# Patient Record
Sex: FEMALE | Race: WHITE | NOT HISPANIC OR LATINO | Employment: OTHER | URBAN - METROPOLITAN AREA
[De-identification: names, ages, dates, MRNs, and addresses within clinical notes are randomized per-mention and may not be internally consistent; named-entity substitution may affect disease eponyms.]

---

## 2017-01-23 ENCOUNTER — HOSPITAL ENCOUNTER (OUTPATIENT)
Dept: MAMMOGRAPHY | Facility: CLINIC | Age: 77
Discharge: HOME | End: 2017-01-23
Attending: OBSTETRICS & GYNECOLOGY | Admitting: OBSTETRICS & GYNECOLOGY
Payer: COMMERCIAL

## 2017-01-23 DIAGNOSIS — Z12.31 VISIT FOR SCREENING MAMMOGRAM: ICD-10-CM

## 2017-01-23 PROCEDURE — 77063 BREAST TOMOSYNTHESIS BI: CPT

## 2017-01-23 PROCEDURE — G0202 SCR MAMMO BI INCL CAD: HCPCS

## 2017-02-22 ENCOUNTER — OFFICE VISIT (OUTPATIENT)
Dept: FAMILY MEDICINE | Facility: CLINIC | Age: 77
End: 2017-02-22

## 2017-02-22 VITALS
HEART RATE: 86 BPM | SYSTOLIC BLOOD PRESSURE: 120 MMHG | DIASTOLIC BLOOD PRESSURE: 80 MMHG | WEIGHT: 141 LBS | OXYGEN SATURATION: 97 % | RESPIRATION RATE: 16 BRPM | TEMPERATURE: 99.4 F | HEIGHT: 63 IN | BODY MASS INDEX: 24.98 KG/M2

## 2017-02-22 DIAGNOSIS — E03.8 OTHER SPECIFIED HYPOTHYROIDISM: ICD-10-CM

## 2017-02-22 DIAGNOSIS — J10.1 INFLUENZA A: Primary | ICD-10-CM

## 2017-02-22 LAB
FLUAV AG UPPER RESP QL IA.RAPID: POSITIVE
FLUBV AG UPPER RESP QL IA.RAPID: NEGATIVE

## 2017-02-22 PROCEDURE — 87804 INFLUENZA ASSAY W/OPTIC: CPT | Performed by: FAMILY MEDICINE

## 2017-02-22 PROCEDURE — 99213 OFFICE O/P EST LOW 20 MIN: CPT | Performed by: FAMILY MEDICINE

## 2017-02-22 RX ORDER — OSELTAMIVIR PHOSPHATE 75 MG/1
75 CAPSULE ORAL 2 TIMES DAILY
Qty: 10 CAPSULE | Refills: 0 | Status: SHIPPED | OUTPATIENT
Start: 2017-02-22 | End: 2017-03-15

## 2017-02-22 RX ORDER — BENZONATATE 100 MG/1
100 CAPSULE ORAL 3 TIMES DAILY PRN
Qty: 21 CAPSULE | Refills: 0 | Status: SHIPPED | OUTPATIENT
Start: 2017-02-22 | End: 2017-03-15

## 2017-02-22 RX ORDER — LEVOTHYROXINE SODIUM 50 UG/1
50 TABLET ORAL DAILY
Qty: 30 TABLET | Refills: 0 | Status: SHIPPED | OUTPATIENT
Start: 2017-02-22 | End: 2017-03-15

## 2017-02-22 NOTE — TELEPHONE ENCOUNTER
LES, the patient is due for an office visit. I have changed the quantity to #30 and put in a note that the patient is due for an ov.    Pending Prescriptions:                       Disp   Refills    levothyroxine (SYNTHROID/LEVOTHROID) 50 M*30 tab*0            Sig: Take 1 tablet (50 mcg) by mouth daily      Ready to be faxed when Rx is approved.    Please forward to the  so the can call the patient and help them set up an appointment.      Telephone Information:   Mobile [phone]         Thank-You,  Rocío

## 2017-02-22 NOTE — PROGRESS NOTES
SUBJECTIVE: 76 year old female complaining of nasal congestion and cough for 2 day(s).  Fever to 101 yesterday evening  The patient describes headache with each cough.   The patient denies a history of SOB, GI symptoms or pain.   Smoking history: No.   Relevant past medical history: positive for hypertension, elevated cholesterol and hypothyroid.    Past Surgical History   Procedure Laterality Date     C appendectomy  1955     C aff forearm/wrist surgery unlisted       orif radius fx distal left     Hc knee scope, diagnostic       Arthroscopy, Knee     Hc colonoscopy w snare removal tumor/polyp/lesion  9/03 & 3/2011     polyp removal during screening colonoscopy     Hc dilation/curettage diag/ther non ob  2006     D & C     C open tacho fixatn humeral shaft fx  12/2007     Dr Massey     Arthroplasty knee  10/25/2011     Procedure:ARTHROPLASTY KNEE; Right Total Knee Arthroplasty   ; Surgeon:BRYCE ATWOOD; Location: OR     Arthroplasty knee  6/2/2014     Procedure: ARTHROPLASTY KNEE;  Surgeon: Bryce Atwood MD;  Location:  OR     Current Outpatient Prescriptions   Medication     simvastatin (ZOCOR) 20 MG tablet     amLODIPine (NORVASC) 5 MG tablet     naproxen sodium (ANAPROX) 220 MG tablet     levothyroxine (SYNTHROID, LEVOTHROID) 50 MCG tablet     No current facility-administered medications for this visit.      .    OBJECTIVE: The patient appears healthy, alert, no distress, cooperative, smiling and fatigued.   EARS: negative  NOSE/SINUS: positive findings: mucosa erythematous and swollen, clear rhinorrhea   THROAT: normal, no tonsillar hypertrophy, no exudates present and post nasal drainage   NECK:Neck supple. No adenopathy. Thyroid symmetric, normal size,, Carotids without bruits.   CHEST: Clear with dry cough  Flu: positive A    ASSESSMENT:(J10.1) Influenza A  (primary encounter diagnosis)  Comment: follow handout  Plan: Influenza A and B (BFP), oseltamivir (TAMIFLU)         75 MG capsule,  benzonatate (TESSALON) 100 MG         capsule        Symptomatic care with decongestants, fluids, tylenol/advil prn. Use GUAIFENESIN  MG OR TBCR, 1 tab po BID (Twice per day), D: 20, R: 0 for congestion and cough.    In addition, I have suggested that the patient   monitor for symptoms of bacterial infection expecting slow gradual resolution of viral URI as the natural course.  Monitor for any worrisome symptoms.

## 2017-02-22 NOTE — PATIENT INSTRUCTIONS
Read flu handout and follow    Symptomatic care with decongestants, fluids, tylenol/advil prn. Use GUAIFENESIN  MG OR TBCR, 1 tab po BID (Twice per day), D: 20, R: 0 for congestion and cough.    In addition, I have suggested that the patient   monitor for symptoms of bacterial infection expecting slow gradual resolution of viral URI as the natural course.  Potential medication side effects were discussed with the patient; let me know if any occur.

## 2017-02-22 NOTE — NURSING NOTE
Patient is here for a cough and congestion that started on Sunday - she is now having sinus pressure and headaches.  Pre-Visit Screening not done today.  Pulse - irregular  My Chart - declines    CLASSIFICATION OF OVERWEIGHT AND OBESITY BY BMI                         Obesity Class           BMI(kg/m2)  Underweight                                    < 18.5  Normal                                         18.5-24.9  Overweight                                     25.0-29.9  OBESITY                     I                  30.0-34.9                              II                 35.0-39.9  EXTREME OBESITY             III                >40                             Patient's  BMI Body mass index is 24.98 kg/(m^2).  http://hin.nhlbi.nih.gov/menuplanner/menu.cgi  Questioned patient about current smoking habits.  Pt. has never smoked.

## 2017-02-22 NOTE — TELEPHONE ENCOUNTER
Please let the patient know that a 30 day refill has been sent for their prescription.  They are due for a return fasting office visit within 30 days.  Failure to schedule an appointment may result in no further refills of his/her medication.

## 2017-02-22 NOTE — MR AVS SNAPSHOT
After Visit Summary   2/22/2017    Kimberley Mullins    MRN: [id number]           Patient Information     Date Of Birth          1940        Visit Information        Provider Department      2/22/2017 10:00 AM Sheron Armenta MD [facility]        Today's Diagnoses     Influenza A    -  1      Care Instructions    Read flu handout and follow    Symptomatic care with decongestants, fluids, tylenol/advil prn. Use GUAIFENESIN  MG OR TBCR, 1 tab po BID (Twice per day), D: 20, R: 0 for congestion and cough.    In addition, I have suggested that the patient   monitor for symptoms of bacterial infection expecting slow gradual resolution of viral URI as the natural course.  Potential medication side effects were discussed with the patient; let me know if any occur.          Follow-ups after your visit        Follow-up notes from your care team     Return if symptoms worsen or fail to improve.      Who to contact     If you have questions or need follow up information about today's clinic visit or your schedule please contact [facility] directly at [phone].  Normal or non-critical lab and imaging results will be communicated to you by MyChart, letter or phone within 4 business days after the clinic has received the results. If you do not hear from us within 7 days, please contact the clinic through MyChart or phone. If you have a critical or abnormal lab result, we will notify you by phone as soon as possible.  Submit refill requests through [employer] or call your pharmacy and they will forward the refill request to us. Please allow 3 business days for your refill to be completed.          Additional Information About Your Visit        Care EveryWhere ID     This is your Care EveryWhere ID. This could be used by other organizations to access your [facility] medical records  [id number]        Your Vitals Were     Pulse Temperature Respirations Height  "Pulse Oximetry BMI (Body Mass Index)    86 99.4  F (37.4  C) (Oral) 16 1.6 m (5' 3\") 97% 24.98 kg/m2       Blood Pressure from Last 3 Encounters:   02/22/17 120/80   10/10/16 102/66   07/20/16 110/70    Weight from Last 3 Encounters:   02/22/17 64 kg (141 lb)   10/10/16 63 kg (139 lb)   07/20/16 64.4 kg (142 lb)              We Performed the Following     Influenza A and B (BFP)          Today's Medication Changes          These changes are accurate as of: 2/22/17 11:02 AM.  If you have any questions, ask your nurse or doctor.               Start taking these medicines.        Dose/Directions    benzonatate 100 MG capsule   Commonly known as:  TESSALON   Used for:  Influenza A   Started by:  Sheron Armenta MD        Dose:  100 mg   Take 1 capsule (100 mg) by mouth 3 times daily as needed   Quantity:  21 capsule   Refills:  0       oseltamivir 75 MG capsule   Commonly known as:  TAMIFLU   Used for:  Influenza A   Started by:  Sheron Armenta MD        Dose:  75 mg   Take 1 capsule (75 mg) by mouth 2 times daily   Quantity:  10 capsule   Refills:  0            Where to get your medicines      These medications were sent to [place] [address] [facility] [address]  [address] [facility] [address]     Phone:  [phone]     benzonatate 100 MG capsule    levothyroxine 50 MCG tablet    oseltamivir 75 MG capsule                Primary Care Provider Office Phone # Fax #    Sheron Armenta [phone]       [place] [address] [facility]  [address]  [facility] [address]        Thank you!     Thank you for choosing [facility]  for your care. Our goal is always to provide you with excellent care. Hearing back from our patients is one way we can continue to improve our services. Please take a few minutes to complete the written survey that you may receive in the mail after your visit with us. Thank you!             Your Updated Medication List - " Protect others around you: Learn how to safely use, store and throw away your medicines at www.disposemymeds.org.          This list is accurate as of: 2/22/17 11:02 AM.  Always use your most recent med list.                   Brand Name Dispense Instructions for use    amLODIPine 5 MG tablet    NORVASC    90 tablet    Take 1 tablet (5 mg) by mouth daily       benzonatate 100 MG capsule    TESSALON    21 capsule    Take 1 capsule (100 mg) by mouth 3 times daily as needed       levothyroxine 50 MCG tablet    SYNTHROID/LEVOTHROID    30 tablet    Take 1 tablet (50 mcg) by mouth daily       naproxen sodium 220 MG tablet    ANAPROX     Take 1 tablet (220 mg) by mouth 2 times daily (with meals)       oseltamivir 75 MG capsule    TAMIFLU    10 capsule    Take 1 capsule (75 mg) by mouth 2 times daily       simvastatin 20 MG tablet    ZOCOR    90 tablet    Take 1 tablet (20 mg) by mouth At Bedtime

## 2017-03-15 ENCOUNTER — OFFICE VISIT (OUTPATIENT)
Dept: FAMILY MEDICINE | Facility: CLINIC | Age: 77
End: 2017-03-15

## 2017-03-15 VITALS
OXYGEN SATURATION: 97 % | BODY MASS INDEX: 24.45 KG/M2 | HEART RATE: 68 BPM | WEIGHT: 138 LBS | SYSTOLIC BLOOD PRESSURE: 122 MMHG | TEMPERATURE: 98.4 F | DIASTOLIC BLOOD PRESSURE: 82 MMHG | HEIGHT: 63 IN | RESPIRATION RATE: 12 BRPM

## 2017-03-15 DIAGNOSIS — I10 BENIGN ESSENTIAL HYPERTENSION: ICD-10-CM

## 2017-03-15 DIAGNOSIS — E03.8 OTHER SPECIFIED HYPOTHYROIDISM: ICD-10-CM

## 2017-03-15 DIAGNOSIS — Z76.0 ENCOUNTER FOR MEDICATION REFILL: ICD-10-CM

## 2017-03-15 DIAGNOSIS — E78.2 MIXED HYPERLIPIDEMIA: Primary | ICD-10-CM

## 2017-03-15 LAB — HEMOGLOBIN: 13.2 G/DL (ref 11.7–15.7)

## 2017-03-15 PROCEDURE — 85018 HEMOGLOBIN: CPT | Performed by: FAMILY MEDICINE

## 2017-03-15 PROCEDURE — 80061 LIPID PANEL: CPT | Mod: 90 | Performed by: FAMILY MEDICINE

## 2017-03-15 PROCEDURE — 84443 ASSAY THYROID STIM HORMONE: CPT | Mod: 90 | Performed by: FAMILY MEDICINE

## 2017-03-15 PROCEDURE — 36415 COLL VENOUS BLD VENIPUNCTURE: CPT | Performed by: FAMILY MEDICINE

## 2017-03-15 PROCEDURE — 99214 OFFICE O/P EST MOD 30 MIN: CPT | Performed by: FAMILY MEDICINE

## 2017-03-15 PROCEDURE — 80053 COMPREHEN METABOLIC PANEL: CPT | Mod: 90 | Performed by: FAMILY MEDICINE

## 2017-03-15 RX ORDER — LEVOTHYROXINE SODIUM 50 UG/1
50 TABLET ORAL DAILY
Qty: 90 TABLET | Refills: 3 | Status: SHIPPED | OUTPATIENT
Start: 2017-03-15 | End: 2018-03-14

## 2017-03-15 RX ORDER — SIMVASTATIN 20 MG
20 TABLET ORAL AT BEDTIME
Qty: 90 TABLET | Refills: 1 | Status: SHIPPED | OUTPATIENT
Start: 2017-03-15 | End: 2017-09-18

## 2017-03-15 RX ORDER — AMLODIPINE BESYLATE 5 MG/1
5 TABLET ORAL DAILY
Qty: 90 TABLET | Refills: 1 | Status: SHIPPED | OUTPATIENT
Start: 2017-03-15 | End: 2017-09-18

## 2017-03-15 NOTE — MR AVS SNAPSHOT
After Visit Summary   3/15/2017    Kimberley Mullins    MRN: [id number]           Patient Information     Date Of Birth          1940        Visit Information        Provider Department      3/15/2017 8:50 AM Sheron Armenta MD [facility]        Today's Diagnoses     Mixed hyperlipidemia    -  1    Benign essential hypertension        Other specified hypothyroidism        Encounter for medication refill          Care Instructions    HGB WNL    1)  Medication: continue current medication regimen unchanged  2)  Low fat, low cholesterol diet and low salt  3)  Regular aerobic exercise and weight loss  4)  Recheck in 6 months, sooner should new symptoms or   problems arise.    Patient Education: Reviewed risks of elevated lipids and principles   of treatment.            Follow-ups after your visit        Follow-up notes from your care team     Return in about 6 months (around 9/15/2017).      Who to contact     If you have questions or need follow up information about today's clinic visit or your schedule please contact [facility] directly at [phone].  Normal or non-critical lab and imaging results will be communicated to you by MyChart, letter or phone within 4 business days after the clinic has received the results. If you do not hear from us within 7 days, please contact the clinic through [employer] or phone. If you have a critical or abnormal lab result, we will notify you by phone as soon as possible.  Submit refill requests through [employer] or call your pharmacy and they will forward the refill request to us. Please allow 3 business days for your refill to be completed.          Additional Information About Your Visit        Care EveryWhere ID     This is your Care EveryWhere ID. This could be used by other organizations to access your [facility] medical records  [id number]        Your Vitals Were     Pulse Temperature Respirations Height Pulse  "Oximetry BMI (Body Mass Index)    68 98.4  F (36.9  C) (Oral) 12 1.588 m (5' 2.5\") 97% 24.84 kg/m2       Blood Pressure from Last 3 Encounters:   03/15/17 122/82   02/22/17 120/80   10/10/16 102/66    Weight from Last 3 Encounters:   03/15/17 62.6 kg (138 lb)   02/22/17 64 kg (141 lb)   10/10/16 63 kg (139 lb)              We Performed the Following     COMPREHENSIVE METABOLIC PANEL     HEMOGLOBIN [65598.000]     LIPID PANEL     TSH ([facility])     VENIPUNC FNGR,HEEL,EAR [[id number]]          Where to get your medicines      These medications were sent to [place] [address] [facility] - [place], [place] - [address]  [address] [facility] [address]     Phone:  [phone]     amLODIPine 5 MG tablet    levothyroxine 50 MCG tablet    simvastatin 20 MG tablet          Primary Care Provider Office Phone # Fax #    Sheron Armenta [phone]       [facility] [address]  [address]  [facility] [address]        Thank you!     Thank you for choosing [facility]  for your care. Our goal is always to provide you with excellent care. Hearing back from our patients is one way we can continue to improve our services. Please take a few minutes to complete the written survey that you may receive in the mail after your visit with us. Thank you!             Your Updated Medication List - Protect others around you: Learn how to safely use, store and throw away your medicines at www.disposemymeds.org.          This list is accurate as of: 3/15/17 10:27 AM.  Always use your most recent med list.                   Brand Name Dispense Instructions for use    amLODIPine 5 MG tablet    NORVASC    90 tablet    Take 1 tablet (5 mg) by mouth daily       levothyroxine 50 MCG tablet    SYNTHROID/LEVOTHROID    90 tablet    Take 1 tablet (50 mcg) by mouth daily       simvastatin 20 MG tablet    ZOCOR    90 tablet    Take 1 tablet (20 mg) by mouth At Bedtime         "

## 2017-03-15 NOTE — NURSING NOTE
Patient is here for a recheck of their medication.  Pre-Visit Screening :  Immunizations : up to date    Colonoscopy : NO LONGER NEEDS PER PT   Mammogram : THROUGH GYN  Asthma Action Test/Plan : NA  PHQ9/GAD7 :  NA  Pulse - regular    Medication Reconciliation: complete      CLASSIFICATION OF OVERWEIGHT AND OBESITY BY BMI                         Obesity Class           BMI(kg/m2)  Underweight                                    < 18.5  Normal                                         18.5-24.9  Overweight                                     25.0-29.9  OBESITY                     I                  30.0-34.9                              II                 35.0-39.9  EXTREME OBESITY             III                >40                             Patient's  BMI Body mass index is 24.84 kg/(m^2).  http://hin.nhlbi.nih.gov/menuplanner/menu.cgi  Questioned patient about current smoking habits.  Pt. has never smoked.

## 2017-03-15 NOTE — PROGRESS NOTES
"SUBJECTIVE:  Kimberley Mullins is an 76 year old female who presents for evaluation of   Hyperlipidemia, hypertension and hypothyroid. She has been diagnosed in the past as having   combined hyperlipidemia. She indicates that she is feeling well   and denies any symptoms of cardiovascular disease. Specifically   denies chest pain, palpitations, dyspnea, orthopnea, PND,   claudication or peripheral edema. Treatment modalities employed to   this point include diet, regular aerobic exercise and Zocor. Current medication   regimen is as listed below. Patient denies any side effects of   medication.    Family history: positive for hypertension, cardiovascular disease and elevated cholesterol  Age at diagnosis of hyperlipidemia: 59 and hypertension age 73  Cardiovascular risk factors: family history, lipids, hypertension, sedentary life style and stress    Current Outpatient Prescriptions   Medication     levothyroxine (SYNTHROID/LEVOTHROID) 50 MCG tablet     simvastatin (ZOCOR) 20 MG tablet     amLODIPine (NORVASC) 5 MG tablet     No current facility-administered medications for this visit.      Allergies   Allergen Reactions     No Known Allergies        Social History   Substance Use Topics     Smoking status: Never Smoker     Smokeless tobacco: Never Used     Alcohol use 3.5 oz/week     7 drink(s) per week      Comment: two to three glasses of wine q day.       OBJECTIVE:  /82 (BP Location: Right arm, Patient Position: Chair, Cuff Size: Adult Regular)  Pulse 68  Temp 98.4  F (36.9  C) (Oral)  Ht 1.588 m (5' 2.5\")  Wt 62.6 kg (138 lb)  SpO2 97%  BMI 24.84 kg/m2  Repeat BP R arm seated = 122/82  with regular size cuff.  Skin: negative  Fundi: deferred  Lungs: negative, Percussion normal. Good diaphragmatic excursion. Lungs clear  Heart: negative, PMI normal. No lifts, heaves, or thrills. RRR. No murmurs, clicks gallops or rub  Peripheral pulses: radial=4/4, femoral=4/4, popliteal=4/4, dorsalis pedis=4/4,  Abd: The " abdomen is soft without tenderness, guarding, mass or organomegaly. Bowel sounds are normal. No CVA tenderness or inguinal adenopathy noted.  BMI : Body mass index is 24.84 kg/(m^2).    ASSESSMENT:  (E78.2) Mixed hyperlipidemia  (primary encounter diagnosis)  Plan: VENIPUNC FNGR,HEEL,EAR [06277], LIPID PANEL,         simvastatin (ZOCOR) 20 MG tablet        1)  Medication: continue current medication regimen unchanged  2)  Low fat, low cholesterol diet  3)  Regular aerobic exercise  4)  Recheck in 6 months, sooner should new symptoms or   problems arise.    Patient Education: Reviewed risks of elevated lipids and principles   of treatment.        (I10) Benign essential hypertension  Plan: VENIPUNC FNGR,HEEL,EAR [06232], COMPREHENSIVE         METABOLIC PANEL, amLODIPine (NORVASC) 5 MG         tablet        1)  Medication: continue current medication regimen unchanged  2)  Dietary sodium restriction  3)  Regular aerobic exercise  4)  Recheck in 6 months, sooner should new symptoms or   problems arise.    Patient Education: Reviewed risks of hypertension and principles of   treatment.        (E03.8) Other specified hypothyroidism  Plan: VENIPUNC FNGR,HEEL,EAR [87108], HEMOGLOBIN         [96464.000], TSH ([facility]), levothyroxine         (SYNTHROID/LEVOTHROID) 50 MCG tablet        Await labs    (Z76.0) Encounter for medication refill  Plan: VENIPUNC FNGR,HEEL,EAR [06657], HEMOGLOBIN         [78807.000], LIPID PANEL, COMPREHENSIVE         METABOLIC PANEL, TSH ([facility])

## 2017-03-15 NOTE — PATIENT INSTRUCTIONS
HGB WNL    1)  Medication: continue current medication regimen unchanged  2)  Low fat, low cholesterol diet and low salt  3)  Regular aerobic exercise and weight loss  4)  Recheck in 6 months, sooner should new symptoms or   problems arise.    Patient Education: Reviewed risks of elevated lipids and principles   of treatment.

## 2017-03-15 NOTE — LETTER
[facility]  [facility] [address]  [place]  [address]    March 16, 2017        Kimberley Mullins  [address] [facility] [address]              Dear Kimberley Mullins      LAB RESULTS:     The results of your recent Comprehensive profile, Lipid profile and Thyroid Function were NORMAL.  If you have any further questions or problems, please contact our office at [phone].          Sheron Armenta MD

## 2017-03-16 LAB
ALBUMIN SERPL-MCNC: 4 G/DL (ref 3.6–5.1)
ALBUMIN/GLOB SERPL: 1.6 (CALC) (ref 1–2.5)
ALP SERPL-CCNC: 63 U/L (ref 33–130)
ALT SERPL-CCNC: 16 U/L (ref 6–29)
AST SERPL-CCNC: 20 U/L (ref 10–35)
BILIRUB SERPL-MCNC: 0.6 MG/DL (ref 0.2–1.2)
BUN SERPL-MCNC: 9 MG/DL (ref 7–25)
BUN/CREATININE RATIO: NORMAL (CALC) (ref 6–22)
CALCIUM SERPL-MCNC: 9.1 MG/DL (ref 8.6–10.4)
CHLORIDE SERPLBLD-SCNC: 104 MMOL/L (ref 98–110)
CHOLEST SERPL-MCNC: 170 MG/DL (ref 125–200)
CHOLEST/HDLC SERPL: 2.6 (CALC)
CO2 SERPL-SCNC: 26 MMOL/L (ref 20–31)
CREAT SERPL-MCNC: 0.61 MG/DL (ref 0.6–0.93)
EGFR AFRICAN AMERICAN - QUEST: 101 ML/MIN/1.73M2
GFR SERPL CREATININE-BSD FRML MDRD: 87 ML/MIN/1.73M2
GLOBULIN, CALCULATED - QUEST: 2.5 G/DL (CALC) (ref 1.9–3.7)
GLUCOSE - QUEST: 87 MG/DL (ref 65–99)
HDLC SERPL-MCNC: 65 MG/DL
LDLC SERPL CALC-MCNC: 86 MG/DL (CALC)
NONHDLC SERPL-MCNC: 105 MG/DL (CALC)
POTASSIUM SERPL-SCNC: 4.2 MMOL/L (ref 3.5–5.3)
PROT SERPL-MCNC: 6.5 G/DL (ref 6.1–8.1)
SODIUM SERPL-SCNC: 140 MMOL/L (ref 135–146)
TRIGL SERPL-MCNC: 96 MG/DL
TSH SERPL-ACNC: 1.69 MIU/L (ref 0.4–4.5)

## 2017-06-05 ENCOUNTER — OFFICE VISIT (OUTPATIENT)
Dept: FAMILY MEDICINE | Facility: CLINIC | Age: 77
End: 2017-06-05

## 2017-06-05 VITALS
DIASTOLIC BLOOD PRESSURE: 70 MMHG | HEART RATE: 76 BPM | RESPIRATION RATE: 16 BRPM | SYSTOLIC BLOOD PRESSURE: 110 MMHG | WEIGHT: 140 LBS | OXYGEN SATURATION: 97 % | HEIGHT: 63 IN | BODY MASS INDEX: 24.8 KG/M2 | TEMPERATURE: 98.2 F

## 2017-06-05 DIAGNOSIS — J34.2 DEVIATED NASAL SEPTUM: ICD-10-CM

## 2017-06-05 DIAGNOSIS — R04.0 NASAL BLEEDING: Primary | ICD-10-CM

## 2017-06-05 LAB
% GRANULOCYTES: 48.5 %
HCT VFR BLD AUTO: 42.8 % (ref 35–47)
HEMOGLOBIN: 14.2 G/DL (ref 11.7–15.7)
INR POINT OF CARE: 0.9 (ref 0.9–1.1)
LYMPHOCYTES NFR BLD AUTO: 37.9 %
MCH RBC QN AUTO: 31.9 PG (ref 26–33)
MCHC RBC AUTO-ENTMCNC: 33.2 G/DL (ref 31–36)
MCV RBC AUTO: 96.1 FL (ref 78–100)
MONOCYTES NFR BLD AUTO: 13.6 %
PLATELET COUNT - QUEST: 142 10^9/L (ref 150–375)
RBC # BLD AUTO: 4.45 10*12/L (ref 3.8–5.2)
WBC # BLD AUTO: 7.1 10*9/L (ref 4–11)

## 2017-06-05 PROCEDURE — 99213 OFFICE O/P EST LOW 20 MIN: CPT | Performed by: FAMILY MEDICINE

## 2017-06-05 PROCEDURE — 85025 COMPLETE CBC W/AUTO DIFF WBC: CPT | Performed by: FAMILY MEDICINE

## 2017-06-05 PROCEDURE — 36415 COLL VENOUS BLD VENIPUNCTURE: CPT | Performed by: FAMILY MEDICINE

## 2017-06-05 PROCEDURE — 85610 PROTHROMBIN TIME: CPT | Performed by: FAMILY MEDICINE

## 2017-06-05 NOTE — NURSING NOTE
Patient is here to talk about a couple of nose bleeds she has had recently.  Pre-Visit Screening :  Immunizations : up to date    Colonoscopy : na  Mammogram : na  Asthma Action Test/Plan : na  PHQ9/GAD7 :  Na  Pulse - regular  My Chart - declines    CLASSIFICATION OF OVERWEIGHT AND OBESITY BY BMI                         Obesity Class           BMI(kg/m2)  Underweight                                    < 18.5  Normal                                         18.5-24.9  Overweight                                     25.0-29.9  OBESITY                     I                  30.0-34.9                              II                 35.0-39.9  EXTREME OBESITY             III                >40                             Patient's  BMI Body mass index is 25.2 kg/(m^2).  http://hin.nhlbi.nih.gov/menuplanner/menu.cgi  Questioned patient about current smoking habits.  Pt. has never smoked.

## 2017-06-05 NOTE — PROGRESS NOTES
SUBJECTIVE: 76 year old female complaining of nose bleed down the back of her throat at 3 am awaking her from sleep 5 days ago. Squeezed her nose and it stopped quickly.  No issue for 2 days. 2 night s ago laye down and nose bleed down the back of throat/ gone quickly with nose squeeze for 2 minutes.   The patient describes left nostril feel raw.   The patient denies a history of pain, trauma or UR symptoms. Was using Flonase but not for a few weeks.   Smoking history: No.   Relevant past medical history: positive for left septum nasal deviation, hypertension, elevated cholesterol and hypothyroid.    OBJECTIVE: The patient appears healthy, alert, no distress, cooperative and smiling.   EARS: negative  NOSE/SINUS: positive findings: nasal septal deviation to left No mucosa changes/ no bleeding noted  THROAT: normal   NECK:Neck supple. No adenopathy. Thyroid symmetric, normal size,, Carotids without bruits.   CHEST: Clear    INR: WNL  CBC: WNL    ASSESSMENT:(R04.0) Nasal bleeding  (primary encounter diagnosis)  Comment: start daily bacitracin ointment to both nares  Plan: CL AFF HEMOGRAM/PLATE/DIFF (BFP), VENOUS         COLLECTION, CL BFP PROTHROMBIN TIME/INR (BFP),         OTOLARYNGOLOGY REFERRAL        Stop Flonase and monitor.    Appointment this week with Dr fish , ENT scheduled. Thursday 1:10 PM    (J34.2) Deviated nasal septum  Plan: OTOLARYNGOLOGY REFERRAL

## 2017-06-05 NOTE — PATIENT INSTRUCTIONS
Nasal bleeding  (primary encounter diagnosis)  Comment: start daily bacitracin ointment to both nares  Plan: CL AFF HEMOGRAM/PLATE/DIFF (BFP), VENOUS         COLLECTION, CL BFP PROTHROMBIN TIME/INR (BFP),         OTOLARYNGOLOGY REFERRAL        Stop Flonase and monitor.    Appointment this week with Dr fish , ENT scheduled. Thursday 1:10 PM    (J34.2) Deviated nasal septum  Plan: OTOLARYNGOLOGY REFERRAL

## 2017-06-05 NOTE — MR AVS SNAPSHOT
After Visit Summary   6/5/2017    Kimberley Mullins    MRN: [id number]           Patient Information     Date Of Birth          1940        Visit Information        Provider Department      6/5/2017 12:30 PM Sheron Armenta MD [facility]        Today's Diagnoses     Nasal bleeding    -  1    Deviated nasal septum          Care Instructions    Nasal bleeding  (primary encounter diagnosis)  Comment: start daily bacitracin ointment to both nares  Plan: CL AFF HEMOGRAM/PLATE/DIFF (BFP), VENOUS         COLLECTION, CL BFP PROTHROMBIN TIME/INR (BFP),         OTOLARYNGOLOGY REFERRAL        Stop Flonase and monitor.    Appointment this week with Dr fish , ENT scheduled. Thursday 1:10 PM    (J34.2) Deviated nasal septum  Plan: OTOLARYNGOLOGY REFERRAL            Follow-ups after your visit        Additional Services     OTOLARYNGOLOGY REFERRAL       Your provider has referred you to: N: [facility] [phone]   [email or web address]    Please be aware that coverage of these services is subject to the terms and limitations of your health insurance plan.  Call member services at your health plan with any benefit or coverage questions.      Please bring the following with you to your appointment:    (1) Any X-Rays, CTs or MRIs which have been performed.  Contact the facility where they were done to arrange for  prior to your scheduled appointment.   (2) List of current medications  (3) This referral request   (4) Any documents/labs given to you for this referral                  Follow-up notes from your care team     Return if symptoms worsen or fail to improve.      Who to contact     If you have questions or need follow up information about today's clinic visit or your schedule please contact [facility] directly at [phone].  Normal or non-critical lab and imaging results will be communicated to you by  "MyChart, letter or phone within 4 business days after the clinic has received the results. If you do not hear from us within 7 days, please contact the clinic through MyChart or phone. If you have a critical or abnormal lab result, we will notify you by phone as soon as possible.  Submit refill requests through [employer] or call your pharmacy and they will forward the refill request to us. Please allow 3 business days for your refill to be completed.          Additional Information About Your Visit        Care EveryWhere ID     This is your Care EveryWhere ID. This could be used by other organizations to access your [facility] medical records  [id number]        Your Vitals Were     Pulse Temperature Respirations Height Pulse Oximetry BMI (Body Mass Index)    76 98.2  F (36.8  C) (Oral) 16 1.588 m (5' 2.5\") 97% 25.2 kg/m2       Blood Pressure from Last 3 Encounters:   06/05/17 110/70   03/15/17 122/82   02/22/17 120/80    Weight from Last 3 Encounters:   06/05/17 63.5 kg (140 lb)   03/15/17 62.6 kg (138 lb)   02/22/17 64 kg (141 lb)              We Performed the Following     CL AFF HEMOGRAM/PLATE/DIFF (BFP)     CL BFP PROTHROMBIN TIME/INR (BFP)     OTOLARYNGOLOGY REFERRAL     VENOUS COLLECTION        Primary Care Provider Office Phone # Fax #    Sheron Armenta [phone]       [place] [address] [address] [facility]  [address]  [facility] [address]        Thank you!     Thank you for choosing [facility]  for your care. Our goal is always to provide you with excellent care. Hearing back from our patients is one way we can continue to improve our services. Please take a few minutes to complete the written survey that you may receive in the mail after your visit with us. Thank you!             Your Updated Medication List - Protect others around you: Learn how to safely use, store and throw away your medicines at www.disposemymeds.org.          This list is accurate as of: " 6/5/17  2:39 PM.  Always use your most recent med list.                   Brand Name Dispense Instructions for use    amLODIPine 5 MG tablet    NORVASC    90 tablet    Take 1 tablet (5 mg) by mouth daily       levothyroxine 50 MCG tablet    SYNTHROID/LEVOTHROID    90 tablet    Take 1 tablet (50 mcg) by mouth daily       simvastatin 20 MG tablet    ZOCOR    90 tablet    Take 1 tablet (20 mg) by mouth At Bedtime

## 2017-06-08 ENCOUNTER — TRANSFERRED RECORDS (OUTPATIENT)
Dept: FAMILY MEDICINE | Facility: CLINIC | Age: 77
End: 2017-06-08

## 2017-08-04 ENCOUNTER — OFFICE VISIT (OUTPATIENT)
Dept: FAMILY MEDICINE | Facility: CLINIC | Age: 77
End: 2017-08-04

## 2017-08-04 VITALS
RESPIRATION RATE: 14 BRPM | BODY MASS INDEX: 26.31 KG/M2 | WEIGHT: 143 LBS | HEART RATE: 89 BPM | TEMPERATURE: 98.1 F | OXYGEN SATURATION: 98 % | SYSTOLIC BLOOD PRESSURE: 122 MMHG | HEIGHT: 62 IN | DIASTOLIC BLOOD PRESSURE: 80 MMHG

## 2017-08-04 DIAGNOSIS — M25.512 LEFT SHOULDER PAIN, UNSPECIFIED CHRONICITY: Primary | ICD-10-CM

## 2017-08-04 LAB — ERYTHROCYTE [SEDIMENTATION RATE] IN BLOOD: 19 MM/HR (ref 0–20)

## 2017-08-04 PROCEDURE — 99213 OFFICE O/P EST LOW 20 MIN: CPT | Performed by: FAMILY MEDICINE

## 2017-08-04 PROCEDURE — 85651 RBC SED RATE NONAUTOMATED: CPT | Performed by: FAMILY MEDICINE

## 2017-08-04 PROCEDURE — 36415 COLL VENOUS BLD VENIPUNCTURE: CPT | Performed by: FAMILY MEDICINE

## 2017-08-04 NOTE — NURSING NOTE
Patient is here for a pain in her L shoulder - NKI - started a few d ago. Seems to be worse in the AM.  Pre-Visit Screening :  Immunizations : up to date    Colonoscopy : No longer needed due to age  Mammogram : No longer needed due to age  Asthma Action Test/Plan : na  PHQ9/GAD7 :  Na  Pulse - regular  My Chart - declines    CLASSIFICATION OF OVERWEIGHT AND OBESITY BY BMI                         Obesity Class           BMI(kg/m2)  Underweight                                    < 18.5  Normal                                         18.5-24.9  Overweight                                     25.0-29.9  OBESITY                     I                  30.0-34.9                              II                 35.0-39.9  EXTREME OBESITY             III                >40                             Patient's  BMI Body mass index is 26.16 kg/(m^2).  http://hin.nhlbi.nih.gov/menuplanner/menu.cgi  Questioned patient about current smoking habits.  Pt. has never smoked.

## 2017-08-04 NOTE — PROGRESS NOTES
SUBJECTIVE: 76 year old female complaining of left shoulder pain worse in the morning for 1 week(s).   The patient describes worse in the morning after sleeping on that side all night. Better a she goes through her day.   The patient denies a history of injury or neurological symptoms.   Smoking history: No.   Relevant past medical history: positive for surgery after a fracture in that humerus/ past polymyalgia rheumatic diagnosis .    OBJECTIVE: The patient appears healthy, alert, no distress, cooperative and smiling.   EARS: negative  NOSE/SINUS: Nares normal. Septum midline. Mucosa normal. No drainage or sinus tenderness.   THROAT: normal   NECK:Neck supple. No adenopathy. Thyroid symmetric, normal size,, Carotids without bruits.   CHEST: Clear  EXT: Shoulder exam shows a normal shoulder musculature with no atrophy.  Abduction is full and painless, patient can get arm over head.  Testing the rotator cuff specifically finds no special weakness of internal or external rotation.  The A-C joint is non tender and the periscapular musculature is normal. The hands reveal normal motor, sensory, vascular and tendon function.    Sed rate: 19/ called cell phone with results    ASSESSMENT: (M21.566) Left shoulder pain, unspecified chronicity  (primary encounter diagnosis)  Comment: change sleeping behavior  Plan: ESR, ALEXANDRE (BFP), VENOUS COLLECTION        Monitor.

## 2017-08-04 NOTE — MR AVS SNAPSHOT
"              After Visit Summary   8/4/2017    Kimberley Mullins    MRN: [id number]           Patient Information     Date Of Birth          1940        Visit Information        Provider Department      8/4/2017 1:45 PM Sheron Armenta MD [facility]        Today's Diagnoses     Left shoulder pain, unspecified chronicity    -  1      Care Instructions    Await labs    Change sleeping behavior/ consider a follow up wit DR Massey, Orthopedics            Follow-ups after your visit        Who to contact     If you have questions or need follow up information about today's clinic visit or your schedule please contact [facility] directly at [phone].  Normal or non-critical lab and imaging results will be communicated to you by MyChart, letter or phone within 4 business days after the clinic has received the results. If you do not hear from us within 7 days, please contact the clinic through MyChart or phone. If you have a critical or abnormal lab result, we will notify you by phone as soon as possible.  Submit refill requests through [employer] or call your pharmacy and they will forward the refill request to us. Please allow 3 business days for your refill to be completed.          Additional Information About Your Visit        Care EveryWhere ID     This is your Care EveryWhere ID. This could be used by other organizations to access your [facility] medical records  [id number]        Your Vitals Were     Pulse Temperature Respirations Height Pulse Oximetry BMI (Body Mass Index)    89 98.1  F (36.7  C) (Oral) 14 1.575 m (5' 2\") 98% 26.16 kg/m2       Blood Pressure from Last 3 Encounters:   08/04/17 122/80   06/05/17 110/70   03/15/17 122/82    Weight from Last 3 Encounters:   08/04/17 64.9 kg (143 lb)   06/05/17 63.5 kg (140 lb)   03/15/17 62.6 kg (138 lb)              We Performed the Following     ESR, WESTERGREN (BFP)     VENOUS COLLECTION        Primary Care " Provider Office Phone # Fax #    Sheron Armenta [phone]       [facility] [address]  [address]  [facility] [address]        Equal Access to Services     MARINE SCHULTZ : Paula saunders suzio Soalyali, waaxda luqadaha, qaybta kaalmada adeegyada, marianne carrolln denis skinner rashad palma. So [facility] [phone].    ATENCIÓN: Si habla español, tiene a neville disposición servicios gratuitos de asistencia lingüística. Llame al [phone].    We comply with applicable federal civil rights laws and [place] laws. We do not discriminate on the basis of race, color, national origin, age, disability sex, sexual orientation or gender identity.            Thank you!     Thank you for choosing [facility]  for your care. Our goal is always to provide you with excellent care. Hearing back from our patients is one way we can continue to improve our services. Please take a few minutes to complete the written survey that you may receive in the mail after your visit with us. Thank you!             Your Updated Medication List - Protect others around you: Learn how to safely use, store and throw away your medicines at www.disposemymeds.org.          This list is accurate as of: 8/4/17  3:46 PM.  Always use your most recent med list.                   Brand Name Dispense Instructions for use Diagnosis    amLODIPine 5 MG tablet    NORVASC    90 tablet    Take 1 tablet (5 mg) by mouth daily    Benign essential hypertension       levothyroxine 50 MCG tablet    SYNTHROID/LEVOTHROID    90 tablet    Take 1 tablet (50 mcg) by mouth daily    Other specified hypothyroidism       simvastatin 20 MG tablet    ZOCOR    90 tablet    Take 1 tablet (20 mg) by mouth At Bedtime    Mixed hyperlipidemia

## 2017-09-18 DIAGNOSIS — E78.2 MIXED HYPERLIPIDEMIA: ICD-10-CM

## 2017-09-18 DIAGNOSIS — I10 BENIGN ESSENTIAL HYPERTENSION: ICD-10-CM

## 2017-09-18 RX ORDER — SIMVASTATIN 20 MG
20 TABLET ORAL AT BEDTIME
Qty: 30 TABLET | Refills: 0 | Status: SHIPPED | OUTPATIENT
Start: 2017-09-18 | End: 2017-11-01

## 2017-09-18 RX ORDER — AMLODIPINE BESYLATE 5 MG/1
5 TABLET ORAL DAILY
Qty: 30 TABLET | Refills: 0 | Status: SHIPPED | OUTPATIENT
Start: 2017-09-18 | End: 2017-11-01

## 2017-09-18 NOTE — TELEPHONE ENCOUNTER
LES, the patient is due for an office visit. I have changed the quantity to #30 and put in a note that the patient is due for an ov.    Pending Prescriptions:                       Disp   Refills    amLODIPine (NORVASC) 5 MG tablet          30 tab*0            Sig: Take 1 tablet (5 mg) by mouth daily    simvastatin (ZOCOR) 20 MG tablet          30 tab*0            Sig: Take 1 tablet (20 mg) by mouth At Bedtime      Ready to be faxed when Rx is approved.    Please forward to the  so the can call the patient and help them set up an appointment.      Telephone Information:   Mobile [phone]         Thank-You,  Rocío

## 2017-11-01 ENCOUNTER — OFFICE VISIT (OUTPATIENT)
Dept: FAMILY MEDICINE | Facility: CLINIC | Age: 77
End: 2017-11-01

## 2017-11-01 VITALS
SYSTOLIC BLOOD PRESSURE: 120 MMHG | WEIGHT: 146 LBS | OXYGEN SATURATION: 98 % | DIASTOLIC BLOOD PRESSURE: 80 MMHG | TEMPERATURE: 98.5 F | HEART RATE: 72 BPM | HEIGHT: 63 IN | BODY MASS INDEX: 25.87 KG/M2 | RESPIRATION RATE: 16 BRPM

## 2017-11-01 DIAGNOSIS — Z76.0 ENCOUNTER FOR MEDICATION REFILL: ICD-10-CM

## 2017-11-01 DIAGNOSIS — I10 ESSENTIAL HYPERTENSION, BENIGN: ICD-10-CM

## 2017-11-01 DIAGNOSIS — Z23 NEED FOR VACCINATION: ICD-10-CM

## 2017-11-01 DIAGNOSIS — E78.2 MIXED HYPERLIPIDEMIA: Primary | ICD-10-CM

## 2017-11-01 PROCEDURE — 99213 OFFICE O/P EST LOW 20 MIN: CPT | Mod: 25 | Performed by: FAMILY MEDICINE

## 2017-11-01 PROCEDURE — G0008 ADMIN INFLUENZA VIRUS VAC: HCPCS | Performed by: FAMILY MEDICINE

## 2017-11-01 PROCEDURE — 80053 COMPREHEN METABOLIC PANEL: CPT | Mod: 90 | Performed by: FAMILY MEDICINE

## 2017-11-01 PROCEDURE — 36415 COLL VENOUS BLD VENIPUNCTURE: CPT | Performed by: FAMILY MEDICINE

## 2017-11-01 PROCEDURE — 80061 LIPID PANEL: CPT | Mod: 90 | Performed by: FAMILY MEDICINE

## 2017-11-01 PROCEDURE — 90686 IIV4 VACC NO PRSV 0.5 ML IM: CPT | Performed by: FAMILY MEDICINE

## 2017-11-01 RX ORDER — SIMVASTATIN 20 MG
20 TABLET ORAL AT BEDTIME
Qty: 90 TABLET | Refills: 1 | Status: SHIPPED | OUTPATIENT
Start: 2017-11-01 | End: 2018-04-05

## 2017-11-01 RX ORDER — AMLODIPINE BESYLATE 5 MG/1
5 TABLET ORAL DAILY
Qty: 90 TABLET | Refills: 1 | Status: SHIPPED | OUTPATIENT
Start: 2017-11-01 | End: 2018-04-05

## 2017-11-01 NOTE — PATIENT INSTRUCTIONS
1)  Medication: continue current medication regimen unchanged  2)  Low fat, low cholesterol diet and low salt  3)  Regular aerobic exercise and weight loss  4)  Recheck in 6 months, sooner should new symptoms or   problems arise.    Patient Education: Reviewed risks of elevated lipids and principles   of treatment.

## 2017-11-01 NOTE — NURSING NOTE
Patient is here for a recheck of their medication.  Pre-Visit Screening :  Immunizations : up to date    Colonoscopy : no longer needed due to age  Mammogram : no longer needed due to age  Asthma Action Test/Plan : kaity  PHQ9/GAD7 :  kaity    Medication Reconciliation: complete    My Chart - declines    CLASSIFICATION OF OVERWEIGHT AND OBESITY BY BMI                         Obesity Class           BMI(kg/m2)  Underweight                                    < 18.5  Normal                                         18.5-24.9  Overweight                                     25.0-29.9  OBESITY                     I                  30.0-34.9                              II                 35.0-39.9  EXTREME OBESITY             III                >40                             Patient's  BMI Body mass index is 26.28 kg/(m^2).  http://hin.nhlbi.nih.gov/menuplanner/menu.cgi  Questioned patient about current smoking habits.  Pt. has never smoked.

## 2017-11-01 NOTE — LETTER
November 3, 2017      Kimberley Mullins  [address] [facility] [address]        Dear ,    We are writing to inform you of your test results.    Your test results fall within the expected range(s) or remain unchanged from previous results.  Please continue with current treatment plan.    Resulted Orders   LIPID PANEL   Result Value Ref Range    Cholesterol 198 <200 mg/dL    HDL Cholesterol 73 >50 mg/dL    Triglycerides 103 <150 mg/dL    LDL Cholesterol Calculated 105 (H) mg/dL (calc)      Comment:      Reference range: <100     Desirable range <100 mg/dL for patients with CHD or  diabetes and <70 mg/dL for diabetic patients with  known heart disease.     LDL-C is now calculated using the Mala   calculation, which is a validated novel method providing   better accuracy than the Friedewald equation in the   estimation of LDL-C.   Butch SS et al. DEYANIRA. 2013;310(19): 6028-9040   (http://education.[employer].Kinsights/faq/JKX432)      Cholesterol/HDL Ratio 2.7 <5.0 (calc)    Non HDL Cholesterol 125 <130 mg/dL (calc)      Comment:      For patients with diabetes plus 1 major ASCVD risk   factor, treating to a non-HDL-C goal of <100 mg/dL   (LDL-C of <70 mg/dL) is considered a therapeutic   option.     COMPREHENSIVE METABOLIC PANEL   Result Value Ref Range    Glucose 87 65 - 99 mg/dL      Comment:                    Fasting reference interval         Urea Nitrogen 10 7 - 25 mg/dL    Creatinine 0.65 0.60 - 0.93 mg/dL      Comment:      For patients >49 years of age, the reference limit  for Creatinine is approximately 13% higher for people  identified as -American.         GFR Estimate 85 > OR = 60 mL/min/1.73m2    EGFR African American 99 > OR = 60 mL/min/1.73m2    BUN/Creatinine Ratio NOT APPLICABLE 6 - 22 (calc)    Sodium 139 135 - 146 mmol/L    Potassium 4.6 3.5 - 5.3 mmol/L    Chloride 101 98 - 110 mmol/L    Carbon Dioxide 27 20 - 31 mmol/L    Calcium 9.7 8.6 - 10.4 mg/dL    Protein  Total 7.2 6.1 - 8.1 g/dL    Albumin 4.4 3.6 - 5.1 g/dL    Globulin Calculated 2.8 1.9 - 3.7 g/dL (calc)    A/G Ratio 1.6 1.0 - 2.5 (calc)    Bilirubin Total 0.7 0.2 - 1.2 mg/dL    Alkaline Phosphatase 73 33 - 130 U/L    AST 25 10 - 35 U/L    ALT 20 6 - 29 U/L       If you have any questions or concerns, please call the clinic at the number listed above.       Sincerely,        Sheron Armenta MD

## 2017-11-01 NOTE — MR AVS SNAPSHOT
"              After Visit Summary   11/1/2017    Kimberley Mullins    MRN: [id number]           Patient Information     Date Of Birth          1940        Visit Information        Provider Department      11/1/2017 10:00 AM Sheron Armenta MD [facility]        Today's Diagnoses     Mixed hyperlipidemia    -  1    Essential hypertension, benign        Encounter for medication refill        Need for vaccination          Care Instructions    1)  Medication: continue current medication regimen unchanged  2)  Low fat, low cholesterol diet and low salt  3)  Regular aerobic exercise and weight loss  4)  Recheck in 6 months, sooner should new symptoms or   problems arise.    Patient Education: Reviewed risks of elevated lipids and principles   of treatment.              Follow-ups after your visit        Who to contact     If you have questions or need follow up information about today's clinic visit or your schedule please contact [facility] directly at [phone].  Normal or non-critical lab and imaging results will be communicated to you by MyChart, letter or phone within 4 business days after the clinic has received the results. If you do not hear from us within 7 days, please contact the clinic through [employer] or phone. If you have a critical or abnormal lab result, we will notify you by phone as soon as possible.  Submit refill requests through [employer] or call your pharmacy and they will forward the refill request to us. Please allow 3 business days for your refill to be completed.          Additional Information About Your Visit        MyChart Information     [employer] lets you send messages to your doctor, view your test results, renew your prescriptions, schedule appointments and more. To sign up, go to [email or web address] . Click on \"Log in\" on the left side of the screen, which will take you to the Welcome page. Then click on \"Sign up Now\" on the right side " "of the page.     You will be asked to enter the access code listed below, as well as some personal information. Please follow the directions to create your username and password.     Your access code is: [email or web address]  Expires: 2018 10:55 AM     Your access code will  in 90 days. If you need help or a new code, please call your [facility] clinic or [phone].        Care EveryWhere ID     This is your Care EveryWhere ID. This could be used by other organizations to access your [facility] medical records  [id number]        Your Vitals Were     Pulse Temperature Height Pulse Oximetry BMI (Body Mass Index)       72 98.5  F (36.9  C) (Oral) 1.588 m (5' 2.5\") 98% 26.28 kg/m2        Blood Pressure from Last 3 Encounters:   17 120/80   17 122/80   17 110/70    Weight from Last 3 Encounters:   17 66.2 kg (146 lb)   17 64.9 kg (143 lb)   17 63.5 kg (140 lb)              We Performed the Following     COMPREHENSIVE METABOLIC PANEL     HC FLU VAC PRESRV FREE QUAD SPLIT VIR 3+YRS IM     LIPID PANEL     VACCINE ADMINISTRATION, INITIAL     VENIPUNC FNGR,HEEL,EAR [[id number]]          Where to get your medicines      These medications were sent to [facility] [address] [place] [facility] [place] - [address]  [address], [facility] [address]     Phone:  [phone]     amLODIPine 5 MG tablet    simvastatin 20 MG tablet          Primary Care Provider Office Phone # Fax #    Sheron Armenta [phone] [address] [address]        Equal Access to Services     RAYMOND SCHULTZ : Paula Szymanski, wakendrick garber, erna kaalmada augustin, marianne palma. So [facility] [phone].    ATENCIÓN: Si habla español, tiene a neville disposición servicios gratuitos de asistencia lingüística. Llame al [phone].    We comply with applicable federal civil rights laws and [place] laws. We do not discriminate on the " basis of race, color, national origin, age, disability, sex, sexual orientation, or gender identity.            Thank you!     Thank you for choosing [facility]  for your care. Our goal is always to provide you with excellent care. Hearing back from our patients is one way we can continue to improve our services. Please take a few minutes to complete the written survey that you may receive in the mail after your visit with us. Thank you!             Your Updated Medication List - Protect others around you: Learn how to safely use, store and throw away your medicines at www.disposemymeds.org.          This list is accurate as of: 11/1/17 10:56 AM.  Always use your most recent med list.                   Brand Name Dispense Instructions for use Diagnosis    amLODIPine 5 MG tablet    NORVASC    90 tablet    Take 1 tablet (5 mg) by mouth daily    Essential hypertension, benign       levothyroxine 50 MCG tablet    SYNTHROID/LEVOTHROID    90 tablet    Take 1 tablet (50 mcg) by mouth daily    Other specified hypothyroidism       simvastatin 20 MG tablet    ZOCOR    90 tablet    Take 1 tablet (20 mg) by mouth At Bedtime    Mixed hyperlipidemia

## 2017-11-01 NOTE — PROGRESS NOTES
"SUBJECTIVE:  Kimberley Mullins is an 77 year old female who presents for evaluation of   Hyperlipidemia and hypertension. She has been diagnosed in the past as having   combined hyperlipidemia. She indicates that she is feeling well   and denies any symptoms of cardiovascular disease. Specifically   denies chest pain, palpitations, dyspnea, orthopnea, PND,   claudication or peripheral edema. Treatment modalities employed to   this point include diet, regular aerobic exercise and Zocor. Current medication   regimen is as listed below. Patient denies any side effects of   medication.    Family history: positive for hypertension, cardiovascular disease and elevated cholesterol  Age at diagnosis of hyperlipidemia: 59 and hypertension age 73  Cardiovascular risk factors: family history, lipids, hypertension, sedentary life style and stress    Current Outpatient Prescriptions   Medication     amLODIPine (NORVASC) 5 MG tablet     simvastatin (ZOCOR) 20 MG tablet     levothyroxine (SYNTHROID/LEVOTHROID) 50 MCG tablet     No current facility-administered medications for this visit.      Allergies   Allergen Reactions     No Known Allergies        Social History   Substance Use Topics     Smoking status: Never Smoker     Smokeless tobacco: Never Used     Alcohol use 3.5 oz/week     7 Standard drinks or equivalent per week      Comment: two to three glasses of wine q day.       OBJECTIVE:  /80 (BP Location: Right arm, Cuff Size: Adult Large)  Pulse 72  Temp 98.5  F (36.9  C) (Oral)  Ht 1.588 m (5' 2.5\")  Wt 66.2 kg (146 lb)  SpO2 98%  BMI 26.28 kg/m2  Repeat BP R arm seated =   120/80 with regular size cuff.  Skin: negative  Fundi: deferred  Lungs: negative, Percussion normal. Good diaphragmatic excursion. Lungs clear  Heart: negative, PMI normal. No lifts, heaves, or thrills. RRR. No murmurs, clicks gallops or rub  Peripheral pulses: radial=4/4, femoral=4/4, popliteal=4/4, dorsalis pedis=4/4,  Abd; The abdomen is " soft without tenderness, guarding, mass or organomegaly. Bowel sounds are normal. No CVA tenderness or inguinal adenopathy noted.  BMI : Body mass index is 26.28 kg/(m^2).    ASSESSMENT:  (E78.2) Mixed hyperlipidemia  (primary encounter diagnosis)  Plan: VENIPUNC FNGR,HEEL,EAR [[id number]], LIPID PANEL,         simvastatin (ZOCOR) 20 MG tablet        1)  Medication: continue current medication regimen unchanged  2)  Low fat, low cholesterol diet  3)  Regular aerobic exercise  4)  Recheck in 6 months, sooner should new symptoms or   problems arise.    Patient Education: Reviewed risks of elevated lipids and principles   of treatment.        (I10) Essential hypertension, benign  Plan: VENIPUNC FNGR,HEEL,EAR [[id number]], COMPREHENSIVE         METABOLIC PANEL, amLODIPine (NORVASC) 5 MG         tablet        1)  Medication: continue current medication regimen unchanged  2)  Dietary sodium restriction  3)  Regular aerobic exercise  4)  Recheck in 6 months, sooner should new symptoms or   problems arise.    Patient Education: Reviewed risks of hypertension and principles of   treatment.        (Z76.0) Encounter for medication refill  Plan: VENIPUNC FNGR,HEEL,EAR [[id number]], LIPID PANEL,         COMPREHENSIVE METABOLIC PANEL            (Z23) Need for vaccination  Plan: HC FLU VAC PRESRV FREE QUAD SPLIT VIR 3+YRS IM,        VACCINE ADMINISTRATION, INITIAL

## 2017-11-02 LAB
ALBUMIN SERPL-MCNC: 4.4 G/DL (ref 3.6–5.1)
ALBUMIN/GLOB SERPL: 1.6 (CALC) (ref 1–2.5)
ALP SERPL-CCNC: 73 U/L (ref 33–130)
ALT SERPL-CCNC: 20 U/L (ref 6–29)
AST SERPL-CCNC: 25 U/L (ref 10–35)
BILIRUB SERPL-MCNC: 0.7 MG/DL (ref 0.2–1.2)
BUN SERPL-MCNC: 10 MG/DL (ref 7–25)
BUN/CREATININE RATIO: NORMAL (CALC) (ref 6–22)
CALCIUM SERPL-MCNC: 9.7 MG/DL (ref 8.6–10.4)
CHLORIDE SERPLBLD-SCNC: 101 MMOL/L (ref 98–110)
CHOLEST SERPL-MCNC: 198 MG/DL
CHOLEST/HDLC SERPL: 2.7 (CALC)
CO2 SERPL-SCNC: 27 MMOL/L (ref 20–31)
CREAT SERPL-MCNC: 0.65 MG/DL (ref 0.6–0.93)
EGFR AFRICAN AMERICAN - QUEST: 99 ML/MIN/1.73M2
GFR SERPL CREATININE-BSD FRML MDRD: 85 ML/MIN/1.73M2
GLOBULIN, CALCULATED - QUEST: 2.8 G/DL (CALC) (ref 1.9–3.7)
GLUCOSE - QUEST: 87 MG/DL (ref 65–99)
HDLC SERPL-MCNC: 73 MG/DL
LDLC SERPL CALC-MCNC: 105 MG/DL (CALC)
NONHDLC SERPL-MCNC: 125 MG/DL (CALC)
POTASSIUM SERPL-SCNC: 4.6 MMOL/L (ref 3.5–5.3)
PROT SERPL-MCNC: 7.2 G/DL (ref 6.1–8.1)
SODIUM SERPL-SCNC: 139 MMOL/L (ref 135–146)
TRIGL SERPL-MCNC: 103 MG/DL

## 2018-01-10 ENCOUNTER — OFFICE VISIT (OUTPATIENT)
Dept: FAMILY MEDICINE | Facility: CLINIC | Age: 78
End: 2018-01-10

## 2018-01-10 VITALS
SYSTOLIC BLOOD PRESSURE: 122 MMHG | HEART RATE: 88 BPM | BODY MASS INDEX: 25.83 KG/M2 | TEMPERATURE: 98 F | RESPIRATION RATE: 16 BRPM | OXYGEN SATURATION: 94 % | WEIGHT: 145.8 LBS | DIASTOLIC BLOOD PRESSURE: 84 MMHG | HEIGHT: 63 IN

## 2018-01-10 DIAGNOSIS — Z87.39 H/O POLYMYALGIA RHEUMATICA: ICD-10-CM

## 2018-01-10 DIAGNOSIS — M77.11 RIGHT TENNIS ELBOW: Primary | ICD-10-CM

## 2018-01-10 DIAGNOSIS — Z98.890 H/O ELBOW SURGERY: ICD-10-CM

## 2018-01-10 LAB — ERYTHROCYTE [SEDIMENTATION RATE] IN BLOOD: 18 MM/HR (ref 0–20)

## 2018-01-10 PROCEDURE — 99213 OFFICE O/P EST LOW 20 MIN: CPT | Performed by: FAMILY MEDICINE

## 2018-01-10 PROCEDURE — 85651 RBC SED RATE NONAUTOMATED: CPT | Performed by: FAMILY MEDICINE

## 2018-01-10 PROCEDURE — 36415 COLL VENOUS BLD VENIPUNCTURE: CPT | Performed by: FAMILY MEDICINE

## 2018-01-10 RX ORDER — PREDNISONE 10 MG/1
10 TABLET ORAL DAILY
Qty: 5 TABLET | Refills: 0 | COMMUNITY
Start: 2018-01-10 | End: 2018-04-05

## 2018-01-10 NOTE — PROGRESS NOTES
SUBJECTIVE: 77 year old female complaining of left arm and shoulder pain with tingling in the morning for 10 month(s). Just feel pins and needle sensation when laying down even on her other side at night  The patient describes going to the orthopedists with Xray's negative/ cleared with ibuprofen but came back. Did make an appointment with Rheumatology in 2/2018 Dr Pelletier.  The patient denies a history of weakness or skin change.   Smoking history: No.   Relevant past medical history: positive for left humerus surgery and elbow pins from a fracture . History of polymyalgia rheumatica on prednisone which resolved 2014 by Rheumatology      OBJECTIVE: The patient appears healthy, alert, no distress, cooperative and smiling.   EXT: Shoulder exam shows a normal shoulder musculature with no atrophy.  Abduction is full, painless and patient can get arm over head.  Testing the rotator cuff specifically finds no special weakness of internal or external rotation.  The A-C joint is non tender and the periscapular musculature is normal. The hands reveal normal motor, sensory, vascular and tendon function.  Point tenderness over lateral epicondyle with positive Cozen's test. Elbow, wrist and hand otherwise normal.    Sed rate 18    ASSESSMENT: (M77.11) Right tennis elbow  (primary encounter diagnosis)  Comment: anatomy reviewed  Plan: predniSONE (DELTASONE) 10 MG tablet,         ORTHOPEDICS ADULT REFERRAL        Rehab stretches/ exercises to begin immediately and given in writing with illustrations.  Ice. Back to DR Massey for a second opinion    (Z98.890) H/O elbow surgery  Plan: ORTHOPEDICS ADULT REFERRAL        I have reviewed the patient's medical history in detail and updated the computerized patient record.      (Z87.39) H/O polymyalgia rheumatica  Plan: ESR, ALEXANDRE (BFP), VENOUS COLLECTION        Has Rheumatology appointment scheduled/ will send results

## 2018-01-10 NOTE — NURSING NOTE
Kimberley is here for a Left sore shoulder, that pt states has been going on for a while    Pre-Visit Screening :  Immunizations : up to date    Colonoscopy : is up to date  Mammogram : is up to date  Asthma Action Test/Plan : kaity  PHQ9/GAD7 :  Na    Pulse - regular  My Chart - declines    CLASSIFICATION OF OVERWEIGHT AND OBESITY BY BMI                         Obesity Class           BMI(kg/m2)  Underweight                                    < 18.5  Normal                                         18.5-24.9  Overweight                                     25.0-29.9  OBESITY                     I                  30.0-34.9                              II                 35.0-39.9  EXTREME OBESITY             III                >40                             Patient's  BMI Body mass index is 22.15 kg/(m^2).  http://hin.nhlbi.nih.gov/menuplanner/menu.cgi  Questioned patient about current smoking habits.  Pt. has never smoked.  The patient has verbalized that it is ok to leave a detailed voice message on the patient's cell phone with results/recommendations from this visit.       Verified [phone] phone number:

## 2018-01-10 NOTE — LETTER
January 10, 2018      Kimberley Mullins  [address] [facility] [address]        Dear ,    We are writing to inform you of your test results.    Your test results fall within the expected range(s) or remain unchanged from previous results.  Please continue with current treatment plan. I sent this to your Rheumatology  specialists    Resulted Orders   ESR, WESTERGREN (BFP)   Result Value Ref Range    Sed Rate 18 0 - 20 mm/hr       If you have any questions or concerns, please call the clinic at the number listed above.       Sincerely,        Sheron Armenta MD

## 2018-01-10 NOTE — PATIENT INSTRUCTIONS
Right tennis elbow  (primary encounter diagnosis)  Comment: anatomy reviewed  Plan: predniSONE (DELTASONE) 10 MG tablet,         ORTHOPEDICS ADULT REFERRAL        Rehab stretches/ exercises to begin immediately and given in writing with illustrations.  Ice. Back to DR Massey for a second opinion

## 2018-01-10 NOTE — MR AVS SNAPSHOT
After Visit Summary   1/10/2018    Kimberley Mullins    MRN: [id number]           Patient Information     Date Of Birth          1940        Visit Information        Provider Department      1/10/2018 11:15 AM Sheron Armenta MD [facility]        Today's Diagnoses     Right tennis elbow    -  1    H/O elbow surgery        H/O polymyalgia rheumatica          Care Instructions    Right tennis elbow  (primary encounter diagnosis)  Comment: anatomy reviewed  Plan: predniSONE (DELTASONE) 10 MG tablet,         ORTHOPEDICS ADULT REFERRAL        Rehab stretches/ exercises to begin immediately and given in writing with illustrations.  Ice. Back to DR Massey for a second opinion            Follow-ups after your visit        Additional Services     ORTHOPEDICS ADULT REFERRAL       Your provider has referred you to: [facility] [place] [phone]   [email or web address]    Please be aware that coverage of these services is subject to the terms and limitations of your health insurance plan.  Call member services at your health plan with any benefit or coverage questions.      Please bring the following to your appointment:    >>   Any x-rays, CTs or MRIs which have been performed.  Contact the facility where they were done to arrange for  prior to your scheduled appointment.    >>   List of current medications   >>   This referral request   >>   Any documents/labs given to you for this referral                  Your next 10 appointments already scheduled     Jan 25, 2018  1:30 PM CST   (Arrive by 1:15 PM)   MA SCREENING BILATERAL W/ YARELY with [id number]   [facility] ([facility] [place])    [address] [facility], [address]  [facility] [id number] [phone]           Three-dimensional (3D) mammograms are available at [facility] in [place], [place], [place], [place], [facility], [place], [place], and  "[place]. [facility] locations include [place] and [facility] in [place]. Benefits of 3D mammograms include: - Improved rate of cancer detection - Decreases your chance of having to go back for more tests, which means fewer: - \"False-positive\" results (This means that there is an abnormal area but it isn't cancer.) - Invasive testing procedures, such as a biopsy or surgery - Can provide clearer images of the breast if you have dense breast tissue. 3D mammography is an optional exam that anyone can have with a 2D mammogram. It doesn't replace or take the place of a 2D mammogram. 2D mammograms remain an effective screening test for all women.  Not all insurance companies cover the cost of a 3D mammogram. Check with your insurance.              Who to contact     If you have questions or need follow up information about today's clinic visit or your schedule please contact [facility] directly at [phone].  Normal or non-critical lab and imaging results will be communicated to you by MyChart, letter or phone within 4 business days after the clinic has received the results. If you do not hear from us within 7 days, please contact the clinic through [employer] or phone. If you have a critical or abnormal lab result, we will notify you by phone as soon as possible.  Submit refill requests through StreamLine Call or call your pharmacy and they will forward the refill request to us. Please allow 3 business days for your refill to be completed.          Additional Information About Your Visit        Care EveryWhere ID     This is your Care EveryWhere ID. This could be used by other organizations to access your [place] medical records  [id number]        Your Vitals Were     Pulse Temperature Height Pulse Oximetry Breastfeeding? BMI (Body Mass Index)    88 98  F (36.7  C) (Oral) 1.588 m (5' 2.5\") 94% No 26.24 kg/m2       Blood Pressure from Last 3 Encounters:   01/10/18 122/84   11/01/17 " 120/80   08/04/17 122/80    Weight from Last 3 Encounters:   01/10/18 66.1 kg (145 lb 12.8 oz)   11/01/17 66.2 kg (146 lb)   08/04/17 64.9 kg (143 lb)              We Performed the Following     LUIS, ALEXANDRE (VERONAP)     ORTHOPEDICS ADULT REFERRAL     VENOUS COLLECTION        Primary Care Provider Office Phone # Fax #    Sheron Armenta [phone] [address]  [address] [place] [address]        Equal Access to Services     [facility]: Hadii aad ku hadasho Soomaali, waaxda luqadaha, qaybta kaalmada adeegyada, waxjulia hernandez hayadeel solorzano . So [facility] [phone].    ATENCIÓN: Si habla español, tiene a neville disposición servicios gratuitos de asistencia lingüística. [facility] [phone].    We comply with applicable federal civil rights laws and [place] laws. We do not discriminate on the basis of race, color, national origin, age, disability, sex, sexual orientation, or gender identity.            Thank you!     Thank you for choosing [facility]  for your care. Our goal is always to provide you with excellent care. Hearing back from our patients is one way we can continue to improve our services. Please take a few minutes to complete the written survey that you may receive in the mail after your visit with us. Thank you!             Your Updated Medication List - Protect others around you: Learn how to safely use, store and throw away your medicines at www.disposemymeds.org.          This list is accurate as of: 1/10/18  2:34 PM.  Always use your most recent med list.                   Brand Name Dispense Instructions for use Diagnosis    amLODIPine 5 MG tablet    NORVASC    90 tablet    Take 1 tablet (5 mg) by mouth daily    Essential hypertension, benign       levothyroxine 50 MCG tablet    SYNTHROID/LEVOTHROID    90 tablet    Take 1 tablet (50 mcg) by mouth daily    Other specified hypothyroidism       predniSONE 10 MG tablet    DELTASONE    5  tablet    Take 1 tablet (10 mg) by mouth daily    Right tennis elbow       simvastatin 20 MG tablet    ZOCOR    90 tablet    Take 1 tablet (20 mg) by mouth At Bedtime    Mixed hyperlipidemia

## 2018-01-25 ENCOUNTER — HOSPITAL ENCOUNTER (OUTPATIENT)
Dept: MAMMOGRAPHY | Facility: CLINIC | Age: 78
Discharge: HOME | End: 2018-01-25
Attending: OBSTETRICS & GYNECOLOGY | Admitting: OBSTETRICS & GYNECOLOGY
Payer: COMMERCIAL

## 2018-01-25 DIAGNOSIS — Z12.31 VISIT FOR SCREENING MAMMOGRAM: ICD-10-CM

## 2018-01-25 PROCEDURE — 77063 BREAST TOMOSYNTHESIS BI: CPT

## 2018-02-24 ENCOUNTER — TRANSFERRED RECORDS (OUTPATIENT)
Dept: FAMILY MEDICINE | Facility: CLINIC | Age: 78
End: 2018-02-24

## 2018-03-14 DIAGNOSIS — E03.8 OTHER SPECIFIED HYPOTHYROIDISM: ICD-10-CM

## 2018-03-14 RX ORDER — LEVOTHYROXINE SODIUM 50 UG/1
50 TABLET ORAL DAILY
Qty: 30 TABLET | Refills: 0 | Status: SHIPPED | OUTPATIENT
Start: 2018-03-14 | End: 2018-04-05

## 2018-03-14 NOTE — TELEPHONE ENCOUNTER
Kimberley is requesting a refill of the folllowing,     Pending Prescriptions:                       Disp   Refills    levothyroxine (SYNTHROID/LEVOTHROID) 50 M*90 tab*0            Sig: Take 1 tablet (50 mcg) by mouth daily    Pt was last seen for a medication check 11-1-17 and her last TSH labs were 3-15-17 with a value of 1.69 (ref range 0.40-4.50)    Please advise

## 2018-04-05 ENCOUNTER — OFFICE VISIT (OUTPATIENT)
Dept: FAMILY MEDICINE | Facility: CLINIC | Age: 78
End: 2018-04-05

## 2018-04-05 VITALS
HEART RATE: 90 BPM | RESPIRATION RATE: 12 BRPM | HEIGHT: 63 IN | DIASTOLIC BLOOD PRESSURE: 78 MMHG | WEIGHT: 146 LBS | OXYGEN SATURATION: 96 % | SYSTOLIC BLOOD PRESSURE: 126 MMHG | TEMPERATURE: 98 F | BODY MASS INDEX: 25.87 KG/M2

## 2018-04-05 DIAGNOSIS — Z76.0 ENCOUNTER FOR MEDICATION REFILL: ICD-10-CM

## 2018-04-05 DIAGNOSIS — I10 BENIGN ESSENTIAL HYPERTENSION: ICD-10-CM

## 2018-04-05 DIAGNOSIS — Z13.21 ENCOUNTER FOR VITAMIN DEFICIENCY SCREENING: ICD-10-CM

## 2018-04-05 DIAGNOSIS — M85.80 OSTEOPENIA, UNSPECIFIED LOCATION: ICD-10-CM

## 2018-04-05 DIAGNOSIS — E78.00 PURE HYPERCHOLESTEROLEMIA: Primary | ICD-10-CM

## 2018-04-05 DIAGNOSIS — E03.8 OTHER SPECIFIED HYPOTHYROIDISM: ICD-10-CM

## 2018-04-05 DIAGNOSIS — E66.3 OVERWEIGHT (BMI 25.0-29.9): ICD-10-CM

## 2018-04-05 LAB — HEMOGLOBIN: 13.7 G/DL (ref 11.7–15.7)

## 2018-04-05 PROCEDURE — 99214 OFFICE O/P EST MOD 30 MIN: CPT | Performed by: FAMILY MEDICINE

## 2018-04-05 PROCEDURE — 85018 HEMOGLOBIN: CPT | Performed by: FAMILY MEDICINE

## 2018-04-05 PROCEDURE — 80061 LIPID PANEL: CPT | Mod: 90 | Performed by: FAMILY MEDICINE

## 2018-04-05 PROCEDURE — 82306 VITAMIN D 25 HYDROXY: CPT | Mod: 90 | Performed by: FAMILY MEDICINE

## 2018-04-05 PROCEDURE — 84443 ASSAY THYROID STIM HORMONE: CPT | Mod: 90 | Performed by: FAMILY MEDICINE

## 2018-04-05 PROCEDURE — 36415 COLL VENOUS BLD VENIPUNCTURE: CPT | Performed by: FAMILY MEDICINE

## 2018-04-05 PROCEDURE — 80053 COMPREHEN METABOLIC PANEL: CPT | Mod: 90 | Performed by: FAMILY MEDICINE

## 2018-04-05 RX ORDER — LEVOTHYROXINE SODIUM 50 UG/1
50 TABLET ORAL DAILY
Qty: 90 TABLET | Refills: 3 | Status: SHIPPED | OUTPATIENT
Start: 2018-04-05 | End: 2019-04-12

## 2018-04-05 RX ORDER — AMLODIPINE BESYLATE 5 MG/1
5 TABLET ORAL DAILY
Qty: 90 TABLET | Refills: 1 | Status: SHIPPED | OUTPATIENT
Start: 2018-04-05 | End: 2018-10-28

## 2018-04-05 RX ORDER — SIMVASTATIN 20 MG
20 TABLET ORAL AT BEDTIME
Qty: 90 TABLET | Refills: 1 | Status: SHIPPED | OUTPATIENT
Start: 2018-04-05 | End: 2018-10-28

## 2018-04-05 NOTE — MR AVS SNAPSHOT
After Visit Summary   4/5/2018    Kimberley Mullins    MRN: [id number]           Patient Information     Date Of Birth          1940        Visit Information        Provider Department      4/5/2018 8:50 AM Sheron Armenta MD [facility]        Today's Diagnoses     Pure hypercholesterolemia    -  1    Benign essential hypertension        Other specified hypothyroidism        Osteopenia, unspecified location        Overweight (BMI 25.0-29.9)        Encounter for vitamin deficiency screening        Encounter for medication refill          Care Instructions     Pure hypercholesterolemia  (primary encounter diagnosis)  Plan: VENIPUNC FNGR,HEEL,EAR [07995], LIPID PANEL,         simvastatin (ZOCOR) 20 MG tablet        1)  Medication: continue current medication regimen unchanged  2)  Low fat, low cholesterol diet  3)  Regular aerobic exercise  4)  Recheck in 6 months, sooner should new symptoms or   problems arise.    Patient Education: Reviewed risks of elevated lipids and principles   of treatment.        (I10) Benign essential hypertension  Plan: VENIPUNC FNGR,HEEL,EAR [[id number]], COMPREHENSIVE         METABOLIC PANEL, amLODIPine (NORVASC) 5 MG         tablet        1)  Medication: continue current medication regimen unchanged  2)  Dietary sodium restriction  3)  Regular aerobic exercise  4)  Recheck in 6 months, sooner should new symptoms or   problems arise.    Patient Education: Reviewed risks of hypertension and principles of   treatment.        (E03.8) Other specified hypothyroidism  Plan: VENIPUNC FNGR,HEEL,EAR [73608], TSH,         levothyroxine (SYNTHROID/LEVOTHROID) 50 MCG         tablet        Await results before filling    (M85.80) Osteopenia, unspecified location  Plan: VENIPUNC FNGR,HEEL,EAR [59973], Vitamin D         deficiency screening (QUEST)        Total calcium intake of 1500 mgm/day, vitamin D 400-800IU/day and a regular weight bearing exercise program for  "prevention of osteoporosis is recommended treatment at this time.      (E66.3) Overweight (BMI 25.0-29.9)  Plan: A low fat diet, regular aerobic exercise like walking 30 minutes daily and weight control is the treatment recommendations at this time                          Follow-ups after your visit        Who to contact     If you have questions or need follow up information about today's clinic visit or your schedule please contact [facility] directly at [phone].  Normal or non-critical lab and imaging results will be communicated to you by MyChart, letter or phone within 4 business days after the clinic has received the results. If you do not hear from us within 7 days, please contact the clinic through MyChart or phone. If you have a critical or abnormal lab result, we will notify you by phone as soon as possible.  Submit refill requests through [employer] or call your pharmacy and they will forward the refill request to us. Please allow 3 business days for your refill to be completed.          Additional Information About Your Visit        Care EveryWhere ID     This is your Care EveryWhere ID. This could be used by other organizations to access your [place] medical records  [id number]        Your Vitals Were     Pulse Temperature Respirations Height Last Period Pulse Oximetry    90 98  F (36.7  C) (Oral) 12 1.588 m (5' 2.5\") (LMP Unknown) 96%    Breastfeeding? BMI (Body Mass Index)                No 26.28 kg/m2           Blood Pressure from Last 3 Encounters:   04/05/18 126/78   01/10/18 122/84   11/01/17 120/80    Weight from Last 3 Encounters:   04/05/18 66.2 kg (146 lb)   01/10/18 66.1 kg (145 lb 12.8 oz)   11/01/17 66.2 kg (146 lb)              We Performed the Following     COMPREHENSIVE METABOLIC PANEL     HEMOGLOBIN [36021.000]     LIPID PANEL     TSH     VENIPUNC FNGR,HEEL,EAR [[id number]]     Vitamin D deficiency screening (QUEST)          Where to get your medicines    "   These medications were sent to [id number] - [place], [place] - [address]  [address], [facility] [address]     Phone:  [phone]     amLODIPine 5 MG tablet    levothyroxine 50 MCG tablet    simvastatin 20 MG tablet          Primary Care Provider Office Phone # Fax #    Sheron Armenta [phone] [address] [facility]  [address]  [facility] [address]        Equal Access to Services     [facility] : Hadii aad ku hadasho Soomaali, waaxda luqadaha, qaybta kaalmada adeegyada, waxay idiin hayaan adeeg kharash larenea . So [facility] [phone].    ATENCIÓN: Si habla español, tiene a neville disposición servicios gratuitos de asistencia lingüística. [facility] [phone].    We comply with applicable federal civil rights laws and [place] laws. We do not discriminate on the basis of race, color, national origin, age, disability, sex, sexual orientation, or gender identity.            Thank you!     Thank you for choosing [facility]  for your care. Our goal is always to provide you with excellent care. Hearing back from our patients is one way we can continue to improve our services. Please take a few minutes to complete the written survey that you may receive in the mail after your visit with us. Thank you!             Your Updated Medication List - Protect others around you: Learn how to safely use, store and throw away your medicines at www.disposemymeds.org.          This list is accurate as of 4/5/18 10:00 AM.  Always use your most recent med list.                   Brand Name Dispense Instructions for use Diagnosis    amLODIPine 5 MG tablet    NORVASC    90 tablet    Take 1 tablet (5 mg) by mouth daily    Benign essential hypertension       levothyroxine 50 MCG tablet    SYNTHROID/LEVOTHROID    90 tablet    Take 1 tablet (50 mcg) by mouth daily    Other specified hypothyroidism       simvastatin 20 MG tablet    ZOCOR    90 tablet    Take 1 tablet (20  mg) by mouth At Bedtime    Pure hypercholesterolemia

## 2018-04-05 NOTE — NURSING NOTE
Kimberley Mullins is here today for a fasting medication recheck.    Pre-visit Screening:    Immunizations:  up to date  Colonoscopy:  No Longer Needs  Mammogram: is up to date (last mammo was at the end of Jan., pt reported)  Asthma Action Test/Plan:  NA  PHQ9:  NA  GAD7:  NA  Fall Risk Assessment: is completed today (No Falls)    Questioned patient about current smoking habits Pt. has never smoked.    Is it ok to leave a detailed message on home or cell phone's voice mail for today's visit only? Yes   Phone # [phone] (home) or [phone] (mobile)      Adela Dumont, [facility]

## 2018-04-05 NOTE — LETTER
April 9, 2018      Kimberley Mulilns  [address] [address] [facility] [address]        Dear ,    We are writing to inform you of your test results.    Normal cholesterol and metabolic panels.  Low vitamin D level.    Total vitamin D 400-1000IU/day and a regular weight bearing exercise program for prevention of osteoporosis is recommended treatment at this time.    See next page          If you have any questions or concerns, please call the clinic at the number listed above.       Sincerely,        Sheron Armenta MD

## 2018-04-05 NOTE — PROGRESS NOTES
"SUBJECTIVE:  Kimberley Mullins is an 77 year old female who presents for evaluation of   hyperlipidemia, hypertension and thyroid replacement medications.   She has been diagnosed in the past as having   elevated cholesterol only. She indicates that she is feeling well   and denies any symptoms of cardiovascular disease. Specifically   denies chest pain, palpitations, dyspnea, orthopnea, PND,   claudication or peripheral edema. Treatment modalities employed to   this point include diet, regular aerobic exercise and Zocor. Current medication   regimen is as listed below. Patient denies any side effects of   medication.    Family history: positive for hypertension, cardiovascular disease and elevated cholesterol  Age at diagnosis of hyperlipidemia: 59 and hypertension age 73  Cardiovascular risk factors: family history, lipids, hypertension, sedentary life style and stress    Osteopenia diagnosis from Dr Lolita Ochoa GYN/ not on vitamin D    Current Outpatient Prescriptions   Medication     levothyroxine (SYNTHROID/LEVOTHROID) 50 MCG tablet     amLODIPine (NORVASC) 5 MG tablet     simvastatin (ZOCOR) 20 MG tablet     No current facility-administered medications for this visit.      Allergies   Allergen Reactions     No Known Allergies        Social History   Substance Use Topics     Smoking status: Never Smoker     Smokeless tobacco: Never Used     Alcohol use 3.5 oz/week     7 Standard drinks or equivalent per week      Comment: two to three glasses of wine q day.       OBJECTIVE:  /78 (BP Location: Right arm, Patient Position: Chair, Cuff Size: Adult Large)  Pulse 90  Temp 98  F (36.7  C) (Oral)  Ht 1.588 m (5' 2.5\")  Wt 66.2 kg (146 lb)  LMP  (LMP Unknown)  SpO2 96%  Breastfeeding? No  BMI 26.28 kg/m2  Repeat BP R arm seated = 126/78 with regular size cuff.  Skin: negative  Fundi: deferred  Lungs: negative, Percussion normal. Good diaphragmatic excursion. Lungs clear  Heart: negative, PMI normal. " No lifts, heaves, or thrills. RRR. No murmurs, clicks gallops or rub  Peripheral pulses: radial=4/4, femoral=4/4, popliteal=4/4, dorsalis pedis=4/4,  Abd; The abdomen is soft without tenderness, guarding, mass or organomegaly. Bowel sounds are normal. No CVA tenderness or inguinal adenopathy noted.  BMI : Body mass index is 26.28 kg/(m^2).    ASSESSMENT:(E78.00) Pure hypercholesterolemia  (primary encounter diagnosis)  Plan: VENIPUNC FNGR,HEEL,EAR [30978], LIPID PANEL,         simvastatin (ZOCOR) 20 MG tablet        1)  Medication: continue current medication regimen unchanged  2)  Low fat, low cholesterol diet  3)  Regular aerobic exercise  4)  Recheck in 6 months, sooner should new symptoms or   problems arise.    Patient Education: Reviewed risks of elevated lipids and principles   of treatment.        (I10) Benign essential hypertension  Plan: VENIPUNC FNGR,HEEL,EAR [26922], COMPREHENSIVE         METABOLIC PANEL, amLODIPine (NORVASC) 5 MG         tablet        1)  Medication: continue current medication regimen unchanged  2)  Dietary sodium restriction  3)  Regular aerobic exercise  4)  Recheck in 6 months, sooner should new symptoms or   problems arise.    Patient Education: Reviewed risks of hypertension and principles of   treatment.        (E03.8) Other specified hypothyroidism  Plan: VENIPUNC FNGR,HEEL,EAR [22268], TSH,         levothyroxine (SYNTHROID/LEVOTHROID) 50 MCG         tablet        Await results before filling    (M85.80) Osteopenia, unspecified location  Plan: VENIPUNC FNGR,HEEL,EAR [17454], Vitamin D         deficiency screening (QUEST)        Total calcium intake of 1500 mgm/day, vitamin D 400-800IU/day and a regular weight bearing exercise program for prevention of osteoporosis is recommended treatment at this time.      (E66.3) Overweight (BMI 25.0-29.9)  Plan: A low fat diet, regular aerobic exercise like walking 30 minutes daily and weight control is the treatment recommendations at this  time      (Z13.21) Encounter for vitamin deficiency screening  Plan: VENIPUNC FNGR,HEEL,EAR [68146], Vitamin D         deficiency screening (QUEST)            (Z76.0) Encounter for medication refill  Plan: VENIPUNC FNGR,HEEL,EAR [16383], HEMOGLOBIN         [71102.000], LIPID PANEL, COMPREHENSIVE         METABOLIC PANEL, TSH

## 2018-04-05 NOTE — PATIENT INSTRUCTIONS
Pure hypercholesterolemia  (primary encounter diagnosis)  Plan: VENIPUNC FNGR,HEEL,EAR [44630], LIPID PANEL,         simvastatin (ZOCOR) 20 MG tablet        1)  Medication: continue current medication regimen unchanged  2)  Low fat, low cholesterol diet  3)  Regular aerobic exercise  4)  Recheck in 6 months, sooner should new symptoms or   problems arise.    Patient Education: Reviewed risks of elevated lipids and principles   of treatment.        (I10) Benign essential hypertension  Plan: VENIPUNC FNGR,HEEL,EAR [01156], COMPREHENSIVE         METABOLIC PANEL, amLODIPine (NORVASC) 5 MG         tablet        1)  Medication: continue current medication regimen unchanged  2)  Dietary sodium restriction  3)  Regular aerobic exercise  4)  Recheck in 6 months, sooner should new symptoms or   problems arise.    Patient Education: Reviewed risks of hypertension and principles of   treatment.        (E03.8) Other specified hypothyroidism  Plan: VENIPUNC FNGR,HEEL,EAR [75094], TSH,         levothyroxine (SYNTHROID/LEVOTHROID) 50 MCG         tablet        Await results before filling    (M85.80) Osteopenia, unspecified location  Plan: VENIPUNC FNGR,HEEL,EAR [65112], Vitamin D         deficiency screening (QUEST)        Total calcium intake of 1500 mgm/day, vitamin D 400-800IU/day and a regular weight bearing exercise program for prevention of osteoporosis is recommended treatment at this time.      (E66.3) Overweight (BMI 25.0-29.9)  Plan: A low fat diet, regular aerobic exercise like walking 30 minutes daily and weight control is the treatment recommendations at this time

## 2018-04-06 LAB
ALBUMIN SERPL-MCNC: 4.4 G/DL (ref 3.6–5.1)
ALBUMIN/GLOB SERPL: 1.8 (CALC) (ref 1–2.5)
ALP SERPL-CCNC: 63 U/L (ref 33–130)
ALT SERPL-CCNC: 15 U/L (ref 6–29)
AST SERPL-CCNC: 22 U/L (ref 10–35)
BILIRUB SERPL-MCNC: 0.6 MG/DL (ref 0.2–1.2)
BUN SERPL-MCNC: 12 MG/DL (ref 7–25)
BUN/CREATININE RATIO: NORMAL (CALC) (ref 6–22)
CALCIUM SERPL-MCNC: 9.5 MG/DL (ref 8.6–10.4)
CHLORIDE SERPLBLD-SCNC: 102 MMOL/L (ref 98–110)
CHOLEST SERPL-MCNC: 192 MG/DL
CHOLEST/HDLC SERPL: 2.2 (CALC)
CO2 SERPL-SCNC: 25 MMOL/L (ref 20–31)
CREAT SERPL-MCNC: 0.71 MG/DL (ref 0.6–0.93)
EGFR AFRICAN AMERICAN - QUEST: 95 ML/MIN/1.73M2
GFR SERPL CREATININE-BSD FRML MDRD: 82 ML/MIN/1.73M2
GLOBULIN, CALCULATED - QUEST: 2.4 G/DL (CALC) (ref 1.9–3.7)
GLUCOSE - QUEST: 86 MG/DL (ref 65–99)
HDLC SERPL-MCNC: 89 MG/DL
LDLC SERPL CALC-MCNC: 86 MG/DL (CALC)
NONHDLC SERPL-MCNC: 103 MG/DL (CALC)
POTASSIUM SERPL-SCNC: 4.7 MMOL/L (ref 3.5–5.3)
PROT SERPL-MCNC: 6.8 G/DL (ref 6.1–8.1)
SODIUM SERPL-SCNC: 138 MMOL/L (ref 135–146)
TRIGL SERPL-MCNC: 81 MG/DL
TSH SERPL-ACNC: 2.81 MIU/L (ref 0.4–4.5)
VITAMIN D, 25-OH, TOTAL - QUEST: 15 NG/ML (ref 30–100)

## 2018-11-07 ENCOUNTER — OFFICE VISIT (OUTPATIENT)
Dept: FAMILY MEDICINE | Facility: CLINIC | Age: 78
End: 2018-11-07

## 2018-11-07 VITALS
SYSTOLIC BLOOD PRESSURE: 128 MMHG | BODY MASS INDEX: 26.65 KG/M2 | HEART RATE: 86 BPM | HEIGHT: 62 IN | TEMPERATURE: 98.2 F | WEIGHT: 144.8 LBS | DIASTOLIC BLOOD PRESSURE: 80 MMHG | RESPIRATION RATE: 18 BRPM | OXYGEN SATURATION: 95 %

## 2018-11-07 DIAGNOSIS — E78.00 PURE HYPERCHOLESTEROLEMIA: Primary | ICD-10-CM

## 2018-11-07 DIAGNOSIS — Z76.0 ENCOUNTER FOR MEDICATION REFILL: ICD-10-CM

## 2018-11-07 DIAGNOSIS — I10 BENIGN ESSENTIAL HYPERTENSION: ICD-10-CM

## 2018-11-07 PROCEDURE — 80061 LIPID PANEL: CPT | Mod: 90 | Performed by: FAMILY MEDICINE

## 2018-11-07 PROCEDURE — 99213 OFFICE O/P EST LOW 20 MIN: CPT | Performed by: FAMILY MEDICINE

## 2018-11-07 PROCEDURE — 36415 COLL VENOUS BLD VENIPUNCTURE: CPT | Performed by: FAMILY MEDICINE

## 2018-11-07 PROCEDURE — 80053 COMPREHEN METABOLIC PANEL: CPT | Mod: 90 | Performed by: FAMILY MEDICINE

## 2018-11-07 RX ORDER — AMLODIPINE BESYLATE 5 MG/1
TABLET ORAL
Qty: 90 TABLET | Refills: 1 | Status: SHIPPED | OUTPATIENT
Start: 2018-11-07 | End: 2019-05-29

## 2018-11-07 RX ORDER — SIMVASTATIN 20 MG
TABLET ORAL
Qty: 90 TABLET | Refills: 1 | Status: SHIPPED | OUTPATIENT
Start: 2018-11-07 | End: 2019-05-29

## 2018-11-07 NOTE — LETTER
November 8, 2018      Kimberley Mullisn  [address] [facility] [address]        Dear ,    We are writing to inform you of your test results.    Your test results fall within the expected range(s) or remain unchanged from previous results.  Please continue with current treatment plan.    Resulted Orders   COMPREHENSIVE METABOLIC PANEL   Result Value Ref Range    Glucose 83 65 - 99 mg/dL      Comment:                    Fasting reference interval         Urea Nitrogen 10 7 - 25 mg/dL    Creatinine 0.65 0.60 - 0.93 mg/dL      Comment:      For patients >49 years of age, the reference limit  for Creatinine is approximately 13% higher for people  identified as -American.         GFR Estimate 84 > OR = 60 mL/min/1.73m2    EGFR African American 98 > OR = 60 mL/min/1.73m2    BUN/Creatinine Ratio NOT APPLICABLE 6 - 22 (calc)    Sodium 138 135 - 146 mmol/L    Potassium 4.5 3.5 - 5.3 mmol/L    Chloride 101 98 - 110 mmol/L    Carbon Dioxide 27 20 - 32 mmol/L    Calcium 9.3 8.6 - 10.4 mg/dL    Protein Total 6.7 6.1 - 8.1 g/dL    Albumin 4.3 3.6 - 5.1 g/dL    Globulin Calculated 2.4 1.9 - 3.7 g/dL (calc)    A/G Ratio 1.8 1.0 - 2.5 (calc)    Bilirubin Total 0.5 0.2 - 1.2 mg/dL    Alkaline Phosphatase 74 33 - 130 U/L    AST 25 10 - 35 U/L    ALT 19 6 - 29 U/L   LIPID PANEL   Result Value Ref Range    Cholesterol 182 <200 mg/dL    HDL Cholesterol 72 >50 mg/dL    Triglycerides 116 <150 mg/dL    LDL Cholesterol Calculated 89 mg/dL (calc)      Comment:      Reference range: <100     Desirable range <100 mg/dL for primary prevention;    <70 mg/dL for patients with CHD or diabetic patients   with > or = 2 CHD risk factors.     LDL-C is now calculated using the Mala   calculation, which is a validated novel method providing   better accuracy than the Friedewald equation in the   estimation of LDL-C.   Butch CAMARGO et al. DEYANIRA. 2013;310(19): 6316-4453   (http://education.[employer].ERUCES/faq/YKI763)       Cholesterol/HDL Ratio 2.5 <5.0 (calc)    Non HDL Cholesterol 110 <130 mg/dL (calc)      Comment:      For patients with diabetes plus 1 major ASCVD risk   factor, treating to a non-HDL-C goal of <100 mg/dL   (LDL-C of <70 mg/dL) is considered a therapeutic   option.         If you have any questions or concerns, please call the clinic at the number listed above.       Sincerely,        Sheron Armenta MD

## 2018-11-07 NOTE — NURSING NOTE
Kimberley is here today for a med recheck.    Pre-visit Screening:  Immunizations:  up to date  Colonoscopy:  is up to date  Mammogram: is up to date  Asthma Action Test/Plan:  MARIYA  PHQ9:  PHQ 2 done today  GAD7:  NA  Questioned patient about current smoking habits Pt. has never smoked.  Ok to leave detailed message on voice mail for today's visit only Yes, phone # [phone]

## 2018-11-07 NOTE — MR AVS SNAPSHOT
"              After Visit Summary   11/7/2018    Kimberley Mullins    MRN: [id number]           Patient Information     Date Of Birth          1940        Visit Information        Provider Department      11/7/2018 8:50 AM Sheron Armenta MD [facility]        Today's Diagnoses     Pure hypercholesterolemia    -  1    Benign essential hypertension        Encounter for medication refill          Care Instructions    1)  Medication: continue current medication regimen unchanged  2)  Low fat, low cholesterol diet and low salt  3)  Regular aerobic exercise and weight loss  4)  Recheck in 6 months, sooner should new symptoms or   problems arise.    Patient Education: Reviewed risks of elevated lipids and principles   of treatment.              Follow-ups after your visit        Follow-up notes from your care team     Return in about 6 months (around 5/7/2019), or if symptoms worsen or fail to improve.      Who to contact     If you have questions or need follow up information about today's clinic visit or your schedule please contact [facility] directly at [phone].  Normal or non-critical lab and imaging results will be communicated to you by Momo Networkshart, letter or phone within 4 business days after the clinic has received the results. If you do not hear from us within 7 days, please contact the clinic through Bleacher[employer] or phone. If you have a critical or abnormal lab result, we will notify you by phone as soon as possible.  Submit refill requests through [employer] or call your pharmacy and they will forward the refill request to us. Please allow 3 business days for your refill to be completed.          Additional Information About Your Visit        Momo NetworksharVollee Information     [employer] lets you send messages to your doctor, view your test results, renew your prescriptions, schedule appointments and more. To sign up, go to [email or web address] . Click on \"Log in\" on the left " "side of the screen, which will take you to the Welcome page. Then click on \"Sign up Now\" on the right side of the page.     You will be asked to enter the access code listed below, as well as some personal information. Please follow the directions to create your username and password.     Your access code is: [email or web address]  Expires: 2019  9:36 AM     Your access code will  in 90 days. If you need help or a new code, please call your [facility] clinic or [phone].        Care EveryWhere ID     This is your Care EveryWhere ID. This could be used by other organizations to access your [facility] medical records  [id number]        Your Vitals Were     Pulse Temperature Respirations Height Last Period Pulse Oximetry    86 98.2  F (36.8  C) (Oral) 18 1.575 m (5' 2\") (LMP Unknown) 95%    BMI (Body Mass Index)                   26.48 kg/m2            Blood Pressure from Last 3 Encounters:   18 128/80   18 126/78   01/10/18 122/84    Weight from Last 3 Encounters:   18 65.7 kg (144 lb 12.8 oz)   18 66.2 kg (146 lb)   01/10/18 66.1 kg (145 lb 12.8 oz)              We Performed the Following     COMPREHENSIVE METABOLIC PANEL     LIPID PANEL     VENIPUNC FNGR,HEEL,EAR [[id number]]          Where to get your medicines      These medications were sent to [facility] - [place], [place] - [address]  [address] [facility] [address]     Phone:  [phone]     amLODIPine 5 MG tablet    simvastatin 20 MG tablet          Primary Care Provider Office Phone # Fax #    Sheron Armenta [phone]       [address] [address]        Equal Access to Services     MARINE SCHULTZ : Paula Szymanski, wakendrick luqadaha, qaybta kaalmamarianne harmon. [facility] [phone].    ATENCIÓN: Si habla español, tiene a neville disposición servicios gratuitos de asistencia lingüística. Llame al [phone].    We " comply with applicable federal civil rights laws and [place] laws. We do not discriminate on the basis of race, color, national origin, age, disability, sex, sexual orientation, or gender identity.            Thank you!     Thank you for choosing [facility]  for your care. Our goal is always to provide you with excellent care. Hearing back from our patients is one way we can continue to improve our services. Please take a few minutes to complete the written survey that you may receive in the mail after your visit with us. Thank you!             Your Updated Medication List - Protect others around you: Learn how to safely use, store and throw away your medicines at www.disposemymeds.org.          This list is accurate as of 11/7/18  9:36 AM.  Always use your most recent med list.                   Brand Name Dispense Instructions for use Diagnosis    amLODIPine 5 MG tablet    NORVASC    90 tablet    TAKE 1 TABLET (5 MG) BY MOUTH DAILY    Benign essential hypertension       levothyroxine 50 MCG tablet    SYNTHROID/LEVOTHROID    90 tablet    Take 1 tablet (50 mcg) by mouth daily    Other specified hypothyroidism       simvastatin 20 MG tablet    ZOCOR    90 tablet    TAKE 1 TABLET (20 MG) BY MOUTH AT BEDTIME    Pure hypercholesterolemia

## 2018-11-08 LAB
ALBUMIN SERPL-MCNC: 4.3 G/DL (ref 3.6–5.1)
ALBUMIN/GLOB SERPL: 1.8 (CALC) (ref 1–2.5)
ALP SERPL-CCNC: 74 U/L (ref 33–130)
ALT SERPL-CCNC: 19 U/L (ref 6–29)
AST SERPL-CCNC: 25 U/L (ref 10–35)
BILIRUB SERPL-MCNC: 0.5 MG/DL (ref 0.2–1.2)
BUN SERPL-MCNC: 10 MG/DL (ref 7–25)
BUN/CREATININE RATIO: NORMAL (CALC) (ref 6–22)
CALCIUM SERPL-MCNC: 9.3 MG/DL (ref 8.6–10.4)
CHLORIDE SERPLBLD-SCNC: 101 MMOL/L (ref 98–110)
CHOLEST SERPL-MCNC: 182 MG/DL
CHOLEST/HDLC SERPL: 2.5 (CALC)
CO2 SERPL-SCNC: 27 MMOL/L (ref 20–32)
CREAT SERPL-MCNC: 0.65 MG/DL (ref 0.6–0.93)
EGFR AFRICAN AMERICAN - QUEST: 98 ML/MIN/1.73M2
GFR SERPL CREATININE-BSD FRML MDRD: 84 ML/MIN/1.73M2
GLOBULIN, CALCULATED - QUEST: 2.4 G/DL (CALC) (ref 1.9–3.7)
GLUCOSE - QUEST: 83 MG/DL (ref 65–99)
HDLC SERPL-MCNC: 72 MG/DL
LDLC SERPL CALC-MCNC: 89 MG/DL (CALC)
NONHDLC SERPL-MCNC: 110 MG/DL (CALC)
POTASSIUM SERPL-SCNC: 4.5 MMOL/L (ref 3.5–5.3)
PROT SERPL-MCNC: 6.7 G/DL (ref 6.1–8.1)
SODIUM SERPL-SCNC: 138 MMOL/L (ref 135–146)
TRIGL SERPL-MCNC: 116 MG/DL

## 2018-12-17 ENCOUNTER — TRANSFERRED RECORDS (OUTPATIENT)
Dept: FAMILY MEDICINE | Facility: CLINIC | Age: 78
End: 2018-12-17

## 2019-01-29 ENCOUNTER — HOSPITAL ENCOUNTER (OUTPATIENT)
Dept: MAMMOGRAPHY | Facility: CLINIC | Age: 79
Discharge: HOME | End: 2019-01-29
Attending: FAMILY MEDICINE | Admitting: FAMILY MEDICINE
Payer: COMMERCIAL

## 2019-01-29 DIAGNOSIS — Z12.31 VISIT FOR SCREENING MAMMOGRAM: ICD-10-CM

## 2019-01-29 PROCEDURE — 77063 BREAST TOMOSYNTHESIS BI: CPT

## 2019-01-30 ENCOUNTER — HOSPITAL ENCOUNTER (OUTPATIENT)
Dept: ULTRASOUND IMAGING | Facility: CLINIC | Age: 79
Discharge: HOME | End: 2019-01-30
Attending: FAMILY MEDICINE | Admitting: FAMILY MEDICINE
Payer: COMMERCIAL

## 2019-01-30 DIAGNOSIS — R92.8 ABNORMAL MAMMOGRAM: ICD-10-CM

## 2019-01-30 PROCEDURE — 76642 ULTRASOUND BREAST LIMITED: CPT | Mod: 50

## 2019-04-12 ENCOUNTER — TELEPHONE (OUTPATIENT)
Dept: FAMILY MEDICINE | Facility: CLINIC | Age: 79
End: 2019-04-12

## 2019-04-12 DIAGNOSIS — E03.8 OTHER SPECIFIED HYPOTHYROIDISM: ICD-10-CM

## 2019-04-12 RX ORDER — LEVOTHYROXINE SODIUM 50 UG/1
50 TABLET ORAL DAILY
Qty: 30 TABLET | Refills: 0 | COMMUNITY
Start: 2019-04-12 | End: 2019-05-29

## 2019-04-12 NOTE — TELEPHONE ENCOUNTER
Called pt in 30 day of levothyroxine 50MCG to [facility] in [place]. Pt due for fasting med check and thyroid recheck. Other meds are due around 05/07/19. Please call to schedule around that time.    Thanks, Dilma

## 2019-05-03 ENCOUNTER — TELEPHONE (OUTPATIENT)
Dept: FAMILY MEDICINE | Facility: CLINIC | Age: 79
End: 2019-05-03

## 2019-05-03 NOTE — TELEPHONE ENCOUNTER
Kimberley called today stating she has concerns she would like to discuss with you.  I spoke to her  and she said she went to her gyn and had to have an US.  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  I told her you were out of the office until Monday and that if it was urgent the gyn would've let her know so she should be ok to speak with you on Monday.  She verbalized understanding    Patient's phone #[phone]

## 2019-05-06 NOTE — TELEPHONE ENCOUNTER
LM on  for Kimberley to obtain the report that said she had a dilated aorta.  Asked them to fax it us and to call with any questions.

## 2019-05-10 ENCOUNTER — TRANSFERRED RECORDS (OUTPATIENT)
Dept: FAMILY MEDICINE | Facility: CLINIC | Age: 79
End: 2019-05-10

## 2019-05-10 NOTE — TELEPHONE ENCOUNTER
Patient called in and left a message stating that she had some records faxed to our office and still has not heard back from Dr. Armenta about this yet. She requested a call back to further review. I called her back and informed her that we still have not received any records from her OBGYN and to have her call them again and try and send them to us again. I gave her the fax number again just to verify she had the correct one and she did. She will call them and let us know how it goes.

## 2019-05-14 ENCOUNTER — TELEPHONE (OUTPATIENT)
Dept: FAMILY MEDICINE | Facility: CLINIC | Age: 79
End: 2019-05-14

## 2019-05-14 DIAGNOSIS — I71.40 ABDOMINAL AORTIC ANEURYSM (AAA) WITHOUT RUPTURE (H): Primary | ICD-10-CM

## 2019-05-14 NOTE — TELEPHONE ENCOUNTER
I just reviewed her ultrasound today. The aorta has an area of dilation 1.5 cm by 1.9 cm. It is asymptomatic.    Only aneurysm over 4.0 cm are a concern that we monitor every 6-12 months. When hey are larger than 5.5 cm, surgery is considered.  Let's consider a recheck in 1-2 years. I do not think this will change or be a concern over your future life time.  Checking it again will show any change.

## 2019-05-14 NOTE — TELEPHONE ENCOUNTER
I spoke to Kimberley to let her know of LES's message below and she was so thankful to hear as she has been worrying.

## 2019-05-14 NOTE — TELEPHONE ENCOUNTER
Kimberley called to ask if you received the report she sent to you last Friday as she has not heard anything?    Patient's phone #[phone]

## 2019-05-29 ENCOUNTER — OFFICE VISIT (OUTPATIENT)
Dept: FAMILY MEDICINE | Facility: CLINIC | Age: 79
End: 2019-05-29

## 2019-05-29 VITALS
HEART RATE: 75 BPM | BODY MASS INDEX: 25.83 KG/M2 | WEIGHT: 145.8 LBS | DIASTOLIC BLOOD PRESSURE: 82 MMHG | RESPIRATION RATE: 18 BRPM | OXYGEN SATURATION: 97 % | HEIGHT: 63 IN | TEMPERATURE: 98.3 F | SYSTOLIC BLOOD PRESSURE: 130 MMHG

## 2019-05-29 DIAGNOSIS — E78.00 PURE HYPERCHOLESTEROLEMIA: Primary | ICD-10-CM

## 2019-05-29 DIAGNOSIS — E03.9 ACQUIRED HYPOTHYROIDISM: ICD-10-CM

## 2019-05-29 DIAGNOSIS — E66.3 OVERWEIGHT (BMI 25.0-29.9): ICD-10-CM

## 2019-05-29 DIAGNOSIS — Z79.899 ENCOUNTER FOR LONG-TERM (CURRENT) USE OF MEDICATIONS: ICD-10-CM

## 2019-05-29 DIAGNOSIS — I10 BENIGN ESSENTIAL HYPERTENSION: ICD-10-CM

## 2019-05-29 LAB
ALBUMIN SERPL-MCNC: 4.3 G/DL (ref 3.6–5.1)
ALP SERPL-CCNC: 74 U/L (ref 40–115)
ALT 1742-6: 7 U/L (ref 9–46)
AST 1920-8: 18 U/L (ref 10–35)
BILIRUB SERPL-MCNC: 0.7 MG/DL (ref 0.2–1.2)
BILIRUBIN DIRECT: 0.3 MG/DL (ref 0.1–0.4)
BUN SERPL-MCNC: 9 MG/DL (ref 7–25)
CALCIUM SERPL-MCNC: 9.5 MG/DL (ref 8.6–10.3)
CHLORIDE SERPLBLD-SCNC: 104 MMOL/L (ref 98–110)
CHOLEST SERPL-MCNC: 199 MG/DL (ref 0–199)
CHOLEST/HDLC SERPL: 3 {RATIO} (ref 0–5)
CO2 SERPL-SCNC: 31.5 MMOL/L (ref 20–32)
CREAT SERPL-MCNC: 0.64 MG/DL (ref 0.7–1.18)
GLUCOSE SERPL-MCNC: 93 MG/DL (ref 60–99)
HDLC SERPL-MCNC: 79 MG/DL (ref 40–150)
LDLC SERPL CALC-MCNC: 101 MG/DL (ref 0–99)
POTASSIUM SERPL-SCNC: 4.57 MMOL/L (ref 3.5–5.3)
PROT SERPL-MCNC: 6.9 G/DL (ref 6.1–8.1)
SODIUM SERPL-SCNC: 142.3 MMOL/L (ref 135–146)
TRIGL SERPL-MCNC: 97 MG/DL (ref 0–149)

## 2019-05-29 PROCEDURE — 80053 COMPREHEN METABOLIC PANEL: CPT | Performed by: FAMILY MEDICINE

## 2019-05-29 PROCEDURE — 99214 OFFICE O/P EST MOD 30 MIN: CPT | Performed by: FAMILY MEDICINE

## 2019-05-29 PROCEDURE — 84443 ASSAY THYROID STIM HORMONE: CPT | Mod: 90 | Performed by: FAMILY MEDICINE

## 2019-05-29 PROCEDURE — 36415 COLL VENOUS BLD VENIPUNCTURE: CPT | Performed by: FAMILY MEDICINE

## 2019-05-29 PROCEDURE — 80061 LIPID PANEL: CPT | Performed by: FAMILY MEDICINE

## 2019-05-29 RX ORDER — AMLODIPINE BESYLATE 5 MG/1
TABLET ORAL
Qty: 90 TABLET | Refills: 1 | Status: SHIPPED | OUTPATIENT
Start: 2019-05-29 | End: 2019-11-30

## 2019-05-29 RX ORDER — SIMVASTATIN 20 MG
TABLET ORAL
Qty: 90 TABLET | Refills: 1 | Status: SHIPPED | OUTPATIENT
Start: 2019-05-29 | End: 2019-11-30

## 2019-05-29 RX ORDER — LEVOTHYROXINE SODIUM 50 UG/1
50 TABLET ORAL DAILY
Qty: 90 TABLET | Refills: 3 | Status: SHIPPED | OUTPATIENT
Start: 2019-05-29 | End: 2020-05-06

## 2019-05-29 SDOH — ECONOMIC STABILITY: FOOD INSECURITY: WITHIN THE PAST 12 MONTHS, THE FOOD YOU BOUGHT JUST DIDN'T LAST AND YOU DIDN'T HAVE MONEY TO GET MORE.: NEVER TRUE

## 2019-05-29 SDOH — ECONOMIC STABILITY: TRANSPORTATION INSECURITY
IN THE PAST 12 MONTHS, HAS THE LACK OF TRANSPORTATION KEPT YOU FROM MEDICAL APPOINTMENTS OR FROM GETTING MEDICATIONS?: NO

## 2019-05-29 SDOH — ECONOMIC STABILITY: FOOD INSECURITY: WITHIN THE PAST 12 MONTHS, YOU WORRIED THAT YOUR FOOD WOULD RUN OUT BEFORE YOU GOT MONEY TO BUY MORE.: NEVER TRUE

## 2019-05-29 SDOH — ECONOMIC STABILITY: TRANSPORTATION INSECURITY
IN THE PAST 12 MONTHS, HAS LACK OF TRANSPORTATION KEPT YOU FROM MEETINGS, WORK, OR FROM GETTING THINGS NEEDED FOR DAILY LIVING?: NO

## 2019-05-29 SDOH — ECONOMIC STABILITY: INCOME INSECURITY: HOW HARD IS IT FOR YOU TO PAY FOR THE VERY BASICS LIKE FOOD, HOUSING, MEDICAL CARE, AND HEATING?: NOT HARD AT ALL

## 2019-05-29 ASSESSMENT — MIFFLIN-ST. JEOR: SCORE: 1097.53

## 2019-05-29 NOTE — PROGRESS NOTES
"SUBJECTIVE:  Kimberley Mullins is an 78 year old female who presents for evaluation of   Hyperlipidemia, hypothyroid medication and hypertension. She has been diagnosed in the past as having   elevated cholesterol only. She indicates that she is feeling well   and denies any symptoms of cardiovascular disease. Specifically   denies chest pain, palpitations, dyspnea, orthopnea, PND,   claudication or peripheral edema. Treatment modalities employed to   this point include diet, regular aerobic exercise and Zocor. Current medication   regimen is as listed below. Patient denies any side effects of   medication.    Family history: positive for hypertension, cardiovascular disease and elevated cholesterol  Age at diagnosis of hyperlipidemia: 59 and hypertension age 73  Cardiovascular risk factors: family history, lipids, hypertension and sedentary life style    Current Outpatient Medications   Medication     amLODIPine (NORVASC) 5 MG tablet     levothyroxine (SYNTHROID/LEVOTHROID) 50 MCG tablet     simvastatin (ZOCOR) 20 MG tablet     No current facility-administered medications for this visit.      Allergies   Allergen Reactions     No Known Allergies        Social History     Tobacco Use     Smoking status: Never Smoker     Smokeless tobacco: Never Used   Substance Use Topics     Alcohol use: Yes     Alcohol/week: 3.5 oz     Types: 7 Standard drinks or equivalent per week     Comment: two to three glasses of wine q day.       OBJECTIVE:  /82 (BP Location: Right arm, Patient Position: Sitting, Cuff Size: Adult Large)   Pulse 75   Temp 98.3  F (36.8  C) (Oral)   Ht 1.588 m (5' 2.5\")   Wt 66.1 kg (145 lb 12.8 oz)   LMP  (LMP Unknown)   SpO2 97%   BMI 26.24 kg/m    Repeat BP R arm seated = 130/82 with regular size cuff.  Skin: negative  Fundi: deferred  Lungs: negative, Percussion normal. Good diaphragmatic excursion. Lungs clear  Heart: negative, PMI normal. No lifts, heaves, or thrills. RRR. No murmurs, clicks " gallops or rub  Peripheral pulses: radial=4/4, femoral=4/4, popliteal=4/4, dorsalis pedis=4/4,  Abd: The abdomen is soft without tenderness, guarding, mass or organomegaly. Bowel sounds are normal. No CVA tenderness or inguinal adenopathy noted.  BMI : Body mass index is 26.24 kg/m .    ASSESSMENT:(E78.00) Pure hypercholesterolemia  (primary encounter diagnosis)  Plan: Lipid Panel (BFP), VENOUS COLLECTION,         simvastatin (ZOCOR) 20 MG tablet        1)  Medication: continue current medication regimen unchanged  2)  Low fat, low cholesterol diet  3)  Regular aerobic exercise  4)  Recheck in 6 months, sooner should new symptoms or   problems arise.    Patient Education: Reviewed risks of elevated lipids and principles   of treatment.        (I10) Benign essential hypertension  Plan: Comprehensive Metobolic Panel (BFP), VENOUS         COLLECTION, amLODIPine (NORVASC) 5 MG tablet        1)  Medication: continue current medication regimen unchanged  2)  Dietary sodium restriction  3)  Regular aerobic exercise  4)  Recheck in 6 months, sooner should new symptoms or   problems arise.    Patient Education: Reviewed risks of hypertension and principles of   treatment.        (E03.9) Acquired hypothyroidism  Plan: TSH with free T4 reflex ([facility]), VENOUS         COLLECTION, levothyroxine         (SYNTHROID/LEVOTHROID) 50 MCG tablet        await labs    (E66.3) Overweight (BMI 25.0-29.9)  Plan: A low fat diet, regular aerobic exercise like walking 30 minutes daily and weight control is the treatment recommendations at this time      (Z79.899) Encounter for long-term (current) use of medications  Plan: TSH with free T4 reflex ([facility]), Lipid Panel         (BFP), Comprehensive Metobolic Panel (BFP),         VENOUS COLLECTION

## 2019-05-29 NOTE — LETTER
May 30, 2019      Kimberley Mullins  [address] [facility] [address]        Dear ,    We are writing to inform you of your test results.    Your test results fall within the expected range(s) or remain unchanged from previous results. A low creatinine or ALT is not significant. See values below.     Please continue with current treatment plan.    Resulted Orders   TSH with free T4 reflex ([facility])   Result Value Ref Range    TSH 4.18 0.40 - 4.50 mIU/L   Lipid Panel (BFP)   Result Value Ref Range    Cholesterol 199 0 - 199 mg/dL    Triglycerides 97 0 - 149 mg/dL    HDL Cholesterol 79 40 - 150 mg/dL    LDL Cholesterol Direct 101 (A) 0 - 99 mg/dL    Cholesterol/HDL Ratio 3 0 - 5   Comprehensive Metobolic Panel (BFP)   Result Value Ref Range    Carbon Dioxide 31.5 20 - 32 mmol/L    Creatinine 0.64 (A) 0.70 - 1.18 mg/dL    Glucose 93 60 - 99 mg/dL    Sodium 142.3 135 - 146 mmol/L    Potassium 4.57 3.5 - 5.3 mmol/L    Chloride 104.0 98 - 110 mmol/L    Protein Total 6.9 6.1 - 8.1 g/dL    Albumin 4.3 3.6 - 5.1 g/dL    Alkaline Phosphatase 74 40 - 115 U/L    ALT 7 (A) 9 - 46 U/L    AST 18 10 - 35 U/L    Bilirubin Direct 0.3 0.1 - 0.4 mg/dL    Bilirubin Total 0.7 0.2 - 1.2 mg/dL    Urea Nitrogen 9 7 - 25 mg/dL    Calcium 9.5 8.6 - 10.3 mg/dL       If you have any questions or concerns, please call the clinic at the number listed above.       Sincerely,        Sheron Armenta MD

## 2019-05-29 NOTE — NURSING NOTE
Kimberley is here today for a fasting med recheck.    Pre-visit Screening:  Immunizations:  up to date  Colonoscopy:  is up to date  Mammogram: is up to date  Asthma Action Test/Plan:  MARIYA  PHQ9:  PHQ 2 done today  GAD7:  NA  Questioned patient about current smoking habits Pt. has never smoked.  Ok to leave detailed message on voice mail for today's visit only Yes, phone # [phone]

## 2019-05-30 LAB — TSH SERPL-ACNC: 4.18 MIU/L (ref 0.4–4.5)

## 2019-11-23 DIAGNOSIS — E78.00 PURE HYPERCHOLESTEROLEMIA: ICD-10-CM

## 2019-11-25 RX ORDER — SIMVASTATIN 20 MG
TABLET ORAL
Qty: 90 TABLET | Refills: 1 | OUTPATIENT
Start: 2019-11-25

## 2019-11-25 NOTE — TELEPHONE ENCOUNTER
Refused Prescriptions:                       Disp   Refills    simvastatin (ZOCOR) 20 MG tablet [Pharmacy*90 tab*1        Sig: TAKE 1 TABLET (20 MG) BY MOUTH AT BEDTIME  Refused By: YAQUELIN KELLEY  Reason for Refusal: Patient needs appointment    Per notes on 5-2019 rtc fasting in six months  No future appts  Yaquelin  [phone] (home)     
No

## 2019-11-26 DIAGNOSIS — E78.00 PURE HYPERCHOLESTEROLEMIA: ICD-10-CM

## 2019-11-26 RX ORDER — SIMVASTATIN 20 MG
TABLET ORAL
Qty: 30 TABLET | OUTPATIENT
Start: 2019-11-26

## 2019-11-26 NOTE — TELEPHONE ENCOUNTER
No 30 day refill/ call patient and schedule an appointment. We can give enough tablets to make it to the date.

## 2019-11-26 NOTE — TELEPHONE ENCOUNTER
Pending Prescriptions:                       Disp   Refills    simvastatin (ZOCOR) 20 MG tablet [Pharmac*30 tab*             Sig: TAKE 1 TABLET (20 MG) BY MOUTH AT BEDTIME    LES I denied this 11-23  Per notes on 5- rtc in six months  No future appt  Do you want to give 30?   Fax or deny   Yaquelin  [phone] (home)

## 2019-11-30 ENCOUNTER — OFFICE VISIT (OUTPATIENT)
Dept: FAMILY MEDICINE | Facility: CLINIC | Age: 79
End: 2019-11-30

## 2019-11-30 VITALS
SYSTOLIC BLOOD PRESSURE: 128 MMHG | BODY MASS INDEX: 26.82 KG/M2 | TEMPERATURE: 97.5 F | OXYGEN SATURATION: 98 % | HEART RATE: 82 BPM | DIASTOLIC BLOOD PRESSURE: 80 MMHG | WEIGHT: 149 LBS

## 2019-11-30 DIAGNOSIS — I10 BENIGN ESSENTIAL HYPERTENSION: Primary | ICD-10-CM

## 2019-11-30 DIAGNOSIS — H35.30 MACULAR DEGENERATION (SENILE) OF RETINA: ICD-10-CM

## 2019-11-30 DIAGNOSIS — H26.9 CATARACT OF BOTH EYES, UNSPECIFIED CATARACT TYPE: ICD-10-CM

## 2019-11-30 DIAGNOSIS — E78.00 PURE HYPERCHOLESTEROLEMIA: ICD-10-CM

## 2019-11-30 LAB
ALBUMIN SERPL-MCNC: 4.2 G/DL (ref 3.6–5.1)
ALBUMIN/GLOB SERPL: 1.7 {RATIO} (ref 1–2.5)
ALP SERPL-CCNC: 80 U/L (ref 33–130)
ALT 1742-6: 8 U/L (ref 0–32)
AST 1920-8: 14 U/L (ref 0–35)
BILIRUB SERPL-MCNC: 0.8 MG/DL (ref 0.2–1.2)
BUN SERPL-MCNC: 9 MG/DL (ref 7–25)
BUN/CREATININE RATIO: 10.2 (ref 6–22)
CALCIUM SERPL-MCNC: 9.2 MG/DL (ref 8.6–10.3)
CHLORIDE SERPLBLD-SCNC: 101.4 MMOL/L (ref 98–110)
CHOLEST SERPL-MCNC: 182 MG/DL (ref 0–199)
CHOLEST/HDLC SERPL: 2 {RATIO} (ref 0–5)
CO2 SERPL-SCNC: 28.1 MMOL/L (ref 20–32)
CREAT SERPL-MCNC: 0.88 MG/DL (ref 0.7–1.18)
GLOBULIN, CALCULATED - QUEST: 2.4 (ref 1.9–3.7)
GLUCOSE SERPL-MCNC: 93 MG/DL (ref 60–99)
HDLC SERPL-MCNC: 83 MG/DL (ref 40–150)
LDLC SERPL CALC-MCNC: 85 MG/DL (ref 0–130)
POTASSIUM SERPL-SCNC: 4.5 MMOL/L (ref 3.5–5.3)
PROT SERPL-MCNC: 6.6 G/DL (ref 6.1–8.1)
SODIUM SERPL-SCNC: 137.8 MMOL/L (ref 135–146)
TRIGL SERPL-MCNC: 72 MG/DL (ref 0–149)

## 2019-11-30 PROCEDURE — 80061 LIPID PANEL: CPT | Performed by: PHYSICIAN ASSISTANT

## 2019-11-30 PROCEDURE — 99213 OFFICE O/P EST LOW 20 MIN: CPT | Performed by: PHYSICIAN ASSISTANT

## 2019-11-30 PROCEDURE — 80053 COMPREHEN METABOLIC PANEL: CPT | Performed by: PHYSICIAN ASSISTANT

## 2019-11-30 PROCEDURE — 36415 COLL VENOUS BLD VENIPUNCTURE: CPT | Performed by: PHYSICIAN ASSISTANT

## 2019-11-30 RX ORDER — AMLODIPINE BESYLATE 5 MG/1
TABLET ORAL
Qty: 90 TABLET | Refills: 1 | Status: SHIPPED | OUTPATIENT
Start: 2019-11-30 | End: 2020-05-06

## 2019-11-30 RX ORDER — SIMVASTATIN 20 MG
TABLET ORAL
Qty: 90 TABLET | Refills: 1 | Status: SHIPPED | OUTPATIENT
Start: 2019-11-30 | End: 2020-05-06

## 2019-11-30 NOTE — PROGRESS NOTES
CC: Medication Check    History:  HTN: Takes amlodipine daily. No side effects. No chest pain, palpitations, SOB, frequent headaches. Does not check BP at home. Last eye exam was this past spring. Per pt, she has cataract and macular degenerative that are being monitored.     Hyperlipidemia: Takes simvastatin daily. Has been on this for years now. No side effects.     Chronic sinus drainage:  Ongoing issue, but tolerable with Guaifenesin. Has not seen ENT, or done Singulair trial. Not interested in either of these today.     PMH, MEDICATIONS, ALLERGIES, SOCIAL AND FAMILY HISTORY in [facility] and reviewed by me personally.    ROS negative other than the symptoms noted above in the HPI.      Examination   /80 (BP Location: Left arm, Patient Position: Sitting, Cuff Size: Adult Regular)   Pulse 82   Temp 97.5  F (36.4  C) (Oral)   Wt 67.6 kg (149 lb)   LMP  (LMP Unknown)   SpO2 98%   BMI 26.82 kg/m         Constitutional: Sitting comfortably, in no acute distress. Vital signs noted  Eyes: pupils equal round reactive to light and accomodation, extra ocular movements intact  Neck:  no adenopathy, trachea midline and normal to palpation  Cardiovascular:  regular rate and rhythm, no murmurs, clicks, or gallops  Respiratory:  normal respiratory rate and rhythm, lungs clear to auscultation  SKIN: No jaundice/pallor/rash.   Psychiatric: mentation appears normal and affect normal/bright    A/P    ICD-10-CM    1. Benign essential hypertension I10 VENOUS COLLECTION     Comprehensive Metobolic Panel (BFP)   2. Pure hypercholesterolemia E78.00 VENOUS COLLECTION     Comprehensive Metobolic Panel (BFP)     Lipid Panel (BFP)       DISCUSSION:  HTN, hyperlipidemia: Kimberley is doing well today. Bp is controlled. Will refill amlodipine and simvastatin for 6 months without change. Will recheck BMP and lipid profile today and send letter with results when available.     follow up visit: 6 months    MARY JANE Giles  [facility]

## 2019-11-30 NOTE — NURSING NOTE
Chief Complaint   Patient presents with     Recheck Medication     fasting     Pre-visit Screening:  Immunizations:  up to date  Colonoscopy:  is up to date  Mammogram: is up to date  Asthma Action Test/Plan:  NA  PHQ9:  NA  GAD7:  NA  Questioned patient about current smoking habits Pt. has never smoked.  Ok to leave detailed message on voice mail for today's visit only yes, phone # [phone]

## 2020-01-30 ENCOUNTER — HOSPITAL ENCOUNTER (OUTPATIENT)
Dept: MAMMOGRAPHY | Facility: CLINIC | Age: 80
Discharge: HOME | End: 2020-01-30
Attending: OBSTETRICS & GYNECOLOGY | Admitting: OBSTETRICS & GYNECOLOGY
Payer: COMMERCIAL

## 2020-01-30 DIAGNOSIS — Z12.31 VISIT FOR SCREENING MAMMOGRAM: ICD-10-CM

## 2020-01-30 PROCEDURE — 77063 BREAST TOMOSYNTHESIS BI: CPT

## 2020-05-06 ENCOUNTER — OFFICE VISIT (OUTPATIENT)
Dept: FAMILY MEDICINE | Facility: CLINIC | Age: 80
End: 2020-05-06

## 2020-05-06 VITALS
DIASTOLIC BLOOD PRESSURE: 82 MMHG | HEART RATE: 87 BPM | TEMPERATURE: 97.9 F | HEIGHT: 63 IN | BODY MASS INDEX: 26.29 KG/M2 | WEIGHT: 148.4 LBS | SYSTOLIC BLOOD PRESSURE: 130 MMHG | OXYGEN SATURATION: 97 %

## 2020-05-06 DIAGNOSIS — I10 BENIGN ESSENTIAL HYPERTENSION: Primary | ICD-10-CM

## 2020-05-06 DIAGNOSIS — H81.10 BENIGN PAROXYSMAL POSITIONAL VERTIGO, UNSPECIFIED LATERALITY: ICD-10-CM

## 2020-05-06 DIAGNOSIS — E03.9 ACQUIRED HYPOTHYROIDISM: ICD-10-CM

## 2020-05-06 DIAGNOSIS — R09.82 POST-NASAL DRIP: ICD-10-CM

## 2020-05-06 DIAGNOSIS — E78.00 PURE HYPERCHOLESTEROLEMIA: ICD-10-CM

## 2020-05-06 LAB
ALBUMIN SERPL-MCNC: 4.4 G/DL (ref 3.6–5.1)
ALBUMIN/GLOB SERPL: 1.8 {RATIO} (ref 1–2.5)
ALP SERPL-CCNC: 77 U/L (ref 33–130)
ALT 1742-6: 10 U/L (ref 0–32)
AST 1920-8: 15 U/L (ref 0–35)
BILIRUB SERPL-MCNC: 0.7 MG/DL (ref 0.2–1.2)
BUN SERPL-MCNC: 9 MG/DL (ref 7–25)
BUN/CREATININE RATIO: 13.4 (ref 6–22)
CALCIUM SERPL-MCNC: 9.4 MG/DL (ref 8.6–10.3)
CHLORIDE SERPLBLD-SCNC: 101.7 MMOL/L (ref 98–110)
CO2 SERPL-SCNC: 28.3 MMOL/L (ref 20–32)
CREAT SERPL-MCNC: 0.67 MG/DL (ref 0.7–1.18)
GLOBULIN, CALCULATED - QUEST: 2.4 (ref 1.9–3.7)
GLUCOSE SERPL-MCNC: 118 MG/DL (ref 60–99)
POTASSIUM SERPL-SCNC: 4.22 MMOL/L (ref 3.5–5.3)
PROT SERPL-MCNC: 6.8 G/DL (ref 6.1–8.1)
SODIUM SERPL-SCNC: 139.5 MMOL/L (ref 135–146)

## 2020-05-06 PROCEDURE — 84443 ASSAY THYROID STIM HORMONE: CPT | Mod: 90 | Performed by: FAMILY MEDICINE

## 2020-05-06 PROCEDURE — 99214 OFFICE O/P EST MOD 30 MIN: CPT | Performed by: FAMILY MEDICINE

## 2020-05-06 PROCEDURE — 36415 COLL VENOUS BLD VENIPUNCTURE: CPT | Performed by: FAMILY MEDICINE

## 2020-05-06 PROCEDURE — 80053 COMPREHEN METABOLIC PANEL: CPT | Performed by: FAMILY MEDICINE

## 2020-05-06 RX ORDER — LEVOTHYROXINE SODIUM 50 UG/1
50 TABLET ORAL DAILY
Qty: 90 TABLET | Refills: 3 | Status: SHIPPED | OUTPATIENT
Start: 2020-05-06 | End: 2021-05-03

## 2020-05-06 RX ORDER — FLUTICASONE PROPIONATE 50 MCG
1 SPRAY, SUSPENSION (ML) NASAL DAILY
Qty: 16 G | Refills: 0 | Status: SHIPPED | OUTPATIENT
Start: 2020-05-06 | End: 2020-06-30

## 2020-05-06 RX ORDER — SIMVASTATIN 20 MG
TABLET ORAL
Qty: 90 TABLET | Refills: 1 | Status: SHIPPED | OUTPATIENT
Start: 2020-05-06 | End: 2020-11-17

## 2020-05-06 RX ORDER — MECLIZINE HYDROCHLORIDE 25 MG/1
25 TABLET ORAL 3 TIMES DAILY PRN
Qty: 20 TABLET | Refills: 0 | Status: SHIPPED | OUTPATIENT
Start: 2020-05-06 | End: 2021-05-26

## 2020-05-06 RX ORDER — AMLODIPINE BESYLATE 5 MG/1
TABLET ORAL
Qty: 90 TABLET | Refills: 1 | Status: SHIPPED | OUTPATIENT
Start: 2020-05-06 | End: 2020-11-17

## 2020-05-06 ASSESSMENT — MIFFLIN-ST. JEOR: SCORE: 1104.33

## 2020-05-06 NOTE — PROGRESS NOTES
"Subjective     Kimberley Mullins is a 79 year old female who presents to clinic today for the following health issues:    HPI   Hyperlipidemia Follow-Up      Are you regularly taking any medication or supplement to lower your cholesterol?   Yes- simvastatin    Are you having muscle aches or other side effects that you think could be caused by your cholesterol lowering medication?  No    Hypertension Follow-up      Do you check your blood pressure regularly outside of the clinic? No     Are you following a low salt diet? No    Are your blood pressures ever more than 140 on the top number (systolic) OR more   than 90 on the bottom number (diastolic), for example 140/90? No      How many servings of fruits and vegetables do you eat daily?  2-3    On average, how many sweetened beverages do you drink each day (Examples: soda, juice, sweet tea, etc.  Do NOT count diet or artificially sweetened beverages)?   1    How many days per week do you exercise enough to make your heart beat faster? 5    How many minutes a day do you exercise enough to make your heart beat faster? 20 - 29    How many days per week do you miss taking your medication? 0    Dizziness  Onset: yesterday am when got out of bed    Description:   Do you feel faint:  no   Does it feel like the surroundings (bed, room) are moving: no   Unsteady/off balance: YES  Have you passed out or fallen: no     Intensity: mild    Progression of Symptoms:  Intermittent episodes when moving head lasting seconds    Accompanying Signs & Symptoms:  Heart palpitations: no   Nausea, vomiting: no   Weakness in arms or legs: no but fatigues in legs  Fatigue: YES  Vision or speech changes: no   Ringing in ears (Tinnitus): YES- chronic issue not new  Hearing Loss: no     Pt feels ears full or need to \"pop\"    History:   Head trauma/concussion hx: no   Previous similar symptoms: no   Recent bleeding history: no     Precipitating factors:   Worse with activity or head movement: " YES  Any new medications (BP?): no   Alcohol/drug abuse/withdrawal: no , drinks 2 glasses wine daily    Alleviating factors:   Does staying in a fixed position give relief:  YES    Therapies Tried and outcome: none  Pt does not ongoing nasal congestion and post nasal drip, no allergy medications tried at present-tried flonase once in past and got nosebleed    Patient Active Problem List   Diagnosis     Tinnitus     Disorder of bone and cartilage     Transient global amnesia     Pure hypercholesterolemia     Osteoarthritis     Status post total knee replacement     Health Care Home     ACP (advance care planning)     Benign essential hypertension     Hypothyroidism     Irritable bowel syndrome with diarrhea     Osteopenia, unspecified location     Overweight (BMI 25.0-29.9)     Macular degeneration (senile) of retina     Cataract of both eyes, unspecified cataract type     Past Surgical History:   Procedure Laterality Date     ARTHROPLASTY KNEE  10/25/2011    Procedure:ARTHROPLASTY KNEE; Right Total Knee Arthroplasty   ; Surgeon:BRYCE ATWOOD; Location:[facility] OR     ARTHROPLASTY KNEE  6/2/2014    Procedure: ARTHROPLASTY KNEE;  Surgeon: Bryce Atwood MD;  Location: [facility] OR     C AFF FOREARM/WRIST SURGERY UNLISTED      orif radius fx distal left     C APPENDECTOMY  1955     C OPEN JESSI FIXATN HUMERAL SHAFT FX  12/2007    Dr Massey     HC COLONOSCOPY W SNARE REMOVAL TUMOR/POLYP/LESION  9/03 & 3/2011    polyp removal during screening colonoscopy     HC DILATION/CURETTAGE DIAG/THER NON OB  2006    D & C     HC KNEE SCOPE, DIAGNOSTIC      Arthroscopy, Knee       Social History     Tobacco Use     Smoking status: Never Smoker     Smokeless tobacco: Never Used   Substance Use Topics     Alcohol use: Yes     Alcohol/week: 5.8 standard drinks     Types: 7 Standard drinks or equivalent per week     Comment: two to three glasses of wine q day.     Family History   Problem Relation Age of Onset     Heart Disease Father   "       MI age 70         Current Outpatient Medications   Medication Sig Dispense Refill     amLODIPine (NORVASC) 5 MG tablet TAKE 1 TABLET (5 MG) BY MOUTH DAILY 90 tablet 1     levothyroxine (SYNTHROID/LEVOTHROID) 50 MCG tablet Take 1 tablet (50 mcg) by mouth daily 90 tablet 3     simvastatin (ZOCOR) 20 MG tablet TAKE 1 TABLET (20 MG) BY MOUTH AT BEDTIME 90 tablet 1     Allergies   Allergen Reactions     No Known Allergies      Recent Labs   Lab Test 11/30/19 05/29/19  0935 05/29/19 11/07/18  0939 04/05/18  0941 11/01/17  1051   LDL 85  --  101* 89 86 105*   HDL 83  --  79 72 89 73   TRIG 72  --  97 116 81 103   ALT  --   --   --  19 15 20   CR 0.88  --  0.64* 0.65 0.71 0.65   GFRESTIMATED  --   --   --  84 82 85   POTASSIUM 4.50  --  4.57 4.5 4.7 4.6   TSH  --  4.18  --   --  2.81  --       BP Readings from Last 3 Encounters:   05/06/20 130/82   11/30/19 128/80   05/29/19 130/82    Wt Readings from Last 3 Encounters:   05/06/20 67.3 kg (148 lb 6.4 oz)   11/30/19 67.6 kg (149 lb)   05/29/19 66.1 kg (145 lb 12.8 oz)                    Hypothyroidism Follow-up      Since last visit, patient describes the following symptoms: Weight stable, no hair loss, no skin changes, no constipation, no loose stools    Reviewed and updated as needed this visit by Provider         Review of Systems   ROS COMP: Constitutional, HEENT, cardiovascular, pulmonary, gi and gu systems are negative, except as otherwise noted.      Objective    /82 (BP Location: Right arm, Patient Position: Sitting, Cuff Size: Adult Large)   Pulse 87   Temp 97.9  F (36.6  C) (Oral)   Ht 1.588 m (5' 2.5\")   Wt 67.3 kg (148 lb 6.4 oz)   LMP  (LMP Unknown)   SpO2 97%   BMI 26.71 kg/m    Body mass index is 26.71 kg/m .  Physical Exam   GENERAL: healthy, alert and no distress  EYES: Eyes grossly normal to inspection, PERRL and conjunctivae and sclerae normal  HENT: ear canals and TM's normal, nose and mouth without ulcers or lesions  NECK: no " adenopathy, no asymmetry, masses, or scars and thyroid normal to palpation  RESP: lungs clear to auscultation - no rales, rhonchi or wheezes  CV: regular rate and rhythm, normal S1 S2, no S3 or S4, no murmur, click or rub, no peripheral edema and peripheral pulses strong  ABDOMEN: soft, nontender, no hepatosplenomegaly, no masses and bowel sounds normal  MS: no gross musculoskeletal defects noted, no edema  SKIN: no suspicious lesions or rashes  NEURO: Normal strength and tone, mentation intact and speech normal  NEURO:   PSYCH: mentation appears normal, affect normal/bright    Diagnostic Test Results:  Labs reviewed in Epic  No results found for this or any previous visit (from the past 24 hour(s)).        Assessment & Plan   Assessment      Plan  (I10) Benign essential hypertension  (primary encounter diagnosis)  Comment: well controlled  Plan: Comprehensive Metobolic Panel (BFP), VENOUS         COLLECTION, amLODIPine (NORVASC) 5 MG tablet        continue current medications at current doses     (E78.00) Pure hypercholesterolemia  Comment: control uncertain  Plan: Comprehensive Metobolic Panel (BFP), VENOUS         COLLECTION, simvastatin (ZOCOR) 20 MG tablet        continue current medications at current doses     (E03.9) Acquired hypothyroidism  Comment: stable symptomatically   Plan: TSH with free T4 reflex ([facility]), VENOUS         COLLECTION, levothyroxine         (SYNTHROID/LEVOTHROID) 50 MCG tablet        continue current medications at current doses pendinglabs    (H81.10) Benign paroxysmal positional vertigo, unspecified laterality  Comment: discussed classic findings of BPV- episodes short, brought on by head movement-no signs central cause, no other symptoms to sugeest other peripheral cause-discussed normal 2-7 day time course, option for meclizine  Plan: meclizine (ANTIVERT) 25 MG tablet        I reviewed the risks, benefits, and possible side effects of the medication.  The patient had an opportunity  "to ask any questions regarding the treatment plan. The patient was encouraged to call my office if any problems.     If not better can consider ENT eval or dizzy clinic    patient given instructions to go to emergency department immediately if worsening of symptoms and verbalizes this understanding     (R09.82) Post-nasal drip  Comment: suspect her sinus issues may be contributing to altered inner ear function  Plan: fluticasone (FLONASE) 50 MCG/ACT nasal spray        Recommend retry flonase, Recommend vaseline petroleum jelly to nares 1-2 times daily or alternatively nasal saline, OK to restartnasal steroid if no bleeds for 2 weeks-continue moisturizing nares after restart. f/u if not improving or worsening       BMI:   Estimated body mass index is 26.71 kg/m  as calculated from the following:    Height as of this encounter: 1.588 m (5' 2.5\").    Weight as of this encounter: 67.3 kg (148 lb 6.4 oz).           FUTURE APPOINTMENTS:       - Follow-up visit in 6 mo    No follow-ups on file.    Krunal Guerra MD  [facility]            "

## 2020-05-06 NOTE — NURSING NOTE
Kimberley is here today for dizziness.    Pre-visit Screening:  Immunizations:  up to date  Colonoscopy:  is up to date  Mammogram: is up to date  Asthma Action Test/Plan:  MARIYA  PHQ9:  NA  GAD7:  NA  Questioned patient about current smoking habits Pt. has never smoked.  Ok to leave detailed message on voice mail for today's visit only Yes, phone # [phone]

## 2020-05-06 NOTE — LETTER
May 7, 2020      Kimberley Mullins  [address] [facility] [address]        Dear ,    We are writing to inform you of your test results.    Your test results fall within the expected range(s) or remain unchanged from previous results.  Please continue with current treatment plan. Thyroid looks fine.     Resulted Orders   Comprehensive Metobolic Panel (BFP)   Result Value Ref Range    Carbon Dioxide 28.3 20 - 32 mmol/L    Creatinine 0.67 (A) 0.70 - 1.18 mg/dL    Glucose 118 (A) 60 - 99 mg/dL    Sodium 139.5 135 - 146 mmol/L    Potassium 4.22 3.5 - 5.3 mmol/L    Chloride 101.7 98 - 110 mmol/L    Protein Total 6.8 6.1 - 8.1 g/dL    Albumin 4.4 3.6 - 5.1 g/dL    Alkaline Phosphatase 77 33 - 130 U/L    ALT 10 0 - 32 U/L    AST 15 0 - 35 U/L    Bilirubin Total 0.7 0.2 - 1.2 mg/dL    Urea Nitrogen 9 7 - 25 mg/dL    Calcium 9.4 8.6 - 10.3 mg/dL    BUN/Creatinine Ratio 13.4 6 - 22    Globulin Calculated 2.4 1.9 - 3.7    A/G Ratio 1.8 1 - 2.5   TSH with free T4 reflex ([facility])   Result Value Ref Range    TSH 3.19 0.40 - 4.50 mIU/L       If you have any questions or concerns, please call the clinic at the number listed above.       Sincerely,        Krunal Guerra MD

## 2020-05-07 LAB — TSH SERPL-ACNC: 3.19 MIU/L (ref 0.4–4.5)

## 2020-05-27 ENCOUNTER — TRANSFERRED RECORDS (OUTPATIENT)
Dept: FAMILY MEDICINE | Facility: CLINIC | Age: 80
End: 2020-05-27

## 2020-06-25 ENCOUNTER — TRANSFERRED RECORDS (OUTPATIENT)
Dept: FAMILY MEDICINE | Facility: CLINIC | Age: 80
End: 2020-06-25

## 2020-06-28 DIAGNOSIS — R09.82 POST-NASAL DRIP: ICD-10-CM

## 2020-06-30 RX ORDER — FLUTICASONE PROPIONATE 50 MCG
SPRAY, SUSPENSION (ML) NASAL
Qty: 16 ML | Refills: 0 | Status: SHIPPED | OUTPATIENT
Start: 2020-06-30 | End: 2020-08-26

## 2020-06-30 NOTE — TELEPHONE ENCOUNTER
Received incoming refill request for  Pending Prescriptions:                       Disp   Refills    fluticasone (FLONASE) 50 MCG/ACT nasal sp*16 mL  0            Sig: INSTILL 1 SPRAY INTO BOTH NOSTRILS DAILY    Patient last had a refill of this medication on 5/6/2020 for one. Patient was seen in clinic on 5/6/2020 and was told to return in 6 months. Routing to Dr. Armenta for review, are you willing to send in a refill of this due to Dr. Guerra being out of office this week?

## 2020-07-09 ENCOUNTER — OFFICE VISIT (OUTPATIENT)
Dept: FAMILY MEDICINE | Facility: CLINIC | Age: 80
End: 2020-07-09

## 2020-07-09 VITALS
HEART RATE: 82 BPM | DIASTOLIC BLOOD PRESSURE: 82 MMHG | WEIGHT: 146 LBS | RESPIRATION RATE: 18 BRPM | TEMPERATURE: 98.5 F | BODY MASS INDEX: 25.87 KG/M2 | OXYGEN SATURATION: 98 % | HEIGHT: 63 IN | SYSTOLIC BLOOD PRESSURE: 134 MMHG

## 2020-07-09 DIAGNOSIS — H26.9 CATARACT OF BOTH EYES, UNSPECIFIED CATARACT TYPE: Primary | ICD-10-CM

## 2020-07-09 DIAGNOSIS — Z01.818 PRE-OP EXAM: ICD-10-CM

## 2020-07-09 LAB — HEMOGLOBIN: 13.9 G/DL (ref 11.7–15.7)

## 2020-07-09 PROCEDURE — 99214 OFFICE O/P EST MOD 30 MIN: CPT | Performed by: FAMILY MEDICINE

## 2020-07-09 PROCEDURE — 85018 HEMOGLOBIN: CPT | Performed by: FAMILY MEDICINE

## 2020-07-09 PROCEDURE — 36415 COLL VENOUS BLD VENIPUNCTURE: CPT | Performed by: FAMILY MEDICINE

## 2020-07-09 ASSESSMENT — MIFFLIN-ST. JEOR: SCORE: 1093.44

## 2020-07-09 NOTE — LETTER
[facility]                 [facility]  [address]  [place]  [address]        For Emergencies:  Call 911      For Clinic Appointments:   [phone]                     [facility]  [address]  [address]  [facility] [address] [phone]  Dept: [phone]    PRE-OP EVALUATION:  Today's date: 2020    Kimberley Mullins (: 1940) presents for pre-operative evaluation assessment as requested by Dr. Lewis.  She requires evaluation and anesthesia risk assessment prior to undergoing surgery/procedure for treatment of cataracts, left eye first then right eye to follow .    Proposed Surgery/ Procedure: cataracts  Date of Surgery/ Procedure: 20 left eye, 20 right eye  Time of Surgery/ Procedure: [facility]  Hospital/Surgical Facility: [facility]   Fax number for surgical facility: [phone]  Primary Physician: Sheron Armenta  Type of Anesthesia Anticipated: to be determined    Patient has a Health Care Directive or Living Will:  YES     1. NO - Do you have a history of heart attack, stroke, stent, bypass or surgery on an artery in the head, neck, heart or legs?  2. NO - Do you ever have any pain or discomfort in your chest?  3. NO - Do you have a history of  Heart Failure?  4. NO - Are you troubled by shortness of breath when: walking on the level, up a slight hill or at night?  5. NO - Do you currently have a cold, bronchitis or other respiratory infection?  6. NO - Do you have a cough, shortness of breath or wheezing?  7. NO - Do you sometimes get pains in the calves of your legs when you walk?  8. NO - Do you or anyone in your family have previous history of blood clots?  9. NO - Do you or does anyone in your family have a serious bleeding problem such as prolonged bleeding following surgeries or cuts?  10. NO - Have you ever had problems with anemia or  been told to take iron pills?  11. NO - Have you had any abnormal blood loss such as black, tarry or bloody stools, or abnormal vaginal bleeding?  12. NO - Have you ever had a blood transfusion?  13. NO - Have you or any of your relatives ever had problems with anesthesia?  14. NO - Do you have sleep apnea, excessive snoring or daytime drowsiness?  15. NO - Do you have any prosthetic heart valves?  16. YES - DO YOU HAVE PROSTHETIC JOINTS? Bilateral knee replacement  17. NO - Is there any chance that you may be pregnant?      HPI:     HPI related to upcoming procedure: Bilateral cataract surgery      See problem list for active medical problems.  Problems all longstanding and stable, except as noted/documented.  See ROS for pertinent symptoms related to these conditions.      MEDICAL HISTORY:     Patient Active Problem List    Diagnosis Date Noted     Macular degeneration (senile) of retina 11/30/2019     Priority: Medium     Cataract of both eyes, unspecified cataract type 11/30/2019     Priority: Medium     Osteopenia, unspecified location 04/05/2018     Priority: Medium     Overweight (BMI 25.0-29.9) 04/05/2018     Priority: Medium     Irritable bowel syndrome with diarrhea 02/08/2016     Priority: Medium     Hypothyroidism 01/07/2016     Priority: Medium     Benign essential hypertension 10/08/2015     Priority: Medium     ACP (advance care planning) 08/27/2015     Priority: Medium     Advance Care Planning 12/1/2015: ACP Review and Resources Provided:  Reviewed chart for advance care plan.  Kimberley Mullins has no plan and FULL code status on file, however states presence of ACP document. Copy requested. Discussed available resources and provided with information. Confirmed code status reflects current choices pending further ACP discussions. Recommend goals of care discussion with patient/decision makers and updated POLST/Code status order to reflect patient choices. Confirmed/documented legally designated  decision maker(s). Added by CARLO Stout  Advance Care Planning 8/27/2015: ACP Review and Resources Provided:  Reviewed chart for advance care plan.  Kimberley Mullins has no plan or code status on file however states presence of ACP document. Copy requested. Confirmed code status reflects current choices pending receipt of document/advance care plan review. Confirmed/documented legally designated decision maker(s). Added by [facility] 10/03/2012     Priority: Medium     State Tier Level:  Tier 1  Status:  n/a  Care Coordinator:      See Letters for [facility] Care Plan           Status post total knee replacement 11/09/2011     Priority: Medium     Osteoarthritis 10/31/2011     Priority: Medium     Pure hypercholesterolemia 11/20/2009     Priority: Medium     Disorder of bone and cartilage 12/20/2004     Priority: Medium     Problem list name updated by automated process. Provider to review       Transient global amnesia 12/20/2004     Priority: Medium     Tinnitus 08/17/2003     Priority: Medium     Problem list name updated by automated process. Provider to review        Past Medical History:   Diagnosis Date     Arthritis      Diverticulitis of colon (without mention of hemorrhage)(562.11) 1997    diverticulosis noted on Flex Sig     Irritable bowel syndrome 1959     Osteopenia, unspecified location 4/5/2018     Other ovarian failure 1986     Overweight (BMI 25.0-29.9) 4/5/2018     Personal history of colonic polyps 2004     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     Thyroid disease      Transient global amnesia      Unspecified tinnitus     Tinnitus     Past Surgical History:   Procedure Laterality Date     ARTHROPLASTY KNEE  10/25/2011    Procedure:ARTHROPLASTY KNEE; Right Total Knee Arthroplasty   ; Surgeon:BRYCE ATWOOD; Location: OR     ARTHROPLASTY KNEE  6/2/2014    Procedure: ARTHROPLASTY KNEE;  Surgeon: Bryce Atwood MD;  Location:  OR       "AFF FOREARM/WRIST SURGERY UNLISTED      orif radius fx distal left     C APPENDECTOMY  1955     C OPEN JESSI FIXATN HUMERAL SHAFT FX  12/2007    Dr Massey     HC COLONOSCOPY W SNARE REMOVAL TUMOR/POLYP/LESION  9/03 & 3/2011    polyp removal during screening colonoscopy     HC DILATION/CURETTAGE DIAG/THER NON OB  2006    D & C     HC KNEE SCOPE, DIAGNOSTIC      Arthroscopy, Knee     Current Outpatient Medications   Medication Sig Dispense Refill     amLODIPine (NORVASC) 5 MG tablet TAKE 1 TABLET (5 MG) BY MOUTH DAILY 90 tablet 1     fluticasone (FLONASE) 50 MCG/ACT nasal spray INSTILL 1 SPRAY INTO BOTH NOSTRILS DAILY 16 mL 0     levothyroxine (SYNTHROID/LEVOTHROID) 50 MCG tablet Take 1 tablet (50 mcg) by mouth daily 90 tablet 3     meclizine (ANTIVERT) 25 MG tablet Take 1 tablet (25 mg) by mouth 3 times daily as needed for dizziness 20 tablet 0     simvastatin (ZOCOR) 20 MG tablet TAKE 1 TABLET (20 MG) BY MOUTH AT BEDTIME 90 tablet 1     OTC products: None, except as noted above    Allergies   Allergen Reactions     No Known Allergies       Latex Allergy: NO    Social History     Tobacco Use     Smoking status: Never Smoker     Smokeless tobacco: Never Used   Substance Use Topics     Alcohol use: Yes     Alcohol/week: 5.8 standard drinks     Types: 7 Standard drinks or equivalent per week     Comment: two to three glasses of wine q day.     History   Drug Use No       REVIEW OF SYSTEMS:   CONSTITUTIONAL: NEGATIVE for fever, chills, change in weight  ENT/MOUTH: NEGATIVE for ear, mouth and throat problems  RESP: NEGATIVE for significant cough or SOB  CV: NEGATIVE for chest pain, palpitations or peripheral edema    EXAM:   /82 (BP Location: Right arm, Patient Position: Sitting, Cuff Size: Adult Large)   Pulse 82   Temp 98.5  F (36.9  C) (Oral)   Resp 18   Ht 1.588 m (5' 2.5\")   Wt 66.2 kg (146 lb)   LMP  (LMP Unknown)   SpO2 98%   BMI 26.28 kg/m    GENERAL APPEARANCE: healthy, alert and no distress  HENT: " ear canals and TM's normal and nose and mouth without ulcers or lesions  RESP: lungs clear to auscultation - no rales, rhonchi or wheezes  CV: regular rate and rhythm, normal S1 S2, no S3 or S4 and no murmur, click or rub   ABDOMEN: soft, nontender, no HSM or masses and bowel sounds normal  NEURO: Normal strength and tone, sensory exam grossly normal, mentation intact and speech normal    DIAGNOSTICS:   HGB:13.9 gm/dl    Recent Labs   Lab Test 05/06/20 11/30/19 04/05/18  0942  06/05/17  1301 06/05/17  1300  02/08/16  1025  06/05/14  0624   HGB  --   --   --  13.7  --  14.2  --    < > 12.7  --   --    PLT  --   --   --   --   --  142*  --   --  253  --   --    INR  --   --   --   --   --   --  0.9  --   --   --  2.21*   .5 137.8   < >  --    < >  --   --    < >  --    < >  --    POTASSIUM 4.22 4.50   < >  --    < >  --   --    < >  --    < >  --    CR 0.67* 0.88   < >  --    < >  --   --    < >  --    < >  --     < > = values in this interval not displayed.        IMPRESSION:   Diagnosis/reason for consult: (H26.9) Cataract of both eyes, unspecified cataract type  (primary encounter diagnosis)    (Z01.818) Pre-op exam        The proposed surgical procedure is considered LOW risk.    REVISED CARDIAC RISK INDEX  The patient has the following serious cardiovascular risks for perioperative complications such as (MI, PE, VFib and 3  AV Block):  No serious cardiac risks  INTERPRETATION: 0 risks: Class I (very low risk - 0.4% complication rate)    The patient has the following additional risks for perioperative complications:  No identified additional risks        RECOMMENDATIONS:     --Consult hospital rounder / IM to assist post-op medical management    --Patient is to take all scheduled medications on the day of surgery EXCEPT for modifications listed below.    APPROVAL GIVEN to proceed with proposed procedure, without further diagnostic evaluation       Signed Electronically by: Sheron Armenta MD    Copy of  this evaluation report is provided to requesting physician.

## 2020-07-09 NOTE — PROGRESS NOTES
[facility] PHYSICIANS  [address]  [address]  [facility] [address]  [phone]  Dept: [phone]    PRE-OP EVALUATION:  Today's date: 2020    Kimberley Mullins (: 1940) presents for pre-operative evaluation assessment as requested by Dr. Lewis.  She requires evaluation and anesthesia risk assessment prior to undergoing surgery/procedure for treatment of cataracts, left eye first then right eye to follow .    Proposed Surgery/ Procedure: cataracts  Date of Surgery/ Procedure: 20 left eye, 20 right eye  Time of Surgery/ Procedure: [facility]  Hospital/Surgical Facility: [facility]   Fax number for surgical facility: [phone]  Primary Physician: Sheron Armenta  Type of Anesthesia Anticipated: to be determined    Patient has a Health Care Directive or Living Will:  YES     1. NO - Do you have a history of heart attack, stroke, stent, bypass or surgery on an artery in the head, neck, heart or legs?  2. NO - Do you ever have any pain or discomfort in your chest?  3. NO - Do you have a history of  Heart Failure?  4. NO - Are you troubled by shortness of breath when: walking on the level, up a slight hill or at night?  5. NO - Do you currently have a cold, bronchitis or other respiratory infection?  6. NO - Do you have a cough, shortness of breath or wheezing?  7. NO - Do you sometimes get pains in the calves of your legs when you walk?  8. NO - Do you or anyone in your family have previous history of blood clots?  9. NO - Do you or does anyone in your family have a serious bleeding problem such as prolonged bleeding following surgeries or cuts?  10. NO - Have you ever had problems with anemia or been told to take iron pills?  11. NO - Have you had any abnormal blood loss such as black, tarry or bloody stools, or abnormal vaginal bleeding?  12. NO - Have you ever had a blood transfusion?  13. NO - Have you or any of your relatives ever had problems with  anesthesia?  14. NO - Do you have sleep apnea, excessive snoring or daytime drowsiness?  15. NO - Do you have any prosthetic heart valves?  16. YES - DO YOU HAVE PROSTHETIC JOINTS? Bilateral knee replacement  17. NO - Is there any chance that you may be pregnant?      HPI:     HPI related to upcoming procedure: Bilateral cataract surgery      See problem list for active medical problems.  Problems all longstanding and stable, except as noted/documented.  See ROS for pertinent symptoms related to these conditions.      MEDICAL HISTORY:     Patient Active Problem List    Diagnosis Date Noted     Macular degeneration (senile) of retina 11/30/2019     Priority: Medium     Cataract of both eyes, unspecified cataract type 11/30/2019     Priority: Medium     Osteopenia, unspecified location 04/05/2018     Priority: Medium     Overweight (BMI 25.0-29.9) 04/05/2018     Priority: Medium     Irritable bowel syndrome with diarrhea 02/08/2016     Priority: Medium     Hypothyroidism 01/07/2016     Priority: Medium     Benign essential hypertension 10/08/2015     Priority: Medium     ACP (advance care planning) 08/27/2015     Priority: Medium     Advance Care Planning 12/1/2015: ACP Review and Resources Provided:  Reviewed chart for advance care plan.  Kimberley ARDON Hipolitoyaniv has no plan and FULL code status on file, however states presence of ACP document. Copy requested. Discussed available resources and provided with information. Confirmed code status reflects current choices pending further ACP discussions. Recommend goals of care discussion with patient/decision makers and updated POLST/Code status order to reflect patient choices. Confirmed/documented legally designated decision maker(s). Added by CARLO Stout  Advance Care Planning 8/27/2015: ACP Review and Resources Provided:  Reviewed chart for advance care plan.  Kimberley Mullins has no plan or code status on file however states presence of ACP document. Copy requested.  Confirmed code status reflects current choices pending receipt of document/advance care plan review. Confirmed/documented legally designated decision maker(s). [facility]2012     Priority: Medium     State Tier Level:  Tier 1  Status:  n/a  Care Coordinator:      See Letters for [facility] Care Plan           Status post total knee replacement 11/09/2011     Priority: Medium     Osteoarthritis 10/31/2011     Priority: Medium     Pure hypercholesterolemia 11/20/2009     Priority: Medium     Disorder of bone and cartilage 12/20/2004     Priority: Medium     Problem list name updated by automated process. Provider to review       Transient global amnesia 12/20/2004     Priority: Medium     Tinnitus 08/17/2003     Priority: Medium     Problem list name updated by automated process. Provider to review        Past Medical History:   Diagnosis Date     Arthritis      Diverticulitis of colon (without mention of hemorrhage)(562.11) 1997    diverticulosis noted on Flex Sig     Irritable bowel syndrome 1959     Osteopenia, unspecified location 4/5/2018     Other ovarian failure 1986     Overweight (BMI 25.0-29.9) 4/5/2018     Personal history of colonic polyps 2004     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     Thyroid disease      Transient global amnesia      Unspecified tinnitus     Tinnitus     Past Surgical History:   Procedure Laterality Date     ARTHROPLASTY KNEE  10/25/2011    Procedure:ARTHROPLASTY KNEE; Right Total Knee Arthroplasty   ; Surgeon:BRYCE ATWOOD; Location:[facility] OR     ARTHROPLASTY KNEE  6/2/2014    Procedure: ARTHROPLASTY KNEE;  Surgeon: Bryce Atwood MD;  Location: [facility] OR     C AFF FOREARM/WRIST SURGERY UNLISTED      orif radius fx distal left     C APPENDECTOMY  1955     C OPEN JESSI FIXATN HUMERAL SHAFT FX  12/2007    Dr Massey     HC COLONOSCOPY W SNARE REMOVAL TUMOR/POLYP/LESION  9/03 & 3/2011    polyp removal during screening colonoscopy  "    HC DILATION/CURETTAGE DIAG/THER NON OB  2006    D & C     HC KNEE SCOPE, DIAGNOSTIC      Arthroscopy, Knee     Current Outpatient Medications   Medication Sig Dispense Refill     amLODIPine (NORVASC) 5 MG tablet TAKE 1 TABLET (5 MG) BY MOUTH DAILY 90 tablet 1     fluticasone (FLONASE) 50 MCG/ACT nasal spray INSTILL 1 SPRAY INTO BOTH NOSTRILS DAILY 16 mL 0     levothyroxine (SYNTHROID/LEVOTHROID) 50 MCG tablet Take 1 tablet (50 mcg) by mouth daily 90 tablet 3     meclizine (ANTIVERT) 25 MG tablet Take 1 tablet (25 mg) by mouth 3 times daily as needed for dizziness 20 tablet 0     simvastatin (ZOCOR) 20 MG tablet TAKE 1 TABLET (20 MG) BY MOUTH AT BEDTIME 90 tablet 1     OTC products: None, except as noted above    Allergies   Allergen Reactions     No Known Allergies       Latex Allergy: NO    Social History     Tobacco Use     Smoking status: Never Smoker     Smokeless tobacco: Never Used   Substance Use Topics     Alcohol use: Yes     Alcohol/week: 5.8 standard drinks     Types: 7 Standard drinks or equivalent per week     Comment: two to three glasses of wine q day.     History   Drug Use No       REVIEW OF SYSTEMS:   CONSTITUTIONAL: NEGATIVE for fever, chills, change in weight  ENT/MOUTH: NEGATIVE for ear, mouth and throat problems  RESP: NEGATIVE for significant cough or SOB  CV: NEGATIVE for chest pain, palpitations or peripheral edema    EXAM:   /82 (BP Location: Right arm, Patient Position: Sitting, Cuff Size: Adult Large)   Pulse 82   Temp 98.5  F (36.9  C) (Oral)   Resp 18   Ht 1.588 m (5' 2.5\")   Wt 66.2 kg (146 lb)   LMP  (LMP Unknown)   SpO2 98%   BMI 26.28 kg/m    GENERAL APPEARANCE: healthy, alert and no distress  HENT: ear canals and TM's normal and nose and mouth without ulcers or lesions  RESP: lungs clear to auscultation - no rales, rhonchi or wheezes  CV: regular rate and rhythm, normal S1 S2, no S3 or S4 and no murmur, click or rub   ABDOMEN: soft, nontender, no HSM or masses and " bowel sounds normal  NEURO: Normal strength and tone, sensory exam grossly normal, mentation intact and speech normal    DIAGNOSTICS:   HGB:13.9 gm/dl    Recent Labs   Lab Test 05/06/20 11/30/19 04/05/18  0942  06/05/17  1301 06/05/17  1300  02/08/16  1025  06/05/14  0624   HGB  --   --   --  13.7  --  14.2  --    < > 12.7  --   --    PLT  --   --   --   --   --  142*  --   --  253  --   --    INR  --   --   --   --   --   --  0.9  --   --   --  2.21*   .5 137.8   < >  --    < >  --   --    < >  --    < >  --    POTASSIUM 4.22 4.50   < >  --    < >  --   --    < >  --    < >  --    CR 0.67* 0.88   < >  --    < >  --   --    < >  --    < >  --     < > = values in this interval not displayed.        IMPRESSION:   Diagnosis/reason for consult: (H26.9) Cataract of both eyes, unspecified cataract type  (primary encounter diagnosis)    (Z01.818) Pre-op exam        The proposed surgical procedure is considered LOW risk.    REVISED CARDIAC RISK INDEX  The patient has the following serious cardiovascular risks for perioperative complications such as (MI, PE, VFib and 3  AV Block):  No serious cardiac risks  INTERPRETATION: 0 risks: Class I (very low risk - 0.4% complication rate)    The patient has the following additional risks for perioperative complications:  No identified additional risks        RECOMMENDATIONS:     --Consult hospital rounder / IM to assist post-op medical management    --Patient is to take all scheduled medications on the day of surgery EXCEPT for modifications listed below.    APPROVAL GIVEN to proceed with proposed procedure, without further diagnostic evaluation       Signed Electronically by: Sheron Armenta MD    Copy of this evaluation report is provided to requesting physician.

## 2020-08-26 DIAGNOSIS — R09.82 POST-NASAL DRIP: ICD-10-CM

## 2020-08-26 RX ORDER — FLUTICASONE PROPIONATE 50 MCG
SPRAY, SUSPENSION (ML) NASAL
Qty: 16 ML | Refills: 1 | Status: SHIPPED | OUTPATIENT
Start: 2020-08-26 | End: 2021-05-26

## 2020-08-26 NOTE — TELEPHONE ENCOUNTER
Kimberley is requesting a refill of the fluticasone. Last seen 5/6/20 for this medication.  Routing to PCP.      Pending Prescriptions:                       Disp   Refills    fluticasone (FLONASE) 50 MCG/ACT nasal sp*16 mL  0            Sig: INSTILL 1 SPRAY INTO BOTH NOSTRILS DAILY

## 2020-10-22 ENCOUNTER — TELEPHONE (OUTPATIENT)
Dept: FAMILY MEDICINE | Facility: CLINIC | Age: 80
End: 2020-10-22

## 2020-10-22 DIAGNOSIS — R03.0 ELEVATED BLOOD PRESSURE READING: Primary | ICD-10-CM

## 2020-10-22 NOTE — TELEPHONE ENCOUNTER
This can be a side effect of prednisone. If she experiences any cardiac symptoms ( exertional chest pain, dyspnea, palpitations, syncope, orthopnea, edema or paroxysmal nocturnal dyspnea) back to Urgent care for a recheck. Otherwise monitor BP, if above 200 systolic or 120 diastolic encourage a recheck visit.

## 2020-10-22 NOTE — TELEPHONE ENCOUNTER
Spoke to Kimberley to let her know LES's note below.  Patient verbalized understanding and will be seen if she has any of the below symtoms

## 2020-10-22 NOTE — TELEPHONE ENCOUNTER
Kimberley went to  yesterday and was diagnosed with [place] Palsy.  They put her on 60 mg of Prednisone.  This morning her BP is 195/112 and she is wondering what she should do.  Please advise    Patient's phone # [phone]

## 2020-10-26 ENCOUNTER — OFFICE VISIT (OUTPATIENT)
Dept: FAMILY MEDICINE | Facility: CLINIC | Age: 80
End: 2020-10-26

## 2020-10-26 VITALS
RESPIRATION RATE: 20 BRPM | HEART RATE: 91 BPM | SYSTOLIC BLOOD PRESSURE: 140 MMHG | DIASTOLIC BLOOD PRESSURE: 84 MMHG | BODY MASS INDEX: 25.87 KG/M2 | HEIGHT: 63 IN | WEIGHT: 146 LBS | OXYGEN SATURATION: 99 % | TEMPERATURE: 98.3 F

## 2020-10-26 DIAGNOSIS — T50.905A ADVERSE EFFECT OF DRUG, INITIAL ENCOUNTER: ICD-10-CM

## 2020-10-26 DIAGNOSIS — G51.0 FACIAL PARALYSIS/BELLS PALSY: Primary | ICD-10-CM

## 2020-10-26 DIAGNOSIS — R03.0 ELEVATED BLOOD PRESSURE READING: ICD-10-CM

## 2020-10-26 PROCEDURE — 99213 OFFICE O/P EST LOW 20 MIN: CPT | Performed by: FAMILY MEDICINE

## 2020-10-26 RX ORDER — PREDNISONE 20 MG/1
TABLET ORAL
COMMUNITY
Start: 2020-10-20 | End: 2020-11-02

## 2020-10-26 RX ORDER — VALACYCLOVIR HYDROCHLORIDE 1 G/1
TABLET, FILM COATED ORAL
COMMUNITY
Start: 2020-10-20 | End: 2021-01-06

## 2020-10-26 ASSESSMENT — MIFFLIN-ST. JEOR: SCORE: 1088.44

## 2020-10-26 NOTE — PROGRESS NOTES
SUBJECTIVE: 80 year old female complaining of diagnosis of Bell's palsy left side at ER in [place] on prednisone with anxiety and elevated BP. Now tapering to 50 mgm  for 1 day(s) and tapering off in 10 days .   The patient describes feeling very anxious and noting elevated BP. Valtrex started  The patient denies a history of eye irritation or trouble closing, eating issues or facial pain.   Smoking history: No.   Relevant past medical history: positive for hypertension.    OBJECTIVE: The patient appears healthy, alert, no distress, cooperative, smiling and Mild left sided facial droop with loss of nasolabial fold.   EARS: negative  NOSE/SINUS: Nares normal. Septum midline. Mucosa normal. No drainage or sinus tenderness.   THROAT: normal   NECK:Neck supple. No adenopathy. Thyroid symmetric, normal size,, Carotids without bruits.   CHEST: Clear  CNS: Cranial nerves are normal. Fundi are normal with sharp disc margins, no papilledema, hemorrhages or exudates noted. PARKER. EOM's intact. DTR's, motor power and sensation normal and symmetric. Babinski sign absent.  Mental status normal.  Gait and station normal.  Cerebellar function is normal.      ASSESSMENT: (G51.0) Facial paralysis/[place] palsy  (primary encounter diagnosis)  Comment: improving  Plan: Potential medication side effects were discussed with the patient; let me know if any occur.      (R03.0) Elevated blood pressure reading  Plan: improving

## 2020-10-26 NOTE — NURSING NOTE
Kimberley is here today for high BP.    Pre-visit Screening:  Immunizations:  up to date  Colonoscopy:  is up to date  Mammogram: is up to date  Asthma Action Test/Plan:  MARIYA  PHQ9:  NA  GAD7:  NA  Questioned patient about current smoking habits Pt. has never smoked.  Ok to leave detailed message on voice mail for today's visit only Yes, phone # [phone]

## 2020-10-26 NOTE — PATIENT INSTRUCTIONS
Facial paralysis/[place] palsy  (primary encounter diagnosis)  Comment: improving  Plan: Potential medication side effects were discussed with the patient; let me know if any occur.    Monitor for resolution/ recheck after all medications complete

## 2020-10-31 DIAGNOSIS — I10 BENIGN ESSENTIAL HYPERTENSION: ICD-10-CM

## 2020-10-31 DIAGNOSIS — E78.00 PURE HYPERCHOLESTEROLEMIA: ICD-10-CM

## 2020-11-02 ENCOUNTER — OFFICE VISIT (OUTPATIENT)
Dept: FAMILY MEDICINE | Facility: CLINIC | Age: 80
End: 2020-11-02

## 2020-11-02 VITALS
SYSTOLIC BLOOD PRESSURE: 138 MMHG | OXYGEN SATURATION: 98 % | BODY MASS INDEX: 25.69 KG/M2 | RESPIRATION RATE: 20 BRPM | HEIGHT: 63 IN | DIASTOLIC BLOOD PRESSURE: 82 MMHG | WEIGHT: 145 LBS | HEART RATE: 100 BPM | TEMPERATURE: 98.5 F

## 2020-11-02 DIAGNOSIS — Z76.0 ENCOUNTER FOR MEDICATION REFILL: ICD-10-CM

## 2020-11-02 DIAGNOSIS — E78.00 PURE HYPERCHOLESTEROLEMIA: ICD-10-CM

## 2020-11-02 DIAGNOSIS — G51.0 FACIAL PARALYSIS/BELLS PALSY: Primary | ICD-10-CM

## 2020-11-02 DIAGNOSIS — I10 BENIGN ESSENTIAL HYPERTENSION: ICD-10-CM

## 2020-11-02 PROCEDURE — 99213 OFFICE O/P EST LOW 20 MIN: CPT | Performed by: FAMILY MEDICINE

## 2020-11-02 RX ORDER — AMLODIPINE BESYLATE 5 MG/1
TABLET ORAL
Qty: 90 TABLET | Refills: 1 | COMMUNITY
Start: 2020-11-02

## 2020-11-02 RX ORDER — SIMVASTATIN 20 MG
TABLET ORAL
Qty: 90 TABLET | Refills: 1 | COMMUNITY
Start: 2020-11-02

## 2020-11-02 ASSESSMENT — MIFFLIN-ST. JEOR: SCORE: 1083.91

## 2020-11-02 NOTE — PATIENT INSTRUCTIONS
Facial paralysis/[place] palsy  (primary encounter diagnosis)  Comment: resolved  Plan: Monitor for expected healing    (E78.00) Pure hypercholesterolemia  Comment: return for fasting labs  Plan: Lipid Panel ([facility])

## 2020-11-02 NOTE — PROGRESS NOTES
"SUBJECTIVE:  Kimberley Mullins is an 80 year old female who presents for evaluation of her bell's palsy off all medication,  Hyperlipidemia and hypertension. She has been diagnosed in the past as having   elevated cholesterol only. She indicates that she is feeling well   and denies any symptoms of cardiovascular disease. Specifically   denies chest pain, palpitations, dyspnea, orthopnea, PND,   claudication or peripheral edema. Treatment modalities employed to   this point include diet, regular aerobic exercise and Zocor. Current medication   regimen is as listed below. Patient denies any side effects of   medication.    Family history: positive for hypertension, cardiovascular disease and elevated cholesterol  Age at diagnosis of hyperlipidemia: 59 and hypertension age 73  Cardiovascular risk factors: family history, lipids, hypertension and sedentary life style    Current Outpatient Medications   Medication     amLODIPine (NORVASC) 5 MG tablet     fluticasone (FLONASE) 50 MCG/ACT nasal spray     levothyroxine (SYNTHROID/LEVOTHROID) 50 MCG tablet     meclizine (ANTIVERT) 25 MG tablet     predniSONE (DELTASONE) 20 MG tablet     simvastatin (ZOCOR) 20 MG tablet     valACYclovir (VALTREX) 1000 mg tablet     No current facility-administered medications for this visit.      Allergies   Allergen Reactions     No Known Allergies        Social History     Tobacco Use     Smoking status: Never Smoker     Smokeless tobacco: Never Used   Substance Use Topics     Alcohol use: Yes     Alcohol/week: 5.8 standard drinks     Types: 7 Standard drinks or equivalent per week     Comment: two to three glasses of wine q day.       OBJECTIVE:  /82 (BP Location: Left arm, Patient Position: Sitting, Cuff Size: Adult Regular)   Pulse 100   Temp 98.5  F (36.9  C) (Oral)   Ht 1.588 m (5' 2.5\")   Wt 65.8 kg (145 lb)   LMP  (LMP Unknown)   SpO2 98%   BMI 26.10 kg/m    Repeat BP R arm seated = 138/82   with regular size cuff.  Skin: " negative  Fundi: deferred  CNS: Cranial nerves are normal. Fundi are normal with sharp disc margins, no papilledema, hemorrhages or exudates noted. PARKER. EOM's intact. DTR's, motor power and sensation normal and symmetric. Babinski sign absent.  Mental status normal.  Gait and station normal.  Cerebellar function is normal.    Lungs: negative, Percussion normal. Good diaphragmatic excursion. Lungs clear  Heart: negative, PMI normal. No lifts, heaves, or thrills. RRR. No murmurs, clicks gallops or rub  Peripheral pulses: radial=4/4, femoral=4/4, popliteal=4/4, dorsalis pedis=4/4,  Abd; The abdomen is soft without tenderness, guarding, mass or organomegaly. Bowel sounds are normal. No CVA tenderness or inguinal adenopathy noted.  Ext: The lower extremities are normal and reveal no sign of DVT. Calves and thighs are soft and non tender, color is normal, no swelling or redness. Leoncio's sign is negative.  Pedal pulses are normal.  BMI : Body mass index is 26.1 kg/m .    ASSESSMENT:(G51.0) Facial paralysis/[place] palsy  (primary encounter diagnosis)  Comment: resolved  Plan: Monitor for expected healing    (E78.00) Pure hypercholesterolemia  Comment: return for fasting labs  Plan: Lipid Panel ([facility])            (I10) Benign essential hypertension  Comment: as above  Plan: Comprehensive Metobolic Panel ([facility])            (Z76.0) Encounter for medication refill  Plan: Lipid Panel ([facility]), Comprehensive Metobolic         Panel ([facility])

## 2020-11-02 NOTE — TELEPHONE ENCOUNTER
Kimberley Mullins is requesting a refill of:    Refused Prescriptions:                       Disp   Refills    simvastatin (ZOCOR) 20 MG tablet [Pharmacy*90 tab*1        Sig: TAKE 1 TABLET BY MOUTH AT BEDTIME  Refused By: CLEO CABEZAS  Reason for Refusal: Patient needs appointment    amLODIPine (NORVASC) 5 MG tablet [Pharmacy*90 tab*1        Sig: TAKE 1 TABLET BY MOUTH EVERY DAY  Refused By: CLEO CABEZAS  Reason for Refusal: Patient needs appointment

## 2020-11-02 NOTE — NURSING NOTE
Kimberley is here today for a med recheck.    Pre-visit Screening:  Immunizations:  up to date  Colonoscopy:  is up to date  Mammogram: is up to date  Asthma Action Test/Plan:  MARIYA  PHQ9:  NA  GAD7:  NA  Questioned patient about current smoking habits Pt. has never smoked.  Ok to leave detailed message on voice mail for today's visit only Yes, phone # [phone]

## 2020-11-17 DIAGNOSIS — I10 BENIGN ESSENTIAL HYPERTENSION: ICD-10-CM

## 2020-11-17 DIAGNOSIS — Z76.0 ENCOUNTER FOR MEDICATION REFILL: ICD-10-CM

## 2020-11-17 DIAGNOSIS — E78.00 PURE HYPERCHOLESTEROLEMIA: ICD-10-CM

## 2020-11-17 LAB
ALBUMIN SERPL-MCNC: 4.5 G/DL (ref 3.6–5.1)
ALBUMIN/GLOB SERPL: 1.8 {RATIO} (ref 1–2.5)
ALP SERPL-CCNC: 71 U/L (ref 33–130)
ALT 1742-6: 14 U/L (ref 0–32)
AST 1920-8: 17 U/L (ref 0–35)
BILIRUB SERPL-MCNC: 0.8 MG/DL (ref 0.2–1.2)
BUN SERPL-MCNC: 7 MG/DL (ref 7–25)
BUN/CREATININE RATIO: 9.3 (ref 6–22)
CALCIUM SERPL-MCNC: 9.5 MG/DL (ref 8.6–10.3)
CHLORIDE SERPLBLD-SCNC: 101.2 MMOL/L (ref 98–110)
CHOLEST SERPL-MCNC: 181 MG/DL (ref 0–199)
CHOLEST/HDLC SERPL: 2 {RATIO} (ref 0–5)
CO2 SERPL-SCNC: 27.9 MMOL/L (ref 20–32)
CREAT SERPL-MCNC: 0.85 MG/DL (ref 0.7–1.18)
GLOBULIN, CALCULATED - QUEST: 2.5 (ref 1.9–3.7)
GLUCOSE SERPL-MCNC: 98 MG/DL (ref 60–99)
HDLC SERPL-MCNC: 89 MG/DL (ref 40–150)
LDLC SERPL CALC-MCNC: 75 MG/DL (ref 0–130)
POTASSIUM SERPL-SCNC: 4.2 MMOL/L (ref 3.5–5.3)
PROT SERPL-MCNC: 7 G/DL (ref 6.1–8.1)
SODIUM SERPL-SCNC: 138.3 MMOL/L (ref 135–146)
TRIGL SERPL-MCNC: 85 MG/DL (ref 0–149)

## 2020-11-17 PROCEDURE — 36415 COLL VENOUS BLD VENIPUNCTURE: CPT | Performed by: FAMILY MEDICINE

## 2020-11-17 PROCEDURE — 80061 LIPID PANEL: CPT | Performed by: FAMILY MEDICINE

## 2020-11-17 PROCEDURE — 80053 COMPREHEN METABOLIC PANEL: CPT | Performed by: FAMILY MEDICINE

## 2020-11-17 RX ORDER — SIMVASTATIN 20 MG
TABLET ORAL
Qty: 90 TABLET | Refills: 1 | Status: SHIPPED | OUTPATIENT
Start: 2020-11-17 | End: 2021-05-26

## 2020-11-17 RX ORDER — AMLODIPINE BESYLATE 5 MG/1
TABLET ORAL
Qty: 90 TABLET | Refills: 1 | Status: SHIPPED | OUTPATIENT
Start: 2020-11-17 | End: 2021-05-26

## 2020-11-17 NOTE — LETTER
November 17, 2020      Kimberley Mullins  [address] [facility] [address]        Dear ,    We are writing to inform you of your test results.    Your test results fall within the expected range(s) or remain unchanged from previous results.  Please continue with current treatment plan.    Resulted Orders   Comprehensive Metobolic Panel ([facility])   Result Value Ref Range    Carbon Dioxide 27.9 20 - 32 mmol/L    Creatinine 0.85 0.70 - 1.18 mg/dL    Glucose 98 60 - 99 mg/dL    Sodium 138.3 135 - 146 mmol/L    Potassium 4.20 3.5 - 5.3 mmol/L    Chloride 101.2 98 - 110 mmol/L    Protein Total 7.0 6.1 - 8.1 g/dL    Albumin 4.5 3.6 - 5.1 g/dL    Alkaline Phosphatase 71 33 - 130 U/L    ALT 14 0 - 32 U/L    AST 17 0 - 35 U/L    Bilirubin Total 0.8 0.2 - 1.2 mg/dL    Urea Nitrogen 7 7 - 25 mg/dL    Calcium 9.5 8.6 - 10.3 mg/dL    BUN/Creatinine Ratio 9.3 6 - 22    Globulin Calculated 2.5 1.9 - 3.7    A/G Ratio 1.8 1 - 2.5   Lipid Panel ([facility])   Result Value Ref Range    Cholesterol 181 0 - 199 mg/dL    Triglycerides 85 0 - 149 mg/dL    HDL Cholesterol 89 40 - 150 mg/dL    LDL Cholesterol Direct 75 0 - 130 mg/dL    Cholesterol/HDL Ratio 2 0 - 5       If you have any questions or concerns, please call the clinic at the number listed above.       Sincerely,        Sheron Armenta MD

## 2021-01-06 ENCOUNTER — OFFICE VISIT (OUTPATIENT)
Dept: FAMILY MEDICINE | Facility: CLINIC | Age: 81
End: 2021-01-06

## 2021-01-06 VITALS
TEMPERATURE: 97.6 F | HEIGHT: 63 IN | RESPIRATION RATE: 20 BRPM | OXYGEN SATURATION: 97 % | BODY MASS INDEX: 25.59 KG/M2 | SYSTOLIC BLOOD PRESSURE: 138 MMHG | HEART RATE: 84 BPM | WEIGHT: 144.4 LBS | DIASTOLIC BLOOD PRESSURE: 84 MMHG

## 2021-01-06 DIAGNOSIS — E03.9 ACQUIRED HYPOTHYROIDISM: ICD-10-CM

## 2021-01-06 DIAGNOSIS — G51.0 FACIAL PARALYSIS/BELLS PALSY: Primary | ICD-10-CM

## 2021-01-06 DIAGNOSIS — F41.1 GAD (GENERALIZED ANXIETY DISORDER): ICD-10-CM

## 2021-01-06 PROCEDURE — 36415 COLL VENOUS BLD VENIPUNCTURE: CPT | Performed by: FAMILY MEDICINE

## 2021-01-06 PROCEDURE — 99214 OFFICE O/P EST MOD 30 MIN: CPT | Performed by: FAMILY MEDICINE

## 2021-01-06 RX ORDER — PREDNISONE 20 MG/1
TABLET ORAL
COMMUNITY
Start: 2020-12-26 | End: 2021-01-06

## 2021-01-06 ASSESSMENT — ANXIETY QUESTIONNAIRES
3. WORRYING TOO MUCH ABOUT DIFFERENT THINGS: MORE THAN HALF THE DAYS
GAD7 TOTAL SCORE: 12
6. BECOMING EASILY ANNOYED OR IRRITABLE: SEVERAL DAYS
1. FEELING NERVOUS, ANXIOUS, OR ON EDGE: NEARLY EVERY DAY
7. FEELING AFRAID AS IF SOMETHING AWFUL MIGHT HAPPEN: MORE THAN HALF THE DAYS
5. BEING SO RESTLESS THAT IT IS HARD TO SIT STILL: SEVERAL DAYS
2. NOT BEING ABLE TO STOP OR CONTROL WORRYING: MORE THAN HALF THE DAYS
IF YOU CHECKED OFF ANY PROBLEMS ON THIS QUESTIONNAIRE, HOW DIFFICULT HAVE THESE PROBLEMS MADE IT FOR YOU TO DO YOUR WORK, TAKE CARE OF THINGS AT HOME, OR GET ALONG WITH OTHER PEOPLE: SOMEWHAT DIFFICULT

## 2021-01-06 ASSESSMENT — MIFFLIN-ST. JEOR: SCORE: 1081.18

## 2021-01-06 ASSESSMENT — PATIENT HEALTH QUESTIONNAIRE - PHQ9: 5. POOR APPETITE OR OVEREATING: SEVERAL DAYS

## 2021-01-06 NOTE — LETTER
January 7, 2021      Kimberley Mullins  [address] [facility] [address]        Dear ,    We are writing to inform you of your test results.    Small elevation in TSH indicates a minimal change in thyroid level that is low. If you missed a dose that would explain this small change.    Resulted Orders   TSH with free T4 reflex ([facility])   Result Value Ref Range    TSH 4.71 (H) 0.40 - 4.50 mIU/L    T4 Free, Non-Dialysis 1.7 0.8 - 1.8 ng/dL       If you have any questions or concerns, please call the clinic at the number listed above.       Sincerely,      Sheron Armenta MD

## 2021-01-06 NOTE — PROGRESS NOTES
SUBJECTIVE: 80 year old female complaining of a second round of Bell's palsy this time on the right started 12/26/2020. Went to urgent care for prednisone and Valtrex with good resolution/ finished medication this week for 10 day(s).   The patient describes left sided Bell's palsy on 10/26/2020. Continued tingling at times on the left face.    The patient denies a history of trauma, other neurological symptoms but does report marked anxiety usually brings on her symptoms.   Smoking history: No.   Relevant past medical history: positive for hypothyroid, tinnitus. Has had ear issues with vertigo and elevated cholesterol.    See recent ENT visit with evaluation. Hearing loss being monitored. No vertigo now. Using FLONASE when needed.   We reviewed that visit and results.      OBJECTIVE: The patient appears healthy, alert and no distress.   EARS: negative  NOSE/SINUS: Nares normal. Septum midline. Mucosa normal. No drainage or sinus tenderness.   THROAT: normal   NECK:Neck supple. No adenopathy. Thyroid symmetric, normal size,, Carotids without bruits.   CHEST: Regular rate and  rhythm. S1 and S2 normal, no murmurs, clicks, gallops or rubs. No edema or JVD. Chest is clear; no wheezes or rales.  CNS: Cranial nerves are normal. Fundi are normal with sharp disc margins, no papilledema, hemorrhages or exudates noted. PARKER. EOM's intact. DTR's, motor power and sensation normal and symmetric. Babinski sign absent.  Mental status normal.  Gait and station normal.  Cerebellar function is normal.      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 Recheck thyroid level.    (E03.9) Acquired hypothyroidism  Plan: TSH with free T4 reflex ([facility]), VENOUS         COLLECTION            (F41.1) CAROL (generalized anxiety  disorder)  Comment: discussion of diagnosis  Plan: read handout. Consider cognitive therapy, medications or both.

## 2021-01-06 NOTE — PATIENT INSTRUCTIONS
0) Facial paralysis/[place] palsy  (primary encounter diagnosis)  Comment: reviewed resolution/ tingling expected  Plan: NEUROLOGY ADULT REFERRAL, TSH with free T4         reflex ([facility]), VENOUS COLLECTION        Consult with neurology for guidance. Recheck thyroid level.    (E03.9) Acquired hypothyroidism  Plan: TSH with free T4 reflex ([facility]), VENOUS         COLLECTION            (F41.1) CAROL (generalized anxiety disorder)  Comment: discussion of diagnosis  Plan: read handout. Consider cognitive therapy, medications or both.

## 2021-01-07 LAB
T4, FREE, NON-DIALYSIS - QUEST: 1.7 NG/DL (ref 0.8–1.8)
TSH SERPL-ACNC: 4.71 MIU/L (ref 0.4–4.5)

## 2021-01-07 ASSESSMENT — ANXIETY QUESTIONNAIRES: GAD7 TOTAL SCORE: 12

## 2021-01-11 ENCOUNTER — TELEPHONE (OUTPATIENT)
Dept: FAMILY MEDICINE | Facility: CLINIC | Age: 81
End: 2021-01-11

## 2021-01-11 NOTE — TELEPHONE ENCOUNTER
Pt called stating the first appt she could get into  is 02/25. She is concerned that is too far out and is wondering if there is another option.    Please advise # [phone]    Thanks, Dilma

## 2021-01-11 NOTE — TELEPHONE ENCOUNTER
I called [facility]. Patient is scheduled for     1/20/2021 @ 2:00pm with a 1:45pm check in with     Dr. Atkins  [facility]  [address] [place] [address]  [phone] -- appt line  [phone] -- fax    Patient was given new appt info. Patient will call [facility] to cancel her 2/25/2021 appt with Dr. Morales.     ABELARDO

## 2021-01-28 ENCOUNTER — IMMUNIZATION (OUTPATIENT)
Dept: NURSING | Facility: CLINIC | Age: 81
End: 2021-01-28
Payer: COMMERCIAL

## 2021-01-28 PROCEDURE — 91300 PR COVID VAC PFIZER DIL RECON 30 MCG/0.3 ML IM: CPT

## 2021-01-28 PROCEDURE — 0001A PR COVID VAC PFIZER DIL RECON 30 MCG/0.3 ML IM: CPT

## 2021-02-01 ENCOUNTER — HOSPITAL ENCOUNTER (OUTPATIENT)
Dept: MAMMOGRAPHY | Facility: CLINIC | Age: 81
Discharge: HOME | End: 2021-02-01
Attending: OBSTETRICS & GYNECOLOGY | Admitting: OBSTETRICS & GYNECOLOGY
Payer: COMMERCIAL

## 2021-02-01 DIAGNOSIS — Z12.31 VISIT FOR SCREENING MAMMOGRAM: ICD-10-CM

## 2021-02-01 PROCEDURE — 77063 BREAST TOMOSYNTHESIS BI: CPT

## 2021-02-05 ENCOUNTER — TRANSFERRED RECORDS (OUTPATIENT)
Dept: FAMILY MEDICINE | Facility: CLINIC | Age: 81
End: 2021-02-05

## 2021-02-17 ENCOUNTER — TELEPHONE (OUTPATIENT)
Dept: FAMILY MEDICINE | Facility: CLINIC | Age: 81
End: 2021-02-17

## 2021-02-17 NOTE — TELEPHONE ENCOUNTER
She was referred to neurology. She should check with them for their advice.it might also depend on her symptoms.

## 2021-02-17 NOTE — TELEPHONE ENCOUNTER
Pt called stating she has her 2nd COVID vaccine scheduled for tomorrow. She had another flare-up of her [place] Palsy yesterday and is concerned that it will be safe to get her vaccine.     Please advise for LES #[phone]    Thanks, Dilma

## 2021-02-18 ENCOUNTER — IMMUNIZATION (OUTPATIENT)
Dept: NURSING | Facility: CLINIC | Age: 81
End: 2021-02-18
Attending: PHYSICIAN ASSISTANT
Payer: COMMERCIAL

## 2021-02-18 ENCOUNTER — OFFICE VISIT (OUTPATIENT)
Dept: FAMILY MEDICINE | Facility: CLINIC | Age: 81
End: 2021-02-18

## 2021-02-18 VITALS
HEIGHT: 63 IN | DIASTOLIC BLOOD PRESSURE: 82 MMHG | RESPIRATION RATE: 18 BRPM | SYSTOLIC BLOOD PRESSURE: 132 MMHG | TEMPERATURE: 97.7 F | WEIGHT: 144.6 LBS | OXYGEN SATURATION: 98 % | HEART RATE: 82 BPM | BODY MASS INDEX: 25.62 KG/M2

## 2021-02-18 DIAGNOSIS — Z86.0100 HISTORY OF COLONIC POLYPS: ICD-10-CM

## 2021-02-18 DIAGNOSIS — K13.79 MUCOCELE OF MOUTH: Primary | ICD-10-CM

## 2021-02-18 PROCEDURE — 91300 PR COVID VAC PFIZER DIL RECON 30 MCG/0.3 ML IM: CPT

## 2021-02-18 PROCEDURE — 0002A PR COVID VAC PFIZER DIL RECON 30 MCG/0.3 ML IM: CPT

## 2021-02-18 PROCEDURE — 99213 OFFICE O/P EST LOW 20 MIN: CPT | Performed by: FAMILY MEDICINE

## 2021-02-18 ASSESSMENT — MIFFLIN-ST. JEOR: SCORE: 1082.09

## 2021-02-18 NOTE — PROGRESS NOTES
SUBJECTIVE: 80 year old female complaining of sinus congestion daily and this unusual left sided facial swelling and tingling without facial drooping multiple times since Er visit. See neurology evaluation and negative MRI this year. Her episodes are not bell's palsy by their opinion.    She is worried about a swollen lower lip after her dental cleaning(diagnosis of  for 1 week(s).   The patient describes going to urgent care and being told this was not an issue/ having her second coronavirus vaccine today.   The patient denies a history of any neurological symptoms and admits to anxiety about her health is always a worry.   Smoking history: No.   Relevant past medical history: positive for hypothyroid, hypertension and elevated cholesterol well controlled.    OBJECTIVE: The patient appears healthy, alert, no distress, cooperative and smiling.   EARS: negative  NOSE/SINUS: Nares normal. Septum midline. Mucosa normal. No drainage or sinus tenderness., positive findings: nasal septal deviation to left   THROAT: Right buccal mucosal mucocele resolving   NECK:Neck supple. No adenopathy. Thyroid symmetric, normal size,, Carotids without bruits.   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

## 2021-02-18 NOTE — PATIENT INSTRUCTIONS
Mucocele of mouth  (primary encounter diagnosis)  Comment: gentle mouth rinses/ trauma likely the etiology and expected resolution  Plan: monitor    (Z86.010) History of colonic polyps  Comment: considering not going for last colonoscopy  Plan: encouraged final check due to polyp history.

## 2021-02-18 NOTE — NURSING NOTE
Kimberley is here today to discuss a recent episode of lip swelling.    Pre-visit Screening:  Immunizations:  up to date  Colonoscopy:  is up to date  Mammogram: is up to date  Asthma Action Test/Plan:  NA  PHQ9:  NA  GAD7:  NA  Questioned patient about current smoking habits Pt. has never smoked.  Ok to leave detailed message on voice mail for today's visit only Yes, phone # [phone]

## 2021-03-13 ENCOUNTER — HEALTH MAINTENANCE LETTER (OUTPATIENT)
Age: 81
End: 2021-03-13

## 2021-05-02 DIAGNOSIS — E03.9 ACQUIRED HYPOTHYROIDISM: ICD-10-CM

## 2021-05-03 RX ORDER — LEVOTHYROXINE SODIUM 50 UG/1
TABLET ORAL
Qty: 90 TABLET | Refills: 0 | Status: SHIPPED | OUTPATIENT
Start: 2021-05-03 | End: 2021-05-26

## 2021-05-03 NOTE — TELEPHONE ENCOUNTER
Kimberley Mullins is requesting a refill of:    Pending Prescriptions:                       Disp   Refills    levothyroxine (SYNTHROID/LEVOTHROID) 50 M*90 tab*1            Sig: TAKE 1 TABLET BY MOUTH EVERY DAY    TSH   Date Value Ref Range Status   01/06/2021 4.71 (H) 0.40 - 4.50 mIU/L Final     T4 Free, Non-Dialysis   Date Value Ref Range Status   01/06/2021 1.7 0.8 - 1.8 ng/dL Final

## 2021-05-19 ENCOUNTER — MYC MEDICAL ADVICE (OUTPATIENT)
Dept: FAMILY MEDICINE | Facility: CLINIC | Age: 81
End: 2021-05-19

## 2021-05-19 DIAGNOSIS — I10 BENIGN ESSENTIAL HYPERTENSION: ICD-10-CM

## 2021-05-19 DIAGNOSIS — E78.00 PURE HYPERCHOLESTEROLEMIA: ICD-10-CM

## 2021-05-19 RX ORDER — SIMVASTATIN 20 MG
TABLET ORAL
Qty: 90 TABLET | Refills: 1 | COMMUNITY
Start: 2021-05-19

## 2021-05-19 RX ORDER — AMLODIPINE BESYLATE 5 MG/1
TABLET ORAL
Qty: 90 TABLET | Refills: 1 | COMMUNITY
Start: 2021-05-19

## 2021-05-19 NOTE — TELEPHONE ENCOUNTER
Received incoming refill request for  Pending Prescriptions:                       Disp   Refills    amLODIPine (NORVASC) 5 MG tablet [Pharmac*90 tab*1            Sig: TAKE 1 TABLET BY MOUTH EVERY DAY    simvastatin (ZOCOR) 20 MG tablet [Pharmac*90 tab*1            Sig: TAKE 1 TABLET BY MOUTH AT BEDTIME    Patient last had a refill of these medications on 11/17/2020 and the patient is now due to come in for labs and an OV with Dr. Armenta.

## 2021-05-26 ENCOUNTER — OFFICE VISIT (OUTPATIENT)
Dept: FAMILY MEDICINE | Facility: CLINIC | Age: 81
End: 2021-05-26

## 2021-05-26 VITALS
RESPIRATION RATE: 20 BRPM | TEMPERATURE: 98.3 F | BODY MASS INDEX: 25.69 KG/M2 | HEIGHT: 63 IN | OXYGEN SATURATION: 97 % | DIASTOLIC BLOOD PRESSURE: 84 MMHG | HEART RATE: 83 BPM | SYSTOLIC BLOOD PRESSURE: 128 MMHG | WEIGHT: 145 LBS

## 2021-05-26 DIAGNOSIS — Z82.49 FAMILY HISTORY OF CARDIOVASCULAR DISEASE: ICD-10-CM

## 2021-05-26 DIAGNOSIS — E03.9 ACQUIRED HYPOTHYROIDISM: ICD-10-CM

## 2021-05-26 DIAGNOSIS — I10 BENIGN ESSENTIAL HYPERTENSION: ICD-10-CM

## 2021-05-26 DIAGNOSIS — Z76.0 ENCOUNTER FOR MEDICATION REFILL: ICD-10-CM

## 2021-05-26 DIAGNOSIS — E78.00 PURE HYPERCHOLESTEROLEMIA: ICD-10-CM

## 2021-05-26 DIAGNOSIS — R00.2 PALPITATIONS: Primary | ICD-10-CM

## 2021-05-26 LAB
ALBUMIN SERPL-MCNC: 4.3 G/DL (ref 3.6–5.1)
ALBUMIN/GLOB SERPL: 2.2 {RATIO} (ref 1–2.5)
ALP SERPL-CCNC: 68 U/L (ref 33–130)
ALT 1742-6: 12 U/L (ref 0–32)
AST 1920-8: 20 U/L (ref 0–35)
BILIRUB SERPL-MCNC: 0.9 MG/DL (ref 0.2–1.2)
BUN SERPL-MCNC: 12 MG/DL (ref 7–25)
BUN/CREATININE RATIO: 15.4 (ref 6–22)
CALCIUM SERPL-MCNC: 9.7 MG/DL (ref 8.6–10.3)
CHLORIDE SERPLBLD-SCNC: 102 MMOL/L (ref 98–110)
CHOLEST SERPL-MCNC: 191 MG/DL (ref 0–199)
CHOLEST/HDLC SERPL: 2 {RATIO} (ref 0–5)
CO2 SERPL-SCNC: 27.1 MMOL/L (ref 20–32)
CREAT SERPL-MCNC: 0.78 MG/DL (ref 0.6–1.3)
GLOBULIN, CALCULATED - QUEST: 2 (ref 1.9–3.7)
GLUCOSE SERPL-MCNC: 99 MG/DL (ref 60–99)
HDLC SERPL-MCNC: 82 MG/DL (ref 40–150)
HEMOGLOBIN: 13.7 G/DL (ref 11.7–15.7)
LDLC SERPL CALC-MCNC: 93 MG/DL (ref 0–130)
POTASSIUM SERPL-SCNC: 4.26 MMOL/L (ref 3.5–5.3)
PROT SERPL-MCNC: 6.3 G/DL (ref 6.1–8.1)
SODIUM SERPL-SCNC: 138.5 MMOL/L (ref 135–146)
TRIGL SERPL-MCNC: 82 MG/DL (ref 0–149)

## 2021-05-26 PROCEDURE — 80053 COMPREHEN METABOLIC PANEL: CPT | Performed by: FAMILY MEDICINE

## 2021-05-26 PROCEDURE — 85018 HEMOGLOBIN: CPT | Performed by: FAMILY MEDICINE

## 2021-05-26 PROCEDURE — 99214 OFFICE O/P EST MOD 30 MIN: CPT | Performed by: FAMILY MEDICINE

## 2021-05-26 PROCEDURE — 36415 COLL VENOUS BLD VENIPUNCTURE: CPT | Performed by: FAMILY MEDICINE

## 2021-05-26 PROCEDURE — 80061 LIPID PANEL: CPT | Performed by: FAMILY MEDICINE

## 2021-05-26 RX ORDER — AMLODIPINE BESYLATE 5 MG/1
TABLET ORAL
Qty: 90 TABLET | Refills: 1 | Status: SHIPPED | OUTPATIENT
Start: 2021-05-26 | End: 2022-01-11

## 2021-05-26 RX ORDER — LEVOTHYROXINE SODIUM 50 UG/1
50 TABLET ORAL DAILY
Qty: 90 TABLET | Refills: 3 | Status: SHIPPED | OUTPATIENT
Start: 2021-05-26 | End: 2022-04-22

## 2021-05-26 RX ORDER — SIMVASTATIN 20 MG
TABLET ORAL
Qty: 90 TABLET | Refills: 3 | Status: SHIPPED | OUTPATIENT
Start: 2021-05-26 | End: 2022-04-22

## 2021-05-26 ASSESSMENT — MIFFLIN-ST. JEOR: SCORE: 1083.91

## 2021-05-26 NOTE — PROGRESS NOTES
"SUBJECTIVE:  Kimberley Mullins is an 80 year old female who presents for evaluation of hyperlipidemia,hypertension and hypothyroid medications. She is concerned about palpations she gets and a heart rate to 120 with exertion.    Patient denies exertional chest pain, dyspnea,  syncope, orthopnea, edema or paroxysmal nocturnal dyspnea.      She has been diagnosed in the past as having   elevated cholesterol only. She indicates that she is feeling well   and denies any symptoms of cardiovascular disease. Specifically   denies chest pain, palpitations, dyspnea, orthopnea, PND,   claudication or peripheral edema. Treatment modalities employed to   this point include diet, regular aerobic exercise and Zocor. Current medication   regimen is as listed below. Patient denies any side effects of   medication.    Family history: positive for hypertension, cardiovascular disease and elevated cholesterol  Age at diagnosis of hyperlipidemia: 59, hypertension age 73,hypothyroid DX by GI at 70  Cardiovascular risk factors: family history, lipids, hypertension, sedentary life style and stress    Current Outpatient Medications   Medication     amLODIPine (NORVASC) 5 MG tablet     levothyroxine (SYNTHROID/LEVOTHROID) 50 MCG tablet     simvastatin (ZOCOR) 20 MG tablet     No current facility-administered medications for this visit.      Allergies   Allergen Reactions     No Known Allergies        Social History     Tobacco Use     Smoking status: Never Smoker     Smokeless tobacco: Never Used   Substance Use Topics     Alcohol use: Yes     Alcohol/week: 5.8 standard drinks     Types: 7 Standard drinks or equivalent per week     Comment: two to three glasses of wine q day.       OBJECTIVE:  /84 (BP Location: Right arm, Patient Position: Sitting, Cuff Size: Adult Large)   Pulse 83   Temp 98.3  F (36.8  C) (Oral)   Resp 20   Ht 1.588 m (5' 2.5\")   Wt 65.8 kg (145 lb)   LMP  (LMP Unknown)   SpO2 97%   BMI 26.10 kg/m    Repeat BP " R arm seated = 128/84  with large size cuff.  Skin: negative  Fundi: deferred  Lungs: negative, Percussion normal. Good diaphragmatic excursion. Lungs clear  Heart: negative, PMI normal. No lifts, heaves, or thrills. RRR. No murmurs, clicks gallops or rub  Peripheral pulses: radial=4/4, femoral=4/4, popliteal=4/4, dorsalis pedis=4/4,  Abd; The abdomen is soft without tenderness, guarding, mass or organomegaly. Bowel sounds are normal. No CVA tenderness or inguinal adenopathy noted.  Ext: The lower extremities are normal and reveal no sign of DVT. Calves and thighs are soft and non tender, color is normal, no swelling or redness. Leoncio's sign is negative.  Pedal pulses are normal.  BMI : Body mass index is 26.1 kg/m .    ASSESSMENT:(R00.2) Palpitations  (primary encounter diagnosis)  Plan: VENOUS COLLECTION, TSH with free T4 reflex         ([facility]), HEMOGLOBIN ([facility]), Echocardiogram         Exercise Stress        Read handout/ schedule ECHO stress testing    (E78.00) Pure hypercholesterolemia  Plan: Lipid Panel ([facility]), VENOUS COLLECTION,         simvastatin (ZOCOR) 20 MG tablet,         Echocardiogram Exercise Stress        1)  Medication: continue current medication regimen unchanged  2)  Low fat, low cholesterol diet  3)  Regular aerobic exercise  4)  Recheck in 6 months, sooner should new symptoms or   problems arise.    Patient Education: Reviewed risks of elevated lipids and principles   of treatment.        (I10) Benign essential hypertension  Plan: Comprehensive Metobolic Panel ([facility]), VENOUS         COLLECTION, amLODIPine (NORVASC) 5 MG tablet,         Echocardiogram Exercise Stress        1)  Medication: continue current medication regimen unchanged  2)  Dietary sodium restriction  3)  Regular aerobic exercise  4)  Recheck in 6 months, sooner should new symptoms or   problems arise.    Patient Education: Reviewed risks of hypertension and principles of   treatment.        (E03.9) Acquired hypothyroidism  Plan:  VENOUS COLLECTION, TSH with free T4 reflex         ([facility]), levothyroxine (SYNTHROID/LEVOTHROID)         50 MCG tablet        Await labs/ recheck 1 year    (Z82.49) Family history of cardiovascular disease  Plan: Echocardiogram Exercise Stress        I have reviewed the patient's medical history in detail and updated the computerized patient record.      (Z76.0) Encounter for medication refill  Plan: Lipid Panel ([facility]), Comprehensive Metobolic         Panel ([facility]), VENOUS COLLECTION, TSH with free         T4 reflex ([facility])            Total time spent with patient today including visit and non face to face time 30 minutes.

## 2021-05-26 NOTE — PATIENT INSTRUCTIONS
Await labs    Schedule stress testing    1)  Medication: continue current medication regimen unchanged  2)  Low fat, low cholesterol diet  3)  Regular aerobic exercise  4)  Recheck in 6 months, sooner should new symptoms or   problems arise.    Patient Education: Reviewed risks of elevated lipids and principles   of treatment.

## 2021-05-26 NOTE — NURSING NOTE
Chief Complaint   Patient presents with     Recheck Medication     fasting medication check and review       Pre-visit Screening:  Immunizations:  up to date  Colonoscopy:  is up to date  Mammogram: is up to date  Asthma Action Test/Plan:  NA  PHQ9:  NA  GAD7:  NA  Questioned patient about current smoking habits Pt. has never smoked.  Ok to leave detailed message on voice mail for today's visit only Yes, phone # [phone]

## 2021-05-27 LAB — TSH SERPL-ACNC: 3.7 MIU/L (ref 0.4–4.5)

## 2021-06-02 ENCOUNTER — HOSPITAL ENCOUNTER (OUTPATIENT)
Dept: CARDIOLOGY | Facility: CLINIC | Age: 81
Discharge: HOME | End: 2021-06-02
Attending: FAMILY MEDICINE | Admitting: FAMILY MEDICINE
Payer: COMMERCIAL

## 2021-06-02 DIAGNOSIS — E78.00 PURE HYPERCHOLESTEROLEMIA: ICD-10-CM

## 2021-06-02 DIAGNOSIS — Z82.49 FAMILY HISTORY OF CARDIOVASCULAR DISEASE: ICD-10-CM

## 2021-06-02 DIAGNOSIS — I10 BENIGN ESSENTIAL HYPERTENSION: ICD-10-CM

## 2021-06-02 DIAGNOSIS — R00.2 PALPITATIONS: ICD-10-CM

## 2021-06-02 PROCEDURE — 93350 STRESS TTE ONLY: CPT | Mod: 26 | Performed by: INTERNAL MEDICINE

## 2021-06-02 PROCEDURE — 93325 DOPPLER ECHO COLOR FLOW MAPG: CPT | Mod: 26 | Performed by: INTERNAL MEDICINE

## 2021-06-02 PROCEDURE — 999N000208 ECHO STRESS ECHOCARDIOGRAM

## 2021-06-02 PROCEDURE — 93018 CV STRESS TEST I&R ONLY: CPT | Performed by: INTERNAL MEDICINE

## 2021-06-02 PROCEDURE — 255N000002 HC RX 255 OP 636: Performed by: FAMILY MEDICINE

## 2021-06-02 PROCEDURE — 93016 CV STRESS TEST SUPVJ ONLY: CPT | Performed by: INTERNAL MEDICINE

## 2021-06-02 PROCEDURE — 93321 DOPPLER ECHO F-UP/LMTD STD: CPT | Mod: 26 | Performed by: INTERNAL MEDICINE

## 2021-06-02 RX ADMIN — HUMAN ALBUMIN MICROSPHERES AND PERFLUTREN 4 ML: 10; .22 INJECTION, SOLUTION INTRAVENOUS at 11:22

## 2021-08-04 ENCOUNTER — OFFICE VISIT (OUTPATIENT)
Dept: FAMILY MEDICINE | Facility: CLINIC | Age: 81
End: 2021-08-04

## 2021-08-04 VITALS
TEMPERATURE: 97.9 F | HEIGHT: 63 IN | OXYGEN SATURATION: 94 % | WEIGHT: 145 LBS | SYSTOLIC BLOOD PRESSURE: 120 MMHG | HEART RATE: 90 BPM | RESPIRATION RATE: 20 BRPM | DIASTOLIC BLOOD PRESSURE: 80 MMHG | BODY MASS INDEX: 25.69 KG/M2

## 2021-08-04 DIAGNOSIS — R09.82 POST-NASAL DRAINAGE: Primary | ICD-10-CM

## 2021-08-04 DIAGNOSIS — R05.9 COUGH: ICD-10-CM

## 2021-08-04 PROCEDURE — G2023 SPECIMEN COLLECT COVID-19: HCPCS | Performed by: FAMILY MEDICINE

## 2021-08-04 PROCEDURE — 99214 OFFICE O/P EST MOD 30 MIN: CPT | Performed by: FAMILY MEDICINE

## 2021-08-04 RX ORDER — MONTELUKAST SODIUM 10 MG/1
10 TABLET ORAL AT BEDTIME
Qty: 30 TABLET | Refills: 0 | Status: SHIPPED | OUTPATIENT
Start: 2021-08-04 | End: 2022-01-14

## 2021-08-04 RX ORDER — GUAIFENESIN 600 MG/1
1200 TABLET, EXTENDED RELEASE ORAL 2 TIMES DAILY
Qty: 120 TABLET | Refills: 0 | Status: SHIPPED | OUTPATIENT
Start: 2021-08-04 | End: 2021-09-03

## 2021-08-04 ASSESSMENT — MIFFLIN-ST. JEOR: SCORE: 1083.91

## 2021-08-04 NOTE — PROGRESS NOTES
"SUBJECTIVE: 80 year old female complaining of sinus post nasal drainage for years. Seen for vertigo and bell's palsy by ENT.  Today a coughing spell lasting 2-3 minutes, productive of thick clear sticky mucous and then felt fine. Coughs every day several times daily causing sore throat comes and goes. Taking  MUCINEX daily at night for 2 month(s). Vaccinization for Covid. Exposed to family members with URI symptoms and not vaccinated. \" I want a Covid test!\"    Did have a grandson staying with her with a child. They are getting immunized.  The patient describes some relief with Flonase but got a nose bleed.   The patient denies a history of fever, loss of taste or smell, no SOB.   Smoking history: No.   Relevant past medical history: positive for hypertension and hypothyroidism.    OBJECTIVE: The patient appears healthy, alert, no distress, cooperative and smiling.   EARS: negative  NOSE/SINUS: Nares normal. Septum midline. Mucosa normal. No drainage or sinus tenderness.   THROAT: normal, post nasal drainage and thick and clear   NECK:Neck supple. No adenopathy. Thyroid symmetric, normal size,, Carotids without bruits.   CHEST: Regular rate and  rhythm. S1 and S2 normal, no murmurs, clicks, gallops or rubs. No edema or JVD. Chest is clear; no wheezes or rales.  Abd; The abdomen is soft without tenderness, guarding, mass or organomegaly. Bowel sounds are normal. No CVA tenderness or inguinal adenopathy noted.  Skin: Skin color, temperature, and turgor are normal. No rashes or suspicious skin lesions noted.    Reviewed ENT notes and recommendations  ASSESSMENT: (R09.82) Post-nasal drainage  (primary encounter diagnosis)  Comment: await labs/ quarantine/ mask  Plan: montelukast (SINGULAIR) 10 MG tablet,         guaiFENesin (MUCINEX) 600 MG 12 hr tablet,         CANCELED: Coronavirus CoVid19 ([facility])        Add Singulair to night time routine/ consider Nasacort AQ    (R05) Cough  Plan: montelukast (SINGULAIR) 10 MG " tablet,         guaiFENesin (MUCINEX) 600 MG 12 hr tablet, SARS        CoV2 CoVid 19 Qual  ([facility]), CANCELED:         Coronavirus CoVid19 ([facility])            Total time spent with patient today including visit and non face to face time 30 minutes.

## 2021-08-04 NOTE — NURSING NOTE
Chief Complaint   Patient presents with     Cough     states been going on for a long time, has congestion, weakness, lack of energy      ACP dicussed

## 2021-08-04 NOTE — PATIENT INSTRUCTIONS
At bedtime use your saline nasal rinse  Take the MUCINEX and the new SINGULAIR tablets before bed.    Monitor your post nasal drainage  Await labs  Quarantine and mask

## 2021-08-05 LAB — SARS-COV-2 RNA SPEC QL NAA+PROBE: NOT DETECTED

## 2021-08-27 DIAGNOSIS — R09.82 POST-NASAL DRAINAGE: ICD-10-CM

## 2021-08-27 DIAGNOSIS — R05.9 COUGH: ICD-10-CM

## 2021-08-30 RX ORDER — MONTELUKAST SODIUM 10 MG/1
TABLET ORAL
Qty: 30 TABLET | Refills: 0 | COMMUNITY
Start: 2021-08-30

## 2021-08-30 NOTE — TELEPHONE ENCOUNTER
Received refill request for  Pending Prescriptions:                       Disp   Refills    montelukast (SINGULAIR) 10 MG tablet [Pha*30 tab*0            Sig: TAKE 1 TABLET BY MOUTH EVERYDAY AT BEDTIME    Last seen  8/4/2021 advised follow up in 4 weeks if ssx fail to improve. Called patient to see if singular has helped. She notes it has helped but is still noting drainage. I advised an appointment with Dr. Armenta and she was agreeable to this.

## 2021-09-01 ENCOUNTER — OFFICE VISIT (OUTPATIENT)
Dept: FAMILY MEDICINE | Facility: CLINIC | Age: 81
End: 2021-09-01

## 2021-09-01 VITALS
SYSTOLIC BLOOD PRESSURE: 122 MMHG | WEIGHT: 146 LBS | OXYGEN SATURATION: 97 % | HEIGHT: 63 IN | TEMPERATURE: 97.6 F | BODY MASS INDEX: 25.87 KG/M2 | DIASTOLIC BLOOD PRESSURE: 80 MMHG | RESPIRATION RATE: 20 BRPM | HEART RATE: 96 BPM

## 2021-09-01 DIAGNOSIS — K21.9 GASTROESOPHAGEAL REFLUX DISEASE WITHOUT ESOPHAGITIS: ICD-10-CM

## 2021-09-01 DIAGNOSIS — R05.9 COUGH: Primary | ICD-10-CM

## 2021-09-01 DIAGNOSIS — R09.82 POST-NASAL DRAINAGE: ICD-10-CM

## 2021-09-01 PROCEDURE — 99213 OFFICE O/P EST LOW 20 MIN: CPT | Performed by: FAMILY MEDICINE

## 2021-09-01 RX ORDER — PANTOPRAZOLE SODIUM 20 MG/1
20 TABLET, DELAYED RELEASE ORAL DAILY
Qty: 30 TABLET | Refills: 0 | Status: ON HOLD | OUTPATIENT
Start: 2021-09-01 | End: 2022-01-14

## 2021-09-01 RX ORDER — TRIAMCINOLONE ACETONIDE 55 UG/1
2 SPRAY, METERED NASAL DAILY
Qty: 16.9 G | Refills: 0 | Status: SHIPPED | OUTPATIENT
Start: 2021-09-01 | End: 2022-01-14

## 2021-09-01 ASSESSMENT — MIFFLIN-ST. JEOR: SCORE: 1088.44

## 2021-09-01 NOTE — PROGRESS NOTES
SUBJECTIVE: 80 year old female complaining of sinus congestion and post nasal drainage for many year(s). Last visit we tested for CoVId after exposure to a grandson/ reviewed ENT notes and tried Singulair for relief. No real change  The patient describes since our visit she is noticing heart burn every other day using Tums/ cough might be helped?. Clear thick discharge sometimes with cough or blowing her nose/ Did try Flonase per ENT and got nose bleeds  The patient denies a history of Abdominal symptoms, SOB or weight changes.   Smoking history: No.   Relevant past medical history: positive for hypertension and hypothyroidism. Not on an ACE    OBJECTIVE: The patient appears healthy, alert, no distress, cooperative, smiling and over weight.   EARS: negative  NOSE/SINUS: positive findings: mucosa swollen, pale, and boggy, clear rhinorrhea   THROAT: normal   NECK:Neck supple. No adenopathy. Thyroid symmetric, normal size,, Carotids without bruits.   CHEST: Regular rate and  rhythm. S1 and S2 normal, no murmurs, clicks, gallops or rubs. No edema or JVD. Chest is clear; no wheezes or rales.      ASSESSMENT: (R05) Cough  (primary encounter diagnosis)  Comment: reviewed ENT together  Plan: pantoprazole (PROTONIX) 20 MG EC tablet,         triamcinolone (NASACORT) 55 MCG/ACT nasal         aerosol        Start a steroid nasal spray with normal saline and monitor.    (K21.9) Gastroesophageal reflux disease without esophagitis  Comment: read GERD handout and cough association  Plan: pantoprazole (PROTONIX) 20 MG EC tablet        Potential medication side effects were discussed with the patient; let me know if any occur.    Total time spent with patient today including visit and non face to face time 20 minutes.    (R09.82) Post-nasal drainage  Plan: triamcinolone (NASACORT) 55 MCG/ACT nasal         aerosol        As above    Recheck 3-4 weeks.

## 2021-09-01 NOTE — PATIENT INSTRUCTIONS
Start a steroid nasal spray with normal saline and monitor.    (K21.9) Gastroesophageal reflux disease without esophagitis  Comment: read GERD handout and cough association  Plan: pantoprazole (PROTONIX) 20 MG EC tablet        Potential medication side effects were discussed with the patient; let me know if any occur.

## 2021-09-01 NOTE — NURSING NOTE
Chief Complaint   Patient presents with     URI     having ongoing cough and congestion, post-nasal drip, has been usin mucinex and singulair     Pre-visit Screening:  Immunizations:  up to date  Colonoscopy:  is up to date  Mammogram: is up to date  Asthma Action Test/Plan:  NA  PHQ9:  NA  GAD7:  NA  Questioned patient about current smoking habits Pt. has never smoked.  Ok to leave detailed message on voice mail for today's visit only Yes, phone # [phone]

## 2021-09-24 DIAGNOSIS — K21.9 GASTROESOPHAGEAL REFLUX DISEASE WITHOUT ESOPHAGITIS: ICD-10-CM

## 2021-09-24 DIAGNOSIS — R05.9 COUGH: ICD-10-CM

## 2021-09-24 RX ORDER — PANTOPRAZOLE SODIUM 20 MG/1
TABLET, DELAYED RELEASE ORAL
Qty: 30 TABLET | Refills: 0 | COMMUNITY
Start: 2021-09-24

## 2021-09-24 NOTE — TELEPHONE ENCOUNTER
Received incoming refill request for  Pending Prescriptions:                       Disp   Refills    pantoprazole (PROTONIX) 20 MG EC tablet [*30 tab*0            Sig: TAKE 1 TABLET BY MOUTH EVERY DAY    Patient last had a refill of this medication on 9/1/2021 for 30 tablets. The patient is due back for a recheck on this.  [employer] message was sent to the patient informing her to call and schedule.

## 2021-10-12 ENCOUNTER — TRANSFERRED RECORDS (OUTPATIENT)
Dept: FAMILY MEDICINE | Facility: CLINIC | Age: 81
End: 2021-10-12

## 2021-10-23 ENCOUNTER — HEALTH MAINTENANCE LETTER (OUTPATIENT)
Age: 81
End: 2021-10-23

## 2021-11-20 DIAGNOSIS — I10 BENIGN ESSENTIAL HYPERTENSION: ICD-10-CM

## 2021-11-22 RX ORDER — AMLODIPINE BESYLATE 5 MG/1
TABLET ORAL
Qty: 90 TABLET | Refills: 1 | COMMUNITY
Start: 2021-11-22

## 2021-11-22 NOTE — TELEPHONE ENCOUNTER
Kimberley Mullins is requesting a refill of:    Refused Prescriptions:                       Disp   Refills    amLODIPine (NORVASC) 5 MG tablet [Pharmacy*90 tab*1        Sig: TAKE 1 TABLET BY MOUTH EVERY DAY  Refused By: CLEO CABEZAS  Reason for Refusal: Patient needs appointment    Last med recheck was 05/26/21 advised to return in 6 months. Pt due for OV. Sent Pay[employer] message

## 2022-01-07 DIAGNOSIS — I10 BENIGN ESSENTIAL HYPERTENSION: ICD-10-CM

## 2022-01-07 RX ORDER — AMLODIPINE BESYLATE 5 MG/1
TABLET ORAL
Qty: 90 TABLET | Refills: 1 | COMMUNITY
Start: 2022-01-07

## 2022-01-07 NOTE — TELEPHONE ENCOUNTER
Kimberley Mullins is requesting a refill of:    Refused Prescriptions:                       Disp   Refills    amLODIPine (NORVASC) 5 MG tablet [Pharmacy*90 tab*1        Sig: TAKE 1 TABLET BY MOUTH EVERY DAY  Refused By: CLEO CABEZAS  Reason for Refusal: Patient needs appointment    Pt was notified months ago she is due for OV

## 2022-01-08 DIAGNOSIS — I10 BENIGN ESSENTIAL HYPERTENSION: ICD-10-CM

## 2022-01-10 RX ORDER — AMLODIPINE BESYLATE 5 MG/1
TABLET ORAL
Qty: 90 TABLET | Refills: 1 | COMMUNITY
Start: 2022-01-10

## 2022-01-10 NOTE — TELEPHONE ENCOUNTER
5/26/2021, return in 6 months per note    Refused Prescriptions:                       Disp   Refills    amLODIPine (NORVASC) 5 MG tablet [Pharmacy*90 tab*1        Sig: TAKE 1 TABLET BY MOUTH EVERY DAY  Refused By: SEBLE SANTOS  Reason for Refusal: Patient needs appointment

## 2022-01-11 ENCOUNTER — TELEPHONE (OUTPATIENT)
Dept: FAMILY MEDICINE | Facility: CLINIC | Age: 82
End: 2022-01-11

## 2022-01-11 DIAGNOSIS — I10 BENIGN ESSENTIAL HYPERTENSION: ICD-10-CM

## 2022-01-11 RX ORDER — AMLODIPINE BESYLATE 5 MG/1
TABLET ORAL
Qty: 14 TABLET | Refills: 0 | Status: SHIPPED | OUTPATIENT
Start: 2022-01-11 | End: 2022-01-19

## 2022-01-11 NOTE — TELEPHONE ENCOUNTER
Pt called stating she is not feeling well: congestion, weakness, vomitting, fatigue starting last night. She did an at home test this morning it was NEG. Her brother tested POS and was with her a few days ago. We discussed she may have tested too soon. We discussed OTC medications, treating symptomatically, rest and fluids. Advised her she probably does have covid. She understood. She asked for a short supply of her Amlodipine be sent in until she can start feeling better to get in. Can you send in a 2 week supply. She will call back to schedule.    Kimberley Mullins is requesting a refill of:    Pending Prescriptions:                       Disp   Refills    amLODIPine (NORVASC) 5 MG tablet          14 tab*0            Sig: TAKE 1 TABLET (5 MG) BY MOUTH DAILY

## 2022-01-14 ENCOUNTER — HOSPITAL ENCOUNTER (INPATIENT)
Facility: CLINIC | Age: 82
LOS: 3 days | Discharge: HOME | DRG: 641 | End: 2022-01-17
Attending: EMERGENCY MEDICINE | Admitting: INTERNAL MEDICINE
Payer: COMMERCIAL

## 2022-01-14 ENCOUNTER — APPOINTMENT (OUTPATIENT)
Dept: GENERAL RADIOLOGY | Facility: CLINIC | Age: 82
DRG: 641 | End: 2022-01-14
Attending: EMERGENCY MEDICINE
Payer: COMMERCIAL

## 2022-01-14 ENCOUNTER — OFFICE VISIT (OUTPATIENT)
Dept: FAMILY MEDICINE | Facility: CLINIC | Age: 82
End: 2022-01-14

## 2022-01-14 VITALS
DIASTOLIC BLOOD PRESSURE: 68 MMHG | OXYGEN SATURATION: 96 % | WEIGHT: 139 LBS | SYSTOLIC BLOOD PRESSURE: 108 MMHG | RESPIRATION RATE: 22 BRPM | BODY MASS INDEX: 25.02 KG/M2 | HEART RATE: 98 BPM | TEMPERATURE: 97.9 F

## 2022-01-14 DIAGNOSIS — J01.00 ACUTE NON-RECURRENT MAXILLARY SINUSITIS: Primary | ICD-10-CM

## 2022-01-14 DIAGNOSIS — R50.9 FEVER, UNSPECIFIED FEVER CAUSE: ICD-10-CM

## 2022-01-14 DIAGNOSIS — R05.9 COUGH: ICD-10-CM

## 2022-01-14 DIAGNOSIS — E87.1 HYPONATREMIA: ICD-10-CM

## 2022-01-14 DIAGNOSIS — M62.81 GENERALIZED MUSCLE WEAKNESS: ICD-10-CM

## 2022-01-14 DIAGNOSIS — R11.0 NAUSEA: ICD-10-CM

## 2022-01-14 LAB
ALBUMIN SERPL-MCNC: 3.5 G/DL (ref 3.4–5)
ALBUMIN UR-MCNC: 70 MG/DL
ALP SERPL-CCNC: 68 U/L (ref 40–150)
ALT SERPL W P-5'-P-CCNC: 46 U/L (ref 0–50)
ANION GAP SERPL CALCULATED.3IONS-SCNC: 10 MMOL/L (ref 3–14)
ANION GAP SERPL CALCULATED.3IONS-SCNC: 12 MMOL/L (ref 3–14)
APPEARANCE UR: CLEAR
AST SERPL W P-5'-P-CCNC: 41 U/L (ref 0–45)
BACTERIA #/AREA URNS HPF: ABNORMAL /HPF
BASOPHILS # BLD AUTO: 0 10E3/UL (ref 0–0.2)
BASOPHILS NFR BLD AUTO: 0 %
BILIRUB DIRECT SERPL-MCNC: 0.3 MG/DL (ref 0–0.2)
BILIRUB SERPL-MCNC: 0.7 MG/DL (ref 0.2–1.3)
BILIRUB UR QL STRIP: NEGATIVE
BUN SERPL-MCNC: 28 MG/DL (ref 7–25)
BUN SERPL-MCNC: 30 MG/DL (ref 7–30)
BUN SERPL-MCNC: 33 MG/DL (ref 7–30)
BUN/CREATININE RATIO: 32.9 (ref 6–22)
CALCIUM SERPL-MCNC: 8 MG/DL (ref 8.5–10.1)
CALCIUM SERPL-MCNC: 8.8 MG/DL (ref 8.6–10.3)
CALCIUM SERPL-MCNC: 9 MG/DL (ref 8.5–10.1)
CHLORIDE BLD-SCNC: 80 MMOL/L (ref 94–109)
CHLORIDE BLD-SCNC: 85 MMOL/L (ref 94–109)
CHLORIDE SERPLBLD-SCNC: 80.8 MMOL/L (ref 98–110)
CO2 SERPL-SCNC: 22 MMOL/L (ref 20–32)
CO2 SERPL-SCNC: 23 MMOL/L (ref 20–32)
CO2 SERPL-SCNC: 24.6 MMOL/L (ref 20–32)
COLOR UR AUTO: YELLOW
COVID-19: NEGATIVE
CREAT SERPL-MCNC: 0.66 MG/DL (ref 0.52–1.04)
CREAT SERPL-MCNC: 0.76 MG/DL (ref 0.52–1.04)
CREAT SERPL-MCNC: 0.85 MG/DL (ref 0.6–1.3)
EOSINOPHIL # BLD AUTO: 0 10E3/UL (ref 0–0.7)
EOSINOPHIL NFR BLD AUTO: 0 %
ERYTHROCYTE [DISTWIDTH] IN BLOOD BY AUTOMATED COUNT: 11.9 %
ERYTHROCYTE [DISTWIDTH] IN BLOOD BY AUTOMATED COUNT: 13 % (ref 10–15)
FLUAV AG UPPER RESP QL IA.RAPID: NORMAL
FLUAV RNA SPEC QL NAA+PROBE: NEGATIVE
FLUBV AG UPPER RESP QL IA.RAPID: NORMAL
FLUBV RNA RESP QL NAA+PROBE: NEGATIVE
GFR SERPL CREATININE-BSD FRML MDRD: 78 ML/MIN/1.73M2
GFR SERPL CREATININE-BSD FRML MDRD: 87 ML/MIN/1.73M2
GLUCOSE BLD-MCNC: 106 MG/DL (ref 70–99)
GLUCOSE BLD-MCNC: 125 MG/DL (ref 70–99)
GLUCOSE SERPL-MCNC: 109 MG/DL (ref 60–99)
GLUCOSE UR STRIP-MCNC: NEGATIVE MG/DL
HCT VFR BLD AUTO: 33.9 % (ref 35–47)
HCT VFR BLD AUTO: 36.4 % (ref 35–47)
HEMOGLOBIN: 12.1 G/DL (ref 11.7–15.7)
HGB BLD-MCNC: 11.8 G/DL (ref 11.7–15.7)
HGB UR QL STRIP: NEGATIVE
HOLD SPECIMEN: NORMAL
HYALINE CASTS: 1 /LPF
IMM GRANULOCYTES # BLD: 0.1 10E3/UL
IMM GRANULOCYTES NFR BLD: 1 %
KETONES UR STRIP-MCNC: 20 MG/DL
LEUKOCYTE ESTERASE UR QL STRIP: ABNORMAL
LYMPHOCYTES # BLD AUTO: 1.3 10E3/UL (ref 0.8–5.3)
LYMPHOCYTES NFR BLD AUTO: 12 %
MAGNESIUM SERPL-MCNC: 2 MG/DL (ref 1.6–2.3)
MCH RBC QN AUTO: 32.6 PG (ref 26.5–33)
MCH RBC QN AUTO: 33.8 PG (ref 26–33)
MCHC RBC AUTO-ENTMCNC: 33.2 G/DL (ref 31–36)
MCHC RBC AUTO-ENTMCNC: 34.8 G/DL (ref 31.5–36.5)
MCV RBC AUTO: 101.7 FL (ref 78–100)
MCV RBC AUTO: 94 FL (ref 78–100)
MONOCYTES # BLD AUTO: 1.2 10E3/UL (ref 0–1.3)
MONOCYTES NFR BLD AUTO: 10 %
MUCOUS THREADS #/AREA URNS LPF: PRESENT /LPF
NEUTROPHILS # BLD AUTO: 8.7 10E3/UL (ref 1.6–8.3)
NEUTROPHILS NFR BLD AUTO: 77 %
NITRATE UR QL: NEGATIVE
NRBC # BLD AUTO: 0 10E3/UL
NRBC BLD AUTO-RTO: 0 /100
OSMOLALITY SERPL: 253 MMOL/KG (ref 280–301)
OSMOLALITY SERPL: 253 MMOL/KG (ref 280–301)
OSMOLALITY UR: 1013 MMOL/KG (ref 100–1200)
PH UR STRIP: 5.5 [PH] (ref 5–7)
PLATELET # BLD AUTO: 235 10E3/UL (ref 150–450)
PLATELET COUNT - QUEST: 242 10^9/L (ref 150–375)
POTASSIUM BLD-SCNC: 4 MMOL/L (ref 3.4–5.3)
POTASSIUM BLD-SCNC: 4.2 MMOL/L (ref 3.4–5.3)
POTASSIUM BLD-SCNC: 4.2 MMOL/L (ref 3.4–5.3)
POTASSIUM SERPL-SCNC: 4.71 MMOL/L (ref 3.5–5.3)
PROT SERPL-MCNC: 7 G/DL (ref 6.8–8.8)
RBC # BLD AUTO: 3.58 10*12/L (ref 3.8–5.2)
RBC # BLD AUTO: 3.62 10E6/UL (ref 3.8–5.2)
RBC URINE: 3 /HPF
SARS-COV-2 RNA RESP QL NAA+PROBE: NEGATIVE
SODIUM SERPL-SCNC: 112.5 MMOL/L (ref 135–146)
SODIUM SERPL-SCNC: 115 MMOL/L (ref 133–144)
SODIUM SERPL-SCNC: 117 MMOL/L (ref 133–144)
SODIUM SERPL-SCNC: 117 MMOL/L (ref 133–144)
SODIUM UR-SCNC: <5 MMOL/L
SP GR UR STRIP: 1.03 (ref 1–1.03)
SQUAMOUS EPITHELIAL: 2 /HPF
T4 FREE SERPL-MCNC: 1.51 NG/DL (ref 0.76–1.46)
TSH SERPL DL<=0.005 MIU/L-ACNC: 5.84 MU/L (ref 0.4–4)
UROBILINOGEN UR STRIP-MCNC: NORMAL MG/DL
WBC # BLD AUTO: 11.2 10E3/UL (ref 4–11)
WBC # BLD AUTO: 11.5 10*9/L (ref 4–11)
WBC URINE: 57 /HPF

## 2022-01-14 PROCEDURE — 36415 COLL VENOUS BLD VENIPUNCTURE: CPT | Performed by: INTERNAL MEDICINE

## 2022-01-14 PROCEDURE — 36415 COLL VENOUS BLD VENIPUNCTURE: CPT | Performed by: EMERGENCY MEDICINE

## 2022-01-14 PROCEDURE — 258N000003 HC RX IP 258 OP 636: Performed by: INTERNAL MEDICINE

## 2022-01-14 PROCEDURE — 83735 ASSAY OF MAGNESIUM: CPT | Performed by: INTERNAL MEDICINE

## 2022-01-14 PROCEDURE — 80048 BASIC METABOLIC PNL TOTAL CA: CPT | Performed by: PHYSICIAN ASSISTANT

## 2022-01-14 PROCEDURE — 84443 ASSAY THYROID STIM HORMONE: CPT | Performed by: EMERGENCY MEDICINE

## 2022-01-14 PROCEDURE — 85027 COMPLETE CBC AUTOMATED: CPT | Performed by: PHYSICIAN ASSISTANT

## 2022-01-14 PROCEDURE — 87804 INFLUENZA ASSAY W/OPTIC: CPT | Performed by: PHYSICIAN ASSISTANT

## 2022-01-14 PROCEDURE — 36415 COLL VENOUS BLD VENIPUNCTURE: CPT | Performed by: PHYSICIAN ASSISTANT

## 2022-01-14 PROCEDURE — 87086 URINE CULTURE/COLONY COUNT: CPT | Performed by: EMERGENCY MEDICINE

## 2022-01-14 PROCEDURE — 71045 X-RAY EXAM CHEST 1 VIEW: CPT

## 2022-01-14 PROCEDURE — 258N000003 HC RX IP 258 OP 636: Performed by: EMERGENCY MEDICINE

## 2022-01-14 PROCEDURE — 85025 COMPLETE CBC W/AUTO DIFF WBC: CPT | Performed by: EMERGENCY MEDICINE

## 2022-01-14 PROCEDURE — 84300 ASSAY OF URINE SODIUM: CPT | Performed by: EMERGENCY MEDICINE

## 2022-01-14 PROCEDURE — 84295 ASSAY OF SERUM SODIUM: CPT | Performed by: INTERNAL MEDICINE

## 2022-01-14 PROCEDURE — C9803 HOPD COVID-19 SPEC COLLECT: HCPCS

## 2022-01-14 PROCEDURE — 96360 HYDRATION IV INFUSION INIT: CPT

## 2022-01-14 PROCEDURE — 80053 COMPREHEN METABOLIC PANEL: CPT | Performed by: EMERGENCY MEDICINE

## 2022-01-14 PROCEDURE — 83930 ASSAY OF BLOOD OSMOLALITY: CPT | Performed by: INTERNAL MEDICINE

## 2022-01-14 PROCEDURE — 84439 ASSAY OF FREE THYROXINE: CPT | Performed by: EMERGENCY MEDICINE

## 2022-01-14 PROCEDURE — 87636 SARSCOV2 & INF A&B AMP PRB: CPT | Performed by: EMERGENCY MEDICINE

## 2022-01-14 PROCEDURE — 250N000011 HC RX IP 250 OP 636: Performed by: EMERGENCY MEDICINE

## 2022-01-14 PROCEDURE — 83935 ASSAY OF URINE OSMOLALITY: CPT | Performed by: EMERGENCY MEDICINE

## 2022-01-14 PROCEDURE — 99223 1ST HOSP IP/OBS HIGH 75: CPT | Mod: AI | Performed by: INTERNAL MEDICINE

## 2022-01-14 PROCEDURE — 120N000001 HC R&B MED SURG/OB

## 2022-01-14 PROCEDURE — 83930 ASSAY OF BLOOD OSMOLALITY: CPT | Performed by: EMERGENCY MEDICINE

## 2022-01-14 PROCEDURE — 99213 OFFICE O/P EST LOW 20 MIN: CPT | Performed by: PHYSICIAN ASSISTANT

## 2022-01-14 PROCEDURE — 81001 URINALYSIS AUTO W/SCOPE: CPT | Performed by: EMERGENCY MEDICINE

## 2022-01-14 PROCEDURE — 87635 SARS-COV-2 COVID-19 AMP PRB: CPT | Performed by: PHYSICIAN ASSISTANT

## 2022-01-14 PROCEDURE — 99285 EMERGENCY DEPT VISIT HI MDM: CPT | Mod: 25

## 2022-01-14 PROCEDURE — 84132 ASSAY OF SERUM POTASSIUM: CPT | Performed by: INTERNAL MEDICINE

## 2022-01-14 PROCEDURE — 82248 BILIRUBIN DIRECT: CPT | Performed by: EMERGENCY MEDICINE

## 2022-01-14 RX ORDER — AMLODIPINE BESYLATE 5 MG/1
5 TABLET ORAL DAILY
Status: DISCONTINUED | OUTPATIENT
Start: 2022-01-15 | End: 2022-01-17 | Stop reason: HOSPADM

## 2022-01-14 RX ORDER — ONDANSETRON 4 MG/1
4 TABLET, ORALLY DISINTEGRATING ORAL EVERY 8 HOURS PRN
Qty: 15 TABLET | Refills: 0 | Status: ON HOLD | OUTPATIENT
Start: 2022-01-14 | End: 2022-02-01

## 2022-01-14 RX ORDER — LEVOTHYROXINE SODIUM 50 UG/1
50 TABLET ORAL DAILY
Status: DISCONTINUED | OUTPATIENT
Start: 2022-01-15 | End: 2022-01-17 | Stop reason: HOSPADM

## 2022-01-14 RX ORDER — ONDANSETRON 4 MG/1
4 TABLET, ORALLY DISINTEGRATING ORAL EVERY 6 HOURS PRN
Status: DISCONTINUED | OUTPATIENT
Start: 2022-01-14 | End: 2022-01-17 | Stop reason: HOSPADM

## 2022-01-14 RX ORDER — PROCHLORPERAZINE 25 MG
12.5 SUPPOSITORY, RECTAL RECTAL EVERY 12 HOURS PRN
Status: DISCONTINUED | OUTPATIENT
Start: 2022-01-14 | End: 2022-01-17 | Stop reason: HOSPADM

## 2022-01-14 RX ORDER — SODIUM CHLORIDE 9 MG/ML
1000 INJECTION, SOLUTION INTRAVENOUS CONTINUOUS
Status: DISCONTINUED | OUTPATIENT
Start: 2022-01-14 | End: 2022-01-15

## 2022-01-14 RX ORDER — AZITHROMYCIN 250 MG/1
TABLET, FILM COATED ORAL
Qty: 6 TABLET | Refills: 0 | Status: ON HOLD | OUTPATIENT
Start: 2022-01-14 | End: 2022-01-17

## 2022-01-14 RX ORDER — PROCHLORPERAZINE MALEATE 5 MG
5 TABLET ORAL EVERY 6 HOURS PRN
Status: DISCONTINUED | OUTPATIENT
Start: 2022-01-14 | End: 2022-01-17 | Stop reason: HOSPADM

## 2022-01-14 RX ORDER — CEFTRIAXONE 2 G/1
2 INJECTION, POWDER, FOR SOLUTION INTRAMUSCULAR; INTRAVENOUS ONCE
Status: COMPLETED | OUTPATIENT
Start: 2022-01-14 | End: 2022-01-14

## 2022-01-14 RX ORDER — ONDANSETRON 2 MG/ML
4 INJECTION INTRAMUSCULAR; INTRAVENOUS EVERY 6 HOURS PRN
Status: DISCONTINUED | OUTPATIENT
Start: 2022-01-14 | End: 2022-01-17 | Stop reason: HOSPADM

## 2022-01-14 RX ORDER — SODIUM CHLORIDE 9 MG/ML
1000 INJECTION, SOLUTION INTRAVENOUS CONTINUOUS
Status: DISCONTINUED | OUTPATIENT
Start: 2022-01-14 | End: 2022-01-14

## 2022-01-14 RX ORDER — LIDOCAINE 40 MG/G
CREAM TOPICAL
Status: DISCONTINUED | OUTPATIENT
Start: 2022-01-14 | End: 2022-01-17 | Stop reason: HOSPADM

## 2022-01-14 RX ORDER — PANTOPRAZOLE SODIUM 20 MG/1
20 TABLET, DELAYED RELEASE ORAL DAILY
Status: DISCONTINUED | OUTPATIENT
Start: 2022-01-15 | End: 2022-01-17 | Stop reason: HOSPADM

## 2022-01-14 RX ADMIN — SODIUM CHLORIDE 1000 ML: 9 INJECTION, SOLUTION INTRAVENOUS at 18:40

## 2022-01-14 RX ADMIN — SODIUM CHLORIDE 1000 ML: 9 INJECTION, SOLUTION INTRAVENOUS at 20:54

## 2022-01-14 RX ADMIN — CEFTRIAXONE 2 G: 2 INJECTION, POWDER, FOR SOLUTION INTRAMUSCULAR; INTRAVENOUS at 23:04

## 2022-01-14 RX ADMIN — SODIUM CHLORIDE 500 ML: 9 INJECTION, SOLUTION INTRAVENOUS at 21:02

## 2022-01-14 ASSESSMENT — ENCOUNTER SYMPTOMS
WEAKNESS: 1
SHORTNESS OF BREATH: 0
FATIGUE: 1
HEMATURIA: 0
COUGH: 1
VOMITING: 1
FEVER: 0
DYSURIA: 0
APPETITE CHANGE: 1
DIARRHEA: 1
BACK PAIN: 1

## 2022-01-14 ASSESSMENT — ACTIVITIES OF DAILY LIVING (ADL)
ADLS_ACUITY_SCORE: 5
ADLS_ACUITY_SCORE: 12
ADLS_ACUITY_SCORE: 5
ADLS_ACUITY_SCORE: 12
ADLS_ACUITY_SCORE: 5

## 2022-01-14 ASSESSMENT — MIFFLIN-ST. JEOR: SCORE: 1112.7

## 2022-01-14 NOTE — ED PROVIDER NOTES
"    History   Chief Complaint:  Abnormal Labs       HPI   Kimberley Mullins is a 81 year old female with history of  who presents with fatigue, cough, and some vomiting and diarrhea since 4 days ago. The patient also endorses a cough, some back pain, a decreased appetite, and generalized weakness. No fever, dysuria, or hematuria. No alcohol use. No chest pain, shortness of breath, or leg swelling.    Influenza and Covid tests from 1/14/22:  Influenza A: Negative  Influenza B: Negative  Covid-19: Negative    Review of Systems   Constitutional: Positive for appetite change (decreased) and fatigue. Negative for fever.   Respiratory: Positive for cough. Negative for shortness of breath.    Cardiovascular: Negative for chest pain and leg swelling.   Gastrointestinal: Positive for diarrhea and vomiting.   Genitourinary: Negative for dysuria and hematuria.   Musculoskeletal: Positive for back pain.   Neurological: Positive for weakness.   All other systems reviewed and are negative.    Allergies:  The patient has no known allergies.    Medications:  Synthroid/Levothyroid  Norvasc  Zocor  Zithromax    Past Medical History:     Arthritis  Diverticulitis of colon  IBS  Osteopenia  Thyroid disease  Unspecified tinnitus  Colonic polyps  Hypothyroidism  Macular degeneration  Cataracts of both eyes      Past Surgical History:    Appendectomy  Humeral open tacho fixation  Dilation and curettage  Right total knee arthroplasty  Polyp removal during colonoscopy screening     Family History:    Father: MI age 70    Social History:  The patient presents alone. She lives alone. Her daughter brought her here.    Physical Exam     Patient Vitals for the past 24 hrs:   BP Temp Temp src Pulse Resp SpO2 Height Weight   01/14/22 2010 118/72 97.5  F (36.4  C) Oral 94 20 98 % 1.6 m (5' 3\") 68.4 kg (150 lb 11.2 oz)   01/14/22 1900 120/80 -- -- 90 -- 96 % -- --   01/14/22 1519 121/83 98.1  F (36.7  C) Oral 95 18 100 % -- --     Physical Exam  VS: " Reviewed per above  HENT: Mucous membranes moist, no nuchal rigidity  EYES: sclera anicteric  CV: Rate as noted, regular rhythm.   RESP: Effort normal. Breath sounds are normal bilaterally.  GI: no tenderness/rebound/guarding, not distended.  NEURO: GCS 15, cranial nerves II through XII are intact, 5 out of 5 strength in all 4 extremities, sensation is intact light touch in all 4 extremities  MSK: No deformity of the extremities  SKIN: Warm and dry    Emergency Department Course   Imaging:  XR Chest Port 1 View:  Small pleural effusions. Mild bibasilar opacities may be from atelectasis and adjacent pleural fluid. Difficult to completely exclude an infectious or inflammatory process.   Upper normal heart size. Mild pulmonary vascular redistribution and congestion. Aortic atherosclerosis.   Bony demineralization. Surgical change proximal left humerus.   Report per radiology    Laboratory:  Symptomatic Influenza A/B & SARS-CoV2 (COVID19) Virus PCR Multiplex: Negative    CBC: WBC 11.2 (H), HGB 11.8,       BMP: Sodium 115 (LL), Chloride 80 (L), Urea Nitrogen 33 (H), Glucose 125 (H), o/w WNL (Creatinine 0.76)    Osmolality: In process    Hepatic panel: Bilirubin Direct 0.3 (H), o/w WNl    TSH with free T4 reflex: 5.84 (H)    T4 free: 1.51 (H)    UA with microscopic: Ketones 20 (A), Protein Albumin 70 (A), Large Leukocyte Esterase (A), Few Bacteria (A), Mucus Present (A), RBC 3 (H), WBC 57 (H), Squamous Epithelials 2 (H), o/w WNL    Sodium random urine: <5    Procedures  None    Emergency Department Course:  Reviewed:  I reviewed nursing notes, vitals, past medical history and Care Everywhere    Assessments:  1750 I obtained history and examined the patient as noted above.   1827 I rechecked the patient and performed an ultrasound.    Interventions:  1840 NS 1L IV Bolus    Disposition:  The patient was admitted to the hospital under the care of Dr. Goodwin.     Impression & Plan   Medical Decision Making:  Patient  presents to the ER from clinic for evaluation of hyponatremia.  Patient reports 4-day history of fatigue, cough, mild vomiting and diarrhea as well as poor p.o. intake.  On arrival vital signs are reassuring.  On exam she is neurologically intact.  Clinically she does not appear markedly hypovolemic.  IVC on bedside ultrasound does not appear to be collapsed, she has moist mucous membranes.  I also do not see signs of significant peripheral edema to suggest volume overload.  Labs today do confirm hyponatremia with potassium level of 115 as well as hypochloremia of 80.  She also has ketones in her urine.  Chest x-ray shows mild bibasilar opacities and mild pleural effusions.  No obvious signs of CHF.  With reports of poor p.o. intake and GI losses and ketones in the urine, IV fluid bolus was trialed with concern for hypotonic hypovolemic hyponatremia.  Further sodium studies pending at time of admission.      Diagnosis:    ICD-10-CM    1. Hyponatremia  E87.1      Scribe Disclosure:  Laura WALKER, am serving as a scribe at 5:46 PM on 1/14/2022 to document services ersonally performed by Karlo Muñiz MD based on my observations and the provider's statements to me.      Karlo Muñiz MD  01/14/22 1381

## 2022-01-14 NOTE — PROGRESS NOTES
CC: Cough, fatigue    History:  Here with daughter Hilary.    Developed nausea 4-5 days ago on Monday, but was more noticeable Tues. Also developed fatigue, which is getting worse. Has been feeling cough, and congestion, which is worse than her usual chronic congestion. Cough is dry and productive, intermittently, getting drainage down throat. Was vomiting at first, now just dry heaving. Some body aches. No fever, sweats, chills, facial pain/pressure, sore throat, headaches, shortness of breath, mild diarrhea. Denies any urinary symptoms. Had done negative covid-19 test at home.     Has been taking Mucinex. Never ended up taking pantoprazole. Has been using cough drops.     PMH, MEDICATIONS, ALLERGIES, SOCIAL AND FAMILY HISTORY in [facility] and reviewed by me personally.    ROS negative other than the symptoms noted above in the HPI.    Examination   /68 (BP Location: Right arm, Patient Position: Sitting, Cuff Size: Adult Large)   Pulse 98   Temp 97.9  F (36.6  C) (Temporal)   Resp 22   Wt 63 kg (139 lb)   LMP  (LMP Unknown)   SpO2 96%   BMI 25.02 kg/m       Constitutional: Sitting comfortably, in no acute distress. Vital signs noted  Neck:  no adenopathy, trachea midline and normal to palpation, thyroid normal to palpation  Cardiovascular:  regular rate and rhythm, no murmurs, clicks, or gallops  Respiratory:  normal respiratory rate and rhythm, lungs clear to auscultation  Abdomen: Abdomen soft, non-tender. BS normal. No masses, organomegaly  SKIN: No jaundice/pallor/rash.   Psychiatric: mentation appears normal and affect normal/bright        A/P    ICD-10-CM    1. Acute non-recurrent maxillary sinusitis  J01.00 VENOUS COLLECTION     Hemogram Platelet (BFP)     Basic Metabolic Panel (BFP)     azithromycin (ZITHROMAX) 250 MG tablet   2. Fever, unspecified fever cause  R50.9 COVID-19 (BFP)     Influenza A and B (BFP)   3. Nausea  R11.0 ondansetron (ZOFRAN-ODT) 4 MG ODT tab   4. Generalized muscle weakness   M62.81    5. Hyponatremia  E87.1    6. Cough  R05.9        DISCUSSION: Covid-19 and influenza swabs today negative. Suspect sinusitis causing drainage, nausea, cough. Could still be viral but with severity of symptoms, recommended z-pack. Take with food. Warned of side effects. Also can use ondansetron as needed to help with nausea, as I am very concerned with her low fluid/food intake. Will check CBC and BMP and send MyChart when available.     *Updated- Unfortunately, BMP revealed critically low sodium value of 112, after being run twice to confirm. Chloride is low as well. Also has very mild leukocytosis. Based on this, informed pt and daughter that Emergency room evaluation is necessary at this time. They agree to proceed to ER. I had planned to contact ER with report, but pt is already checked in.      follow up visit: As needed    Renetta Rizvi PA-C  [facility]

## 2022-01-14 NOTE — ED TRIAGE NOTES
"Arrives with family. Reports that patient has been \"Not well since Monday night\" to ED this AM. Vomiting, coughing and congested, weakness with confusion. States that ruled out flu and COVID at clinic.     Left appointment and was called back with test results for low sodium   "

## 2022-01-14 NOTE — NURSING NOTE
Chief Complaint   Patient presents with     URI     cough, fatigue and nausea since Monday night        Pre-visit Screening:  Immunizations:  up to date  Colonoscopy:  is up to date  Mammogram: is up to date  Asthma Action Test/Plan:  NA  PHQ9:  Na  GAD7:  NA  Questioned patient about current smoking habits Pt. has never smoked.  Ok to leave detailed message on voice mail for today's visit only YEs, phone # [phone]

## 2022-01-15 LAB
ALBUMIN SERPL-MCNC: 2.8 G/DL (ref 3.4–5)
ALP SERPL-CCNC: 64 U/L (ref 40–150)
ALT SERPL W P-5'-P-CCNC: 46 U/L (ref 0–50)
ANION GAP SERPL CALCULATED.3IONS-SCNC: 10 MMOL/L (ref 3–14)
AST SERPL W P-5'-P-CCNC: 37 U/L (ref 0–45)
BILIRUB SERPL-MCNC: 0.5 MG/DL (ref 0.2–1.3)
BUN SERPL-MCNC: 23 MG/DL (ref 7–30)
CALCIUM SERPL-MCNC: 7.8 MG/DL (ref 8.5–10.1)
CHLORIDE BLD-SCNC: 87 MMOL/L (ref 94–109)
CO2 SERPL-SCNC: 23 MMOL/L (ref 20–32)
CREAT SERPL-MCNC: 0.7 MG/DL (ref 0.52–1.04)
ERYTHROCYTE [DISTWIDTH] IN BLOOD BY AUTOMATED COUNT: 13.2 % (ref 10–15)
GFR SERPL CREATININE-BSD FRML MDRD: 86 ML/MIN/1.73M2
GLUCOSE BLD-MCNC: 87 MG/DL (ref 70–99)
HCT VFR BLD AUTO: 30.8 % (ref 35–47)
HGB BLD-MCNC: 10.6 G/DL (ref 11.7–15.7)
MAGNESIUM SERPL-MCNC: 2.2 MG/DL (ref 1.6–2.3)
MCH RBC QN AUTO: 32.9 PG (ref 26.5–33)
MCHC RBC AUTO-ENTMCNC: 34.4 G/DL (ref 31.5–36.5)
MCV RBC AUTO: 96 FL (ref 78–100)
PLATELET # BLD AUTO: 194 10E3/UL (ref 150–450)
POTASSIUM BLD-SCNC: 4.3 MMOL/L (ref 3.4–5.3)
PROT SERPL-MCNC: 5.8 G/DL (ref 6.8–8.8)
RBC # BLD AUTO: 3.22 10E6/UL (ref 3.8–5.2)
SODIUM SERPL-SCNC: 119 MMOL/L (ref 133–144)
SODIUM SERPL-SCNC: 119 MMOL/L (ref 133–144)
SODIUM SERPL-SCNC: 120 MMOL/L (ref 133–144)
SODIUM SERPL-SCNC: 123 MMOL/L (ref 133–144)
WBC # BLD AUTO: 7.8 10E3/UL (ref 4–11)

## 2022-01-15 PROCEDURE — 258N000003 HC RX IP 258 OP 636: Performed by: INTERNAL MEDICINE

## 2022-01-15 PROCEDURE — 120N000001 HC R&B MED SURG/OB

## 2022-01-15 PROCEDURE — 84295 ASSAY OF SERUM SODIUM: CPT | Performed by: INTERNAL MEDICINE

## 2022-01-15 PROCEDURE — 83735 ASSAY OF MAGNESIUM: CPT | Performed by: INTERNAL MEDICINE

## 2022-01-15 PROCEDURE — 36415 COLL VENOUS BLD VENIPUNCTURE: CPT | Performed by: INTERNAL MEDICINE

## 2022-01-15 PROCEDURE — 85014 HEMATOCRIT: CPT | Performed by: INTERNAL MEDICINE

## 2022-01-15 PROCEDURE — 99233 SBSQ HOSP IP/OBS HIGH 50: CPT | Performed by: INTERNAL MEDICINE

## 2022-01-15 PROCEDURE — 80053 COMPREHEN METABOLIC PANEL: CPT | Performed by: INTERNAL MEDICINE

## 2022-01-15 PROCEDURE — 250N000011 HC RX IP 250 OP 636: Performed by: INTERNAL MEDICINE

## 2022-01-15 PROCEDURE — 250N000013 HC RX MED GY IP 250 OP 250 PS 637: Performed by: INTERNAL MEDICINE

## 2022-01-15 RX ORDER — CEFTRIAXONE 1 G/1
1 INJECTION, POWDER, FOR SOLUTION INTRAMUSCULAR; INTRAVENOUS EVERY 24 HOURS
Status: DISCONTINUED | OUTPATIENT
Start: 2022-01-15 | End: 2022-01-16

## 2022-01-15 RX ORDER — GUAIFENESIN 600 MG/1
600 TABLET, EXTENDED RELEASE ORAL DAILY
Status: DISCONTINUED | OUTPATIENT
Start: 2022-01-15 | End: 2022-01-17 | Stop reason: HOSPADM

## 2022-01-15 RX ORDER — SODIUM CHLORIDE 9 MG/ML
1000 INJECTION, SOLUTION INTRAVENOUS CONTINUOUS
Status: DISCONTINUED | OUTPATIENT
Start: 2022-01-15 | End: 2022-01-16

## 2022-01-15 RX ORDER — SODIUM CHLORIDE 9 MG/ML
1000 INJECTION, SOLUTION INTRAVENOUS CONTINUOUS
Status: DISCONTINUED | OUTPATIENT
Start: 2022-01-15 | End: 2022-01-15

## 2022-01-15 RX ADMIN — SODIUM CHLORIDE 1000 ML: 9 INJECTION, SOLUTION INTRAVENOUS at 20:09

## 2022-01-15 RX ADMIN — GUAIFENESIN 600 MG: 600 TABLET, EXTENDED RELEASE ORAL at 10:22

## 2022-01-15 RX ADMIN — AMLODIPINE BESYLATE 5 MG: 5 TABLET ORAL at 08:12

## 2022-01-15 RX ADMIN — CEFTRIAXONE 1 G: 1 INJECTION, POWDER, FOR SOLUTION INTRAMUSCULAR; INTRAVENOUS at 20:09

## 2022-01-15 RX ADMIN — PANTOPRAZOLE SODIUM 20 MG: 20 TABLET, DELAYED RELEASE ORAL at 08:12

## 2022-01-15 RX ADMIN — LEVOTHYROXINE SODIUM 50 MCG: 50 TABLET ORAL at 08:12

## 2022-01-15 RX ADMIN — SODIUM CHLORIDE 1000 ML: 9 INJECTION, SOLUTION INTRAVENOUS at 08:17

## 2022-01-15 ASSESSMENT — ACTIVITIES OF DAILY LIVING (ADL)
ADLS_ACUITY_SCORE: 5

## 2022-01-15 ASSESSMENT — MIFFLIN-ST. JEOR: SCORE: 1108.62

## 2022-01-15 NOTE — H&P
[facility]    Hospitalist Admission Note    Name: Kimberley Mullins    MRN: [id number]  YOB: 1940    Age: 81 year old  Date of admission: 1/14/2022  Primary care provider: Sheron Armenta  Admitting physician : Andrade Goodwin M.D. ,M.B.A.            Assessment:       Brief summary of admission assessment:    Kimberley Mullins is a 81 year old  female with a significant past medical history of hypothyroidism,  diverticular disease and hypertension who presents with 4 days of nausea vomiting diarrhea, cough and generalized weakness.    She was was seen at the [facility] clinic for cough and fatigue morning of presentation.  She was diagnosed with acute nonrecurrent maxillary sinusitis and labs were drawn.  Influenza swab was negative.  Basic metabolic panel revealed critically low sodium of 112 and patient was sent to emergency department for further evaluation.    Here in the emergency department her initial vital signs were stable.  Basic metabolic panel revealed sodium of 115, potassium of 4.2, chloride of 80, bicarb of 23, BUN of 33 and creatinine of 0.76.  Complete blood count was evident for WBC count of 11.5, normal hemoglobin and platelet count.    Hospitalist admission was requested and patient was admitted to our service.        Admission diagnoses:      #1.Acute respiratory symptoms -Cough, Fatigue, congestion associated with GI symptoms of nausea vomiting and diarrhea concerning for viral illness.  -- Influenza and COVID-19 negative    #2.Hyponatremia -suspect hypovolemic with GI symptoms and volume loss.  -- Serum sodium was 112.5 earlier and 115 on arrival.  Patient has no altered mental status or neurologic symptoms.      #3.  Small pleural effusions: No hypoxia or shortness of breath.  Likely chronic.  Normal EF stress test back in June 2021.        Active comorbid medical conditions:    Hypertension on amlodipine    Hypothyroidism  on levothyroxine    Hyperlipidemia on statin        COVID-19 Testing : Negative       Plan/MDM:       # Admission Status: Will admit patient to hospitalist service as inpatient as patient likely need over two mid night stay  in the hospital.     # Care plan(MDM):      Admit to medical bed, continue IV fluid hydration, continue treatment with 0.9% normal saline rather than 3% due to absence of neurologic symptoms.  Monitor serum sodium every 4 hours, monitor replace potassium as needed.      Check serum osmolality, urine sodium, intake and output.      # Supportive care:IV fluids continued  Nausea and vomiting control measures    # Diet:Diet advanced    # Activity:Advance activity as tolerated    # Education/Counseling :Discussed treatment plan with the patient    # Consults:none     # VTE prophylactic measures:prophylaxis against venous thromboembolism with PCD     # Therapies:none       # Additional orders:    --Care plan discussed with the patient/family and agreed to care plan   --Patient will be transferred to care of hospitalist attending for further evaluation and management as appropriate   --Old medical orders reviewed   --imaging result independently reviewed by me     (See orders placed for this visit by me )     - Home medication reviewed and will be continued as appropriate once pharmacy reconciliation is completed         Code Status/Disposition:     # Code Status:Full Code      # Disposition:anticipate discharge to home and Anticipate discharge in 2-3 days        Disclaimer: This note consists of symbols derived from keyboarding, dictation and/or voice recognition software. As a result, there may be errors in the script that have gone undetected. Please consider this when interpreting information found in this chart.             Chief Complaint:     Abnormal lab  History is obtained from the patient          History of Present Illness:      This patient is a 81 year old  female with a significant past  medical history of hypertension, hyperlipidemia, hypothyroidism who presents with the following condition requiring a hospital admission:    Severe acute hyponatremia    Patient has been having generalized weakness fatigue, cough nausea and few episodes of vomiting associated with diarrhea since 4 days ago.  She had decreased appetite and was not able to take enough fluid.  She has been having dry cough but denies any shortness of breath or fever.  No complaint of chest pain.  She was tested negative for influenza as well as COVID-19.    She was was seen at the [facility] physicians clinic for cough and fatigue morning of presentation.  She was diagnosed with acute nonrecurrent maxillary sinusitis and labs were drawn.  Influenza swab was negative.  Basic metabolic panel revealed critically low sodium of 112 and patient was sent to emergency department for further evaluation.           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     Thyroid disease      Transient global amnesia      Unspecified tinnitus     Tinnitus            Past Surgical History:     Past Surgical History:   Procedure Laterality Date     ARTHROPLASTY KNEE  10/25/2011    Procedure:ARTHROPLASTY KNEE; Right Total Knee Arthroplasty   ; Surgeon:BRYCE ATWOOD; Location:[facility] OR     ARTHROPLASTY KNEE  6/2/2014    Procedure: ARTHROPLASTY KNEE;  Surgeon: Bryce Atwood MD;  Location:  OR      DILATION/CURETTAGE DIAG/THER NON OB  2006    D & C     HC KNEE SCOPE, DIAGNOSTIC      Arthroscopy, Knee     ZZC AFF FOREARM/WRIST SURGERY UNLISTED      orif radius  fx distal left     [facility] APP[facility]  1955     [facility] OPEN JESSI FIXATN HUMERAL SHAFT FX  12/2007    Dr Massey     [facility] COLONOSCOPY W SNARE REMOVAL TUMOR/POLYP/LESION  9/03 & 3/2011    polyp removal during screening colonoscopy             Social History:     Social History     Tobacco Use     Smoking status: Never Smoker     Smokeless tobacco: Never Used   Substance Use Topics     Alcohol use: Yes     Alcohol/week: 5.8 standard drinks     Types: 7 Standard drinks or equivalent per week     Comment: two to three glasses of wine q day.             Family History:     Family History   Problem Relation Age of Onset     Heart Disease Father         MI age 70            Allergies:     Allergies   Allergen Reactions     No Known Allergies              Medications:        Prior to Admission medications    Medication Sig Last Dose Taking? Auth Provider   amLODIPine (NORVASC) 5 MG tablet TAKE 1 TABLET (5 MG) BY MOUTH DAILY   Katlyn Daniels MD   azithromycin (ZITHROMAX) 250 MG tablet Two tablets first day, then one tablet daily for four days.   Renetta Rizvi PA-C   levothyroxine (SYNTHROID/LEVOTHROID) 50 MCG tablet Take 1 tablet (50 mcg) by mouth daily   Sheron Armenta MD   ondansetron (ZOFRAN-ODT) 4 MG ODT tab Take 1 tablet (4 mg) by mouth every 8 hours as needed for nausea   Renetta Rizvi PA-C   pantoprazole (PROTONIX) 20 MG EC tablet Take 1 tablet (20 mg) by mouth daily   Sheron Armenta MD   simvastatin (ZOCOR) 20 MG tablet TAKE 1 TABLET (20 MG) BY MOUTH AT BEDTIME   Sheron Armenta MD          Review of Systems:     A Comprehensive greater than 10 system review of systems was carried out.  Pertinent positives and negatives are noted above in HPI.  Otherwise negative for contributory information.           Physical Exam:     Vital signs were reviewed    Temp:  [97.9  F (36.6  C)-98.1  F (36.7  C)] 98.1  F (36.7  C)  Pulse:  [95-98] 95  Resp:  [18-22] 18  BP: (108-121)/(68-83) 121/83  SpO2:  [96  %-100 %] 100 %        GEN: awake, alert, cooperative, no apparent distress, oriented x 3    NECK:Supple ,no mass or thyromegaly     HEENT:  Normocephalic/atraumatic, no scleral icterus, no nasal discharge, mouth moist.    CV:  Regular rate and rhythm, no murmur or JVD.  S1 + S2 noted, no S3 or S4.    LUNGS:  Clear to auscultation bilaterally without rales/rhonchi/wheezing/retractions.  Symmetric chest rise on inhalation noted.    ABD:  Active bowel sounds, soft, non-tender/non-distended.  No rebound/guarding/rigidity.    EXT:  No edema.  No cyanosis.  No joint synovitis noted.Lower extremity pulses are normal bilaterally and     LGS: No cervical or axillary lymphadenopathy     SKIN:  Dry to touch, warm ,no exanthems noted in the visualized areas.    Neurologic:Grossly intact,non focal . No acute focal neurologic deficit     Psychaitric exam: Mood and affect normal                Data:       All laboratory and imaging data in the past 24 hours reviewed     Results for orders placed or performed during the hospital encounter of 01/14/22   XR Chest Port 1 View     Status: None    Narrative    EXAM: XR CHEST PORT 1 VIEW  LOCATION: [facility]  DATE/TIME: 1/14/2022 6:18 PM    INDICATION: Cough.  COMPARISON: None.      Impression    IMPRESSION:     Small pleural effusions. Mild bibasilar opacities may be from atelectasis and adjacent pleural fluid. Difficult to completely exclude an infectious or inflammatory process.     Upper normal heart size. Mild pulmonary vascular redistribution and congestion. Aortic atherosclerosis.     Bony demineralization. Surgical change proximal left humerus.       UA with Microscopic reflex to Culture     Status: Abnormal    Specimen: Urine, Midstream   Result Value Ref Range    Color Urine Yellow Colorless, Straw, Light Yellow, Yellow    Appearance Urine Clear Clear    Glucose Urine Negative Negative mg/dL    Bilirubin Urine Negative Negative    Ketones Urine 20  (A)  Negative mg/dL    Specific Gravity Urine 1.035 1.003 - 1.035    Blood Urine Negative Negative    pH Urine 5.5 5.0 - 7.0    Protein Albumin Urine 70  (A) Negative mg/dL    Urobilinogen Urine Normal Normal, 2.0 mg/dL    Nitrite Urine Negative Negative    Leukocyte Esterase Urine Large (A) Negative    Bacteria Urine Few (A) None Seen /HPF    Mucus Urine Present (A) None Seen /LPF    RBC Urine 3 (H) <=2 /HPF    WBC Urine 57 (H) <=5 /HPF    Squamous Epithelials Urine 2 (H) <=1 /HPF    Hyaline Casts Urine 1 <=2 /LPF    Narrative    Urine Culture ordered based on laboratory criteria   Extra Red Top Tube     Status: None   Result Value Ref Range    Hold Specimen JIC    Extra Green Top (Lithium Heparin) Tube     Status: None   Result Value Ref Range    Hold Specimen JIC    Extra Purple Top Tube     Status: None   Result Value Ref Range    Hold Specimen JIC    Basic metabolic panel     Status: Abnormal   Result Value Ref Range    Sodium 115 (LL) 133 - 144 mmol/L    Potassium 4.2 3.4 - 5.3 mmol/L    Chloride 80 (L) 94 - 109 mmol/L    Carbon Dioxide (CO2) 23 20 - 32 mmol/L    Anion Gap 12 3 - 14 mmol/L    Urea Nitrogen 33 (H) 7 - 30 mg/dL    Creatinine 0.76 0.52 - 1.04 mg/dL    Calcium 9.0 8.5 - 10.1 mg/dL    Glucose 125 (H) 70 - 99 mg/dL    GFR Estimate 78 >60 mL/min/1.73m2   CBC with platelets and differential     Status: Abnormal   Result Value Ref Range    WBC Count 11.2 (H) 4.0 - 11.0 10e3/uL    RBC Count 3.62 (L) 3.80 - 5.20 10e6/uL    Hemoglobin 11.8 11.7 - 15.7 g/dL    Hematocrit 33.9 (L) 35.0 - 47.0 %    MCV 94 78 - 100 fL    MCH 32.6 26.5 - 33.0 pg    MCHC 34.8 31.5 - 36.5 g/dL    RDW 13.0 10.0 - 15.0 %    Platelet Count 235 150 - 450 10e3/uL    % Neutrophils 77 %    % Lymphocytes 12 %    % Monocytes 10 %    % Eosinophils 0 %    % Basophils 0 %    % Immature Granulocytes 1 %    NRBCs per 100 WBC 0 <1 /100    Absolute Neutrophils 8.7 (H) 1.6 - 8.3 10e3/uL    Absolute Lymphocytes 1.3 0.8 - 5.3 10e3/uL    Absolute  Monocytes 1.2 0.0 - 1.3 10e3/uL    Absolute Eosinophils 0.0 0.0 - 0.7 10e3/uL    Absolute Basophils 0.0 0.0 - 0.2 10e3/uL    Absolute Immature Granulocytes 0.1 <=0.4 10e3/uL    Absolute NRBCs 0.0 10e3/uL   Sodium random urine     Status: None   Result Value Ref Range    Sodium Urine mmol/L <5 mmol/L   Osmolality urine     Status: Normal   Result Value Ref Range    Osmolality Urine 1,013 100-1,200 mmol/kg    Narrative    Reference Ranges depend on patient's hydration status and renal function.   Neonates:  mmol/kg   2 years and older, random specimens: 100-1200 mmol/kg; Greater than 850 mmol/kg after 12 hour fluid restriction  Urine/serum osmolality ratio: 2 years and older: 1.0-3.0; 3.0-4.7 after 12 hour fluid restriction   Osmolality     Status: Abnormal   Result Value Ref Range    Osmolality Blood 253 (L) 280 - 301 mmol/kg    Narrative    Greater than 385 mmol/kg relates to stupor in hyperglycemia   Greater than 400 mmol/kg can relate to seizures   Greater than 420 mmol/kg can be lethal    Serum Osmalar Gap:   Normal <10   Larger suggest unmeasured substances present in serum (ethanol, methanol, isopropanol, mannitol, ethylene glycol).   Hepatic panel     Status: Abnormal   Result Value Ref Range    Bilirubin Total 0.7 0.2 - 1.3 mg/dL    Bilirubin Direct 0.3 (H) 0.0 - 0.2 mg/dL    Protein Total 7.0 6.8 - 8.8 g/dL    Albumin 3.5 3.4 - 5.0 g/dL    Alkaline Phosphatase 68 40 - 150 U/L    AST 41 0 - 45 U/L    ALT 46 0 - 50 U/L   TSH with free T4 reflex     Status: Abnormal   Result Value Ref Range    TSH 5.84 (H) 0.40 - 4.00 mU/L   Symptomatic; Unknown Influenza A/B & SARS-CoV2 (COVID-19) Virus PCR Multiplex Nasopharyngeal     Status: Normal    Specimen: Nasopharyngeal; Swab   Result Value Ref Range    Influenza A PCR Negative Negative    Influenza B PCR Negative Negative    SARS CoV2 PCR Negative Negative    Narrative    Testing was performed using the froilan SARS-CoV-2 & Influenza A/B Assay on the froilan [employer]  System. This test should be ordered for the detection of SARS-CoV-2 and influenza viruses in individuals who meet clinical and/or epidemiological criteria. Test performance is unknown in asymptomatic patients. This test is for in vitro diagnostic use under the FDA EUA for laboratories certified under CLIA to perform moderate and/or high complexity testing. This test has not been FDA cleared or approved. A negative result does not rule out the presence of PCR inhibitors in the specimen or target RNA in concentration below the limit of detection for the assay. If only one viral target is positive but coinfection with multiple targets is suspected, the sample should be re-tested with another FDA cleared, approved or authorized test, if coinfection would change clinical management. [facility] are certified under the Clinical Laboratory Improvement Amendments of 1988 (CLIA-88) as  qualified to perform moderate and/or high complexity laboratory testing.   Basic metabolic panel (BMP)     Status: Abnormal   Result Value Ref Range    Sodium 117 (LL) 133 - 144 mmol/L    Potassium 4.2 3.4 - 5.3 mmol/L    Chloride 85 (L) 94 - 109 mmol/L    Carbon Dioxide (CO2) 22 20 - 32 mmol/L    Anion Gap 10 3 - 14 mmol/L    Urea Nitrogen 30 7 - 30 mg/dL    Creatinine 0.66 0.52 - 1.04 mg/dL    Calcium 8.0 (L) 8.5 - 10.1 mg/dL    Glucose 106 (H) 70 - 99 mg/dL    GFR Estimate 87 >60 mL/min/1.73m2   T4 free     Status: Abnormal   Result Value Ref Range    Free T4 1.51 (H) 0.76 - 1.46 ng/dL   Potassium     Status: Normal   Result Value Ref Range    Potassium 4.0 3.4 - 5.3 mmol/L   Magnesium     Status: Normal   Result Value Ref Range    Magnesium 2.0 1.6 - 2.3 mg/dL   Osmolality     Status: Abnormal   Result Value Ref Range    Osmolality Blood 253 (L) 280 - 301 mmol/kg    Narrative    Greater than 385 mmol/kg relates to stupor in hyperglycemia   Greater than 400 mmol/kg can relate to seizures   Greater than 420 mmol/kg can be  lethal    Serum Osmalar Gap:   Normal <10   Larger suggest unmeasured substances present in serum (ethanol, methanol, isopropanol, mannitol, ethylene glycol).   Sodium     Status: Abnormal   Result Value Ref Range    Sodium 117 (LL) 133 - 144 mmol/L   Extra Purple Top Tube     Status: None   Result Value Ref Range    Hold Specimen [facility]    [place] Draw     Status: None    Narrative    The following orders were created for panel order [place] Draw.  Procedure                               Abnormality         Status                     ---------                               -----------         ------                     Extra Red Top Tube[[id number]]                               Final result               Extra Green Top (Lithium...[[id number]]                      Final result               Extra Purple Top Tube[[id number]]                            Final result                 Please view results for these tests on the individual orders.   CBC with platelets differential     Status: Abnormal    Narrative    The following orders were created for panel order CBC with platelets differential.  Procedure                               Abnormality         Status                     ---------                               -----------         ------                     CBC with platelets and d...[[id number]]  Abnormal            Final result                 Please view results for these tests on the individual orders.   Extra Tube     Status: None    Narrative    The following orders were created for panel order Extra Tube.  Procedure                               Abnormality         Status                     ---------                               -----------         ------                     Extra Purple Top Tube[[id number]]                            Final result                 Please view results for these tests on the individual orders.   Results for orders placed or performed in visit on 01/14/22   COVID-19 ([facility])      Status: None   Result Value Ref Range    COVID-19 Negative    Influenza A and B (BFP)     Status: None   Result Value Ref Range    Influenza A neg neg    Influenza B neg neg   Hemogram Platelet (BFP)     Status: Abnormal   Result Value Ref Range    WBC 11.5 (A) 4.0 - 11 10*9/L    RBC Count 3.58 (A) 3.8 - 5.2 10*12/L    Hemoglobin 12.1 11.7 - 15.7 g/dL    Hematocrit 36.4 35.0 - 47.0 %    .7 (A) 78 - 100 fL    MCH 33.8 (A) 26 - 33 pg    MCHC 33.2 31 - 36 g/dL    RDW 11.9 %    Platelet Count 242 150 - 375 10^9/L   Basic Metabolic Panel (BFP)     Status: Abnormal   Result Value Ref Range    Carbon Dioxide 24.6 20 - 32 mmol/L    Creatinine 0.85 0.60 - 1.30 mg/dL    Glucose 109 (A) 60 - 99 mg/dL    Sodium 112.5 (A) 135 - 146 mmol/L    Potassium 4.71 3.5 - 5.3 mmol/L    Chloride 80.8 (A) 98 - 110 mmol/L    Urea Nitrogen 28 (A) 7 - 25 mg/dL    Calcium 8.8 8.6 - 10.3 mg/dL    BUN/Creatinine Ratio 32.9 (A) 6 - 22             All imaging studies reviewed by me.         Patient`s old medical records reviewed and case discussed with the ED physician.    ED course-Reviewed  and care plan discussed with Karlo Muñiz MD

## 2022-01-15 NOTE — PHARMACY-ADMISSION MEDICATION HISTORY
Admission medication history interview status for this patient is complete. See [facility] admission navigator for allergy information, prior to admission medications and immunization status.     Medication history interview done, indicate source(s): Patient  Medication history resources (including written lists, pill bottles, clinic record):None      Changes made to PTA medication list:  Added: none  Changed: none  Reported as Not Taking: none  Removed: protonix    Actions taken by pharmacist (provider contacted, etc):None     Additional medication history information:None    Medication reconciliation/reorder completed by provider prior to medication history?  y   (Y/N)     For patients on insulin therapy:   Do you use sliding scale insulin based on blood sugars?   What is your pre-meal insulin coverage?    Do you typically eat three meals a day?   How many times do you check your blood glucose per day?   How many episodes of hypoglycemia do you typically have per month?   Do you have a Continuous Glucose Monitor (CGM)?      Prior to Admission medications    Medication Sig Last Dose Taking? Auth Provider   amLODIPine (NORVASC) 5 MG tablet TAKE 1 TABLET (5 MG) BY MOUTH DAILY 1/14/2022 at Unknown time Yes Katlyn Daniels MD   azithromycin (ZITHROMAX) 250 MG tablet Two tablets first day, then one tablet daily for four days. new RX Yes Renetta Rizvi PA-C   levothyroxine (SYNTHROID/LEVOTHROID) 50 MCG tablet Take 1 tablet (50 mcg) by mouth daily 1/14/2022 at am Yes Sheron Armenta MD   ondansetron (ZOFRAN-ODT) 4 MG ODT tab Take 1 tablet (4 mg) by mouth every 8 hours as needed for nausea  Yes Renetta Rizvi PA-C   simvastatin (ZOCOR) 20 MG tablet TAKE 1 TABLET (20 MG) BY MOUTH AT BEDTIME 1/13/2022 at Unknown time Yes Sheron Armenta MD

## 2022-01-15 NOTE — PLAN OF CARE
Pertinent assessments: A&Ox4. Infrequent cough, LS dim. Denies pain. Passing flatus. Poor appetite. Up SBA.     Major Shift Events: admitted to unit    Treatment Plan: IVF, Q4 Na checks    Bedside Nurse: Ellen Canada RN

## 2022-01-15 NOTE — PROGRESS NOTES
VSS. Denies pain. Only symptom reported is weakness. Sodium checks q6h, last 120. NS infusing. 1500 mL fluid restriction in place. Poor appetite, but patient is trying to eat meals. Voiding. Rocephin for uti. Will continue to monitor.

## 2022-01-15 NOTE — PROGRESS NOTES
[facility]  Hospitalist Progress Note  Admit 1/14/2022  5:35 PM    Name: Kimberley Mullins    MRN: [id number]  Provider:  Job Elena MD, [facility]    Date of Service: 01/15/2022     Reason for Stay (Diagnosis): Severe hyponatremia         Summary of hospital stay & Assessment/Plan:   Summary of Stay: Kimberley Mullins is a 81 year old female who was admitted on 1/14/2022  81 year old  female with a significant past medical history of hypothyroidism,  diverticular disease and hypertension who presents with 4 days of nausea vomiting diarrhea, cough and generalized weakness. She was diagnosed with acute nonrecurrent maxillary sinusitis and labs were drawn.  Influenza swab was negative.  Basic metabolic panel revealed critically low sodium of 112 and patient was sent to emergency department for further evaluation.       Problem List:   1. Acute respiratory viral illness negative COVID and influenza  2. Nausea vomiting and diarrhea concerning for viral illness  3. Severe hyponatremia with sodium of 112.5, new to the patient, patient urine osmolarity is significantly elevated and serum osmolality is low picture consistent with SIADH  4. Concern for possible urinary tract infection culture pending    Clinically improving continue IV fluid 100 cc/h  I am not concerned about a rapid increase in sodium likely her hyponatremia is acute, it looks IV fluids were decreased earlier this morning, sodium was a little lower  We will continue following sodium every 6 hours and repeat BMP in the morning  Fluid restrict to 1.5 L/day   Follow-up urine culture and continue Rocephin for now    Active comorbid medical conditions:    Hypertension on amlodipine    Hypothyroidism on levothyroxine    Hyperlipidemia on statin         DVT Prophylaxis: Pneumatic Compression Devices  Code Status:  Full Code    Disposition Plan     Expected discharge in 2  days to prior living arrangement once sodium above 125 for more than 12  hours.     Entered: Job Elena 01/15/2022, 3:46 PM                 Interval History:       Denies new symptoms denies nausea or vomiting  Today's plan detailed above discussed with nursing               Physical Exam:   Physical Exam   Temp: 97.8  F (36.6  C) Temp src: Oral BP: 105/67 Pulse: 79   Resp: 18 SpO2: 96 % O2 Device: None (Room air)    Vitals:    01/14/22 2010 01/15/22 0541   Weight: 68.4 kg (150 lb 11.2 oz) 67.9 kg (149 lb 12.8 oz)     I/O last 3 completed shifts:  In: 440 [P.O.:440]  Out: -           GENERAL:  Comfortable.   PSYCH: pleasant, oriented, No acute distress.  EYES: PERRLA, Normal conjunctiva.  HEART:  Normal S1, S2 with no edema.  LUNGS:  Clear to auscultation, normal Respiratory effort.  ABDOMEN:  Soft, no hepatosplenomegaly, normal bowel sounds.  SKIN:  Dry to touch, No rash.    Medications     sodium chloride 100 mL/hr at 01/15/22 1317       amLODIPine  5 mg Oral Daily     guaiFENesin  600 mg Oral Daily     levothyroxine  50 mcg Oral Daily     pantoprazole  20 mg Oral Daily     sodium chloride (PF)  3 mL Intracatheter Q8H     Data     -Data reviewed today:  I personally reviewed  all new labs and imaging results over the last 24 hours.    Recent Labs   Lab 01/15/22  0531 01/14/22  1521 01/14/22  1242   WBC 7.8 11.2* 11.5*   HGB 10.6* 11.8 12.1   HCT 30.8* 33.9* 36.4   MCV 96 94 101.7*    235 242     Recent Labs   Lab 01/15/22  1003 01/15/22  0531 01/15/22  0056 01/14/22  2043 01/14/22  1930 01/14/22  1521   * 120* 119* 117* 117* 115*   POTASSIUM  --  4.3  --  4.0 4.2 4.2   CHLORIDE  --  87*  --   --  85* 80*   CO2  --  23  --   --  22 23   ANIONGAP  --  10  --   --  10 12   GLC  --  87  --   --  106* 125*   BUN  --  23  --   --  30 33*   CR  --  0.70  --   --  0.66 0.76   GFRESTIMATED  --  86  --   --  87 78   ANANDA  --  7.8*  --   --  8.0* 9.0       Recent Results (from the past 24 hour(s))   XR Chest Port 1 View    Narrative    EXAM: XR CHEST PORT 1 VIEW  LOCATION: [facility]  DATE/TIME: 1/14/2022 6:18 PM    INDICATION: Cough.  COMPARISON: None.      Impression    IMPRESSION:     Small pleural effusions. Mild bibasilar opacities may be from atelectasis and adjacent pleural fluid. Difficult to completely exclude an infectious or inflammatory process.     Upper normal heart size. Mild pulmonary vascular redistribution and congestion. Aortic atherosclerosis.     Bony demineralization. Surgical change proximal left humerus.           This document was produced using voice recognition software

## 2022-01-15 NOTE — PROVIDER NOTIFICATION
DATE:  1/15/2022   TIME OF RECEIPT FROM LAB:  0124  LAB TEST:  Sodium   LAB VALUE:  119  RESULTS GIVEN WITH READ-BACK TO (PROVIDER): Hospitalist   TIME LAB VALUE REPORTED TO PROVIDER:   0127

## 2022-01-15 NOTE — PLAN OF CARE
Pertinent assessments: A&Ox4. Able to communicate needs. Critical sodium of 119. Paged MD. IVF held per order. Denies chest pain, SOB, N/V or diarrhea. Lung sounds diminished, intermittent cough. Bowel sounds active. No BM during shift. Second sodium level 120. Paged MD. Resume IVF at 50ml/hr    Major Shift Events: critical Na 120    Treatment Plan: monitor sodium level.     Bedside Nurse: Shavonne Teixeira RN

## 2022-01-15 NOTE — PROGRESS NOTES
Cross cover:    Paged regarding sodium of 119.  Approximately 26 hours ago her sodium was 112.5.  She is right at goal correction for severe hyponatremia.  -- Currently on normal saline at 100 cc/h, will hold this for the time being pending recheck  -- Recheck sodium in 4 hours with a.m. labs.  -- Amanda BRINK to revisit restarting fluids pending trend/recheck.    Ede Wiley MD     Update: Recheck sodium this morning 120.  We will restart normal saline at a reduced rate of 50 cc/h.  Amanda BRINK to follow-up later this morning.  Continue serial sodium checks.    Ede Wiley MD

## 2022-01-15 NOTE — PROVIDER NOTIFICATION
DATE:  1/14/2022   TIME OF RECEIPT FROM LAB:  2045  LAB TEST:  Sodium  LAB VALUE:  117  RESULTS GIVEN WITH READ-BACK TO (PROVIDER):  Admitting hospitalist  TIME LAB VALUE REPORTED TO PROVIDER:   2045

## 2022-01-15 NOTE — ED NOTES
[facility]  ED Nurse Handoff Report    Kimberley Mullins is a 81 year old female   ED Chief complaint: Abnormal Labs  . ED Diagnosis:   Final diagnoses:   Hyponatremia     Allergies:   Allergies   Allergen Reactions     No Known Allergies        Code Status: Full Code  Activity level - Baseline/Home:  Independent. Activity Level - Current:   Independent. Lift room needed: No. Bariatric: No   Needed: No   Isolation: Yes. Infection: Not Applicable  COVID r/o and special precautions.     Vital Signs:   Vitals:    01/14/22 1519 01/14/22 1900   BP: 121/83 120/80   Pulse: 95 90   Resp: 18    Temp: 98.1  F (36.7  C)    TempSrc: Oral    SpO2: 100% 96%       Cardiac Rhythm:  ,      Pain level:    Patient confused: No. Patient Falls Risk: Yes.   Elimination Status: Has voided   Patient Report - Initial Complaint: Abnormal Labs. Focused Assessment:  Pt c/o fatigue, cough, and some vomiting and diarrhea since 4 days ago. The patient also endorses a cough, some back pain, a decreased appetite, and generalized weakness. No fever, dysuria, or hematuria.  ABCs intact, A&OX4.  Tests Performed:   Labs Ordered and Resulted from Time of ED Arrival to Time of ED Departure   BASIC METABOLIC PANEL - Abnormal       Result Value    Sodium 115 (*)     Potassium 4.2      Chloride 80 (*)     Carbon Dioxide (CO2) 23      Anion Gap 12      Urea Nitrogen 33 (*)     Creatinine 0.76      Calcium 9.0      Glucose 125 (*)     GFR Estimate 78     CBC WITH PLATELETS AND DIFFERENTIAL - Abnormal    WBC Count 11.2 (*)     RBC Count 3.62 (*)     Hemoglobin 11.8      Hematocrit 33.9 (*)     MCV 94      MCH 32.6      MCHC 34.8      RDW 13.0      Platelet Count 235      % Neutrophils 77      % Lymphocytes 12      % Monocytes 10      % Eosinophils 0      % Basophils 0      % Immature Granulocytes 1      NRBCs per 100 WBC 0      Absolute Neutrophils 8.7 (*)     Absolute Lymphocytes 1.3      Absolute Monocytes 1.2      Absolute Eosinophils  0.0      Absolute Basophils 0.0      Absolute Immature Granulocytes 0.1      Absolute NRBCs 0.0     HEPATIC FUNCTION PANEL - Abnormal    Bilirubin Total 0.7      Bilirubin Direct 0.3 (*)     Protein Total 7.0      Albumin 3.5      Alkaline Phosphatase 68      AST 41      ALT 46     TSH WITH FREE T4 REFLEX - Abnormal    TSH 5.84 (*)    T4 FREE - Abnormal    Free T4 1.51 (*)    ROUTINE UA WITH MICROSCOPIC REFLEX TO CULTURE   SODIUM RANDOM URINE   OSMOLALITY, RANDOM URINE   OSMOLALITY   INFLUENZA A/B & SARS-COV2 PCR MULTIPLEX   BASIC METABOLIC PANEL     XR Chest Port 1 View   Final Result   IMPRESSION:       Small pleural effusions. Mild bibasilar opacities may be from atelectasis and adjacent pleural fluid. Difficult to completely exclude an infectious or inflammatory process.       Upper normal heart size. Mild pulmonary vascular redistribution and congestion. Aortic atherosclerosis.       Bony demineralization. Surgical change proximal left humerus.              Treatments provided: see MAR  Family Comments: Daughter would like to be kept updated  OBS brochure/video discussed/provided to patient:  Yes  ED Medications:   Medications   0.9% sodium chloride BOLUS (1,000 mLs Intravenous New Bag 1/14/22 1840)     Drips infusing:  No  For the majority of the shift, the patient's behavior Green. Interventions performed were none.    Sepsis treatment initiated: No     Patient tested for COVID 19 prior to admission: YES    ED Nurse Name/Phone Number: Reshma Pugh RN,   7:13 PM    RECEIVING UNIT ED HANDOFF REVIEW    Above ED Nurse Handoff Report was reviewed: Yes  Reviewed by: Ellen Canada RN on January 14, 2022 at 7:41 PM

## 2022-01-16 LAB
ANION GAP SERPL CALCULATED.3IONS-SCNC: 6 MMOL/L (ref 3–14)
BACTERIA UR CULT: NORMAL
BUN SERPL-MCNC: 15 MG/DL (ref 7–30)
CALCIUM SERPL-MCNC: 7.8 MG/DL (ref 8.5–10.1)
CHLORIDE BLD-SCNC: 95 MMOL/L (ref 94–109)
CO2 SERPL-SCNC: 24 MMOL/L (ref 20–32)
CREAT SERPL-MCNC: 0.68 MG/DL (ref 0.52–1.04)
GFR SERPL CREATININE-BSD FRML MDRD: 86 ML/MIN/1.73M2
GLUCOSE BLD-MCNC: 108 MG/DL (ref 70–99)
MAGNESIUM SERPL-MCNC: 2.4 MG/DL (ref 1.6–2.3)
POTASSIUM BLD-SCNC: 4 MMOL/L (ref 3.4–5.3)
SODIUM SERPL-SCNC: 125 MMOL/L (ref 133–144)
SODIUM SERPL-SCNC: 125 MMOL/L (ref 133–144)
SODIUM SERPL-SCNC: 128 MMOL/L (ref 133–144)
SODIUM SERPL-SCNC: 132 MMOL/L (ref 133–144)

## 2022-01-16 PROCEDURE — 99232 SBSQ HOSP IP/OBS MODERATE 35: CPT | Performed by: INTERNAL MEDICINE

## 2022-01-16 PROCEDURE — 83735 ASSAY OF MAGNESIUM: CPT | Performed by: INTERNAL MEDICINE

## 2022-01-16 PROCEDURE — 84295 ASSAY OF SERUM SODIUM: CPT | Performed by: INTERNAL MEDICINE

## 2022-01-16 PROCEDURE — 250N000013 HC RX MED GY IP 250 OP 250 PS 637: Performed by: INTERNAL MEDICINE

## 2022-01-16 PROCEDURE — 120N000001 HC R&B MED SURG/OB

## 2022-01-16 PROCEDURE — 258N000003 HC RX IP 258 OP 636: Performed by: INTERNAL MEDICINE

## 2022-01-16 PROCEDURE — 36415 COLL VENOUS BLD VENIPUNCTURE: CPT | Performed by: INTERNAL MEDICINE

## 2022-01-16 RX ADMIN — GUAIFENESIN 600 MG: 600 TABLET, EXTENDED RELEASE ORAL at 05:48

## 2022-01-16 RX ADMIN — SODIUM CHLORIDE 1000 ML: 9 INJECTION, SOLUTION INTRAVENOUS at 05:49

## 2022-01-16 RX ADMIN — LEVOTHYROXINE SODIUM 50 MCG: 50 TABLET ORAL at 08:21

## 2022-01-16 RX ADMIN — AMLODIPINE BESYLATE 5 MG: 5 TABLET ORAL at 08:20

## 2022-01-16 RX ADMIN — PANTOPRAZOLE SODIUM 20 MG: 20 TABLET, DELAYED RELEASE ORAL at 08:20

## 2022-01-16 ASSESSMENT — ACTIVITIES OF DAILY LIVING (ADL)
ADLS_ACUITY_SCORE: 5
ADLS_ACUITY_SCORE: 7
ADLS_ACUITY_SCORE: 7
ADLS_ACUITY_SCORE: 5
ADLS_ACUITY_SCORE: 7
ADLS_ACUITY_SCORE: 5
ADLS_ACUITY_SCORE: 7
ADLS_ACUITY_SCORE: 5
ADLS_ACUITY_SCORE: 7
ADLS_ACUITY_SCORE: 5

## 2022-01-16 ASSESSMENT — MIFFLIN-ST. JEOR: SCORE: 1127.22

## 2022-01-16 NOTE — PLAN OF CARE
Pertinent assessments:   A/O x4, forgetful. Denies pain. Heart and lung sounds WDl x murmur noted. Pt is aware and plans to follow up with PCP. IVF infusing. Up with SBA to void. 1500mL fluid restriction in effect. Rocephin for suspected UTI.     Major Shift Events: None, slept well.     Treatment Plan: monitor sodium level, Anbx UTI

## 2022-01-16 NOTE — PROGRESS NOTES
[facility]  Hospitalist Progress Note  Admit 1/14/2022  5:35 PM    Name: Kimberley Mullins    MRN: [id number]  Provider:  Job Elena MD, [facility]    Date of Service: 01/16/2022     Reason for Stay (Diagnosis): Severe hyponatremia         Summary of hospital stay & Assessment/Plan:   Summary of Stay: Kimberley Mullins is a 81 year old female who was admitted on 1/14/2022  81 year old  female with a significant past medical history of hypothyroidism,  diverticular disease and hypertension who presents with 4 days of nausea vomiting diarrhea, cough and generalized weakness. She was diagnosed with acute nonrecurrent maxillary sinusitis and labs were drawn.  Influenza swab was negative.  Basic metabolic panel revealed critically low sodium of 112 and patient was sent to emergency department for further evaluation.       Problem List:   1. Acute respiratory viral illness negative COVID and influenza  2. Nausea vomiting and diarrhea concerning for viral illness  3. Severe hyponatremia with sodium of 112.5, new to the patient, patient urine osmolarity is significantly elevated and serum osmolality is low picture consistent with SIADH  4. Concern for possible urinary tract infection culture culture showing urogenital christopher Rocephin discontinued    I would like to stop her IV fluid and continue fluid restriction at 1.2 L/day and see how she will autoregulate her sodium prior to discharge  With significant weakness physical therapy evaluation was requested  I am not concerned about a rapid increase in sodium likely her hyponatremia is acute, it looks IV fluids were decreased earlier this morning, sodium was a little lower  We will continue following sodium every 6 hours and repeat BMP in the morning      Active comorbid medical conditions:    Hypertension on amlodipine    Hypothyroidism on levothyroxine, free T4 is 1.51 should have follow-up with her physician    Hyperlipidemia on statin         DVT  Prophylaxis: Pneumatic Compression Devices  Code Status:  Full Code    Disposition Plan     Expected discharge in 1-2  days to prior living arrangement if sodium continues to improve and cleared by therapy     Entered: Job Elena 01/16/2022, 11:13 AM                 Interval History:       Although improving however feels still weak  Today's plan detailed above discussed with nursing               Physical Exam:   Physical Exam   Temp: 97.6  F (36.4  C) Temp src: Oral BP: 126/81 Pulse: 81   Resp: 18 SpO2: 98 % O2 Device: None (Room air)    Vitals:    01/14/22 2010 01/15/22 0541 01/16/22 0526   Weight: 68.4 kg (150 lb 11.2 oz) 67.9 kg (149 lb 12.8 oz) 69.8 kg (153 lb 14.4 oz)     I/O last 3 completed shifts:  In: 1058 [P.O.:1058]  Out: -       GENERAL:  Comfortable.   PSYCH: pleasant, oriented, No acute distress.  EYES: PERRLA, Normal conjunctiva.  HEART:  Normal S1, S2 with no edema.  LUNGS:  Clear to auscultation, normal Respiratory effort.  ABDOMEN:  Soft, no hepatosplenomegaly, normal bowel sounds.  SKIN:  Dry to touch, No rash.    Medications       amLODIPine  5 mg Oral Daily     guaiFENesin  600 mg Oral Daily     levothyroxine  50 mcg Oral Daily     pantoprazole  20 mg Oral Daily     sodium chloride (PF)  3 mL Intracatheter Q8H     Data     -Data reviewed today:  I personally reviewed  all new labs and imaging results over the last 24 hours.    Recent Labs   Lab 01/15/22  0531 01/14/22  1521 01/14/22  1242   WBC 7.8 11.2* 11.5*   HGB 10.6* 11.8 12.1   HCT 30.8* 33.9* 36.4   MCV 96 94 101.7*    235 242     Recent Labs   Lab 01/16/22  0636 01/15/22  2327 01/15/22  1748 01/15/22  1003 01/15/22  0531 01/15/22  0056 01/14/22  2043 01/14/22  1930   *  125* 123* 120*   < > 120*   < > 117* 117*   POTASSIUM 4.0  --   --   --  4.3  --  4.0 4.2   CHLORIDE 95  --   --   --  87*  --   --  85*   CO2 24  --   --   --  23  --   --  22   ANIONGAP 6  --   --   --  10  --   --  10   *  --   --   --   87  --   --  106*   BUN 15  --   --   --  23  --   --  30   CR 0.68  --   --   --  0.70  --   --  0.66   GFRESTIMATED 86  --   --   --  86  --   --  87   ANANDA 7.8*  --   --   --  7.8*  --   --  8.0*    < > = values in this interval not displayed.       No results found for this or any previous visit (from the past 24 hour(s)).    This document was produced using voice recognition software

## 2022-01-17 VITALS
DIASTOLIC BLOOD PRESSURE: 77 MMHG | RESPIRATION RATE: 18 BRPM | WEIGHT: 153.4 LBS | HEIGHT: 63 IN | HEART RATE: 85 BPM | BODY MASS INDEX: 27.18 KG/M2 | SYSTOLIC BLOOD PRESSURE: 122 MMHG | TEMPERATURE: 97.8 F | OXYGEN SATURATION: 96 %

## 2022-01-17 LAB
ANION GAP SERPL CALCULATED.3IONS-SCNC: 7 MMOL/L (ref 3–14)
BUN SERPL-MCNC: 14 MG/DL (ref 7–30)
CALCIUM SERPL-MCNC: 8.2 MG/DL (ref 8.5–10.1)
CHLORIDE BLD-SCNC: 102 MMOL/L (ref 94–109)
CO2 SERPL-SCNC: 23 MMOL/L (ref 20–32)
CREAT SERPL-MCNC: 0.72 MG/DL (ref 0.52–1.04)
GFR SERPL CREATININE-BSD FRML MDRD: 83 ML/MIN/1.73M2
GLUCOSE BLD-MCNC: 97 MG/DL (ref 70–99)
MAGNESIUM SERPL-MCNC: 2.5 MG/DL (ref 1.6–2.3)
POTASSIUM BLD-SCNC: 3.4 MMOL/L (ref 3.4–5.3)
SODIUM SERPL-SCNC: 132 MMOL/L (ref 133–144)
SODIUM SERPL-SCNC: 132 MMOL/L (ref 133–144)

## 2022-01-17 PROCEDURE — 83735 ASSAY OF MAGNESIUM: CPT | Performed by: INTERNAL MEDICINE

## 2022-01-17 PROCEDURE — 250N000013 HC RX MED GY IP 250 OP 250 PS 637: Performed by: INTERNAL MEDICINE

## 2022-01-17 PROCEDURE — 84295 ASSAY OF SERUM SODIUM: CPT | Performed by: INTERNAL MEDICINE

## 2022-01-17 PROCEDURE — 36415 COLL VENOUS BLD VENIPUNCTURE: CPT | Performed by: INTERNAL MEDICINE

## 2022-01-17 PROCEDURE — 999N000147 HC STATISTIC PT IP EVAL DEFER: Performed by: PHYSICAL THERAPIST

## 2022-01-17 PROCEDURE — 99239 HOSP IP/OBS DSCHRG MGMT >30: CPT | Performed by: INTERNAL MEDICINE

## 2022-01-17 RX ORDER — POTASSIUM CHLORIDE 1500 MG/1
40 TABLET, EXTENDED RELEASE ORAL ONCE
Status: COMPLETED | OUTPATIENT
Start: 2022-01-17 | End: 2022-01-17

## 2022-01-17 RX ADMIN — AMLODIPINE BESYLATE 5 MG: 5 TABLET ORAL at 08:24

## 2022-01-17 RX ADMIN — POTASSIUM CHLORIDE 40 MEQ: 1500 TABLET, EXTENDED RELEASE ORAL at 10:14

## 2022-01-17 RX ADMIN — PANTOPRAZOLE SODIUM 20 MG: 20 TABLET, DELAYED RELEASE ORAL at 08:24

## 2022-01-17 RX ADMIN — GUAIFENESIN 600 MG: 600 TABLET, EXTENDED RELEASE ORAL at 08:24

## 2022-01-17 RX ADMIN — LEVOTHYROXINE SODIUM 50 MCG: 50 TABLET ORAL at 07:30

## 2022-01-17 ASSESSMENT — ACTIVITIES OF DAILY LIVING (ADL)
ADLS_ACUITY_SCORE: 7

## 2022-01-17 ASSESSMENT — MIFFLIN-ST. JEOR: SCORE: 1124.95

## 2022-01-17 NOTE — PLAN OF CARE
Reviewed discharge instructions and medications with patient and son. Questions answered. Patient discharged to home with son, discharge instructions, and belongings at this time.    VSS AM of discharge.     Per discharging provider, NO fluid restriction needed at discharge.

## 2022-01-17 NOTE — PLAN OF CARE
Physical Therapy: Orders received. Chart reviewed and discussed with patient and family.? Physical Therapy not indicated due to patient mobilizing adequately for discharge this date, and has support of family PRN upon discharge.? Defer discharge recommendations to medical team.? Will complete orders.

## 2022-01-17 NOTE — PROGRESS NOTES
End of Shift Summary  For vital signs and complete assessments, please see documentation flowsheets.     Pertinent assessments: A&O x4. VSS. SBA w/ transfers. LS fine crackles noted. BS active, BM today. IV fluid discontinued to see if sodium labs continue to improve. Q8 hour sodium labs. Poor appetite. No pain noted. 1200 fluid restriction. Sodium trending up.     Major Shift Events: none     Treatment Plan: continue w/ monitoring sodium labs, encouraging ambulation, and encouraging eating food.     Bedside Nurse: Barbara Colunga RN

## 2022-01-17 NOTE — PLAN OF CARE
Pertinent assessments:   A&O x4. VSS. SBA w/ transfers. LLL fine crackles noted. Q8 Na- most recent 132. Poor appetite. No pain noted. 1200 fluid restriction. Sodium trending up.     Major Shift Events: none     Treatment Plan: Monitor Na, encourage ambulation & PO intake. Anticipate home today if Na maintains WDL.

## 2022-01-19 ENCOUNTER — CARE COORDINATION (OUTPATIENT)
Dept: FAMILY MEDICINE | Facility: CLINIC | Age: 82
End: 2022-01-19

## 2022-01-19 ENCOUNTER — OFFICE VISIT (OUTPATIENT)
Dept: FAMILY MEDICINE | Facility: CLINIC | Age: 82
End: 2022-01-19

## 2022-01-19 VITALS
TEMPERATURE: 97.7 F | DIASTOLIC BLOOD PRESSURE: 78 MMHG | WEIGHT: 157 LBS | HEART RATE: 102 BPM | HEIGHT: 63 IN | OXYGEN SATURATION: 99 % | SYSTOLIC BLOOD PRESSURE: 120 MMHG | BODY MASS INDEX: 27.82 KG/M2

## 2022-01-19 DIAGNOSIS — Z09 HOSPITAL DISCHARGE FOLLOW-UP: Primary | ICD-10-CM

## 2022-01-19 DIAGNOSIS — E87.1 HYPONATREMIA: ICD-10-CM

## 2022-01-19 DIAGNOSIS — I10 BENIGN ESSENTIAL HYPERTENSION: ICD-10-CM

## 2022-01-19 LAB
BUN SERPL-MCNC: 13 MG/DL (ref 7–25)
BUN/CREATININE RATIO: 17.8 (ref 6–22)
CALCIUM SERPL-MCNC: 8.7 MG/DL (ref 8.6–10.3)
CHLORIDE SERPLBLD-SCNC: 100.4 MMOL/L (ref 98–110)
CO2 SERPL-SCNC: 25.6 MMOL/L (ref 20–32)
CREAT SERPL-MCNC: 0.73 MG/DL (ref 0.6–1.3)
GLUCOSE SERPL-MCNC: 112 MG/DL (ref 60–99)
POTASSIUM SERPL-SCNC: 4.75 MMOL/L (ref 3.5–5.3)
SODIUM SERPL-SCNC: 130.9 MMOL/L (ref 135–146)

## 2022-01-19 PROCEDURE — 99496 TRANSJ CARE MGMT HIGH F2F 7D: CPT | Performed by: PHYSICIAN ASSISTANT

## 2022-01-19 PROCEDURE — 80048 BASIC METABOLIC PNL TOTAL CA: CPT | Performed by: PHYSICIAN ASSISTANT

## 2022-01-19 PROCEDURE — 36415 COLL VENOUS BLD VENIPUNCTURE: CPT | Performed by: PHYSICIAN ASSISTANT

## 2022-01-19 RX ORDER — AMLODIPINE BESYLATE 5 MG/1
TABLET ORAL
Qty: 90 TABLET | Refills: 0 | Status: ON HOLD | OUTPATIENT
Start: 2022-01-19 | End: 2022-02-01

## 2022-01-19 ASSESSMENT — MIFFLIN-ST. JEOR: SCORE: 1141.28

## 2022-01-19 NOTE — PROGRESS NOTES
"  Assessment & Plan     Hospital discharge follow-up  Hyponatremia  Kimberley's condition seems to be stable. Ankle edema is minimal, but did recommend elevate, could consider compression stockings. Will check BMP to ensure sodium level I maintaining- was 132 at discharge. Will likely recheck again in 1 week, and if seeing any downward trend will want to refer to nephrology.   - VENOUS COLLECTION  - Basic Metabolic Panel (BFP)    Benign essential hypertension  Refilled as she is running low.   - amLODIPine (NORVASC) 5 MG tablet; TAKE 1 TABLET (5 MG) BY MOUTH DAILY    30 minutes spent on the date of the encounter doing chart review, review of outside records, review of test results, interpretation of tests, patient visit and documentation        BMI:   Estimated body mass index is 27.81 kg/m  as calculated from the following:    Height as of this encounter: 1.6 m (5' 3\").    Weight as of this encounter: 71.2 kg (157 lb).   Weight management plan: Discussed healthy diet and exercise guidelines    CONSULTATION/REFERRAL to nephrology if sodium decreasing  FUTURE APPOINTMENTS:       - Follow-up visit in 1 week    No follow-ups on file.    Renetta Rizvi PA-C  [facility]    Subjective   Kimberley is a 81 year old who presents for the following health issues  accompanied by her son.    HPI   Discharged from hospital yesterday. Was told to follow-up with PCP within 1 week. Since discharge, has been stable, nausea improved, but still having some fatigue, and some more noticeable fatigue especially with exertion. 2 hours ago did notice some swelling developing in ankles.     Hospital Follow-up Visit:    Hospital/Nursing Home/IP Rehab Facility: [facility]  Date of Admission: 1/14/2022  Date of Discharge: 1/18/2022  Reason(s) for Admission: Hyponatremia, slight ankle swelling today      Was your hospitalization related to COVID-19? No   Problems taking medications regularly:  " "None  Medication changes since discharge: None  Problems adhering to non-medication therapy:  None    Summary of hospitalization:  Discharge summary unavailable  Diagnostic Tests/Treatments reviewed.  Follow up needed: Based on labs  Other Healthcare Providers Involved in Patient s Care:         None- Discharge summary not yet available, but no evidence of nephrology referral. Thought to be acute from viral URI.   Update since discharge: improved    Post Discharge Medication Reconciliation: discharge medications reconciled, continue medications without change.  Plan of care communicated with patient and family          Review of Systems   Constitutional, HEENT, cardiovascular, pulmonary, GI, , musculoskeletal, neuro, skin, endocrine and psych systems are negative, except as otherwise noted.      Objective    /78 (BP Location: Right arm, Patient Position: Sitting, Cuff Size: Adult Large)   Pulse 102   Temp 97.7  F (36.5  C) (Temporal)   Ht 1.6 m (5' 3\")   Wt 71.2 kg (157 lb)   LMP  (LMP Unknown)   SpO2 99%   BMI 27.81 kg/m    Body mass index is 27.81 kg/m .  Physical Exam   GENERAL: healthy, alert and no distress  NECK: no adenopathy, no asymmetry, masses, or scars and thyroid normal to palpation  RESP: lungs clear to auscultation - no rales, rhonchi or wheezes  CV: regular rate and rhythm, normal S1 S2, no S3 or S4, no murmur, click or rub, no peripheral edema and peripheral pulses strong  MS: no gross musculoskeletal defects noted, mild ankle edema bilaterally, pitted 1+ in left ankle.     Results for orders placed or performed in visit on 01/19/22   Basic Metabolic Panel (BFP)     Status: Abnormal   Result Value Ref Range    Carbon Dioxide 25.6 20 - 32 mmol/L    Creatinine 0.73 0.60 - 1.30 mg/dL    Glucose 112 (A) 60 - 99 mg/dL    Sodium 130.9 (A) 135 - 146 mmol/L    Potassium 4.75 3.5 - 5.3 mmol/L    Chloride 100.4 98 - 110 mmol/L    Urea Nitrogen 13 7 - 25 mg/dL    Calcium 8.7 8.6 - 10.3 mg/dL    " BUN/Creatinine Ratio 17.8 6 - 22

## 2022-01-19 NOTE — PROGRESS NOTES
Care Coordination Initial Assessment    The patient was admitted into [facility] on 1/14/22 due to hyponatremia. She was discharged on 1/17/22 with instructions to follow up with her PCP within 5 days.    PCP: Renetta Rizvi    Referral Source:  ED/IP List    Utilization:   Hospital Admits in last year: 1  Last PCP Appt.: 1/14/22  Specialist: Seeing dermatology and radiology   Upcoming Appt.: Has appt with Renetta for follow up on 1/21/22    Health Maintenance Reviewed: Yes    Current Medical Health Concerns:   Please review patients current medical problem list    Patient/Caregiver Understanding: Yes    Medication Management:   Patient has understanding of regimen and is adherent:  Yes    Functional Status:   Independent with all ADL/IADL's-patient doing very well fro her age and is independent with help from her daughter most of the time or friends.    Current Behavioral Health Concerns:   No current behavioral concerns at this time.    Patient/Caregiver Understanding:  yes    Psychosocial:  Support System Patient/Caregivers: has a good support system with her daughter and her friends that are able to help her when needed  Living Situation:  Living at home and doing well with this-she is   Safety: no safety concerns-patient states that she is still able to move around just fine by herself and does not have any concerns for risk of falls  Decision Making/Healthcare Directives  yes    Gaps:    Health Maintenance: Due for medicare wellness visit-I did inform the patient of this and she is not interested in this at this time   Medication: Doing well with understanding her medications and how to take them-no concerns  Functional: Doing well-no concerns  Behavioral Health: Doing well-no concerns with this     Resources Given:    No resources were needed at this time by the patient.    Plan:   The patient is doing well overall but is slightly anxious about making sure her sodium stays at a  good level. She was scheduled for a hospital follow up on 1/21/22 with Renetta where labs will be repeated. The patient had no further questions or concerns at this time.

## 2022-01-20 ENCOUNTER — TELEPHONE (OUTPATIENT)
Dept: FAMILY MEDICINE | Facility: CLINIC | Age: 82
End: 2022-01-20

## 2022-01-20 NOTE — TELEPHONE ENCOUNTER
Patient called in and is requesting a call back from Renetta today if possible to review her blood work results that were done yesterday. She is still worried about her sodium and wants to review this more with Renetta over the phone.     Patient can be reached at [phone]

## 2022-01-20 NOTE — TELEPHONE ENCOUNTER
Called and spoke to pt and called daughter Hilary as well. Ankle swelling improved overnight. Informed of stable sodium since discharge- only 1 point down. Continue rest, getting adequate fluids, but not much >64 oz daily. Okay to do Protein drinks/electrolyte water. Contact me if worsening, otherwise continue to monitor. Does have mildly sore back- can try heat pack, gentle stretching.    Asked them to return next week for repeat labs, review. Contact me sooner if concerns.

## 2022-01-23 NOTE — DISCHARGE SUMMARY
Physician Discharge Summary           [facility]    Name: Kimberley Mullins    MRN: [id number]     YOB: 1940    Age: 81 year old                                                     Primary care provider: Renetta Rizvi    Admit date:  1/14/2022    Discharge date and time: 1/17/2022 11:31 AM    Discharge Physician: Andrade Goodwin M.D., M.B.A.       Primary Discharge Diagnosis      Severe hyponatremia  Acute respiratory viral illness negative COVID and influenza  Nausea vomiting and diarrhea concerning for viral illness  Concern for possible urinary tract infection culture culture showing urogenital christopher Rocephin discontinued       Secondary Diagnosis /chronic medical conditions     Past Medical History:   Diagnosis Date     Arthritis      Diverticulitis of colon (without mention of hemorrhage)(562.11) 1997    diverticulosis noted on Flex Sig     Irritable bowel syndrome 1959     Osteopenia, unspecified location 4/5/2018     Other ovarian failure 1986     Overweight (BMI 25.0-29.9) 4/5/2018     Personal history of colonic polyps 2004     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     Thyroid disease      Transient global amnesia      Unspecified tinnitus     Tinnitus     Past Surgical History:  Past Surgical History:   Procedure Laterality Date     ARTHROPLASTY KNEE  10/25/2011    Procedure:ARTHROPLASTY KNEE; Right Total Knee Arthroplasty   ; Surgeon:BRYCE ATWOOD; Location:[facility] OR     ARTHROPLASTY KNEE  6/2/2014    Procedure: ARTHROPLASTY KNEE;  Surgeon: Bryce Atwood MD;  Location: [facility] OR     HC DILATION/CURETTAGE DIAG/THER NON OB  2006    D & C     HC KNEE SCOPE, DIAGNOSTIC      Arthroscopy, Knee     ZZC AFF FOREARM/WRIST SURGERY UNLISTED      orif radius fx distal left     ZZC APPENDECTOMY  1955     ZZ OPEN JESSI FIXATN HUMERAL SHAFT FX  12/2007    Dr Massey     ZZ COLONOSCOPY W SNARE REMOVAL TUMOR/POLYP/LESION  9/03 &  3/2011    polyp removal during screening colonoscopy           Brief Summary of Hospital stay :       Please refer to  Admission H&P note for full details of patient presentation.    Reason for Hospitalization(C/C,HPI and brief patient summary):nausea vomiting diarrhea, cough and generalized weakness.      Significant findings(Primary diagnosis )Procedures and treatments provided(Hospital course ,consults, procedures):Please see below for details    Kimberley Mullins is a 81 year old  female with a significant past medical history of hypothyroidism,  diverticular disease and hypertension who presents with 4 days of nausea vomiting diarrhea, cough and generalized weakness.     She was was seen at the [facility] physicians clinic for cough and fatigue morning of presentation.  She was diagnosed with acute nonrecurrent maxillary sinusitis and labs were drawn.  Influenza swab was negative.  Basic metabolic panel revealed critically low sodium of 112 and patient was sent to emergency department for further evaluation.     Here in the emergency department her initial vital signs were stable.  Basic metabolic panel revealed sodium of 115, potassium of 4.2, chloride of 80, bicarb of 23, BUN of 33 and creatinine of 0.76.  Complete blood count was evident for WBC count of 11.5, normal hemoglobin and platelet count.     Hospitalist admission was requested and patient was admitted to our service.        Problem list (medical problems addressed during hospital stay):    1. Acute respiratory viral illness negative COVID and influenza-improved  2. Nausea vomiting and diarrhea concerning for viral illness-improved with supportive care  3. Severe hyponatremia -likely hypovolemic hyponatremia due to dehydration from #2.  Patient's sodium improved to 132 with saline hydration     4. Concern for possible urinary tract infection culture culture showing urogenital christopher Rocephin discontinued           Consultations during  "hospital stay:       PHYSICAL THERAPY ADULT IP CONSULT      Patient discharge Condition:     stable    /77 (BP Location: Left arm)   Pulse 85   Temp 97.8  F (36.6  C) (Oral)   Resp 18   Ht 1.6 m (5' 3\")   Wt 69.6 kg (153 lb 6.4 oz)   LMP  (LMP Unknown)   SpO2 96%   BMI 27.17 kg/m         Discharge Instructions:       Patient/family instructions: Written discharge instruction given to patient/family    Discharge Medications:       Review of your medicines      CONTINUE these medicines which have NOT CHANGED      Dose / Directions   levothyroxine 50 MCG tablet  Commonly known as: SYNTHROID/LEVOTHROID  Used for: Acquired hypothyroidism      Dose: 50 mcg  Take 1 tablet (50 mcg) by mouth daily  Quantity: 90 tablet  Refills: 3     ondansetron 4 MG ODT tab  Commonly known as: ZOFRAN-ODT  Used for: Nausea      Dose: 4 mg  Take 1 tablet (4 mg) by mouth every 8 hours as needed for nausea  Quantity: 15 tablet  Refills: 0     simvastatin 20 MG tablet  Commonly known as: ZOCOR  Used for: Pure hypercholesterolemia      TAKE 1 TABLET (20 MG) BY MOUTH AT BEDTIME  Quantity: 90 tablet  Refills: 3        STOP taking    azithromycin 250 MG tablet  Commonly known as: ZITHROMAX                Discharge diet:Orders Placed This Encounter      Diet    regular diet      Discharge activity:Activity as tolerated      Discharge follow-up:    Follow up with primary care provider in 7 days or earlier if symptoms return or gets worse.          Other instructions:    We discussed with patient/family about detail discharge instructions as well as discharge medications above including potential risks,side effects and benefits.Patient/family understood benefits and potential serious side effects of taking these medications and need to follow up with PCP if the patient develops complications.  Patient is also advised to see a doctor immediately for severe symptoms.        Major procedure performed/  Significant Diagnostic Studies:        "     Results for orders placed or performed during the hospital encounter of 01/14/22   XR Chest Port 1 View    Narrative    EXAM: XR CHEST PORT 1 VIEW  LOCATION: [facility]  DATE/TIME: 1/14/2022 6:18 PM    INDICATION: Cough.  COMPARISON: None.      Impression    IMPRESSION:     Small pleural effusions. Mild bibasilar opacities may be from atelectasis and adjacent pleural fluid. Difficult to completely exclude an infectious or inflammatory process.     Upper normal heart size. Mild pulmonary vascular redistribution and congestion. Aortic atherosclerosis.     Bony demineralization. Surgical change proximal left humerus.           No results for input(s): WBC, HGB, HCT, MCV, PLT in the last 168 hours.  No results for input(s): CULT in the last 168 hours.  Recent Labs   Lab 01/19/22  1613 01/17/22  0634 01/16/22 2129   .9* 132*  132* 132*   POTASSIUM 4.75 3.4  --    CHLORIDE 100.4 102  --    CO2 25.6 23  --    ANIONGAP  --  7  --    * 97  --    BUN 13  17.8 14  --    CR 0.73 0.72  --    GFRESTIMATED  --  83  --    ANANDA 8.7 8.2*  --    MAG  --  2.5*  --        Recent Labs   Lab 01/19/22  1613 01/17/22  0634   * 97       Pending Results:       Unresulted Labs Ordered in the Past 30 Days of this Admission     No orders found from 12/15/2021 to 1/15/2022.             Patient Allergies:       Allergies   Allergen Reactions     No Known Allergies          Disposition:     Disposition: home      I saw and evaluated the patient on day of discharge and  discharge instructions reviewed  and  all the patient's questions and concerns addressed. Over 30 minutes spent on discharge and coordination of discharge process for this patient.      Disclaimer: This note consists of symbols derived from keyboarding, dictation and/or voice recognition software. As a result, there may be errors in the script that have gone undetected. Please consider this when interpreting information found in this  chart

## 2022-01-24 ENCOUNTER — HOSPITAL ENCOUNTER (EMERGENCY)
Facility: CLINIC | Age: 82
Discharge: ANOTHER HOSPITAL | End: 2022-01-24
Attending: EMERGENCY MEDICINE | Admitting: EMERGENCY MEDICINE
Payer: COMMERCIAL

## 2022-01-24 ENCOUNTER — OFFICE VISIT (OUTPATIENT)
Dept: FAMILY MEDICINE | Facility: CLINIC | Age: 82
End: 2022-01-24

## 2022-01-24 ENCOUNTER — APPOINTMENT (OUTPATIENT)
Dept: GENERAL RADIOLOGY | Facility: CLINIC | Age: 82
End: 2022-01-24
Attending: EMERGENCY MEDICINE
Payer: COMMERCIAL

## 2022-01-24 ENCOUNTER — APPOINTMENT (OUTPATIENT)
Dept: CARDIOLOGY | Facility: CLINIC | Age: 82
End: 2022-01-24
Attending: EMERGENCY MEDICINE
Payer: COMMERCIAL

## 2022-01-24 ENCOUNTER — HOSPITAL ENCOUNTER (INPATIENT)
Facility: CLINIC | Age: 82
LOS: 17 days | Discharge: HOME HEALTH | DRG: 228 | End: 2022-02-10
Attending: INTERNAL MEDICINE | Admitting: INTERNAL MEDICINE
Payer: COMMERCIAL

## 2022-01-24 ENCOUNTER — APPOINTMENT (OUTPATIENT)
Dept: CT IMAGING | Facility: CLINIC | Age: 82
End: 2022-01-24
Attending: EMERGENCY MEDICINE
Payer: COMMERCIAL

## 2022-01-24 VITALS
HEIGHT: 63 IN | OXYGEN SATURATION: 97 % | DIASTOLIC BLOOD PRESSURE: 72 MMHG | BODY MASS INDEX: 28.21 KG/M2 | HEART RATE: 105 BPM | SYSTOLIC BLOOD PRESSURE: 114 MMHG | WEIGHT: 159.2 LBS | TEMPERATURE: 97.6 F

## 2022-01-24 VITALS
HEART RATE: 95 BPM | OXYGEN SATURATION: 92 % | SYSTOLIC BLOOD PRESSURE: 123 MMHG | RESPIRATION RATE: 27 BRPM | DIASTOLIC BLOOD PRESSURE: 87 MMHG | TEMPERATURE: 97.9 F

## 2022-01-24 DIAGNOSIS — I50.21 ACUTE SYSTOLIC HEART FAILURE (H): ICD-10-CM

## 2022-01-24 DIAGNOSIS — Z87.74 S/P VENTRICULAR SEPTAL DEFECT REPAIR: Primary | ICD-10-CM

## 2022-01-24 DIAGNOSIS — R01.1 HEART MURMUR: ICD-10-CM

## 2022-01-24 DIAGNOSIS — Q21.0 VENTRICULAR SEPTAL DEFECT: ICD-10-CM

## 2022-01-24 DIAGNOSIS — Q21.0 VSD (VENTRICULAR SEPTAL DEFECT): ICD-10-CM

## 2022-01-24 DIAGNOSIS — F41.1 GAD (GENERALIZED ANXIETY DISORDER): ICD-10-CM

## 2022-01-24 DIAGNOSIS — I21.4 NSTEMI (NON-ST ELEVATED MYOCARDIAL INFARCTION) (H): ICD-10-CM

## 2022-01-24 DIAGNOSIS — E87.1 HYPONATREMIA: Primary | ICD-10-CM

## 2022-01-24 DIAGNOSIS — R60.9 FLUID RETENTION: ICD-10-CM

## 2022-01-24 DIAGNOSIS — R06.02 SHORTNESS OF BREATH: ICD-10-CM

## 2022-01-24 DIAGNOSIS — I26.93 SINGLE SUBSEGMENTAL PULMONARY EMBOLISM WITHOUT ACUTE COR PULMONALE (H): ICD-10-CM

## 2022-01-24 DIAGNOSIS — R53.83 OTHER FATIGUE: ICD-10-CM

## 2022-01-24 LAB
% GRANULOCYTES: 64.8 %
ABO/RH(D): NORMAL
ALBUMIN SERPL-MCNC: 3.3 G/DL (ref 3.4–5)
ALP SERPL-CCNC: 60 U/L (ref 40–150)
ALT SERPL W P-5'-P-CCNC: 49 U/L (ref 0–50)
ANION GAP SERPL CALCULATED.3IONS-SCNC: 8 MMOL/L (ref 3–14)
ANTIBODY SCREEN: NEGATIVE
APPEARANCE UR: ABNORMAL
APTT PPP: 34 SECONDS (ref 22–38)
AST SERPL W P-5'-P-CCNC: 25 U/L (ref 0–45)
BACTERIA, UR: ABNORMAL
BASOPHILS # BLD AUTO: 0 10E3/UL (ref 0–0.2)
BASOPHILS NFR BLD AUTO: 0 %
BILIRUB SERPL-MCNC: 0.6 MG/DL (ref 0.2–1.3)
BILIRUB UR QL: ABNORMAL
BUN SERPL-MCNC: 11 MG/DL (ref 7–30)
CALCIUM SERPL-MCNC: 8.6 MG/DL (ref 8.5–10.1)
CASTS/LPF: ABNORMAL
CHLORIDE BLD-SCNC: 97 MMOL/L (ref 94–109)
CO2 SERPL-SCNC: 26 MMOL/L (ref 20–32)
COLOR UR: YELLOW
CREAT SERPL-MCNC: 0.71 MG/DL (ref 0.52–1.04)
D DIMER PPP FEU-MCNC: 2.13 UG/ML FEU (ref 0–0.5)
EOSINOPHIL # BLD AUTO: 0 10E3/UL (ref 0–0.7)
EOSINOPHIL NFR BLD AUTO: 0 %
EP/HPF: ABNORMAL
ERYTHROCYTE [DISTWIDTH] IN BLOOD BY AUTOMATED COUNT: 15.6 % (ref 10–15)
ERYTHROCYTE [DISTWIDTH] IN BLOOD BY AUTOMATED COUNT: 15.6 % (ref 10–15)
FLUAV RNA SPEC QL NAA+PROBE: NEGATIVE
FLUBV RNA RESP QL NAA+PROBE: NEGATIVE
GFR SERPL CREATININE-BSD FRML MDRD: 84 ML/MIN/1.73M2
GLUCOSE BLD-MCNC: 109 MG/DL (ref 70–99)
GLUCOSE URINE: ABNORMAL MG/DL
HCT VFR BLD AUTO: 35.1 % (ref 35–47)
HCT VFR BLD AUTO: 35.2 % (ref 35–47)
HCT VFR BLD AUTO: 35.3 % (ref 35–47)
HEMOGLOBIN: 11.7 G/DL (ref 11.7–15.7)
HGB BLD-MCNC: 11.6 G/DL (ref 11.7–15.7)
HGB BLD-MCNC: 11.7 G/DL (ref 11.7–15.7)
HGB UR QL: ABNORMAL
IMM GRANULOCYTES # BLD: 0 10E3/UL
IMM GRANULOCYTES NFR BLD: 1 %
INR PPP: 1.08 (ref 0.85–1.15)
INR PPP: 1.09 (ref 0.85–1.15)
KETONES UR QL: ABNORMAL MG/DL
LVEF ECHO: NORMAL
LYMPHOCYTES # BLD AUTO: 0.9 10E3/UL (ref 0.8–5.3)
LYMPHOCYTES NFR BLD AUTO: 10 %
LYMPHOCYTES NFR BLD AUTO: 23 %
MCH RBC QN AUTO: 33 PG (ref 26.5–33)
MCH RBC QN AUTO: 33.2 PG (ref 26.5–33)
MCH RBC QN AUTO: 33.6 PG (ref 26–33)
MCHC RBC AUTO-ENTMCNC: 33 G/DL (ref 31.5–36.5)
MCHC RBC AUTO-ENTMCNC: 33.1 G/DL (ref 31–36)
MCHC RBC AUTO-ENTMCNC: 33.2 G/DL (ref 31.5–36.5)
MCV RBC AUTO: 101 FL (ref 78–100)
MCV RBC AUTO: 101.5 FL (ref 78–100)
MCV RBC AUTO: 99 FL (ref 78–100)
MISC.: ABNORMAL
MONOCYTES # BLD AUTO: 0.9 10E3/UL (ref 0–1.3)
MONOCYTES NFR BLD AUTO: 10 %
MONOCYTES NFR BLD AUTO: 11.2 %
NEUTROPHILS # BLD AUTO: 6.6 10E3/UL (ref 1.6–8.3)
NEUTROPHILS NFR BLD AUTO: 79 %
NITRITE UR QL STRIP: ABNORMAL
NRBC # BLD AUTO: 0 10E3/UL
NRBC BLD AUTO-RTO: 0 /100
NT-PROBNP SERPL-MCNC: ABNORMAL PG/ML (ref 0–1800)
PH UR STRIP: 6.5 PH (ref 5–7)
PLATELET # BLD AUTO: 214 10E3/UL (ref 150–450)
PLATELET # BLD AUTO: 220 10E3/UL (ref 150–450)
PLATELET COUNT - QUEST: 265 10^9/L (ref 150–375)
POTASSIUM BLD-SCNC: 4.5 MMOL/L (ref 3.4–5.3)
PROT SERPL-MCNC: 6.5 G/DL (ref 6.8–8.8)
PROT UR QL: 100 MG/DL
RBC # BLD AUTO: 3.48 10*12/L (ref 3.8–5.2)
RBC # BLD AUTO: 3.49 10E6/UL (ref 3.8–5.2)
RBC # BLD AUTO: 3.55 10E6/UL (ref 3.8–5.2)
RBC, UR MICRO: ABNORMAL (ref ?–2)
SARS-COV-2 RNA RESP QL NAA+PROBE: NEGATIVE
SODIUM SERPL-SCNC: 131 MMOL/L (ref 133–144)
SP GR UR STRIP: ABNORMAL (ref 1–1.03)
SPECIMEN EXPIRATION DATE: NORMAL
TROPONIN I SERPL HS-MCNC: 73 NG/L
TROPONIN I SERPL HS-MCNC: 77 NG/L
UFH PPP CHRO-ACNC: 0.78 IU/ML
UROBILINOGEN UR QL STRIP: 0.2 EU/DL (ref 0.2–1)
WBC # BLD AUTO: 8.4 10E3/UL (ref 4–11)
WBC # BLD AUTO: 9.3 10E3/UL (ref 4–11)
WBC # BLD AUTO: 9.6 10*9/L (ref 4–11)
WBC #/AREA URNS HPF: ABNORMAL /[HPF]
WBC, UR MICRO: ABNORMAL (ref ?–2)

## 2022-01-24 PROCEDURE — C9803 HOPD COVID-19 SPEC COLLECT: HCPCS

## 2022-01-24 PROCEDURE — 84484 ASSAY OF TROPONIN QUANT: CPT | Performed by: HOSPITALIST

## 2022-01-24 PROCEDURE — 93325 DOPPLER ECHO COLOR FLOW MAPG: CPT

## 2022-01-24 PROCEDURE — 93308 TTE F-UP OR LMTD: CPT

## 2022-01-24 PROCEDURE — 250N000011 HC RX IP 250 OP 636: Performed by: EMERGENCY MEDICINE

## 2022-01-24 PROCEDURE — 85610 PROTHROMBIN TIME: CPT | Performed by: EMERGENCY MEDICINE

## 2022-01-24 PROCEDURE — 250N000009 HC RX 250: Performed by: EMERGENCY MEDICINE

## 2022-01-24 PROCEDURE — 258N000003 HC RX IP 258 OP 636: Performed by: EMERGENCY MEDICINE

## 2022-01-24 PROCEDURE — 93308 TTE F-UP OR LMTD: CPT | Mod: 26 | Performed by: INTERNAL MEDICINE

## 2022-01-24 PROCEDURE — 96374 THER/PROPH/DIAG INJ IV PUSH: CPT | Mod: 59

## 2022-01-24 PROCEDURE — 96376 TX/PRO/DX INJ SAME DRUG ADON: CPT

## 2022-01-24 PROCEDURE — 85520 HEPARIN ASSAY: CPT | Performed by: STUDENT IN AN ORGANIZED HEALTH CARE EDUCATION/TRAINING PROGRAM

## 2022-01-24 PROCEDURE — 86850 RBC ANTIBODY SCREEN: CPT | Performed by: EMERGENCY MEDICINE

## 2022-01-24 PROCEDURE — 93321 DOPPLER ECHO F-UP/LMTD STD: CPT | Mod: 26 | Performed by: INTERNAL MEDICINE

## 2022-01-24 PROCEDURE — 36415 COLL VENOUS BLD VENIPUNCTURE: CPT | Performed by: HOSPITALIST

## 2022-01-24 PROCEDURE — 36415 COLL VENOUS BLD VENIPUNCTURE: CPT | Performed by: STUDENT IN AN ORGANIZED HEALTH CARE EDUCATION/TRAINING PROGRAM

## 2022-01-24 PROCEDURE — 80053 COMPREHEN METABOLIC PANEL: CPT | Performed by: EMERGENCY MEDICINE

## 2022-01-24 PROCEDURE — 96375 TX/PRO/DX INJ NEW DRUG ADDON: CPT | Mod: 59

## 2022-01-24 PROCEDURE — 250N000011 HC RX IP 250 OP 636: Performed by: STUDENT IN AN ORGANIZED HEALTH CARE EDUCATION/TRAINING PROGRAM

## 2022-01-24 PROCEDURE — 93325 DOPPLER ECHO COLOR FLOW MAPG: CPT | Mod: 26 | Performed by: INTERNAL MEDICINE

## 2022-01-24 PROCEDURE — 85025 COMPLETE CBC W/AUTO DIFF WBC: CPT | Performed by: EMERGENCY MEDICINE

## 2022-01-24 PROCEDURE — 85025 COMPLETE CBC W/AUTO DIFF WBC: CPT | Performed by: FAMILY MEDICINE

## 2022-01-24 PROCEDURE — 36415 COLL VENOUS BLD VENIPUNCTURE: CPT | Performed by: EMERGENCY MEDICINE

## 2022-01-24 PROCEDURE — 258N000003 HC RX IP 258 OP 636: Performed by: STUDENT IN AN ORGANIZED HEALTH CARE EDUCATION/TRAINING PROGRAM

## 2022-01-24 PROCEDURE — 71275 CT ANGIOGRAPHY CHEST: CPT

## 2022-01-24 PROCEDURE — 83880 ASSAY OF NATRIURETIC PEPTIDE: CPT | Performed by: EMERGENCY MEDICINE

## 2022-01-24 PROCEDURE — 96365 THER/PROPH/DIAG IV INF INIT: CPT

## 2022-01-24 PROCEDURE — 85379 FIBRIN DEGRADATION QUANT: CPT | Performed by: EMERGENCY MEDICINE

## 2022-01-24 PROCEDURE — 200N000002 HC R&B ICU UMMC

## 2022-01-24 PROCEDURE — 86901 BLOOD TYPING SEROLOGIC RH(D): CPT | Performed by: EMERGENCY MEDICINE

## 2022-01-24 PROCEDURE — 84484 ASSAY OF TROPONIN QUANT: CPT | Mod: 91 | Performed by: EMERGENCY MEDICINE

## 2022-01-24 PROCEDURE — 36415 COLL VENOUS BLD VENIPUNCTURE: CPT | Performed by: FAMILY MEDICINE

## 2022-01-24 PROCEDURE — 96366 THER/PROPH/DIAG IV INF ADDON: CPT

## 2022-01-24 PROCEDURE — 82040 ASSAY OF SERUM ALBUMIN: CPT | Performed by: EMERGENCY MEDICINE

## 2022-01-24 PROCEDURE — 85027 COMPLETE CBC AUTOMATED: CPT | Performed by: STUDENT IN AN ORGANIZED HEALTH CARE EDUCATION/TRAINING PROGRAM

## 2022-01-24 PROCEDURE — 71045 X-RAY EXAM CHEST 1 VIEW: CPT

## 2022-01-24 PROCEDURE — 87636 SARSCOV2 & INF A&B AMP PRB: CPT | Performed by: EMERGENCY MEDICINE

## 2022-01-24 PROCEDURE — 99285 EMERGENCY DEPT VISIT HI MDM: CPT | Mod: 25

## 2022-01-24 PROCEDURE — 81001 URINALYSIS AUTO W/SCOPE: CPT | Performed by: FAMILY MEDICINE

## 2022-01-24 PROCEDURE — 85730 THROMBOPLASTIN TIME PARTIAL: CPT | Performed by: EMERGENCY MEDICINE

## 2022-01-24 PROCEDURE — 99215 OFFICE O/P EST HI 40 MIN: CPT | Performed by: FAMILY MEDICINE

## 2022-01-24 RX ORDER — AMLODIPINE BESYLATE 5 MG/1
5 TABLET ORAL DAILY
Status: DISCONTINUED | OUTPATIENT
Start: 2022-01-25 | End: 2022-02-02

## 2022-01-24 RX ORDER — FUROSEMIDE 10 MG/ML
40 INJECTION INTRAMUSCULAR; INTRAVENOUS EVERY 12 HOURS
Status: DISCONTINUED | OUTPATIENT
Start: 2022-01-24 | End: 2022-01-24

## 2022-01-24 RX ORDER — IOPAMIDOL 755 MG/ML
500 INJECTION, SOLUTION INTRAVASCULAR ONCE
Status: COMPLETED | OUTPATIENT
Start: 2022-01-24 | End: 2022-01-24

## 2022-01-24 RX ORDER — FUROSEMIDE 10 MG/ML
20 INJECTION INTRAMUSCULAR; INTRAVENOUS EVERY 12 HOURS
Status: DISCONTINUED | OUTPATIENT
Start: 2022-01-25 | End: 2022-01-24 | Stop reason: HOSPADM

## 2022-01-24 RX ORDER — MULTIVITAMIN,THERAPEUTIC
1 TABLET ORAL DAILY
Status: DISCONTINUED | OUTPATIENT
Start: 2022-01-25 | End: 2022-02-10 | Stop reason: HOSPADM

## 2022-01-24 RX ORDER — FUROSEMIDE 10 MG/ML
40 INJECTION INTRAMUSCULAR; INTRAVENOUS
Status: DISCONTINUED | OUTPATIENT
Start: 2022-01-24 | End: 2022-01-25

## 2022-01-24 RX ORDER — LEVOTHYROXINE SODIUM 50 UG/1
50 TABLET ORAL DAILY
Status: DISCONTINUED | OUTPATIENT
Start: 2022-01-25 | End: 2022-01-24 | Stop reason: HOSPADM

## 2022-01-24 RX ORDER — SIMVASTATIN 20 MG
20 TABLET ORAL EVERY EVENING
Status: DISCONTINUED | OUTPATIENT
Start: 2022-01-24 | End: 2022-01-24 | Stop reason: HOSPADM

## 2022-01-24 RX ORDER — HEPARIN SODIUM 10000 [USP'U]/100ML
0-5000 INJECTION, SOLUTION INTRAVENOUS CONTINUOUS
Status: DISCONTINUED | OUTPATIENT
Start: 2022-01-24 | End: 2022-01-24 | Stop reason: HOSPADM

## 2022-01-24 RX ORDER — HEPARIN SODIUM 10000 [USP'U]/100ML
0-5000 INJECTION, SOLUTION INTRAVENOUS CONTINUOUS
Status: DISCONTINUED | OUTPATIENT
Start: 2022-01-24 | End: 2022-01-31

## 2022-01-24 RX ORDER — ONDANSETRON 4 MG/1
4 TABLET, ORALLY DISINTEGRATING ORAL EVERY 6 HOURS PRN
Status: DISCONTINUED | OUTPATIENT
Start: 2022-01-24 | End: 2022-01-24 | Stop reason: HOSPADM

## 2022-01-24 RX ORDER — LEVOTHYROXINE SODIUM 50 UG/1
50 TABLET ORAL DAILY
Status: DISCONTINUED | OUTPATIENT
Start: 2022-01-25 | End: 2022-02-10 | Stop reason: HOSPADM

## 2022-01-24 RX ORDER — ONDANSETRON 2 MG/ML
4 INJECTION INTRAMUSCULAR; INTRAVENOUS EVERY 6 HOURS PRN
Status: DISCONTINUED | OUTPATIENT
Start: 2022-01-24 | End: 2022-01-24 | Stop reason: HOSPADM

## 2022-01-24 RX ORDER — LIDOCAINE 40 MG/G
CREAM TOPICAL
Status: DISCONTINUED | OUTPATIENT
Start: 2022-01-24 | End: 2022-01-24 | Stop reason: HOSPADM

## 2022-01-24 RX ORDER — FUROSEMIDE 10 MG/ML
20 INJECTION INTRAMUSCULAR; INTRAVENOUS ONCE
Status: COMPLETED | OUTPATIENT
Start: 2022-01-24 | End: 2022-01-24

## 2022-01-24 RX ORDER — VITS A,C,E/LUTEIN/MINERALS 300MCG-200
1 TABLET ORAL 2 TIMES DAILY
Status: DISCONTINUED | OUTPATIENT
Start: 2022-01-24 | End: 2022-02-10 | Stop reason: HOSPADM

## 2022-01-24 RX ORDER — ACETAMINOPHEN 325 MG/1
650 TABLET ORAL EVERY 6 HOURS PRN
Status: DISCONTINUED | OUTPATIENT
Start: 2022-01-24 | End: 2022-01-24 | Stop reason: HOSPADM

## 2022-01-24 RX ORDER — ACETAMINOPHEN 650 MG/1
650 SUPPOSITORY RECTAL EVERY 6 HOURS PRN
Status: DISCONTINUED | OUTPATIENT
Start: 2022-01-24 | End: 2022-01-24 | Stop reason: HOSPADM

## 2022-01-24 RX ORDER — SIMVASTATIN 20 MG
20 TABLET ORAL AT BEDTIME
Status: DISCONTINUED | OUTPATIENT
Start: 2022-01-24 | End: 2022-02-10 | Stop reason: HOSPADM

## 2022-01-24 RX ORDER — MULTIVITAMIN,THERAPEUTIC
1 TABLET ORAL DAILY
COMMUNITY
End: 2023-04-17

## 2022-01-24 RX ADMIN — HEPARIN SODIUM 1300 UNITS/HR: 1000 INJECTION INTRAVENOUS; SUBCUTANEOUS at 17:10

## 2022-01-24 RX ADMIN — HEPARIN SODIUM 1300 UNITS/HR: 1000 INJECTION INTRAVENOUS; SUBCUTANEOUS at 22:48

## 2022-01-24 RX ADMIN — SODIUM CHLORIDE 78 ML: 9 INJECTION, SOLUTION INTRAVENOUS at 15:46

## 2022-01-24 RX ADMIN — FUROSEMIDE 20 MG: 10 INJECTION, SOLUTION INTRAMUSCULAR; INTRAVENOUS at 12:33

## 2022-01-24 RX ADMIN — IOPAMIDOL 57 ML: 755 INJECTION, SOLUTION INTRAVENOUS at 15:46

## 2022-01-24 RX ADMIN — FUROSEMIDE 40 MG: 10 INJECTION, SOLUTION INTRAVENOUS at 22:47

## 2022-01-24 ASSESSMENT — MIFFLIN-ST. JEOR: SCORE: 1151.26

## 2022-01-24 ASSESSMENT — ACTIVITIES OF DAILY LIVING (ADL)
ADLS_ACUITY_SCORE: 12
ADLS_ACUITY_SCORE: 3

## 2022-01-24 NOTE — Clinical Note
Called to Una MAYES on 4A. All questions answered. All belongings sent with patient. Patient transported to 4A via patient bed with MIGUEL RN and on monitor.

## 2022-01-24 NOTE — PHARMACY-ADMISSION MEDICATION HISTORY
Admission medication history interview status for this patient is complete. See [facility] admission navigator for allergy information, prior to admission medications and immunization status.     Medication history interview done, indicate source(s): Patient  Medication history resources (including written lists, pill bottles, clinic record): [employer] dispense records  Pharmacy: [facility]    Changes made to PTA medication list:  Added: multivitamin, preservision areds  Changed: none  Reported as Not Taking: ondansetron  Removed: none    Actions taken by pharmacist (provider contacted, etc):None     Additional medication history information:None    Medication reconciliation/reorder completed by provider prior to medication history?  Y   (Y/N)         Prior to Admission medications    Medication Sig Last Dose Taking? Auth Provider   amLODIPine (NORVASC) 5 MG tablet TAKE 1 TABLET (5 MG) BY MOUTH DAILY 1/23/2022 at am Yes Renetta Rizvi PA-C   levothyroxine (SYNTHROID/LEVOTHROID) 50 MCG tablet Take 1 tablet (50 mcg) by mouth daily 1/24/2022 at am Yes Sheron Armenta MD   Multiple Vitamins-Minerals (PRESERVISION AREDS PO) Take 1 tablet by mouth 2 times daily 1/23/2022 at pm Yes Unknown, Entered By History   multivitamin, therapeutic (THERA-VIT) TABS tablet Take 1 tablet by mouth daily 1/23/2022 at am Yes Unknown, Entered By History   simvastatin (ZOCOR) 20 MG tablet TAKE 1 TABLET (20 MG) BY MOUTH AT BEDTIME 1/23/2022 at pm Yes Sheron Armenta MD   ondansetron (ZOFRAN-ODT) 4 MG ODT tab Take 1 tablet (4 mg) by mouth every 8 hours as needed for nausea  Patient not taking: Reported on 1/24/2022 Not Taking at Unknown time  Renetta Rizvi PA-C

## 2022-01-24 NOTE — Clinical Note
Prepped: groin, right radial and right neck. Prepped with: ChloraPrep. The patient was draped. .Pre-procedure site marking:N/A

## 2022-01-24 NOTE — Clinical Note
Potential access sites were evaluated for patency using ultrasound.   The right femoral vein was selected. Access was obtained under with Sonosite guidance using a micropuncture 21 gauge needle with direct visualization of needle entry.

## 2022-01-24 NOTE — PATIENT INSTRUCTIONS
Assessment & Plan   Problem List Items Addressed This Visit        Endocrine    Hyponatremia - Primary       Circulatory    Heart murmur      Other Visit Diagnoses     Other fatigue        Relevant Orders    VENOUS COLLECTION (Completed)    Basic Metabolic Panel ([facility])    HEMOGRAM PLATELET DIFF ([facility]) (Completed)    URINALYSIS, ROUTINE ([facility]) (Completed)    URINE CULTURE AEROBIC BACTERIAL ([facility])    Fluid retention        Shortness of breath             Results for orders placed or performed in visit on 01/24/22   HEMOGRAM PLATELET DIFF ([facility])     Status: Abnormal   Result Value Ref Range    WBC 9.6 4.0 - 11 10*9/L    RBC Count 3.48 (A) 3.8 - 5.2 10*12/L    Hemoglobin 11.7 11.7 - 15.7 g/dL    Hematocrit 35.3 35.0 - 47.0 %    .5 (A) 78 - 100 fL    MCH 33.6 (A) 26 - 33 pg    MCHC 33.1 31 - 36 g/dL    Platelet Count 265 150 - 375 10^9/L    % Granulocytes 64.8 %    % Lymphocytes 23.0 %    % Monocytes 11.2 %   URINALYSIS, ROUTINE ([facility])     Status: Abnormal   Result Value Ref Range    Color Urine Yellow     Appearance Urine Slightly Cloudy (A)     Glucose Urine Neg neg mg/dL    Bilirubin Urine Neg neg    Ketones Urine Neg neg mg/dL    Specific Gravity Urine Other (A) 1.003 - 1.035    Blood Urine Trace (A) neg    pH Urine 6.5 5.0 - 7.0 pH    Protein Urine 100 (A) neg - neg mg/dL    Urobilinogen Urine 0.2 0.2 - 1.0 EU/dL    Nitrite Urine Neg NEG    Leukocytes trace (A)     Wbc, Urine Micro 2-3 (A) neg - 2    RBC Micro Urine 0-1 neg - 2    EP/HPF small     Bacteria Urine small (A) neg - neg    Casts/LPF neg     Miscellaneous neg      1. Hyponatremia  Labs checked today.    2. Other fatigue  Her symptoms are significant. She will need additional workup, including for shortness of breath, increased fluid retention, fatigue, significant heart murmur.  - VENOUS COLLECTION  - Basic Metabolic Panel ([facility])  - HEMOGRAM PLATELET DIFF ([facility])  - URINALYSIS, ROUTINE ([facility])  - URINE CULTURE AEROBIC BACTERIAL ([facility])       3. Heart  murmur      4. Fluid retention      5. Shortness of breath      FUTURE APPOINTMENTS:       - Follow-up visit in ER now.    No follow-ups on file.    Katlyn Daniels MD  [facility]

## 2022-01-24 NOTE — CONSULTS
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 She has a large VSD left to right shunt  with moderate RV enlargement and mildly to moderately reduced RV systolic performance. She clearly needs an ICU bed and urgent surgical consultation/diagnostic cardiac testing.  I have called , cardiovascular surgery service at [facility] and they are performing emergency surgery at this time and although they are very willing to take her, do not have a bed available. Unfortunately no ICU bed is available in [facility] at this time. The CT scan showed a small subsegmental PE as well.     Plan  1)  Continue attempts to find ICU bed with CV surgery available  2)  Will administer IV heparin and furosemide to temporize

## 2022-01-24 NOTE — Clinical Note
Procedure done under supervision of Anesthesia.  Vital signs, charting, monitoring, Ins/Outs, medications, and sedation all per Anesthesia.

## 2022-01-24 NOTE — Clinical Note
Potential access sites were evaluated for patency using ultrasound.   The right internal jugular vein was selected. Access was obtained under with Sonosite guidance using a micropuncture 21 gauge needle with direct visualization of needle entry.

## 2022-01-24 NOTE — Clinical Note
dry, intact, no bleeding and no hematoma. 8fr RIJ and 6fr RFV removed, manual pressure applied hemostasis achieved bandage placed  7fr RFA sheath removed, angio-seal closure device used.

## 2022-01-24 NOTE — PROGRESS NOTES
Assessment & Plan   Problem List Items Addressed This Visit        Endocrine    Hyponatremia - Primary       Circulatory    Heart murmur      Other Visit Diagnoses     Other fatigue        Relevant Orders    VENOUS COLLECTION (Completed)    Basic Metabolic Panel (BFP)    HEMOGRAM PLATELET DIFF (BFP) (Completed)    URINALYSIS, ROUTINE (BFP) (Completed)    URINE CULTURE AEROBIC BACTERIAL ([facility])    Fluid retention        Shortness of breath             Results for orders placed or performed in visit on 01/24/22   HEMOGRAM PLATELET DIFF (BFP)     Status: Abnormal   Result Value Ref Range    WBC 9.6 4.0 - 11 10*9/L    RBC Count 3.48 (A) 3.8 - 5.2 10*12/L    Hemoglobin 11.7 11.7 - 15.7 g/dL    Hematocrit 35.3 35.0 - 47.0 %    .5 (A) 78 - 100 fL    MCH 33.6 (A) 26 - 33 pg    MCHC 33.1 31 - 36 g/dL    Platelet Count 265 150 - 375 10^9/L    % Granulocytes 64.8 %    % Lymphocytes 23.0 %    % Monocytes 11.2 %   URINALYSIS, ROUTINE (BFP)     Status: Abnormal   Result Value Ref Range    Color Urine Yellow     Appearance Urine Slightly Cloudy (A)     Glucose Urine Neg neg mg/dL    Bilirubin Urine Neg neg    Ketones Urine Neg neg mg/dL    Specific Gravity Urine Other (A) 1.003 - 1.035    Blood Urine Trace (A) neg    pH Urine 6.5 5.0 - 7.0 pH    Protein Urine 100 (A) neg - neg mg/dL    Urobilinogen Urine 0.2 0.2 - 1.0 EU/dL    Nitrite Urine Neg NEG    Leukocytes trace (A)     Wbc, Urine Micro 2-3 (A) neg - 2    RBC Micro Urine 0-1 neg - 2    EP/HPF small     Bacteria Urine small (A) neg - neg    Casts/LPF neg     Miscellaneous neg      1. Hyponatremia  Labs checked today.    2. Other fatigue  Her symptoms are significant. She will need additional workup, including for shortness of breath, increased fluid retention, fatigue, significant heart murmur.  - VENOUS COLLECTION  - Basic Metabolic Panel (BFP)  - HEMOGRAM PLATELET DIFF (BFP)  - URINALYSIS, ROUTINE (BFP)  - URINE CULTURE AEROBIC BACTERIAL ([facility])       3. Heart  murmur      4. Fluid retention      5. Shortness of breath      FUTURE APPOINTMENTS:       - Follow-up visit in ER now.    No follow-ups on file.    Katlyn Daniels MD  [facility]    Subjective     Nursing Notes:   Dilma Malin CMA  1/24/2022 10:02 AM  Signed  Chief Complaint   Patient presents with     Leg Swelling     calf swelling down through ankles into feet     Fatigue     energy level isvery low,  fatigue, no appetite     Pre-visit Screening:  Immunizations:  up to date  Colonoscopy:  is up to date  Mammogram: is up to date  Asthma Action Test/Plan:  NA  PHQ9:  NA  GAD7:  NA  Questioned patient about current smoking habits Pt. has never smoked.  Ok to leave detailed message on voice mail for today's visit only Yes, phone # [phone]           Kimberley Mullins is a 81 year old female who presents to clinic today for the following health issues   HPI     Here with son and daughter, not feeling well.  History chronic cough.  Started getting sick jan 10th, neg for covid. Came in about 10 days ago on Friday. Neg again for covid and influenza. Was feeling very weak.   The sx initially started with vomiting at night. Lives alone. Weak and nervous about being alone. Worsening cough, weak and achy and no appetite. Low sodium, sent to hospital, was there until Monday 17th.  Now since Thursday has been staying with daughter. Saw Renetta last week, did bloodwork again, sodium was down a little, had some swelling in her feet and told her to come back this week.    Now is back earlier because she is doing worse. Swelling is worse. Is trying to keep her legs elevated. Maybe retaining in her abd also. Her cough exhausts her. Not sure what is bringing this on. The coughing has caused a hemorrhoid, pee. Is a little gaggy. The phlegm is not colored.  Is very winded when walking anywhere.  Was almost wheezing. Is very out of breath, this has been the whole time. This hasn't gotten any better.  Can  "hardly even get into the car. Very short of breath. Normally works out at the [employer].  Now the family is questioning this all. This summer had noted some leg weakness and couldn't even walk around the block anymore.   Has a new heart murmur. Did have a stress test in the spring of this year. Was no mention of this. It was very notable in the hospital. They also noted some fluid in her lungs.   Yesterday wasn't a terrible day but today is worse. Thinks that anxiety is worsening. This doesn't help anything. Even its elevated with walking to her chair.  No fevers. Doesn't feel congested until she starts coughing. Wondering about an inhaler.    Has strong \"stinky\" urine. Not dark. Had abx in the hospital.  Eating and drinking: had a little yogurt this morning. And an electrolyte drink. This morning wasn't good. Yesterday had a little for breakfast. Lunch: 1/2 can soup with crackers. Is eating but not a lot. This isn't normal for her but she isn't a big eater.     2 weeks ago she was 145 pounds at home, weighs herself regularly.    Denies calf pain.           Review of Systems   Constitutional, HEENT, cardiovascular, pulmonary, gi and gu systems are negative, except as otherwise noted.      Objective    /72 (BP Location: Left arm, Patient Position: Sitting, Cuff Size: Adult Regular)   Pulse 105   Temp 97.6  F (36.4  C) (Temporal)   Ht 1.6 m (5' 3\")   Wt 72.2 kg (159 lb 3.2 oz)   LMP  (LMP Unknown)   SpO2 97%   BMI 28.20 kg/m    Body mass index is 28.2 kg/m .  Physical Exam   GENERAL: healthy, alert and no distress  NECK: no adenopathy, no asymmetry, masses, or scars and thyroid normal to palpation  RESP: lungs clear to auscultation - no rales, rhonchi or wheezes  CV: regular rate and rhythm, normal S1 S2, no S3 or S4, + significant holosystolic murmur  MS: no gross musculoskeletal defects noted, no edema  NEURO: Normal strength and tone, mentation intact and speech normal  PSYCH: mentation appears normal, affect " normal/bright  Needs assistance to walk, walked several steps, oxygen level dropped to 93%, couldn't walk further, had to stop and get into her wheelchair.  Bilateral LE edema, mild/moderate    Results for orders placed or performed in visit on 01/24/22   HEMOGRAM PLATELET DIFF (BFP)     Status: Abnormal   Result Value Ref Range    WBC 9.6 4.0 - 11 10*9/L    RBC Count 3.48 (A) 3.8 - 5.2 10*12/L    Hemoglobin 11.7 11.7 - 15.7 g/dL    Hematocrit 35.3 35.0 - 47.0 %    .5 (A) 78 - 100 fL    MCH 33.6 (A) 26 - 33 pg    MCHC 33.1 31 - 36 g/dL    Platelet Count 265 150 - 375 10^9/L    % Granulocytes 64.8 %    % Lymphocytes 23.0 %    % Monocytes 11.2 %   URINALYSIS, ROUTINE (BFP)     Status: Abnormal   Result Value Ref Range    Color Urine Yellow     Appearance Urine Slightly Cloudy (A)     Glucose Urine Neg neg mg/dL    Bilirubin Urine Neg neg    Ketones Urine Neg neg mg/dL    Specific Gravity Urine Other (A) 1.003 - 1.035    Blood Urine Trace (A) neg    pH Urine 6.5 5.0 - 7.0 pH    Protein Urine 100 (A) neg - neg mg/dL    Urobilinogen Urine 0.2 0.2 - 1.0 EU/dL    Nitrite Urine Neg NEG    Leukocytes trace (A)     Wbc, Urine Micro 2-3 (A) neg - 2    RBC Micro Urine 0-1 neg - 2    EP/HPF small     Bacteria Urine small (A) neg - neg    Casts/LPF neg     Miscellaneous neg

## 2022-01-24 NOTE — Clinical Note
Potential access sites were evaluated for patency using ultrasound.   The left femoral vein was selected. Access was obtained under with Sonosite guidance using a micropuncture 21 gauge needle with direct visualization of needle entry.

## 2022-01-24 NOTE — NURSING NOTE
Chief Complaint   Patient presents with     Leg Swelling     calf swelling down through ankles into feet     Fatigue     energy level isvery low,  fatigue, no appetite     Pre-visit Screening:  Immunizations:  up to date  Colonoscopy:  is up to date  Mammogram: is up to date  Asthma Action Test/Plan:  NA  PHQ9:  NA  GAD7:  NA  Questioned patient about current smoking habits Pt. has never smoked.  Ok to leave detailed message on voice mail for today's visit only Yes, phone # [phone]

## 2022-01-24 NOTE — ED PROVIDER NOTES
History     Chief Complaint:    Shortness of Breath      HPI   Kimberley Mullins is a 81 year old female who presents with shortness of breath and shortness of breath with exertion.    Patient is an 81-year-old female recently hospitalized for diarrhea and low sodium.  Patient states she has been weak since then.  She has been short of breath since the 10th of this month she feels like she cannot go 5 or 10 feet and is significantly short of breath.  This is new as if I were to meet her a month ago she admits her exertional capacity was normal.  She is had no clear chest pain her legs have been more swollen.  She has had no vomiting or diarrhea since discharge.    Review of Systems  Negative for chest pain positive for dyspnea positive or dyspnea on exertion.  Positive for leg swelling.      Allergiesa;    No Known Allergies      Medications:      amLODIPine (NORVASC) 5 MG tablet  levothyroxine (SYNTHROID/LEVOTHROID) 50 MCG tablet  ondansetron (ZOFRAN-ODT) 4 MG ODT tab  simvastatin (ZOCOR) 20 MG tablet        Past Medical History:    Past Medical History:   Diagnosis Date     Arthritis      Diverticulitis of colon (without mention of hemorrhage)(562.11) 1997     Irritable bowel syndrome 1959     Osteopenia, unspecified location 4/5/2018     Other ovarian failure 1986     Overweight (BMI 25.0-29.9) 4/5/2018     Personal history of colonic polyps 2004     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     Thyroid disease      Transient global amnesia      Unspecified tinnitus      Patient Active Problem List    Diagnosis Date Noted     Heart murmur 01/24/2022     Priority: Medium     Hyponatremia 01/14/2022     Priority: Medium     Macular degeneration (senile) of retina 11/30/2019     Priority: Medium     Cataract of both eyes, unspecified cataract type 11/30/2019     Priority: Medium     Osteopenia, unspecified location 04/05/2018     Priority: Medium     Overweight (BMI 25.0-29.9) 04/05/2018     Priority: Medium      Irritable bowel syndrome with diarrhea 02/08/2016     Priority: Medium     Hypothyroidism 01/07/2016     Priority: Medium     Benign essential hypertension 10/08/2015     Priority: Medium     ACP (advance care planning) 08/27/2015     Priority: Medium     Advance Care Planning 12/1/2015: ACP Review and Resources Provided:  Reviewed chart for advance care plan.  Kimberley Mullins has no plan and FULL code status on file, however states presence of ACP document. Copy requested. Discussed available resources and provided with information. Confirmed code status reflects current choices pending further ACP discussions. Recommend goals of care discussion with patient/decision makers and updated POLST/Code status order to reflect patient choices. Confirmed/documented legally designated decision maker(s). Added by CARLO Stout  Advance Care Planning 8/27/2015: ACP Review and Resources Provided:  Reviewed chart for advance care plan.  Kimberley Mullins has no plan or code status on file however states presence of ACP document. Copy requested. Confirmed code status reflects current choices pending receipt of document/advance care plan review. Confirmed/documented legally designated decision maker(s). [facility]     Priority: Medium     State Tier Level:  Tier 1  Status:  n/a  Care Coordinator:      See Letters for [facility] Care Plan           Status post total knee replacement 11/09/2011     Priority: Medium     Osteoarthritis 10/31/2011     Priority: Medium     Pure hypercholesterolemia 11/20/2009     Priority: Medium     Transient global amnesia 12/20/2004     Priority: Medium     Tinnitus 08/17/2003     Priority: Medium     Problem list name updated by automated process. Provider to review          Past Surgical History:      Past Surgical History:   Procedure Laterality Date     ARTHROPLASTY KNEE  10/25/2011    Procedure:ARTHROPLASTY KNEE; Right Total Knee  Arthroplasty   ; Surgeon:BRYCE ATWOOD; Location:[facility] OR     ARTHROPLASTY KNEE  6/2/2014    Procedure: ARTHROPLASTY KNEE;  Surgeon: Bryce Atwood MD;  Location: [facility] OR     HC DILATION/CURETTAGE DIAG/THER NON OB  2006    D & C     HC KNEE SCOPE, DIAGNOSTIC      Arthroscopy, Knee     ZZC AFF FOREARM/WRIST SURGERY UNLISTED      orif radius fx distal left     ZZC APPENDECTOMY  1955     ZZ OPEN JESSI FIXATN HUMERAL SHAFT FX  12/2007    Dr Mas[facility]POLYP[facility]LESION  9/03 & 3/2011    polyp removal during screening colonoscopy       Family History:    Family History   Problem Relation Age of Onset     Heart Disease Father         MI age 70       Social History:  Patient here alone denies smoking or alcohol abuse.    Physical Exam     Patient Vitals for the past 24 hrs:   BP Temp Temp src Pulse Resp SpO2   01/24/22 1301 -- -- -- 105 20 95 %   01/24/22 1300 (!) 112/93 -- -- 109 22 95 %   01/24/22 1200 126/89 -- -- 101 16 96 %   01/24/22 1122 (!) 131/98 97.9  F (36.6  C) Temporal 105 18 96 %       Physical Exam  Vitals and nursing note reviewed.   HENT:      Head: Normocephalic and atraumatic.   Eyes:      Pupils: Pupils are equal, round, and reactive to light.   Cardiovascular:      Rate and Rhythm: Normal rate.      Heart sounds: Murmur heard.       Pulmonary:      Effort: Tachypnea present.      Breath sounds: Examination of the right-lower field reveals decreased breath sounds. Examination of the left-lower field reveals decreased breath sounds. Decreased breath sounds present.   Musculoskeletal:      Cervical back: Normal range of motion.      Comments: +2 bilateral lower extremity edema.   Skin:     General: Skin is warm.      Capillary Refill: Capillary refill takes less than 2 seconds.   Neurological:      General: No focal deficit present.      Mental Status: She is alert.   Psychiatric:         Mood and Affect: Mood normal.           Emergency Department Course    ECG:  ECG taken at 11:37AM, ECG read at SINUS TACHYCARDIA     Rate 104 bpm. DE interval 152 ms. QRS duration 84 ms. QT/QTc 380/499 ms. t wave inversion V1 and V2, flattened t II inverted t wave III avf, no prior to compare      Imaging:  XR Chest Port 1 View   Final Result   IMPRESSION: Stable small bilateral pleural effusions, bibasilar   atelectasis and mild pulmonary edema. Mild cardiomegaly. No   pneumothorax. Left humerus plate and screw fixation.      IVAN ANGELES MD            SYSTEM ID:  [id number]      Echocardiogram Limited    (Results Pending)   CT Chest Pulmonary Embolism w Contrast    (Results Pending)       Laboratory:  Labs Ordered and Resulted from Time of ED Arrival to Time of ED Departure   D DIMER QUANTITATIVE - Abnormal       Result Value    D-Dimer Quantitative 2.13 (*)    COMPREHENSIVE METABOLIC PANEL - Abnormal    Sodium 131 (*)     Potassium 4.5      Chloride 97      Carbon Dioxide (CO2) 26      Anion Gap 8      Urea Nitrogen 11      Creatinine 0.71      Calcium 8.6      Glucose 109 (*)     Alkaline Phosphatase 60      AST 25      ALT 49      Protein Total 6.5 (*)     Albumin 3.3 (*)     Bilirubin Total 0.6      GFR Estimate 84     TROPONIN I - Abnormal    Troponin I High Sensitivity 73 (*)    NT PROBNP INPATIENT - Abnormal    N terminal Pro BNP Inpatient 14,899 (*)    CBC WITH PLATELETS AND DIFFERENTIAL - Abnormal    WBC Count 8.4      RBC Count 3.49 (*)     Hemoglobin 11.6 (*)     Hematocrit 35.1       (*)     MCH 33.2 (*)     MCHC 33.0      RDW 15.6 (*)     Platelet Count 214      % Neutrophils 79      % Lymphocytes 10      % Monocytes 10      % Eosinophils 0      % Basophils 0      % Immature Granulocytes 1      NRBCs per 100 WBC 0      Absolute Neutrophils 6.6      Absolute Lymphocytes 0.9      Absolute Monocytes 0.9      Absolute Eosinophils 0.0      Absolute Basophils 0.0      Absolute Immature Granulocytes 0.0      Absolute NRBCs 0.0     TROPONIN I - Abnormal    Troponin I  High Sensitivity 77 (*)    INR - Normal    INR 1.09     PARTIAL THROMBOPLASTIN TIME - Normal    aPTT 34     INFLUENZA A/B & SARS-COV2 PCR MULTIPLEX - Normal    Influenza A PCR Negative      Influenza B PCR Negative      SARS CoV2 PCR Negative     INR   TYPE AND SCREEN, ADULT    ABO/RH(D) O POS      Antibody Screen Negative      SPECIMEN EXPIRATION DATE [id number]     ABO/RH TYPE AND SCREEN           Emergency Department Course:           Reviewed:    I reviewed nursing notes, vitals and care everywhere    Assessments:  1130 I obtained history and examined the patient as noted above.   1230 I rechecked the patient and explained findings.       Consults:            Interventions:    Medications   furosemide (LASIX) injection 20 mg (20 mg Intravenous Given 1/24/22 1233)       Disposition:  Care of the patient was transferred to my colleague, Dr. sibley, pending transfer bed availability.    Impression & Plan        Medical Decision Making:  Patient presents with 2 weeks of shortness of breath and shortness of breath with exertion.  Patient was admitted for low sodium few weeks ago but this is since been corrected thought likely related to SIADH.  On arrival patient is mildly tachypneic with conversation room air sats are normal blood pressure is normal chest x-ray shows concerns for congestive heart failure.  Lab work shows severely elevated BNP.  Due to what sounds to be a new murmur as this was not documented on prior exams echo was recommended for further assessment it does identify a new VSD at the apex as well as concerns for congestive heart failure.  I was called by Dr. Gillespie who recommended transfer to a hospital with cardiac MRI.  Ultimately this was not available other than [facility] which was far for the patient due to the need for transfer I did recommend a cardiology consultation in the ER.  Patient was seen in the emergency room by Dr. Wade Felton.  He does recommend admission and feels  urgent to get the patient in the care of a cardiothoracic surgeon.  Patient CT of the chest was entertained due to elevated D-dimer and is positive for small subsegmental PE as well as clear right to left shunting of her IV contrast.  Patient was initiated on heparin.  Unfortunately during the current Covid crisis there are no beds to transfer the patient to.  There is no beds in the [address] [place] [address] area to admit for cardiovascular  surgical consult.  Patient will require hospitalization here until definitive transfer can be obtained.  Patient will be made aware of this finding and will be signed out to the oncoming physician pending bed availability.    Initially care was discussed with Dr. Stanley at [facility] in [place].  I was told her care connect that this hospital could accept this patient however after consulting with Dr. Terrazas, it was decided that the patient could not go to [facility] at the only potential hospital transfer is could be the [facility] or [facility].  Patient is signed out to Dr. Nevarez pending bed availability.  Patient and family were updated on these concerns.    Covid-19  Kimberley Mullins was evaluated during a global COVID-19 pandemic, which necessitated consideration that the patient might be at risk for infection with the SARS-CoV-2 virus that causes COVID-19.   Applicable protocols for evaluation were followed during the patient's care.   COVID-19 was considered as part of the patient's evaluation. The plan for testing is:  a test was obtained during this visit.      Diagnosis:    ICD-10-CM    1. Acute systolic heart failure (H)  I50.21    2. Ventricular septal defect  Q21.0    3. NSTEMI (non-ST elevated myocardial infarction) (H)  I21.4        Discharge Medications:  New Prescriptions    No medications on file         Scribe Disclosure:  Gui WALKER MD, am serving as a scribe at 1:59 PM on 1/24/2022 to document services personally performed by Goodman  Gui Hines MD  based on my observations and the provider's statements to me.      Gui Braden MD  01/24/22 1822

## 2022-01-24 NOTE — Clinical Note
dry, intact, no bleeding and no hematoma. 6 fr sheath removed from right radial artery. TR band applied - 14 ml air in balloon. Hemostasis achieved. 4 fr sheath removed from right femoral artery. Manual pressure held. Hemostasis achieved. 7 fr sheath removed from right internal jugular. Manual pressure held. Hemostasis achieved. Primapore dressings applied.

## 2022-01-24 NOTE — PROGRESS NOTES
Discussed with Dr. Braden and reviewed Dr. Crum's note from cardiology service.      Patient presents with shortness of breath with acute heart failure and found to have significant VSD on TTE.  VSD noted to have left to right shunt with moderate RV enlargement and mildly to moderately reduced RV systolic performance.  Patient requires urgent cardiothoracic surgery eval. We do not have cardiothoracic surgery available at [facility] and given severity of her VSD and cardiology's recommendations, she requires transfer to another hospital even if outside of [facility].     Andrew Ware MD

## 2022-01-24 NOTE — ED NOTES
[facility]  ED Nurse Handoff Report    Kimberley Mullins is a 81 year old female   ED Chief complaint: Shortness of Breath  . ED Diagnosis:   Final diagnoses:   None     Allergies:   Allergies   Allergen Reactions     No Known Allergies        Code Status: Full Code  Activity level - Baseline/Home:  Independent. Activity Level - Current:   Stand by Assist. Lift room needed: No. Bariatric: No   Needed: No   Isolation: No. Infection: Covid-19 pending    Vital Signs:   Vitals:    01/24/22 1122 01/24/22 1200 01/24/22 1300 01/24/22 1301   BP: (!) 131/98 126/89 (!) 112/93    Pulse: 105 101 109 105   Resp: 18 16 22 20   Temp: 97.9  F (36.6  C)      TempSrc: Temporal      SpO2: 96% 96% 95% 95%       Cardiac Rhythm:  ,      Pain level:    Patient confused: No. Patient Falls Risk: Yes. Weak  Elimination Status: Has voided   Patient Report - Initial Complaint: SOB,BLE swelling. Focused Assessment:  Patient presents with complaints of SOB which has been ongoing for the past few weeks. Patient also has cough and leg swelling as well. Denies any chest pain.  ABC intact without need for intervention at this time  Tests Performed:   Labs Ordered and Resulted from Time of ED Arrival to Time of ED Departure   D DIMER QUANTITATIVE - Abnormal       Result Value    D-Dimer Quantitative 2.13 (*)    COMPREHENSIVE METABOLIC PANEL - Abnormal    Sodium 131 (*)     Potassium 4.5      Chloride 97      Carbon Dioxide (CO2) 26      Anion Gap 8      Urea Nitrogen 11      Creatinine 0.71      Calcium 8.6      Glucose 109 (*)     Alkaline Phosphatase 60      AST 25      ALT 49      Protein Total 6.5 (*)     Albumin 3.3 (*)     Bilirubin Total 0.6      GFR Estimate 84     TROPONIN I - Abnormal    Troponin I High Sensitivity 73 (*)    NT PROBNP INPATIENT - Abnormal    N terminal Pro BNP Inpatient 14,899 (*)    CBC WITH PLATELETS AND DIFFERENTIAL - Abnormal    WBC Count 8.4      RBC Count 3.49 (*)     Hemoglobin 11.6 (*)      Hematocrit 35.1       (*)     MCH 33.2 (*)     MCHC 33.0      RDW 15.6 (*)     Platelet Count 214      % Neutrophils 79      % Lymphocytes 10      % Monocytes 10      % Eosinophils 0      % Basophils 0      % Immature Granulocytes 1      NRBCs per 100 WBC 0      Absolute Neutrophils 6.6      Absolute Lymphocytes 0.9      Absolute Monocytes 0.9      Absolute Eosinophils 0.0      Absolute Basophils 0.0      Absolute Immature Granulocytes 0.0      Absolute NRBCs 0.0     INR - Normal    INR 1.09     PARTIAL THROMBOPLASTIN TIME - Normal    aPTT 34     INFLUENZA A/B & SARS-COV2 PCR MULTIPLEX   TYPE AND SCREEN, ADULT   ABO/RH TYPE AND SCREEN     XR Chest Port 1 View   Final Result   IMPRESSION: Stable small bilateral pleural effusions, bibasilar   atelectasis and mild pulmonary edema. Mild cardiomegaly. No   pneumothorax. Left humerus plate and screw fixation.      IVAN ANGELES MD            SYSTEM ID:  [id number]      Echocardiogram Limited    (Results Pending)     Treatments provided: Lasix 20mg  Family Comments: patient able to text family  OBS brochure/video discussed/provided to patient:  Yes  ED Medications:   Medications   furosemide (LASIX) injection 20 mg (20 mg Intravenous Given 1/24/22 1233)     Drips infusing:  No  For the majority of the shift, the patient's behavior Green. Interventions performed were questions answered, assisted to bedside commode.    Sepsis treatment initiated: No     Patient tested for COVID 19 prior to admission: YES-pending    ED Nurse Name/Phone Number: Eloise Moreau RN,   1:57 PM

## 2022-01-24 NOTE — CONSULTS
Consult Date: 01/24/2022    HISTORY OF PRESENT ILLNESS:  Kimberley Mullins, an 81-year-old woman with hypertension and dyslipidemia ,was evaluated in consultation at the request of Dr. Ware and Dr. Braden ( ED)  for heart failure and echocardiographic evidence of ventricular septal defect.    The patient was admitted to [facility] on 01/14/2022 with a 4-day history of fatigue, dyspnea, cough and nausea.  At no point did the patient complain of chest, arm, neck, jaw or back discomfort.  Her admission laboratories at that time  were remarkable for profound hyponatremia (a sodium of 112), small pleural effusions, and pulmonary congestion and negative COVID/Influenza screening tests. Her physicians suspected acute viral upper respiratory illness complicated by SIADH and she was treated with IV normal saline, fluid restriction and empiric antibiotics. At no point did any of her care providers report a cardiac murmur and an ECG was not performed. The patient was discharged with plans to follow up with her primary care providers. She  was then seen in followup at [facility], again no murmur was noted and plans were made to consult with nephrology for her hyponatremia. The patients complaints of cough,weakness and dyspnea persisted after this visit, she presented to her primary care providers who then sent her to the [facility] ED. Our ED doctors performed an ECG demonstrating a QS pattern in her ECG consistent with age indetermiant anterior wall MI and a CT chest demonstrating a small subsegmental PE. A TTE demonstrated new apical wall motion abnormality with a VSD, left to right shunt and moderate RV enlargement Cardiology consultation was requested.  Presently the patient is seated upright in an ED bed with mild resting dyspnea, but no chest pain and states she has been unchanged for several days.      and the ED staff have worked diligently to find an ICU bed in the [facility] [place] system, [facility] and the [facility] without success.   I called Dr. Ede Terrazas CV surgery [facility] who confirmed there are no ICU beds currently available and they would be very willing to accept the patient in transfer at anytime a bed becomes available.     PAST MEDICAL HISTORY:    1.  Osteoarthritis.  a. Status post bilateral knee replacement.  2.  Diverticulitis.  3.  Hypothyroidism.  4.  Hypertension.  5.  Dyslipidemia.    ALLERGIES:  NONE KNOWN.    FAMILY HISTORY:  There is a family history of her father having an MI at age 70.    REVIEW OF SYSTEMS:  A 12-point review of systems was performed.  Outside the issues mentioned in the HPI, there are no other complaints.    HABITS:  The patient does not smoke.  She has perhaps 1 drink every night.    SOCIAL HISTORY:  The patient is a .  She lives on her own.  She has 3 children, 10 grandchildren and 6 great grandchildren.    MEDICATIONS:    1.  Amlodipine 5 daily.  2.  Levothyroxine 50 mcg daily.  3.  Simvastatin 20 daily.    PHYSICAL EXAMINATION:    GENERAL:  Demonstrates a very pleasant, cooperative 81-year-old woman who is alert and answers questions appropriately.  VITAL SIGNS:  Her blood pressure is 112/93.  Her heart rate is 105 and regular.  LUNGS:  Demonstrate basilar dullness with occasional rhonchi.  CARDIOVASCULAR:  Shows a normal S1 with a very loud 3-4/6 systolic ejection murmur and a palpable thrill compatible with ventricular septal defect.  Her pulses are symmetrical in the carotid, radial, brachial femoral, popliteal, dorsalis pedis and posterior tibials.  ABDOMEN:  Bowel sounds are present but diminished.  Liver percusses to about 7 cm.  Spleen is not palpable.  Aorta is not tender.  LOWER EXTREMITIES:  There is no swelling, cyanosis or clubbing.  NEUROLOGIC:  Cranial nerves II-XII are intact.  Strength equal and symmetrical.  She displays normal insight and judgment.    LABORATORY DATA:  Her ECG shows a sinus rhythm with an  age-indeterminate anterior wall infarction characterized by Q-waves in V1, V2, V3.  There are nonspecific ST wave changes.  Heart rate is 104.  As compared to last ECG in our system, which was performed on 2014, there have been new changes consistent with age-indeterminate anterior wall infarction.  Echocardiogram:  I have personally reviewed the echocardiogram.  There is hyperdynamic left ventricular systolic performance with normal left ventricular chamber size.  There is moderate dilation of the ascending aorta that measures 4.2 cm.  There is moderate-to-severe tricuspid insufficiency with a triangular dense pattern consistent with elevated right-sided pressures.  There is an apical ventricular septal defect with a large left-to-right shunt and a small pericardial effusion.  The right side is enlarged, and it is difficult to assess right ventricular systolic performance, but it appears to be mildly to moderately reduced.    ASSESSMENT:  This 81-year-old woman has sustained a recent anterior apical infarction complicated by ventricular septal defect and acute systolic heart failure. Remarkably she has remained free of chest pain and her blood pressure and renal function are stable. Ideally we would like to transfer her to a facility where urgent advanced cardiac surgical services are available as this support is not available at [facility]. We will continue diligent efforts to find a bed for her in any available system. . In the meantime, I have explained the situation with the patient and her daughter. In the meantime, we will treat with IV heparin ( PE) and furosemide to palliate symptoms of dyspnea.         Wade Crum MD        D: 2022   T: 2022   MT: [facility]    Name:     ALEXA EDGE  MRN:      [id number]        Account:      [id number]   :      1940           Consult Date: 2022     Document: [id number]

## 2022-01-24 NOTE — Clinical Note
Potential access sites were evaluated for patency using ultrasound.   The right femoral artery was selected. Access was obtained under with Sonosite guidance using a micropuncture 21 gauge needle with direct visualization of needle entry.

## 2022-01-25 LAB
ALBUMIN SERPL-MCNC: 3 G/DL (ref 3.4–5)
ALP SERPL-CCNC: 50 U/L (ref 40–150)
ALT SERPL W P-5'-P-CCNC: 41 U/L (ref 0–50)
ANION GAP SERPL CALCULATED.3IONS-SCNC: 15 MMOL/L (ref 3–14)
AST SERPL W P-5'-P-CCNC: 28 U/L (ref 0–45)
ATRIAL RATE - MUSE: 104 BPM
BILIRUB SERPL-MCNC: 0.6 MG/DL (ref 0.2–1.3)
BUN SERPL-MCNC: 10 MG/DL (ref 7–30)
CALCIUM SERPL-MCNC: 8.3 MG/DL (ref 8.5–10.1)
CHLORIDE BLD-SCNC: 97 MMOL/L (ref 94–109)
CO2 SERPL-SCNC: 20 MMOL/L (ref 20–32)
CREAT SERPL-MCNC: 0.59 MG/DL (ref 0.52–1.04)
DIASTOLIC BLOOD PRESSURE - MUSE: NORMAL MMHG
GFR SERPL CREATININE-BSD FRML MDRD: 89 ML/MIN/1.73M2
GLUCOSE BLD-MCNC: 84 MG/DL (ref 70–99)
HGB BLD-MCNC: 10.5 G/DL (ref 11.7–15.7)
HGB BLD-MCNC: 10.7 G/DL (ref 11.7–15.7)
HGB BLD-MCNC: 10.8 G/DL (ref 11.7–15.7)
HGB BLD-MCNC: 10.9 G/DL (ref 11.7–15.7)
HGB BLD-MCNC: 11 G/DL (ref 11.7–15.7)
HGB BLD-MCNC: 11.7 G/DL (ref 11.7–15.7)
INTERPRETATION ECG - MUSE: NORMAL
MAGNESIUM SERPL-MCNC: 2.2 MG/DL (ref 1.6–2.3)
OXYHGB MFR BLDV: 63 % (ref 92–100)
OXYHGB MFR BLDV: 64 % (ref 92–100)
OXYHGB MFR BLDV: 64 % (ref 92–100)
OXYHGB MFR BLDV: 65 % (ref 92–100)
OXYHGB MFR BLDV: 80 % (ref 92–100)
OXYHGB MFR BLDV: 81 % (ref 92–100)
OXYHGB MFR BLDV: 83 % (ref 92–100)
OXYHGB MFR BLDV: 96 % (ref 92–100)
P AXIS - MUSE: 55 DEGREES
POTASSIUM BLD-SCNC: 3.7 MMOL/L (ref 3.4–5.3)
PR INTERVAL - MUSE: 152 MS
PROT SERPL-MCNC: 5.9 G/DL (ref 6.8–8.8)
QRS DURATION - MUSE: 84 MS
QT - MUSE: 380 MS
QTC - MUSE: 499 MS
R AXIS - MUSE: 43 DEGREES
SODIUM SERPL-SCNC: 132 MMOL/L (ref 133–144)
SYSTOLIC BLOOD PRESSURE - MUSE: NORMAL MMHG
T AXIS - MUSE: 21 DEGREES
UFH PPP CHRO-ACNC: 0.32 IU/ML
UFH PPP CHRO-ACNC: 0.45 IU/ML
UFH PPP CHRO-ACNC: <0.1 IU/ML
VENTRICULAR RATE- MUSE: 104 BPM

## 2022-01-25 PROCEDURE — 250N000013 HC RX MED GY IP 250 OP 250 PS 637: Performed by: NURSE PRACTITIONER

## 2022-01-25 PROCEDURE — 250N000013 HC RX MED GY IP 250 OP 250 PS 637: Performed by: STUDENT IN AN ORGANIZED HEALTH CARE EDUCATION/TRAINING PROGRAM

## 2022-01-25 PROCEDURE — 85018 HEMOGLOBIN: CPT

## 2022-01-25 PROCEDURE — 99152 MOD SED SAME PHYS/QHP 5/>YRS: CPT | Performed by: INTERNAL MEDICINE

## 2022-01-25 PROCEDURE — 99153 MOD SED SAME PHYS/QHP EA: CPT | Performed by: INTERNAL MEDICINE

## 2022-01-25 PROCEDURE — 250N000011 HC RX IP 250 OP 636: Performed by: INTERNAL MEDICINE

## 2022-01-25 PROCEDURE — 99223 1ST HOSP IP/OBS HIGH 75: CPT | Mod: 25 | Performed by: INTERNAL MEDICINE

## 2022-01-25 PROCEDURE — C1769 GUIDE WIRE: HCPCS | Performed by: INTERNAL MEDICINE

## 2022-01-25 PROCEDURE — 93456 R HRT CORONARY ARTERY ANGIO: CPT | Performed by: INTERNAL MEDICINE

## 2022-01-25 PROCEDURE — 258N000003 HC RX IP 258 OP 636: Performed by: STUDENT IN AN ORGANIZED HEALTH CARE EDUCATION/TRAINING PROGRAM

## 2022-01-25 PROCEDURE — 83735 ASSAY OF MAGNESIUM: CPT | Performed by: STUDENT IN AN ORGANIZED HEALTH CARE EDUCATION/TRAINING PROGRAM

## 2022-01-25 PROCEDURE — 272N000001 HC OR GENERAL SUPPLY STERILE: Performed by: INTERNAL MEDICINE

## 2022-01-25 PROCEDURE — 250N000011 HC RX IP 250 OP 636: Performed by: STUDENT IN AN ORGANIZED HEALTH CARE EDUCATION/TRAINING PROGRAM

## 2022-01-25 PROCEDURE — 82810 BLOOD GASES O2 SAT ONLY: CPT

## 2022-01-25 PROCEDURE — C1894 INTRO/SHEATH, NON-LASER: HCPCS | Performed by: INTERNAL MEDICINE

## 2022-01-25 PROCEDURE — C1887 CATHETER, GUIDING: HCPCS | Performed by: INTERNAL MEDICINE

## 2022-01-25 PROCEDURE — 214N000001 HC R&B CCU UMMC

## 2022-01-25 PROCEDURE — 999N000248 HC STATISTIC IV INSERT WITH US BY RN

## 2022-01-25 PROCEDURE — 36415 COLL VENOUS BLD VENIPUNCTURE: CPT | Performed by: INTERNAL MEDICINE

## 2022-01-25 PROCEDURE — 80053 COMPREHEN METABOLIC PANEL: CPT | Performed by: STUDENT IN AN ORGANIZED HEALTH CARE EDUCATION/TRAINING PROGRAM

## 2022-01-25 PROCEDURE — 36415 COLL VENOUS BLD VENIPUNCTURE: CPT | Performed by: STUDENT IN AN ORGANIZED HEALTH CARE EDUCATION/TRAINING PROGRAM

## 2022-01-25 PROCEDURE — 250N000009 HC RX 250: Performed by: INTERNAL MEDICINE

## 2022-01-25 PROCEDURE — 99222 1ST HOSP IP/OBS MODERATE 55: CPT | Performed by: THORACIC SURGERY (CARDIOTHORACIC VASCULAR SURGERY)

## 2022-01-25 PROCEDURE — 85520 HEPARIN ASSAY: CPT | Performed by: INTERNAL MEDICINE

## 2022-01-25 RX ORDER — OXYCODONE HYDROCHLORIDE 10 MG/1
10 TABLET ORAL EVERY 4 HOURS PRN
Status: DISCONTINUED | OUTPATIENT
Start: 2022-01-25 | End: 2022-02-05

## 2022-01-25 RX ORDER — SODIUM CHLORIDE 9 MG/ML
75 INJECTION, SOLUTION INTRAVENOUS CONTINUOUS
Status: ACTIVE | OUTPATIENT
Start: 2022-01-25 | End: 2022-01-25

## 2022-01-25 RX ORDER — NITROGLYCERIN 5 MG/ML
VIAL (ML) INTRAVENOUS
Status: DISCONTINUED | OUTPATIENT
Start: 2022-01-25 | End: 2022-01-25 | Stop reason: HOSPADM

## 2022-01-25 RX ORDER — FENTANYL CITRATE 50 UG/ML
25 INJECTION, SOLUTION INTRAMUSCULAR; INTRAVENOUS
Status: DISCONTINUED | OUTPATIENT
Start: 2022-01-25 | End: 2022-01-26 | Stop reason: CLARIF

## 2022-01-25 RX ORDER — ONDANSETRON 2 MG/ML
4 INJECTION INTRAMUSCULAR; INTRAVENOUS EVERY 6 HOURS PRN
Status: DISCONTINUED | OUTPATIENT
Start: 2022-01-25 | End: 2022-01-31

## 2022-01-25 RX ORDER — FUROSEMIDE 10 MG/ML
40 INJECTION INTRAMUSCULAR; INTRAVENOUS DAILY
Status: DISCONTINUED | OUTPATIENT
Start: 2022-01-26 | End: 2022-01-28

## 2022-01-25 RX ORDER — ACETAMINOPHEN 325 MG/1
325 TABLET ORAL EVERY 4 HOURS PRN
Status: DISCONTINUED | OUTPATIENT
Start: 2022-01-25 | End: 2022-01-26

## 2022-01-25 RX ORDER — HYDRALAZINE HYDROCHLORIDE 10 MG/1
10 TABLET, FILM COATED ORAL 4 TIMES DAILY
Status: DISCONTINUED | OUTPATIENT
Start: 2022-01-25 | End: 2022-01-27

## 2022-01-25 RX ORDER — NICARDIPINE HYDROCHLORIDE 2.5 MG/ML
INJECTION INTRAVENOUS
Status: DISCONTINUED | OUTPATIENT
Start: 2022-01-25 | End: 2022-01-25 | Stop reason: HOSPADM

## 2022-01-25 RX ORDER — IOPAMIDOL 755 MG/ML
INJECTION, SOLUTION INTRAVASCULAR
Status: DISCONTINUED | OUTPATIENT
Start: 2022-01-25 | End: 2022-01-25 | Stop reason: HOSPADM

## 2022-01-25 RX ORDER — NALOXONE HYDROCHLORIDE 0.4 MG/ML
0.2 INJECTION, SOLUTION INTRAMUSCULAR; INTRAVENOUS; SUBCUTANEOUS
Status: ACTIVE | OUTPATIENT
Start: 2022-01-25 | End: 2022-01-25

## 2022-01-25 RX ORDER — NALOXONE HYDROCHLORIDE 0.4 MG/ML
0.4 INJECTION, SOLUTION INTRAMUSCULAR; INTRAVENOUS; SUBCUTANEOUS
Status: ACTIVE | OUTPATIENT
Start: 2022-01-25 | End: 2022-01-25

## 2022-01-25 RX ORDER — OXYCODONE HYDROCHLORIDE 5 MG/1
5 TABLET ORAL EVERY 4 HOURS PRN
Status: DISCONTINUED | OUTPATIENT
Start: 2022-01-25 | End: 2022-02-05

## 2022-01-25 RX ORDER — ISOSORBIDE DINITRATE 20 MG/1
20 TABLET ORAL
Status: DISCONTINUED | OUTPATIENT
Start: 2022-01-25 | End: 2022-01-31

## 2022-01-25 RX ORDER — ATROPINE SULFATE 0.1 MG/ML
0.5 INJECTION INTRAVENOUS
Status: ACTIVE | OUTPATIENT
Start: 2022-01-25 | End: 2022-01-25

## 2022-01-25 RX ORDER — FLUMAZENIL 0.1 MG/ML
0.2 INJECTION, SOLUTION INTRAVENOUS
Status: ACTIVE | OUTPATIENT
Start: 2022-01-25 | End: 2022-01-25

## 2022-01-25 RX ORDER — FENTANYL CITRATE 50 UG/ML
INJECTION, SOLUTION INTRAMUSCULAR; INTRAVENOUS
Status: DISCONTINUED | OUTPATIENT
Start: 2022-01-25 | End: 2022-01-25 | Stop reason: HOSPADM

## 2022-01-25 RX ORDER — ACETAMINOPHEN 325 MG/1
650 TABLET ORAL EVERY 4 HOURS PRN
Status: DISCONTINUED | OUTPATIENT
Start: 2022-01-25 | End: 2022-02-04

## 2022-01-25 RX ADMIN — SIMVASTATIN 20 MG: 20 TABLET, FILM COATED ORAL at 22:09

## 2022-01-25 RX ADMIN — HYDRALAZINE HYDROCHLORIDE 10 MG: 10 TABLET, FILM COATED ORAL at 22:08

## 2022-01-25 RX ADMIN — Medication 1 TABLET: at 00:12

## 2022-01-25 RX ADMIN — HYDRALAZINE HYDROCHLORIDE 10 MG: 10 TABLET, FILM COATED ORAL at 15:49

## 2022-01-25 RX ADMIN — LEVOTHYROXINE SODIUM 50 MCG: 0.05 TABLET ORAL at 07:43

## 2022-01-25 RX ADMIN — FUROSEMIDE 40 MG: 10 INJECTION, SOLUTION INTRAVENOUS at 08:55

## 2022-01-25 RX ADMIN — HEPARIN SODIUM 1250 UNITS/HR: 1000 INJECTION INTRAVENOUS; SUBCUTANEOUS at 15:47

## 2022-01-25 RX ADMIN — SIMVASTATIN 20 MG: 20 TABLET, FILM COATED ORAL at 00:12

## 2022-01-25 RX ADMIN — AMLODIPINE BESYLATE 5 MG: 5 TABLET ORAL at 07:43

## 2022-01-25 RX ADMIN — THERA TABS 1 TABLET: TAB at 07:43

## 2022-01-25 RX ADMIN — Medication 1 TABLET: at 22:09

## 2022-01-25 RX ADMIN — Medication 1 TABLET: at 07:47

## 2022-01-25 ASSESSMENT — ACTIVITIES OF DAILY LIVING (ADL)
ADLS_ACUITY_SCORE: 5
ADLS_ACUITY_SCORE: 7
ADLS_ACUITY_SCORE: 5
ADLS_ACUITY_SCORE: 16
ADLS_ACUITY_SCORE: 5
ADLS_ACUITY_SCORE: 7

## 2022-01-25 NOTE — PLAN OF CARE
[id number]:  Neuro: A&O x4. Call light appropriate. Up to commode with assist x1.   Card: SR with Twave inversion. Murmur present on auscultation. BPs stable. Afebrile.   Pulm: RA. Lungs clear, dim in bases. Pt reports SOB and KUMAR but does feel it is improving over course of night. Frequent dry cough.   Heme: Heparin gtt@1200units/hr. Next Xa due at 0630.    GI: NPO since midnight for possible tests/procedure today. Active BS, no BM overnight.   : Voiding frequently with good response to Lasix.   Skin: Intact. Preventive mepilex on sacrum.     Will continue to monitor and notify team with updates.

## 2022-01-25 NOTE — PLAN OF CARE
Admitted/transferred from: [facility]   Reason for admission/transfer: Further mgmt of newly diagnosed VSD   2 RN skin assessment: completed by Elisa RN and Erica RN  Result of skin assessment and interventions/actions: Skin intact, protective mepilex applied to sacrum  Height, weight, drug calc weight: Done  Patient belongings (see Flowsheet) - purse, phone, apple watch at bedside  MDRO education added to care plan N/A  ?  Pt seen by Enrique BRINK, plan to make NPO at midnight for possible tests/procedures tomorrow and continue Heparin gtt from OSH. Will check Xa at 2300. Order also obtained for Lasix. Order to transfer pt from ICU.

## 2022-01-25 NOTE — PROGRESS NOTES
[facility]   Cardiology   Progress Note     ASSESSMENT/PLAN:  Kimberley Mullins is a 81 year old female admitted on (Not on file). She has PMH only notable for HTN, hypothyroidism, and recent [facility] admission (1/14-1/17) for N/V and hyponatremia, and is being transferred to [facility] for shortness of breath 2/2 new heart failure and VSD.      # Acute diastolic heart failure   # Ventral septal defect, new  Patient admitted to [facility] with 2w worsening SOB and BETINA, weight gain. Echo shows new VSD with L>R shunt, EF 60-65%, likely WMA RCA territory. Patient transferred for possible surgical intervention of VSD. No previous hx of CAD, recent MI, heart failure.     - Coronary angiogram (diagnostic), RHC today  - NPO for procedure  - CVTS consulted  - Volume Status: hypervolemic, decrease IV Lasix 40 mg daily (Lasix naive), will adjust based on RHC  - Start low dose hydral/isordil for afterload reduction, to minimize shunting  - Can consider cMRI to further assess VSD, but won't change plan of needing surgical intervention  - Strict I/O  - Daily BMP  - K>4, Mg>2  - Transfer to cardiac telemetry     # Small segmental PE  D-dimer positive at OSH 2.13, CT PE positive for acute vs chronic small subsegmental PE. Hemodynamically stable.   - Heparin gtt high intensity     # Hypertension   BP stable 110-120/70-80's  - Start low dose hydral/isordil for afterload reduction; this can be stopped after surgery  - Hold PTA Norvasc currently     # Hyponatremia, hypervolemic  # IBS with diarrhea  Recent hospitalization for diarrhea, HypoNa, no recent diarrhea/vomiting since discharge. Na on admit 131.   - Diuresis per above    - BMP, Mg monitoring while diuresing     # Weakness   Pt reports worsening over last several weeks  - PT/OT consult (can be prior to surgery)     Hyperlipidemia- PTA Simvastatin 20  Hypothyroidism - PTA Levothyroxine 50mcg    FEN: NPO for procedure, ADAT Cardiac diet post  procedure  Code status: Full  Prophylaxis:  hep gtt  Isolation: None  Disposition: transfer to floor, remain hospitalized prior to surgery    Patient seen and discussed with Dr. Pickering, who agrees with above plan.    Marlys SOLORZANO, CNP  [facility] Cardiology Team    Interval History:  - Patient given 2 doses IV Lasix overnight (given late transfer), was net negative 3.5L, weight down 8 lbs, Cr stable  - Pt denies any SOB at rest, reports has not ambulated much since time of transfer  - She continues to report cough, pt states this is chronic, has gone on for years, pt daughter reports worse over last couple weeks  - Pt denies any chest pain, SOB, lightheadedness, dizziness    Physical Exam:  Temp:  [97.6  F (36.4  C)-98.5  F (36.9  C)] 97.6  F (36.4  C)  Pulse:  [] 92  Resp:  [14-27] 20  BP: (105-134)/(70-93) 116/79  SpO2:  [84 %-97 %] 95 %    I/O:  Intake/Output Summary (Last 24 hours) at 1/25/2022 1146  Last data filed at 1/25/2022 1030  Gross per 24 hour   Intake 342.87 ml   Output 4375 ml   Net -4032.13 ml         Wt:   Wt Readings from Last 5 Encounters:   01/24/22 68.6 kg (151 lb 3.8 oz)   01/24/22 72.2 kg (159 lb 3.2 oz)   01/19/22 71.2 kg (157 lb)   01/17/22 69.6 kg (153 lb 6.4 oz)   01/14/22 63 kg (139 lb)       General: NAD  HEENT:  PERRLA, EOMI.   Neck: no JVD  CV: RRR. Loud murmur, heard t/o. No rubs or gallops. Peripheral radial pulse intact.  Resp: No increased work of breathing or use of accessory muscles, breathing comfortably on room air.  Lung sounds clear throughout/bilaterally  Abdomen:  Normal active bowel sounds.  Abdomen is soft. No distension, non-tender to palpation.    Extremities: Warm. Capillary refill less than 3 sec. 2/4 radial pulses bilaterally. 2/4 pedal pulses bilaterally. Trace BLE edema. No cyanosis or clubbing.  Skin:  Warm and dry. No erythema, rashes, ulceration or diaphoresis.  Neuro: Alert and oriented x3.      Medications:    [Held by provider] amLODIPine  5 mg Oral  Daily     furosemide  40 mg Intravenous BID     hydrALAZINE  10 mg Oral or Feeding Tube 4x Daily     isosorbide dinitrate  20 mg Oral TID     levothyroxine  50 mcg Oral Daily     multivitamin  1 tablet Oral BID     multivitamin, therapeutic  1 tablet Oral Daily     simvastatin  20 mg Oral At Bedtime       heparin Stopped (01/25/22 1045)     ACE/ARB/ARNI NOT PRESCRIBED       BETA BLOCKER NOT PRESCRIBED         Labs:   CMP  Recent Labs   Lab 01/25/22  0359 01/24/22  1151 01/19/22  1613   * 131* 130.9*   POTASSIUM 3.7 4.5 4.75   CHLORIDE 97 97 100.4   CO2 20 26 25.6   ANIONGAP 15* 8  --    GLC 84 109* 112*   BUN 10 11 13  17.8   CR 0.59 0.71 0.73   GFRESTIMATED 89 84  --    ANANDA 8.3* 8.6 8.7   MAG 2.2  --   --    PROTTOTAL 5.9* 6.5*  --    ALBUMIN 3.0* 3.3*  --    BILITOTAL 0.6 0.6  --    ALKPHOS 50 60  --    AST 28 25  --    ALT 41 49  --      CBC  Recent Labs   Lab 01/24/22  2235 01/24/22  1151 01/24/22  0000   WBC 9.3 8.4 9.6   RBC 3.55* 3.49* 3.48*   HGB 11.7 11.6* 11.7   HCT 35.2 35.1 35.3   MCV 99 101* 101.5*   MCH 33.0 33.2* 33.6*   MCHC 33.2 33.0 33.1   RDW 15.6* 15.6*  --     214 265     INR  Recent Labs   Lab 01/24/22  1701 01/24/22  1151   INR 1.08 1.09     Arterial Blood GasNo lab results found in last 7 days.    Diagnostics:  ECG 1/24/2022: ST, , TWI V2-V4      Recent Results (from the past 24 hour(s))   XR Chest Port 1 View    Narrative    XR CHEST PORT 1 VIEW 1/24/2022 12:38 PM    HISTORY: SOB    COMPARISON: 1/14/2022      Impression    IMPRESSION: Stable small bilateral pleural effusions, bibasilar  atelectasis and mild pulmonary edema. Mild cardiomegaly. No  pneumothorax. Left humerus plate and screw fixation.    IVAN ANGELES MD         SYSTEM ID:  [id number]   Echocardiogram Limited   Result Value    LVEF  60-65%    Narrative    [id number]  [id number]  [id number]  [id number]^^IVAN^[facility]  Echocardiography Laboratory  [address] [place]  [address]     Name: ALEXA EDGE  MRN: [id number]  : 1940  Study Date: 2022 12:01 PM  Age: 81 yrs  Gender: Female  Patient Location: [facility]  Reason For Study: SOB  Ordering Physician: IVAN BIGGS  Referring Physician: Renetta Rizvi  Performed By: Ita Browning RDCS     BSA: 1.7 m2  Height: 63 in  Weight: 157 lb  HR: 104  BP: 145/97 mmHg  ______________________________________________________________________________  Procedure  Limited Portable Echo Adult.  ______________________________________________________________________________  Interpretation Summary     Left ventricular systolic function is normal.  The visual ejection fraction is 60-65%.  No regional wall motion abnormalities noted.  A muscular ventricular septal defect is present.  This appears to be in mid to distal interventricular septum with left to right  flow, measuring approximately 9-10mm. Qp/Qs calculated at 2 but accuracy  limited. Consider Cardiac MRI with flow if clinically appropriate.  Right ventricular systolic pressure is elevated, consistent with mild to  moderate pulmonary hypertension.  Compared to stress echo from , this VSD is new finding but the  Interventricular septum was not optimally imaged on prior study.  Findings discussed with Dr Biggs today at 12.50pm.  The study was technically adequate.  ______________________________________________________________________________  Left Ventricle  The left ventricle is normal in size. There is normal left ventricular wall  thickness. Left ventricular systolic function is normal. The visual ejection  fraction is 60-65%. Left ventricular diastolic function is indeterminate. No  regional wall motion abnormalities noted. A muscular ventricular septal defect  is present. This appears to be in mid to distal interventricular septum with  left to right flow, measuring approximately 9-10mm. Qp/Qs calculated at 2 but  accuracy limited. Consider Cardiac MRI with flow  if clinically appropriate.     Right Ventricle  The right ventricle is mildly dilated. Mildly decreased right ventricular  systolic function.     Atria  The left atrium is mildly dilated. The right atrium is mildly dilated.     Mitral Valve  There is trace mitral regurgitation.     Tricuspid Valve  There is mild to moderate (1-2+) tricuspid regurgitation. The right  ventricular systolic pressure is approximated at 40.5 mmHg plus the right  atrial pressure. Right ventricular systolic pressure is elevated, consistent  with mild to moderate pulmonary hypertension.     Aortic Valve  There is mild trileaflet aortic sclerosis. There is trace aortic  regurgitation. No aortic stenosis is present.     Pulmonic Valve  The pulmonic valve is not well visualized.     Vessels  The ascending aorta is Mildly dilated.     Pericardium  Trivial pericardial effusion. Small left pleural effusion.     Rhythm  Sinus rhythm was noted.  ______________________________________________________________________________  MMode/2D Measurements & Calculations  asc Aorta Diam: 4.2 cm     LVOT diam: 2.3 cm  LVOT area: 4.2 cm2  RVOT diam: 2.7 cm     Doppler Measurements & Calculations  LV V1 max PG: 3.0 mmHg  LV V1 max: 86.7 cm/sec  LV V1 VTI: 12.1 cm  SV(LVOT): 50.3 ml  SI(LVOT): 28.8 ml/m2  TR max lalita: 318.2 cm/sec  TR max P.5 mmHg  Qp/Qs: 2.0/1.0  PVR: 182.0     ______________________________________________________________________________                     VSD     Report approved by: Liz Quarles 2022 12:50 PM         CT Chest Pulmonary Embolism w Contrast    Narrative    CT CHEST PULMONARY EMBOLISM W CONTRAST 2022 3:55 PM    CLINICAL HISTORY: PE suspected, low/intermediate prob, positive  D-dimer  TECHNIQUE: CT angiogram chest during arterial phase injection IV  contrast. 2D and 3D MIP reconstructions were performed by the CT  technologist. Dose reduction techniques were used.     CONTRAST: 57mL [id number]    COMPARISON:  Chest x-ray earlier today    FINDINGS:  ANGIOGRAM CHEST: Small nonocclusive filling defect in right lower lobe  segmental pulmonary artery (series 6, image 151, series 9, image 44  and series 10, image 40) is suspicious for an age indeterminate,  possibly chronic lung embolus. No other pulmonary emboli. Thoracic  aorta is negative for dissection. No CT evidence of right heart  strain.    LUNGS AND PLEURA: Small-to-moderate bilateral pleural effusions, right  greater than left. Mild intralobular septal thickening suggestive of  pulmonary edema. Mild bibasilar atelectasis. No focal infiltrate or  pneumothorax.    MEDIASTINUM/AXILLAE: Cardiomegaly. Defect in the anterior ventricular  septum (series 6, image 212) could represent the known VSD although  this evaluation is slightly limited by motion. No thoracic aortic  aneurysm. Mild coronary artery calcifications. No pericardial  effusion.    UPPER ABDOMEN: Normal.    MUSCULOSKELETAL: Mild degenerative changes in the spine. No suspicious  lesions in the bones.      Impression    IMPRESSION:  1.  Small nonocclusive filling defect in a right lower lobe segmental  pulmonary artery could represent an age-indeterminate small pulmonary  embolus, acute or chronic.  2.  Mild pulmonary edema and small-to-moderate bilateral pleural  effusions.   3.  Cardiomegaly. Ventricular septal defect, evaluation of which is  slightly limited by motion.    IVAN ANGELES MD         SYSTEM ID:  [id number]       Time Spent on this Encounter   I spent 25 minutes on the unit/floor managing the care of Kimberley Mullins. Over 50% of my time was spent on the following:   - Counseling the patient and/or family regarding: diagnostic results, prognosis and risks and benefits of treatment options  - Coordination of care with the: consultant(s), nurse and family (daughter)      Saba Betancourt, CORRY

## 2022-01-25 NOTE — H&P
[facility]    Cardiology History and Physical - Cardiology         Date of Admission:  (Not on file)    Assessment & Plan: [facility]    Kimberley Mullins is a 81 year old female admitted on (Not on file). She has PMH only notable for HTN, hypothyroidism, and recent [facility] admission (1/14-1/17) for N/V and hyponatremia, and is being transferred to [facility] for shortness of breath 2/2 new heart failure and VSD.       Acute heart failure exacerbation  Ventral septal defect, new  Today, pt has been evaluated by PCP and ED with approx 2w worsening SOB and BETINA, weight gain. Further evaluation found elevated BNP, CXR with signs of congestive heart failure, reportedly new murmur, TTE shows new VSD and e/o heart failure, new since 6/2021 echo exercise stress test. Recommended transfer to cardiothoracic surgery-equipped facility for possible surgical intervention. No previous hx of heart failure, not on any PTA GDMTs.   - CVTS consult placed   - NPO at midnight   - Diuresis plan: s/p 20mg IV lasix at OSH   - 40mg IV lasix BID   - Strict I/O    - Daily weights   - K >4 Mg >2     Small segmental PE  D-dimer positive at OSH 2.13, CT PE positive for small subsegmental PE. Hemodynamically stable.   - Heparin gtt high intensity    Hypertension   - PTA Amlodipine 5    Hyponatremia, hypervolemic  IBS with diarrhea  Recent hospitalization for diarrhea, HypoNa, no recent diarrhea/vomiting since discharge. Na on admit 131.   - Diuresis per above    - BMP, Mg monitoring while diuresing    Weakness    - would benefit from PT/OT after surgical evaluation    Hyperlipidemia- PTA Simvastatin 20  Hypothyroidism - PTA Levothyroxine 50mcg       Diet: 2g Na, 2L fluid restrict, NPO at midnight  DVT Prophylaxis: Heparin gtt  Plascencia Catheter: Not present  Code Status: Full  Fluids: Diuresis  Lines: PIV     Disposition Plan   Expected discharge: 4 - 7 days, recommended to prior living arrangement once  "fluid volume status optimized on oral medication and plans for possible surgical intervention have been made. Pt arrived to room 4214, however is floor appropriate on arrival. Transfer orders to cardiac tele bed placed.     Entered: Bernadette Castillo MD 01/24/2022, 11:02 PM     The patient's care was discussed with the Patient and overnight cardiology fellow.    Bernadette Castillo MD  [facility]    ______________________________________________________________________    Chief Complaint   SOB    History is obtained from the patient and chart review    History of Present Illness   Kimberley Mullins is a 81 year old female who presents to [facility] with 2w worsening SOB, BETINA, and weight gain.     Today, patient was seen (along with son and daughter) for a follow-up visit by Family Medicine Dr. Daniels for leg swelling and significant fatigue, with murmur. Advised to go to the ED.     Pt reports illness symptoms since 1/10, COVID negative, negative again for COVID and flu 1/14. Felt quite weak, several episodes of emesis with nausea, poor PO intake. Pt was admitted to the hospital 1/14-1/17 for what was thought to be viral illness, c/b hyponatremia. Has since been staying with daughter. Repeat OP labs showed ongoing HypoNa, with new/worsening BETINA. Re-presented to PCP due to worsening illness and swelling despite elevating legs as much as possible. Exam notable for new murmur not previously documented.  Cough is ongoing and significantly bothersome with clear phlegm. Some SOB at rest with profound KUMAR, some wheezing. Has had intermittent back pain over past couple of months, onset if she's working hard in her kitchen, resolves with a few minutes of rest, between her shoulder blades, and severity described as \"quite noticeable I don't stop what I'm doing.\"     Baseline physical activity incl working out at the [facility], though had leg weakness over the summer and inability to walk around " the block, since resolved. Currently cannot walk more than a few feet without significant SOB. Worsening anxiety associated with illness in recent days. Otherwise, no fevers, no chest pain, calf pain, no sinus congestion. Reports foul smelling urine. Recent Abx at previous hospital. Decreased PO intake. Reports her dry weight would be 143lb at home, quite surprised to hear it was 155 on her recent hospital admit, and BETINA has only developed in interim after she was discharged home. Uses 2 pillows to sleep chronically, reports she wouldn't be able to lay flat if she tried right now.     Pt went to ED, noted to be mildly tachypneic but on RA with conversation, CXR, BNP, TTE all c/w CHF and new VSD. Elevated D-dimer prompted CTA chest, which found small subsegmental PE. Was evaluated by cardiology, recommended initiation of Heparin, recommended transfer to hospital with cardiothoracic surgeons available. Pt arrived to [facility] approx 22:00, hemodynamically stable breathing comfortably on RA in NAD.     Review of Systems    The 10 point Review of Systems is negative other than noted in the HPI or here.     Past Medical History    I have reviewed this patient's medical history and updated it with pertinent information if needed.   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  Past Surgical History:   Procedure Laterality Date     ARTHROPLASTY KNEE   10/25/2011    Procedure:ARTHROPLASTY KNEE; Right Total Knee Arthroplasty   ; Surgeon:BRYCE ATWOOD; Location:[facility] OR     ARTHROPLASTY KNEE  6/2/2014    Procedure: ARTHROPLASTY KNEE;  Surgeon: Bryce Atwood MD;  Location: [facility] OR     HC DILATION/CURETTAGE DIAG/THER NON OB  2006    D & C     HC KNEE SCOPE, DIAGNOSTIC      Arthroscopy, Knee     ZZC AFF FOREARM/WRIST SURGERY UNLISTED      orif radius fx distal left     ZZC APPENDECTOMY  1955     ZZ OPEN JESSI FIXATN HUMERAL SHAFT FX  12/2007    Dr Massey     [facility] COLONOSCOPY W SNARE REMOVAL TUMOR/POLYP/LESION  9/03 & 3/2011    polyp removal during screening colonoscopy       Social History   I have reviewed this patient's social history and updated it with pertinent information if needed.  Social History     Tobacco Use     Smoking status: Never Smoker     Smokeless tobacco: Never Used   Substance Use Topics     Alcohol use: Yes     Alcohol/week: 5.8 standard drinks     Types: 7 Standard drinks or equivalent per week     Comment: two to three glasses of wine q day.     Drug use: No     Family History   I have reviewed this patient's family history and updated it with pertinent information if needed.   I have reviewed this patient's family history and updated it with pertinent information if needed.  Family History   Problem Relation Age of Onset     Heart Disease Father         MI age 70       Prior to Admission Medications   Prior to Admission Medications   Prescriptions Last Dose Informant Patient Reported? Taking?   Multiple Vitamins-Minerals (PRESERVISION AREDS PO)   Yes No   Sig: Take 1 tablet by mouth 2 times daily   amLODIPine (NORVASC) 5 MG tablet   No No   Sig: TAKE 1 TABLET (5 MG) BY MOUTH DAILY   levothyroxine (SYNTHROID/LEVOTHROID) 50 MCG tablet   No No   Sig: Take 1 tablet (50 mcg) by mouth daily   multivitamin, therapeutic (THERA-VIT) TABS tablet   Yes No   Sig: Take 1 tablet by mouth daily   ondansetron (ZOFRAN-ODT) 4 MG ODT tab   No No   Sig:  Take 1 tablet (4 mg) by mouth every 8 hours as needed for nausea   Patient not taking: Reported on 1/24/2022   simvastatin (ZOCOR) 20 MG tablet   No No   Sig: TAKE 1 TABLET (20 MG) BY MOUTH AT BEDTIME      Facility-Administered Medications: None     Allergies   Allergies   Allergen Reactions     No Known Allergies        Physical Exam   Vital Signs: Temp: 97.8  F (36.6  C) Temp src: Oral BP: 133/86 Pulse: 101   Resp: 22 SpO2: 93 % O2 Device: None (Room air)    Weight: 151 lbs 3.77 oz    GEN: Mildly anxious though comfortable appearing, alert, NAD  HEENT: NCAT, EOMI, MMM  CVS: tachycardic, regular rhythm, Normal S1/S2. Grade 3/6 systolic murmur heard best LLSB, no R/G. JVP approx 12cm  Pulm: decreased breath sounds bl, with mild end expiratory wheezing in RLL. On RA  Abd: soft, NTND  Ext: WWP, 1+ pitting BETINA around ankles, and non-pitting edema to mid shin bl.  Skin: No lesions/bruises on exposed skin surfaces  Neuro: Answering questions appropriately, moving all extremities spontaneously in bed, no facial asymmetry noted      Data   Data reviewed today: I reviewed all medications, new labs and imaging results over the last 24 hours.    1/24 CXR at [facility]:  IMPRESSION: Stable small bilateral pleural effusions, bibasilar  atelectasis and mild pulmonary edema. Mild cardiomegaly. No  pneumothorax. Left humerus plate and screw fixation.    1/24 CT chest  IMPRESSION:  1.  Small nonocclusive filling defect in a right lower lobe segmental  pulmonary artery could represent an age-indeterminate small pulmonary  embolus, acute or chronic.  2.  Mild pulmonary edema and small-to-moderate bilateral pleural  effusions.   3.  Cardiomegaly. Ventricular septal defect, evaluation of which is  slightly limited by motion.    1/24 limited echo: reviewed and significant VSD noted. Formal read still pending.     1/24 EKG: Reviewed, no significant change from 2014      Recent Labs   Lab 01/24/22  2235 01/24/22  1701 01/24/22  1151  01/24/22  0000 01/19/22  1613   WBC 9.3  --  8.4 9.6  --    HGB 11.7  --  11.6* 11.7  --    MCV 99  --  101* 101.5*  --      --  214 265  --    INR  --  1.08 1.09  --   --    NA  --   --  131*  --  130.9*   POTASSIUM  --   --  4.5  --  4.75   CHLORIDE  --   --  97  --  100.4   CO2  --   --  26  --  25.6   BUN  --   --  11  --  13  17.8   CR  --   --  0.71  --  0.73   ANIONGAP  --   --  8  --   --    ANANDA  --   --  8.6  --  8.7   GLC  --   --  109*  --  112*   ALBUMIN  --   --  3.3*  --   --    PROTTOTAL  --   --  6.5*  --   --    BILITOTAL  --   --  0.6  --   --    ALKPHOS  --   --  60  --   --    ALT  --   --  49  --   --    AST  --   --  25  --   --

## 2022-01-25 NOTE — PHARMACY-ADMISSION MEDICATION HISTORY
Admission medication history completed at [facility]. Please see Pharmacy - Admission Medication History note from 1/24/2022 (Honey Dykes).    Reshma Arteaga, PharmD, BCCP, BCPS

## 2022-01-25 NOTE — CONSULTS
Cardiothoracic Surgery   History and Physical     Kimberley Mullins   1940   MRN: [id number]           Cardiothoracic Consult Note   Reason for Consult: VSD secondary to ischemic event          Assessment and Plan:     Kimberley Mullins is a 81 year old female with a PMHx of HTN and hypothyroidism, who presented to [facility] with complaints of SOB, extremity edema, and N/V. 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     - Recommend Left heart cath, coronary angiogram and Right heart cath today, will allow for calculation of shunt fraction and determination of culprit coronary lesion as well as any concomitant coronary pathology.   - Consider transesophageal echocardiogram to better delineate location of VSD  - Continue medical optimization with diuresis and afterload reduction  - Continue to delay intervention as tolerated from HF standpoint to increase success of intervention, timing of surgery may depend on need for intravenous medical management of heart failure which would push timing of intervention up vs if heart failure can ultimately be managed with oral therapy with possible discharge and follow up for more elective type repair  - Would discuss with interventional cardiology anatomical consideration for percutaneous closure, this route may be preferable due to her age.  - Small segmental PE (acute vs chronic), currently on heparin drip, would require long term anticoagulation once stable and no further interventions planned.  - Will continue to follow during work up.       Aldair Whitney MD  Cardiothoracic Surgery  [phone]            History of Present Illness:     Kimberley Mullins is a 81 year old female with a PMHx of HTN and hypothyroidism, who presented to [facility] with complaints of SOB, extremity edema, and N/V. She was found to have a new apical septal ischemic VSD (with L to R shunt) likely secondary to recent  anteroapical MI. She presented on 1/10 to her GP for worsening shortness of breath and severe nausea. Her symptoms and low sodium were attributed to a viral illness. She was sent home. She returned when her symptoms progressed to the point she was very short of breath, fatigued, and had significant lower extremity swelling. She was referred to [facility] ED where a limited ECHO identified a apical VSD. She was transferred to [facility] for escalation of care. She is currently without chest pain or shortness of breath at rest. Her nausea has resolved. She denies orthopnea, palpitations, syncope, or pre-syncope.     Home Meds:  Medications Prior to Admission   Medication Sig Dispense Refill Last Dose     amLODIPine (NORVASC) 5 MG tablet TAKE 1 TABLET (5 MG) BY MOUTH DAILY 90 tablet 0      levothyroxine (SYNTHROID/LEVOTHROID) 50 MCG tablet Take 1 tablet (50 mcg) by mouth daily 90 tablet 3      Multiple Vitamins-Minerals (PRESERVISION AREDS PO) Take 1 tablet by mouth 2 times daily        multivitamin, therapeutic (THERA-VIT) TABS tablet Take 1 tablet by mouth daily        ondansetron (ZOFRAN-ODT) 4 MG ODT tab Take 1 tablet (4 mg) by mouth every 8 hours as needed for nausea (Patient not taking: Reported on 1/24/2022) 15 tablet 0      simvastatin (ZOCOR) 20 MG tablet TAKE 1 TABLET (20 MG) BY MOUTH AT BEDTIME 90 tablet 3        Allergies:  Allergies   Allergen Reactions     No Known Allergies        PMH:  Past Medical History:   Diagnosis Date     Arthritis      Diverticulitis of colon (without mention of hemorrhage)(562.11) 1997    diverticulosis noted on Flex Sig     Irritable bowel syndrome 1959     Osteopenia, unspecified location 4/5/2018     Other ovarian failure 1986     Overweight (BMI 25.0-29.9) 4/5/2018     Personal history of colonic polyps 2004     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     Thyroid disease      Transient global amnesia      Unspecified tinnitus     Tinnitus       PSH:  Past Surgical History:    Procedure Laterality Date     ARTHROPLASTY KNEE  10/25/2011    Procedure:ARTHROPLASTY KNEE; Right Total Knee Arthroplasty   ; Surgeon:BRYCE ATWOOD; Location:[facility] OR     ARTHROPLASTY KNEE  6/2/2014    Procedure: ARTHROPLASTY KNEE;  Surgeon: Bryce Atwood MD;  Location: [facility] OR     HC DILATION/CURETTAGE DIAG/THER NON OB  2006    D & C     HC KNEE SCOPE, DIAGNOSTIC      Arthroscopy, Knee     ZZC AFF FOREARM/WRIST SURGERY UNLISTED      orif radius fx distal left     ZZC APPENDECTOMY  1955     ZZC OPEN JESSI FIXATN HUMERAL SHAFT FX  12/2007    Dr Massey     ZZ COLONOSCOPY W SNARE REMOVAL TUMOR/POLYP/LESION  9/03 & 3/2011    polyp removal during screening colonoscopy       FH:  Family History   Problem Relation Age of Onset     Heart Disease Father         MI age 70       SH:  Social History     Socioeconomic History     Marital status:      Spouse name: Not on file     Number of children: 3     Years of education: Not on file     Highest education level: Not on file   Occupational History     Employer: RETIRED   Tobacco Use     Smoking status: Never Smoker     Smokeless tobacco: Never Used   Substance and Sexual Activity     Alcohol use: Yes     Alcohol/week: 5.8 standard drinks     Types: 7 Standard drinks or equivalent per week     Comment: two to three glasses of wine q day.     Drug use: No     Sexual activity: Yes     Partners: Male     Birth control/protection: Post-menopausal   Other Topics Concern      Service Not Asked     Blood Transfusions Not Asked     Caffeine Concern No     Comment: none     Occupational Exposure Not Asked     Hobby Hazards Not Asked     Sleep Concern Not Asked     Stress Concern Not Asked     Weight Concern Not Asked     Special Diet No     Comment: calcium daily     Back Care Not Asked     Exercise Yes     Comment: regular walks     Bike Helmet Not Asked     Seat Belt Not Asked     Self-Exams Not Asked   Social History Narrative     Not on file     Social  Determinants of Health     Financial Resource Strain: Not on file   Food Insecurity: Not on file   Transportation Needs: Not on file   Physical Activity: Not on file   Stress: Not on file   Social Connections: Not on file   Intimate Partner Violence: Not on file   Housing Stability: Not on file            Review of Systems:   See HPI for ROS. No cough, fevers, chills, sweats, diarrhea, abdominal pain.            Physical Exam:   Temp:  [97.6  F (36.4  C)-98.5  F (36.9  C)] 97.6  F (36.4  C)  Pulse:  [] 89  Resp:  [16-27] 18  BP: (105-134)/(70-98) 111/76  SpO2:  [84 %-97 %] 95 %     Gen: NAD, resting comfortably in bed, conversational  Skin: no rashes or bruises, warm, dry  HEENT: mild JVD, trachea midline, EOMI, anicteric  Lungs: CTA in all fields, no wheezing or rhonchi  CV: RRR, S1S2 normal, harsh holosystolic murmur heard best at 5th intercostal space anterior axillary line.  Abd: positive normal pitched bowel sounds, overall soft and non distended, nontender, no hepatosplenomegaly, guarding/rebound tenderness.   Musculoskeletal: grossly intact, strength 5/5 upper and lower extremities, bilateral pitting edema to knees  Neuro: AOx3, CN II-VII grossly intact, sensation/motor intact in upper and lower extremities  Mental: normal mood and affect, regular rate of speech           LABS:     BMP  Recent Labs   Lab 01/25/22  0359 01/24/22  1151 01/19/22  1613   * 131* 130.9*   POTASSIUM 3.7 4.5 4.75   CHLORIDE 97 97 100.4   ANANDA 8.3* 8.6 8.7   CO2 20 26 25.6   BUN 10 11 13  17.8   CR 0.59 0.71 0.73   GLC 84 109* 112*     CBC  Recent Labs   Lab 01/24/22  2235 01/24/22  1151 01/24/22  0000   WBC 9.3 8.4 9.6   RBC 3.55* 3.49* 3.48*   HGB 11.7 11.6* 11.7   HCT 35.2 35.1 35.3   MCV 99 101* 101.5*   MCH 33.0 33.2* 33.6*   MCHC 33.2 33.0 33.1   RDW 15.6* 15.6*  --     214 265     INR  Recent Labs   Lab 01/24/22  1701 01/24/22  1151   INR 1.08 1.09      Hepatic Panel  Recent Labs   Lab 01/25/22  0359  01/24/22  1151   AST 28 25   ALT 41 49   ALKPHOS 50 60   BILITOTAL 0.6 0.6   ALBUMIN 3.0* 3.3*            Imaging:     TTE 1/24/22-  Left ventricular systolic function is normal.  The visual ejection fraction is 60-65%.  No regional wall motion abnormalities noted.  A muscular ventricular septal defect is present.  This appears to be in mid to distal interventricular septum with left to right  flow, measuring approximately 9-10mm. Qp/Qs calculated at 2 but accuracy  limited. Consider Cardiac MRI with flow if clinically appropriate.  Right ventricular systolic pressure is elevated consistent with mild to moderate pulmonary hypertension.  Compared to stress echo from 6/21, this VSD is new finding but the Interventricular septum was not  optimally imaged on prior study.  Findings discussed with Dr Braden today at 12.50pm.  The study was technically adequate.         Problem List:     Patient Active Problem List   Diagnosis     Tinnitus     Transient global amnesia     Pure hypercholesterolemia     Osteoarthritis     Status post total knee replacement     Health Care Home     ACP (advance care planning)     Benign essential hypertension     Hypothyroidism     Irritable bowel syndrome with diarrhea     Osteopenia, unspecified location     Overweight (BMI 25.0-29.9)     Macular degeneration (senile) of retina     Cataract of both eyes, unspecified cataract type     Hyponatremia     Heart murmur     Shortness of breath     VSD (ventricular septal defect)

## 2022-01-26 ENCOUNTER — APPOINTMENT (OUTPATIENT)
Dept: CT IMAGING | Facility: CLINIC | Age: 82
DRG: 228 | End: 2022-01-26
Attending: NURSE PRACTITIONER
Payer: COMMERCIAL

## 2022-01-26 ENCOUNTER — APPOINTMENT (OUTPATIENT)
Dept: MRI IMAGING | Facility: CLINIC | Age: 82
DRG: 228 | End: 2022-01-26
Attending: NURSE PRACTITIONER
Payer: COMMERCIAL

## 2022-01-26 LAB
ANION GAP SERPL CALCULATED.3IONS-SCNC: 10 MMOL/L (ref 3–14)
BUN SERPL-MCNC: 9 MG/DL (ref 7–30)
CALCIUM SERPL-MCNC: 8.3 MG/DL (ref 8.5–10.1)
CHLORIDE BLD-SCNC: 97 MMOL/L (ref 94–109)
CO2 SERPL-SCNC: 29 MMOL/L (ref 20–32)
CREAT SERPL-MCNC: 0.68 MG/DL (ref 0.52–1.04)
ERYTHROCYTE [DISTWIDTH] IN BLOOD BY AUTOMATED COUNT: 15.6 % (ref 10–15)
GFR SERPL CREATININE-BSD FRML MDRD: 86 ML/MIN/1.73M2
GLUCOSE BLD-MCNC: 87 MG/DL (ref 70–99)
HCT VFR BLD AUTO: 30.9 % (ref 35–47)
HGB BLD-MCNC: 10.5 G/DL (ref 11.7–15.7)
MAGNESIUM SERPL-MCNC: 2.2 MG/DL (ref 1.6–2.3)
MCH RBC QN AUTO: 33.2 PG (ref 26.5–33)
MCHC RBC AUTO-ENTMCNC: 34 G/DL (ref 31.5–36.5)
MCV RBC AUTO: 98 FL (ref 78–100)
PLATELET # BLD AUTO: 202 10E3/UL (ref 150–450)
POTASSIUM BLD-SCNC: 3.1 MMOL/L (ref 3.4–5.3)
POTASSIUM BLD-SCNC: 3.5 MMOL/L (ref 3.4–5.3)
RBC # BLD AUTO: 3.16 10E6/UL (ref 3.8–5.2)
SODIUM SERPL-SCNC: 136 MMOL/L (ref 133–144)
UFH PPP CHRO-ACNC: 0.43 IU/ML
WBC # BLD AUTO: 6 10E3/UL (ref 4–11)

## 2022-01-26 PROCEDURE — 75572 CT HRT W/3D IMAGE: CPT | Mod: 26 | Performed by: STUDENT IN AN ORGANIZED HEALTH CARE EDUCATION/TRAINING PROGRAM

## 2022-01-26 PROCEDURE — 83735 ASSAY OF MAGNESIUM: CPT | Performed by: STUDENT IN AN ORGANIZED HEALTH CARE EDUCATION/TRAINING PROGRAM

## 2022-01-26 PROCEDURE — 82310 ASSAY OF CALCIUM: CPT | Performed by: NURSE PRACTITIONER

## 2022-01-26 PROCEDURE — 85520 HEPARIN ASSAY: CPT | Performed by: INTERNAL MEDICINE

## 2022-01-26 PROCEDURE — 36415 COLL VENOUS BLD VENIPUNCTURE: CPT | Performed by: INTERNAL MEDICINE

## 2022-01-26 PROCEDURE — 255N000002 HC RX 255 OP 636: Performed by: INTERNAL MEDICINE

## 2022-01-26 PROCEDURE — A9585 GADOBUTROL INJECTION: HCPCS | Performed by: INTERNAL MEDICINE

## 2022-01-26 PROCEDURE — 250N000013 HC RX MED GY IP 250 OP 250 PS 637: Performed by: NURSE PRACTITIONER

## 2022-01-26 PROCEDURE — 250N000011 HC RX IP 250 OP 636: Performed by: STUDENT IN AN ORGANIZED HEALTH CARE EDUCATION/TRAINING PROGRAM

## 2022-01-26 PROCEDURE — 99233 SBSQ HOSP IP/OBS HIGH 50: CPT | Performed by: INTERNAL MEDICINE

## 2022-01-26 PROCEDURE — 250N000013 HC RX MED GY IP 250 OP 250 PS 637: Performed by: STUDENT IN AN ORGANIZED HEALTH CARE EDUCATION/TRAINING PROGRAM

## 2022-01-26 PROCEDURE — 214N000001 HC R&B CCU UMMC

## 2022-01-26 PROCEDURE — 75561 CARDIAC MRI FOR MORPH W/DYE: CPT

## 2022-01-26 PROCEDURE — 250N000013 HC RX MED GY IP 250 OP 250 PS 637: Performed by: INTERNAL MEDICINE

## 2022-01-26 PROCEDURE — 99232 SBSQ HOSP IP/OBS MODERATE 35: CPT | Mod: GC | Performed by: INTERNAL MEDICINE

## 2022-01-26 PROCEDURE — 84132 ASSAY OF SERUM POTASSIUM: CPT | Performed by: INTERNAL MEDICINE

## 2022-01-26 PROCEDURE — 75572 CT HRT W/3D IMAGE: CPT

## 2022-01-26 PROCEDURE — 36415 COLL VENOUS BLD VENIPUNCTURE: CPT | Performed by: NURSE PRACTITIONER

## 2022-01-26 PROCEDURE — 258N000003 HC RX IP 258 OP 636: Performed by: STUDENT IN AN ORGANIZED HEALTH CARE EDUCATION/TRAINING PROGRAM

## 2022-01-26 PROCEDURE — 250N000011 HC RX IP 250 OP 636: Performed by: NURSE PRACTITIONER

## 2022-01-26 PROCEDURE — 85014 HEMATOCRIT: CPT | Performed by: NURSE PRACTITIONER

## 2022-01-26 PROCEDURE — 75561 CARDIAC MRI FOR MORPH W/DYE: CPT | Mod: 26 | Performed by: INTERNAL MEDICINE

## 2022-01-26 RX ORDER — GADOBUTROL 604.72 MG/ML
7.5 INJECTION INTRAVENOUS ONCE
Status: COMPLETED | OUTPATIENT
Start: 2022-01-26 | End: 2022-01-26

## 2022-01-26 RX ORDER — POTASSIUM CHLORIDE 750 MG/1
20 TABLET, EXTENDED RELEASE ORAL ONCE
Status: COMPLETED | OUTPATIENT
Start: 2022-01-26 | End: 2022-01-26

## 2022-01-26 RX ORDER — IOPAMIDOL 755 MG/ML
120 INJECTION, SOLUTION INTRAVASCULAR ONCE
Status: COMPLETED | OUTPATIENT
Start: 2022-01-26 | End: 2022-01-26

## 2022-01-26 RX ORDER — METOPROLOL TARTRATE 50 MG
50-100 TABLET ORAL
Status: DISCONTINUED | OUTPATIENT
Start: 2022-01-26 | End: 2022-01-26

## 2022-01-26 RX ORDER — LORAZEPAM 0.5 MG/1
0.5 TABLET ORAL ONCE
Status: COMPLETED | OUTPATIENT
Start: 2022-01-26 | End: 2022-01-26

## 2022-01-26 RX ORDER — FUROSEMIDE 10 MG/ML
40 INJECTION INTRAMUSCULAR; INTRAVENOUS ONCE
Status: COMPLETED | OUTPATIENT
Start: 2022-01-26 | End: 2022-01-26

## 2022-01-26 RX ORDER — POTASSIUM CHLORIDE 750 MG/1
40 TABLET, EXTENDED RELEASE ORAL ONCE
Status: DISCONTINUED | OUTPATIENT
Start: 2022-01-26 | End: 2022-01-26

## 2022-01-26 RX ORDER — POTASSIUM CHLORIDE 750 MG/1
40 TABLET, EXTENDED RELEASE ORAL ONCE
Status: COMPLETED | OUTPATIENT
Start: 2022-01-26 | End: 2022-01-26

## 2022-01-26 RX ADMIN — HYDRALAZINE HYDROCHLORIDE 10 MG: 10 TABLET, FILM COATED ORAL at 06:53

## 2022-01-26 RX ADMIN — ISOSORBIDE DINITRATE 20 MG: 20 TABLET ORAL at 17:33

## 2022-01-26 RX ADMIN — IOPAMIDOL 120 ML: 755 INJECTION, SOLUTION INTRAVENOUS at 11:06

## 2022-01-26 RX ADMIN — HYDRALAZINE HYDROCHLORIDE 10 MG: 10 TABLET, FILM COATED ORAL at 17:33

## 2022-01-26 RX ADMIN — FUROSEMIDE 40 MG: 10 INJECTION, SOLUTION INTRAVENOUS at 17:33

## 2022-01-26 RX ADMIN — POTASSIUM CHLORIDE 20 MEQ: 750 TABLET, EXTENDED RELEASE ORAL at 14:21

## 2022-01-26 RX ADMIN — Medication 1 TABLET: at 08:28

## 2022-01-26 RX ADMIN — Medication 1 TABLET: at 20:25

## 2022-01-26 RX ADMIN — HEPARIN SODIUM 1250 UNITS/HR: 1000 INJECTION INTRAVENOUS; SUBCUTANEOUS at 13:04

## 2022-01-26 RX ADMIN — HYDRALAZINE HYDROCHLORIDE 10 MG: 10 TABLET, FILM COATED ORAL at 11:39

## 2022-01-26 RX ADMIN — ISOSORBIDE DINITRATE 20 MG: 20 TABLET ORAL at 08:28

## 2022-01-26 RX ADMIN — ISOSORBIDE DINITRATE 20 MG: 20 TABLET ORAL at 11:39

## 2022-01-26 RX ADMIN — SIMVASTATIN 20 MG: 20 TABLET, FILM COATED ORAL at 21:06

## 2022-01-26 RX ADMIN — LEVOTHYROXINE SODIUM 50 MCG: 0.05 TABLET ORAL at 06:53

## 2022-01-26 RX ADMIN — POTASSIUM CHLORIDE 40 MEQ: 750 TABLET, EXTENDED RELEASE ORAL at 08:29

## 2022-01-26 RX ADMIN — GADOBUTROL 7.5 ML: 604.72 INJECTION INTRAVENOUS at 10:10

## 2022-01-26 RX ADMIN — LORAZEPAM 0.5 MG: 0.5 TABLET ORAL at 08:29

## 2022-01-26 RX ADMIN — FUROSEMIDE 40 MG: 10 INJECTION, SOLUTION INTRAVENOUS at 11:39

## 2022-01-26 RX ADMIN — HYDRALAZINE HYDROCHLORIDE 10 MG: 10 TABLET, FILM COATED ORAL at 21:10

## 2022-01-26 RX ADMIN — THERA TABS 1 TABLET: TAB at 08:29

## 2022-01-26 ASSESSMENT — ACTIVITIES OF DAILY LIVING (ADL)
ADLS_ACUITY_SCORE: 5

## 2022-01-26 NOTE — PROGRESS NOTES
[facility]   Cardiology   Progress Note     ASSESSMENT/PLAN:  Kimberley Mullins is a 81 year old female admitted on (Not on file). She has PMH only notable for HTN, hypothyroidism, and recent [facility] admission (1/14-1/17) for N/V and hyponatremia, and is being transferred to [facility] for shortness of breath 2/2 new heart failure and VSD.      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 with 2w worsening SOB and BETINA, weight gain. Echo shows new VSD with L>R shunt, EF 60-65%, likely WMA RCA territory. Patient transferred for possible surgical intervention of VSD. No previous hx of CAD, recent MI, heart failure. Cors/RHC 1/25 with normal coronary arteries, RHC PCPW 16, PA(m) 26, CO 3.9/CI 2.3. Shunt run completed, 2.      - CVTS consulted, looking into possible percutaneous repair vs open, timing of surgery TBD  - Volume Status: hypervolemic, continue Lasix 40 mg IV daily  - Continue low dose hydral/isordil for afterload reduction, to minimize shunting  - cMRI, CTA today for further characterization of VSD/anatomy  - no need for KATIE   - Strict I/O  - Daily BMP  - K>4, Mg>2     # Small segmental PE  D-dimer positive at OSH 2.13, CT PE positive for acute vs chronic small subsegmental PE. Hemodynamically stable.   - Heparin gtt high intensity     # Hx Hypertension   BP stable   - Continue low dose hydral/isordil for afterload reduction; this can be stopped after surgery  - Hold PTA Norvasc currently     # Hyponatremia, hypervolemic  # IBS with diarrhea  Recent hospitalization for diarrhea, HypoNa, no recent diarrhea/vomiting since discharge. Na on admit 131. Today 136   - Diuresis per above    - BMP, Mg monitoring while diuresing     # Weakness   Pt reports worsening over last several weeks  - PT/OT consult      # Hyperlipidemia- PTA Simvastatin 20  # Hypothyroidism - PTA Levothyroxine  50mcg    FEN: 2 gm Sodium  Code status: Full  Prophylaxis:  hep gtt  Isolation: None, COVID negative 1/24  Disposition: 5-7 days pending timing VSD repair, volume status    Patient seen and discussed with Dr. Pickering, who agrees with above plan.    Marlys SOLORZANO, CNP  [facility] Cardiology Team    Interval History:  - No acute events overnight  - Pt reports feeling better this am, no SOB at rest, no KUMAR (has been up to bathroom)  - patient s/p 40 mg IV Lasix yesterday, net negative 4.4L, weight down 8 lbs, Cr stable  - K low 3.1, 2/2 diuresis    Physical Exam:  Temp:  [97.5  F (36.4  C)-98.6  F (37  C)] 98.6  F (37  C)  Pulse:  [80-99] 90  Resp:  [12-20] 16  BP: ()/(56-83) 103/69  SpO2:  [90 %-96 %] 92 %    I/O:  Intake/Output Summary (Last 24 hours) at 1/26/2022 0834  Last data filed at 1/26/2022 0832  Gross per 24 hour   Intake 1093.21 ml   Output 2700 ml   Net -1606.79 ml         Wt:   Wt Readings from Last 5 Encounters:   01/26/22 65.1 kg (143 lb 8 oz)   01/24/22 72.2 kg (159 lb 3.2 oz)   01/19/22 71.2 kg (157 lb)   01/17/22 69.6 kg (153 lb 6.4 oz)   01/14/22 63 kg (139 lb)       General: NAD  HEENT:  PERRLA, EOMI.   Neck: no JVD  CV: RRR. Loud murmur heard t/o. No rubs or gallops. Peripheral radial pulse intact.  Resp: No increased work of breathing or use of accessory muscles, breathing comfortably on room air.  Lung sounds clear throughout/bilaterally  Abdomen:  Normal active bowel sounds.  Abdomen is soft. No distension, non-tender to palpation.    Extremities: Warm. Capillary refill less than 3 sec. 2/4 radial pulses bilaterally.  2/4 pedal pulses bilaterally. Trace BLE edema. No cyanosis or clubbing. RIJ, Right radial, Right femoral access site CDI, soft, non-tender to touch, minimal bruising around radial site, no signs of hematoma.   Skin:  Warm and dry. No erythema, rashes, ulceration or diaphoresis.  Neuro: Alert and oriented x3.      Medications:    [Held by provider] amLODIPine  5 mg Oral Daily      furosemide  40 mg Intravenous Daily     hydrALAZINE  10 mg Oral or Feeding Tube 4x Daily     isosorbide dinitrate  20 mg Oral TID     levothyroxine  50 mcg Oral Daily     LORazepam  0.5 mg Oral Once     multivitamin  1 tablet Oral BID     multivitamin, therapeutic  1 tablet Oral Daily     potassium chloride  40 mEq Oral Once     simvastatin  20 mg Oral At Bedtime       heparin 1,250 Units/hr (01/26/22 0406)     ACE/ARB/ARNI NOT PRESCRIBED       BETA BLOCKER NOT PRESCRIBED         Labs:   CMP  Recent Labs   Lab 01/26/22  0549 01/25/22  0359 01/24/22  1151 01/19/22  1613    132* 131* 130.9*   POTASSIUM 3.1* 3.7 4.5 4.75   CHLORIDE 97 97 97 100.4   CO2 29 20 26 25.6   ANIONGAP 10 15* 8  --    GLC 87 84 109* 112*   BUN 9 10 11 13  17.8   CR 0.68 0.59 0.71 0.73   GFRESTIMATED 86 89 84  --    ANANDA 8.3* 8.3* 8.6 8.7   MAG 2.2 2.2  --   --    PROTTOTAL  --  5.9* 6.5*  --    ALBUMIN  --  3.0* 3.3*  --    BILITOTAL  --  0.6 0.6  --    ALKPHOS  --  50 60  --    AST  --  28 25  --    ALT  --  41 49  --      CBC  Recent Labs   Lab 01/26/22  0549 01/25/22  1242 01/25/22  1237 01/25/22  1235 01/25/22  1148 01/24/22  2235 01/24/22  1151 01/24/22  1151 01/24/22  0000   WBC 6.0  --   --   --   --  9.3  --  8.4 9.6   RBC 3.16*  --   --   --   --  3.55*  --  3.49* 3.48*   HGB 10.5* 11.0* 10.9* 10.8*   < > 11.7   < > 11.6* 11.7   HCT 30.9*  --   --   --   --  35.2  --  35.1 35.3   MCV 98  --   --   --   --  99  --  101* 101.5*   MCH 33.2*  --   --   --   --  33.0  --  33.2* 33.6*   MCHC 34.0  --   --   --   --  33.2  --  33.0 33.1   RDW 15.6*  --   --   --   --  15.6*  --  15.6*  --      --   --   --   --  220  --  214 265    < > = values in this interval not displayed.     INR  Recent Labs   Lab 01/24/22  1701 01/24/22  1151   INR 1.08 1.09     Arterial Blood GasNo lab results found in last 7 days.    Diagnostics:  ECG 1/24/2022: ST , TWI V2-4      Echo 1/24/2022:  Interpretation Summary     Left ventricular  systolic function is normal.  The visual ejection fraction is 60-65%.  No regional wall motion abnormalities noted.  A muscular ventricular septal defect is present.  This appears to be in mid to distal interventricular septum with left to right  flow, measuring approximately 9-10mm. Qp/Qs calculated at 2 but accuracy  limited. Consider Cardiac MRI with flow if clinically appropriate.  Right ventricular systolic pressure is elevated, consistent with mild to  moderate pulmonary hypertension.  Compared to stress echo from 6/21, this VSD is new finding but the  Interventricular septum was not optimally imaged on prior study.  Findings discussed with Dr Braden today at 12.50pm.  The study was technically adequate.    Coronary angiogram/RHC:  Comments/Patient Narrative  81 year old woman who presents to catheterization laboratory for shunt run for VSD closure as well as coronary angiogram for definition of coronary anatomy and consideration for CABG prior to potential cardiac surgery.       Pre Procedure Diagnosis  ACS > 24 hoursother         Conclusion    Left sided filling pressures are mildly elevated.    Right sided filling pressures are normal.    Mild elevated pulmonary hypertension.    No significant coronary artery disease identified angiographically.           Plan    Follow bedrest per protocol    Continued medical management and lifestyle modifications for cardiovascular risk factor optimizations.    Arterial sheath removed from radial artery with TR band placement.    Return to the primary inpatient team for further evaluation and managmenet     Recommendations  General Recommendations:  - Patient given specific instructions regarding care of arteriotomy site, activity restrictions, signs and symptoms of cardiac or vascular complications and to seek immediate medical evaluation should they occur.   - Arterial sheath removed from radial artery with TR Band placement.     Coronary  Findings  DiagnosticDominance: Right  Left Main   The vessel is large and is angiographically normal.   Left Circumflex   The vessel is large.   First Obtuse Marginal Branch   The vessel is large and is angiographically normal.   Second Obtuse Marginal Branch   The vessel is large and is angiographically normal.   Third Obtuse Marginal Branch   The vessel is large and is angiographically normal.   Right Coronary Artery   The vessel is large and is angiographically normal.   Right Posterior Descending Artery   The vessel is small and is angiographically normal.   Right Posterior Atrioventricular Artery   The vessel is large and is angiographically normal.   First Right Posterolateral Branch   The vessel is angiographically normal.   Second Right Posterolateral Branch   The vessel is angiographically normal.   Third Right Posterolateral Branch   The vessel is small and is angiographically normal.       Intervention  No interventions have been documented.    Hemodynamics  RA 9/13/8  RV 50/7  PA 44/18 (27)  PCWP 16  TDCO 3.93  TDCI 2.29  CI 2.32  CO 3.99  MVO2 PA 80.15  MVO2 RIJ 63.5  MVO2 SVC 65.3   MVO2 RA 63.6  MVO2 RV 82.5  PVR 2.79 KIRKPATRICK  SVR 1343 dynes   Right sided filling pressures are normal. Left sided filling pressures are mildly elevated. Mild elevated pulmonary hypertension.       No results found for this or any previous visit (from the past 24 hour(s)).    Time Spent on this Encounter   I spent 30 minutes on the unit/floor managing the care of Kimberley Mullins. Over 50% of my time was spent on the following:   - Counseling the patient and/or family regarding: diagnostic results, prognosis and risks and benefits of treatment options  - Coordination of care with the: consultant(s), care coordinator/, nurse and family    Saba Betancourt, CNP

## 2022-01-26 NOTE — PLAN OF CARE
Admission Diagnosis:  Acute diastolic heart failure, VSD, Edema, SOB    Admitted from: 4A  Via: Bed  Accompanied: Daughter Hilary, and RN  Belongings:1 duffle bag 1 purse, phone    Access: R & L PIV  Telemetry: yes  Ht/Wt: 143.5 # (standing scale)  2 RN skin check: Done with Claudia GOODEN RN. Skin in tact, see flow sheets for assesment of surgical sites.

## 2022-01-26 NOTE — PLAN OF CARE
D:   Acute diastolic heart failure, VSD, Edema, SOB. Coronary angio done 1/25, small segmenta PE (heparin drip)    PMH: HTN, Hypothyroid, arthritis    I: Monitored vitals and assessed pt status.    Running: Heparin 1250 u/hr (R PIV)     A: A&Ox4. VSS on 1L NC. Tele shows SR with inverted t wave with murmer present on auscultation.. Afebrile. Urinating adequately. Has right groin site, right radial sight, and right IJ site. IJ and groin Covered with primapore and CDI. Right radial site with small hematoma on arrival to . 2g NA diet, 2  Fluid restriction. Denies pain     P: Continue to monitor pt status and report changes to treatment team.     [id number]

## 2022-01-26 NOTE — CONSULTS
STRUCTURAL HEART CARE  CARDIOVASCULAR DIVISION    STRUCTURAL HEART INITIAL CONSULTATION    PRIMARY CARDIOLOGIST: Dr. Pickering (inpatient)      PERTINENT CLINICAL HISTORY:     Kimberley Mullins is a very pleasant 81 year old female with history of hypertension, hyperlipidemia, hypothyroidism, ascending aortic aneurysm who is admitted for heart failure and found to have ventricular septal defect. Our service is consulted for consideration of percutaneous closure of her VSD.    Ms. Mullins was at her baseline level of health/activity until Freeman 10 when she was admitted with sudden onset fatigue, KUMAR, nausea, emesis. She was admitted to a local hospital with these symptoms and new findings of hyponatremia and heart murmur. She received IV fluids and was discharged a few days later without any change in longstanding medications. She continued to experience significant fatigue, KUMAR, and developed progressive lower extremity edema, prompting emergency room visit. She was noted to have harsh murmur and had TTE performed, revealing a VSD with large left to right shunt. She was admitted to cardiology service for further management. She was incidentally found to have small PE on CT and is currently receiving heparin gtt. Her symptoms have improved with afterload reduction and IV diuresis. Coronary angiogram was performed that did not reveal any significant CAD. RHC showed elevated right sided pressures with oxygen saturation step up in RV and Qp/Qs of 2.2. She had CT heart and MRI performed today. CT surgery evaluated the patient for open closure. Our service was also consulted for consideration of percutaneous closure.     She denies recent travel, infection, exposures, medication changes or major changes in her health prior to Freeman 10. She admits her symptoms came on suddenly and were not slowly progressive over a time course. She had a stress echocardiogram last summer that was unrevealing and showed normal RV and LV  function with appropriate response to stress.    Denies chest pain, palpitations, syncope. She continues to have some lower extremity swelling that is improving with diuretics.    Mother passed away of heart failure in her 90s. Father had heart attack in his 70s. No family history of congenital heart defects.       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     Thyroid disease      Transient global amnesia      Unspecified tinnitus     Tinnitus        PAST SURGICAL HISTORY:     Past Surgical History:   Procedure Laterality Date     ARTHROPLASTY KNEE  10/25/2011    Procedure:ARTHROPLASTY KNEE; Right Total Knee Arthroplasty   ; Surgeon:BRYCE ATWOOD; Location:[facility] OR     ARTHROPLASTY KNEE  6/2/2014    Procedure: ARTHROPLASTY KNEE;  Surgeon: Bryce Atwood MD;  Location: [facility] OR     HC DILATION/CURETTAGE DIAG/THER NON OB  2006    D & C     HC KNEE SCOPE, DIAGNOSTIC      Arthroscopy, Knee     ZZC AFF FOREARM/WRIST SURGERY UNLISTED      orif radius fx distal left     ZZC APPENDECTOMY  1955     ZZ OPEN JESSI FIXATN HUMERAL SHAFT FX  12/2007    [facility] REMOVAL TUMOR/POLYP/LESION  9/03 & 3/2011    polyp removal during screening colonoscopy        CURRENT MEDICATIONS:     Current Facility-Administered Medications   Medication     acetaminophen (TYLENOL) tablet 650 mg     [Held by provider] amLODIPine (NORVASC) tablet 5 mg     furosemide (LASIX) injection 40 mg     heparin infusion 25,000 units in D5W 250 mL ANTICOAGULANT     HOLD nitroGLYcerin IF     hydrALAZINE (APRESOLINE) tablet 10 mg     IF patient  diabetic - HOLD: ALL ORAL HYPOGLYCEMICS: glipizide, glyburide, glimepiride, gliclazide, metformin (Glucophage), any metformin (Glucophage) containing medication, rosiglitazone (Avandia), pioglitazone (Actos), or sitagliptin (Januvia) on day of the procedure     isosorbide dinitrate (ISORDIL) tablet 20 mg     levothyroxine (SYNTHROID/LEVOTHROID) tablet 50 mcg     multivitamin (OCUVITE) tablet 1 tablet     multivitamin, therapeutic (THERA-VIT) tablet 1 tablet     ondansetron (ZOFRAN) injection 4 mg     oxyCODONE (ROXICODONE) tablet 5 mg    Or     oxyCODONE IR (ROXICODONE) tablet 10 mg     Reason ACE/ARB/ARNI order not selected     Reason beta blocker not prescribed     simvastatin (ZOCOR) tablet 20 mg     sodium chloride (PF) 0.9% PF flush 10 mL     sodium chloride (PF) 0.9% PF flush 10 mL     sodium chloride (PF) 0.9% PF flush 10 mL        ALLERGIES:     Allergies   Allergen Reactions     No Known Allergies         FAMILY HISTORY:     Family History   Problem Relation Age of Onset     Heart Disease Father         MI age 70        SOCIAL HISTORY:     Social History     Socioeconomic History     Marital status:      Spouse name: Not on file     Number of children: 3     Years of education: Not on file     Highest education level: Not on file   Occupational History     Employer: RETIRED   Tobacco Use     Smoking status: Never Smoker     Smokeless tobacco: Never Used   Substance and Sexual Activity     Alcohol use: Yes     Alcohol/week: 5.8 standard drinks     Types: 7 Standard drinks or equivalent per week     Comment: two to three glasses of wine q day.     Drug use: No     Sexual activity: Yes     Partners: Male     Birth control/protection: Post-menopausal   Other Topics Concern      Service Not Asked     Blood Transfusions Not Asked     Caffeine Concern No     Comment: none     Occupational Exposure Not Asked     Hobby Hazards Not Asked     Sleep Concern Not Asked     Stress Concern Not Asked      Weight Concern Not Asked     Special Diet No     Comment: calcium daily     Back Care Not Asked     Exercise Yes     Comment: regular walks     Bike Helmet Not Asked     Seat Belt Not Asked     Self-Exams Not Asked   Social History Narrative     Not on file     Social Determinants of Health     Financial Resource Strain: Not on file   Food Insecurity: Not on file   Transportation Needs: Not on file   Physical Activity: Not on file   Stress: Not on file   Social Connections: Not on file   Intimate Partner Violence: Not on file   Housing Stability: Not on file        REVIEW OF SYSTEMS:     Constitutional: No fevers or chills  Skin: No new rash or itching  Eyes: No acute change in vision  Ears/Nose/Throat: No purulent rhinorrhea, new hearing loss, or new vertigo  Respiratory: No cough or hemoptysis  Cardiovascular: See HPI  Gastrointestinal: No change in appetite, vomiting, hematemesis or diarrhea  Genitourinary: No dysuria or hematuria  Musculoskeletal: No new back pain, neck pain or muscle pain  Neurologic: No new headaches, focal weakness or behavior changes  Psychiatric: No hallucinations, excessive alcohol consumption or illegal drug usage  Hematologic/Lymphatic/Immunologic: No bleeding, chills, fever, night sweats or weight loss  Endocrine: No new cold intolerance, heat intolerance, polyphagia, polydipsia or polyuria      PHYSICAL EXAMINATION:     BP 96/58 (BP Location: Right arm, Cuff Size: Adult Regular)   Pulse 100   Temp 98.5  F (36.9  C) (Axillary)   Resp 16   Wt 65.1 kg (143 lb 8 oz)   LMP  (LMP Unknown)   SpO2 93%   BMI 25.42 kg/m      GENERAL: No acute distress.  HEENT: EOMI. Sclerae white, not injected. Pharynx without erythema or exudate.   Neck: No adenopathy. No thyromegaly. Symmetrical. JVP approx 7cm  Heart: Regular rate and rhythm. Harsh systolic murmur throughout precordium.   Lungs: Diminished at bases.  Abdomen: Soft, nontender, nondistended. Bowel sounds present.  Extremities: No  clubbing, cyanosis. 1+ pretibial edema.  Neurologic: Alert and oriented to person/place/time, normal speech and affect. No focal deficits.  Skin: No petechiae, purpura or rash.     LABORATORY DATA:     LIPID RESULTS:  Lab Results   Component Value Date    CHOL 191 05/26/2021    HDL 82 05/26/2021    LDL 93 05/26/2021    LDL 89 11/07/2018    TRIG 82 05/26/2021    CHOLHDLRATIO 2 05/26/2021       LIVER ENZYME RESULTS:  Lab Results   Component Value Date    AST 28 01/25/2022    AST 25 11/07/2018    ALT 41 01/25/2022    ALT 19 11/07/2018       CBC RESULTS:  Lab Results   Component Value Date    WBC 6.0 01/26/2022    WBC 9.6 01/24/2022    RBC 3.16 (L) 01/26/2022    RBC 3.48 (A) 01/24/2022    HGB 10.5 (L) 01/26/2022    HGB 11.7 01/24/2022    HCT 30.9 (L) 01/26/2022    HCT 35.3 01/24/2022    MCV 98 01/26/2022    .5 (A) 01/24/2022    MCH 33.2 (H) 01/26/2022    MCH 33.6 (A) 01/24/2022    MCHC 34.0 01/26/2022    MCHC 33.1 01/24/2022    RDW 15.6 (H) 01/26/2022    RDW 11.9 01/14/2022     01/26/2022     01/24/2022     02/21/2012       BMP RESULTS:  Lab Results   Component Value Date     01/26/2022    .9 (A) 01/19/2022    POTASSIUM 3.5 01/26/2022    POTASSIUM 4.75 01/19/2022    CHLORIDE 97 01/26/2022    CHLORIDE 100.4 01/19/2022    CO2 29 01/26/2022    CO2 25.6 01/19/2022    ANIONGAP 10 01/26/2022    ANIONGAP 13 05/05/2008    GLC 87 01/26/2022     (A) 01/19/2022    BUN 9 01/26/2022    BUN 13 01/19/2022    BUN 17.8 01/19/2022    CR 0.68 01/26/2022    CR 0.73 01/19/2022    GFRESTIMATED 86 01/26/2022    GFRESTIMATED 84 11/07/2018    GFRESTBLACK >90 05/05/2008    ANANDA 8.3 (L) 01/26/2022    ANANDA 8.7 01/19/2022        A1C RESULTS:  Lab Results   Component Value Date    A1C 5.6 08/14/2003       INR RESULTS:  Lab Results   Component Value Date    INR 1.08 01/24/2022    INR 1.09 01/24/2022    INR 0.9 06/05/2017    INR 2.21 (H) 06/05/2014    INR 2.01 (H) 06/04/2014          PROCEDURES & FURTHER  ASSESSMENTS:     EKG 1/24/22      Stress echo 6/2021  1. Average exercise capacity, 97% max HR achieved.  2. The patient exhibited no chest pain during exercise.  3. This was a normal stress EKG with no evidence of stress-induced ischemia.  4. Rest echo: Normal left ventricular function and wall motion at rest. The  visual ejection fraction is estimated at 55-60%.  5. Stress echo: This was a normal stress echocardiogram with no evidence of  stress-induced ischemia. The visual ejection fraction is estimated at 65-70%.     The ascending aorta is Moderately dilated. 4.4cm.    Limited TTE 1/24/22  Left ventricular systolic function is normal.  The visual ejection fraction is 60-65%.  No regional wall motion abnormalities noted.  A muscular ventricular septal defect is present.  This appears to be in mid to distal interventricular septum with left to right  flow, measuring approximately 9-10mm. Qp/Qs calculated at 2 but accuracy  limited. Consider Cardiac MRI with flow if clinically appropriate.  Right ventricular systolic pressure is elevated, consistent with mild to  moderate pulmonary hypertension.  Compared to stress echo from 6/21, this VSD is new finding but the  Interventricular septum was not optimally imaged on prior study.  Findings discussed with Dr Braden today at 12.50pm.  The study was technically adequate.    Coronary angiogram, RHC, shunt run 1/25/22  Reviewed, final report pending  RA 9/13/8  RV 50/7  PA 44/18 (27)  PCWP 16  TDCO 3.93  TDCI 2.29  CI 2.32  CO 3.99  MVO2 PA 80.15  MVO2 RIJ 63.5  MVO2 SVC 65.3   MVO2 RA 63.6  MVO2 RV 82.5  PVR 2.79 KIRKPATRICK  SVR 1343 dynes    MR heart 1/26/22  1. The left ventricle is normal in cavity size and wall thickness.  There is a muscular ventricular septal  defect with a left to right shunt in the distal septum. The defect is oblique measuring 7 mm on the left  ventricular side and much wider at 18 mm on the right ventricular side.  There are no regional wall  motion  abnormalities. The global systolic function is normal. The LVEF is 67%.   2. The right ventricle is moderately dilated. The global systolic function is mildly reduced. The RVEF is  47%.   3. The right atrium is mildly enlarged. The left atrium is normal in size   4. There is tricuspid regurgitation.   5. There is no late gadolinium enhancement to indicate myocardial fibrosis.    6. There is no pericardial effusion. There are small bilateral pleural effusion.   7. There is no intracardiac thrombus.  8. There is evidence of shunting with Qp/Qs of 2.34.   CONCLUSIONS:   1. Normal left ventricular size and function, LVEF 67%  2. Moderately dilated and mildly reduced right ventricular function, RVEF 47%.   3. Isolated muscular ventricular septal defect in the distal septum measuring (7 mm on the LV side and 18mm on the RV side) with Qp/Qs of 2.34.   4. No evidence of myocardial fibrosis or infarction.      CT heart 1/26/22  Images reviewed. Report pending     CLINICAL IMPRESSION:     Kimberley Mullins is a very pleasant 81 year old female with history of hypertension, hyperlipidemia, hypothyroidism, ascending aortic aneurysm who is admitted for heart failure and found to have ventricular septal defect. Our service is consulted for consideration of percutaneous closure of her VSD.    Ms. Mullins presented with acute heart failure likely as a result of new onset ventricular septal defect. Etiology of her VSD remains unclear. Echo images from 6/2021 do not suggest baseline defect based on ventricular size/function or by color doppler of the septum on limited views. There is no evidence of post-infarct based on absence of scar on MRI and minimal CAD on angiogram. No evidence of infiltrative disease on MRI.     Multimodal imaging, invasive hemodynamics, and shunt run show significant left to right shunt with Qp:Qs >1.5. Along with her symptoms, this favors elective closure of VSD. The distal location of the VSD along the  septum may pose a challenge for septal occluder device, especially if there is inadequate rim tissue for device grasp. We will need to review her studies further with our imaging cardiologists to determine feasibility of percutaneous device closure.     Fortunately, she is improving with afterload reduction and diuresis and favor continued medical support while a treatment plan is made for her.    Plan Summary:  1) Ventricular septal defect:  - Continue medical treatment with afterload reduction and diuresis  - Will review studies further with imaging cardiologists to review anatomical consideration for percutaneous closure  - Continue heparin gtt for small segmental PE until timing of procedure determined    Will follow along in hospital.    Discussed with Dr. Cerda.    Please call if any questions or concerns.    Yusuf Ortiz MD  Structural Heart Fellow  Pager [phone]    CC  Patient Care Team:  Renetta Rizvi PA-C as PCP - General (Family Medicine)  Sheron Armenta MD as Assigned PCP  IVAN BIGGS      I have seen and examined the patient and agree with the finding and plan.       Daniel Cerda MD  Cardiology-[facility]  [phone]

## 2022-01-27 ENCOUNTER — APPOINTMENT (OUTPATIENT)
Dept: PHYSICAL THERAPY | Facility: CLINIC | Age: 82
DRG: 228 | End: 2022-01-27
Attending: NURSE PRACTITIONER
Payer: COMMERCIAL

## 2022-01-27 LAB
ANION GAP SERPL CALCULATED.3IONS-SCNC: 7 MMOL/L (ref 3–14)
BUN SERPL-MCNC: 8 MG/DL (ref 7–30)
CALCIUM SERPL-MCNC: 8.6 MG/DL (ref 8.5–10.1)
CHLORIDE BLD-SCNC: 99 MMOL/L (ref 94–109)
CO2 SERPL-SCNC: 30 MMOL/L (ref 20–32)
CREAT SERPL-MCNC: 0.68 MG/DL (ref 0.52–1.04)
ERYTHROCYTE [DISTWIDTH] IN BLOOD BY AUTOMATED COUNT: 15.7 % (ref 10–15)
GFR SERPL CREATININE-BSD FRML MDRD: 86 ML/MIN/1.73M2
GLUCOSE BLD-MCNC: 98 MG/DL (ref 70–99)
HCT VFR BLD AUTO: 30.1 % (ref 35–47)
HGB BLD-MCNC: 10.2 G/DL (ref 11.7–15.7)
MAGNESIUM SERPL-MCNC: 2.2 MG/DL (ref 1.6–2.3)
MCH RBC QN AUTO: 33.2 PG (ref 26.5–33)
MCHC RBC AUTO-ENTMCNC: 33.9 G/DL (ref 31.5–36.5)
MCV RBC AUTO: 98 FL (ref 78–100)
PLATELET # BLD AUTO: 201 10E3/UL (ref 150–450)
POTASSIUM BLD-SCNC: 3.4 MMOL/L (ref 3.4–5.3)
RBC # BLD AUTO: 3.07 10E6/UL (ref 3.8–5.2)
SODIUM SERPL-SCNC: 136 MMOL/L (ref 133–144)
UFH PPP CHRO-ACNC: 0.56 IU/ML
WBC # BLD AUTO: 5.7 10E3/UL (ref 4–11)

## 2022-01-27 PROCEDURE — 214N000001 HC R&B CCU UMMC

## 2022-01-27 PROCEDURE — 4A023N6 MEASUREMENT OF CARDIAC SAMPLING AND PRESSURE, RIGHT HEART, PERCUTANEOUS APPROACH: ICD-10-PCS | Performed by: INTERNAL MEDICINE

## 2022-01-27 PROCEDURE — 250N000011 HC RX IP 250 OP 636: Performed by: STUDENT IN AN ORGANIZED HEALTH CARE EDUCATION/TRAINING PROGRAM

## 2022-01-27 PROCEDURE — 83735 ASSAY OF MAGNESIUM: CPT | Performed by: STUDENT IN AN ORGANIZED HEALTH CARE EDUCATION/TRAINING PROGRAM

## 2022-01-27 PROCEDURE — 36415 COLL VENOUS BLD VENIPUNCTURE: CPT | Performed by: NURSE PRACTITIONER

## 2022-01-27 PROCEDURE — 258N000003 HC RX IP 258 OP 636: Performed by: STUDENT IN AN ORGANIZED HEALTH CARE EDUCATION/TRAINING PROGRAM

## 2022-01-27 PROCEDURE — 250N000013 HC RX MED GY IP 250 OP 250 PS 637: Performed by: STUDENT IN AN ORGANIZED HEALTH CARE EDUCATION/TRAINING PROGRAM

## 2022-01-27 PROCEDURE — 99233 SBSQ HOSP IP/OBS HIGH 50: CPT | Performed by: INTERNAL MEDICINE

## 2022-01-27 PROCEDURE — 85027 COMPLETE CBC AUTOMATED: CPT | Performed by: NURSE PRACTITIONER

## 2022-01-27 PROCEDURE — 250N000013 HC RX MED GY IP 250 OP 250 PS 637: Performed by: INTERNAL MEDICINE

## 2022-01-27 PROCEDURE — 97110 THERAPEUTIC EXERCISES: CPT | Mod: GP

## 2022-01-27 PROCEDURE — B2111ZZ FLUOROSCOPY OF MULTIPLE CORONARY ARTERIES USING LOW OSMOLAR CONTRAST: ICD-10-PCS | Performed by: INTERNAL MEDICINE

## 2022-01-27 PROCEDURE — 97530 THERAPEUTIC ACTIVITIES: CPT | Mod: GP

## 2022-01-27 PROCEDURE — 97161 PT EVAL LOW COMPLEX 20 MIN: CPT | Mod: GP

## 2022-01-27 PROCEDURE — 250N000013 HC RX MED GY IP 250 OP 250 PS 637: Performed by: NURSE PRACTITIONER

## 2022-01-27 PROCEDURE — 85520 HEPARIN ASSAY: CPT | Performed by: INTERNAL MEDICINE

## 2022-01-27 PROCEDURE — 80048 BASIC METABOLIC PNL TOTAL CA: CPT | Performed by: NURSE PRACTITIONER

## 2022-01-27 PROCEDURE — 250N000011 HC RX IP 250 OP 636: Performed by: NURSE PRACTITIONER

## 2022-01-27 RX ORDER — POTASSIUM CHLORIDE 750 MG/1
20 TABLET, EXTENDED RELEASE ORAL ONCE
Status: COMPLETED | OUTPATIENT
Start: 2022-01-27 | End: 2022-01-27

## 2022-01-27 RX ADMIN — ACETAMINOPHEN 650 MG: 325 TABLET, FILM COATED ORAL at 21:13

## 2022-01-27 RX ADMIN — POTASSIUM CHLORIDE 20 MEQ: 750 TABLET, EXTENDED RELEASE ORAL at 12:33

## 2022-01-27 RX ADMIN — HEPARIN SODIUM 1250 UNITS/HR: 1000 INJECTION INTRAVENOUS; SUBCUTANEOUS at 06:54

## 2022-01-27 RX ADMIN — LEVOTHYROXINE SODIUM 50 MCG: 0.05 TABLET ORAL at 06:52

## 2022-01-27 RX ADMIN — HYDRALAZINE HYDROCHLORIDE 10 MG: 10 TABLET, FILM COATED ORAL at 05:03

## 2022-01-27 RX ADMIN — Medication 1 TABLET: at 09:01

## 2022-01-27 RX ADMIN — SIMVASTATIN 20 MG: 20 TABLET, FILM COATED ORAL at 21:05

## 2022-01-27 RX ADMIN — THERA TABS 1 TABLET: TAB at 09:03

## 2022-01-27 RX ADMIN — Medication 1 TABLET: at 21:05

## 2022-01-27 RX ADMIN — FUROSEMIDE 40 MG: 10 INJECTION, SOLUTION INTRAVENOUS at 09:01

## 2022-01-27 RX ADMIN — Medication 12.5 MG: at 21:06

## 2022-01-27 RX ADMIN — HEPARIN SODIUM 1250 UNITS/HR: 1000 INJECTION INTRAVENOUS; SUBCUTANEOUS at 09:05

## 2022-01-27 RX ADMIN — ISOSORBIDE DINITRATE 20 MG: 20 TABLET ORAL at 13:23

## 2022-01-27 RX ADMIN — ISOSORBIDE DINITRATE 20 MG: 20 TABLET ORAL at 18:22

## 2022-01-27 RX ADMIN — ISOSORBIDE DINITRATE 20 MG: 20 TABLET ORAL at 09:02

## 2022-01-27 RX ADMIN — Medication 12.5 MG: at 13:23

## 2022-01-27 ASSESSMENT — ACTIVITIES OF DAILY LIVING (ADL)
ADLS_ACUITY_SCORE: 5
ADLS_ACUITY_SCORE: 6
ADLS_ACUITY_SCORE: 6
ADLS_ACUITY_SCORE: 5
ADLS_ACUITY_SCORE: 6
ADLS_ACUITY_SCORE: 6
ADLS_ACUITY_SCORE: 5
ADLS_ACUITY_SCORE: 5
ADLS_ACUITY_SCORE: 6
ADLS_ACUITY_SCORE: 5
ADLS_ACUITY_SCORE: 5
ADLS_ACUITY_SCORE: 6
ADLS_ACUITY_SCORE: 5
ADLS_ACUITY_SCORE: 6
ADLS_ACUITY_SCORE: 6
ADLS_ACUITY_SCORE: 5
ADLS_ACUITY_SCORE: 5
ADLS_ACUITY_SCORE: 6
ADLS_ACUITY_SCORE: 6
ADLS_ACUITY_SCORE: 5

## 2022-01-27 NOTE — PLAN OF CARE
D: Pt is a 81 year old with a history of HTN, diastolic heart failure, pHTN, hypokalemia, hyponatremia, and IBS, . Pt presented on 1/24/2022 with SOB, lower limb edema, and weight gain and was found to have a new VSD. Pt is scheduled for repair of this Monday 1/31/2022.    I: Monitored vitals and assessed pt status. Pt has heparin drip running at 1250units/hr for small PE. Pt ambulated in the halls this shift. Pt visited by daughter this shift.    A: Alert and oriented x4. Pt in sinus rhythm with intermittent tachycardia. Pt has audible heart murmur. Pt afebrile and denies pain.     Temp range: 97.8 to 98.3F  Pulse: 87 to 109 beats per minute  BP: systolic 95 to 117mmHg, diastolic 58 to 66mmHg  Respiratory: rate 16 to 18 breaths per minute, oxygen saturations 93 to 96% on room air    Diet: 2g sodium, 2L fluid restriction  Mobility: stand-by assist  I/O: intake ~1.4L, output ~1.6L, voiding adequately    P: Continue to monitor vitals and ensure pt comfort. Pt scheduled for VSD closure Monday 1/31/2022. Continue with plan of care. Pt verbalizes acceptance with plan.     Lian Reyes RN

## 2022-01-27 NOTE — ED PROVIDER NOTES
Patient was changed over to my care from Dr. Braden pending bed placement.  During change over, patient received a bed at the [facility] and was transferred without event.     Ramin Nevarez MD  01/27/22 1057

## 2022-01-27 NOTE — PLAN OF CARE
Patient admitted for shortness of breath secondary to new heart failure and ventricular septal defect.  PMH of hypertension, hypothyroidism, and arthritis.     Neuro: A&O x4, denied pain and dizziness.  Had right groin, wrist, and neck puncture sites from angiogram yesterday, CMS intact and equal in bilateral arms and legs.  Respiratory:  Had dyspnea on exertion, lung sounds diminished in bases.  Cardiac: Heart murmur noted.  Had 1-2+ edema in legs and feet, was given IV furosemide.     GI: Denied nausea, bowel sounds hyperactive.  Had bowel movement today.   : Had good urine output, see I&O flowsheet.  Skin: Right groin puncture site clean and dry, no hematoma noted, no femoral bruit noted.  Right wrist puncture site clean and dry, small hematoma noted.  Right neck puncture site clean and dry, no hematoma noted.   VS: In sinus rhythm with intermittent tachycardia and occasional PACs, HR 90s-100s, SBP 90s-100s, SaO2 92-94% on room air.    Drips:  Heparin gtt continued at 1250 units/hr, 10a level therapeutic.    Electrolytes: Potassium replaced per protocol.  Pain: Denied.  Tests/procedures: Cardiac MRI and CT angiogram performed during shift, see Chart Review for results.    Mobility: Ambulated in hallway with standby assist, was steady on her feet.  Social: Daughter visited during shift.    Plan:  Continue to monitor pain, VS, heart rhythm, skin and surgical site integrity, fluid status, bowel status, cardiac and respiratory status.  Notify care team of changes in patient condition or other concerns.  Encourage activity to maintain strength.  Encourage IS use to promote lung function.  Potential ventricular septal defect repair prior to discharge.

## 2022-01-27 NOTE — PROGRESS NOTES
01/27/22 1300   Quick Adds   Type of Visit Initial PT Evaluation   Living Environment   People in home alone   Current Living Arrangements house   Home Accessibility stairs to enter home;stairs within home  (all needs met on main level)   Number of Stairs, Main Entrance 4   Stair Railings, Main Entrance railings on both sides of stairs   Number of Stairs, Within Home, Primary greater than 10 stairs  (all needs met on main level)   Stair Railings, Within Home, Primary railing on right side (ascending)   Transportation Anticipated car, drives self   Living Environment Comments Pt is very active in the community. She has adult kids but the closest nearby is 15 min away.   Self-Care   Usual Activity Tolerance good   Current Activity Tolerance good   Regular Exercise Yes   Activity/Exercise Type other (see comments)  (Functional fitness classes at )   Exercise Amount/Frequency 2 times/wk   Equipment Currently Used at Home tub bench   Activity/Exercise/Self-Care Comment  Pt enjoys golfing & attending [employer]   Disability/Function   Hearing Difficulty or Deaf no   Wear Glasses or Blind yes   Vision Management implants   Concentrating, Remembering or Making Decisions Difficulty no   Difficulty Communicating no   Difficulty Eating/Swallowing no   Walking or Climbing Stairs Difficulty no   Dressing/Bathing Difficulty no   Toileting issues no   Doing Errands Independently Difficulty (such as shopping) no   Fall history within last six months yes   Number of times patient has fallen within last six months 1   Change in Functional Status Since Onset of Current Illness/Injury no   General Information   Onset of Illness/Injury or Date of Surgery 01/24/22   Referring Physician Saba Betancourt, CNP   Patient/Family Therapy Goals Statement (PT) Get strong to go home.   Pertinent History of Current Problem (include personal factors and/or comorbidities that impact the POC) 81 year old female with a PMHx of HTN and  hypothyroidism, who presented to [facility] with complaints of SOB, extremity edema, and N/V. 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  Patient transferred to [facility] for possible surgical intervention of VSD.   Existing Precautions/Restrictions no known precautions/restrictions   Heart Disease Risk Factors High blood pressure;Family history;Medical history   Cognition   Orientation Status (Cognition) oriented x 3   Affect/Mental Status (Cognition) WFL   Follows Commands (Cognition) over 90% accuracy;follows multi-step commands   Pain Assessment   Patient Currently in Pain No   Integumentary/Edema   Integumentary/Edema Comments No observable BLE swelling upon eval   Posture    Posture Protracted shoulders   Range of Motion (ROM)   ROM Comment BUE & BLE grossly WFL   Strength   Strength Comments BUE & BLE grossly WFL   Bed Mobility   Comment (Bed Mobility) Supine<>sit IND   Transfers   Transfer Safety Comments Sit<>stand SBA   Gait/Stairs (Locomotion)   Comment (Gait/Stairs) amb w/ no AD SBA   Balance   Balance Comments Seated & standing balance WNL   Clinical Impression   Criteria for Skilled Therapeutic Intervention yes, treatment indicated   PT Diagnosis (PT) Impaired strength & activity tolerance d/t anticipated prolonged hospital stay   Influenced by the following impairments Activity tolerance, strength, dyspnea   Functional limitations due to impairments Gait, stairs, endurance   Clinical Presentation Stable/Uncomplicated   Clinical Presentation Rationale Clinical reasoning   Clinical Decision Making (Complexity) low complexity   Therapy Frequency (PT) 2x/week   Predicted Duration of Therapy Intervention (days/wks) 7 days   Planned Therapy Interventions (PT) gait training;home exercise program;patient/family education;postural re-education;stair training;strengthening;transfer training;progressive activity/exercise;risk factor  education;home program guidelines   Risk & Benefits of therapy have been explained patient;daughter;evaluation/treatment results reviewed   PT Discharge Planning    PT Discharge Recommendation (DC Rec) home with assist;home with home care physical therapy;home with outpatient physical therapy   PT Rationale for DC Rec Currently recommend discharge to home w/ home health vs. OP PT - will reassess discharge plans pending surgical treatment. Pt is near baseline for functional mobility but shows minor deficits in endurance & strength. Pt lives alone w/o nearby assist, was IND in all ADLs previously.    PT Brief overview of current status  SBA w/o AD   Total Evaluation Time   Total Evaluation Time (Minutes) 10

## 2022-01-27 NOTE — PROGRESS NOTES
[facility]   Cardiology   Progress Note     ASSESSMENT/PLAN:  Kimberley Mullins is a 81 year old female admitted on (Not on file). She has PMH only notable for HTN, hypothyroidism, and recent [facility] admission (1/14-1/17) for N/V and hyponatremia, and is being transferred to [facility] for shortness of breath 2/2 new heart failure and VSD.      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 Echo shows new VSD with L>R shunt, EF 60-65%, likely WMA RCA territory. Patient transferred for possible surgical intervention of VSD. No previous hx of CAD, recent MI, heart failure. Cors/RHC 1/25 with normal coronary arteries, RHC PCPW 16, PA(m) 26, CO 3.9/CI 2.3. QP/QS 2.2. cMRI with no evidence of ischemia, no scarring. CT heart with 1.9 x 1.2 x 0.7 cm muscular ventricular septal defect.     - CVTS and Structural Cardiology consulted  - Tentative plan for percutaneous VSD closure (and intra op KATIE) with Dr. Cerda Monday, 1/31  - Volume Status: mildly hypervolemic, continue Lasix 40 mg IV daily, likely transition to oral in next 1-2 days  - Continue low dose hydral/isordil for afterload reduction, to minimize shunting  - Strict I/O  - Daily BMP  - K>4, Mg>2, replacement per protocol     # Small segmental PE  D-dimer positive at OSH 2.13, CT PE positive for acute vs chronic small subsegmental PE. Hemodynamically stable.   - Heparin gtt high intensity, will leave on this gtt prior to procedure  - After VSD closure, prior to discharge, will transition to DOAC     # Hx Hypertension   BP stable   - Continue low dose hydral/isordil for afterload reduction  - Hold PTA Norvasc currently     # Hyponatremia, hypervolemic  # IBS with diarrhea  Recent hospitalization for diarrhea, HypoNa, no recent diarrhea/vomiting since discharge. Na on  admit 131. Na stable, 136   - Diuresis per above    - BMP, Mg monitoring while diuresing     # Weakness, resolved  Pt reports worsening over last several weeks, with aggressive diuresis, patient now stating weakness is much better, she feels almost back to baseline  - PT/OT consulted      # Hyperlipidemia- PTA Simvastatin 20  # Hypothyroidism - PTA Levothyroxine 50mcg     FEN: 2 gm Sodium  Code status: Full  Prophylaxis:  hep gtt  Isolation: None, COVID negative 1/24  Disposition: 5-7 days pending timing VSD repair/recovery    Patient seen and discussed with Dr. Pickering, who agrees with above plan.    Marlys SOLORZANO, CNP  [facility] Cardiology Team    Interval History:  - No acute events overnight  - Patient given 2 doses 40 mg IV Lasix  - Net negative 380mL, did have several unmeasured while gone for tests  - Weight down 2 lbs  - patient denies SOB at rest, she also denies KUMAR, but does report when ambulating in hallways yesterday, had to take a break sooner than she expected  - BP remains stable with diuresis/afterload    Physical Exam:  Temp:  [97.5  F (36.4  C)-98.5  F (36.9  C)] 98.2  F (36.8  C)  Pulse:  [] 99  Resp:  [16-18] 18  BP: ()/(58-82) 98/67  SpO2:  [92 %-96 %] 96 %    I/O:  Intake/Output Summary (Last 24 hours) at 1/27/2022 1348  Last data filed at 1/27/2022 1231  Gross per 24 hour   Intake 2078.29 ml   Output 3675 ml   Net -1596.71 ml       Wt:   Wt Readings from Last 5 Encounters:   01/27/22 64.3 kg (141 lb 11.2 oz)   01/24/22 72.2 kg (159 lb 3.2 oz)   01/19/22 71.2 kg (157 lb)   01/17/22 69.6 kg (153 lb 6.4 oz)   01/14/22 63 kg (139 lb)       General: NAD  HEENT:  PERRLA, EOMI.   Neck: no JVD  CV: RRR. Loud murmur heard t/o. No rubs or gallops. Peripheral radial pulse intact.  Resp: No increased work of breathing or use of accessory muscles, breathing comfortably on room air.  Lung sounds clear throughout/bilaterally  Abdomen:  Normal active bowel sounds.  Abdomen is soft. No distension,  non-tender to palpation.    Extremities: Warm. Capillary refill less than 3 sec. 2/4 radial pulses bilaterally.  2/4 pedal pulses bilaterally. No pre-tibial edema. No cyanosis or clubbing.  Skin:  Warm and dry. No erythema, rashes, ulceration or diaphoresis.  Neuro: Alert and oriented x3.      Medications:    [Held by provider] amLODIPine  5 mg Oral Daily     furosemide  40 mg Intravenous Daily     hydrALAZINE  12.5 mg Oral or Feeding Tube TID     isosorbide dinitrate  20 mg Oral TID     levothyroxine  50 mcg Oral Daily     multivitamin  1 tablet Oral BID     multivitamin, therapeutic  1 tablet Oral Daily     simvastatin  20 mg Oral At Bedtime     sodium chloride (PF)  10 mL Intravenous Once       heparin 1,250 Units/hr (01/27/22 0905)     ACE/ARB/ARNI NOT PRESCRIBED       BETA BLOCKER NOT PRESCRIBED         Labs:   CMP  Recent Labs   Lab 01/27/22  0602 01/26/22  1256 01/26/22  0549 01/25/22  0359 01/24/22  1151     --  136 132* 131*   POTASSIUM 3.4 3.5 3.1* 3.7 4.5   CHLORIDE 99  --  97 97 97   CO2 30  --  29 20 26   ANIONGAP 7  --  10 15* 8   GLC 98  --  87 84 109*   BUN 8  --  9 10 11   CR 0.68  --  0.68 0.59 0.71   GFRESTIMATED 86  --  86 89 84   ANANDA 8.6  --  8.3* 8.3* 8.6   MAG 2.2  --  2.2 2.2  --    PROTTOTAL  --   --   --  5.9* 6.5*   ALBUMIN  --   --   --  3.0* 3.3*   BILITOTAL  --   --   --  0.6 0.6   ALKPHOS  --   --   --  50 60   AST  --   --   --  28 25   ALT  --   --   --  41 49     CBC  Recent Labs   Lab 01/27/22  0602 01/26/22  0549 01/25/22  1242 01/25/22  1237 01/25/22  1148 01/24/22  2235 01/24/22  1151   WBC 5.7 6.0  --   --   --  9.3 8.4   RBC 3.07* 3.16*  --   --   --  3.55* 3.49*   HGB 10.2* 10.5* 11.0* 10.9*   < > 11.7 11.6*   HCT 30.1* 30.9*  --   --   --  35.2 35.1   MCV 98 98  --   --   --  99 101*   MCH 33.2* 33.2*  --   --   --  33.0 33.2*   MCHC 33.9 34.0  --   --   --  33.2 33.0   RDW 15.7* 15.6*  --   --   --  15.6* 15.6*    202  --   --   --  220 214    < > = values in  this interval not displayed.     INR  Recent Labs   Lab 01/24/22  1701 01/24/22  1151   INR 1.08 1.09     Arterial Blood GasNo lab results found in last 7 days.    Diagnostics:  cMRI 1/26/2022:  Clinical history: 81 year old woman with history of hypertension, hypothyroidism with a newly diagnosed  muscular VSD on echocardiogram. CMRI to evaluate VSD     Comparison CMR: None      1. The left ventricle is normal in cavity size and wall thickness.  There is a muscular ventricular septal defect with a left to right shunt in the distal septum. The defect is oblique measuring 7 mm on the left  ventricular side and much wider at 18 mm on the right ventricular side.  There are no regional wall motion abnormalities. The global systolic function is normal. The LVEF is 67%.   2. The right ventricle is moderately dilated. The global systolic function is mildly reduced. The RVEF is 47%.   3. The right atrium is mildly enlarged. The left atrium is normal in size   4. There is tricuspid regurgitation.   5. There is no late gadolinium enhancement to indicate myocardial fibrosis.    6. There is no pericardial effusion. There are small bilateral pleural effusion.   7. There is no intracardiac thrombus.  8. There is evidence of shunting with Qp/Qs of 2.34.      CONCLUSIONS:   1. Normal left ventricular size and function, LVEF 67%  2. Moderately dilated and mildly reduced right ventricular function, RVEF 47%.   3. Isolated muscular ventricular septal defect in the distal septum measuring (7 mm on the LV side and 18  mm on the RV side) with Qp/Qs of 2.34.   4. No evidence of myocardial fibrosis or infarction.      Recent Results (from the past 24 hour(s))   CTA  Angiogram Heart    Narrative    Procedure: CTA ANGIOGRAM HEART  Indication: Evaluate ventricular septal defect morphology  Examination Date: 1/26/2022 11:37 AM  Ordering Physician: IVAN BIGGS     TECHNIQUE: The patient was positioned in the scanner gantry and an  IV  was started using an 18 gauge IV in the right antecubital fossa.  Utilizing 120 mL [id number]. Multi-slice computed tomography was  performed with a Siemens Dual Source Flash scanner without incident.  The total radiation exposure was calculated to be 867 DLP and 12.1  mSv. The angiogram was performed in the Flash mode with care dose at  100 kVp. Images were reconstructed and analyzed on a [employer]  Workstation. The scan protocol was optimized to minimize radiation  exposure.     Height: 63 in  Weight: 143 lbs  BSA: 1.68 cm/m2    IMPRESSIONS:    1.  There is a 1.9 x 1.2 x 0.7 cm muscular ventricular septal defect.  It is seen in the mid anteroseptum and then extends inferiorly into  the mid inferoseptum and obliquely into the apical septum.   2.  Please review the separate Radiology report for incidental  noncardiac findings.    FINDINGS:     1.  The cardiac chambers demonstrate normal atrioventricular and  ventriculoarterial concordance.  2.  A muscular ventricular septal defect is visualized. The VSD is  seen in the mid anteroseptal segment of the LV and then extends  inferiorly into the mid inferoseptal segment and obliquely into the  apical septal segment. The VSD measures 1.9 x 1.2 x 0.7 cm. There is  moderate-severe degree of left-to-right shunting in view of the fact  that the right ventricle is clearly opacified. There is robust  contrast uptake in the surrounding myocardium. This argues against the  presence of a recent myocardial infarction.  3.  Another area of contrast opacification is seen in the RV apex but  a clear communication to the LV cannot be identified due to  significant misregistration artifact that persisted after multiple  reconstructions. This could be another very small VSD or a heavily  trabeculated area in the RV apex.   4.  The aortic arch is left-sided. There is normal branching of the  arch vessels. There is no coarctation seen.  5.  There is mild dilation of the ascending aorta  (4.3 x 4.1 cm,  indexed value 2.56 cm/m2).   6.  The main pulmonary artery is mildly enlarged (3.7 x 3.2 cm). The  proximal branch pulmonary arteries are normal in size.   7.  The systemic venous connections are normal.   8.  The coronary arteries originate from their respective cusps.   9.  There are mild coronary artery calcifications. They are noted in  the LAD territory.  10.  The pericardium is unremarkable.  There is no pericardial  effusion.   11.  There is a moderate-sized right pleural effusion.  12.  There is no intracardiac thrombus.    Results discussed with Cards 1 team at 1100 hrs on 01/26/2022.    BG AARON MD         SYSTEM ID:  [id number]   Radiologist Consult For Cardiology    Narrative    Radiologist consult for cardiology    INDICATION: Preoperative planning    COMPARISON: CT chest pulmonary angiogram 1/24/2022    FINDINGS: Contrast mixing between the right and left cardiac ventricle  suggestive of VSD. Bilateral pleural effusions. Associated bibasilar  atelectasis. Degenerative disc disease in the thoracic spine.  Calcification of bronchial airway walls. Coronary artery calcium. Mild  abdominal aortic atherosclerosis. Left upper lung scarring.      Impression    IMPRESSION: Ventricular septal defect. Coronary artery calcium. Please  refer to cardiologist separate report for further information. The  mild left upper lung scarring.    SIMONE NATARAJAN MD         SYSTEM ID:  [id number]       Time Spent on this Encounter   I spent >35 minutes on the unit/floor managing the care of Kimberley Mullins. Over 50% of my time was spent on the following:   - Counseling the patient and/or family regarding: diagnostic results, prognosis, risks and benefits of treatment options and recommended follow-up  - Coordination of care with the: consultant(s), care coordinator/ and family      Saba Betancourt, CNP

## 2022-01-27 NOTE — PLAN OF CARE
D: Pt admit 1/24/22 for SOB 2/2  New HF and VSD. PMH HTN, hypothyroidism, recent [facility] admission 1/14-1/17 for n/v and hyponatremia    I/A:   Neuro: A&Ox4  VS: VSS. RA  Tele: SR  Pain: denies  GI/: Urinating adequately. No BM this shift.  Diet: 2g Na w/2L FR  IV/Drips: L PIV SL. R PIV infusing heparin gtt 1250 units/hr. hep10a therapeutic 0.56. next check tomorrow morning  Activity: SBA  Skin/drains: R neck, groin, and wrist site unchanged.     P: Plan to Continue to monitor pt status and report changes to Cards 1.     Archana Quintana RN

## 2022-01-28 ENCOUNTER — ANESTHESIA EVENT (OUTPATIENT)
Dept: CARDIOLOGY | Facility: CLINIC | Age: 82
DRG: 228 | End: 2022-01-28
Payer: COMMERCIAL

## 2022-01-28 LAB
ANION GAP SERPL CALCULATED.3IONS-SCNC: 6 MMOL/L (ref 3–14)
BUN SERPL-MCNC: 8 MG/DL (ref 7–30)
CALCIUM SERPL-MCNC: 8.9 MG/DL (ref 8.5–10.1)
CHLORIDE BLD-SCNC: 102 MMOL/L (ref 94–109)
CO2 SERPL-SCNC: 29 MMOL/L (ref 20–32)
CREAT SERPL-MCNC: 0.69 MG/DL (ref 0.52–1.04)
ERYTHROCYTE [DISTWIDTH] IN BLOOD BY AUTOMATED COUNT: 15.8 % (ref 10–15)
GFR SERPL CREATININE-BSD FRML MDRD: 86 ML/MIN/1.73M2
GLUCOSE BLD-MCNC: 94 MG/DL (ref 70–99)
HCT VFR BLD AUTO: 30.8 % (ref 35–47)
HGB BLD-MCNC: 10.3 G/DL (ref 11.7–15.7)
MAGNESIUM SERPL-MCNC: 2.2 MG/DL (ref 1.6–2.3)
MCH RBC QN AUTO: 32.5 PG (ref 26.5–33)
MCHC RBC AUTO-ENTMCNC: 33.4 G/DL (ref 31.5–36.5)
MCV RBC AUTO: 97 FL (ref 78–100)
PLATELET # BLD AUTO: 207 10E3/UL (ref 150–450)
POTASSIUM BLD-SCNC: 3.6 MMOL/L (ref 3.4–5.3)
RBC # BLD AUTO: 3.17 10E6/UL (ref 3.8–5.2)
SODIUM SERPL-SCNC: 137 MMOL/L (ref 133–144)
T4 FREE SERPL-MCNC: 1.45 NG/DL (ref 0.76–1.46)
TSH SERPL DL<=0.005 MIU/L-ACNC: 6.02 MU/L (ref 0.4–4)
UFH PPP CHRO-ACNC: 0.66 IU/ML
WBC # BLD AUTO: 4.8 10E3/UL (ref 4–11)

## 2022-01-28 PROCEDURE — 258N000003 HC RX IP 258 OP 636: Performed by: STUDENT IN AN ORGANIZED HEALTH CARE EDUCATION/TRAINING PROGRAM

## 2022-01-28 PROCEDURE — 82310 ASSAY OF CALCIUM: CPT | Performed by: NURSE PRACTITIONER

## 2022-01-28 PROCEDURE — 250N000013 HC RX MED GY IP 250 OP 250 PS 637: Performed by: NURSE PRACTITIONER

## 2022-01-28 PROCEDURE — 84439 ASSAY OF FREE THYROXINE: CPT | Performed by: NURSE PRACTITIONER

## 2022-01-28 PROCEDURE — 250N000011 HC RX IP 250 OP 636: Performed by: STUDENT IN AN ORGANIZED HEALTH CARE EDUCATION/TRAINING PROGRAM

## 2022-01-28 PROCEDURE — 99233 SBSQ HOSP IP/OBS HIGH 50: CPT | Performed by: INTERNAL MEDICINE

## 2022-01-28 PROCEDURE — 250N000013 HC RX MED GY IP 250 OP 250 PS 637: Performed by: INTERNAL MEDICINE

## 2022-01-28 PROCEDURE — 85027 COMPLETE CBC AUTOMATED: CPT | Performed by: NURSE PRACTITIONER

## 2022-01-28 PROCEDURE — 84443 ASSAY THYROID STIM HORMONE: CPT | Performed by: NURSE PRACTITIONER

## 2022-01-28 PROCEDURE — 250N000013 HC RX MED GY IP 250 OP 250 PS 637: Performed by: STUDENT IN AN ORGANIZED HEALTH CARE EDUCATION/TRAINING PROGRAM

## 2022-01-28 PROCEDURE — 83735 ASSAY OF MAGNESIUM: CPT | Performed by: STUDENT IN AN ORGANIZED HEALTH CARE EDUCATION/TRAINING PROGRAM

## 2022-01-28 PROCEDURE — 36415 COLL VENOUS BLD VENIPUNCTURE: CPT | Performed by: NURSE PRACTITIONER

## 2022-01-28 PROCEDURE — 85520 HEPARIN ASSAY: CPT | Performed by: STUDENT IN AN ORGANIZED HEALTH CARE EDUCATION/TRAINING PROGRAM

## 2022-01-28 PROCEDURE — 214N000001 HC R&B CCU UMMC

## 2022-01-28 RX ORDER — FUROSEMIDE 40 MG
40 TABLET ORAL DAILY
Status: DISCONTINUED | OUTPATIENT
Start: 2022-01-28 | End: 2022-01-31

## 2022-01-28 RX ORDER — POTASSIUM CHLORIDE 750 MG/1
10 TABLET, EXTENDED RELEASE ORAL ONCE
Status: COMPLETED | OUTPATIENT
Start: 2022-01-28 | End: 2022-01-28

## 2022-01-28 RX ADMIN — POTASSIUM CHLORIDE 10 MEQ: 750 TABLET, EXTENDED RELEASE ORAL at 09:45

## 2022-01-28 RX ADMIN — HEPARIN SODIUM 1250 UNITS/HR: 1000 INJECTION INTRAVENOUS; SUBCUTANEOUS at 03:13

## 2022-01-28 RX ADMIN — Medication 12.5 MG: at 14:32

## 2022-01-28 RX ADMIN — ISOSORBIDE DINITRATE 20 MG: 20 TABLET ORAL at 17:38

## 2022-01-28 RX ADMIN — THERA TABS 1 TABLET: TAB at 08:23

## 2022-01-28 RX ADMIN — SIMVASTATIN 20 MG: 20 TABLET, FILM COATED ORAL at 20:01

## 2022-01-28 RX ADMIN — Medication 1 TABLET: at 09:45

## 2022-01-28 RX ADMIN — LEVOTHYROXINE SODIUM 50 MCG: 0.05 TABLET ORAL at 09:45

## 2022-01-28 RX ADMIN — FUROSEMIDE 40 MG: 40 TABLET ORAL at 09:45

## 2022-01-28 RX ADMIN — HEPARIN SODIUM 1250 UNITS/HR: 1000 INJECTION INTRAVENOUS; SUBCUTANEOUS at 23:51

## 2022-01-28 RX ADMIN — Medication 12.5 MG: at 08:23

## 2022-01-28 RX ADMIN — Medication 1 TABLET: at 20:01

## 2022-01-28 RX ADMIN — ISOSORBIDE DINITRATE 20 MG: 20 TABLET ORAL at 08:23

## 2022-01-28 RX ADMIN — Medication 12.5 MG: at 20:01

## 2022-01-28 ASSESSMENT — ACTIVITIES OF DAILY LIVING (ADL)
ADLS_ACUITY_SCORE: 6

## 2022-01-28 NOTE — PLAN OF CARE
No significant change in status today. Continues on heparin gtt @ 1250u/hr with next 10a recheck in a.m.   Ambulating with family around unit. Denies pain. Reports a mild rash starting on her upper back-some relief with lotion applied. Continue to monitor.   Lasix changed to oral dosing this morning w/good urine output.   Plan for VSD closure procedure Monday 1/31.

## 2022-01-28 NOTE — PROGRESS NOTES
[facility]   Cardiology   Progress Note     ASSESSMENT/PLAN:  Kimberley Mullins is a 81 year old female admitted on (Not on file). She has PMH only notable for HTN, hypothyroidism, and recent [facility] admission (1/14-1/17) for N/V and hyponatremia, and is being transferred to [facility] for shortness of breath 2/2 new heart failure and VSD.      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 Echo shows new VSD with L>R shunt, EF 60-65%, likely WMA RCA territory. Patient transferred for possible surgical intervention of VSD. No previous hx of CAD, recent MI, heart failure. Cors/RHC 1/25 with normal coronary arteries, RHC PCPW 16, PA(m) 26, CO 3.9/CI 2.3. QP/QS 2.2. cMRI with no evidence of ischemia, no scarring. CT heart with 1.9 x 1.2 x 0.7 cm muscular ventricular septal defect, moderate right pleural effusion.      - CVTS and Structural Cardiology consulted  - Current plan for percutaneous VSD closure (and intra op KATIE) with Dr. Cerda Monday, 1/31  - Will need repeat COVID prior to procedure (ordered)  - Volume Status: appears euvolemic, transition to oral Lasix, 40 mg daily  - Goal keep net even  - Continue low dose hydral/isordil for afterload reduction, to minimize shunting  - Strict I/O  - Daily BMP  - K>4, Mg>2, replacement per protocol  - IV filter on peripheral IV lines    # Small segmental PE  D-dimer positive at OSH 2.13, CT PE positive for acute vs chronic small subsegmental PE. Hemodynamically stable.  - Heparin gtt high intensity, will leave on this gtt prior to procedure  - After VSD closure, prior to discharge, will transition to DOAC     # Hx Hypertension   BP stable   - Continue low dose hydral/isordil for afterload reduction  - Hold PTA Norvasc  "currently     # Hyponatremia, hypervolemic  # IBS with diarrhea  Recent hospitalization for diarrhea, HypoNa, no recent diarrhea/vomiting since discharge. Na on admit 131. Na stable, 136   - Diuresis per above    - BMP, Mg monitoring while diuresing     # Weakness, resolved  Pt reports worsening over last several weeks, with aggressive diuresis, patient now stating weakness is much better, she feels almost back to baseline  - PT/OT consulted     # Hypothyroidism  Patient reports feeling \"shaky\" today. Likely 2/2 no sleep last night, however, 1/14 when hospitalized with hyponatremia, TSH/T4 elevated.   - Will repeat TSH/T4 today to assess for dose adjustment  - PTA Levothyroxine 50mcg for now     # Hyperlipidemia- PTA Simvastatin 20      FEN: 2gm Sodium  Code status: Full  Prophylaxis:  heparin gtt  Isolation: None  Disposition: possible discharge home early-mid next week pending VSD closure/recovery    Patient seen and discussed with Dr. Pickering, who agrees with above plan.    Marlys SOLORZANO, CNP  [facility] Cardiology Team    Interval History:  - No acute events overnight  - Pt reports did not sleep last night (due to noise from neighbor)  - She also feels \"more shaky\" this am  - She denies any SOB at rest, did ambulate in hallways yesterday and denied any KUMAR while walking around  - Patient s/p 40 mg IV lasix, net positive 300 mL, did have 2 unmeasured UOP.  - Weight down almost 1 pound, Cr stable    Physical Exam:  Temp:  [96.7  F (35.9  C)-98.5  F (36.9  C)] 96.7  F (35.9  C)  Pulse:  [] 102  Resp:  [16-18] 16  BP: ()/(57-77) 107/63  SpO2:  [93 %-96 %] 96 %    I/O:  Intake/Output Summary (Last 24 hours) at 1/28/2022 0912  Last data filed at 1/28/2022 0756  Gross per 24 hour   Intake 1516 ml   Output 1975 ml   Net -459 ml       Wt:   Wt Readings from Last 5 Encounters:   01/28/22 64 kg (141 lb 3.2 oz)   01/24/22 72.2 kg (159 lb 3.2 oz)   01/19/22 71.2 kg (157 lb)   01/17/22 69.6 kg (153 lb 6.4 oz) "   01/14/22 63 kg (139 lb)     General: NAD  HEENT:  PERRLA, EOMI.   Neck: no JVD  CV: RRR. Loud murmur heard t/o. No rubs or gallops. Peripheral radial pulse intact.  Resp: No increased work of breathing or use of accessory muscles, breathing comfortably on room air.  Lung sounds clear throughout/bilaterally  Abdomen:  Normal active bowel sounds.  Abdomen is soft. No distension, non-tender to palpation.    Extremities: Warm. Capillary refill less than 3 sec. 2/4 radial pulses bilaterally.  2/4 pedal pulses bilaterally. No pre-tibial edema. No cyanosis or clubbing.  Skin:  Warm and dry. No erythema, rashes, ulceration or diaphoresis.  Neuro: Alert and oriented x3.      Medications:    [Held by provider] amLODIPine  5 mg Oral Daily     furosemide  40 mg Oral Daily     hydrALAZINE  12.5 mg Oral or Feeding Tube TID     isosorbide dinitrate  20 mg Oral TID     levothyroxine  50 mcg Oral Daily     multivitamin  1 tablet Oral BID     multivitamin, therapeutic  1 tablet Oral Daily     potassium chloride  10 mEq Oral Once     simvastatin  20 mg Oral At Bedtime     sodium chloride (PF)  10 mL Intravenous Once       heparin 1,250 Units/hr (01/28/22 0313)     ACE/ARB/ARNI NOT PRESCRIBED       BETA BLOCKER NOT PRESCRIBED         Labs:   CMP  Recent Labs   Lab 01/28/22  0610 01/27/22  0602 01/26/22  1256 01/26/22  0549 01/25/22  0359 01/24/22  1151    136  --  136 132* 131*   POTASSIUM 3.6 3.4 3.5 3.1* 3.7 4.5   CHLORIDE 102 99  --  97 97 97   CO2 29 30  --  29 20 26   ANIONGAP 6 7  --  10 15* 8   GLC 94 98  --  87 84 109*   BUN 8 8  --  9 10 11   CR 0.69 0.68  --  0.68 0.59 0.71   GFRESTIMATED 86 86  --  86 89 84   ANANDA 8.9 8.6  --  8.3* 8.3* 8.6   MAG 2.2 2.2  --  2.2 2.2  --    PROTTOTAL  --   --   --   --  5.9* 6.5*   ALBUMIN  --   --   --   --  3.0* 3.3*   BILITOTAL  --   --   --   --  0.6 0.6   ALKPHOS  --   --   --   --  50 60   AST  --   --   --   --  28 25   ALT  --   --   --   --  41 49     CBC  Recent Labs   Lab  01/28/22  0610 01/27/22  0602 01/26/22  0549 01/25/22  1242 01/25/22  1148 01/24/22  2235   WBC 4.8 5.7 6.0  --   --  9.3   RBC 3.17* 3.07* 3.16*  --   --  3.55*   HGB 10.3* 10.2* 10.5* 11.0*   < > 11.7   HCT 30.8* 30.1* 30.9*  --   --  35.2   MCV 97 98 98  --   --  99   MCH 32.5 33.2* 33.2*  --   --  33.0   MCHC 33.4 33.9 34.0  --   --  33.2   RDW 15.8* 15.7* 15.6*  --   --  15.6*    201 202  --   --  220    < > = values in this interval not displayed.     INR  Recent Labs   Lab 01/24/22  1701 01/24/22  1151   INR 1.08 1.09     Arterial Blood GasNo lab results found in last 7 days.    Diagnostics:  Echo 1/24/2022:  Interpretation Summary     Left ventricular systolic function is normal.  The visual ejection fraction is 60-65%.  No regional wall motion abnormalities noted.  A muscular ventricular septal defect is present.  This appears to be in mid to distal interventricular septum with left to right  flow, measuring approximately 9-10mm. Qp/Qs calculated at 2 but accuracy  limited. Consider Cardiac MRI with flow if clinically appropriate.  Right ventricular systolic pressure is elevated, consistent with mild to  moderate pulmonary hypertension.  Compared to stress echo from 6/21, this VSD is new finding but the  Interventricular septum was not optimally imaged on prior study.  Findings discussed with Dr Braden today at 12.50pm.  The study was technically adequate.    Coronary angiogram/RHC 1/25/2022:  Conclusion       Left sided filling pressures are mildly elevated.    Right sided filling pressures are mildly elevated.    Mild elevated pulmonary hypertension.    Normal cardiac output level.    Hemodynamic data has been modified in Epic per physician review.     Minimal non obstructive CAD.  Significant left to right shunt at the level of the right ventricle with a Qp/Qs of 2.0.             Coronary Findings  DiagnosticDominance: Right  Left Main   The vessel is large and is angiographically normal.   Left  Anterior Descending   The vessel is large.   First Diagonal Branch   The vessel is large and is angiographically normal.   Left Circumflex   The vessel is large and is angiographically normal.   First Obtuse Marginal Branch   The vessel is large and is angiographically normal.   Second Obtuse Marginal Branch   The vessel is large and is angiographically normal.   Right Coronary Artery   The vessel is large and is angiographically normal.   Right Posterior Descending Artery   The vessel is moderate in size and is angiographically normal.   Right Posterior Atrioventricular Artery   The vessel is moderate in size and is angiographically normal.   First Right Posterolateral Branch   The vessel is small and is angiographically normal.   Second Right Posterolateral Branch   The vessel is moderate in size and is angiographically normal.   Third Right Posterolateral Branch   The vessel is small and is angiographically normal.       Intervention  No interventions have been documented.    Hemodynamics  RA 9/13/8  RV 50/7  PA 44/18 (27)  PCWP 16  TDCO 3.93  TDCI 2.29  CI 2.32  CO 3.99  MVO2 PA 80.15  MVO2 RIJ 63.5  MVO2 SVC 65.3   MVO2 RA 63.6  MVO2 RV 82.5  PVR 2.79 KIRKPATRICK  SVR 1343 dynes   Right sided filling pressures are mildly elevated. Left sided filling pressures are mildly elevated. Mild elevated pulmonary hypertension. Normal cardiac output level. Hemodynamic data has been modified in Epic per physician review.     cMRI 1/26/2022:  Clinical history: 81 year old woman with history of hypertension, hypothyroidism with a newly diagnosed  muscular VSD on echocardiogram. CMRI to evaluate VSD     Comparison CMR: None      1. The left ventricle is normal in cavity size and wall thickness.  There is a muscular ventricular septal defect with a left to right shunt in the distal septum. The defect is oblique measuring 7 mm on the left  ventricular side and much wider at 18 mm on the right ventricular side.  There are no regional  wall motion abnormalities. The global systolic function is normal. The LVEF is 67%.   2. The right ventricle is moderately dilated. The global systolic function is mildly reduced. The RVEF is 47%.   3. The right atrium is mildly enlarged. The left atrium is normal in size   4. There is tricuspid regurgitation.   5. There is no late gadolinium enhancement to indicate myocardial fibrosis.    6. There is no pericardial effusion. There are small bilateral pleural effusion.   7. There is no intracardiac thrombus.  8. There is evidence of shunting with Qp/Qs of 2.34.      CONCLUSIONS:   1. Normal left ventricular size and function, LVEF 67%  2. Moderately dilated and mildly reduced right ventricular function, RVEF 47%.   3. Isolated muscular ventricular septal defect in the distal septum measuring (7 mm on the LV side and 18  mm on the RV side) with Qp/Qs of 2.34.   4. No evidence of myocardial fibrosis or infarction.      CT Heart 1/26/2022:  IMPRESSIONS:     1.  There is a 1.9 x 1.2 x 0.7 cm muscular ventricular septal defect.  It is seen in the mid anteroseptum and then extends inferiorly into  the mid inferoseptum and obliquely into the apical septum.   2.  Please review the separate Radiology report for incidental  noncardiac findings.     FINDINGS:      1.  The cardiac chambers demonstrate normal atrioventricular and  ventriculoarterial concordance.  2.  A muscular ventricular septal defect is visualized. The VSD is  seen in the mid anteroseptal segment of the LV and then extends  inferiorly into the mid inferoseptal segment and obliquely into the  apical septal segment. The VSD measures 1.9 x 1.2 x 0.7 cm. There is  moderate-severe degree of left-to-right shunting in view of the fact  that the right ventricle is clearly opacified. There is robust  contrast uptake in the surrounding myocardium. This argues against the  presence of a recent myocardial infarction.  3.  Another area of contrast opacification is seen in  the RV apex but  a clear communication to the LV cannot be identified due to  significant misregistration artifact that persisted after multiple  reconstructions. This could be another very small VSD or a heavily  trabeculated area in the RV apex.   4.  The aortic arch is left-sided. There is normal branching of the  arch vessels. There is no coarctation seen.  5.  There is mild dilation of the ascending aorta (4.3 x 4.1 cm,  indexed value 2.56 cm/m2).   6.  The main pulmonary artery is mildly enlarged (3.7 x 3.2 cm). The  proximal branch pulmonary arteries are normal in size.   7.  The systemic venous connections are normal.   8.  The coronary arteries originate from their respective cusps.   9.  There are mild coronary artery calcifications. They are noted in  the LAD territory.  10.  The pericardium is unremarkable.  There is no pericardial  effusion.   11.  There is a moderate-sized right pleural effusion.  12.  There is no intracardiac thrombus.      Time Spent on this Encounter   I spent 30 minutes on the unit/floor managing the care of Kimberley Mullins. Over 50% of my time was spent on the following:   - Counseling the patient and/or family regarding: diagnostic results, risks and benefits of treatment options and recommended follow-up  - Coordination of care with the: consultant(s), care coordinator/, nurse and family    Saba Betancourt, CORRY

## 2022-01-28 NOTE — PLAN OF CARE
D: Pt admit 1/24/22 for SOB 2/2  New HF and VSD. PMH HTN, hypothyroidism, recent [facility] admission 1/14-1/17 for n/v and hyponatremia     I/A:   Neuro: A&Ox4  VS: VSS. RA  Tele: SR/ST  Pain: denies  GI/: Urinating adequately. No BM this shift.  Diet: 2g Na w/2L FR  IV/Drips: L PIV SL. R PIV infusing heparin gtt 1250 units/hr. hep10a not yet resulted this AM  Activity: SBA  Skin/drains: R neck, groin, and wrist site unchanged.      P: Plan for VSD closure Monday 1/31/22.  Continue to monitor pt status and report changes to Cards 1.      Archana Quintana RN

## 2022-01-29 ENCOUNTER — APPOINTMENT (OUTPATIENT)
Dept: GENERAL RADIOLOGY | Facility: CLINIC | Age: 82
DRG: 228 | End: 2022-01-29
Attending: NURSE PRACTITIONER
Payer: COMMERCIAL

## 2022-01-29 LAB
ANION GAP SERPL CALCULATED.3IONS-SCNC: 8 MMOL/L (ref 3–14)
BUN SERPL-MCNC: 9 MG/DL (ref 7–30)
CALCIUM SERPL-MCNC: 9 MG/DL (ref 8.5–10.1)
CHLORIDE BLD-SCNC: 102 MMOL/L (ref 94–109)
CO2 SERPL-SCNC: 28 MMOL/L (ref 20–32)
CREAT SERPL-MCNC: 0.68 MG/DL (ref 0.52–1.04)
ERYTHROCYTE [DISTWIDTH] IN BLOOD BY AUTOMATED COUNT: 16.1 % (ref 10–15)
GFR SERPL CREATININE-BSD FRML MDRD: 86 ML/MIN/1.73M2
GLUCOSE BLD-MCNC: 90 MG/DL (ref 70–99)
HCT VFR BLD AUTO: 33.1 % (ref 35–47)
HGB BLD-MCNC: 10.8 G/DL (ref 11.7–15.7)
MAGNESIUM SERPL-MCNC: 2.1 MG/DL (ref 1.6–2.3)
MCH RBC QN AUTO: 32.5 PG (ref 26.5–33)
MCHC RBC AUTO-ENTMCNC: 32.6 G/DL (ref 31.5–36.5)
MCV RBC AUTO: 100 FL (ref 78–100)
PLATELET # BLD AUTO: 206 10E3/UL (ref 150–450)
POTASSIUM BLD-SCNC: 3.6 MMOL/L (ref 3.4–5.3)
RBC # BLD AUTO: 3.32 10E6/UL (ref 3.8–5.2)
SODIUM SERPL-SCNC: 138 MMOL/L (ref 133–144)
UFH PPP CHRO-ACNC: 0.4 IU/ML
UFH PPP CHRO-ACNC: 0.44 IU/ML
UFH PPP CHRO-ACNC: 0.73 IU/ML
WBC # BLD AUTO: 5.2 10E3/UL (ref 4–11)

## 2022-01-29 PROCEDURE — 250N000013 HC RX MED GY IP 250 OP 250 PS 637: Performed by: INTERNAL MEDICINE

## 2022-01-29 PROCEDURE — 82310 ASSAY OF CALCIUM: CPT | Performed by: NURSE PRACTITIONER

## 2022-01-29 PROCEDURE — 214N000001 HC R&B CCU UMMC

## 2022-01-29 PROCEDURE — U0005 INFEC AGEN DETEC AMPLI PROBE: HCPCS | Performed by: NURSE PRACTITIONER

## 2022-01-29 PROCEDURE — 85520 HEPARIN ASSAY: CPT | Performed by: STUDENT IN AN ORGANIZED HEALTH CARE EDUCATION/TRAINING PROGRAM

## 2022-01-29 PROCEDURE — 85027 COMPLETE CBC AUTOMATED: CPT | Performed by: NURSE PRACTITIONER

## 2022-01-29 PROCEDURE — 36415 COLL VENOUS BLD VENIPUNCTURE: CPT | Performed by: STUDENT IN AN ORGANIZED HEALTH CARE EDUCATION/TRAINING PROGRAM

## 2022-01-29 PROCEDURE — 99233 SBSQ HOSP IP/OBS HIGH 50: CPT | Performed by: INTERNAL MEDICINE

## 2022-01-29 PROCEDURE — 83735 ASSAY OF MAGNESIUM: CPT | Performed by: STUDENT IN AN ORGANIZED HEALTH CARE EDUCATION/TRAINING PROGRAM

## 2022-01-29 PROCEDURE — 258N000003 HC RX IP 258 OP 636: Performed by: STUDENT IN AN ORGANIZED HEALTH CARE EDUCATION/TRAINING PROGRAM

## 2022-01-29 PROCEDURE — 250N000011 HC RX IP 250 OP 636: Performed by: STUDENT IN AN ORGANIZED HEALTH CARE EDUCATION/TRAINING PROGRAM

## 2022-01-29 PROCEDURE — 85520 HEPARIN ASSAY: CPT | Performed by: INTERNAL MEDICINE

## 2022-01-29 PROCEDURE — 71045 X-RAY EXAM CHEST 1 VIEW: CPT | Mod: 26 | Performed by: RADIOLOGY

## 2022-01-29 PROCEDURE — 71045 X-RAY EXAM CHEST 1 VIEW: CPT

## 2022-01-29 PROCEDURE — 250N000013 HC RX MED GY IP 250 OP 250 PS 637: Performed by: STUDENT IN AN ORGANIZED HEALTH CARE EDUCATION/TRAINING PROGRAM

## 2022-01-29 PROCEDURE — 36415 COLL VENOUS BLD VENIPUNCTURE: CPT | Performed by: INTERNAL MEDICINE

## 2022-01-29 PROCEDURE — 250N000013 HC RX MED GY IP 250 OP 250 PS 637: Performed by: NURSE PRACTITIONER

## 2022-01-29 RX ORDER — CLONAZEPAM 0.5 MG/1
0.5 TABLET ORAL 2 TIMES DAILY PRN
Status: DISCONTINUED | OUTPATIENT
Start: 2022-01-29 | End: 2022-02-10 | Stop reason: HOSPADM

## 2022-01-29 RX ORDER — POTASSIUM CHLORIDE 750 MG/1
10 TABLET, EXTENDED RELEASE ORAL ONCE
Status: COMPLETED | OUTPATIENT
Start: 2022-01-29 | End: 2022-01-29

## 2022-01-29 RX ADMIN — Medication 12.5 MG: at 14:28

## 2022-01-29 RX ADMIN — Medication 1 TABLET: at 07:58

## 2022-01-29 RX ADMIN — HEPARIN SODIUM 1150 UNITS/HR: 1000 INJECTION INTRAVENOUS; SUBCUTANEOUS at 21:35

## 2022-01-29 RX ADMIN — ISOSORBIDE DINITRATE 20 MG: 20 TABLET ORAL at 07:58

## 2022-01-29 RX ADMIN — Medication 12.5 MG: at 19:45

## 2022-01-29 RX ADMIN — Medication 1 TABLET: at 19:45

## 2022-01-29 RX ADMIN — Medication 12.5 MG: at 07:58

## 2022-01-29 RX ADMIN — THERA TABS 1 TABLET: TAB at 07:58

## 2022-01-29 RX ADMIN — FUROSEMIDE 40 MG: 40 TABLET ORAL at 07:58

## 2022-01-29 RX ADMIN — ISOSORBIDE DINITRATE 20 MG: 20 TABLET ORAL at 12:02

## 2022-01-29 RX ADMIN — LEVOTHYROXINE SODIUM 50 MCG: 0.05 TABLET ORAL at 07:28

## 2022-01-29 RX ADMIN — ISOSORBIDE DINITRATE 20 MG: 20 TABLET ORAL at 18:17

## 2022-01-29 RX ADMIN — POTASSIUM CHLORIDE 10 MEQ: 750 TABLET, EXTENDED RELEASE ORAL at 07:58

## 2022-01-29 RX ADMIN — SIMVASTATIN 20 MG: 20 TABLET, FILM COATED ORAL at 21:35

## 2022-01-29 ASSESSMENT — ACTIVITIES OF DAILY LIVING (ADL)
ADLS_ACUITY_SCORE: 8
ADLS_ACUITY_SCORE: 6
ADLS_ACUITY_SCORE: 6
ADLS_ACUITY_SCORE: 8
ADLS_ACUITY_SCORE: 6
ADLS_ACUITY_SCORE: 6
ADLS_ACUITY_SCORE: 8
ADLS_ACUITY_SCORE: 8
ADLS_ACUITY_SCORE: 6
ADLS_ACUITY_SCORE: 6
ADLS_ACUITY_SCORE: 8
ADLS_ACUITY_SCORE: 6
ADLS_ACUITY_SCORE: 8
ADLS_ACUITY_SCORE: 6
ADLS_ACUITY_SCORE: 8

## 2022-01-29 NOTE — PLAN OF CARE
No major shift events    Neuro: A&Ox4.     Cardiac: SR in 90s. VSS           Respiratory: RA. LS with faint crackles in bases.     GI/: Voiding spontaneously. No BM this shift.     Diet/appetite: 2g Na, 2L FR.     Activity: Up indep, ambulated [place] several times with daughter today.

## 2022-01-29 NOTE — PROGRESS NOTES
Cardiology Progress Note      Assessment & Plan:  Kimberley Mullins is a 81 year old female admitted on (Not on file). She has PMH only notable for HTN, hypothyroidism, and recent [facility] admission (1/14-1/17) for N/V and hyponatremia, and is being transferred to [facility] for shortness of breath 2/2 new heart failure and VSD.     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 Echo shows new VSD with L>R shunt, EF 60-65%, likely WMA RCA territory. Patient transferred for possible surgical intervention of VSD. No previous hx of CAD, recent MI, heart failure. Cors/RHC 1/25 with normal coronary arteries, RHC PCPW 16, PA(m) 26, CO 3.9/CI 2.3. QP/QS 2.2. cMRI with no evidence of ischemia, no scarring. CT heart with 1.9 x 1.2 x 0.7 cm muscular ventricular septal defect, moderate right pleural effusion. Reports worsening SOB with exertion today, otherwise appears well-compensated on exam.       - CVTS and Structural Cardiology following, appreciate assistance   - Volume Status: appears euvolemic, transition to oral Lasix, 40 mg daily  - Strict I&O's, goal to keep net even  - Continue low dose hydral/isordil for afterload reduction, to minimize shunting  - Daily BMP  - K>4, Mg>2, replacement per protocol  - IV filter on peripheral IV lines  - Obtain repeat covid swab for upcoming procedure   - Repeat CXR for SOB   - Plan for percutaneous VSD closure (and intra op KATIE) on Monday, 1/31    # Small segmental PE  D-dimer positive at OSH 2.13, CT PE positive for acute vs chronic small subsegmental PE. Hemodynamically stable.  - Heparin gtt high  "intensity, will leave on this gtt prior to procedure  - After VSD closure, prior to discharge, will transition to DOAC     # Hypertension   BP stable   - Continue low dose hydral/isordil for afterload reduction  - Hold PTA Norvasc for now      # Hyponatremia, hypervolemic  # IBS with diarrhea  Recent hospitalization for diarrhea, HypoNa, no recent diarrhea/vomiting since discharge. Na on admit 131. Na stable, 136   - Diuresis per above    - BMP, Mg monitoring while diuresing     # Weakness, resolved  Pt reports worsening over last several weeks, with aggressive diuresis, patient now stating weakness is much better, she feels almost back to baseline  - PT/OT consulted      # Hypothyroidism  Patient reports feeling \"shaky\" today. Likely 2/2 no sleep last night, however, 1/14 when hospitalized with hyponatremia, TSH/T4 elevated. Repeat TSH 6.02, T4 nml.   - Continue PTA Levothyroxine 50mcg      # Hyperlipidemia  - Continue PTA Simvastatin 20     # Anxiety  Reports feeling anxious about new diagnosis and upcoming procedure.   - Clonazepam 0.5 mg BID prn     Diet: 2G Na/2L fluid restriction   Activity: as tolerated   Code Status: Full   Disposition: pending VSD closure     Patient seen and discussed w/ Dr. Pickering.      Genoveva Olson DNP, APRN, CNP  [facility] Cardiology Team  [phone] 7a-5p       Interval History: No acute events overnight. Denies CP, dizziness, or palpitations. Reports worsening SOB with activity while walking hallway this morning along with persistent cough. Afebrile. Hemodynamically on stable on RA. Appears compensated on exam. Down 3 lbs today. Net negative 1L yesterday.     Most recent vital signs:  /73 (BP Location: Right arm)   Pulse 94   Temp 97.8  F (36.6  C) (Oral)   Resp 16   Wt 62.8 kg (138 lb 7.2 oz)   LMP  (LMP Unknown)   SpO2 96%   BMI 24.53 kg/m    Temp:  [97.6  F (36.4  C)-98.5  F (36.9  C)] 97.8  F (36.6  C)  Pulse:  [] 94  Resp:  [14-16] 16  BP: (101-118)/(71-87) " 108/73  SpO2:  [92 %-96 %] 96 %  Wt Readings from Last 2 Encounters:   01/29/22 62.8 kg (138 lb 7.2 oz)   01/24/22 72.2 kg (159 lb 3.2 oz)       Intake/Output Summary (Last 24 hours) at 1/29/2022 0935  Last data filed at 1/29/2022 0904  Gross per 24 hour   Intake 1660 ml   Output 2100 ml   Net -440 ml       Physical exam:  General: In bed, in NAD  HEENT: EOMI, PERRLA, no scleral icterus or injection  CARDIAC: RRR, no m/r/g appreciated. Peripheral pulses 2+  RESP: CTAB, no wheezes, rhonchi or crackles appreciated.  GI: NABS, NT/ND, no guarding or rebound  EXTREMITIES: NO LE edema, pulses 2+.   SKIN: No acute lesions appreciated  NEURO: Alert and oriented X3, CN II-XII grossly intact, no focal neurological deficits noted    Drains and Tubes: All drain and tube sites are all benign, no signs of infection  Access: All vascular access sites appear benign    Labs (Past three days):  CBC  Recent Labs   Lab 01/29/22  0619 01/28/22  0610 01/27/22  0602 01/26/22  0549   WBC 5.2 4.8 5.7 6.0   RBC 3.32* 3.17* 3.07* 3.16*   HGB 10.8* 10.3* 10.2* 10.5*   HCT 33.1* 30.8* 30.1* 30.9*    97 98 98   MCH 32.5 32.5 33.2* 33.2*   MCHC 32.6 33.4 33.9 34.0   RDW 16.1* 15.8* 15.7* 15.6*    207 201 202     BMP  Recent Labs   Lab 01/29/22  0619 01/28/22  0610 01/27/22  0602 01/26/22  1256 01/26/22  0549    137 136  --  136   POTASSIUM 3.6 3.6 3.4 3.5 3.1*   CHLORIDE 102 102 99  --  97   CO2 28 29 30  --  29   ANIONGAP 8 6 7  --  10   GLC 90 94 98  --  87   BUN 9 8 8  --  9   CR 0.68 0.69 0.68  --  0.68   GFRESTIMATED 86 86 86  --  86   ANANDA 9.0 8.9 8.6  --  8.3*   MAG 2.1 2.2 2.2  --  2.2     Troponins: No results found for: TROPI, TROPONIN, TROPR, TROPN     INR  Recent Labs   Lab 01/24/22  1701 01/24/22  1151   INR 1.08 1.09     Liver panel  Recent Labs   Lab 01/25/22  0359 01/24/22  1151   PROTTOTAL 5.9* 6.5*   ALBUMIN 3.0* 3.3*   BILITOTAL 0.6 0.6   ALKPHOS 50 60   AST 28 25   ALT 41 49       Imaging/procedure  results:  ECHO: 1/24/22  Interpretation Summary     Left ventricular systolic function is normal.  The visual ejection fraction is 60-65%.  No regional wall motion abnormalities noted.  A muscular ventricular septal defect is present.  This appears to be in mid to distal interventricular septum with left to right  flow, measuring approximately 9-10mm. Qp/Qs calculated at 2 but accuracy  limited. Consider Cardiac MRI with flow if clinically appropriate.  Right ventricular systolic pressure is elevated, consistent with mild to  moderate pulmonary hypertension.  Compared to stress echo from 6/21, this VSD is new finding but the  Interventricular septum was not optimally imaged on prior study.      CMR: 1/26/22  Comparison CMR: None      1. The left ventricle is normal in cavity size and wall thickness.  There is a muscular ventricular septal  defect with a left to right shunt in the distal septum. The defect is oblique measuring 7 mm on the left  ventricular side and much wider at 18 mm on the right ventricular side.  There are no regional wall motion  abnormalities. The global systolic function is normal. The LVEF is 67%.      2. The right ventricle is moderately dilated. The global systolic function is mildly reduced. The RVEF is  47%.      3. The right atrium is mildly enlarged. The left atrium is normal in size      4. There is tricuspid regurgitation.      5. There is no late gadolinium enhancement to indicate myocardial fibrosis.       6. There is no pericardial effusion. There are small bilateral pleural effusion.      7. There is no intracardiac thrombus.     8. There is evidence of shunting with Qp/Qs of 2.34.      CONCLUSIONS:   1. Normal left ventricular size and function, LVEF 67%  2. Moderately dilated and mildly reduced right ventricular function, RVEF 47%.   3. Isolated muscular ventricular septal defect in the distal septum measuring (7 mm on the LV side and 18  mm on the RV side) with Qp/Qs of 2.34.    4. No evidence of myocardial fibrosis or infarction.      Coronary Angiogram/RHC: 1/25/22  Pre Procedure Diagnosis    ACS > 24 hoursother         Conclusion         Left sided filling pressures are mildly elevated.    Right sided filling pressures are mildly elevated.    Mild elevated pulmonary hypertension.    Normal cardiac output level.    Hemodynamic data has been modified in Epic per physician review.     Minimal non obstructive CAD.  Significant left to right shunt at the level of the right ventricle with a Qp/Qs of 2.0.     Hemodynamics    RA 9/13/8  RV 50/7  PA 44/18 (27)  PCWP 16  TDCO 3.93  TDCI 2.29  CI 2.32  CO 3.99  MVO2 PA 80.15  MVO2 RIJ 63.5  MVO2 SVC 65.3   MVO2 RA 63.6  MVO2 RV 82.5  PVR 2.79 KIRKPATRICK  SVR 1343 dynes         Time Spent on this Encounter   I spent >35 minutes on the unit/floor managing the care of Kimberley Mullins. Over 50% of my time was spent on the following:   - Counseling the patient and/or family regarding: diagnostic results, prognosis, risks and benefits of treatment options and medical compliance  - Coordination of care with the: consultant(s) and nurse      Genoveva Olson, CNP

## 2022-01-29 NOTE — PLAN OF CARE
Neuro: A&Ox4.   Cardiac: SR in 90s. VSS   Respiratory: RA. LS with faint crackles in bases.   GI/: Voiding spontaneously. No BM this shift.   Diet/appetite: 2g Na, 2L FR.   Activity: Up indep, ambulated [place] several times with daughter today.   Pain: Denies   Skin: No new deficits noted.  Lines: PIV x 1-heparin infusing  Replacements: K recheck in am.   Plan: continue current cares, surgery on Monday

## 2022-01-30 LAB
ABO/RH(D): NORMAL
ANION GAP SERPL CALCULATED.3IONS-SCNC: 6 MMOL/L (ref 3–14)
ANTIBODY SCREEN: NEGATIVE
BUN SERPL-MCNC: 12 MG/DL (ref 7–30)
CALCIUM SERPL-MCNC: 9.2 MG/DL (ref 8.5–10.1)
CHLORIDE BLD-SCNC: 102 MMOL/L (ref 94–109)
CO2 SERPL-SCNC: 27 MMOL/L (ref 20–32)
CREAT SERPL-MCNC: 0.67 MG/DL (ref 0.52–1.04)
ERYTHROCYTE [DISTWIDTH] IN BLOOD BY AUTOMATED COUNT: 16 % (ref 10–15)
FERRITIN SERPL-MCNC: 61 NG/ML (ref 8–252)
GFR SERPL CREATININE-BSD FRML MDRD: 87 ML/MIN/1.73M2
GLUCOSE BLD-MCNC: 96 MG/DL (ref 70–99)
HCT VFR BLD AUTO: 33.6 % (ref 35–47)
HGB BLD-MCNC: 11 G/DL (ref 11.7–15.7)
IRON SATN MFR SERPL: 17 % (ref 15–46)
IRON SERPL-MCNC: 58 UG/DL (ref 35–180)
MAGNESIUM SERPL-MCNC: 2.3 MG/DL (ref 1.6–2.3)
MCH RBC QN AUTO: 32.4 PG (ref 26.5–33)
MCHC RBC AUTO-ENTMCNC: 32.7 G/DL (ref 31.5–36.5)
MCV RBC AUTO: 99 FL (ref 78–100)
PLATELET # BLD AUTO: 220 10E3/UL (ref 150–450)
POTASSIUM BLD-SCNC: 3.9 MMOL/L (ref 3.4–5.3)
RBC # BLD AUTO: 3.39 10E6/UL (ref 3.8–5.2)
SARS-COV-2 RNA RESP QL NAA+PROBE: NEGATIVE
SODIUM SERPL-SCNC: 135 MMOL/L (ref 133–144)
SPECIMEN EXPIRATION DATE: NORMAL
TIBC SERPL-MCNC: 337 UG/DL (ref 240–430)
UFH PPP CHRO-ACNC: 0.25 IU/ML
UFH PPP CHRO-ACNC: 0.51 IU/ML
VIT B12 SERPL-MCNC: 957 PG/ML (ref 193–986)
WBC # BLD AUTO: 4.5 10E3/UL (ref 4–11)

## 2022-01-30 PROCEDURE — 36415 COLL VENOUS BLD VENIPUNCTURE: CPT | Performed by: INTERNAL MEDICINE

## 2022-01-30 PROCEDURE — 83550 IRON BINDING TEST: CPT | Performed by: STUDENT IN AN ORGANIZED HEALTH CARE EDUCATION/TRAINING PROGRAM

## 2022-01-30 PROCEDURE — 85027 COMPLETE CBC AUTOMATED: CPT | Performed by: NURSE PRACTITIONER

## 2022-01-30 PROCEDURE — 250N000013 HC RX MED GY IP 250 OP 250 PS 637: Performed by: NURSE PRACTITIONER

## 2022-01-30 PROCEDURE — 250N000013 HC RX MED GY IP 250 OP 250 PS 637: Performed by: STUDENT IN AN ORGANIZED HEALTH CARE EDUCATION/TRAINING PROGRAM

## 2022-01-30 PROCEDURE — 80048 BASIC METABOLIC PNL TOTAL CA: CPT | Performed by: NURSE PRACTITIONER

## 2022-01-30 PROCEDURE — 86923 COMPATIBILITY TEST ELECTRIC: CPT

## 2022-01-30 PROCEDURE — 36415 COLL VENOUS BLD VENIPUNCTURE: CPT | Performed by: NURSE PRACTITIONER

## 2022-01-30 PROCEDURE — 82607 VITAMIN B-12: CPT | Performed by: STUDENT IN AN ORGANIZED HEALTH CARE EDUCATION/TRAINING PROGRAM

## 2022-01-30 PROCEDURE — 85520 HEPARIN ASSAY: CPT | Performed by: INTERNAL MEDICINE

## 2022-01-30 PROCEDURE — 83735 ASSAY OF MAGNESIUM: CPT | Performed by: STUDENT IN AN ORGANIZED HEALTH CARE EDUCATION/TRAINING PROGRAM

## 2022-01-30 PROCEDURE — 82728 ASSAY OF FERRITIN: CPT | Performed by: STUDENT IN AN ORGANIZED HEALTH CARE EDUCATION/TRAINING PROGRAM

## 2022-01-30 PROCEDURE — 258N000003 HC RX IP 258 OP 636: Performed by: STUDENT IN AN ORGANIZED HEALTH CARE EDUCATION/TRAINING PROGRAM

## 2022-01-30 PROCEDURE — 214N000001 HC R&B CCU UMMC

## 2022-01-30 PROCEDURE — 86901 BLOOD TYPING SEROLOGIC RH(D): CPT | Performed by: NURSE PRACTITIONER

## 2022-01-30 PROCEDURE — 250N000011 HC RX IP 250 OP 636: Performed by: STUDENT IN AN ORGANIZED HEALTH CARE EDUCATION/TRAINING PROGRAM

## 2022-01-30 PROCEDURE — 99233 SBSQ HOSP IP/OBS HIGH 50: CPT | Performed by: NURSE PRACTITIONER

## 2022-01-30 RX ADMIN — HEPARIN SODIUM 1300 UNITS/HR: 1000 INJECTION INTRAVENOUS; SUBCUTANEOUS at 22:06

## 2022-01-30 RX ADMIN — Medication 12.5 MG: at 07:55

## 2022-01-30 RX ADMIN — FUROSEMIDE 40 MG: 40 TABLET ORAL at 07:55

## 2022-01-30 RX ADMIN — LEVOTHYROXINE SODIUM 50 MCG: 0.05 TABLET ORAL at 07:21

## 2022-01-30 RX ADMIN — Medication 12.5 MG: at 19:48

## 2022-01-30 RX ADMIN — ISOSORBIDE DINITRATE 20 MG: 20 TABLET ORAL at 11:48

## 2022-01-30 RX ADMIN — THERA TABS 1 TABLET: TAB at 07:55

## 2022-01-30 RX ADMIN — SIMVASTATIN 20 MG: 20 TABLET, FILM COATED ORAL at 21:27

## 2022-01-30 RX ADMIN — Medication 12.5 MG: at 14:39

## 2022-01-30 RX ADMIN — Medication 1 TABLET: at 19:48

## 2022-01-30 RX ADMIN — Medication 1 TABLET: at 07:55

## 2022-01-30 RX ADMIN — ISOSORBIDE DINITRATE 20 MG: 20 TABLET ORAL at 17:51

## 2022-01-30 RX ADMIN — ISOSORBIDE DINITRATE 20 MG: 20 TABLET ORAL at 07:55

## 2022-01-30 RX ADMIN — CLONAZEPAM 0.5 MG: 0.5 TABLET ORAL at 21:27

## 2022-01-30 ASSESSMENT — ACTIVITIES OF DAILY LIVING (ADL)
ADLS_ACUITY_SCORE: 6
ADLS_ACUITY_SCORE: 8
ADLS_ACUITY_SCORE: 6
ADLS_ACUITY_SCORE: 8
ADLS_ACUITY_SCORE: 6
ADLS_ACUITY_SCORE: 8
ADLS_ACUITY_SCORE: 6
ADLS_ACUITY_SCORE: 8
ADLS_ACUITY_SCORE: 6
ADLS_ACUITY_SCORE: 6
ADLS_ACUITY_SCORE: 8
ADLS_ACUITY_SCORE: 6
ADLS_ACUITY_SCORE: 6
ADLS_ACUITY_SCORE: 8
ADLS_ACUITY_SCORE: 6
ADLS_ACUITY_SCORE: 8

## 2022-01-30 NOTE — PLAN OF CARE
Pt reported feeling somewhat more SOB with activity today; was noticing she had productive cough as well. Admitted feeling anxious regarding upcoming VSD repair.  CXR done-wnl. Weight trending down; continues on oral lasix with good urine output. Pt given IS and encouraged to spend more time sitting upright in chair; felt better after doing so as day went on. Independent with cares-walking halls with family & RN. Klonopin prn now available to pt for anxiety but thus far she has declined wanting to take med.  Replaced K 3.6 per protocol.  Continues on heparin gtt @ 1150u/hr for small PE. Next 10a recheck due at 21:30.  Pre-procedure covid swab sent: results pending.

## 2022-01-30 NOTE — PROGRESS NOTES
Cardiology Progress Note      Assessment & Plan:  Kimberley Mullins is a 81 year old female admitted on (Not on file). She has PMH only notable for HTN, hypothyroidism, and recent [facility] admission (1/14-1/17) for N/V and hyponatremia, and is being transferred to [facility] for shortness of breath 2/2 new heart failure and VSD.     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 Echo shows new VSD with L>R shunt, EF 60-65%, likely WMA RCA territory. Patient transferred for possible surgical intervention of VSD. No previous hx of CAD, recent MI, heart failure. Cors/RHC 1/25 with normal coronary arteries, RHC PCPW 16, PA(m) 26, CO 3.9/CI 2.3. QP/QS 2.2. cMRI with no evidence of ischemia, no scarring. CT heart with 1.9 x 1.2 x 0.7 cm muscular ventricular septal defect, moderate right pleural effusion. Worsening SOB on 1/29, no acute findings on CXR. Appears well-compensated on exam.      - Volume Status: appears euvolemic, continue oral lasix 40 mg daily  - Continue low dose hydral/isordil for afterload reduction, to minimize shunting  - Daily BMP  - K>4, Mg>2, replacement per protocol  - Strict I&O's, goal net even  - IV filter on peripheral IV lines  - Plan for percutaneous VSD closure (and intra op KATIE) on Monday, 1/31    # Small segmental PE  D-dimer positive at OSH 2.13, CT PE positive for acute vs chronic small subsegmental PE. Hemodynamically stable.  - Continue heparin gtt prior to procedure, transition to DOAC at discharge      # Hypertension   BP stable   - Continue low dose hydral/isordil for afterload reduction  - Hold PTA Norvasc for now      # Hyponatremia, hypervolemic  # IBS with diarrhea  Recent hospitalization for diarrhea,  "HypoNa, no recent diarrhea/vomiting since discharge. Na on admit 131. Na stable, 136   - Diuresis per above    - BMP, Mg monitoring while diuresing     # Weakness, resolved  Pt reports worsening over last several weeks, with aggressive diuresis, patient now stating weakness is much better, she feels almost back to baseline  - PT/OT consulted      # Hypothyroidism  Patient reports feeling \"shaky\" today. Likely 2/2 no sleep last night, however, 1/14 when hospitalized with hyponatremia, TSH/T4 elevated. Repeat TSH 6.02, T4 nml.   - Continue PTA Levothyroxine 50mcg      # Hyperlipidemia  - Continue PTA Simvastatin 20     # Anxiety  Reports feeling anxious about new diagnosis and upcoming procedure.   - Clonazepam 0.5 mg BID prn     Diet: 2G Na/2L fluid restriction   Activity: as tolerated   Code Status: Full   Disposition: home 2-3 days pending VSD closure     Patient seen and discussed w/ Dr. Pickering.      Genoveva Olson DNP, APRN, CNP  [facility] Cardiology Team  [phone] 7a-5p       Interval History: No acute events overnight. Denies CP, dizziness, or palpitations. Hemodynamically on stable on RA. Appears compensated on exam. Weight stable. NPO at midnight for VSD closure tomorrow.     Most recent vital signs:  /74 (BP Location: Right arm)   Pulse 98   Temp 98.2  F (36.8  C) (Oral)   Resp 18   Wt 62.6 kg (137 lb 14.4 oz)   LMP  (LMP Unknown)   SpO2 94%   BMI 24.43 kg/m    Temp:  [97.3  F (36.3  C)-98.5  F (36.9  C)] 98.2  F (36.8  C)  Pulse:  [87-98] 98  Resp:  [18] 18  BP: ()/(63-83) 111/74  SpO2:  [94 %-99 %] 94 %  Wt Readings from Last 2 Encounters:   01/30/22 62.6 kg (137 lb 14.4 oz)   01/24/22 72.2 kg (159 lb 3.2 oz)       Intake/Output Summary (Last 24 hours) at 1/29/2022 0935  Last data filed at 1/29/2022 0904  Gross per 24 hour   Intake 1660 ml   Output 2100 ml   Net -440 ml       Physical exam:  General: In bed, in NAD  HEENT: EOMI, PERRLA, no scleral icterus or injection  CARDIAC: RRR, no " m/r/g appreciated. Peripheral pulses 2+  RESP: CTAB, no wheezes, rhonchi or crackles appreciated.  GI: NABS, NT/ND, no guarding or rebound  EXTREMITIES: NO LE edema, pulses 2+.   SKIN: No acute lesions appreciated  NEURO: Alert and oriented X3, CN II-XII grossly intact, no focal neurological deficits noted    Drains and Tubes: All drain and tube sites are all benign, no signs of infection  Access: All vascular access sites appear benign    Labs (Past three days):  CBC  Recent Labs   Lab 01/30/22  0531 01/29/22  0619 01/28/22  0610 01/27/22  0602   WBC 4.5 5.2 4.8 5.7   RBC 3.39* 3.32* 3.17* 3.07*   HGB 11.0* 10.8* 10.3* 10.2*   HCT 33.6* 33.1* 30.8* 30.1*   MCV 99 100 97 98   MCH 32.4 32.5 32.5 33.2*   MCHC 32.7 32.6 33.4 33.9   RDW 16.0* 16.1* 15.8* 15.7*    206 207 201     BMP  Recent Labs   Lab 01/30/22  0531 01/29/22  0619 01/28/22  0610 01/27/22  0602    138 137 136   POTASSIUM 3.9 3.6 3.6 3.4   CHLORIDE 102 102 102 99   CO2 27 28 29 30   ANIONGAP 6 8 6 7   GLC 96 90 94 98   BUN 12 9 8 8   CR 0.67 0.68 0.69 0.68   GFRESTIMATED 87 86 86 86   ANANDA 9.2 9.0 8.9 8.6   MAG 2.3 2.1 2.2 2.2     Troponins: No results found for: TROPI, TROPONIN, TROPR, TROPN     INR  Recent Labs   Lab 01/24/22  1701 01/24/22  1151   INR 1.08 1.09     Liver panel  Recent Labs   Lab 01/25/22  0359 01/24/22  1151   PROTTOTAL 5.9* 6.5*   ALBUMIN 3.0* 3.3*   BILITOTAL 0.6 0.6   ALKPHOS 50 60   AST 28 25   ALT 41 49       Imaging/procedure results:  ECHO: 1/24/22  Interpretation Summary     Left ventricular systolic function is normal.  The visual ejection fraction is 60-65%.  No regional wall motion abnormalities noted.  A muscular ventricular septal defect is present.  This appears to be in mid to distal interventricular septum with left to right  flow, measuring approximately 9-10mm. Qp/Qs calculated at 2 but accuracy  limited. Consider Cardiac MRI with flow if clinically appropriate.  Right ventricular systolic pressure is  elevated, consistent with mild to  moderate pulmonary hypertension.  Compared to stress echo from 6/21, this VSD is new finding but the  Interventricular septum was not optimally imaged on prior study.      CMR: 1/26/22  Comparison CMR: None      1. The left ventricle is normal in cavity size and wall thickness.  There is a muscular ventricular septal  defect with a left to right shunt in the distal septum. The defect is oblique measuring 7 mm on the left  ventricular side and much wider at 18 mm on the right ventricular side.  There are no regional wall motion  abnormalities. The global systolic function is normal. The LVEF is 67%.      2. The right ventricle is moderately dilated. The global systolic function is mildly reduced. The RVEF is  47%.      3. The right atrium is mildly enlarged. The left atrium is normal in size      4. There is tricuspid regurgitation.      5. There is no late gadolinium enhancement to indicate myocardial fibrosis.       6. There is no pericardial effusion. There are small bilateral pleural effusion.      7. There is no intracardiac thrombus.     8. There is evidence of shunting with Qp/Qs of 2.34.      CONCLUSIONS:   1. Normal left ventricular size and function, LVEF 67%  2. Moderately dilated and mildly reduced right ventricular function, RVEF 47%.   3. Isolated muscular ventricular septal defect in the distal septum measuring (7 mm on the LV side and 18  mm on the RV side) with Qp/Qs of 2.34.   4. No evidence of myocardial fibrosis or infarction.      Coronary Angiogram/RHC: 1/25/22  Pre Procedure Diagnosis    ACS > 24 hoursother         Conclusion         Left sided filling pressures are mildly elevated.    Right sided filling pressures are mildly elevated.    Mild elevated pulmonary hypertension.    Normal cardiac output level.    Hemodynamic data has been modified in Epic per physician review.     Minimal non obstructive CAD.  Significant left to right shunt at the level of the  right ventricle with a Qp/Qs of 2.0.     Hemodynamics    RA 9/13/8  RV 50/7  PA 44/18 (27)  PCWP 16  TDCO 3.93  TDCI 2.29  CI 2.32  CO 3.99  MVO2 PA 80.15  MVO2 RIJ 63.5  MVO2 SVC 65.3   MVO2 RA 63.6  MVO2 RV 82.5  PVR 2.79 KIRKPATRICK  SVR 1343 dynes       Time Spent on this Encounter   I spent 34 minutes on the unit/floor managing the care of Kimberleydion Mullins. Over 50% of my time was spent on the following:   - Counseling the patient and/or family regarding: diagnostic results, recommended follow-up and medical compliance  - Coordination of care with the: consultant(s) and nurse      Genoveva Olson, CNP

## 2022-01-30 NOTE — PLAN OF CARE
D: New HF and VSD   PMH of HTN, hypothyroidism    I/A: A0x4. VSS. Afebrile. SR/ST, rates of 80s-100s. On RA. BP stable and WDL.   Denies pain/palpitations/dyspnea.   Heparin gtt increased to 1300 units/hr with bolus. Urinating adequately. Weight trending down. Standby assist, moves well. Reports anxiety is manageable overnight.     P: VSD closure on Monday. continue to monitor Pt status and report changes to treatment team.

## 2022-01-31 ENCOUNTER — APPOINTMENT (OUTPATIENT)
Dept: GENERAL RADIOLOGY | Facility: CLINIC | Age: 82
DRG: 228 | End: 2022-01-31
Attending: STUDENT IN AN ORGANIZED HEALTH CARE EDUCATION/TRAINING PROGRAM
Payer: COMMERCIAL

## 2022-01-31 ENCOUNTER — ANESTHESIA (OUTPATIENT)
Dept: CARDIOLOGY | Facility: CLINIC | Age: 82
DRG: 228 | End: 2022-01-31
Payer: COMMERCIAL

## 2022-01-31 ENCOUNTER — APPOINTMENT (OUTPATIENT)
Dept: CARDIOLOGY | Facility: CLINIC | Age: 82
DRG: 228 | End: 2022-01-31
Attending: NURSE PRACTITIONER
Payer: COMMERCIAL

## 2022-01-31 LAB
ACT BLD: 126 SECONDS (ref 74–150)
ACT BLD: 220 SECONDS (ref 74–150)
ACT BLD: 245 SECONDS (ref 74–150)
ACT BLD: 279 SECONDS (ref 74–150)
ANION GAP SERPL CALCULATED.3IONS-SCNC: 6 MMOL/L (ref 3–14)
BASE EXCESS BLDA CALC-SCNC: 2.3 MMOL/L (ref -9.6–2)
BASE EXCESS BLDA CALC-SCNC: 4.1 MMOL/L (ref -9.6–2)
BUN SERPL-MCNC: 12 MG/DL (ref 7–30)
CA-I BLD-MCNC: 4.4 MG/DL (ref 4.4–5.2)
CA-I BLD-MCNC: 4.6 MG/DL (ref 4.4–5.2)
CALCIUM SERPL-MCNC: 9.1 MG/DL (ref 8.5–10.1)
CHLORIDE BLD-SCNC: 102 MMOL/L (ref 94–109)
CO2 SERPL-SCNC: 28 MMOL/L (ref 20–32)
CREAT SERPL-MCNC: 0.62 MG/DL (ref 0.52–1.04)
ERYTHROCYTE [DISTWIDTH] IN BLOOD BY AUTOMATED COUNT: 15.8 % (ref 10–15)
FOLATE SERPL-MCNC: 35.2 NG/ML
GFR SERPL CREATININE-BSD FRML MDRD: 88 ML/MIN/1.73M2
GLUCOSE BLD-MCNC: 120 MG/DL (ref 70–99)
GLUCOSE BLD-MCNC: 89 MG/DL (ref 70–99)
GLUCOSE BLD-MCNC: 94 MG/DL (ref 70–99)
GLUCOSE BLDC GLUCOMTR-MCNC: 84 MG/DL (ref 70–99)
HCO3 BLDA-SCNC: 26 MMOL/L (ref 21–28)
HCO3 BLDA-SCNC: 27 MMOL/L (ref 21–28)
HCT VFR BLD AUTO: 33.7 % (ref 35–47)
HGB BLD-MCNC: 10.5 G/DL (ref 11.7–15.7)
HGB BLD-MCNC: 10.9 G/DL (ref 11.7–15.7)
HGB BLD-MCNC: 11 G/DL (ref 11.7–15.7)
HGB BLD-MCNC: 11.1 G/DL (ref 11.7–15.7)
HGB BLD-MCNC: 11.2 G/DL (ref 11.7–15.7)
HGB BLD-MCNC: 11.3 G/DL (ref 11.7–15.7)
LACTATE BLD-SCNC: 0.9 MMOL/L
LACTATE BLD-SCNC: 1.2 MMOL/L
MAGNESIUM SERPL-MCNC: 2.3 MG/DL (ref 1.6–2.3)
MCH RBC QN AUTO: 32.6 PG (ref 26.5–33)
MCHC RBC AUTO-ENTMCNC: 32.6 G/DL (ref 31.5–36.5)
MCV RBC AUTO: 100 FL (ref 78–100)
O2/TOTAL GAS SETTING VFR VENT: 100 %
O2/TOTAL GAS SETTING VFR VENT: 63 %
OXYHGB MFR BLDA: 87 % (ref 92–100)
OXYHGB MFR BLDA: 96 % (ref 92–100)
OXYHGB MFR BLDA: 99 % (ref 92–100)
OXYHGB MFR BLDV: 63 % (ref 92–100)
OXYHGB MFR BLDV: 72 % (ref 92–100)
OXYHGB MFR BLDV: 74 % (ref 92–100)
OXYHGB MFR BLDV: 86 % (ref 92–100)
PCO2 BLDA: 33 MM HG (ref 35–45)
PCO2 BLDA: 36 MM HG (ref 35–45)
PH BLDA: 7.47 [PH] (ref 7.35–7.45)
PH BLDA: 7.52 [PH] (ref 7.35–7.45)
PLATELET # BLD AUTO: 218 10E3/UL (ref 150–450)
PO2 BLDA: 206 MM HG (ref 80–105)
PO2 BLDA: 83 MM HG (ref 80–105)
POTASSIUM BLD-SCNC: 3.7 MMOL/L (ref 3.5–5)
POTASSIUM BLD-SCNC: 3.7 MMOL/L (ref 3.5–5)
POTASSIUM BLD-SCNC: 3.8 MMOL/L (ref 3.4–5.3)
RBC # BLD AUTO: 3.37 10E6/UL (ref 3.8–5.2)
SODIUM BLD-SCNC: 133 MMOL/L (ref 133–144)
SODIUM BLD-SCNC: 139 MMOL/L (ref 133–144)
SODIUM SERPL-SCNC: 136 MMOL/L (ref 133–144)
UFH PPP CHRO-ACNC: 0.63 IU/ML
WBC # BLD AUTO: 4.8 10E3/UL (ref 4–11)

## 2022-01-31 PROCEDURE — 02UM3JZ SUPPLEMENT VENTRICULAR SEPTUM WITH SYNTHETIC SUBSTITUTE, PERCUTANEOUS APPROACH: ICD-10-PCS | Performed by: INTERNAL MEDICINE

## 2022-01-31 PROCEDURE — 258N000003 HC RX IP 258 OP 636: Performed by: NURSE ANESTHETIST, CERTIFIED REGISTERED

## 2022-01-31 PROCEDURE — 999N000054 HC STATISTIC EKG NON-CHARGEABLE

## 2022-01-31 PROCEDURE — 82810 BLOOD GASES O2 SAT ONLY: CPT

## 2022-01-31 PROCEDURE — 93355 ECHO TRANSESOPHAGEAL (TEE): CPT

## 2022-01-31 PROCEDURE — 250N000009 HC RX 250: Performed by: INTERNAL MEDICINE

## 2022-01-31 PROCEDURE — 272N000001 HC OR GENERAL SUPPLY STERILE: Performed by: INTERNAL MEDICINE

## 2022-01-31 PROCEDURE — 85018 HEMOGLOBIN: CPT

## 2022-01-31 PROCEDURE — 250N000009 HC RX 250: Performed by: NURSE ANESTHETIST, CERTIFIED REGISTERED

## 2022-01-31 PROCEDURE — 82803 BLOOD GASES ANY COMBINATION: CPT

## 2022-01-31 PROCEDURE — 80048 BASIC METABOLIC PNL TOTAL CA: CPT | Performed by: NURSE PRACTITIONER

## 2022-01-31 PROCEDURE — 85027 COMPLETE CBC AUTOMATED: CPT | Performed by: NURSE PRACTITIONER

## 2022-01-31 PROCEDURE — 214N000001 HC R&B CCU UMMC

## 2022-01-31 PROCEDURE — 4A023N8 MEASUREMENT OF CARDIAC SAMPLING AND PRESSURE, BILATERAL, PERCUTANEOUS APPROACH: ICD-10-PCS | Performed by: INTERNAL MEDICINE

## 2022-01-31 PROCEDURE — 93355 ECHO TRANSESOPHAGEAL (TEE): CPT | Performed by: STUDENT IN AN ORGANIZED HEALTH CARE EDUCATION/TRAINING PROGRAM

## 2022-01-31 PROCEDURE — 85520 HEPARIN ASSAY: CPT | Performed by: INTERNAL MEDICINE

## 2022-01-31 PROCEDURE — 250N000011 HC RX IP 250 OP 636: Performed by: INTERNAL MEDICINE

## 2022-01-31 PROCEDURE — 250N000013 HC RX MED GY IP 250 OP 250 PS 637: Performed by: STUDENT IN AN ORGANIZED HEALTH CARE EDUCATION/TRAINING PROGRAM

## 2022-01-31 PROCEDURE — 250N000011 HC RX IP 250 OP 636: Performed by: ANESTHESIOLOGY

## 2022-01-31 PROCEDURE — 250N000011 HC RX IP 250 OP 636: Performed by: NURSE ANESTHETIST, CERTIFIED REGISTERED

## 2022-01-31 PROCEDURE — 258N000003 HC RX IP 258 OP 636: Performed by: STUDENT IN AN ORGANIZED HEALTH CARE EDUCATION/TRAINING PROGRAM

## 2022-01-31 PROCEDURE — 93005 ELECTROCARDIOGRAM TRACING: CPT

## 2022-01-31 PROCEDURE — C1894 INTRO/SHEATH, NON-LASER: HCPCS | Performed by: INTERNAL MEDICINE

## 2022-01-31 PROCEDURE — 85347 COAGULATION TIME ACTIVATED: CPT

## 2022-01-31 PROCEDURE — 250N000013 HC RX MED GY IP 250 OP 250 PS 637: Performed by: INTERNAL MEDICINE

## 2022-01-31 PROCEDURE — 250N000011 HC RX IP 250 OP 636: Performed by: STUDENT IN AN ORGANIZED HEALTH CARE EDUCATION/TRAINING PROGRAM

## 2022-01-31 PROCEDURE — 93010 ELECTROCARDIOGRAM REPORT: CPT | Mod: 76 | Performed by: INTERNAL MEDICINE

## 2022-01-31 PROCEDURE — 93581 TRANSCATH CLOSURE OF VSD: CPT | Performed by: INTERNAL MEDICINE

## 2022-01-31 PROCEDURE — 250N000013 HC RX MED GY IP 250 OP 250 PS 637: Performed by: NURSE PRACTITIONER

## 2022-01-31 PROCEDURE — 278N000051 HC OR IMPLANT GENERAL: Performed by: INTERNAL MEDICINE

## 2022-01-31 PROCEDURE — B2151ZZ FLUOROSCOPY OF LEFT HEART USING LOW OSMOLAR CONTRAST: ICD-10-PCS | Performed by: INTERNAL MEDICINE

## 2022-01-31 PROCEDURE — 82746 ASSAY OF FOLIC ACID SERUM: CPT | Performed by: STUDENT IN AN ORGANIZED HEALTH CARE EDUCATION/TRAINING PROGRAM

## 2022-01-31 PROCEDURE — 258N000003 HC RX IP 258 OP 636: Performed by: ANESTHESIOLOGY

## 2022-01-31 PROCEDURE — 99233 SBSQ HOSP IP/OBS HIGH 50: CPT | Mod: 25 | Performed by: INTERNAL MEDICINE

## 2022-01-31 PROCEDURE — 36415 COLL VENOUS BLD VENIPUNCTURE: CPT | Performed by: NURSE PRACTITIONER

## 2022-01-31 PROCEDURE — 370N000017 HC ANESTHESIA TECHNICAL FEE, PER MIN: Performed by: INTERNAL MEDICINE

## 2022-01-31 PROCEDURE — 93453 R&L HRT CATH W/VENTRICLGRPHY: CPT | Performed by: INTERNAL MEDICINE

## 2022-01-31 PROCEDURE — C1887 CATHETER, GUIDING: HCPCS | Performed by: INTERNAL MEDICINE

## 2022-01-31 PROCEDURE — C1760 CLOSURE DEV, VASC: HCPCS | Performed by: INTERNAL MEDICINE

## 2022-01-31 PROCEDURE — 71045 X-RAY EXAM CHEST 1 VIEW: CPT | Mod: 26

## 2022-01-31 PROCEDURE — 999N000065 XR CHEST PORT 1 VIEW

## 2022-01-31 PROCEDURE — 83735 ASSAY OF MAGNESIUM: CPT | Performed by: STUDENT IN AN ORGANIZED HEALTH CARE EDUCATION/TRAINING PROGRAM

## 2022-01-31 PROCEDURE — C1769 GUIDE WIRE: HCPCS | Performed by: INTERNAL MEDICINE

## 2022-01-31 PROCEDURE — 82330 ASSAY OF CALCIUM: CPT

## 2022-01-31 DEVICE — IMPLANTABLE DEVICE
Type: IMPLANTABLE DEVICE | Status: NON-FUNCTIONAL
Removed: 2022-02-04

## 2022-01-31 RX ORDER — LIDOCAINE 40 MG/G
CREAM TOPICAL
Status: CANCELLED | OUTPATIENT
Start: 2022-01-31

## 2022-01-31 RX ORDER — IOPAMIDOL 755 MG/ML
INJECTION, SOLUTION INTRAVASCULAR
Status: DISCONTINUED | OUTPATIENT
Start: 2022-01-31 | End: 2022-01-31 | Stop reason: HOSPADM

## 2022-01-31 RX ORDER — NALOXONE HYDROCHLORIDE 0.4 MG/ML
0.4 INJECTION, SOLUTION INTRAMUSCULAR; INTRAVENOUS; SUBCUTANEOUS
Status: DISCONTINUED | OUTPATIENT
Start: 2022-01-31 | End: 2022-02-01

## 2022-01-31 RX ORDER — SODIUM CHLORIDE 9 MG/ML
INJECTION, SOLUTION INTRAVENOUS CONTINUOUS
Status: DISCONTINUED | OUTPATIENT
Start: 2022-01-31 | End: 2022-01-31 | Stop reason: CLARIF

## 2022-01-31 RX ORDER — PROPOFOL 10 MG/ML
INJECTION, EMULSION INTRAVENOUS PRN
Status: DISCONTINUED | OUTPATIENT
Start: 2022-01-31 | End: 2022-01-31

## 2022-01-31 RX ORDER — FENTANYL CITRATE 50 UG/ML
25 INJECTION, SOLUTION INTRAMUSCULAR; INTRAVENOUS
Status: DISCONTINUED | OUTPATIENT
Start: 2022-01-31 | End: 2022-02-01

## 2022-01-31 RX ORDER — PROTAMINE SULFATE 10 MG/ML
INJECTION, SOLUTION INTRAVENOUS PRN
Status: DISCONTINUED | OUTPATIENT
Start: 2022-01-31 | End: 2022-01-31

## 2022-01-31 RX ORDER — FLUMAZENIL 0.1 MG/ML
0.2 INJECTION, SOLUTION INTRAVENOUS
Status: DISCONTINUED | OUTPATIENT
Start: 2022-01-31 | End: 2022-02-01

## 2022-01-31 RX ORDER — CEFAZOLIN SODIUM 1 G/50ML
2 SOLUTION INTRAVENOUS ONCE
Status: COMPLETED | OUTPATIENT
Start: 2022-01-31 | End: 2022-01-31

## 2022-01-31 RX ORDER — LIDOCAINE HYDROCHLORIDE 20 MG/ML
INJECTION, SOLUTION INFILTRATION; PERINEURAL PRN
Status: DISCONTINUED | OUTPATIENT
Start: 2022-01-31 | End: 2022-01-31

## 2022-01-31 RX ORDER — OXYCODONE HYDROCHLORIDE 5 MG/1
5 TABLET ORAL EVERY 4 HOURS PRN
Status: DISCONTINUED | OUTPATIENT
Start: 2022-01-31 | End: 2022-01-31

## 2022-01-31 RX ORDER — HEPARIN SODIUM 1000 [USP'U]/ML
INJECTION, SOLUTION INTRAVENOUS; SUBCUTANEOUS PRN
Status: DISCONTINUED | OUTPATIENT
Start: 2022-01-31 | End: 2022-01-31

## 2022-01-31 RX ORDER — DEXAMETHASONE SODIUM PHOSPHATE 4 MG/ML
INJECTION, SOLUTION INTRA-ARTICULAR; INTRALESIONAL; INTRAMUSCULAR; INTRAVENOUS; SOFT TISSUE PRN
Status: DISCONTINUED | OUTPATIENT
Start: 2022-01-31 | End: 2022-01-31

## 2022-01-31 RX ORDER — ETOMIDATE 2 MG/ML
INJECTION INTRAVENOUS PRN
Status: DISCONTINUED | OUTPATIENT
Start: 2022-01-31 | End: 2022-01-31

## 2022-01-31 RX ORDER — ASPIRIN 81 MG/1
81 TABLET ORAL DAILY
Status: DISCONTINUED | OUTPATIENT
Start: 2022-02-01 | End: 2022-01-31

## 2022-01-31 RX ORDER — CEFAZOLIN SODIUM 2 G/100ML
INJECTION, SOLUTION INTRAVENOUS PRN
Status: DISCONTINUED | OUTPATIENT
Start: 2022-01-31 | End: 2022-01-31

## 2022-01-31 RX ORDER — CLOPIDOGREL BISULFATE 75 MG/1
75 TABLET ORAL DAILY
Qty: 90 TABLET | Refills: 3 | Status: SHIPPED | OUTPATIENT
Start: 2022-02-01 | End: 2022-02-15

## 2022-01-31 RX ORDER — DOBUTAMINE HYDROCHLORIDE 200 MG/100ML
2.5 INJECTION INTRAVENOUS CONTINUOUS
Status: DISCONTINUED | OUTPATIENT
Start: 2022-01-31 | End: 2022-01-31 | Stop reason: HOSPADM

## 2022-01-31 RX ORDER — ONDANSETRON 4 MG/1
4 TABLET, ORALLY DISINTEGRATING ORAL EVERY 6 HOURS PRN
Status: DISCONTINUED | OUTPATIENT
Start: 2022-01-31 | End: 2022-02-04

## 2022-01-31 RX ORDER — SODIUM CHLORIDE, SODIUM LACTATE, POTASSIUM CHLORIDE, CALCIUM CHLORIDE 600; 310; 30; 20 MG/100ML; MG/100ML; MG/100ML; MG/100ML
INJECTION, SOLUTION INTRAVENOUS CONTINUOUS PRN
Status: DISCONTINUED | OUTPATIENT
Start: 2022-01-31 | End: 2022-01-31

## 2022-01-31 RX ORDER — HYDRALAZINE HYDROCHLORIDE 20 MG/ML
10 INJECTION INTRAMUSCULAR; INTRAVENOUS EVERY 4 HOURS PRN
Status: DISCONTINUED | OUTPATIENT
Start: 2022-01-31 | End: 2022-02-01

## 2022-01-31 RX ORDER — NALOXONE HYDROCHLORIDE 0.4 MG/ML
0.2 INJECTION, SOLUTION INTRAMUSCULAR; INTRAVENOUS; SUBCUTANEOUS
Status: DISCONTINUED | OUTPATIENT
Start: 2022-01-31 | End: 2022-02-01

## 2022-01-31 RX ORDER — CLOPIDOGREL BISULFATE 75 MG/1
300 TABLET ORAL ONCE
Status: COMPLETED | OUTPATIENT
Start: 2022-01-31 | End: 2022-01-31

## 2022-01-31 RX ORDER — POTASSIUM CHLORIDE 750 MG/1
10 TABLET, EXTENDED RELEASE ORAL ONCE
Status: COMPLETED | OUTPATIENT
Start: 2022-01-31 | End: 2022-01-31

## 2022-01-31 RX ORDER — ONDANSETRON 2 MG/ML
INJECTION INTRAMUSCULAR; INTRAVENOUS PRN
Status: DISCONTINUED | OUTPATIENT
Start: 2022-01-31 | End: 2022-01-31

## 2022-01-31 RX ORDER — FENTANYL CITRATE 50 UG/ML
INJECTION, SOLUTION INTRAMUSCULAR; INTRAVENOUS PRN
Status: DISCONTINUED | OUTPATIENT
Start: 2022-01-31 | End: 2022-01-31

## 2022-01-31 RX ORDER — SODIUM CHLORIDE, SODIUM LACTATE, POTASSIUM CHLORIDE, CALCIUM CHLORIDE 600; 310; 30; 20 MG/100ML; MG/100ML; MG/100ML; MG/100ML
INJECTION, SOLUTION INTRAVENOUS CONTINUOUS
Status: CANCELLED | OUTPATIENT
Start: 2022-01-31

## 2022-01-31 RX ORDER — OXYCODONE HYDROCHLORIDE 10 MG/1
10 TABLET ORAL EVERY 4 HOURS PRN
Status: DISCONTINUED | OUTPATIENT
Start: 2022-01-31 | End: 2022-01-31

## 2022-01-31 RX ORDER — CLOPIDOGREL BISULFATE 75 MG/1
75 TABLET ORAL DAILY
Status: DISCONTINUED | OUTPATIENT
Start: 2022-02-01 | End: 2022-02-03

## 2022-01-31 RX ORDER — ASPIRIN 81 MG/1
81 TABLET, CHEWABLE ORAL ONCE
Status: DISCONTINUED | OUTPATIENT
Start: 2022-01-31 | End: 2022-01-31

## 2022-01-31 RX ORDER — ATROPINE SULFATE 0.1 MG/ML
0.5 INJECTION INTRAVENOUS
Status: DISCONTINUED | OUTPATIENT
Start: 2022-01-31 | End: 2022-02-01

## 2022-01-31 RX ORDER — METOPROLOL TARTRATE 1 MG/ML
5 INJECTION, SOLUTION INTRAVENOUS
Status: DISCONTINUED | OUTPATIENT
Start: 2022-01-31 | End: 2022-02-01

## 2022-01-31 RX ORDER — NOREPINEPHRINE BITARTRATE 0.06 MG/ML
.01-.6 INJECTION, SOLUTION INTRAVENOUS CONTINUOUS
Status: DISCONTINUED | OUTPATIENT
Start: 2022-01-31 | End: 2022-01-31 | Stop reason: HOSPADM

## 2022-01-31 RX ORDER — NITROGLYCERIN 0.4 MG/1
0.4 TABLET SUBLINGUAL EVERY 5 MIN PRN
Status: DISCONTINUED | OUTPATIENT
Start: 2022-01-31 | End: 2022-02-10 | Stop reason: HOSPADM

## 2022-01-31 RX ORDER — ONDANSETRON 2 MG/ML
4 INJECTION INTRAMUSCULAR; INTRAVENOUS EVERY 6 HOURS PRN
Status: DISCONTINUED | OUTPATIENT
Start: 2022-01-31 | End: 2022-02-04

## 2022-01-31 RX ADMIN — THERA TABS 1 TABLET: TAB at 08:32

## 2022-01-31 RX ADMIN — NOREPINEPHRINE BITARTRATE 0.2 MCG/KG/MIN: 1 INJECTION, SOLUTION, CONCENTRATE INTRAVENOUS at 14:15

## 2022-01-31 RX ADMIN — ASPIRIN 325 MG: 325 TABLET, COATED ORAL at 18:46

## 2022-01-31 RX ADMIN — NOREPINEPHRINE BITARTRATE 12.8 MCG: 1 INJECTION, SOLUTION, CONCENTRATE INTRAVENOUS at 15:10

## 2022-01-31 RX ADMIN — NOREPINEPHRINE BITARTRATE 6.4 MCG: 1 INJECTION, SOLUTION, CONCENTRATE INTRAVENOUS at 12:52

## 2022-01-31 RX ADMIN — LEVOTHYROXINE SODIUM 50 MCG: 0.05 TABLET ORAL at 08:32

## 2022-01-31 RX ADMIN — PHENYLEPHRINE HYDROCHLORIDE 50 MCG: 10 INJECTION INTRAVENOUS at 16:10

## 2022-01-31 RX ADMIN — FUROSEMIDE 40 MG: 40 TABLET ORAL at 08:31

## 2022-01-31 RX ADMIN — NOREPINEPHRINE BITARTRATE 6.4 MCG: 1 INJECTION, SOLUTION, CONCENTRATE INTRAVENOUS at 13:13

## 2022-01-31 RX ADMIN — POTASSIUM CHLORIDE 10 MEQ: 750 TABLET, EXTENDED RELEASE ORAL at 08:32

## 2022-01-31 RX ADMIN — NOREPINEPHRINE BITARTRATE 12.8 MCG: 1 INJECTION, SOLUTION, CONCENTRATE INTRAVENOUS at 14:59

## 2022-01-31 RX ADMIN — NOREPINEPHRINE BITARTRATE 6.4 MCG: 1 INJECTION, SOLUTION, CONCENTRATE INTRAVENOUS at 13:11

## 2022-01-31 RX ADMIN — Medication 12.5 MG: at 08:32

## 2022-01-31 RX ADMIN — Medication 2 G: at 12:52

## 2022-01-31 RX ADMIN — FENTANYL CITRATE 50 MCG: 50 INJECTION, SOLUTION INTRAMUSCULAR; INTRAVENOUS at 14:40

## 2022-01-31 RX ADMIN — NOREPINEPHRINE BITARTRATE 12.8 MCG: 1 INJECTION, SOLUTION, CONCENTRATE INTRAVENOUS at 14:16

## 2022-01-31 RX ADMIN — SIMVASTATIN 20 MG: 20 TABLET, FILM COATED ORAL at 22:56

## 2022-01-31 RX ADMIN — NOREPINEPHRINE BITARTRATE 6.4 MCG: 1 INJECTION, SOLUTION, CONCENTRATE INTRAVENOUS at 13:18

## 2022-01-31 RX ADMIN — ROCURONIUM BROMIDE 50 MG: 50 INJECTION, SOLUTION INTRAVENOUS at 13:10

## 2022-01-31 RX ADMIN — ROCURONIUM BROMIDE 20 MG: 50 INJECTION, SOLUTION INTRAVENOUS at 15:04

## 2022-01-31 RX ADMIN — FENTANYL CITRATE 50 MCG: 50 INJECTION, SOLUTION INTRAMUSCULAR; INTRAVENOUS at 13:10

## 2022-01-31 RX ADMIN — Medication 1 UNITS/HR: at 14:23

## 2022-01-31 RX ADMIN — Medication 1 UNITS: at 14:26

## 2022-01-31 RX ADMIN — Medication 1 UNITS: at 13:39

## 2022-01-31 RX ADMIN — CEFAZOLIN SODIUM 2 G: 10 INJECTION, POWDER, FOR SOLUTION INTRAVENOUS at 21:51

## 2022-01-31 RX ADMIN — DEXAMETHASONE SODIUM PHOSPHATE 4 MG: 4 INJECTION, SOLUTION INTRA-ARTICULAR; INTRALESIONAL; INTRAMUSCULAR; INTRAVENOUS; SOFT TISSUE at 12:52

## 2022-01-31 RX ADMIN — ISOSORBIDE DINITRATE 20 MG: 20 TABLET ORAL at 08:31

## 2022-01-31 RX ADMIN — SODIUM CHLORIDE, POTASSIUM CHLORIDE, SODIUM LACTATE AND CALCIUM CHLORIDE: 600; 310; 30; 20 INJECTION, SOLUTION INTRAVENOUS at 12:37

## 2022-01-31 RX ADMIN — Medication 1 UNITS: at 14:16

## 2022-01-31 RX ADMIN — ROCURONIUM BROMIDE 10 MG: 50 INJECTION, SOLUTION INTRAVENOUS at 14:10

## 2022-01-31 RX ADMIN — ONDANSETRON 4 MG: 2 INJECTION INTRAMUSCULAR; INTRAVENOUS at 16:13

## 2022-01-31 RX ADMIN — ETOMIDATE 10 MG: 2 INJECTION INTRAVENOUS at 13:10

## 2022-01-31 RX ADMIN — CLOPIDOGREL BISULFATE 300 MG: 75 TABLET ORAL at 18:46

## 2022-01-31 RX ADMIN — Medication 2000 UNITS: at 15:04

## 2022-01-31 RX ADMIN — PROTAMINE SULFATE 10 MG: 10 INJECTION, SOLUTION INTRAVENOUS at 16:01

## 2022-01-31 RX ADMIN — NOREPINEPHRINE BITARTRATE 12.8 MCG: 1 INJECTION, SOLUTION, CONCENTRATE INTRAVENOUS at 14:09

## 2022-01-31 RX ADMIN — Medication 6000 UNITS: at 14:08

## 2022-01-31 RX ADMIN — NOREPINEPHRINE BITARTRATE 6.4 MCG: 1 INJECTION, SOLUTION, CONCENTRATE INTRAVENOUS at 13:28

## 2022-01-31 RX ADMIN — LIDOCAINE HYDROCHLORIDE 60 MG: 20 INJECTION, SOLUTION INFILTRATION; PERINEURAL at 13:10

## 2022-01-31 RX ADMIN — NOREPINEPHRINE BITARTRATE 6.4 MCG: 1 INJECTION, SOLUTION, CONCENTRATE INTRAVENOUS at 13:22

## 2022-01-31 RX ADMIN — Medication 1 UNITS: at 14:09

## 2022-01-31 RX ADMIN — NOREPINEPHRINE BITARTRATE 6.4 MCG: 1 INJECTION, SOLUTION, CONCENTRATE INTRAVENOUS at 13:36

## 2022-01-31 RX ADMIN — SODIUM CHLORIDE, SODIUM LACTATE, POTASSIUM CHLORIDE, CALCIUM CHLORIDE: 600; 310; 30; 20 INJECTION, SOLUTION INTRAVENOUS at 14:15

## 2022-01-31 RX ADMIN — PROTAMINE SULFATE 50 MG: 10 INJECTION, SOLUTION INTRAVENOUS at 16:04

## 2022-01-31 RX ADMIN — Medication 5 MG: at 23:30

## 2022-01-31 RX ADMIN — PROPOFOL 40 MG: 10 INJECTION, EMULSION INTRAVENOUS at 13:10

## 2022-01-31 RX ADMIN — Medication 1 TABLET: at 08:31

## 2022-01-31 RX ADMIN — SUGAMMADEX 200 MG: 100 INJECTION, SOLUTION INTRAVENOUS at 16:16

## 2022-01-31 RX ADMIN — Medication 3000 UNITS: at 14:49

## 2022-01-31 ASSESSMENT — ACTIVITIES OF DAILY LIVING (ADL)
ADLS_ACUITY_SCORE: 6

## 2022-01-31 NOTE — ANESTHESIA CARE TRANSFER NOTE
Patient: Kimberley Mullins    Procedure: Procedure(s):  CV VSD CLOSURE W/KATIE       Diagnosis: other  Diagnosis Additional Information: No value filed.    Anesthesia Type:   General     Note:    Oropharynx: oropharynx clear of all foreign objects and spontaneously breathing  Level of Consciousness: drowsy  Oxygen Supplementation: face mask  Level of Supplemental Oxygen (L/min / FiO2): 6  Independent Airway: airway patency satisfactory and stable  Dentition: dentition unchanged  Vital Signs Stable: post-procedure vital signs reviewed and stable  Report to RN Given: handoff report given  Patient transferred to: PACU    Handoff Report: Identifed the Patient, Identified the Reponsible Provider, Reviewed the pertinent medical history, Discussed the surgical course, Reviewed Intra-OP anesthesia mangement and issues during anesthesia, Set expectations for post-procedure period and Allowed opportunity for questions and acknowledgement of understanding      Vitals:  Vitals Value Taken Time   BP     Temp     Pulse 92 01/31/22 1642   Resp 14    SpO2 98 % 01/31/22 1642   Vitals shown include unvalidated device data.    Electronically Signed By: JEANIEN Prince CRNA  January 31, 2022  4:43 PM

## 2022-01-31 NOTE — OR NURSING
Dr. Padilla stated that it is up to cardiology if they want another EKG pre-op, or a heparin level rechecked.    Pt has a flat itchy red rash on her back, and also on the back of her Right leg. Her right wrist is bruised with a hematoma noted. Radial pulse is palpable, Bilat feet pulses marked. Right groin is soft and flat. Right neck has scant bruising but is soft and flat. No tenderness to these previous sites.

## 2022-01-31 NOTE — PROGRESS NOTES
"CLINICAL NUTRITION SERVICES - ASSESSMENT NOTE     Nutrition Prescription    RECOMMENDATIONS FOR MDs/PROVIDERS TO ORDER:  None at this time    Malnutrition Status:    Moderate malnutrition in the context of acute illness    Recommendations already ordered by Registered Dietitian (RD):  Provided education on low saturated fat diet    Future/Additional Recommendations:  Monitor labs, intakes, and weight trends.     REASON FOR ASSESSMENT  Kimberley Mullins is a/an 81 year old female assessed by the dietitian for LOS    NUTRITION/MEDICAL HISTORY  History of HTN, hypothyroidism, and recent [facility] admission (1/14-1/17) for N/V and hyponatremia. Transferred to [facility] for shortness of breath 2/2 new heart failure and VSD (with L to R shunt) likely secondary to recent anteroapical MI    Percutaneous VSD closure 1/31.     Chest x-ray 1/31: Increasing small left pleural effusion with adjacent compressive  atelectasis.    FINDINGS  Patient states she believes recent weight loss is all water weight and not muscle. Did not eat much of her lunch (chinese new year menu) and was worried that she would not have options for dinner given the amount of sodium in that meal. Was curious about low saturated fat diet, explained reasoning behind that addition and better choices to make.     CURRENT NUTRITION ORDERS  Diet:Low Saturated Fat Na <2400 mg diet  Intake/Tolerance: mostly 100%, one 50% , two 75% of documented meals in last week    Pt ordering (on average) 1056kcal and 50g protein per day. This is likely meeting 68% minimum energy and 74% protein needs.    LABS  Labs reviewed: Mag 2.4 (L), rest WNL    MEDICATIONS  Medications reviewed, notable for: lasix, levothyroxine, multivitamin, potassium chloride, simvastatin    ANTHROPOMETRICS  Height: 160 cm (5' 3\")  Most Recent Weight: 62.2 kg (137 lb 1.6 oz)    IBW: 52.3 kg  BMI: Normal BMI  Weight History:  Pt with 22 lb weight loss in 1 week since admission, has been on lasix   Wt " Readings from Last Encounters:   01/31/22 62.2 kg (137 lb 1.6 oz)   01/30/22  62.6 kg (137 lb 14.4 oz)   01/29/22 62.8 kg (138 lb 7.2 oz)   01/28/22  64 kg (141 lb 3.2 oz)   01/27/22  64.3 kg (141 lb 11.2 oz)   01/26/22  65.1 kg (143 lb 8 oz)   01/24/22 72.2 kg (159 lb 3.2 oz)   01/19/22 71.2 kg (157 lb)   01/17/22 69.6 kg (153 lb 6.4 oz)   01/14/22 63 kg (139 lb)   09/01/21 66.2 kg (146 lb)   08/04/21 65.8 kg (145 lb)   05/26/21 65.8 kg (145 lb)   02/18/21 65.6 kg (144 lb 9.6 oz)   01/06/21 65.5 kg (144 lb 6.4 oz)     Dosing Weight: 62 kg    ASSESSED NUTRITION NEEDS  Estimated Energy Needs: 2685-4834 kcals/day (25 - 30 kcals/kg)  Justification: Maintenance  Estimated Protein Needs: 68-87 grams protein/day (1.1 - 1.4 grams of pro/kg)  Justification: Increased needs  Estimated Fluid Needs: 6872-8460 mL/day (25 - 30 mL/kg)   Justification: Maintenance     MALNUTRITION  % Intake: < 75% for > 7 days (moderate)  % Weight Loss: > 2% in 1 week (severe) - difficult to assess with fluid status  Subcutaneous Fat Loss: None observed  Muscle Loss: Temporal:  Mild vs age related, Upper arm (bicep, tricep):  Mild-Moderate and Dorsal hand:  Mild-moderate vs age related  Fluid Accumulation/Edema: Trace   Malnutrition Diagnosis: Moderate malnutrition in the context of acute illness      NUTRITION DIAGNOSIS  Inadequate oral intake related to poor appetite and restrictive diet order as evidenced by documented intakes and muscle wasting.       INTERVENTIONS  Implementation  Nutrition Education: Provided education on low saturated fat diet for heart health    Goals  Patient to consume % of nutritionally adequate meal trays TID, or the equivalent with supplements/snacks.     Monitoring/Evaluation  Progress toward goals will be monitored and evaluated per protocol.    Gloria Hampton MS, RDN, LDN  6C RD Pager: [phone]

## 2022-01-31 NOTE — PRE-PROCEDURE
GENERAL PRE-PROCEDURE:   Procedure:  Percutanous ventricular septal defect closure  Date/Time:  1/31/2022 8:55 AM    Written consent obtained?: Yes    Risks and benefits: Risks, benefits and alternatives were discussed    Consent given by:  Patient  Patient states understanding of procedure being performed: Yes    Patient's understanding of procedure matches consent: Yes    Procedure consent matches procedure scheduled: Yes    Expected level of sedation:  Moderate  Appropriately NPO:  Yes  ASA Class:  2  Mallampati  :  Grade 2- soft palate, base of uvula, tonsillar pillars, and portion of posterior pharyngeal wall visible  Lungs:  Lungs clear with good breath sounds bilaterally  Heart:  Normal heart sounds and rate and systolic murmur  History & Physical reviewed:  History and physical reviewed and no updates needed  Statement of review:  I have reviewed the lab findings, diagnostic data, medications, and the plan for sedation

## 2022-01-31 NOTE — PLAN OF CARE
No significant change in status today. Continues on heparin gtt @ 1300u/hr with next recheck in a.m.   Denies pain; ambulating in halls with family throughout day. Using hydrocortisone cream for mild rash on back with relief.  Had a full shower and shampoo this yeimy in prep for VSD repair procedure tomorrow around noon.   Admits to feeling more anxious the closer it gets to procedure and is willing to take klonopin this evening after her daughter leaves.  NPO after MN for VSD repair.

## 2022-01-31 NOTE — ANESTHESIA POSTPROCEDURE EVALUATION
Patient: Kimberley Mullins    Procedure: Procedure(s):  CV VSD CLOSURE W/KATIE       Diagnosis:other  Diagnosis Additional Information: No value filed.    Anesthesia Type:  General    Note:  Disposition: Inpatient   Postop Pain Control: Uneventful            Sign Out: Well controlled pain   PONV: No   Neuro/Psych: Uneventful            Sign Out: Acceptable/Baseline neuro status   Airway/Respiratory: Uneventful            Sign Out: Acceptable/Baseline resp. status   CV/Hemodynamics: Uneventful            Sign Out: Acceptable CV status; No obvious hypovolemia; No obvious fluid overload   Other NRE: NONE   DID A NON-ROUTINE EVENT OCCUR? No           Last vitals:  Vitals Value Taken Time   /82 01/31/22 1715   Temp 36.8  C (98.3  F) 01/31/22 1640   Pulse 92 01/31/22 1727   Resp 15 01/31/22 1715   SpO2 98 % 01/31/22 1727   Vitals shown include unvalidated device data.    Electronically Signed By: Wade Lopez MD  January 31, 2022  5:28 PM

## 2022-01-31 NOTE — PLAN OF CARE
D: New HF and VSD   PMH of HTN, hypothyroidism    I/A: A0x4. VSS. Afebrile. SR/ST, rates of 80s-100s. On RA. BP stable and WDL.   Denies pain/palpitations/dyspnea.   Heparin gtt running at 1300 units/hr. Urinating adequately. Standby assist, moves well. Klonapin x1 overnight for anxiety/sleep. NPO at midnight for VSD closure today.    P: VSD closure at noon. continue to monitor Pt status and report changes to Cards 1.

## 2022-01-31 NOTE — ANESTHESIA PREPROCEDURE EVALUATION
Anesthesia Pre-Procedure Evaluation    Patient: Kimberley Mullins   MRN: [id number] : 1940        Preoperative Diagnosis: other    Procedure : Procedure(s):  CV VSD CLOSURE W/KATIE          Past Medical History:   Diagnosis Date     Arthritis      Diverticulitis of colon (without mention of hemorrhage)(562.11)     diverticulosis noted on Flex Sig     Irritable bowel syndrome      Osteopenia, unspecified location 2018     Other ovarian failure      Overweight (BMI 25.0-29.9) 2018     Personal history of colonic polyps 2004     Status post total knee replacement 2011     Thyroid disease      Transient global amnesia      Unspecified tinnitus     Tinnitus      Past Surgical History:   Procedure Laterality Date     ARTHROPLASTY KNEE  10/25/2011    Procedure:ARTHROPLASTY KNEE; Right Total Knee Arthroplasty   ; Surgeon:BRYCE ATWOOD; Location:[facility] OR     ARTHROPLASTY KNEE  2014    Procedure: ARTHROPLASTY KNEE;  Surgeon: Bryce Atwood MD;  Location: [facility] OR     CV CORONARY ANGIOGRAM N/A 2022    Procedure: Coronary Angiogram; diagnostic;  Surgeon: Sreekanth Delong MD;  [facility]     CV RIGHT HEART CATH MEASUREMENTS RECORDED N/A 2022    Procedure: Right Heart Cath;  Surgeon: Sreekanth Delong MD;  [facility]     HC DILATION/CURETTAGE DIAG/THER NON OB  2006    D & C     HC KNEE SCOPE, DIAGNOSTIC      Arthroscopy, Knee     ZZC AFF FOREARM/WRIST SURGERY UNLISTED      orif radius fx distal left     ZZC APPENDECTOMY       ZZ OPEN JESSI FIXATN HUMERAL SHAFT FX  2007    Dr Massey     [facility] COLONOSCOPY W SNARE REMOVAL TUMOR/POLYP/LESION   & 3/2011    polyp removal during screening colonoscopy      Allergies   Allergen Reactions     No Known Allergies       Social History     Tobacco Use     Smoking status: Never Smoker     Smokeless tobacco: Never Used   Substance Use Topics     Alcohol use: Yes      Alcohol/week: 5.8 standard drinks     Types: 7 Standard drinks or equivalent per week     Comment: two to three glasses of wine q day.      Wt Readings from Last 1 Encounters:   01/31/22 62.2 kg (137 lb 1.6 oz)        Anesthesia Evaluation   Pt has had prior anesthetic.     No history of anesthetic complications       ROS/MED HX  ENT/Pulmonary:       Neurologic:       Cardiovascular:     (+) hypertension-----CHF valvular problems/murmurs pulmonary hypertension, Previous cardiac testing     METS/Exercise Tolerance: 3 - Able to walk 1-2 blocks without stopping    Hematologic:       Musculoskeletal:       GI/Hepatic:       Renal/Genitourinary:       Endo:     (+) thyroid problem, hypothyroidism,     Psychiatric/Substance Use:       Infectious Disease:       Malignancy:       Other:            Physical Exam    Airway        Mallampati: II   TM distance: > 3 FB   Neck ROM: limited   Mouth opening: > 3 cm    Respiratory Devices and Support         Dental  no notable dental history         Cardiovascular          Rhythm and rate: regular and tachycardia     Pulmonary           (+) decreased breath sounds           OUTSIDE LABS:  CBC:   Lab Results   Component Value Date    WBC 4.8 01/31/2022    WBC 4.5 01/30/2022    HGB 11.0 (L) 01/31/2022    HGB 11.0 (L) 01/30/2022    HCT 33.7 (L) 01/31/2022    HCT 33.6 (L) 01/30/2022     01/31/2022     01/30/2022     BMP:   Lab Results   Component Value Date     01/31/2022     01/30/2022    POTASSIUM 3.8 01/31/2022    POTASSIUM 3.9 01/30/2022    CHLORIDE 102 01/31/2022    CHLORIDE 102 01/30/2022    CO2 28 01/31/2022    CO2 27 01/30/2022    BUN 12 01/31/2022    BUN 12 01/30/2022    CR 0.62 01/31/2022    CR 0.67 01/30/2022    GLC 89 01/31/2022    GLC 96 01/30/2022     COAGS:   Lab Results   Component Value Date    PTT 34 01/24/2022    INR 1.08 01/24/2022     POC: No results found for: BGM, HCG, HCGS  HEPATIC:   Lab Results   Component Value Date    ALBUMIN 3.0  (L) 01/25/2022    PROTTOTAL 5.9 (L) 01/25/2022    ALT 41 01/25/2022    AST 28 01/25/2022    ALKPHOS 50 01/25/2022    BILITOTAL 0.6 01/25/2022    BILIDIRECT 0.1 01/08/2010     OTHER:   Lab Results   Component Value Date    PH 8.0 (A) 04/06/2006    A1C 5.6 08/14/2003    ANANDA 9.1 01/31/2022    MAG 2.3 01/31/2022    TSH 6.02 (H) 01/28/2022    T4 1.45 01/28/2022    CRP 0.02 06/10/2013    SED 18 01/10/2018       Anesthesia Plan    ASA Status:  3   NPO Status:  NPO Appropriate    Anesthesia Type: General.     - Airway: ETT         Techniques and Equipment:     - Lines/Monitors: 2nd IV, Arterial Line, Central Line, KATIE            KATIE Absolute Contra-indication: NONE            KATIE Relative Contra-indication: NONE     - Blood: T&C     - Drips/Meds: Vasopressin, Norepi     Consents    Anesthesia Plan(s) and associated risks, benefits, and realistic alternatives discussed. Questions answered and patient/representative(s) expressed understanding.    - Discussed:     - Discussed with:  Patient, Other (See Comment)      - Extended Intubation/Ventilatory Support Discussed: Yes.      - Patient is DNR/DNI Status: No    Use of blood products discussed: Yes.     - Discussed with: Patient, Other (see comment).     - Consented: consented to blood products            Reason for refusal: other.     Postoperative Care       PONV prophylaxis: Ondansetron (or other 5HT-3), Dexamethasone or Solumedrol     Comments:    Other Comments: Anesthesia plan was discussed with patient and her daughter. They voiced understanding and agreed to proceed as planned. Questions were sought and answered            Jai Padilla MD

## 2022-01-31 NOTE — PROGRESS NOTES
[facility]   Cardiology   Progress Note     ASSESSMENT/PLAN:  Kimberley Mullins is a 81 year old female admitted on (Not on file). She has PMH only notable for HTN, hypothyroidism, and recent [facility] admission (1/14-1/17) for N/V and hyponatremia, and is being transferred to [facility] for shortness of breath 2/2 new heart failure and VSD.      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 Echo shows new VSD with L>R shunt, EF 60-65%, likely WMA RCA territory. Patient transferred for possible surgical intervention of VSD. No previous hx of CAD, recent MI, heart failure. Cors/RHC 1/25 with normal coronary arteries, RHC PCPW 16, PA(m) 26, CO 3.9/CI 2.3. QP/QS 2.2. cMRI with no evidence of ischemia, no scarring. CT heart with 1.9 x 1.2 x 0.7 cm muscular ventricular septal defect, moderate right pleural effusion. Worsening SOB on 1/29, no acute findings on CXR. Appears well-compensated on exam.       - Volume Status: appears euvolemic, continue oral lasix 40 mg daily  - Continue low dose hydral/isordil for afterload reduction, to minimize shunting  - Post VSD, may be able to discontinue Lasix and hydral/isordil  - Daily BMP  - K>4, Mg>2, replacement per protocol  - Strict I&O's, goal net even  - IV filter on peripheral IV lines  - Plan for percutaneous VSD closure (and intra op KATIE) today     # Small segmental PE  D-dimer positive at OSH 2.13, CT PE positive for acute vs chronic small subsegmental PE. Hemodynamically stable.  - hep gtt on hold this am in prep for procedure  - post procedure, if no signs of bleeding will transition to DOAC     # Hypertension   BP stable   - Continue low dose hydral/isordil for afterload reduction; plan on  stopped post VSD closure  - Hold PTA Norvasc for now, may resume post procedure     # Hyponatremia, hypervolemic, resolved  # IBS with diarrhea  Recent hospitalization for diarrhea, HypoNa, no recent diarrhea/vomiting since discharge. Na on admit 131. Na stable, 136   - Diuresis per above    - BMP, Mg monitoring while diuresing     # Weakness, resolved  Pt reports worsening over last several weeks, with aggressive diuresis, patient now stating weakness is much better, she feels almost back to baseline  - PT/OT consulted; recs for home with assist at time of discharge     # Hypothyroidism  1/14 when hospitalized with hyponatremia, TSH/T4 elevated. Repeat TSH 6.02, T4 nml.   - Continue PTA Levothyroxine 50mcg      # Hyperlipidemia  - Continue PTA Simvastatin 20     # Anxiety  Reports feeling anxious about new diagnosis and upcoming procedure.   - Clonazepam 0.5 mg BID prn     FEN: NPO for procedure  Code status: Full  Prophylaxis:  hep gtt (currently on hold for procedure)  Isolation: None  Disposition: possible discharge in tomorrow pending VSD/recovery    Patient seen and discussed with Dr. Liang, who agrees with above plan.    Marlys SOLORZANO, CNP  [facility] Cardiology Team    Interval History:  - No acute events overnight  - Pt anxious for procedure today  - Hep gtt held this am  - Pt denies SOB, lightheadedness, dizziness, chest pain    Physical Exam:  Temp:  [97.2  F (36.2  C)-98  F (36.7  C)] 97.9  F (36.6  C)  Pulse:  [] 93  Resp:  [16-20] 16  BP: (101-117)/(65-81) 111/72  SpO2:  [92 %-97 %] 92 %    I/O:  Intake/Output Summary (Last 24 hours) at 1/31/2022 0839  Last data filed at 1/31/2022 0200  Gross per 24 hour   Intake 1008.98 ml   Output 1400 ml   Net -391.02 ml       Wt:   Wt Readings from Last 5 Encounters:   01/31/22 62.2 kg (137 lb 1.6 oz)   01/24/22 72.2 kg (159 lb 3.2 oz)   01/19/22 71.2 kg (157 lb)   01/17/22 69.6 kg (153 lb 6.4 oz)   01/14/22 63 kg (139 lb)       General: NAD  HEENT:  GEETHA,  EOMI.   Neck: no JVD  CV: RRR. Loud murmur heard t/o. No rubs or gallops. Peripheral radial pulse intact.  Resp: No increased work of breathing or use of accessory muscles, breathing comfortably on room air.  Lung sounds clear throughout/bilaterally  Abdomen:  Normal active bowel sounds.  Abdomen is soft. No distension, non-tender to palpation.    Extremities: Warm. Capillary refill less than 3 sec. 2/4 radial pulses bilaterally.  2/4 pedal pulses bilaterally. No pre-tibial edema. No cyanosis or clubbing.  Skin:  Warm and dry. No erythema, rashes, ulceration or diaphoresis.  Neuro: Alert and oriented x3.      Medications:    [Held by provider] amLODIPine  5 mg Oral Daily     furosemide  40 mg Oral Daily     hydrALAZINE  12.5 mg Oral or Feeding Tube TID     isosorbide dinitrate  20 mg Oral TID     levothyroxine  50 mcg Oral Daily     multivitamin  1 tablet Oral BID     multivitamin, therapeutic  1 tablet Oral Daily     simvastatin  20 mg Oral At Bedtime       [Held by provider] heparin Stopped (01/31/22 0817)     ACE/ARB/ARNI NOT PRESCRIBED       BETA BLOCKER NOT PRESCRIBED         Labs:   CMP  Recent Labs   Lab 01/31/22  0515 01/30/22  0531 01/29/22  0619 01/28/22  0610 01/26/22  0549 01/25/22  0359 01/24/22  1151    135 138 137   < > 132* 131*   POTASSIUM 3.8 3.9 3.6 3.6   < > 3.7 4.5   CHLORIDE 102 102 102 102   < > 97 97   CO2 28 27 28 29   < > 20 26   ANIONGAP 6 6 8 6   < > 15* 8   GLC 89 96 90 94   < > 84 109*   BUN 12 12 9 8   < > 10 11   CR 0.62 0.67 0.68 0.69   < > 0.59 0.71   GFRESTIMATED 88 87 86 86   < > 89 84   ANANDA 9.1 9.2 9.0 8.9   < > 8.3* 8.6   MAG 2.3 2.3 2.1 2.2   < > 2.2  --    PROTTOTAL  --   --   --   --   --  5.9* 6.5*   ALBUMIN  --   --   --   --   --  3.0* 3.3*   BILITOTAL  --   --   --   --   --  0.6 0.6   ALKPHOS  --   --   --   --   --  50 60   AST  --   --   --   --   --  28 25   ALT  --   --   --   --   --  41 49    < > = values in this interval not displayed.     CBC  Recent Labs    Lab 01/31/22  0515 01/30/22  0531 01/29/22  0619 01/28/22  0610   WBC 4.8 4.5 5.2 4.8   RBC 3.37* 3.39* 3.32* 3.17*   HGB 11.0* 11.0* 10.8* 10.3*   HCT 33.7* 33.6* 33.1* 30.8*    99 100 97   MCH 32.6 32.4 32.5 32.5   MCHC 32.6 32.7 32.6 33.4   RDW 15.8* 16.0* 16.1* 15.8*    220 206 207     INR  Recent Labs   Lab 01/24/22  1701 01/24/22  1151   INR 1.08 1.09     Arterial Blood GasNo lab results found in last 7 days.    Diagnostics:  ECHO: 1/24/22  Interpretation Summary     Left ventricular systolic function is normal.  The visual ejection fraction is 60-65%.  No regional wall motion abnormalities noted.  A muscular ventricular septal defect is present.  This appears to be in mid to distal interventricular septum with left to right  flow, measuring approximately 9-10mm. Qp/Qs calculated at 2 but accuracy  limited. Consider Cardiac MRI with flow if clinically appropriate.  Right ventricular systolic pressure is elevated, consistent with mild to  moderate pulmonary hypertension.  Compared to stress echo from 6/21, this VSD is new finding but the  Interventricular septum was not optimally imaged on prior study.        CMR: 1/26/22  Comparison CMR: None      1. The left ventricle is normal in cavity size and wall thickness.  There is a muscular ventricular septal  defect with a left to right shunt in the distal septum. The defect is oblique measuring 7 mm on the left  ventricular side and much wider at 18 mm on the right ventricular side.  There are no regional wall motion  abnormalities. The global systolic function is normal. The LVEF is 67%.      2. The right ventricle is moderately dilated. The global systolic function is mildly reduced. The RVEF is  47%.      3. The right atrium is mildly enlarged. The left atrium is normal in size      4. There is tricuspid regurgitation.      5. There is no late gadolinium enhancement to indicate myocardial fibrosis.       6. There is no pericardial effusion. There  are small bilateral pleural effusion.      7. There is no intracardiac thrombus.     8. There is evidence of shunting with Qp/Qs of 2.34.      CONCLUSIONS:   1. Normal left ventricular size and function, LVEF 67%  2. Moderately dilated and mildly reduced right ventricular function, RVEF 47%.   3. Isolated muscular ventricular septal defect in the distal septum measuring (7 mm on the LV side and 18  mm on the RV side) with Qp/Qs of 2.34.   4. No evidence of myocardial fibrosis or infarction.       Coronary Angiogram/RHC: 1/25/22  Pre Procedure Diagnosis     ACS > 24 hoursother           Conclusion          Left sided filling pressures are mildly elevated.    Right sided filling pressures are mildly elevated.    Mild elevated pulmonary hypertension.    Normal cardiac output level.    Hemodynamic data has been modified in Epic per physician review.     Minimal non obstructive CAD.  Significant left to right shunt at the level of the right ventricle with a Qp/Qs of 2.0.     Hemodynamics    RA 9/13/8  RV 50/7  PA 44/18 (27)  PCWP 16  TDCO 3.93  TDCI 2.29  CI 2.32  CO 3.99  MVO2 PA 80.15  MVO2 RIJ 63.5  MVO2 SVC 65.3   MVO2 RA 63.6  MVO2 RV 82.5  PVR 2.79 KIRKPATRICK  SVR 1343 dynes       No results found for this or any previous visit (from the past 24 hour(s)).    Time Spent on this Encounter   I spent 30 minutes on the unit/floor managing the care of Kimberley Mullins. Over 50% of my time was spent on the following:   - Counseling the patient and/or family regarding: risks and benefits of treatment options, recommended follow-up and medical compliance  - Coordination of care with the: consultant(s), nurse and family    Saba Betancourt, CNP      This is a shared progress note with Saba Betancourt, NP-C    Kimberley Mullins is a 81 year old female admitted in [facility] for shortness of breath 2/2 new heart failure and VSD.      # Acute diastolic heart failure   # Ventral septal defect, new  # Mild Pulmonary Hypertension, likely WHO  group II  # Hypokalemia, improving  # Moderate Pleural Effusion    I discussed with patient and NP-C   Interval History:  - No acute events overnight  - Pt anxious for procedure today  - Hep gtt held this am  - Pt denies SOB, lightheadedness, dizziness, chest pain     Physical Exam performed by me and NP-C and discussed as well vital signs, in and output  Temp:  [97.2  F (36.2  C)-98  F (36.7  C)] 97.9  F (36.6  C)  Pulse:  [] 93  Resp:  [16-20] 16  BP: (101-117)/(65-81) 111/72  SpO2:  [92 %-97 %] 92 %     I/O:  Intake/Output Summary (Last 24 hours) at 1/31/2022 0839  Last data filed at 1/31/2022 0200      Gross per 24 hour   Intake 1008.98 ml   Output 1400 ml   Net -391.02 ml         Wt:   Wt Readings from Last 5 Encounters:   01/31/22 62.2 kg (137 lb 1.6 oz)   01/24/22 72.2 kg (159 lb 3.2 oz)   01/19/22 71.2 kg (157 lb)   01/17/22 69.6 kg (153 lb 6.4 oz)   01/14/22 63 kg (139 lb)         General: NAD  HEENT:  PERRLA, EOMI.   Neck: no JVD  CV: RRR. Loud murmur heard t/o. No rubs or gallops. Peripheral radial pulse intact.  Resp: No increased work of breathing or use of accessory muscles, breathing comfortably on room air.  Lung sounds clear throughout/bilaterally  Extremities: Warm. Capillary refill less than 3 sec. 2/4 radial pulses bilaterally.  2/4 pedal pulses bilaterally. No pre-tibial edema. No cyanosis or clubbing.    I evaluated and examined patient with NP-C PA-C.  I reviewed discussed the tests of vital signs and monitoring, lab tests, EKG, results of imaging and other tests with patient and NP-C PA-C.  I discussed the assessment and therapeutic plan with patient and NP-C PA-C.  I agree with NP-C note and I edited the note.    Barney Liang MD, PhD  Professor of Medicine  Division of Cardiology

## 2022-01-31 NOTE — CONSULTS
Discharge Pharmacy Test Claim    Patient has prescription coverage through [employer] Part D with an unmet deductible of $200. This insurance covers eliquis or xarelto with an initial copay of $235 then $35/mo after the deductible is met.     [facility] pharmacy has one-time use free trial vouchers available for either DOAC.    Kristi Montiel  [facility] Pharmacy Liaison  Ph: [phone] Pager: [phone]

## 2022-01-31 NOTE — ANESTHESIA PROCEDURE NOTES
Airway       Patient location during procedure: OR       Procedure Start/Stop Times: 1/31/2022 1:13 PM  Staff -        Anesthesiologist:  Jai Padilla MD       CRNA: Jennifer Handy APRN CRNA       Performed By: CRNAIndications and Patient Condition       Indications for airway management: vimal-procedural       Induction type:intravenous       Mask difficulty assessment: 1 - vent by mask    Final Airway Details       Final airway type: endotracheal airway       Successful airway: ETT - single  Endotracheal Airway Details        ETT size (mm): 7.0       Cuffed: yes       Successful intubation technique: direct laryngoscopy       DL Blade Type: MAC 3       Grade View of Cords: 1       Adjucts: stylet       Position: Right       Measured from: lips       Secured at (cm): 21       Bite block used: None    Post intubation assessment        Placement verified by: capnometry, equal breath sounds and chest rise        Number of attempts at approach: 1       Number of other approaches attempted: 0       Secured with: pink tape       Ease of procedure: easy       Dentition: Intact and Unchanged

## 2022-02-01 ENCOUNTER — APPOINTMENT (OUTPATIENT)
Dept: PHYSICAL THERAPY | Facility: CLINIC | Age: 82
DRG: 228 | End: 2022-02-01
Attending: INTERNAL MEDICINE
Payer: COMMERCIAL

## 2022-02-01 ENCOUNTER — APPOINTMENT (OUTPATIENT)
Dept: CARDIOLOGY | Facility: CLINIC | Age: 82
DRG: 228 | End: 2022-02-01
Attending: STUDENT IN AN ORGANIZED HEALTH CARE EDUCATION/TRAINING PROGRAM
Payer: COMMERCIAL

## 2022-02-01 ENCOUNTER — APPOINTMENT (OUTPATIENT)
Dept: CARDIOLOGY | Facility: CLINIC | Age: 82
DRG: 228 | End: 2022-02-01
Attending: NURSE PRACTITIONER
Payer: COMMERCIAL

## 2022-02-01 LAB
ANION GAP SERPL CALCULATED.3IONS-SCNC: 6 MMOL/L (ref 3–14)
BILIRUB DIRECT SERPL-MCNC: 0.2 MG/DL (ref 0–0.2)
BILIRUB SERPL-MCNC: 1.1 MG/DL (ref 0.2–1.3)
BUN SERPL-MCNC: 18 MG/DL (ref 7–30)
CALCIUM SERPL-MCNC: 9 MG/DL (ref 8.5–10.1)
CHLORIDE BLD-SCNC: 103 MMOL/L (ref 94–109)
CO2 SERPL-SCNC: 28 MMOL/L (ref 20–32)
CREAT SERPL-MCNC: 0.89 MG/DL (ref 0.52–1.04)
ERYTHROCYTE [DISTWIDTH] IN BLOOD BY AUTOMATED COUNT: 16.2 % (ref 10–15)
GFR SERPL CREATININE-BSD FRML MDRD: 64 ML/MIN/1.73M2
GLUCOSE BLD-MCNC: 96 MG/DL (ref 70–99)
HCT VFR BLD AUTO: 32.8 % (ref 35–47)
HGB BLD-MCNC: 10.8 G/DL (ref 11.7–15.7)
LVEF ECHO: NORMAL
LVEF ECHO: NORMAL
MAGNESIUM SERPL-MCNC: 2.4 MG/DL (ref 1.6–2.3)
MCH RBC QN AUTO: 32.8 PG (ref 26.5–33)
MCHC RBC AUTO-ENTMCNC: 32.9 G/DL (ref 31.5–36.5)
MCV RBC AUTO: 100 FL (ref 78–100)
PLATELET # BLD AUTO: 197 10E3/UL (ref 150–450)
POTASSIUM BLD-SCNC: 4 MMOL/L (ref 3.4–5.3)
RBC # BLD AUTO: 3.29 10E6/UL (ref 3.8–5.2)
SODIUM SERPL-SCNC: 137 MMOL/L (ref 133–144)
UFH PPP CHRO-ACNC: <0.1 IU/ML
WBC # BLD AUTO: 7.9 10E3/UL (ref 4–11)

## 2022-02-01 PROCEDURE — 214N000001 HC R&B CCU UMMC

## 2022-02-01 PROCEDURE — 250N000013 HC RX MED GY IP 250 OP 250 PS 637: Performed by: NURSE PRACTITIONER

## 2022-02-01 PROCEDURE — 93308 TTE F-UP OR LMTD: CPT | Mod: 26 | Performed by: INTERNAL MEDICINE

## 2022-02-01 PROCEDURE — 97116 GAIT TRAINING THERAPY: CPT | Mod: GP

## 2022-02-01 PROCEDURE — 250N000013 HC RX MED GY IP 250 OP 250 PS 637: Performed by: STUDENT IN AN ORGANIZED HEALTH CARE EDUCATION/TRAINING PROGRAM

## 2022-02-01 PROCEDURE — 99232 SBSQ HOSP IP/OBS MODERATE 35: CPT | Mod: 25 | Performed by: NURSE PRACTITIONER

## 2022-02-01 PROCEDURE — 93010 ELECTROCARDIOGRAM REPORT: CPT | Performed by: INTERNAL MEDICINE

## 2022-02-01 PROCEDURE — 93321 DOPPLER ECHO F-UP/LMTD STD: CPT

## 2022-02-01 PROCEDURE — 999N000111 HC STATISTIC OT IP EVAL DEFER

## 2022-02-01 PROCEDURE — 85027 COMPLETE CBC AUTOMATED: CPT | Performed by: STUDENT IN AN ORGANIZED HEALTH CARE EDUCATION/TRAINING PROGRAM

## 2022-02-01 PROCEDURE — 80048 BASIC METABOLIC PNL TOTAL CA: CPT | Performed by: NURSE PRACTITIONER

## 2022-02-01 PROCEDURE — 85520 HEPARIN ASSAY: CPT | Performed by: STUDENT IN AN ORGANIZED HEALTH CARE EDUCATION/TRAINING PROGRAM

## 2022-02-01 PROCEDURE — 93005 ELECTROCARDIOGRAM TRACING: CPT

## 2022-02-01 PROCEDURE — 97110 THERAPEUTIC EXERCISES: CPT | Mod: GP

## 2022-02-01 PROCEDURE — 93325 DOPPLER ECHO COLOR FLOW MAPG: CPT | Mod: 26 | Performed by: INTERNAL MEDICINE

## 2022-02-01 PROCEDURE — 93321 DOPPLER ECHO F-UP/LMTD STD: CPT | Mod: 26 | Performed by: INTERNAL MEDICINE

## 2022-02-01 PROCEDURE — 82248 BILIRUBIN DIRECT: CPT | Performed by: NURSE PRACTITIONER

## 2022-02-01 PROCEDURE — 83735 ASSAY OF MAGNESIUM: CPT | Performed by: STUDENT IN AN ORGANIZED HEALTH CARE EDUCATION/TRAINING PROGRAM

## 2022-02-01 PROCEDURE — 93325 DOPPLER ECHO COLOR FLOW MAPG: CPT

## 2022-02-01 PROCEDURE — 36415 COLL VENOUS BLD VENIPUNCTURE: CPT | Performed by: STUDENT IN AN ORGANIZED HEALTH CARE EDUCATION/TRAINING PROGRAM

## 2022-02-01 RX ORDER — NALOXONE HYDROCHLORIDE 0.4 MG/ML
0.2 INJECTION, SOLUTION INTRAMUSCULAR; INTRAVENOUS; SUBCUTANEOUS
Status: DISCONTINUED | OUTPATIENT
Start: 2022-02-01 | End: 2022-02-10 | Stop reason: HOSPADM

## 2022-02-01 RX ORDER — NALOXONE HYDROCHLORIDE 0.4 MG/ML
0.4 INJECTION, SOLUTION INTRAMUSCULAR; INTRAVENOUS; SUBCUTANEOUS
Status: DISCONTINUED | OUTPATIENT
Start: 2022-02-01 | End: 2022-02-10 | Stop reason: HOSPADM

## 2022-02-01 RX ORDER — LOPERAMIDE HCL 2 MG
2 CAPSULE ORAL 4 TIMES DAILY PRN
Status: DISCONTINUED | OUTPATIENT
Start: 2022-02-01 | End: 2022-02-10 | Stop reason: HOSPADM

## 2022-02-01 RX ORDER — CLONAZEPAM 0.5 MG/1
0.5 TABLET ORAL
Qty: 30 TABLET | Refills: 0 | Status: SHIPPED | OUTPATIENT
Start: 2022-02-01 | End: 2022-02-15

## 2022-02-01 RX ADMIN — Medication 5 MG: at 22:11

## 2022-02-01 RX ADMIN — LEVOTHYROXINE SODIUM 50 MCG: 0.05 TABLET ORAL at 07:57

## 2022-02-01 RX ADMIN — CLONAZEPAM 0.5 MG: 0.5 TABLET ORAL at 14:29

## 2022-02-01 RX ADMIN — BENZOCAINE AND MENTHOL 1 LOZENGE: 15; 3.6 LOZENGE ORAL at 21:01

## 2022-02-01 RX ADMIN — THERA TABS 1 TABLET: TAB at 07:57

## 2022-02-01 RX ADMIN — LOPERAMIDE HYDROCHLORIDE 2 MG: 2 CAPSULE ORAL at 14:29

## 2022-02-01 RX ADMIN — BENZOCAINE AND MENTHOL 1 LOZENGE: 15; 3.6 LOZENGE ORAL at 22:11

## 2022-02-01 RX ADMIN — CLOPIDOGREL BISULFATE 75 MG: 75 TABLET ORAL at 07:57

## 2022-02-01 RX ADMIN — APIXABAN 5 MG: 5 TABLET, FILM COATED ORAL at 07:57

## 2022-02-01 RX ADMIN — Medication 1 TABLET: at 07:57

## 2022-02-01 RX ADMIN — SIMVASTATIN 20 MG: 20 TABLET, FILM COATED ORAL at 22:11

## 2022-02-01 RX ADMIN — Medication 1 TABLET: at 21:01

## 2022-02-01 ASSESSMENT — ACTIVITIES OF DAILY LIVING (ADL)
ADLS_ACUITY_SCORE: 6

## 2022-02-01 NOTE — PLAN OF CARE
D: New HF and VSD   PMH of HTN, hypothyroidism    I/A: A0x4. VSS. Afebrile. SR/ST, rates of 80s-100s. On RA. BP WDL.   Denies pain/palpitations/dyspnea.   Right groin site soft, nontender, and right leg CMS intact. Bedrest completed 10:30. One time dose of cefazolin given.     Pt had purewick in place while on bedrest, draining clear yellow urine. After bedrest, pt urinated 400 mL of brown/red urine into toilet. No pain or signs of vaginal bleeding or rectal bleeding were noted. Xcover notified and will obtain hgb check in AM and watch for BP changes.   Sore throat noted, but improving.     P: Send UA, possible discharge, continue to monitor Pt status and report changes to Cards 1.

## 2022-02-01 NOTE — PROGRESS NOTES
CVTS    Patient underwent successful percutaneous VSD closure yesterday. Plan for follow-up echo today per cardiology. CVTS will sign off, please do not hesitate to call with any questions or concerns.     Discussed with Dr. Whitney.    Carlos Acevedo PA-C  Cardiothoracic Surgery  p: [phone]

## 2022-02-01 NOTE — PROGRESS NOTES
[facility]   Cardiology   Progress Note     This is a shared progress note with MIRANDA Rodriguez    Interval History:  - No acute events overnight  - Pt s/p percutaneous VSD occluder yesterday  - Right groin site CDI, soft, non-tender to touch, no signs of hem- No acute events overnightatoma  - Pt feels well this am, denies SOB, lightheadedness, dizziness       I examined patient together with NP-C    Physical Exam:  Temp:  [97.5  F (36.4  C)-98.3  F (36.8  C)] 97.5  F (36.4  C)  Pulse:  [] 98  Resp:  [14-18] 16  BP: ()/(69-88) 102/71  SpO2:  [93 %-99 %] 93 %    I/O:  Intake/Output Summary (Last 24 hours) at 2/1/2022 0854  Last data filed at 2/1/2022 0724  Gross per 24 hour   Intake 700 ml   Output 2250 ml   Net -1550 ml         Wt:   Wt Readings from Last 5 Encounters:   02/01/22 61.5 kg (135 lb 9.6 oz)   01/24/22 72.2 kg (159 lb 3.2 oz)   01/19/22 71.2 kg (157 lb)   01/17/22 69.6 kg (153 lb 6.4 oz)   01/14/22 63 kg (139 lb)       General: NAD  HEENT:  PERRLA, EOMI.   Neck: no JVD  CV: RRR. 3/6 systolic murmur present heard best near left sternal border. No rubs or gallops. Peripheral radial pulse intact.  Resp: No increased work of breathing or use of accessory muscles, breathing comfortably on room air.  Lung sounds clear throughout/bilaterally   Extremities: Warm. Capillary refill less than 3 sec. 2/4 radial pulses bilaterally.  2/4 pedal pulses bilaterally. No pre-tibial edema. No cyanosis or clubbing.  Skin:  Warm and dry. No erythema, rashes, ulceration or diaphoresis. Right groin site CDI, soft, non-tender to touch, no bruising, no signs of hematoma. RIJ CDI, soft, non-tender to touch, no bruising, no signs of hematoma    Assessment and Plan    Today's plan:   - Echocardiogram  - Encourage Ambulation  - Start Eliquis    I evaluated and examined patient with NPLANDEN HINTON.  I reviewed discussed the tests of vital signs and monitoring, lab tests, EKG, results of  imaging and other tests with patient and NP-C PA-C.  I discussed the assessment and therapeutic plan with patient and NP-C MARY JANE.  I agree with NP-C note and I edited the note.     Barney Liang MD, PhD  Professor of Medicine  Division of Cardiology                   ASSESSMENT/PLAN:  Kimberley Mullins is a 81 year old female admitted on (Not on file). She has PMH only notable for HTN, hypothyroidism, and recent [facility] admission (1/14-1/17) for N/V and hyponatremia, and is being transferred to [facility] for shortness of breath 2/2 new heart failure and VSD.      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 Echo shows new VSD with L>R shunt, EF 60-65%, likely WMA RCA territory. Patient transferred for possible surgical intervention of VSD. No previous hx of CAD, recent MI, heart failure. Cors/RHC 1/25 with normal coronary arteries, RHC PCPW 16, PA(m) 26, CO 3.9/CI 2.3. QP/QS 2.2. cMRI with no evidence of ischemia, no scarring. CT heart with 1.9 x 1.2 x 0.7 cm muscular ventricular septal defect, moderate right pleural effusion. Worsening SOB on 1/29, no acute findings on CXR. Appears well-compensated on exam.  s/p percutaneous VSD occluder device 1/31.      - DAPT: Plavix/Eliquis for at least 3 months  - Volume Status: appears euvolemic, Lasix discontinued  - Stop hydral/isordil  - Daily BMP  - K>4, Mg>2, replacement per protocol  - Strict I&O's, goal net even  - IV filter on peripheral IV lines  - Echocardiogram today  - Encourage ambulation  - will need structural follow up in 2 weeks and 1 month with repeat echo prior to 1 month appointment     # Small  segmental PE  D-dimer positive at OSH 2.13, CT PE positive for acute vs chronic small subsegmental PE. Hemodynamically stable.  - Start Eliquis 5 mg BID, at least 3 months     # Hypertension   BP stable   - Hold PTA Norvasc for now, SBP 's, may not need Norvasc at time of discharge     # Hyponatremia, hypervolemic, resolved  # IBS with diarrhea  Recent hospitalization for diarrhea, HypoNa, no recent diarrhea/vomiting since discharge. Na on admit 131. Na stable, 137   - Daily BMP     # Weakness, resolved  Pt reports worsening over last several weeks, with aggressive diuresis, patient now stating weakness is much better, she feels almost back to baseline  - PT/OT consulted; recs for home with assist at time of discharge     # Hypothyroidism  1/14 when hospitalized with hyponatremia, TSH/T4 elevated. Repeat TSH 6.02, T4 nml.   - Continue PTA Levothyroxine 50mcg      # Hyperlipidemia  - Continue PTA Simvastatin 20     # Anxiety, resolved  Reported feeling anxious about new diagnosis and VSD procedure, pre-op given Clonazepam.   - Discontinue prn Clonazepam     Patient seen and discussed with Dr. Liang, who agrees with above plan.    Marlys SOLORZANO, CNP  [facility] Cardiology Team    Interval History:  - No acute events overnight  - Pt s/p percutaneous VSD occluder yesterday  - Right groin site CDI, soft, non-tender to touch, no signs of hematoma  - Pt reported dark, brown urine overnight, was clear while using Purewick, ?blood from [place], she denies any burning, frequency, flank pain, no UA needed  - Pt feels well this am, denies SOB, lightheadedness, dizziness    Physical Exam:  Temp:  [97.5  F (36.4  C)-98.3  F (36.8  C)] 97.5  F (36.4  C)  Pulse:  [] 98  Resp:  [14-18] 16  BP: ()/(69-88) 102/71  SpO2:  [93 %-99 %] 93 %    I/O:  Intake/Output Summary (Last 24 hours) at 2/1/2022 0854  Last data filed at 2/1/2022 0724  Gross per 24 hour   Intake 700 ml   Output 2250 ml   Net -1550 ml         Wt:   Wt  Readings from Last 5 Encounters:   02/01/22 61.5 kg (135 lb 9.6 oz)   01/24/22 72.2 kg (159 lb 3.2 oz)   01/19/22 71.2 kg (157 lb)   01/17/22 69.6 kg (153 lb 6.4 oz)   01/14/22 63 kg (139 lb)       General: NAD  HEENT:  PERRLA, EOMI.   Neck: no JVD  CV: RRR. 3/6 systolic murmur present heard best near left sternal border. No rubs or gallops. Peripheral radial pulse intact.  Resp: No increased work of breathing or use of accessory muscles, breathing comfortably on room air.  Lung sounds clear throughout/bilaterally  Abdomen:  Normal active bowel sounds.  Abdomen is soft. No distension, non-tender to palpation.    Extremities: Warm. Capillary refill less than 3 sec. 2/4 radial pulses bilaterally.  2/4 pedal pulses bilaterally. No pre-tibial edema. No cyanosis or clubbing.  Skin:  Warm and dry. No erythema, rashes, ulceration or diaphoresis. Right groin site CDI, soft, non-tender to touch, no bruising, no signs of hematoma. RIJ CDI, soft, non-tender to touch, no bruising, no signs of hematoma  Neuro: Alert and oriented x3.      Medications:    [Held by provider] amLODIPine  5 mg Oral Daily     apixaban ANTICOAGULANT  5 mg Oral BID     clopidogrel  75 mg Oral Daily     levothyroxine  50 mcg Oral Daily     melatonin  5 mg Oral At Bedtime     multivitamin  1 tablet Oral BID     multivitamin, therapeutic  1 tablet Oral Daily     simvastatin  20 mg Oral At Bedtime       - MEDICATION INSTRUCTIONS -       Percutaneous Coronary Intervention orders placed (this is information for BPA alerting)       ACE/ARB/ARNI NOT PRESCRIBED       BETA BLOCKER NOT PRESCRIBED         Labs:   CMP  Recent Labs   Lab 02/01/22  0527 01/31/22  1555 01/31/22  1413 01/31/22  1220 01/31/22  0515 01/30/22  0531 01/29/22  0619    133 139  --  136 135 138   POTASSIUM 4.0 3.7 3.7  --  3.8 3.9 3.6   CHLORIDE 103  --   --   --  102 102 102   CO2 28  --   --   --  28 27 28   ANIONGAP 6  --   --   --  6 6 8   GLC 96 120* 94 84 89 96 90   BUN 18  --   --    --  12 12 9   CR 0.89  --   --   --  0.62 0.67 0.68   GFRESTIMATED 64  --   --   --  88 87 86   ANANDA 9.0  --   --   --  9.1 9.2 9.0   MAG 2.4*  --   --   --  2.3 2.3 2.1     CBC  Recent Labs   Lab 22  0527 22  1555 22  1414 22  1413 22  1408 22  0515 22  0531 22  0619   WBC 7.9  --   --   --   --  4.8 4.5 5.2   RBC 3.29*  --   --   --   --  3.37* 3.39* 3.32*   HGB 10.8* 10.5* 11.1* 11.0*  10.9*   < > 11.0* 11.0* 10.8*   HCT 32.8*  --   --   --   --  33.7* 33.6* 33.1*     --   --   --   --  100 99 100   MCH 32.8  --   --   --   --  32.6 32.4 32.5   MCHC 32.9  --   --   --   --  32.6 32.7 32.6   RDW 16.2*  --   --   --   --  15.8* 16.0* 16.1*     --   --   --   --  218 220 206    < > = values in this interval not displayed.     INRNo lab results found in last 7 days.  Arterial Blood Gas  Recent Labs   Lab 22  1555 22  1413   PH 7.47* 7.52*   PCO2 36 33*   PO2 206* 83   HCO3 26 27   O2PER 63.0 100.0       Diagnostics:  Recent Results (from the past 24 hour(s))   Transesophageal Echocardiogram    Narrative    [id number]  [id number]  [id number]  [id number]^LISSETTE^MELECIO^NICOLAS     [facility]  Echocardiography Laboratory  [address] [place] [id number]     Name: ALEXA EDGE  MRN: [id number]  : 1940  Study Date: 2022 12:26 PM  Age: 81 yrs  Gender: Female  Patient Location: [facility]  Reason For Study: VSD closure  Ordering Physician: MELECIO MCLAUGHLIN  Referring Physician: IVAN BIGGS  Performed By: Adina Harrison     ______________________________________________________________________________  Interpretation Summary  Pre-Procedural  Global and regional LV function is 60-65% with no regional wall motion  abnormalities. LV size and thickness is normal . There is a VSD visualized in  the distal interventricular septum measuring approximately 9mm in size with  predominant left to right  shunting.  RV size and function is normal.  No significant valvular abnormalities seen on limited evaluation of the mitral  and aortic valves  No pericardial effusion     Intra-Procedural  Live KATIE guidance provided for wire, catheter and 12mm Amplatzer VSD closure  device crossing of VSD. Amplatzer device embolization into the RV was  witnessed with retrieval performed under KATIE guidance. Repeat VSD crossing of  catheter and 16mm Amplatzer device was then conducted with successful crossing  and seating of Amplatzer device.     Post-Procedural  Repeat survey performed and noted for well-seated 16mm Amplatzer device with  small ongoing para-device leak present. Device appears well-seated and stable  in multiple views and on 3D imaging. The mitral valve leaflets continue to  open well and show no current evidence of device tethering. There is no  pericardial effusion. On repeat valve survery, mild to moderate tricuspid  regurgitation is seen, consistent with prior documented degree of TR on prior  studies. No other valve pathology witnessed on Doppler interrogation     This study was compared to prior dated 1/24/22. Patient is now s/p VSD closure  with 16mm Amplatzer device. No additional changes noted.  ______________________________________________________________________________  Procedure  Transesophageal Echocardiogram with color and spectral Doppler performed. 3D  image acquisition, reconstruction, and real-time interpretation was performed.  Procedure location Heart Catherization Laboratory. Sedation, endotracheal  intubation, and mechanical ventilation were initiated prior to the KATIE and  were monitored by anesthesia. The Transducer was inserted without difficulty .  I determined this patient to be an appropriate candidate for the planned  sedation and procedure and have reassessed the patient immediately prior to  sedation and procedure. Total sedation time: 150 minutes of continuous bedside  1:1 monitoring.  Sedation was administered and monitored by anesthesia. The  patient tolerated the procedure well. Complications None. KATIE Probe #64 was  used during the procedure. ECG shows normal sinus rhythm. Good quality two-  dimensional was performed and interpreted. Good quality color and spectral  Doppler were performed and interpreted.     ______________________________________________________________________________  Report approved by: MD Arie Martin 01/31/2022 04:43 PM     ______________________________________________________________________________      Cardiac Catheterization    Narrative      Successful percutaneous VSD closure with a 16mm Amplatzer muscular   ventricular septal occluder device      XR Chest Port 1 View    Narrative    Exam: XR CHEST PORT 1 VIEW, 1/31/2022 5:09 PM    Indication: s/p VSD occluder device placement    Comparison: 1/29/2022    Findings:   Portable supine AP radiograph of the chest. Midline trachea. New VSD  occluder device. Stable large chronic silhouette. Indistinct pulmonary  vasculature with increased mixed interstitial and patchy airspace  opacities in the perihilar and left retrocardiac distribution.  Increasing small left pleural effusion. No definite right pleural  effusion. Streaky right basilar opacities. No appreciable  pneumothorax. Additionally, increased superhilar mixed interstitial  and patchy airspace opacities. No acute findings in the upper abdomen.  Stable osseous structures, with left humeral head hardware.      Impression    Impression:   1. Interval placement of a VSD occluder device.  2.Cardiomegaly with increased central and left retrocardiac pulmonary  edema and/or infection. Likely component of atelectasis.  3. Increasing small left pleural effusion with adjacent compressive  atelectasis.    I have personally reviewed the examination and initial interpretation  and I agree with the findings.    NERI PATEL MD         SYSTEM ID:   [id number]       Time Spent on this Encounter   I spent >35 minutes on the unit/floor managing the care of Kimberley Mullins. Over 50% of my time was spent on the following:   - Counseling the patient and/or family regarding: diagnostic results, risks and benefits of treatment options, recommended follow-up and medical compliance  - Coordination of care with the: care coordinator/, nurse and daughter      Saba VALENZUELADarwin Betancourt, CNP    This is a shared note with Saba Betancourt NP-C    Kimberley Mullins is a 81 year old female with PMH of  HTN, hypothyroidism.  [facility] admission (1/14-1/17) for N/V and hyponatremia, and is being transferred to 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  # Hypokalemia, resolved  # Moderate Pleural Effusion    I disucssed the interval Hx with patient and NP-C  Interval History:  - No acute events overnight  - Pt s/p percutaneous VSD occluder yesterday  - Right groin site CDI, soft, non-tender to touch, no signs of hematoma  - Pt reported dark, brown urine overnight, was clear while using Purewick, ?blood from [place], she denies any burning, frequency, flank pain, no UA needed  - Pt feels well this am, denies SOB, lightheadedness, dizziness     Physical Exam:  Temp:  [97.5  F (36.4  C)-98.3  F (36.8  C)] 97.5  F (36.4  C)  Pulse:  [] 98  Resp:  [14-18] 16  BP: ()/(69-88) 102/71  SpO2:  [93 %-99 %] 93 %     I/O:  Intake/Output Summary (Last 24 hours) at 2/1/2022 0854  Last data filed at 2/1/2022 0724      Gross per 24 hour   Intake 700 ml   Output 2250 ml   Net -1550 ml            Wt:       Wt Readings from Last 5 Encounters:   02/01/22 61.5 kg (135 lb 9.6 oz)   01/24/22 72.2 kg (159 lb 3.2 oz)   01/19/22 71.2 kg (157 lb)   01/17/22 69.6 kg (153 lb 6.4 oz)   01/14/22 63 kg (139 lb)          General: NAD  HEENT:  PERRLA, EOMI.   Neck: no JVD  CV: RRR. 3/6 systolic murmur present heard best near left sternal border. No rubs or gallops. Peripheral radial pulse intact.  Resp: No increased work of breathing or use of accessory muscles, breathing comfortably on room air.  Lung sounds clear throughout/bilaterally  Abdomen:  Normal active bowel sounds.  Abdomen is soft. No distension, non-tender to palpation.    Extremities: Warm. Capillary refill less than 3 sec. 2/4 radial pulses bilaterally.  2/4 pedal pulses bilaterally. No pre-tibial edema. No cyanosis or clubbing.  Skin:  Warm and dry. No erythema, rashes, ulceration or diaphoresis. Right groin site CDI, soft, non-tender to touch, no bruising, no signs of hematoma. RIJ CDI, soft, non-tender to touch, no bruising, no signs of hematoma  Neuro: Alert and oriented x3.

## 2022-02-01 NOTE — PLAN OF CARE
DX: VSD  PMH: HTN, hypothyroidism, and recent [facility] admission (1/14-1/17) for N/V and hyponatremia, and is being transferred to [facility] for shortness of breath 2/2 new heart failure and VSD.       Code Status: full   Team: CSI    Cardiac: NSR, VSS. Team found on the first echo today that the amplatzer device used to close the VSD shifted. Waiting for results from second echo today.  Respiratory: lung sounds clear in the bases. Pt complains of sore throat from intubation yesterday  Neuro: AxO x4  Pain: denies  GI/: urinating well. Pt had dark brown urine in the am. Team is aware and believes it is r/t to hernandez catheter from procedure yesterday. Pt's lab are WDL and was asked to push fluids. Pt had several loose stools today so imodium was given   Diet: 2g Na  Skin: groin site soft and warm and pt's normal skin color   Activity: independent    Gtts/fluid: n/a    Plan: continue to monitor

## 2022-02-01 NOTE — PLAN OF CARE
Occupational Therapy: Orders received. Chart reviewed and discussed with PT and patient/daughter.? Occupational Therapy not indicated due to SBA-IND performance with basic ADLs, with patient feeling near baseline. Patient/daughter deny concerns for OT at this time. PT to follow for general conditioning/progression of activity and cardiac rehab education while IP.? Defer discharge recommendations to PT (recommending home with assist and OP CR).? Will complete orders. Please re-consult if new needs arise.

## 2022-02-01 NOTE — PLAN OF CARE
DX: VSD  PMH: HTN, hypothyroidism, and recent [facility] admission (1/14-1/17) for N/V and hyponatremia, and is being transferred to [facility] for shortness of breath 2/2 new heart failure and VSD.       Code Status: full   Team: CSI    Cardiac: NSR, VSS  Respiratory: crackles in bases of lungs. Pt has a productive cough since extubation, team is aware.  Neuro: AxO x4  Pain: denies  GI/: urinating well with purwick since pt is on bedrest until 2230. Pt has not had a bm today  Diet: 2g Na, 2L fluid restriction   Skin: groin site soft and warm and pt's normal skin color   Activity: bedrest until 2230    Gtts/fluid: n/a    Plan: possible discharge home tomorrow

## 2022-02-02 ENCOUNTER — APPOINTMENT (OUTPATIENT)
Dept: CARDIOLOGY | Facility: CLINIC | Age: 82
DRG: 228 | End: 2022-02-02
Attending: NURSE PRACTITIONER
Payer: COMMERCIAL

## 2022-02-02 LAB
ANION GAP SERPL CALCULATED.3IONS-SCNC: 8 MMOL/L (ref 3–14)
APTT PPP: 61 SECONDS (ref 22–38)
ATRIAL RATE - MUSE: 94 BPM
BUN SERPL-MCNC: 20 MG/DL (ref 7–30)
CALCIUM SERPL-MCNC: 8.5 MG/DL (ref 8.5–10.1)
CHLORIDE BLD-SCNC: 100 MMOL/L (ref 94–109)
CO2 SERPL-SCNC: 25 MMOL/L (ref 20–32)
CREAT SERPL-MCNC: 0.91 MG/DL (ref 0.52–1.04)
DIASTOLIC BLOOD PRESSURE - MUSE: NORMAL MMHG
ERYTHROCYTE [DISTWIDTH] IN BLOOD BY AUTOMATED COUNT: 16.1 % (ref 10–15)
GFR SERPL CREATININE-BSD FRML MDRD: 63 ML/MIN/1.73M2
GLUCOSE BLD-MCNC: 92 MG/DL (ref 70–99)
HCT VFR BLD AUTO: 28 % (ref 35–47)
HGB BLD-MCNC: 9.2 G/DL (ref 11.7–15.7)
INTERPRETATION ECG - MUSE: NORMAL
LVEF ECHO: NORMAL
MAGNESIUM SERPL-MCNC: 2.3 MG/DL (ref 1.6–2.3)
MCH RBC QN AUTO: 32.3 PG (ref 26.5–33)
MCHC RBC AUTO-ENTMCNC: 32.9 G/DL (ref 31.5–36.5)
MCV RBC AUTO: 98 FL (ref 78–100)
P AXIS - MUSE: 36 DEGREES
PLATELET # BLD AUTO: 168 10E3/UL (ref 150–450)
POTASSIUM BLD-SCNC: 4 MMOL/L (ref 3.4–5.3)
PR INTERVAL - MUSE: 160 MS
QRS DURATION - MUSE: 80 MS
QT - MUSE: 404 MS
QTC - MUSE: 505 MS
R AXIS - MUSE: -2 DEGREES
RBC # BLD AUTO: 2.85 10E6/UL (ref 3.8–5.2)
SODIUM SERPL-SCNC: 133 MMOL/L (ref 133–144)
SYSTOLIC BLOOD PRESSURE - MUSE: NORMAL MMHG
T AXIS - MUSE: 0 DEGREES
VENTRICULAR RATE- MUSE: 94 BPM
WBC # BLD AUTO: 9 10E3/UL (ref 4–11)

## 2022-02-02 PROCEDURE — 250N000013 HC RX MED GY IP 250 OP 250 PS 637: Performed by: STUDENT IN AN ORGANIZED HEALTH CARE EDUCATION/TRAINING PROGRAM

## 2022-02-02 PROCEDURE — 93325 DOPPLER ECHO COLOR FLOW MAPG: CPT

## 2022-02-02 PROCEDURE — 83735 ASSAY OF MAGNESIUM: CPT | Performed by: STUDENT IN AN ORGANIZED HEALTH CARE EDUCATION/TRAINING PROGRAM

## 2022-02-02 PROCEDURE — 258N000003 HC RX IP 258 OP 636: Performed by: INTERNAL MEDICINE

## 2022-02-02 PROCEDURE — 36415 COLL VENOUS BLD VENIPUNCTURE: CPT | Performed by: NURSE PRACTITIONER

## 2022-02-02 PROCEDURE — 82310 ASSAY OF CALCIUM: CPT | Performed by: NURSE PRACTITIONER

## 2022-02-02 PROCEDURE — 250N000013 HC RX MED GY IP 250 OP 250 PS 637: Performed by: NURSE PRACTITIONER

## 2022-02-02 PROCEDURE — 99232 SBSQ HOSP IP/OBS MODERATE 35: CPT | Mod: 25 | Performed by: INTERNAL MEDICINE

## 2022-02-02 PROCEDURE — 85027 COMPLETE CBC AUTOMATED: CPT | Performed by: STUDENT IN AN ORGANIZED HEALTH CARE EDUCATION/TRAINING PROGRAM

## 2022-02-02 PROCEDURE — 36415 COLL VENOUS BLD VENIPUNCTURE: CPT | Performed by: INTERNAL MEDICINE

## 2022-02-02 PROCEDURE — 93325 DOPPLER ECHO COLOR FLOW MAPG: CPT | Mod: 26 | Performed by: INTERNAL MEDICINE

## 2022-02-02 PROCEDURE — 85730 THROMBOPLASTIN TIME PARTIAL: CPT | Performed by: INTERNAL MEDICINE

## 2022-02-02 PROCEDURE — 250N000011 HC RX IP 250 OP 636: Performed by: INTERNAL MEDICINE

## 2022-02-02 PROCEDURE — 93308 TTE F-UP OR LMTD: CPT | Mod: 26 | Performed by: INTERNAL MEDICINE

## 2022-02-02 PROCEDURE — 93321 DOPPLER ECHO F-UP/LMTD STD: CPT | Mod: 26 | Performed by: INTERNAL MEDICINE

## 2022-02-02 PROCEDURE — 214N000001 HC R&B CCU UMMC

## 2022-02-02 RX ORDER — HEPARIN SODIUM 10000 [USP'U]/100ML
0-5000 INJECTION, SOLUTION INTRAVENOUS CONTINUOUS
Status: DISCONTINUED | OUTPATIENT
Start: 2022-02-02 | End: 2022-02-02

## 2022-02-02 RX ORDER — HYDROXYZINE HYDROCHLORIDE 50 MG/1
50 TABLET, FILM COATED ORAL EVERY 6 HOURS PRN
Status: DISCONTINUED | OUTPATIENT
Start: 2022-02-02 | End: 2022-02-10 | Stop reason: HOSPADM

## 2022-02-02 RX ORDER — ASPIRIN 81 MG/1
81 TABLET ORAL DAILY
Status: DISCONTINUED | OUTPATIENT
Start: 2022-02-03 | End: 2022-02-04

## 2022-02-02 RX ORDER — FUROSEMIDE 20 MG
40 TABLET ORAL DAILY
Status: DISCONTINUED | OUTPATIENT
Start: 2022-02-02 | End: 2022-02-04

## 2022-02-02 RX ORDER — HEPARIN SODIUM 10000 [USP'U]/100ML
0-5000 INJECTION, SOLUTION INTRAVENOUS CONTINUOUS
Status: DISCONTINUED | OUTPATIENT
Start: 2022-02-02 | End: 2022-02-04

## 2022-02-02 RX ORDER — ISOSORBIDE DINITRATE 20 MG/1
20 TABLET ORAL
Status: DISCONTINUED | OUTPATIENT
Start: 2022-02-02 | End: 2022-02-03

## 2022-02-02 RX ORDER — HYDROXYZINE HYDROCHLORIDE 25 MG/1
25 TABLET, FILM COATED ORAL EVERY 6 HOURS PRN
Status: DISCONTINUED | OUTPATIENT
Start: 2022-02-02 | End: 2022-02-10 | Stop reason: HOSPADM

## 2022-02-02 RX ADMIN — THERA TABS 1 TABLET: TAB at 07:55

## 2022-02-02 RX ADMIN — SIMVASTATIN 20 MG: 20 TABLET, FILM COATED ORAL at 21:31

## 2022-02-02 RX ADMIN — CLOPIDOGREL BISULFATE 75 MG: 75 TABLET ORAL at 07:55

## 2022-02-02 RX ADMIN — Medication 1 TABLET: at 20:05

## 2022-02-02 RX ADMIN — LEVOTHYROXINE SODIUM 50 MCG: 0.05 TABLET ORAL at 07:55

## 2022-02-02 RX ADMIN — ISOSORBIDE DINITRATE 20 MG: 20 TABLET ORAL at 18:06

## 2022-02-02 RX ADMIN — FUROSEMIDE 40 MG: 40 TABLET ORAL at 14:23

## 2022-02-02 RX ADMIN — Medication 5 MG: at 21:31

## 2022-02-02 RX ADMIN — Medication 1 TABLET: at 07:56

## 2022-02-02 RX ADMIN — ISOSORBIDE DINITRATE 20 MG: 20 TABLET ORAL at 14:23

## 2022-02-02 RX ADMIN — HEPARIN SODIUM 1100 UNITS/HR: 1000 INJECTION INTRAVENOUS; SUBCUTANEOUS at 15:22

## 2022-02-02 RX ADMIN — Medication 12.5 MG: at 20:05

## 2022-02-02 RX ADMIN — Medication 12.5 MG: at 14:23

## 2022-02-02 ASSESSMENT — ACTIVITIES OF DAILY LIVING (ADL)
ADLS_ACUITY_SCORE: 6

## 2022-02-02 NOTE — PROGRESS NOTES
Brief Structural Note    Reviewed post-procedure images. VSD occluder device appears to be tilted towards RV with worsening left-to-right shunting. RV appears more dilated compared to intra-procedural imaging. Her fatigue has worsened since procedure.     We reviewed options for management including watchful waiting, return to cath lab for device retrieval and attempt at placing larger device, or surgery.     Given her clinical stability, we favor monitoring with serial imaging over the next few days and restarting afterload reduction and diuretics. Will consider repeat attempt at percutaneous repair vs surgery depending on how her images look.     Please call if any questions or concerns or if there is a major change in her clinical condition.    Yusuf Ortiz MD  Structural Heart Fellow  [phone]

## 2022-02-02 NOTE — PLAN OF CARE
D: Admitted 1/24 for VSD closure and new HF.    I: Monitored vitals and assessed pt status.   Changed: NPO since 7am.  PRN: lozenge x2    A: A0x4. VSS, on RA. SR, murmur present. Afebrile. Up ad eduardo, moves well. R groin site WDL.     BP 96/67 (BP Location: Right arm)   Pulse 91   Temp 98  F (36.7  C) (Oral)   Resp 18   Wt 61.5 kg (135 lb 9.6 oz)   LMP  (LMP Unknown)   SpO2 93%   BMI 24.02 kg/m        P: Continue to monitor Pt status and report changes to Cards 1.

## 2022-02-02 NOTE — PROGRESS NOTES
SPIRITUAL HEALTH SERVICES  SPIRITUAL ASSESSMENT Progress Note  [facility] 6C     REFERRAL SOURCE: Consult    Pt and daughter welcomed  visit, saying they had just received news pt's stay would be longer than anticipated in the hospital.  Pt's daughter was tearful and pt said she was disappointed and frustrated.  Pt talked about her aiyana, her lifelong connection to the [facility] and her involvement there, as well as the many people praying for her.  Pt said her  is aware of her hospitalization and her daughter has been in touch with the [employer].   provided prayer, validation and support.      PLAN: SHS will remain available and continue to visit    Kary Redin  Pager: [phone]     none

## 2022-02-02 NOTE — PROGRESS NOTES
[facility]   Cardiology   Progress Note     ASSESSMENT/PLAN:  Kimberley Mullins is a 81 year old female admitted on 1/24. She has PMH only notable for HTN, hypothyroidism, and recent [facility] admission (1/14-1/17) for N/V and hyponatremia, and was transferred to [facility] for shortness of breath 2/2 new heart failure and VSD. 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 with 2w worsening SOB and BETINA, weight gain. Echo shows new VSD with L>R shunt, EF 60-65%, likely WMA RCA territory. Patient transferred for possible surgical intervention of VSD. No previous hx of CAD, recent MI, heart failure. Cors/RHC 1/25 with normal coronary arteries, RHC PCPW 16, PA(m) 26, CO 3.9/CI 2.3. QP/QS 2.2. cMRI with no evidence of ischemia, no scarring. CT heart with 1.9 x 1.2 x 0.7 cm muscular ventricular septal defect, moderate right pleural effusion. s/p VSD with 16 mm Amplatzer device 1/31. POD 1 echo revealed device slightly tilted to the right, moderate leaking around device. A repeat echo was done yesterday with similar findings.     - Dr. Cerda aware of echo results, plan on repeat echo this am, if device continues to move, it may need to be removed  - In case of this scenario, patient is currently NPO  - We are also holding Eliquis  - Continue Plavix 75 mg daily   - Volume Status: appears euvolemic off Lasix  - Daily BMP  - K>4, Mg>2, replacement per protocol  - Strict I&O's, goal net even  - IV filter on peripheral IV lines     # Small segmental PE  D-dimer positive at OSH 2.13, CT PE positive for acute vs chronic small subsegmental PE. Hemodynamically stable.  - Patient off heparin since 1/31  - PO Eliquis on hold  for potential procedure, pending echo results (pt did receive 1 dose yesterday am)     # Hypertension   BP stable off all antihypertensives, /60-70s's  - Discontinue PTA Norvasc     # Hyponatremia, hypervolemic, resolved  # IBS with diarrhea  Recent hospitalization for diarrhea, HypoNa, no recent diarrhea/vomiting since discharge. Na on admit 131. Na stable, 136. Pt with loose stools yesterday, she states is not uncommon with higher levels of stress  - BMP, Mg monitoring while diuresing  - prn imodium     # Weakness, resolved  Pt reports worsening over last several weeks, with aggressive diuresis, patient now stating weakness is much better, she feels almost back to baseline  - PT/OT consulted; recs for home with assist at time of discharge     # Hypothyroidism  1/14 when hospitalized with hyponatremia, TSH/T4 elevated. Repeat TSH 6.02, T4 nml.   - Continue PTA Levothyroxine 50mcg      # Hyperlipidemia  - Continue PTA Simvastatin 20     # Anxiety  Reports feeling anxious about new diagnosis and upcoming procedure.   - Clonazepam 0.5 mg BID prn     # Dark Urine  Noted 1/31 post VSD procedure, dark brown color. Bilirubin checked, normal, 0.2. Cr normal 0.8. Patient denies any frequency, burning, pain. Afebrile. WBC 7.9. Possible old blood from Plascencia catheter used intra op. Per patient, starting to lighten up.   - Continue to monitor UOP, expect to clear with fluids    FEN: NPO for possible need of procedure  Code status: Full  Prophylaxis:  PO Eliquis on hold (pt received 1 dose yesterday am)  Isolation: None  Disposition: discharge home pending echocardiogram findings    Patient seen and discussed with Dr. Liang, who agrees with above plan.    Marlys SOLORZANO, CNP  [facility] Cardiology Team    Interval History:  - No acute events overnight  - Patient continues to deny SOB, chest pain, lightheadedness, dizziness  - Urine remains dark color, per patient, appears to be getting lighter  - Echocardiogram yesterday  revealed Amplatzer device has tilted to right, will get repeat echo this am  - Pt anxious about results of echo    Physical Exam:  Temp:  [96.9  F (36.1  C)-98.3  F (36.8  C)] 98.3  F (36.8  C)  Pulse:  [] 96  Resp:  [16-18] 16  BP: ()/(64-78) 97/64  SpO2:  [92 %-99 %] 94 %    I/O:  Intake/Output Summary (Last 24 hours) at 2/2/2022 0804  Last data filed at 2/2/2022 0300  Gross per 24 hour   Intake 250 ml   Output 1100 ml   Net -850 ml       Wt:   Wt Readings from Last 5 Encounters:   02/01/22 61.5 kg (135 lb 9.6 oz)   01/24/22 72.2 kg (159 lb 3.2 oz)   01/19/22 71.2 kg (157 lb)   01/17/22 69.6 kg (153 lb 6.4 oz)   01/14/22 63 kg (139 lb)       General: NAD  HEENT:  PERRLA, EOMI.   Neck: no JVD  CV: RRR. 3/6 systolic murmur heard best left sternal border. No rubs or gallops. Peripheral radial pulse intact.  Resp: No increased work of breathing or use of accessory muscles, breathing comfortably on room air.  Lung sounds clear throughout/bilaterally  Abdomen:  Normal active bowel sounds.  Abdomen is soft. No distension, non-tender to palpation.    Extremities: Warm. Capillary refill less than 3 sec. 2/4 radial pulses bilaterally. 2/4 pedal pulses bilaterally. No pre-tibial edema. No cyanosis or clubbing.  Skin:  Warm and dry. No erythema, rashes, ulceration or diaphoresis.  Neuro: Alert and oriented x3.      Medications:    [Held by provider] amLODIPine  5 mg Oral Daily     [Held by provider] apixaban ANTICOAGULANT  5 mg Oral BID     clopidogrel  75 mg Oral Daily     levothyroxine  50 mcg Oral Daily     melatonin  5 mg Oral At Bedtime     multivitamin  1 tablet Oral BID     multivitamin, therapeutic  1 tablet Oral Daily     simvastatin  20 mg Oral At Bedtime       - MEDICATION INSTRUCTIONS -       Percutaneous Coronary Intervention orders placed (this is information for BPA alerting)       ACE/ARB/ARNI NOT PRESCRIBED       BETA BLOCKER NOT PRESCRIBED         Labs:   [facility]  Recent Labs   Lab 02/02/22  9332  22  1555 22  1413 22  1220 22  0515 22  0531    137 133 139  --  136 135   POTASSIUM 4.0 4.0 3.7 3.7  --  3.8 3.9   CHLORIDE 100 103  --   --   --  102 102   CO2 25 28  --   --   --  28 27   ANIONGAP 8 6  --   --   --  6 6   GLC 92 96 120* 94   < > 89 96   BUN 20 18  --   --   --  12 12   CR 0.91 0.89  --   --   --  0.62 0.67   GFRESTIMATED 63 64  --   --   --  88 87   ANANDA 8.5 9.0  --   --   --  9.1 9.2   MAG 2.3 2.4*  --   --   --  2.3 2.3   BILITOTAL  --  1.1  --   --   --   --   --     < > = values in this interval not displayed.     CBC  Recent Labs   Lab 2245 22  1555 22  1414 22  1408 22  0515 22  0531   WBC 9.0 7.9  --   --   --  4.8 4.5   RBC 2.85* 3.29*  --   --   --  3.37* 3.39*   HGB 9.2* 10.8* 10.5* 11.1*   < > 11.0* 11.0*   HCT 28.0* 32.8*  --   --   --  33.7* 33.6*   MCV 98 100  --   --   --  100 99   MCH 32.3 32.8  --   --   --  32.6 32.4   MCHC 32.9 32.9  --   --   --  32.6 32.7   RDW 16.1* 16.2*  --   --   --  15.8* 16.0*    197  --   --   --  218 220    < > = values in this interval not displayed.     INRNo lab results found in last 7 days.  Arterial Blood Gas  Recent Labs   Lab 22  15522  1413   PH 7.47* 7.52*   PCO2 36 33*   PO2 206* 83   HCO3 26 27   O2PER 63.0 100.0       Diagnostics:  Recent Results (from the past 24 hour(s))   Echo Limited   Result Value    LVEF  60-65%    Narrative    [id number]  [id number]  [id number]  [id number]^EUGENIO^DELICIA     [facility],[place]  Echocardiography Laboratory  [address] [place], [place] [id number]     Name: ALEXA EDGE  MRN: [id number]  : 1940  Study Date: 2022 09:40 AM  Age: 81 yrs  Gender: Female  [place] [facility] [place]  Reason For Study: VSD  Ordering Physician: DELICIA BECKER  Referring Physician: IVAN BIGGS  Performed By: Pat Perea RDCS     BSA: 1.6 m2  Height: 63  in  Weight: 137 lb  HR: 100  BP: 129/78 mmHg  ______________________________________________________________________________  Procedure  Limited Portable Echo Adult.  ______________________________________________________________________________  Interpretation Summary  Post VSD closure with 16mm Amplatzer device     Global and regional left ventricular function is normal with an EF of 60-65%.     Global right ventricular function is mildly reduced.     There is an Amplatzer device at the site of the apical VSD. The device is  stationary but tilted to the right with moderate vimal device leak.     Mild to moderate tricuspid insufficiency is present.     IVC diameter and respiratory changes fall into an intermediate range  suggesting an RA pressure of 8 mmHg.     No pericardial effusion is present.  ______________________________________________________________________________  Left Ventricle  Left ventricular size is normal. Global and regional left ventricular function  is normal with an EF of 60-65%. There is an Amplatzer device at the site of  the apical VSD. The device is stationary but tilted to the right with moderate  vimal device leak.     Right Ventricle  Mild right ventricular dilation is present. Global right ventricular function  is mildly reduced.     Tricuspid Valve  Mild to moderate tricuspid insufficiency is present.     Vessels  IVC diameter and respiratory changes fall into an intermediate range  suggesting an RA pressure of 8 mmHg.     Pericardium  No pericardial effusion is present.     Compared to Previous Study  This study was compared with the study from 1/31/22 (periprocedural KATIE) . The  Amplatzer VSD closure device appears tilted to the right with more vimal-device  leak.  ______________________________________________________________________________  Report approved by: Ese Konety, MDon 02/01/2022 10:37 AM     ______________________________________________________________________________       Echocardiogram Limited   Result Value    LVEF  55-60%    [facility]    [id number]  [id number]  [id number]  [id number]^LISSETTE^MELECIO^NICOLAS     [facility],[place]  Echocardiography Laboratory  [address] [place] [id number]     Name: ALEXA EDGE  MRN: [id number]  : 1940  Study Date: 2022 03:34 PM  Age: 81 yrs  Gender: Female  [place] [facility] [place] Study: VSD  Ordering Physician: MELECIO MCLAUGHLIN  Referring Physician: IVAN BIGGS  Performed By: Pat Perea RDCS     BSA: 1.6 m2  Height: 63 in  Weight: 135 lb  HR: 97  BP: 129/78 mmHg  ______________________________________________________________________________  Procedure  Limited Portable Echo Adult.  ______________________________________________________________________________  Interpretation Summary  Left ventricular function is normal. Right ventricular function is moderately  reduced.     The Amplatzer device placed at the site of the apical VSD (1.31.22) is  stationary and remains tilted to the right with moderate vimal device leak. The  leak is predominanty apical to the device.     Pulmoanry hypertension is present. Estimated PA systolic pressure is 48 mmHg +  RA pressure.     Pericardial effusion is not present.     Compared to echo from earlier today (9.40 AM), the device position and  peridevice leak are similar. RV appears larger and the function is slightly  worse.  ______________________________________________________________________________  Left Ventricle  Left ventricular function is normal.The ejection fraction is 55-60%. The  Amplatzer device placed at the site of the apical VSD (1.31.22) is stationary  and remains tilted to the right with moderate vimal device leak. The leak is  predominanty apical to the device. The peak pressure gradient across the  residual VSD is now lower at 30 mmHg (was 67 mmHg earlier).     Right Ventricle  Mild to moderate right ventricular  dilation is present. Global right  ventricular function is moderately reduced.     Atria  Moderate right atrial enlargement is present.     Tricuspid Valve  Pulmonary hypertension is present. Estimated pulmonary artery systolic  pressure is 48 mmHg plus right atrial pressure.     Pericardium  No pericardial effusion is present.  ______________________________________________________________________________  Report approved by: Liz Rasmussen 02/01/2022 04:27 PM     ______________________________________________________________________________          Time Spent on this Encounter   I spent 25 minutes on the unit/floor managing the care of Kimberley ARDON Hipolitoyaniv. Over 50% of my time was spent on the following:   - Counseling the patient and/or family regarding: diagnostic results, prognosis and risks and benefits of treatment options  - Coordination of care with the: consultant(s), nurse and family    Saba Betancourt, CNP

## 2022-02-03 ENCOUNTER — ANESTHESIA EVENT (OUTPATIENT)
Dept: SURGERY | Facility: CLINIC | Age: 82
DRG: 228 | End: 2022-02-03
Payer: COMMERCIAL

## 2022-02-03 ENCOUNTER — APPOINTMENT (OUTPATIENT)
Dept: CARDIOLOGY | Facility: CLINIC | Age: 82
DRG: 228 | End: 2022-02-03
Attending: NURSE PRACTITIONER
Payer: COMMERCIAL

## 2022-02-03 ENCOUNTER — PREP FOR PROCEDURE (OUTPATIENT)
Dept: CARDIOLOGY | Facility: CLINIC | Age: 82
End: 2022-02-03

## 2022-02-03 DIAGNOSIS — Q21.0 VSD (VENTRICULAR SEPTAL DEFECT): Primary | ICD-10-CM

## 2022-02-03 LAB
ABO/RH(D): NORMAL
ALBUMIN UR-MCNC: 300 MG/DL
ANION GAP SERPL CALCULATED.3IONS-SCNC: 7 MMOL/L (ref 3–14)
ANION GAP SERPL CALCULATED.3IONS-SCNC: 9 MMOL/L (ref 3–14)
ANTIBODY SCREEN: NEGATIVE
APPEARANCE UR: ABNORMAL
APTT PPP: 83 SECONDS (ref 22–38)
ATRIAL RATE - MUSE: 95 BPM
ATRIAL RATE - MUSE: 96 BPM
BACTERIA #/AREA URNS HPF: ABNORMAL /HPF
BILIRUB DIRECT SERPL-MCNC: 0.3 MG/DL (ref 0–0.2)
BILIRUB SERPL-MCNC: 1.3 MG/DL (ref 0.2–1.3)
BILIRUB UR QL STRIP: NEGATIVE
BLD PROD TYP BPU: NORMAL
BLOOD COMPONENT TYPE: NORMAL
BUN SERPL-MCNC: 19 MG/DL (ref 7–30)
BUN SERPL-MCNC: 24 MG/DL (ref 7–30)
CALCIUM SERPL-MCNC: 8.6 MG/DL (ref 8.5–10.1)
CALCIUM SERPL-MCNC: 9.1 MG/DL (ref 8.5–10.1)
CHLORIDE BLD-SCNC: 100 MMOL/L (ref 94–109)
CHLORIDE BLD-SCNC: 105 MMOL/L (ref 94–109)
CO2 SERPL-SCNC: 23 MMOL/L (ref 20–32)
CO2 SERPL-SCNC: 25 MMOL/L (ref 20–32)
CODING SYSTEM: NORMAL
COLOR UR AUTO: ABNORMAL
CREAT SERPL-MCNC: 1.01 MG/DL (ref 0.52–1.04)
CREAT SERPL-MCNC: 1.31 MG/DL (ref 0.52–1.04)
CROSSMATCH: NORMAL
DIASTOLIC BLOOD PRESSURE - MUSE: NORMAL MMHG
DIASTOLIC BLOOD PRESSURE - MUSE: NORMAL MMHG
ERYTHROCYTE [DISTWIDTH] IN BLOOD BY AUTOMATED COUNT: 16 % (ref 10–15)
GFR SERPL CREATININE-BSD FRML MDRD: 40 ML/MIN/1.73M2
GFR SERPL CREATININE-BSD FRML MDRD: 55 ML/MIN/1.73M2
GLUCOSE BLD-MCNC: 100 MG/DL (ref 70–99)
GLUCOSE BLD-MCNC: 97 MG/DL (ref 70–99)
GLUCOSE UR STRIP-MCNC: NEGATIVE MG/DL
HAPTOGLOB SERPL-MCNC: <3 MG/DL (ref 32–197)
HCT VFR BLD AUTO: 28.1 % (ref 35–47)
HGB BLD-MCNC: 9.2 G/DL (ref 11.7–15.7)
HGB UR QL STRIP: ABNORMAL
HOLD SPECIMEN: NORMAL
INTERPRETATION ECG - MUSE: NORMAL
INTERPRETATION ECG - MUSE: NORMAL
KETONES UR STRIP-MCNC: 10 MG/DL
LDH SERPL L TO P-CCNC: 1364 U/L (ref 81–234)
LDH SERPL L TO P-CCNC: 1632 U/L (ref 81–234)
LEUKOCYTE ESTERASE UR QL STRIP: NEGATIVE
LVEF ECHO: NORMAL
MAGNESIUM SERPL-MCNC: 2.4 MG/DL (ref 1.6–2.3)
MCH RBC QN AUTO: 33 PG (ref 26.5–33)
MCHC RBC AUTO-ENTMCNC: 32.7 G/DL (ref 31.5–36.5)
MCV RBC AUTO: 101 FL (ref 78–100)
MUCOUS THREADS #/AREA URNS LPF: PRESENT /LPF
NITRATE UR QL: NEGATIVE
P AXIS - MUSE: 55 DEGREES
P AXIS - MUSE: 66 DEGREES
PA ADP BLD-ACNC: 110 PRU
PH UR STRIP: 7 [PH] (ref 5–7)
PLATELET # BLD AUTO: 194 10E3/UL (ref 150–450)
POTASSIUM BLD-SCNC: 3.5 MMOL/L (ref 3.4–5.3)
POTASSIUM BLD-SCNC: 4 MMOL/L (ref 3.4–5.3)
PR INTERVAL - MUSE: 162 MS
PR INTERVAL - MUSE: 170 MS
QRS DURATION - MUSE: 90 MS
QRS DURATION - MUSE: 92 MS
QT - MUSE: 368 MS
QT - MUSE: 402 MS
QTC - MUSE: 463 MS
QTC - MUSE: 507 MS
R AXIS - MUSE: 45 DEGREES
R AXIS - MUSE: 6 DEGREES
RBC # BLD AUTO: 2.79 10E6/UL (ref 3.8–5.2)
RBC URINE: 19 /HPF
SODIUM SERPL-SCNC: 132 MMOL/L (ref 133–144)
SODIUM SERPL-SCNC: 137 MMOL/L (ref 133–144)
SP GR UR STRIP: 1.01 (ref 1–1.03)
SPECIMEN EXPIRATION DATE: NORMAL
SQUAMOUS EPITHELIAL: 2 /HPF
SYSTOLIC BLOOD PRESSURE - MUSE: NORMAL MMHG
SYSTOLIC BLOOD PRESSURE - MUSE: NORMAL MMHG
T AXIS - MUSE: 16 DEGREES
T AXIS - MUSE: 61 DEGREES
TRANSITIONAL EPI: 8 /HPF
UNIT ABO/RH: NORMAL
UNIT NUMBER: NORMAL
UNIT STATUS: NORMAL
UNIT TYPE ISBT: 5100
UROBILINOGEN UR STRIP-MCNC: NORMAL MG/DL
VENTRICULAR RATE- MUSE: 95 BPM
VENTRICULAR RATE- MUSE: 96 BPM
WBC # BLD AUTO: 7.9 10E3/UL (ref 4–11)
WBC URINE: 84 /HPF

## 2022-02-03 PROCEDURE — 250N000013 HC RX MED GY IP 250 OP 250 PS 637: Performed by: STUDENT IN AN ORGANIZED HEALTH CARE EDUCATION/TRAINING PROGRAM

## 2022-02-03 PROCEDURE — 93321 DOPPLER ECHO F-UP/LMTD STD: CPT

## 2022-02-03 PROCEDURE — 36415 COLL VENOUS BLD VENIPUNCTURE: CPT | Performed by: THORACIC SURGERY (CARDIOTHORACIC VASCULAR SURGERY)

## 2022-02-03 PROCEDURE — 93325 DOPPLER ECHO COLOR FLOW MAPG: CPT | Mod: 26 | Performed by: INTERNAL MEDICINE

## 2022-02-03 PROCEDURE — 80048 BASIC METABOLIC PNL TOTAL CA: CPT | Performed by: THORACIC SURGERY (CARDIOTHORACIC VASCULAR SURGERY)

## 2022-02-03 PROCEDURE — 250N000009 HC RX 250: Performed by: PHYSICIAN ASSISTANT

## 2022-02-03 PROCEDURE — 258N000003 HC RX IP 258 OP 636: Performed by: INTERNAL MEDICINE

## 2022-02-03 PROCEDURE — 83010 ASSAY OF HAPTOGLOBIN QUANT: CPT | Performed by: NURSE PRACTITIONER

## 2022-02-03 PROCEDURE — 86901 BLOOD TYPING SEROLOGIC RH(D): CPT | Performed by: PHYSICIAN ASSISTANT

## 2022-02-03 PROCEDURE — 93010 ELECTROCARDIOGRAM REPORT: CPT | Mod: 59 | Performed by: INTERNAL MEDICINE

## 2022-02-03 PROCEDURE — 86850 RBC ANTIBODY SCREEN: CPT | Performed by: PHYSICIAN ASSISTANT

## 2022-02-03 PROCEDURE — 93321 DOPPLER ECHO F-UP/LMTD STD: CPT | Mod: 26 | Performed by: INTERNAL MEDICINE

## 2022-02-03 PROCEDURE — 999N000054 HC STATISTIC EKG NON-CHARGEABLE

## 2022-02-03 PROCEDURE — 86923 COMPATIBILITY TEST ELECTRIC: CPT | Performed by: INTERNAL MEDICINE

## 2022-02-03 PROCEDURE — 93325 DOPPLER ECHO COLOR FLOW MAPG: CPT

## 2022-02-03 PROCEDURE — 99232 SBSQ HOSP IP/OBS MODERATE 35: CPT | Mod: 25 | Performed by: INTERNAL MEDICINE

## 2022-02-03 PROCEDURE — 82248 BILIRUBIN DIRECT: CPT | Performed by: NURSE PRACTITIONER

## 2022-02-03 PROCEDURE — 83615 LACTATE (LD) (LDH) ENZYME: CPT | Performed by: NURSE PRACTITIONER

## 2022-02-03 PROCEDURE — 258N000003 HC RX IP 258 OP 636: Performed by: PHYSICIAN ASSISTANT

## 2022-02-03 PROCEDURE — 36415 COLL VENOUS BLD VENIPUNCTURE: CPT | Performed by: NURSE PRACTITIONER

## 2022-02-03 PROCEDURE — 87086 URINE CULTURE/COLONY COUNT: CPT | Performed by: NURSE PRACTITIONER

## 2022-02-03 PROCEDURE — 214N000001 HC R&B CCU UMMC

## 2022-02-03 PROCEDURE — 250N000011 HC RX IP 250 OP 636: Performed by: INTERNAL MEDICINE

## 2022-02-03 PROCEDURE — 83615 LACTATE (LD) (LDH) ENZYME: CPT | Performed by: THORACIC SURGERY (CARDIOTHORACIC VASCULAR SURGERY)

## 2022-02-03 PROCEDURE — 93308 TTE F-UP OR LMTD: CPT | Mod: 26 | Performed by: INTERNAL MEDICINE

## 2022-02-03 PROCEDURE — 85730 THROMBOPLASTIN TIME PARTIAL: CPT | Performed by: INTERNAL MEDICINE

## 2022-02-03 PROCEDURE — 250N000013 HC RX MED GY IP 250 OP 250 PS 637: Performed by: NURSE PRACTITIONER

## 2022-02-03 PROCEDURE — 36415 COLL VENOUS BLD VENIPUNCTURE: CPT | Performed by: INTERNAL MEDICINE

## 2022-02-03 PROCEDURE — 83735 ASSAY OF MAGNESIUM: CPT | Performed by: STUDENT IN AN ORGANIZED HEALTH CARE EDUCATION/TRAINING PROGRAM

## 2022-02-03 PROCEDURE — 85027 COMPLETE CBC AUTOMATED: CPT | Performed by: NURSE PRACTITIONER

## 2022-02-03 PROCEDURE — 85576 BLOOD PLATELET AGGREGATION: CPT

## 2022-02-03 PROCEDURE — 81003 URINALYSIS AUTO W/O SCOPE: CPT | Performed by: NURSE PRACTITIONER

## 2022-02-03 PROCEDURE — 250N000011 HC RX IP 250 OP 636: Performed by: PHYSICIAN ASSISTANT

## 2022-02-03 PROCEDURE — 80048 BASIC METABOLIC PNL TOTAL CA: CPT | Performed by: NURSE PRACTITIONER

## 2022-02-03 RX ORDER — PHENYLEPHRINE HCL IN 0.9% NACL 50MG/250ML
.1-6 PLASTIC BAG, INJECTION (ML) INTRAVENOUS CONTINUOUS
Status: CANCELLED | OUTPATIENT
Start: 2022-02-03

## 2022-02-03 RX ORDER — HYDRALAZINE HYDROCHLORIDE 10 MG/1
10 TABLET, FILM COATED ORAL 3 TIMES DAILY PRN
Status: DISCONTINUED | OUTPATIENT
Start: 2022-02-03 | End: 2022-02-04

## 2022-02-03 RX ORDER — ISOSORBIDE DINITRATE 30 MG/1
30 TABLET ORAL
Status: DISCONTINUED | OUTPATIENT
Start: 2022-02-03 | End: 2022-02-06

## 2022-02-03 RX ORDER — POTASSIUM CHLORIDE 750 MG/1
10 TABLET, EXTENDED RELEASE ORAL ONCE
Status: COMPLETED | OUTPATIENT
Start: 2022-02-03 | End: 2022-02-03

## 2022-02-03 RX ADMIN — Medication 5 MG: at 21:28

## 2022-02-03 RX ADMIN — ISOSORBIDE DINITRATE 30 MG: 10 TABLET ORAL at 17:59

## 2022-02-03 RX ADMIN — SIMVASTATIN 20 MG: 20 TABLET, FILM COATED ORAL at 21:28

## 2022-02-03 RX ADMIN — HEPARIN SODIUM 1100 UNITS/HR: 1000 INJECTION INTRAVENOUS; SUBCUTANEOUS at 13:07

## 2022-02-03 RX ADMIN — Medication 12.5 MG: at 09:13

## 2022-02-03 RX ADMIN — POTASSIUM CHLORIDE 10 MEQ: 750 TABLET, EXTENDED RELEASE ORAL at 09:14

## 2022-02-03 RX ADMIN — ISOSORBIDE DINITRATE 20 MG: 20 TABLET ORAL at 09:13

## 2022-02-03 RX ADMIN — HYDROXYZINE HYDROCHLORIDE 50 MG: 50 TABLET ORAL at 16:15

## 2022-02-03 RX ADMIN — Medication 1 TABLET: at 09:13

## 2022-02-03 RX ADMIN — Medication 1 TABLET: at 20:07

## 2022-02-03 RX ADMIN — ISOSORBIDE DINITRATE 30 MG: 10 TABLET ORAL at 11:18

## 2022-02-03 RX ADMIN — LEVOTHYROXINE SODIUM 50 MCG: 0.05 TABLET ORAL at 09:14

## 2022-02-03 RX ADMIN — THERA TABS 1 TABLET: TAB at 09:13

## 2022-02-03 RX ADMIN — ASPIRIN 81 MG: 81 TABLET ORAL at 09:13

## 2022-02-03 ASSESSMENT — ACTIVITIES OF DAILY LIVING (ADL)
ADLS_ACUITY_SCORE: 6
ADLS_ACUITY_SCORE: 8
ADLS_ACUITY_SCORE: 6
ADLS_ACUITY_SCORE: 6
ADLS_ACUITY_SCORE: 10
ADLS_ACUITY_SCORE: 6
ADLS_ACUITY_SCORE: 10
ADLS_ACUITY_SCORE: 6
ADLS_ACUITY_SCORE: 6

## 2022-02-03 NOTE — PLAN OF CARE
DX: VSD  PMH: HTN, hypothyroidism, and recent [facility] admission (1/14-1/17) for N/V and hyponatremia, and is being transferred to [facility] for shortness of breath 2/2 new heart failure and VSD.       Code Status: full   Team: CSI/cards 1    Cardiac: NSR, VSS. BP meds restarted. Team wants to wait and see if device moves anymore and will reassess plan on Friday.   Respiratory: lung sounds have very fine crackles in bases  Neuro: AxO x4  Pain: denies  GI/: urinating well. Pt is still having dark brown urine from hernandez cath on Monday   Diet: 2g Na  Skin: wdl  Activity: independent    Gtts/fluid: n/a    Plan: continue to monitor

## 2022-02-03 NOTE — PLAN OF CARE
D: Admitted 1/24 with SOB 2/2 new heart failure and VSD s/p Amplatzer device on 1/31. Hx of HTN, hypothyroidism, and recent hospitalization for N/V and hyponatremia.       I: Monitored vitals and assessed pt status.   Changed:  Running: Heparin 1100 units/hr    PRN:  Tele: SA/SR  O2: Room air   Mobility: stand by assist      A: A0x4. VSS. Afebrile. Denies pain. C/o intermittent chest pain (has improved), MD aware. 2 g Na diet, poor appetite. Crackles in lower lobes. Urine dark brown in color, urinating adequately. Right groin and right internal jugular site intact.       P: Continue to monitor Pt status and report changes to Cards 2.

## 2022-02-03 NOTE — PROGRESS NOTES
CVTS:     Pre-op orders placed for VSD repair in OR tomorrow am with Dr Whitney.   Type and screen, blood products.   NPO at midnight.      Migue Dolan PA-C  Cardiothoracic Surgery  Pager [phone]    4:22 PM   February 3, 2022

## 2022-02-03 NOTE — PROGRESS NOTES
[facility]  Cardiovascular and Thoracic Surgery Daily Note          Assessment and Plan:   Kimberley Mullins is a 81 year old female who presented with a new apical, septal VSD from [facility]. It was assumed to be ischemic in origin however MRI and angiogram did not reveal coronary disease or evidence of prior infarct. Etiology remains unclear. She presented in heart failure but was not in shock and improved with medical therapy. A percutaneous closure was attempted and although initially successful, has on follow up imaging been found to have a residual, high gradient shunt. Initial plan was to delay surgical repair to allow septal tissue to continue to scar and repeat LHC/RHC to determine shunt fraction in 3-4 weeks as she was minimally symptomatic. She has, however, in the interim developed significant hemolysis resulting in hematuria and an LDH of over 1000. Her renal function remains preserved currently.     Because of her hemolysis and high gradient flow across the residual shunt I do not believe we now have the opportunity to delay surgical repair. She will be a worse candidate after a delay if her renal function declines and she required hemodialysis. For this reason I recommend urgent surgical repair of her VSD tomorrow AM. This does put her at higher risk of a failed repair and mortality. She also unfortunately has received a DOAC dose of apixaban on 2/1 and was loaded and has since received 2 doses of clopidogrel making her risk of bleeding significantly higher. Additional risks explained to the patient include a risk of stroke, infection, irregular heart rhythm, heart attack, shock requiring ECMO, valvular heart damage, renal failure, lung failure requiring prolonged ventilation and possible tracheostomy, nerve damage, liver damage, intestinal damage, and heart block requiring permanent pacemaker. I have quoted her a risk of death of 10-20%. She and her  family agree to proceed with surgery.    - Preoperative orders completed  - Hold heparin at 0300 AM tomorrow morning  - Canceled held orders for apixaban and clopidogrel.     Aldair Whitney MD  Cardiothoracic Surgery  [phone]        Interval History:   Shortness of breath, severe fatigue, urine color changes present. Denies chest pain, tightness, pressure, fevers, chills, cough.         Medications:     Current Facility-Administered Medications   Medication     acetaminophen (TYLENOL) tablet 650 mg     aspirin EC tablet 81 mg     benzocaine-menthol (CEPACOL) 15-3.6 MG lozenge 1 lozenge     clonazePAM (klonoPIN) tablet 0.5 mg     Continuing statin from home medication list OR statin order already placed during this visit     [Held by provider] furosemide (LASIX) tablet 40 mg     heparin infusion 25,000 units in D5W 250 mL ANTICOAGULANT     HOLD MEDICATION     HOLD nitroGLYcerin IF     [START ON 2/4/2022] HOLD:  All AM Medications     hydrALAZINE (APRESOLINE) tablet 10 mg     hydrOXYzine (ATARAX) tablet 25 mg    Or     hydrOXYzine (ATARAX) tablet 50 mg     isosorbide dinitrate (ISORDIL) tablet 30 mg     levothyroxine (SYNTHROID/LEVOTHROID) tablet 50 mcg     loperamide (IMODIUM) capsule 2 mg     melatonin tablet 5 mg     multivitamin (OCUVITE) tablet 1 tablet     multivitamin, therapeutic (THERA-VIT) tablet 1 tablet     naloxone (NARCAN) injection 0.2 mg    Or     naloxone (NARCAN) injection 0.4 mg    Or     naloxone (NARCAN) injection 0.2 mg    Or     naloxone (NARCAN) injection 0.4 mg     nitroGLYcerin (NITROSTAT) sublingual tablet 0.4 mg     ondansetron (ZOFRAN-ODT) ODT tab 4 mg    Or     ondansetron (ZOFRAN) injection 4 mg     oxyCODONE (ROXICODONE) tablet 5 mg    Or     oxyCODONE IR (ROXICODONE) tablet 10 mg     Percutaneous Coronary Intervention orders placed (this is information for BPA alerting)     Reason ACE/ARB/ARNI order not selected     Reason beta blocker not prescribed     simvastatin (ZOCOR) tablet  no 20 mg     Objective:  /77 (BP Location: Left arm)   Pulse 100   Temp 97.5  F (36.4  C) (Oral)   Resp 16   Wt 61.2 kg (135 lb)   LMP  (LMP Unknown)   SpO2 97%   BMI 23.91 kg/m      Alert, oriented, cooperative, no distress  No JVD, trachea midline  EOMI, anicteric  Sinus tachcyardia, systolic murmur heard best at apex  Clear b/l, no increased work of breathing, on room air  Soft, nt, nd  No edema, no deformities          Data:     Lab Results   Component Value Date    WBC 7.9 02/03/2022    HGB 9.2 (L) 02/03/2022    HCT 28.1 (L) 02/03/2022     02/03/2022     02/03/2022    POTASSIUM 3.5 02/03/2022    CHLORIDE 105 02/03/2022    CO2 25 02/03/2022    BUN 19 02/03/2022    CR 1.01 02/03/2022    GLC 97 02/03/2022    SED 18 01/10/2018    DD 2.13 (H) 01/24/2022    NTBNPI 14,899 (H) 01/24/2022    AST 28 01/25/2022    ALT 41 01/25/2022    ALKPHOS 50 01/25/2022    BILITOTAL 1.3 02/03/2022    BILIDIRECT 0.1 01/08/2010    INR 1.08 01/24/2022     TTE 2/3/2022:  Global and regional left ventricular function is normal with an EF of 55-60%.  Flattened septum is consistent with right ventricular pressure overload.  The Amplatzer device placed at the site of the apical VSD is well seated  however bowing in to RV with apical vimal device leak. Peak VSD flow through  the vimal-device leak is 5 m/s.  Global right ventricular function is mildly reduced.  Pulmonary hypertension is present.  Right ventricular systolic pressure is 70mmHg above the right atrial pressure.  Trivial pericardial effusion is present.

## 2022-02-03 NOTE — PROGRESS NOTES
SPIRITUAL HEALTH SERVICES  SPIRITUAL ASSESSMENT Progress Note  [facility]C     REFERRAL SOURCE: Follow up    Pt and daughter were receptive of return  visit.  They shared that pt had received some difficult news this morning from the physicians, and she still did not know what the plan would be moving forward.  She said she is waiting to hear, and that the waiting is difficult for her.  We processed how she can manage during this time, and get support from her family.  We prayed together, and pt said she would reach out again for support if needed.    PLAN: [facility] will remain available,  will continue to follow up     Kary Toney    Pager: [phone]

## 2022-02-03 NOTE — PROGRESS NOTES
[facility]   Cardiology   Progress Note     ASSESSMENT/PLAN:  Kimberley Mullins is a 81 year old female admitted on 1/24. She has PMH only notable for HTN, hypothyroidism, and recent [facility] admission (1/14-1/17) for N/V and hyponatremia, and was transferred to [facility] for shortness of breath 2/2 new heart failure and VSD. 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 Echo shows new VSD with L>R shunt, EF 60-65%, likely WMA RCA territory. Patient transferred for possible surgical intervention of VSD. No previous hx of CAD, recent MI, heart failure. Cors/RHC 1/25 with normal coronary arteries, RHC PCPW 16, PA(m) 26, CO 3.9/CI 2.3. QP/QS 2.2. cMRI with no evidence of ischemia, no scarring. CT heart with 1.9 x 1.2 x 0.7 cm muscular ventricular septal defect, moderate right pleural effusion. s/p VSD with 16 mm Amplatzer device 1/31. POD 1 echo revealed device slightly tilted to the right, moderate leaking around device. A repeat echo was done yesterday with similar findings, worsening RV function.     - Repeat echo this am  - Structural team following  - Holding Plavix in case of need for open surgical repair, patient started on 81 mg ASA  - Volume Status: appears euvolemic , hold PO Lasix today given LINDSEY   - Continue hydral/isordil to minimize shunting  - Daily BMP  - K>4, Mg>2, replacement per protocol  - Strict I&O's, goal net even  - IV filter on peripheral IV lines    # LINDSEY  # Brown Colored Urine  GFR 55, previously in 80's. Patient restarted on PO Lasix  yesterday, was net negative 2L. Could be component of over diuresis as well as 2/2 contrast given with VSD procedure 1/31. Brownish red color urine Noted 1/31 post VSD procedure, dark brown/red color. Bilirubin checked, normal, 0.2. Cr 1.0 Patient denies any frequency, burning, pain. Afebrile. WBC 9.0. Possible old blood from Plascencia catheter used intra op. Per patient, had started to lighten up 2/2, now getting dark again 2/3, per RN more brown than red.   - Strict I/O  - Daily Weights  - Daily BMP  - Check UA    # Acute Anemia  Hgb baseline 10-11. Yesterday hgb 9.2. Pt reported blood nose. Given concern for hemolysis after VSD occluder, bilirubin checked, normal.   - Given continued drop in hgb, repeat bili, haptoglobin, LDH this am     # Small segmental PE  D-dimer positive at OSH 2.13, CT PE positive for acute vs chronic small subsegmental PE. Hemodynamically stable.  - Heparin gtt resumed yesterday given possible need for repeat procedure     # Hypertension   BP stable off all antihypertensives, /60-70s's  - Discontinue PTA Norvasc  - Continue hydral/isordil as listed above     # Hyponatremia, hypervolemic, resolved  # IBS with diarrhea  Recent hospitalization for diarrhea, HypoNa, no recent diarrhea/vomiting since discharge. Na on admit 131. Na stable, 136. Pt with loose stools yesterday, she states is not uncommon with higher levels of stress  - BMP, Mg monitoring while diuresing  - prn imodium     # Weakness, resolved  Pt reports worsening over last several weeks, with aggressive diuresis, patient now stating weakness is much better, she feels almost back to baseline  - PT/OT consulted; recs for home with assist at time of discharge     # Hypothyroidism  1/14 when hospitalized with hyponatremia, TSH/T4 elevated. Repeat TSH 6.02, T4 nml.   - Continue PTA Levothyroxine 50mcg      # Hyperlipidemia  - Continue PTA Simvastatin 20     # Anxiety  Reports feeling anxious about new diagnosis and possible upcoming  procedure.   - Clonazepam 0.5 mg BID prn   - Atarax prn    FEN: 2 gm Sodium  Code status: Full  Prophylaxis:  hep gtt  Isolation: None   Disposition: discharge plan TBD based on echocardiogram, labwork    Patient seen and discussed with Dr. Liang, who agrees with above plan.    Marlys SOLORZANO, CNP  [facility] Cardiology Team    Interval History:  - No acute events overnight  - Patient restarted on Lasix, Hydral/isordil yesterday to off load RV; net negative 2L, weight stable, Cr slightly increased (GFR 55, had been in 80's)  - Pt reports had bloody nose last night  - Urine remains brown color, pt states she thought had been lightening up, now back to brown  - Hgb down 9.2 (from 10.8)  - She denies SOB, lightheadedness, dizziness  - Pt reports feeling very anxious regarding POC    Physical Exam:  Temp:  [97.3  F (36.3  C)-98.3  F (36.8  C)] 98  F (36.7  C)  Pulse:  [88-93] 88  Resp:  [16] 16  BP: ()/(57-75) 95/57  SpO2:  [94 %-97 %] 95 %    I/O:  Intake/Output Summary (Last 24 hours) at 2/3/2022 0824  Last data filed at 2/3/2022 0600  Gross per 24 hour   Intake 260.97 ml   Output 2450 ml   Net -2189.03 ml         Wt:   Wt Readings from Last 5 Encounters:   02/03/22 61.2 kg (135 lb)   01/24/22 72.2 kg (159 lb 3.2 oz)   01/19/22 71.2 kg (157 lb)   01/17/22 69.6 kg (153 lb 6.4 oz)   01/14/22 63 kg (139 lb)         General: NAD  HEENT:  PERRLA, EOMI.   Neck: no JVD  CV: RRR. Systolic murmur present, left sternal border. No rubs or gallops. Peripheral radial pulse intact.  Resp: No increased work of breathing or use of accessory muscles, breathing comfortably on room air.  Lung sounds clear throughout/bilaterally  Abdomen:  Normal active bowel sounds.  Abdomen is soft. No distension, non-tender to palpation.    Extremities: Warm. Capillary refill less than 3 sec. 2/4 radial pulses bilaterally.  2/4 pedal pulses bilaterally. No pre-tibial edema. No cyanosis or clubbing.  Skin:  Warm and dry. No erythema, rashes,  ulceration or diaphoresis.  Neuro: Alert and oriented x3.      Medications:    [Held by provider] apixaban ANTICOAGULANT  5 mg Oral BID     aspirin  81 mg Oral Daily     [Held by provider] clopidogrel  75 mg Oral Daily     [Held by provider] furosemide  40 mg Oral Daily     hydrALAZINE  12.5 mg Oral TID     isosorbide dinitrate  20 mg Oral TID     levothyroxine  50 mcg Oral Daily     melatonin  5 mg Oral At Bedtime     multivitamin  1 tablet Oral BID     multivitamin, therapeutic  1 tablet Oral Daily     potassium chloride  10 mEq Oral Once     simvastatin  20 mg Oral At Bedtime       - MEDICATION INSTRUCTIONS -       heparin 1,100 Units/hr (02/02/22 2007)     Percutaneous Coronary Intervention orders placed (this is information for BPA alerting)       ACE/ARB/ARNI NOT PRESCRIBED       BETA BLOCKER NOT PRESCRIBED         Labs:   CMP  Recent Labs   Lab 02/03/22  0320 02/02/22  0545 02/01/22  0527 01/31/22  1555 01/31/22  1220 01/31/22  0515    133 137 133   < > 136   POTASSIUM 3.5 4.0 4.0 3.7   < > 3.8   CHLORIDE 105 100 103  --   --  102   CO2 25 25 28  --   --  28   ANIONGAP 7 8 6  --   --  6   GLC 97 92 96 120*   < > 89   BUN 19 20 18  --   --  12   CR 1.01 0.91 0.89  --   --  0.62   GFRESTIMATED 55* 63 64  --   --  88   ANANDA 8.6 8.5 9.0  --   --  9.1   MAG 2.4* 2.3 2.4*  --   --  2.3   BILITOTAL  --   --  1.1  --   --   --     < > = values in this interval not displayed.     CBC  Recent Labs   Lab 02/02/22  0545 02/01/22  0527 01/31/22  1555 01/31/22  1414 01/31/22  1408 01/31/22  0515 01/30/22  0531   WBC 9.0 7.9  --   --   --  4.8 4.5   RBC 2.85* 3.29*  --   --   --  3.37* 3.39*   HGB 9.2* 10.8* 10.5* 11.1*   < > 11.0* 11.0*   HCT 28.0* 32.8*  --   --   --  33.7* 33.6*   MCV 98 100  --   --   --  100 99   MCH 32.3 32.8  --   --   --  32.6 32.4   MCHC 32.9 32.9  --   --   --  32.6 32.7   RDW 16.1* 16.2*  --   --   --  15.8* 16.0*    197  --   --   --  218 220    < > = values in this interval not  displayed.     INRNo lab results found in last 7 days.  Arterial Blood Gas  Recent Labs   Lab 22  1555 22  1413   PH 7.47* 7.52*   PCO2 36 33*   PO2 206* 83   HCO3 26 27   O2PER 63.0 100.0       Diagnostics:  Recent Results (from the past 24 hour(s))   Echocardiogram Limited   Result Value    LVEF  55-60%    Narrative    [id number]  [id number]  [id number]  [id number]^LISSETTE^MELECIO^NICOLAS     [facility],[place]  Echocardiography Laboratory  [address] [place] [id number]     Name: ALEXA EDGE  MRN: [id number]  : 1940  Study Date: 2022 09:20 AM  Age: 81 yrs  Gender: Female  Patient Location: [facility]  Reason For Study: VSD  Ordering Physician: MELECIO MCLAUGHLIN  Referring Physician: IVAN BIGGS  Performed By: Adela Ramos RDCS     BSA: 1.6 m2  Height: 63 in  Weight: 135 lb  HR: 99  BP: 97/64 mmHg  ______________________________________________________________________________  Procedure  Limited Echocardiogram with portions of two-dimensional, color and spectral  Doppler performed.  ______________________________________________________________________________  Interpretation Summary  LV function normal. RV function continues to appear moderately reduced, stable  when compared with study 22.  The Amplatzer device placed at the site of the apical VSD (22) is  stationary and remains tilted to the right with moderate vimal device leak. The  leak is predominanty apical to the device. Peak VSD flow velocity 5.3m/sec.  Moderate pulmonary hypertension is present with RVSP pf 54.4mmHg plus the RA  pressure  IVC diameter and respiratory changes fall into an intermediate range  suggesting an RA pressure of 8 mmHg.  No pericardial effusion is present.  ______________________________________________________________________________  Left Ventricle  Global and regional left ventricular function is normal with an EF of 55-60%.  The Amplatzer device placed at  the site of the apical VSD (22) is  stationary and remains tilted to the right with moderate vimal device leak. The  leak is predominanty apical to the device. Peak VSD flow velocity 5.3m/sec.     Right Ventricle  Global right ventricular function is moderately reduced.     Mitral Valve  The mitral valve is normal.     Aortic Valve  The aortic valve cannot be assessed.     Tricuspid Valve  Mild to moderate tricuspid insufficiency is present. The right ventricular  systolic pressure is approximated at 54.4 mmHg plus the right atrial pressure.  Moderate pulmonary hypertension is present.     Vessels  IVC diameter and respiratory changes fall into an intermediate range  suggesting an RA pressure of 8 mmHg.     Pericardium  No pericardial effusion is present.     Compared to Previous Study  This study was compared with the study from 22 .     Attestation  I have personally viewed the imaging and agree with the interpretation and  report as documented by the fellow, Adina Harrison, and/or edited by me.     ______________________________________________________________________________  Doppler Measurements & Calculations  TR max lalita: 368.8 cm/sec  TR max P.4 mmHg     ______________________________________________________________________________  Report approved by: Liz Rodriguez 2022 10:31 AM             Time Spent on this Encounter   I spent >35 minutes on the unit/floor managing the care of Kimberley Mullins. Over 50% of my time was spent on the following:   - Counseling the patient and/or family regarding: diagnostic results, prognosis, risks and benefits of treatment options and medical compliance  - Coordination of care with the: consultant(s), care coordinator/, nurse and family    Saba Betancourt, CNP

## 2022-02-04 ENCOUNTER — ANESTHESIA (OUTPATIENT)
Dept: SURGERY | Facility: CLINIC | Age: 82
DRG: 228 | End: 2022-02-04
Payer: COMMERCIAL

## 2022-02-04 ENCOUNTER — APPOINTMENT (OUTPATIENT)
Dept: GENERAL RADIOLOGY | Facility: CLINIC | Age: 82
DRG: 228 | End: 2022-02-04
Attending: SURGERY
Payer: COMMERCIAL

## 2022-02-04 LAB
ALBUMIN SERPL-MCNC: 2.5 G/DL (ref 3.4–5)
ALBUMIN SERPL-MCNC: 3.4 G/DL (ref 3.4–5)
ALP SERPL-CCNC: 42 U/L (ref 40–150)
ALP SERPL-CCNC: 49 U/L (ref 40–150)
ALT SERPL W P-5'-P-CCNC: 33 U/L (ref 0–50)
ALT SERPL W P-5'-P-CCNC: 43 U/L (ref 0–50)
ANION GAP SERPL CALCULATED.3IONS-SCNC: 14 MMOL/L (ref 3–14)
ANION GAP SERPL CALCULATED.3IONS-SCNC: 7 MMOL/L (ref 3–14)
APTT PPP: 34 SECONDS (ref 22–38)
APTT PPP: 40 SECONDS (ref 22–38)
APTT PPP: 62 SECONDS (ref 22–38)
APTT PPP: 66 SECONDS (ref 22–38)
AST SERPL W P-5'-P-CCNC: 125 U/L (ref 0–45)
AST SERPL W P-5'-P-CCNC: 141 U/L (ref 0–45)
ATRIAL RATE - MUSE: 76 BPM
BASE EXCESS BLDA CALC-SCNC: -0.8 MMOL/L (ref -9.6–2)
BASE EXCESS BLDA CALC-SCNC: -0.9 MMOL/L (ref -9.6–2)
BASE EXCESS BLDA CALC-SCNC: -3.3 MMOL/L (ref -9–1.8)
BASE EXCESS BLDA CALC-SCNC: -3.4 MMOL/L (ref -9.6–2)
BASE EXCESS BLDA CALC-SCNC: -3.6 MMOL/L (ref -9.6–2)
BASE EXCESS BLDA CALC-SCNC: -3.8 MMOL/L (ref -9.6–2)
BASE EXCESS BLDA CALC-SCNC: -3.8 MMOL/L (ref -9.6–2)
BASE EXCESS BLDA CALC-SCNC: -4.1 MMOL/L (ref -9.6–2)
BASE EXCESS BLDA CALC-SCNC: -4.5 MMOL/L (ref -9.6–2)
BASE EXCESS BLDA CALC-SCNC: -4.5 MMOL/L (ref -9.6–2)
BASE EXCESS BLDA CALC-SCNC: -5.4 MMOL/L (ref -9.6–2)
BASE EXCESS BLDV CALC-SCNC: -1.4 MMOL/L (ref -8.1–1.9)
BILIRUB SERPL-MCNC: 1 MG/DL (ref 0.2–1.3)
BILIRUB SERPL-MCNC: 1.8 MG/DL (ref 0.2–1.3)
BLD PROD TYP BPU: NORMAL
BLOOD COMPONENT TYPE: NORMAL
BUN SERPL-MCNC: 18 MG/DL (ref 7–30)
BUN SERPL-MCNC: 22 MG/DL (ref 7–30)
CA-I BLD-MCNC: 3.6 MG/DL (ref 4.4–5.2)
CA-I BLD-MCNC: 3.7 MG/DL (ref 4.4–5.2)
CA-I BLD-MCNC: 3.8 MG/DL (ref 4.4–5.2)
CA-I BLD-MCNC: 4 MG/DL (ref 4.4–5.2)
CA-I BLD-MCNC: 4.4 MG/DL (ref 4.4–5.2)
CA-I BLD-MCNC: 4.5 MG/DL (ref 4.4–5.2)
CA-I BLD-MCNC: 4.6 MG/DL (ref 4.4–5.2)
CA-I BLD-MCNC: 4.6 MG/DL (ref 4.4–5.2)
CA-I BLD-MCNC: 4.7 MG/DL (ref 4.4–5.2)
CA-I BLD-MCNC: 5.1 MG/DL (ref 4.4–5.2)
CA-I BLD-MCNC: 5.6 MG/DL (ref 4.4–5.2)
CA-I BLD-MCNC: 5.8 MG/DL (ref 4.4–5.2)
CALCIUM SERPL-MCNC: 8.4 MG/DL (ref 8.5–10.1)
CALCIUM SERPL-MCNC: 9.3 MG/DL (ref 8.5–10.1)
CF REDUC 30M P MA P HEP LENFR BLD TEG: 0.5 % (ref 0–8)
CF REDUC 30M P MA P HEP LENFR BLD TEG: 3.3 % (ref 0–8)
CF REDUC 60M P MA LENFR BLD TEG: 0 % (ref 0–15)
CF REDUC 60M P MA LENFR BLD TEG: 2.9 % (ref 0–15)
CF REDUC 60M P MA LENFR BLD TEG: 4 % (ref 0–15)
CF REDUC 60M P MA P HEPASE LENFR BLD TEG: 3 % (ref 0–15)
CF REDUC 60M P MA P HEPASE LENFR BLD TEG: 6.7 % (ref 0–15)
CFT BLD TEG: 0.8 MINUTE (ref 1–3)
CFT BLD TEG: 0.8 MINUTE (ref 1–3)
CFT BLD TEG: ABNORMAL S
CFT P HPASE BLD TEG: 0.8 MINUTE (ref 1–3)
CFT P HPASE BLD TEG: 1.1 MINUTE (ref 1–3)
CHLORIDE BLD-SCNC: 104 MMOL/L (ref 94–109)
CHLORIDE BLD-SCNC: 107 MMOL/L (ref 94–109)
CI (COAGULATION INDEX)(Z) NON NATIVE: 3.9 (ref -3–3)
CI (COAGULATION INDEX)(Z) NON NATIVE: 4.3 (ref -3–3)
CI (COAGULATION INDEX)(Z) NON NATIVE: ABNORMAL
CI (COAGULATION INDEX)(Z): 2.5 (ref -3–3)
CI (COAGULATION INDEX)(Z): 4.1 (ref -3–3)
CLOT ANGLE BLD TEG: 78.5 DEGREES (ref 53–72)
CLOT ANGLE BLD TEG: 78.7 DEGREES (ref 53–72)
CLOT ANGLE BLD TEG: ABNORMAL DEG
CLOT ANGLE P HPASE BLD TEG: 74.5 DEGREES (ref 53–72)
CLOT ANGLE P HPASE BLD TEG: 78.1 DEGREES (ref 53–72)
CLOT INIT BLD TEG: 18 MINUTE (ref 5–10)
CLOT INIT BLD TEG: 4.5 MINUTE (ref 5–10)
CLOT INIT BLD TEG: 5.4 MINUTE (ref 5–10)
CLOT INIT P HPASE BLD TEG: 4.7 MINUTE (ref 5–10)
CLOT INIT P HPASE BLD TEG: 5.7 MINUTE (ref 5–10)
CLOT LYSIS 30M P MA LENFR BLD TEG: 0 % (ref 0–8)
CLOT LYSIS 30M P MA LENFR BLD TEG: 0.4 % (ref 0–8)
CLOT LYSIS 30M P MA LENFR BLD TEG: 1 % (ref 0–8)
CLOT STRENGTH BLD TEG: 16.2 KD/SC (ref 4.5–11)
CLOT STRENGTH BLD TEG: 18.4 KD/SC (ref 4.5–11)
CLOT STRENGTH BLD TEG: 6.6 KD/SC (ref 4.5–11)
CLOT STRENGTH P HPASE BLD TEG: 12.3 KD/SC (ref 4.5–11)
CLOT STRENGTH P HPASE BLD TEG: 15.9 KD/SC (ref 4.5–11)
CO2 SERPL-SCNC: 20 MMOL/L (ref 20–32)
CO2 SERPL-SCNC: 24 MMOL/L (ref 20–32)
CODING SYSTEM: NORMAL
CREAT SERPL-MCNC: 1.22 MG/DL (ref 0.52–1.04)
CREAT SERPL-MCNC: 1.44 MG/DL (ref 0.52–1.04)
CROSSMATCH: NORMAL
DIASTOLIC BLOOD PRESSURE - MUSE: NORMAL MMHG
ERYTHROCYTE [DISTWIDTH] IN BLOOD BY AUTOMATED COUNT: 16.4 % (ref 10–15)
ERYTHROCYTE [DISTWIDTH] IN BLOOD BY AUTOMATED COUNT: 16.5 % (ref 10–15)
ERYTHROCYTE [DISTWIDTH] IN BLOOD BY AUTOMATED COUNT: 17.2 % (ref 10–15)
ERYTHROCYTE [DISTWIDTH] IN BLOOD BY AUTOMATED COUNT: 17.3 % (ref 10–15)
FIBRINOGEN PPP-MCNC: 296 MG/DL (ref 170–490)
FIBRINOGEN PPP-MCNC: 297 MG/DL (ref 170–490)
FIBRINOGEN PPP-MCNC: 366 MG/DL (ref 170–490)
GFR SERPL CREATININE-BSD FRML MDRD: 36 ML/MIN/1.73M2
GFR SERPL CREATININE-BSD FRML MDRD: 44 ML/MIN/1.73M2
GLUCOSE BLD-MCNC: 104 MG/DL (ref 70–99)
GLUCOSE BLD-MCNC: 125 MG/DL (ref 70–99)
GLUCOSE BLD-MCNC: 128 MG/DL (ref 70–99)
GLUCOSE BLD-MCNC: 145 MG/DL (ref 70–99)
GLUCOSE BLD-MCNC: 159 MG/DL (ref 70–99)
GLUCOSE BLD-MCNC: 159 MG/DL (ref 70–99)
GLUCOSE BLD-MCNC: 174 MG/DL (ref 70–99)
GLUCOSE BLD-MCNC: 174 MG/DL (ref 70–99)
GLUCOSE BLD-MCNC: 176 MG/DL (ref 70–99)
GLUCOSE BLD-MCNC: 177 MG/DL (ref 70–99)
GLUCOSE BLD-MCNC: 186 MG/DL (ref 70–99)
GLUCOSE BLD-MCNC: 187 MG/DL (ref 70–99)
GLUCOSE BLD-MCNC: 188 MG/DL (ref 70–99)
GLUCOSE BLDC GLUCOMTR-MCNC: 120 MG/DL (ref 70–99)
GLUCOSE BLDC GLUCOMTR-MCNC: 130 MG/DL (ref 70–99)
GLUCOSE BLDC GLUCOMTR-MCNC: 132 MG/DL (ref 70–99)
GLUCOSE BLDC GLUCOMTR-MCNC: 143 MG/DL (ref 70–99)
GLUCOSE BLDC GLUCOMTR-MCNC: 154 MG/DL (ref 70–99)
GLUCOSE BLDC GLUCOMTR-MCNC: 163 MG/DL (ref 70–99)
GLUCOSE BLDC GLUCOMTR-MCNC: 185 MG/DL (ref 70–99)
GLUCOSE BLDC GLUCOMTR-MCNC: 197 MG/DL (ref 70–99)
GLUCOSE BLDC GLUCOMTR-MCNC: 204 MG/DL (ref 70–99)
GLUCOSE BLDC GLUCOMTR-MCNC: 216 MG/DL (ref 70–99)
GLUCOSE BLDC GLUCOMTR-MCNC: 87 MG/DL (ref 70–99)
HBV SURFACE AG SERPL QL IA: NONREACTIVE
HCO3 BLD-SCNC: 21 MMOL/L (ref 21–28)
HCO3 BLDA-SCNC: 20 MMOL/L (ref 21–28)
HCO3 BLDA-SCNC: 21 MMOL/L (ref 21–28)
HCO3 BLDA-SCNC: 22 MMOL/L (ref 21–28)
HCO3 BLDA-SCNC: 22 MMOL/L (ref 21–28)
HCO3 BLDA-SCNC: 23 MMOL/L (ref 21–28)
HCO3 BLDA-SCNC: 23 MMOL/L (ref 21–28)
HCO3 BLDA-SCNC: 25 MMOL/L (ref 21–28)
HCO3 BLDV-SCNC: 24 MMOL/L (ref 21–28)
HCT VFR BLD AUTO: 23 % (ref 35–47)
HCT VFR BLD AUTO: 24.1 % (ref 35–47)
HCT VFR BLD AUTO: 26.7 % (ref 35–47)
HCT VFR BLD AUTO: 30.7 % (ref 35–47)
HGB BLD-MCNC: 6.4 G/DL (ref 11.7–15.7)
HGB BLD-MCNC: 6.6 G/DL (ref 11.7–15.7)
HGB BLD-MCNC: 7.1 G/DL (ref 11.7–15.7)
HGB BLD-MCNC: 7.2 G/DL (ref 11.7–15.7)
HGB BLD-MCNC: 7.2 G/DL (ref 11.7–15.7)
HGB BLD-MCNC: 7.3 G/DL (ref 11.7–15.7)
HGB BLD-MCNC: 7.7 G/DL (ref 11.7–15.7)
HGB BLD-MCNC: 7.9 G/DL (ref 11.7–15.7)
HGB BLD-MCNC: 7.9 G/DL (ref 11.7–15.7)
HGB BLD-MCNC: 8.2 G/DL (ref 11.7–15.7)
HGB BLD-MCNC: 8.4 G/DL (ref 11.7–15.7)
HGB BLD-MCNC: 8.4 G/DL (ref 11.7–15.7)
HGB BLD-MCNC: 8.6 G/DL (ref 11.7–15.7)
HGB BLD-MCNC: 8.9 G/DL (ref 11.7–15.7)
HGB BLD-MCNC: 9.9 G/DL (ref 11.7–15.7)
HIV 1+2 AB+HIV1 P24 AG SERPL QL IA: NONREACTIVE
HIV 1+2 AB+HIV1P24 AG SERPLBLD IA.RAPID: NON REACTIVE
HIV 1+2 AB+HIV1P24 AG SERPLBLD IA.RAPID: NON REACTIVE
HIV INTERPRETATION: NORMAL
HOLD SPECIMEN: NORMAL
HOLD SPECIMEN: NORMAL
INR PPP: 1.21 (ref 0.85–1.15)
INR PPP: 1.34 (ref 0.85–1.15)
INR PPP: 1.54 (ref 0.85–1.15)
INTERPRETATION ECG - MUSE: NORMAL
ISSUE DATE AND TIME: NORMAL
LACTATE BLD-SCNC: 1.1 MMOL/L
LACTATE BLD-SCNC: 1.5 MMOL/L
LACTATE BLD-SCNC: 1.6 MMOL/L
LACTATE BLD-SCNC: 1.8 MMOL/L
LACTATE BLD-SCNC: 2.1 MMOL/L
LACTATE BLD-SCNC: 2.3 MMOL/L
LACTATE BLD-SCNC: 2.7 MMOL/L
LACTATE BLD-SCNC: 3.8 MMOL/L
LACTATE BLD-SCNC: 4.6 MMOL/L
LACTATE SERPL-SCNC: 5.2 MMOL/L (ref 0.7–2)
MAGNESIUM SERPL-MCNC: 2.7 MG/DL (ref 1.6–2.3)
MAGNESIUM SERPL-MCNC: 3.4 MG/DL (ref 1.8–2.6)
MCF BLD TEG: 57.1 MM (ref 50–70)
MCF BLD TEG: 76.4 MM (ref 50–70)
MCF BLD TEG: 78.6 MM (ref 50–70)
MCF P HPASE BLD TEG: 71 MM (ref 50–70)
MCF P HPASE BLD TEG: 76.1 MM (ref 50–70)
MCH RBC QN AUTO: 31.6 PG (ref 26.5–33)
MCH RBC QN AUTO: 32.2 PG (ref 26.5–33)
MCH RBC QN AUTO: 32.6 PG (ref 26.5–33)
MCH RBC QN AUTO: 32.8 PG (ref 26.5–33)
MCHC RBC AUTO-ENTMCNC: 32.2 G/DL (ref 31.5–36.5)
MCHC RBC AUTO-ENTMCNC: 32.2 G/DL (ref 31.5–36.5)
MCHC RBC AUTO-ENTMCNC: 32.8 G/DL (ref 31.5–36.5)
MCHC RBC AUTO-ENTMCNC: 33.5 G/DL (ref 31.5–36.5)
MCV RBC AUTO: 101 FL (ref 78–100)
MCV RBC AUTO: 102 FL (ref 78–100)
MCV RBC AUTO: 94 FL (ref 78–100)
MCV RBC AUTO: 98 FL (ref 78–100)
O2/TOTAL GAS SETTING VFR VENT: 100 %
O2/TOTAL GAS SETTING VFR VENT: 70 %
O2/TOTAL GAS SETTING VFR VENT: 80 %
O2/TOTAL GAS SETTING VFR VENT: 90 %
OXYHGB MFR BLD: 98 % (ref 92–100)
OXYHGB MFR BLDA: 97 % (ref 92–100)
OXYHGB MFR BLDV: 74 % (ref 92–100)
P AXIS - MUSE: 42 DEGREES
PCO2 BLD: 34 MM HG (ref 35–45)
PCO2 BLDA: 31 MM HG (ref 35–45)
PCO2 BLDA: 34 MM HG (ref 35–45)
PCO2 BLDA: 36 MM HG (ref 35–45)
PCO2 BLDA: 37 MM HG (ref 35–45)
PCO2 BLDA: 38 MM HG (ref 35–45)
PCO2 BLDA: 42 MM HG (ref 35–45)
PCO2 BLDA: 42 MM HG (ref 35–45)
PCO2 BLDA: 44 MM HG (ref 35–45)
PCO2 BLDA: 46 MM HG (ref 35–45)
PCO2 BLDA: 50 MM HG (ref 35–45)
PCO2 BLDV: 40 MM HG (ref 40–50)
PH BLD: 7.4 [PH] (ref 7.35–7.45)
PH BLDA: 7.27 [PH] (ref 7.35–7.45)
PH BLDA: 7.3 [PH] (ref 7.35–7.45)
PH BLDA: 7.32 [PH] (ref 7.35–7.45)
PH BLDA: 7.33 [PH] (ref 7.35–7.45)
PH BLDA: 7.34 [PH] (ref 7.35–7.45)
PH BLDA: 7.34 [PH] (ref 7.35–7.45)
PH BLDA: 7.37 [PH] (ref 7.35–7.45)
PH BLDA: 7.38 [PH] (ref 7.35–7.45)
PH BLDA: 7.39 [PH] (ref 7.35–7.45)
PH BLDA: 7.46 [PH] (ref 7.35–7.45)
PH BLDV: 7.38 [PH] (ref 7.32–7.43)
PHOSPHATE SERPL-MCNC: 0.8 MG/DL (ref 2.5–4.5)
PLATELET # BLD AUTO: 140 10E3/UL (ref 150–450)
PLATELET # BLD AUTO: 228 10E3/UL (ref 150–450)
PLATELET # BLD AUTO: 234 10E3/UL (ref 150–450)
PLATELET # BLD AUTO: 298 10E3/UL (ref 150–450)
PO2 BLD: 244 MM HG (ref 80–105)
PO2 BLDA: 223 MM HG (ref 80–105)
PO2 BLDA: 335 MM HG (ref 80–105)
PO2 BLDA: 352 MM HG (ref 80–105)
PO2 BLDA: 374 MM HG (ref 80–105)
PO2 BLDA: 390 MM HG (ref 80–105)
PO2 BLDA: 392 MM HG (ref 80–105)
PO2 BLDA: 395 MM HG (ref 80–105)
PO2 BLDA: 400 MM HG (ref 80–105)
PO2 BLDA: 487 MM HG (ref 80–105)
PO2 BLDA: 487 MM HG (ref 80–105)
PO2 BLDV: 43 MM HG (ref 25–47)
POTASSIUM BLD-SCNC: 3 MMOL/L (ref 3.5–5)
POTASSIUM BLD-SCNC: 3.3 MMOL/L (ref 3.4–5.3)
POTASSIUM BLD-SCNC: 3.3 MMOL/L (ref 3.5–5)
POTASSIUM BLD-SCNC: 3.3 MMOL/L (ref 3.5–5)
POTASSIUM BLD-SCNC: 3.4 MMOL/L (ref 3.5–5)
POTASSIUM BLD-SCNC: 3.6 MMOL/L (ref 3.5–5)
POTASSIUM BLD-SCNC: 3.7 MMOL/L (ref 3.4–5.3)
POTASSIUM BLD-SCNC: 3.8 MMOL/L (ref 3.5–5)
POTASSIUM BLD-SCNC: 4.6 MMOL/L (ref 3.5–5)
POTASSIUM BLD-SCNC: 4.7 MMOL/L (ref 3.5–5)
POTASSIUM BLD-SCNC: 4.9 MMOL/L (ref 3.5–5)
POTASSIUM BLD-SCNC: 5 MMOL/L (ref 3.5–5)
POTASSIUM BLD-SCNC: 5.3 MMOL/L (ref 3.5–5)
PR INTERVAL - MUSE: 160 MS
PROT SERPL-MCNC: 4.9 G/DL (ref 6.8–8.8)
PROT SERPL-MCNC: 6.9 G/DL (ref 6.8–8.8)
QRS DURATION - MUSE: 92 MS
QT - MUSE: 450 MS
QTC - MUSE: 506 MS
R AXIS - MUSE: -9 DEGREES
RBC # BLD AUTO: 2.44 10E6/UL (ref 3.8–5.2)
RBC # BLD AUTO: 2.45 10E6/UL (ref 3.8–5.2)
RBC # BLD AUTO: 2.64 10E6/UL (ref 3.8–5.2)
RBC # BLD AUTO: 3.02 10E6/UL (ref 3.8–5.2)
SODIUM BLD-SCNC: 134 MMOL/L (ref 133–144)
SODIUM BLD-SCNC: 135 MMOL/L (ref 133–144)
SODIUM BLD-SCNC: 136 MMOL/L (ref 133–144)
SODIUM BLD-SCNC: 136 MMOL/L (ref 133–144)
SODIUM BLD-SCNC: 137 MMOL/L (ref 133–144)
SODIUM BLD-SCNC: 137 MMOL/L (ref 133–144)
SODIUM BLD-SCNC: 138 MMOL/L (ref 133–144)
SODIUM BLD-SCNC: 139 MMOL/L (ref 133–144)
SODIUM BLD-SCNC: 139 MMOL/L (ref 133–144)
SODIUM SERPL-SCNC: 135 MMOL/L (ref 133–144)
SODIUM SERPL-SCNC: 141 MMOL/L (ref 133–144)
SYSTOLIC BLOOD PRESSURE - MUSE: NORMAL MMHG
T AXIS - MUSE: 37 DEGREES
UNIT ABO/RH: NORMAL
UNIT NUMBER: NORMAL
UNIT STATUS: NORMAL
UNIT TYPE ISBT: 5100
VENTRICULAR RATE- MUSE: 76 BPM
WBC # BLD AUTO: 11.7 10E3/UL (ref 4–11)
WBC # BLD AUTO: 15.1 10E3/UL (ref 4–11)
WBC # BLD AUTO: 7.9 10E3/UL (ref 4–11)
WBC # BLD AUTO: 9.5 10E3/UL (ref 4–11)

## 2022-02-04 PROCEDURE — 82810 BLOOD GASES O2 SAT ONLY: CPT

## 2022-02-04 PROCEDURE — 250N000011 HC RX IP 250 OP 636: Performed by: STUDENT IN AN ORGANIZED HEALTH CARE EDUCATION/TRAINING PROGRAM

## 2022-02-04 PROCEDURE — 250N000009 HC RX 250

## 2022-02-04 PROCEDURE — 84450 TRANSFERASE (AST) (SGOT): CPT | Performed by: SURGERY

## 2022-02-04 PROCEDURE — 99291 CRITICAL CARE FIRST HOUR: CPT | Mod: 24 | Performed by: ANESTHESIOLOGY

## 2022-02-04 PROCEDURE — 85396 CLOTTING ASSAY WHOLE BLOOD: CPT | Performed by: PHYSICIAN ASSISTANT

## 2022-02-04 PROCEDURE — P9016 RBC LEUKOCYTES REDUCED: HCPCS | Performed by: INTERNAL MEDICINE

## 2022-02-04 PROCEDURE — 84132 ASSAY OF SERUM POTASSIUM: CPT

## 2022-02-04 PROCEDURE — 36415 COLL VENOUS BLD VENIPUNCTURE: CPT | Performed by: INTERNAL MEDICINE

## 2022-02-04 PROCEDURE — 82330 ASSAY OF CALCIUM: CPT | Performed by: SURGERY

## 2022-02-04 PROCEDURE — 999N000104 HIV RAPID ANTIBODY SCREEN: Performed by: INTERNAL MEDICINE

## 2022-02-04 PROCEDURE — C1781 MESH (IMPLANTABLE): HCPCS | Performed by: THORACIC SURGERY (CARDIOTHORACIC VASCULAR SURGERY)

## 2022-02-04 PROCEDURE — 272N000002 HC OR SUPPLY OTHER OPNP: Performed by: THORACIC SURGERY (CARDIOTHORACIC VASCULAR SURGERY)

## 2022-02-04 PROCEDURE — 272N000088 HC PUMP APP ADULT PERFUSION: Performed by: THORACIC SURGERY (CARDIOTHORACIC VASCULAR SURGERY)

## 2022-02-04 PROCEDURE — 85396 CLOTTING ASSAY WHOLE BLOOD: CPT | Performed by: INTERNAL MEDICINE

## 2022-02-04 PROCEDURE — 85384 FIBRINOGEN ACTIVITY: CPT | Performed by: THORACIC SURGERY (CARDIOTHORACIC VASCULAR SURGERY)

## 2022-02-04 PROCEDURE — 85018 HEMOGLOBIN: CPT | Performed by: SURGERY

## 2022-02-04 PROCEDURE — 93010 ELECTROCARDIOGRAM REPORT: CPT | Mod: 59 | Performed by: INTERNAL MEDICINE

## 2022-02-04 PROCEDURE — 83605 ASSAY OF LACTIC ACID: CPT

## 2022-02-04 PROCEDURE — 93005 ELECTROCARDIOGRAM TRACING: CPT

## 2022-02-04 PROCEDURE — 250N000009 HC RX 250: Performed by: PHYSICIAN ASSISTANT

## 2022-02-04 PROCEDURE — 999N000157 HC STATISTIC RCP TIME EA 10 MIN

## 2022-02-04 PROCEDURE — 410N000004: Performed by: THORACIC SURGERY (CARDIOTHORACIC VASCULAR SURGERY)

## 2022-02-04 PROCEDURE — 272N000001 HC OR GENERAL SUPPLY STERILE: Performed by: THORACIC SURGERY (CARDIOTHORACIC VASCULAR SURGERY)

## 2022-02-04 PROCEDURE — 88300 SURGICAL PATH GROSS: CPT | Mod: TC | Performed by: THORACIC SURGERY (CARDIOTHORACIC VASCULAR SURGERY)

## 2022-02-04 PROCEDURE — 410N000003 HC PER-PERFUSION 1ST 30 MIN: Performed by: THORACIC SURGERY (CARDIOTHORACIC VASCULAR SURGERY)

## 2022-02-04 PROCEDURE — 82805 BLOOD GASES W/O2 SATURATION: CPT | Performed by: SURGERY

## 2022-02-04 PROCEDURE — 02UM08Z SUPPLEMENT VENTRICULAR SEPTUM WITH ZOOPLASTIC TISSUE, OPEN APPROACH: ICD-10-PCS | Performed by: THORACIC SURGERY (CARDIOTHORACIC VASCULAR SURGERY)

## 2022-02-04 PROCEDURE — 258N000003 HC RX IP 258 OP 636: Performed by: STUDENT IN AN ORGANIZED HEALTH CARE EDUCATION/TRAINING PROGRAM

## 2022-02-04 PROCEDURE — 360N000079 HC SURGERY LEVEL 6, PER MIN: Performed by: THORACIC SURGERY (CARDIOTHORACIC VASCULAR SURGERY)

## 2022-02-04 PROCEDURE — 258N000003 HC RX IP 258 OP 636: Performed by: PHYSICIAN ASSISTANT

## 2022-02-04 PROCEDURE — 85018 HEMOGLOBIN: CPT

## 2022-02-04 PROCEDURE — 85027 COMPLETE CBC AUTOMATED: CPT | Performed by: NURSE PRACTITIONER

## 2022-02-04 PROCEDURE — 84132 ASSAY OF SERUM POTASSIUM: CPT | Performed by: SURGERY

## 2022-02-04 PROCEDURE — 200N000002 HC R&B ICU UMMC

## 2022-02-04 PROCEDURE — 88300 SURGICAL PATH GROSS: CPT | Mod: 26 | Performed by: PATHOLOGY

## 2022-02-04 PROCEDURE — 94002 VENT MGMT INPAT INIT DAY: CPT

## 2022-02-04 PROCEDURE — 250N000009 HC RX 250: Performed by: STUDENT IN AN ORGANIZED HEALTH CARE EDUCATION/TRAINING PROGRAM

## 2022-02-04 PROCEDURE — 82330 ASSAY OF CALCIUM: CPT

## 2022-02-04 PROCEDURE — 250N000009 HC RX 250: Performed by: SURGERY

## 2022-02-04 PROCEDURE — 71045 X-RAY EXAM CHEST 1 VIEW: CPT | Mod: 26 | Performed by: RADIOLOGY

## 2022-02-04 PROCEDURE — 85610 PROTHROMBIN TIME: CPT | Performed by: INTERNAL MEDICINE

## 2022-02-04 PROCEDURE — 999N000141 HC STATISTIC PRE-PROCEDURE NURSING ASSESSMENT: Performed by: THORACIC SURGERY (CARDIOTHORACIC VASCULAR SURGERY)

## 2022-02-04 PROCEDURE — 84100 ASSAY OF PHOSPHORUS: CPT | Performed by: STUDENT IN AN ORGANIZED HEALTH CARE EDUCATION/TRAINING PROGRAM

## 2022-02-04 PROCEDURE — 250N000011 HC RX IP 250 OP 636

## 2022-02-04 PROCEDURE — 83605 ASSAY OF LACTIC ACID: CPT | Performed by: SURGERY

## 2022-02-04 PROCEDURE — 250N000009 HC RX 250: Performed by: THORACIC SURGERY (CARDIOTHORACIC VASCULAR SURGERY)

## 2022-02-04 PROCEDURE — 82805 BLOOD GASES W/O2 SATURATION: CPT

## 2022-02-04 PROCEDURE — 85027 COMPLETE CBC AUTOMATED: CPT | Performed by: INTERNAL MEDICINE

## 2022-02-04 PROCEDURE — 250N000011 HC RX IP 250 OP 636: Performed by: PHYSICIAN ASSISTANT

## 2022-02-04 PROCEDURE — 250N000011 HC RX IP 250 OP 636: Performed by: THORACIC SURGERY (CARDIOTHORACIC VASCULAR SURGERY)

## 2022-02-04 PROCEDURE — P9037 PLATE PHERES LEUKOREDU IRRAD: HCPCS | Performed by: INTERNAL MEDICINE

## 2022-02-04 PROCEDURE — 85730 THROMBOPLASTIN TIME PARTIAL: CPT | Performed by: SURGERY

## 2022-02-04 PROCEDURE — 83735 ASSAY OF MAGNESIUM: CPT | Performed by: SURGERY

## 2022-02-04 PROCEDURE — 250N000024 HC ISOFLURANE, PER MIN: Performed by: THORACIC SURGERY (CARDIOTHORACIC VASCULAR SURGERY)

## 2022-02-04 PROCEDURE — 84100 ASSAY OF PHOSPHORUS: CPT | Performed by: SURGERY

## 2022-02-04 PROCEDURE — 250N000013 HC RX MED GY IP 250 OP 250 PS 637: Performed by: PHYSICIAN ASSISTANT

## 2022-02-04 PROCEDURE — 80053 COMPREHEN METABOLIC PANEL: CPT | Performed by: NURSE PRACTITIONER

## 2022-02-04 PROCEDURE — C1763 CONN TISS, NON-HUMAN: HCPCS | Performed by: THORACIC SURGERY (CARDIOTHORACIC VASCULAR SURGERY)

## 2022-02-04 PROCEDURE — 5A1221Z PERFORMANCE OF CARDIAC OUTPUT, CONTINUOUS: ICD-10-PCS | Performed by: THORACIC SURGERY (CARDIOTHORACIC VASCULAR SURGERY)

## 2022-02-04 PROCEDURE — 85610 PROTHROMBIN TIME: CPT | Performed by: SURGERY

## 2022-02-04 PROCEDURE — 999N000015 HC STATISTIC ARTERIAL MONITORING DAILY

## 2022-02-04 PROCEDURE — 272N000085 HC PACK CELL SAVER CSP: Performed by: THORACIC SURGERY (CARDIOTHORACIC VASCULAR SURGERY)

## 2022-02-04 PROCEDURE — 85730 THROMBOPLASTIN TIME PARTIAL: CPT | Performed by: INTERNAL MEDICINE

## 2022-02-04 PROCEDURE — 84295 ASSAY OF SERUM SODIUM: CPT

## 2022-02-04 PROCEDURE — 250N000015 HC RX FACTOR IP MED 636 OP 636: Performed by: INTERNAL MEDICINE

## 2022-02-04 PROCEDURE — 82803 BLOOD GASES ANY COMBINATION: CPT

## 2022-02-04 PROCEDURE — 370N000017 HC ANESTHESIA TECHNICAL FEE, PER MIN: Performed by: THORACIC SURGERY (CARDIOTHORACIC VASCULAR SURGERY)

## 2022-02-04 PROCEDURE — 33681 CLOSURE 1 VSD W/WO PATCH: CPT | Performed by: THORACIC SURGERY (CARDIOTHORACIC VASCULAR SURGERY)

## 2022-02-04 PROCEDURE — 250N000011 HC RX IP 250 OP 636: Performed by: SURGERY

## 2022-02-04 PROCEDURE — 85384 FIBRINOGEN ACTIVITY: CPT | Performed by: INTERNAL MEDICINE

## 2022-02-04 PROCEDURE — P9041 ALBUMIN (HUMAN),5%, 50ML: HCPCS

## 2022-02-04 PROCEDURE — 83735 ASSAY OF MAGNESIUM: CPT | Performed by: STUDENT IN AN ORGANIZED HEALTH CARE EDUCATION/TRAINING PROGRAM

## 2022-02-04 PROCEDURE — 999N000065 XR CHEST PORT 1 VIEW

## 2022-02-04 DEVICE — GRAFT PTFE FELT 6X6" 007837: Type: IMPLANTABLE DEVICE | Site: HEART | Status: FUNCTIONAL

## 2022-02-04 DEVICE — IMP PATCH PERICARDIUM PHOTOFIX BOVINE 8X14CM PFP8X14: Type: IMPLANTABLE DEVICE | Site: HEART | Status: FUNCTIONAL

## 2022-02-04 RX ORDER — ASPIRIN 81 MG/1
162 TABLET, CHEWABLE ORAL
Status: DISCONTINUED | OUTPATIENT
Start: 2022-02-04 | End: 2022-02-04 | Stop reason: HOSPADM

## 2022-02-04 RX ORDER — DEXTROSE MONOHYDRATE 25 G/50ML
25-50 INJECTION, SOLUTION INTRAVENOUS
Status: DISCONTINUED | OUTPATIENT
Start: 2022-02-04 | End: 2022-02-06

## 2022-02-04 RX ORDER — BISACODYL 10 MG
10 SUPPOSITORY, RECTAL RECTAL DAILY PRN
Status: DISCONTINUED | OUTPATIENT
Start: 2022-02-04 | End: 2022-02-10 | Stop reason: HOSPADM

## 2022-02-04 RX ORDER — ONDANSETRON 4 MG/1
4 TABLET, ORALLY DISINTEGRATING ORAL EVERY 6 HOURS PRN
Status: DISCONTINUED | OUTPATIENT
Start: 2022-02-04 | End: 2022-02-10 | Stop reason: HOSPADM

## 2022-02-04 RX ORDER — HYDROMORPHONE HYDROCHLORIDE 2 MG/1
2 TABLET ORAL EVERY 4 HOURS PRN
Status: DISCONTINUED | OUTPATIENT
Start: 2022-02-04 | End: 2022-02-06

## 2022-02-04 RX ORDER — DEXMEDETOMIDINE HYDROCHLORIDE 4 UG/ML
.2-.7 INJECTION, SOLUTION INTRAVENOUS CONTINUOUS
Status: DISCONTINUED | OUTPATIENT
Start: 2022-02-04 | End: 2022-02-06

## 2022-02-04 RX ORDER — POLYETHYLENE GLYCOL 3350 17 G/17G
17 POWDER, FOR SOLUTION ORAL DAILY
Status: DISCONTINUED | OUTPATIENT
Start: 2022-02-05 | End: 2022-02-10 | Stop reason: HOSPADM

## 2022-02-04 RX ORDER — FIBRINOGEN (HUMAN) 700-1300MG
1100 KIT INTRAVENOUS ONCE
Status: DISCONTINUED | OUTPATIENT
Start: 2022-02-04 | End: 2022-02-04

## 2022-02-04 RX ORDER — DEXTROSE MONOHYDRATE 25 G/50ML
25-50 INJECTION, SOLUTION INTRAVENOUS
Status: DISCONTINUED | OUTPATIENT
Start: 2022-02-04 | End: 2022-02-04 | Stop reason: HOSPADM

## 2022-02-04 RX ORDER — MILRINONE LACTATE 0.2 MG/ML
0.12 INJECTION, SOLUTION INTRAVENOUS CONTINUOUS
Status: DISCONTINUED | OUTPATIENT
Start: 2022-02-04 | End: 2022-02-04 | Stop reason: HOSPADM

## 2022-02-04 RX ORDER — PANTOPRAZOLE SODIUM 40 MG/1
40 TABLET, DELAYED RELEASE ORAL DAILY
Status: DISCONTINUED | OUTPATIENT
Start: 2022-02-04 | End: 2022-02-04

## 2022-02-04 RX ORDER — ASPIRIN 81 MG/1
81 TABLET, CHEWABLE ORAL DAILY
Status: DISCONTINUED | OUTPATIENT
Start: 2022-02-05 | End: 2022-02-10 | Stop reason: HOSPADM

## 2022-02-04 RX ORDER — LIDOCAINE HYDROCHLORIDE 20 MG/ML
INJECTION, SOLUTION INFILTRATION; PERINEURAL PRN
Status: DISCONTINUED | OUTPATIENT
Start: 2022-02-04 | End: 2022-02-04

## 2022-02-04 RX ORDER — PANTOPRAZOLE SODIUM 40 MG/1
40 TABLET, DELAYED RELEASE ORAL DAILY
Status: DISCONTINUED | OUTPATIENT
Start: 2022-02-05 | End: 2022-02-10 | Stop reason: HOSPADM

## 2022-02-04 RX ORDER — ACETAMINOPHEN 325 MG/1
650 TABLET ORAL EVERY 4 HOURS PRN
Status: DISCONTINUED | OUTPATIENT
Start: 2022-02-07 | End: 2022-02-07

## 2022-02-04 RX ORDER — ACETAMINOPHEN 325 MG/1
975 TABLET ORAL ONCE
Status: COMPLETED | OUTPATIENT
Start: 2022-02-04 | End: 2022-02-04

## 2022-02-04 RX ORDER — FAMOTIDINE 20 MG/1
20 TABLET, FILM COATED ORAL
Status: COMPLETED | OUTPATIENT
Start: 2022-02-04 | End: 2022-02-04

## 2022-02-04 RX ORDER — MUPIROCIN 20 MG/G
0.5 OINTMENT TOPICAL 2 TIMES DAILY
Status: CANCELLED | OUTPATIENT
Start: 2022-02-04 | End: 2022-02-09

## 2022-02-04 RX ORDER — PROPOFOL 10 MG/ML
INJECTION, EMULSION INTRAVENOUS PRN
Status: DISCONTINUED | OUTPATIENT
Start: 2022-02-04 | End: 2022-02-04

## 2022-02-04 RX ORDER — CEFAZOLIN SODIUM 1 G/3ML
1 INJECTION, POWDER, FOR SOLUTION INTRAMUSCULAR; INTRAVENOUS EVERY 8 HOURS
Status: CANCELLED | OUTPATIENT
Start: 2022-02-04 | End: 2022-02-05

## 2022-02-04 RX ORDER — POTASSIUM CHLORIDE 7.45 MG/ML
10 INJECTION INTRAVENOUS
Status: COMPLETED | OUTPATIENT
Start: 2022-02-04 | End: 2022-02-04

## 2022-02-04 RX ORDER — ACETAMINOPHEN 325 MG/1
975 TABLET ORAL EVERY 8 HOURS
Status: DISPENSED | OUTPATIENT
Start: 2022-02-04 | End: 2022-02-07

## 2022-02-04 RX ORDER — HYDROMORPHONE HCL IN WATER/PF 6 MG/30 ML
0.2 PATIENT CONTROLLED ANALGESIA SYRINGE INTRAVENOUS
Status: DISCONTINUED | OUTPATIENT
Start: 2022-02-04 | End: 2022-02-10 | Stop reason: HOSPADM

## 2022-02-04 RX ORDER — GABAPENTIN 100 MG/1
100 CAPSULE ORAL
Status: COMPLETED | OUTPATIENT
Start: 2022-02-04 | End: 2022-02-04

## 2022-02-04 RX ORDER — NICOTINE POLACRILEX 4 MG
15-30 LOZENGE BUCCAL
Status: DISCONTINUED | OUTPATIENT
Start: 2022-02-04 | End: 2022-02-04 | Stop reason: HOSPADM

## 2022-02-04 RX ORDER — SODIUM CHLORIDE, SODIUM GLUCONATE, SODIUM ACETATE, POTASSIUM CHLORIDE AND MAGNESIUM CHLORIDE 526; 502; 368; 37; 30 MG/100ML; MG/100ML; MG/100ML; MG/100ML; MG/100ML
INJECTION, SOLUTION INTRAVENOUS CONTINUOUS PRN
Status: DISCONTINUED | OUTPATIENT
Start: 2022-02-04 | End: 2022-02-04

## 2022-02-04 RX ORDER — NICOTINE POLACRILEX 4 MG
15-30 LOZENGE BUCCAL
Status: DISCONTINUED | OUTPATIENT
Start: 2022-02-04 | End: 2022-02-06

## 2022-02-04 RX ORDER — HEPARIN SODIUM 1000 [USP'U]/ML
INJECTION, SOLUTION INTRAVENOUS; SUBCUTANEOUS PRN
Status: DISCONTINUED | OUTPATIENT
Start: 2022-02-04 | End: 2022-02-04

## 2022-02-04 RX ORDER — CALCIUM GLUCONATE 20 MG/ML
2 INJECTION, SOLUTION INTRAVENOUS
Status: ACTIVE | OUTPATIENT
Start: 2022-02-04 | End: 2022-02-07

## 2022-02-04 RX ORDER — ONDANSETRON 2 MG/ML
4 INJECTION INTRAMUSCULAR; INTRAVENOUS EVERY 6 HOURS PRN
Status: DISCONTINUED | OUTPATIENT
Start: 2022-02-04 | End: 2022-02-10 | Stop reason: HOSPADM

## 2022-02-04 RX ORDER — DEXTROSE MONOHYDRATE, SODIUM CHLORIDE, AND POTASSIUM CHLORIDE 50; 1.49; 4.5 G/1000ML; G/1000ML; G/1000ML
INJECTION, SOLUTION INTRAVENOUS CONTINUOUS
Status: CANCELLED | OUTPATIENT
Start: 2022-02-04

## 2022-02-04 RX ORDER — PROPOFOL 10 MG/ML
INJECTION, EMULSION INTRAVENOUS CONTINUOUS PRN
Status: DISCONTINUED | OUTPATIENT
Start: 2022-02-04 | End: 2022-02-04

## 2022-02-04 RX ORDER — NOREPINEPHRINE BITARTRATE 0.06 MG/ML
.01-.6 INJECTION, SOLUTION INTRAVENOUS CONTINUOUS
Status: DISCONTINUED | OUTPATIENT
Start: 2022-02-04 | End: 2022-02-05

## 2022-02-04 RX ORDER — CEFAZOLIN SODIUM 1 G/50ML
2 SOLUTION INTRAVENOUS
Status: COMPLETED | OUTPATIENT
Start: 2022-02-04 | End: 2022-02-04

## 2022-02-04 RX ORDER — CEFAZOLIN SODIUM 1 G/50ML
2 SOLUTION INTRAVENOUS SEE ADMIN INSTRUCTIONS
Status: DISCONTINUED | OUTPATIENT
Start: 2022-02-04 | End: 2022-02-04 | Stop reason: HOSPADM

## 2022-02-04 RX ORDER — VANCOMYCIN HYDROCHLORIDE 1 G/200ML
1000 INJECTION, SOLUTION INTRAVENOUS
Status: COMPLETED | OUTPATIENT
Start: 2022-02-04 | End: 2022-02-04

## 2022-02-04 RX ORDER — ASPIRIN 81 MG/1
81 TABLET, CHEWABLE ORAL DAILY
Status: DISCONTINUED | OUTPATIENT
Start: 2022-02-05 | End: 2022-02-04

## 2022-02-04 RX ORDER — CHLORHEXIDINE GLUCONATE ORAL RINSE 1.2 MG/ML
10 SOLUTION DENTAL ONCE
Status: COMPLETED | OUTPATIENT
Start: 2022-02-04 | End: 2022-02-04

## 2022-02-04 RX ORDER — HEPARIN SODIUM 5000 [USP'U]/.5ML
5000 INJECTION, SOLUTION INTRAVENOUS; SUBCUTANEOUS EVERY 8 HOURS
Status: DISCONTINUED | OUTPATIENT
Start: 2022-02-05 | End: 2022-02-06

## 2022-02-04 RX ORDER — AMOXICILLIN 250 MG
1 CAPSULE ORAL 2 TIMES DAILY
Status: DISCONTINUED | OUTPATIENT
Start: 2022-02-04 | End: 2022-02-07

## 2022-02-04 RX ORDER — HYDRALAZINE HYDROCHLORIDE 20 MG/ML
10 INJECTION INTRAMUSCULAR; INTRAVENOUS EVERY 30 MIN PRN
Status: DISCONTINUED | OUTPATIENT
Start: 2022-02-04 | End: 2022-02-10 | Stop reason: HOSPADM

## 2022-02-04 RX ORDER — CALCIUM GLUCONATE 20 MG/ML
1 INJECTION, SOLUTION INTRAVENOUS
Status: ACTIVE | OUTPATIENT
Start: 2022-02-04 | End: 2022-02-07

## 2022-02-04 RX ORDER — PROPOFOL 10 MG/ML
5-75 INJECTION, EMULSION INTRAVENOUS CONTINUOUS
Status: DISCONTINUED | OUTPATIENT
Start: 2022-02-04 | End: 2022-02-05

## 2022-02-04 RX ORDER — ASPIRIN 81 MG/1
81 TABLET, CHEWABLE ORAL
Status: DISCONTINUED | OUTPATIENT
Start: 2022-02-04 | End: 2022-02-04 | Stop reason: HOSPADM

## 2022-02-04 RX ORDER — PROTAMINE SULFATE 10 MG/ML
INJECTION, SOLUTION INTRAVENOUS PRN
Status: DISCONTINUED | OUTPATIENT
Start: 2022-02-04 | End: 2022-02-04

## 2022-02-04 RX ORDER — SODIUM CHLORIDE, SODIUM LACTATE, POTASSIUM CHLORIDE, CALCIUM CHLORIDE 600; 310; 30; 20 MG/100ML; MG/100ML; MG/100ML; MG/100ML
1-3 INJECTION, SOLUTION INTRAVENOUS CONTINUOUS
Status: DISCONTINUED | OUTPATIENT
Start: 2022-02-04 | End: 2022-02-04 | Stop reason: HOSPADM

## 2022-02-04 RX ORDER — NICARDIPINE HYDROCHLORIDE 0.2 MG/ML
.5-15 INJECTION INTRAVENOUS CONTINUOUS
Status: DISCONTINUED | OUTPATIENT
Start: 2022-02-04 | End: 2022-02-04 | Stop reason: HOSPADM

## 2022-02-04 RX ORDER — LIDOCAINE 40 MG/G
CREAM TOPICAL
Status: DISCONTINUED | OUTPATIENT
Start: 2022-02-04 | End: 2022-02-04 | Stop reason: HOSPADM

## 2022-02-04 RX ORDER — DEXMEDETOMIDINE HYDROCHLORIDE 4 UG/ML
.1-1.2 INJECTION, SOLUTION INTRAVENOUS CONTINUOUS
Status: DISCONTINUED | OUTPATIENT
Start: 2022-02-04 | End: 2022-02-04 | Stop reason: HOSPADM

## 2022-02-04 RX ORDER — ALBUMIN, HUMAN INJ 5% 5 %
SOLUTION INTRAVENOUS
Status: COMPLETED
Start: 2022-02-04 | End: 2022-02-04

## 2022-02-04 RX ORDER — NOREPINEPHRINE BITARTRATE 0.06 MG/ML
.01-.6 INJECTION, SOLUTION INTRAVENOUS CONTINUOUS
Status: DISCONTINUED | OUTPATIENT
Start: 2022-02-04 | End: 2022-02-04 | Stop reason: HOSPADM

## 2022-02-04 RX ORDER — PROCHLORPERAZINE MALEATE 5 MG
5 TABLET ORAL EVERY 6 HOURS PRN
Status: DISCONTINUED | OUTPATIENT
Start: 2022-02-04 | End: 2022-02-10 | Stop reason: HOSPADM

## 2022-02-04 RX ORDER — CEFAZOLIN SODIUM 1 G/3ML
1 INJECTION, POWDER, FOR SOLUTION INTRAMUSCULAR; INTRAVENOUS EVERY 12 HOURS
Status: COMPLETED | OUTPATIENT
Start: 2022-02-04 | End: 2022-02-05

## 2022-02-04 RX ORDER — SODIUM CHLORIDE, SODIUM GLUCONATE, SODIUM ACETATE, POTASSIUM CHLORIDE AND MAGNESIUM CHLORIDE 526; 502; 368; 37; 30 MG/100ML; MG/100ML; MG/100ML; MG/100ML; MG/100ML
1-3 INJECTION, SOLUTION INTRAVENOUS CONTINUOUS
Status: DISCONTINUED | OUTPATIENT
Start: 2022-02-04 | End: 2022-02-04 | Stop reason: HOSPADM

## 2022-02-04 RX ORDER — LIDOCAINE 40 MG/G
CREAM TOPICAL
Status: DISCONTINUED | OUTPATIENT
Start: 2022-02-04 | End: 2022-02-10 | Stop reason: HOSPADM

## 2022-02-04 RX ORDER — MUPIROCIN 20 MG/G
0.5 OINTMENT TOPICAL 2 TIMES DAILY
Status: DISPENSED | OUTPATIENT
Start: 2022-02-04 | End: 2022-02-09

## 2022-02-04 RX ORDER — SODIUM CHLORIDE, SODIUM GLUCONATE, SODIUM ACETATE, POTASSIUM CHLORIDE AND MAGNESIUM CHLORIDE 526; 502; 368; 37; 30 MG/100ML; MG/100ML; MG/100ML; MG/100ML; MG/100ML
250 INJECTION, SOLUTION INTRAVENOUS EVERY 10 MIN PRN
Status: DISCONTINUED | OUTPATIENT
Start: 2022-02-04 | End: 2022-02-07

## 2022-02-04 RX ORDER — FENTANYL CITRATE 50 UG/ML
INJECTION, SOLUTION INTRAMUSCULAR; INTRAVENOUS PRN
Status: DISCONTINUED | OUTPATIENT
Start: 2022-02-04 | End: 2022-02-04

## 2022-02-04 RX ADMIN — FAMOTIDINE 20 MG: 20 TABLET, FILM COATED ORAL at 06:43

## 2022-02-04 RX ADMIN — AMINOCAPROIC ACID 7.5 G: 250 INJECTION, SOLUTION INTRAVENOUS at 08:43

## 2022-02-04 RX ADMIN — FENTANYL CITRATE 250 MCG: 50 INJECTION, SOLUTION INTRAMUSCULAR; INTRAVENOUS at 09:06

## 2022-02-04 RX ADMIN — PROPOFOL 30 MCG/KG/MIN: 10 INJECTION, EMULSION INTRAVENOUS at 13:17

## 2022-02-04 RX ADMIN — DEXMEDETOMIDINE HYDROCHLORIDE 0.2 MCG/KG/HR: 400 INJECTION INTRAVENOUS at 17:11

## 2022-02-04 RX ADMIN — SODIUM CHLORIDE, SODIUM GLUCONATE, SODIUM ACETATE, POTASSIUM CHLORIDE AND MAGNESIUM CHLORIDE: 526; 502; 368; 37; 30 INJECTION, SOLUTION INTRAVENOUS at 07:46

## 2022-02-04 RX ADMIN — LIDOCAINE HYDROCHLORIDE 100 MG: 20 INJECTION, SOLUTION INFILTRATION; PERINEURAL at 11:25

## 2022-02-04 RX ADMIN — EPINEPHRINE 0.03 MCG/KG/MIN: 1 INJECTION PARENTERAL at 08:31

## 2022-02-04 RX ADMIN — ACETAMINOPHEN 975 MG: 325 TABLET, FILM COATED ORAL at 06:43

## 2022-02-04 RX ADMIN — PROTHROMBIN, COAGULATION FACTOR VII HUMAN, COAGULATION FACTOR IX HUMAN, COAGULATION FACTOR X HUMAN, PROTEIN C, PROTEIN S HUMAN, AND WATER 1120 UNITS: KIT at 11:54

## 2022-02-04 RX ADMIN — SODIUM CHLORIDE, SODIUM GLUCONATE, SODIUM ACETATE, POTASSIUM CHLORIDE AND MAGNESIUM CHLORIDE: 526; 502; 368; 37; 30 INJECTION, SOLUTION INTRAVENOUS at 08:12

## 2022-02-04 RX ADMIN — FENTANYL CITRATE 250 MCG: 50 INJECTION, SOLUTION INTRAMUSCULAR; INTRAVENOUS at 08:12

## 2022-02-04 RX ADMIN — PROTAMINE SULFATE 25 MG: 10 INJECTION, SOLUTION INTRAVENOUS at 11:46

## 2022-02-04 RX ADMIN — MILRINONE LACTATE IN DEXTROSE 0.28 MCG/KG/MIN: 200 INJECTION, SOLUTION INTRAVENOUS at 09:00

## 2022-02-04 RX ADMIN — GABAPENTIN 100 MG: 100 CAPSULE ORAL at 06:43

## 2022-02-04 RX ADMIN — HYDROMORPHONE HYDROCHLORIDE 0.2 MG: 0.2 INJECTION, SOLUTION INTRAMUSCULAR; INTRAVENOUS; SUBCUTANEOUS at 21:09

## 2022-02-04 RX ADMIN — MUPIROCIN 0.5 G: 20 OINTMENT TOPICAL at 20:17

## 2022-02-04 RX ADMIN — HUMAN INSULIN 2 UNITS/HR: 100 INJECTION, SOLUTION SUBCUTANEOUS at 09:27

## 2022-02-04 RX ADMIN — PROTAMINE SULFATE 50 MG: 10 INJECTION, SOLUTION INTRAVENOUS at 11:48

## 2022-02-04 RX ADMIN — HYDROMORPHONE HYDROCHLORIDE 0.5 MG: 1 INJECTION, SOLUTION INTRAMUSCULAR; INTRAVENOUS; SUBCUTANEOUS at 13:35

## 2022-02-04 RX ADMIN — SUGAMMADEX 200 MG: 100 INJECTION, SOLUTION INTRAVENOUS at 13:37

## 2022-02-04 RX ADMIN — PROPOFOL 30 MCG/KG/MIN: 10 INJECTION, EMULSION INTRAVENOUS at 21:28

## 2022-02-04 RX ADMIN — FENTANYL CITRATE 250 MCG: 50 INJECTION, SOLUTION INTRAMUSCULAR; INTRAVENOUS at 12:39

## 2022-02-04 RX ADMIN — HYDROMORPHONE HYDROCHLORIDE 0.2 MG: 0.2 INJECTION, SOLUTION INTRAMUSCULAR; INTRAVENOUS; SUBCUTANEOUS at 16:17

## 2022-02-04 RX ADMIN — NOREPINEPHRINE BITARTRATE 6.4 MCG: 1 INJECTION, SOLUTION, CONCENTRATE INTRAVENOUS at 08:12

## 2022-02-04 RX ADMIN — ALBUMIN HUMAN 12.5 G: 0.05 INJECTION, SOLUTION INTRAVENOUS at 15:15

## 2022-02-04 RX ADMIN — ROCURONIUM BROMIDE 50 MG: 50 INJECTION, SOLUTION INTRAVENOUS at 09:06

## 2022-02-04 RX ADMIN — HUMAN INSULIN 2 UNITS/HR: 100 INJECTION, SOLUTION SUBCUTANEOUS at 14:46

## 2022-02-04 RX ADMIN — Medication 2 G: at 11:45

## 2022-02-04 RX ADMIN — POTASSIUM CHLORIDE 10 MEQ: 7.46 INJECTION, SOLUTION INTRAVENOUS at 17:35

## 2022-02-04 RX ADMIN — VANCOMYCIN HYDROCHLORIDE 1000 MG: 1 INJECTION, SOLUTION INTRAVENOUS at 09:00

## 2022-02-04 RX ADMIN — POTASSIUM PHOSPHATE, MONOBASIC AND POTASSIUM PHOSPHATE, DIBASIC 15 MMOL: 224; 236 INJECTION, SOLUTION, CONCENTRATE INTRAVENOUS at 16:08

## 2022-02-04 RX ADMIN — Medication 2 G: at 08:45

## 2022-02-04 RX ADMIN — PROPOFOL 40 MG: 10 INJECTION, EMULSION INTRAVENOUS at 08:12

## 2022-02-04 RX ADMIN — FENTANYL CITRATE 250 MCG: 50 INJECTION, SOLUTION INTRAMUSCULAR; INTRAVENOUS at 11:38

## 2022-02-04 RX ADMIN — LIDOCAINE HYDROCHLORIDE 100 MG: 20 INJECTION, SOLUTION INFILTRATION; PERINEURAL at 08:12

## 2022-02-04 RX ADMIN — ROCURONIUM BROMIDE 50 MG: 50 INJECTION, SOLUTION INTRAVENOUS at 11:38

## 2022-02-04 RX ADMIN — NOREPINEPHRINE BITARTRATE 0.03 MCG/KG/MIN: 1 INJECTION, SOLUTION, CONCENTRATE INTRAVENOUS at 08:15

## 2022-02-04 RX ADMIN — PROTAMINE SULFATE 50 MG: 10 INJECTION, SOLUTION INTRAVENOUS at 11:47

## 2022-02-04 RX ADMIN — POTASSIUM CHLORIDE 10 MEQ: 7.46 INJECTION, SOLUTION INTRAVENOUS at 16:33

## 2022-02-04 RX ADMIN — CHLORHEXIDINE GLUCONATE 10 ML: 1.2 SOLUTION ORAL at 07:00

## 2022-02-04 RX ADMIN — PROPOFOL 30 MCG/KG/MIN: 10 INJECTION, EMULSION INTRAVENOUS at 15:35

## 2022-02-04 RX ADMIN — CEFAZOLIN 1 G: 1 INJECTION, POWDER, FOR SOLUTION INTRAMUSCULAR; INTRAVENOUS at 23:32

## 2022-02-04 RX ADMIN — ROCURONIUM BROMIDE 100 MG: 50 INJECTION, SOLUTION INTRAVENOUS at 08:12

## 2022-02-04 RX ADMIN — HEPARIN SODIUM 20000 UNITS: 1000 INJECTION INTRAVENOUS; SUBCUTANEOUS at 09:15

## 2022-02-04 RX ADMIN — EPINEPHRINE 0.05 MCG/KG/MIN: 1 INJECTION PARENTERAL at 15:13

## 2022-02-04 ASSESSMENT — ACTIVITIES OF DAILY LIVING (ADL)
ADLS_ACUITY_SCORE: 10
ADLS_ACUITY_SCORE: 12
ADLS_ACUITY_SCORE: 10
ADLS_ACUITY_SCORE: 12
ADLS_ACUITY_SCORE: 10
ADLS_ACUITY_SCORE: 12
ADLS_ACUITY_SCORE: 12
ADLS_ACUITY_SCORE: 10
ADLS_ACUITY_SCORE: 12
ADLS_ACUITY_SCORE: 10
ADLS_ACUITY_SCORE: 12
ADLS_ACUITY_SCORE: 10
ADLS_ACUITY_SCORE: 10

## 2022-02-04 NOTE — BRIEF OP NOTE
"[facility]    Brief Operative Note    Pre-operative diagnosis: VSD (ventricular septal defect) [Q21.0]  Post-operative diagnosis Same as pre-operative diagnosis    Procedure: Procedure(s):  Median Sternotomy, cardiopulmonary bypass, Ischemic Ventricular Septal Defect repair, tranesophageal echocardiogram performed by anesthesia  Surgeon: Surgeon(s) and Role:     * Aldair Whitney MD - Primary     * Leighton Whittington MD - Assisting     * Christiana Oquendo PA-C - Assisting      * Jacqueline Sumner CNP - Assisting  Anesthesia: General   Estimated Blood Loss: 1L    Drains:   Mediastinal chest tubes x2  Specimens:   ID Type Source Tests Collected by Time Destination   1 : VSD Plug Implant Other SURGICAL PATHOLOGY EXAM Aldair Whitney MD 2/4/2022 12:15 PM      Findings:   VSD in situ.  Complications: None.  Implants:   Implant Name Type Inv. Item Serial No.  Lot No. LRB No. Used Action   OCCLUDER DUCT AMPLATZER 16MM [id number] - [id number] Card Septal Defect Repair OCCLUDER DUCT AMPLATZER 16MM [id number]  [employer] [id number]  1 Explanted   IMP PATCH PERICARDIUM PHOTOFIX BOVINE [id number] [id number] - [id number] Bone/Tissue/Biologic IMP PATCH PERICARDIUM PHOTOFIX BOVINE 8X14CM [id number]  [facility] [id number] N/A 1 Implanted   GRAFT PTFE FELT 6X6\" [id number] - [id number] Graft GRAFT PTFE FELT 6X6\" [id number]  [employer] [id number] N/A 1 Implanted     Jacqueline Sumner CNP, [facility]  Cardiothoracic Surgery  Pager [phone]    "

## 2022-02-04 NOTE — OP NOTE
"DATE OF SERVICE: 02/04/22      PREOPERATIVE DIAGNOSES:  1. Subacute muscular ventricular septal defect, etiology unknown  2. 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 Hemolysis  4. Acute kidney injury  5. Hypothyroidism  6. Acute diastolic heart failure  7. Pulmonary hypertension  8 Acute on chronic anemia      POSTOPERATIVE DIAGNOSES:  1. Subacute muscular ventricular septal defect, etiology unknown  2. 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 Hemolysis  4. Acute kidney injury  5. Hypothyroidism  6. Acute diastolic heart failure  7. Pulmonary hypertension  8 Acute on chronic anemia    PROCEDURE PERFORMED:  1. Repair muscular ventriculoseptal defect, single patch     SURGEON: Aldair Whitney MD      CO SURGEON: Leighton Whittington    Please note, a co-surgeon was required due to the complexity of the operation.     FIRST ASSISTANT:  Christiana Oquendo PA-C and Jacqueline Sumner PA-C      ANESTHESIA:  General endotracheal anesthesia.      ANESTHESIOLOGIST:  Ibrahima Nascimento MD      ESTIMATED BLOOD LOSS:  500 mL      SPECIMEN:    1. VSD Plug device     CARDIOPULMONARY BYPASS TIME: 144\"    AORTIC CROSSCLAMP TIME: 94\"      INDICATIONS FOR PROCEDURE: Ms. Kimberley Mullins is a 81 year old year-old woman with acute heart failure and was found to have a new apical septal, muscular ventriculoseptal defect. She was worked up and etiology did not appear to be congenital or ischemic. Coronary angiography demonstrates no significant coronary artery disease. Echocardiogram showed mild to moderate mitral regurgitation and moderate tricuspid regurgitation with a large apical VSD. A percutaneous closure was attempted bur resulted in a residual shunt with hemolysis. I recommend surgical patch repair. The patient understands the risks and benefits of the procedure and wishes to undergo the operation.      OPERATIVE FINDINGS:    1. Transesophageal echocardiogram intraoperatively showed similar findings to " pre-operatively. Her left to right shunt fraction by KATIE was 2.3.  2. The VSD plug was visible through the right atriotomy but the defect was too difficult to repair via this approach.   3. The VSD was repaired with a single patch technique through a left apico-lateral ventriculotomy using bovine pericardium. The ventriculotomy was closed primarily.  4. The post bypass KATIE showed no residual shunt and no new or changed valvular disease. An intermittent left bundle branch block was present post operatively.     OPERATIVE DESCRIPTION IN DETAIL:    After obtaining informed consent, the patient was brought to the operating room and placed in the supine position on the operating room table. Appropriate lines and devices for Monitoring were placed by the anesthesia team. The patient underwent smooth induction of general anesthesia, and the trachea was intubated orally. A right internal jugular Cordis was placed. The patient was prepped and draped, and a timeout was performed to confirm the correct patient identity, as well as the procedure to be performed. A median sternotomy was performed and the pericardium was opened.      The patient was given full dose heparin with a final ACT greater than 480 seconds. Bicaval cannulation was performed and cardiopulmonary bypass was commenced. Antegrade and direct retrograde cardioplegia cannulae were placed after snaring the cavae. The heart was arrested with 1 liter of cold antegrade and 500 mL of retrograde blood cardioplegia. Subsequent maintenance doses of 300 mL of cold retrograde blood cardioplegia were given approximately every 15 minutes. Topical cold saline slush was applied for additional protection.      Through a lower transverse oblique right atriotomy the ventricular septum was explored. The plug device was identified and had partially migrated into the RV but was trapped by trabeculae. Due to the depth of the VSD from the approach repair was not possible.     There  was not evidence of infarct on the anterior or posterior wall of the heart. A vertical left ventriculotomy was performed through the apico-inferior wall 3 cm lateral to the left anterior descending coronary artery. The left ventricular cavity was entered safely and the papillary muscles identified and avoided. The ventriculotomy was expanded for a length of 3 cm. The VSD and plug was easily visualized via this incision. The VSD plug was easily removed. The VSD appeared to have quality tissue and amenable to repair. Pledgeted 2-0 Ethibond sutures were passed from the RV side of the VSD to the LV side circumferentially. A total of 14 sutures were placed. A patulous patch of photofixed bovine pericardium was cut to size and suture passed through its perimeter. This was seated and gently secured. 2 sutures along the apical aspect tore through the tissue and were replaced. We then inspected the patch for completeness and added buttressing 2-0 Ethibond pledgeted sutures in several locations for extra support.     The left ventriculotomy was then closed in 2 layers. The first layer was horizontal mattress sutures of 2-0 Ethibond with felt strips to either side of the incision. These were secured. A second layer was then run using 3-0 Prolene from felt strip to felt strip for additional hemostasis. Bioglue was applied to the incision. A hot shot dose of cardioplegia was then given.     The aortic cross clamp was released and the heart was resuscitated. The right atriotomy was closed in 2 layers with a running 4-0 prolene suture. All bleeding points were controlled. A bipolar ventricular pacing lead was placed and brought out through a separate stab incision. A bipolar right atrial pacing lead was placed and brought out through a separate stab incision. The patient weaned from cardiopulmonary bypass, was given protamine and decannulated. A 28-Maltese angled drain was placed in the right pleural cavity and delivered through a  separate stab incision. A 28-Kyrgyz angled drain was placed along the inferior margin and brought out through a separate stab incision. A 32-Kyrgyz chest tube was placed in the anterior mediastinum and brought out through a separate stab incision.    Coagulopathy was corrected by anesthesia with platelet transfusions as well as a dose of prothrombin concentrate complex.     The sternal edges were reapposed using a wire cerclage technique with #6 single stainless steel sternal wires. The muscle, fascia, and skin were closed in layers with absorbable Stratafix suture. Surgical skin glue was applied. All sponge, needle and instrument counts were correct at the end of the case.    Aldair Whitney MD  Cardiothoracic Surgery   Pager [phone]

## 2022-02-04 NOTE — ANESTHESIA PROCEDURE NOTES
Arterial Line Procedure Note    Pre-Procedure   Staff -        Anesthesiologist:  Ibrahima Nascimento MD       Resident/Fellow: Alexia Ortiz MD       Performed By: resident       Location: OR       Pre-Anesthestic Checklist: patient identified, IV checked, risks and benefits discussed, informed consent, monitors and equipment checked, pre-op evaluation and at physician/surgeon's request  Timeout:       Correct Patient: Yes        Correct Procedure: Yes        Correct Site: Yes        Correct Position: Yes   Procedure   Procedure: arterial line       Laterality: right       Insertion Site: radial.  Sterile Prep        Standard elements of sterile barrier followed       Skin prep: Chloraprep  Insertion/Injection        Technique: ultrasound guided        1. Ultrasound was used to evaluate the access site.       2. Artery evaluated via ultrasound for patency/adequacy.       3. Using real-time ultrasound the needle/catheter was observed entering the artery/vein.       Catheter Type/Size: 20 G, 12 cm  Narrative        Tegaderm and Biopatch dressing used.       Complications: Other (Comment),        Arterial waveform: Yes        IBP within 10% of NIBP: Yes   Comments:  Initial attempt on left radial unsuccessful with difficulty passing guide wire. Switched to right radial with successful placement.

## 2022-02-04 NOTE — ANESTHESIA POSTPROCEDURE EVALUATION
Patient: Kimberley Mullins    Procedure: Procedure(s):  Median Sternotomy, cardiopulmonary bypass, Ischemic Ventricular Septal Defect repair, tranesophageal echocardiogram performed by anesthesia       Diagnosis:VSD (ventricular septal defect) [Q21.0]  Diagnosis Additional Information: No value filed.    Anesthesia Type:  General    Note:  Disposition: ICU            ICU Sign Out: Anesthesiologist/ICU physician sign out WAS performed   Postop Pain Control: Uneventful            Sign Out: Well controlled pain   PONV: No   Neuro/Psych: Uneventful            Sign Out: PLANNED postop sedation   Airway/Respiratory: Uneventful            Sign Out: AIRWAY IN SITU/Resp. Support               Airway in situ/Resp. Support: ETT                 Reason: Planned Pre-op   CV/Hemodynamics: Uneventful            Sign Out: Acceptable CV status; No obvious hypovolemia; No obvious fluid overload   Other NRE: NONE   DID A NON-ROUTINE EVENT OCCUR? No    Event details/Postop Comments:  Patient transported from the OR to the ICU w/ O2, ambu, monitors, infusions, emergency medications and emergency airway equipment. VSS throughout transport and handoff.           Last vitals:  Vitals Value Taken Time   BP     Temp     Pulse 81 02/04/22 1359   Resp 18 02/04/22 1359   SpO2 99 % 02/04/22 1359   Vitals shown include unvalidated device data.    Electronically Signed By: Ibrahima Nascimento MD  February 4, 2022  2:01 PM

## 2022-02-04 NOTE — ANESTHESIA PROCEDURE NOTES
Airway       Patient location during procedure: OR       Procedure Start/Stop Times: 2/4/2022 8:15 AM  Staff -        Anesthesiologist:  Ibrahima Nascimento MD       Resident/Fellow: Alexia Ortiz MD       Performed By: resident  Consent for Airway        Urgency: elective  Indications and Patient Condition       Indications for airway management: vimal-procedural       Induction type:intravenous       Mask difficulty assessment: 2 - vent by mask + OA or adjuvant +/- NMBA    Final Airway Details       Final airway type: endotracheal airway       Successful airway: ETT - single  Endotracheal Airway Details        ETT size (mm): 8.0       Cuffed: yes       Successful intubation technique: video laryngoscopy       VL Blade Size: MAC 3       Grade View of Cords: 1       Adjucts: stylet       Position: Right       Measured from: gums/teeth       Secured at (cm): 23       Bite block used: None    Post intubation assessment        Placement verified by: capnometry, equal breath sounds and chest rise        Number of attempts at approach: 1       Secured with: pink tape       Ease of procedure: easy       Dentition: Intact

## 2022-02-04 NOTE — ANESTHESIA PROCEDURE NOTES
Arterial Line Procedure Note    Pre-Procedure   Staff -        Anesthesiologist:  Ibrahima Nascimento MD       Performed By: anesthesiologist       Location: OR       Pre-Anesthestic Checklist: patient identified, IV checked, risks and benefits discussed, informed consent, monitors and equipment checked, pre-op evaluation and at physician/surgeon's request  Timeout:       Correct Patient: Yes        Correct Procedure: Yes        Correct Site: Yes        Correct Position: Yes   Procedure   Procedure: arterial line, new line and elective       Laterality: left       Insertion Site: femoral.  Sterile Prep        Standard elements of sterile barrier followed       Skin prep: Chloraprep  Insertion/Injection        Technique: ultrasound guided        1. Ultrasound was used to evaluate the access site.       2. Artery evaluated via ultrasound for patency/adequacy.       3. Using real-time ultrasound the needle/catheter was observed entering the artery/vein.       5. The visualized structures were anatomically normal.       6. There were no apparent abnormal pathologic findings.       Catheter Type/Size: 20 G, 12 cm  Narrative         Secured by: suture       Tegaderm and Biopatch dressing used.       Complications: None apparent,        Arterial waveform: Yes        IBP within 10% of NIBP: Yes

## 2022-02-04 NOTE — ANESTHESIA PROCEDURE NOTES
Central Line/PA Catheter Placement    Pre-Procedure   Staff -        Anesthesiologist:  Ibrahima Nascimento MD       Resident/Fellow: Alexia Ortiz MD       Performed By: resident       Location: OR       Pre-Anesthestic Checklist: patient identified, IV checked, site marked, risks and benefits discussed, informed consent, monitors and equipment checked, pre-op evaluation and at physician/surgeon's request  Timeout:       Correct Patient: Yes        Correct Procedure: Yes        Correct Site: Yes        Correct Position: Yes        Correct Laterality: Yes     Procedure   Procedure: central line       Laterality: right       Insertion Site: internal jugular.       Patient Position: Trendelenburg  Sterile Prep        All elements of maximal sterile barrier technique followed       Patient Prep/Sterile Barriers: draped, hand hygiene, gloves , hat , mask , draped, gown, sterile gel and probe cover       Skin prep: Chloraprep  Insertion/Injection        Technique: ultrasound guided        1. Ultrasound was used to evaluate the access site.       2. Vein evaluated via ultrasound for patency/adequacy.       3. Using real-time ultrasound the needle/catheter was observed entering the artery/vein.       Introducer Type: 9 Fr, 2-lumen MAC        Hands-off Catheter: Hands-off 3-lumen via introducer   Narrative         Secured by: suture       Biopatch and Tegaderm dressing used.       Complications: None apparent,        blood aspirated from all lumens,        All lumens flushed: Yes       Verification method: Ultrasound, Placement to be verified post-op and KATIE

## 2022-02-04 NOTE — ANESTHESIA CARE TRANSFER NOTE
Patient: Kimberley Mullins    Procedure: Procedure(s):  Median Sternotomy, cardiopulmonary bypass, Ischemic Ventricular Septal Defect repair, tranesophageal echocardiogram performed by anesthesia       Diagnosis: VSD (ventricular septal defect) [Q21.0]  Diagnosis Additional Information: No value filed.    Anesthesia Type:   General     Note:    Oropharynx: endotracheal tube in place  Level of Consciousness: iatrogenic sedation          Vital Signs Stable: post-procedure vital signs reviewed and stable  Report to RN Given: handoff report given  Patient transferred to: ICU  Comments: Patient transferred to ICU with ETT in situ with appropriate monitors. VSS on Epi and NE gtts. No apparent complications from anesthesia. Sign out given to ICU team and all questions answered prior to leaving.   ICU Handoff: Call for PAUSE to initiate/utilize ICU HANDOFF, Identified Patient, Identified Responsible Provider, Reviewed the Pertinent Medical History, Discussed Surgical Course, Reviewed Intra-OP Anesthesia Management and Issues during Anesthesia, Set Expectations for Post Procedure Period and Allowed Opportunity for Questions and Acknowledgement of Understanding      Vitals:  Vitals Value Taken Time   BP     Temp     Pulse 81 02/04/22 1401   Resp 18 02/04/22 1401   SpO2 94 % 02/04/22 1401   Vitals shown include unvalidated device data.    Electronically Signed By: Alexia Ortiz MD  February 4, 2022  2:03 PM

## 2022-02-04 NOTE — H&P
CV ICU H&P  2/4/2022      CO-MORBIDITIES:   Patient Active Problem List   Diagnosis     Tinnitus     Transient global amnesia     Pure hypercholesterolemia     Osteoarthritis     Status post total knee replacement     Health Care Home     ACP (advance care planning)     Benign essential hypertension     Hypothyroidism     Irritable bowel syndrome with diarrhea     Osteopenia, unspecified location     Overweight (BMI 25.0-29.9)     Macular degeneration (senile) of retina     Cataract of both eyes, unspecified cataract type     Hyponatremia     Heart murmur     Shortness of breath     VSD (ventricular septal defect)       ASSESSMENT: Kimberley Mullins is a 81 year old female with a PMH of HTN, hypothyroidism, recent [facility] admission (1/14-1/17) for N/V and hyponatremia found to have a new apical, septal VSD ( underwent percutaneous closure with 16mm Amplatzer device (now has a residual, high gradient shunt) who underwent apical septal VSD repair and removal of Amplatzer device 2/4 with Dr. Whitney and Dr. Whittington.    INTRAOPERATIVE REPORT:  Easy mask and intubation  Repaired VSD and removal of Amplatzer device  Shunt was 2.3 before the case, now 1  3 units platelets, 3 units pRBC, 85ml cell savor, 100 units Kcentra, 2.5 L crystalloid  1000mg fentanyl  0.5 dilaudid  PH 7.36  Hgb 8.4  Propofol @ 30  Epi @ 0.06, NE @ 0.04  , PEEP 5, O2 80%  Has an LINDSEY- little urine output throughout case (dark in color since before case started)    PLAN:  Neuro/ pain/ sedation:  # Acute Postoperative pain  # History of tinnitus  # History of macular degeneration and bilateral cataracts  - Monitor neurological status. Notify the MD for any acute changes in exam.  - Pain: fentanyl gtt. Scheduled tylenol and gabapentin. PRN tylenol, oxycodone, robaxin  - Sedation: propofol gtt    Pulmonary care:   # Postoperative ventilation management  - Intubated, ventilated  - Titrate supplemental oxygen to maintain saturation above 92%.  -  Pulmonary hygiene: Incentive spirometer every 15- 30 minutes when awake, flutter valve, C&DB    Cardiovascular:    # VSD s/p percutaneous closure with 16mm Amplatzer device  # Residual high gradient VSD shunt s/p septal VSD repair and Amplatzer device removal 2/4 with Dr. Whitney and Dr. Whittington  # History of hypertension  # History of hypercholesterolemia  Recent echo on 2/3/22 with LVEF of 55-50%, mildly reduced RV function, moderate TR  LHC/RC 1/25 with no CAD, PCPW 15, MPAP 26  Cardiac MR without evidence of ischemia  CTA chest pulmonary angiogram 1/26/22 showing 1.9 x 1.2 x 0.7 cm muscular ventricular septal defect.  It is seen in the mid anteroseptum and then extends inferiorly into  the mid inferoseptum and obliquely into the apical septum.  - Monitor hemodynamic status.   - Goal MAP>65, SBP<140  - Statin hold  - BB hold  - ASA: start tomorrow  - Epi, norepi gtt; wean as tolerated  - PTA meds: ASA 81 daily, amlodipine 5mg daily, simvastatin 20mg daily held    GI care/ Nutrition:   # History of IBS with diarrhea  - NPO   - PPI  - Continue bowel regimen: miralax, senna  - PTA meds: pantoprazole 20mg daily    Renal/ Fluid Balance/ Electrolytes:   # Recent LINDSEY  BL creat appears to be ~ 0.9-1.0. Most recent creatinine before surgery was 1.44. She has decreased urine output and dark colored urine before her operation 2/4.  - Strict I/O, daily weights  - Avoid/limit nephrotoxins as able  - Replete lytes PRN per protocol    Endocrine:    # Stress induced hyperglycemia  # History of hypothyroidism  Preop A1c 5.6  - Insulin gtt  - Goal BG <180 for optimal healing  - PTA meds: synthroid 50 mcg daily    ID/ Antibiotics:  # Stress induced leukocytosis  - To complete perioperative regimen  - Continue to monitor fever curve, WBC and inflammatory markers as appropriate    Heme:     # Stress induced leukocytosis  # Acute blood loss anemia  # Acute blood loss thrombocytopenia  No s/sx active bleeding  - Continue to monitor  - CBC      MSK/ Skin:  # Sternotomy  # Surgical Incision  # Osteoarthritis s/p total knee replacement  # Osteopenia  - Sternal precautions  - Postoperative incision management per protocol  - PT/OT/CR  - PTA meds: Tylenol PRN    Prophylaxis:    - Mechanical prophylaxis for DVT  - Chemical DVT prophylaxis - start subcutaneous heparin tomorrow  - PPI    Lines/ tubes/ drains:  - RIJ MAC (2/4)  - RIJ access line (10 days)  - Right groin access line (9 days)  - Arterial Line x 2 (left femoral, right radial) (2/4)  - PIV R hand (2/4)  - 3 CTs (anterior and posterior mediastinal, right pleural) (2/4)  - ETT (2/4)  - Plascencia (2/4)    Disposition:  - CVICU    Patient seen, findings and plan discussed with CVICU staff Dr. Vasquez and CVTS staff Dr. Devonte Hill, MS4    I was present with the student who participated in the service and in the documentation of the note. I have verified the history and personally performed the physical exam and medical decisionmaking. I agree with the assessment and plan of care as documented in the note.    Ede Hutchinson MD, PhD, PGY-3  General Surgery  ====================================    HPI:   Kimberley Mullins is a 81 year old female with a PMH of HTN, hypothyroidism, recent [facility] admission (1/14-1/17) for N/V and hyponatremia found to have a new apical, septal VSD ( underwent percutaneous closure with 16mm Amplatzer device (now has a residual, high gradient shunt) who underwent apical, septal VSD repair and removal of Amplatzer device 2/4 with Dr. Whitney and Dr. Whittington.    Kimberley Mullins is a 81 year old female who presented with a new apical, septal VSD from [facility]. It was assumed to be ischemic in origin however MRI and angiogram did not reveal coronary disease or evidence of prior infarct. Etiology remains unclear. She presented in heart failure but was not in shock and improved with medical therapy. A percutaneous closure was attempted and although  initially successful, has on follow up imaging been found to have a residual, high gradient shunt. Initial plan was to delay surgical repair to allow septal tissue to continue to scar and repeat LHC/RHC to determine shunt fraction in 3-4 weeks as she was minimally symptomatic. She has, however, in the interim developed significant hemolysis resulting in hematuria and an LDH of over 1000. Her renal function remains preserved currently. Underwent urgent surgical repair of her VSD.      Echo 2/3/22:  Global and regional left ventricular function is normal with an EF of 55-60%.  Flattened septum is consistent with right ventricular pressure overload.  The Amplatzer device placed at the site of the apical VSD is well seated  however bowing in to RV with apical vmial device leak. Peak VSD flow through the vimal-device leak is 5 m/s.  Global right ventricular function is mildly reduced.  Pulmonary hypertension is present.  Right ventricular systolic pressure is 70mmHg above the right atrial pressure.  Trivial pericardial effusion is present.     This study was compared with the study from 2/2/2022 .  Systolic PAP estimated higher in this study otherwise no change.    Cardiac cath 1/27/22:  Successful percutaneous VSD closure with a 16mm Amplatzer muscular ventricular septal occluder device.    CT chest pulmonary angiogram 1/26/22:  1.  The cardiac chambers demonstrate normal atrioventricular and  ventriculoarterial concordance.  2.  A muscular ventricular septal defect is visualized. The VSD is  seen in the mid anteroseptal segment of the LV and then extends  inferiorly into the mid inferoseptal segment and obliquely into the  apical septal segment. The VSD measures 1.9 x 1.2 x 0.7 cm. There is  moderate-severe degree of left-to-right shunting in view of the fact  that the right ventricle is clearly opacified. There is robust  contrast uptake in the surrounding myocardium. This argues against the  presence of a recent  myocardial infarction.  3.  Another area of contrast opacification is seen in the RV apex but  a clear communication to the LV cannot be identified due to  significant misregistration artifact that persisted after multiple  reconstructions. This could be another very small VSD or a heavily  trabeculated area in the RV apex.   4.  The aortic arch is left-sided. There is normal branching of the  arch vessels. There is no coarctation seen.  5.  There is mild dilation of the ascending aorta (4.3 x 4.1 cm,  indexed value 2.56 cm/m2).   6.  The main pulmonary artery is mildly enlarged (3.7 x 3.2 cm). The  proximal branch pulmonary arteries are normal in size.   7.  The systemic venous connections are normal.   8.  The coronary arteries originate from their respective cusps.   9.  There are mild coronary artery calcifications. They are noted in  the LAD territory.  10.  The pericardium is unremarkable.  There is no pericardial  effusion.   11.  There is a moderate-sized right pleural effusion.  12.  There is no intracardiac thrombus.    MRI cardiac 1/26/22:  1. Normal left ventricular size and function, LVEF 67%  2. Moderately dilated and mildly reduced right ventricular function, RVEF 47%.   3. Isolated muscular ventricular septal defect in the distal septum measuring (7 mm on the LV side and 18  mm on the RV side) with Qp/Qs of 2.34.   4. No evidence of myocardial fibrosis or infarction.      PAST MEDICAL HISTORY:   Past Medical History:   Diagnosis Date     Arthritis      Diverticulitis of colon (without mention of hemorrhage)(562.11) 1997    diverticulosis noted on Flex Sig     Irritable bowel syndrome 1959     Osteopenia, unspecified location 4/5/2018     Other ovarian failure 1986     Overweight (BMI 25.0-29.9) 4/5/2018     Personal history of colonic polyps 2004     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     Thyroid disease      Transient global amnesia      Unspecified tinnitus     Tinnitus       PAST  "SURGICAL HISTORY:   Past Surgical History:   Procedure Laterality Date     ARTHROPLASTY KNEE  10/25/2011    Procedure:ARTHROPLASTY KNEE; Right Total Knee Arthroplasty   ; Surgeon:BRYCE ATWOOD; Location:[facility] OR     ARTHROPLASTY KNEE  6/2/2014    Procedure: ARTHROPLASTY KNEE;  Surgeon: Bryce Atwood MD;  Location: [facility] OR     CV CORONARY ANGIOGRAM N/A 1/25/2022    Procedure: Coronary Angiogram; diagnostic;  Surgeon: Sreekanth Delong MD;  Location: [facility]     CV RIGHT HEART CATH MEASUREMENTS RECORDED N/A 1/25/2022    Procedure: Right Heart Cath;  Surgeon: Sreekanth Delong MD;  Location: [facility]     HC DILATION/CURETTAGE DIAG/THER NON OB  2006    D & C     HC KNEE SCOPE, DIAGNOSTIC      Arthroscopy, Knee     ZZC AFF FOREARM/WRIST SURGERY UNLISTED      orif radius fx distal left     ZZC APPENDECTOMY  1955     ZZC OPEN JESSI FIXATN HUMERAL SHAFT FX  12/2007    Dr Massey     ZZ COLONOSCOPY W SNARE REMOVAL TUMOR/POLYP/LESION  9/03 & 3/2011    polyp removal during screening colonoscopy       FAMILY HISTORY:   Family History   Problem Relation Age of Onset     Heart Disease Father         MI age 70       SOCIAL HISTORY:   Social History     Tobacco Use     Smoking status: Never Smoker     Smokeless tobacco: Never Used   Substance Use Topics     Alcohol use: Yes     Alcohol/week: 5.8 standard drinks     Types: 7 Standard drinks or equivalent per week     Comment: two to three glasses of wine q day.         OBJECTIVE:   1. VITAL SIGNS:      Vitals:    02/04/22 0300 02/04/22 0631 02/04/22 0637 02/04/22 1400   BP: 107/70  117/74    BP Location: Right arm  Left arm    Pulse: 93  104 81   Resp: 16  16 18   Temp: 98.2  F (36.8  C)  98.4  F (36.9  C) 98.5  F (36.9  C)   TempSrc: Oral  Oral Axillary   SpO2: 95%  98% 92%   Weight:  60.2 kg (132 lb 11.2 oz)     Height:   1.6 m (5' 3\")        2. INTAKE/ OUTPUT:   I/O last 3 completed shifts:  In: 230.36 " [P.O.:120; I.V.:110.36]  Out: 1500 [Urine:1500]     3. PHYSICAL EXAMINATION:    General: sedated  Neuro: sedated  Resp: intubated, mechanically ventilated, CTAB  CV: S1, S2, RRR on telemetry, pacer on standby  Abdomen: Soft, non-distended  Incisions: c/d/i  Extremities: warm and well perfused  CT: To suction, serosang output, no airleak, crepitus    4. INVESTIGATIONS:   Arterial Blood Gases   Recent Labs   Lab 02/04/22  1234 02/04/22  1153 02/04/22  1136 02/04/22  1128   PH 7.38 7.39 7.30* 7.27*   PCO2 36 34* 44 50*   PO2 352* 223* 392* 374*   HCO3 21 21 22 23     Complete Blood Count   Recent Labs   Lab 02/04/22  1234 02/04/22  1155 02/04/22  1153 02/04/22  1136 02/04/22  0921 02/04/22  0850 02/04/22  0537 02/03/22  0324   WBC  --  15.1*  --   --   --  9.5 7.9 7.9   HGB 7.1* 7.9* 8.2* 7.9*   < > 8.6*  8.9* 9.9* 9.2*   PLT  --  140*  --   --   --  228 234 194    < > = values in this interval not displayed.     Basic Metabolic Panel  Recent Labs   Lab 02/04/22  1355 02/04/22  1234 02/04/22  1153 02/04/22  1136 02/04/22  1128 02/04/22  0850 02/04/22  0537 02/03/22  1712 02/03/22  0320 02/02/22  0545   NA  --  139 138 138 135   < > 135 132* 137 133   POTASSIUM  --  3.4* 3.8 4.6 4.9   < > 3.7 4.0 3.5 4.0   CHLORIDE  --   --   --   --   --   --  104 100 105 100   CO2  --   --   --   --   --   --  24 23 25 25   BUN  --   --   --   --   --   --  22 24 19 20   CR  --   --   --   --   --   --  1.44* 1.31* 1.01 0.91   * 159* 187* 177* 174*   < > 104* 100* 97 92    < > = values in this interval not displayed.     Liver Function Tests  Recent Labs   Lab 02/04/22  1155 02/04/22  0850 02/04/22  0537 02/03/22  0320 02/01/22  0527   AST  --   --  141*  --   --    ALT  --   --  43  --   --    ALKPHOS  --   --  49  --   --    BILITOTAL  --   --  1.8* 1.3 1.1   ALBUMIN  --   --  3.4  --   --    INR 1.54* 1.21*  --   --   --      Pancreatic Enzymes  No lab results found in last 7 days.  Coagulation Profile  Recent Labs   Lab  02/04/22  1155 02/04/22  0850 02/04/22  0537 02/03/22  0320   INR 1.54* 1.21*  --   --    PTT 66* 40* 34 83*         5. RADIOLOGY:   No results found for this or any previous visit (from the past 24 hour(s)).    =========================================

## 2022-02-04 NOTE — PLAN OF CARE
D: Admitted 1/24 with SOB 2/2 new heart failure and VSD s/p Amplatzer device on 1/31. Hx of HTN, hypothyroidism, and recent hospitalization for N/V and hyponatremia.       I: Monitored vitals and assessed pt status.   Changed: Heparin stopped 0300  Saline locked PIV- filters on IV  PRN:  Tele: SA/ST   O2: Room air   Mobility: independent      A: A0x4. Anxious for surgery, daughter/sons at bedside. VSS. Afebrile. Denies pain. NPO since midnight. Poor appetite. Completed both pre op showers. Last BM 2/2. Urine brown/red in color.       P: Continue to monitor Pt status and report changes to Cards 1. OR in am.

## 2022-02-04 NOTE — ANESTHESIA PROCEDURE NOTES
Perioperative KATIE Procedure Note    Staff -        Anesthesiologist:  Ibrahima Nascimento MD       Resident/Fellow: Lonnie Yost MD       Performed By: with fellow       Procedure performed by resident/fellow/CRNA in presence of a teaching physician.    Preanesthesia Checklist:  Patient identified, IV assessed, risks and benefits discussed, monitors and equipment assessed, procedure being performed at surgeon's request and anesthesia consent obtained.    KATIE Probe Insertion  Probe Number: 2  Probe Status PRE Insertion: NO obvious damage  Probe type:  Adult 3D  Bite block used:   Soft  Insertion Technique: Easy, no oropharyngeal manipulation  Insertion complications: None obvious  Billing Report:KATIE report by Anesthesiologist (See Separate Report note)  Probe Status POST Removal: NO obvious damage    KATIE Report  General Procedure Information  Fellow/Resident Interpretation: I personally reviewed the images and the fellow/resident interpretation.  I agree with the fellow/resident interpretation as amended by myself.   Images for this study have been archived.  Modalities: 3D, 2D, CW Doppler, PW Doppler and Color flow mapping  Diagnostic indications for KATIE:   Acquired cardiac septal defect  Echocardiographic and Doppler Measurements  Right Ventricle:  Cavity size normal.   Hypertrophy not present.   Thrombus not present.    Global function normal.     Left Ventricle:  Cavity size normal.   Hypertrophy not present.   Thrombus not present.   Global Function normal.   Ejection Fraction 60%.      Ventricular Regional Function:  1- Basal Anteroseptal:  normal  2- Basal Anterior:  normal  3- Basal Anterolateral:  normal  4- Basal Inferolateral:  normal  5- Basal Inferior:  normal  6- Basal Inferoseptal:  normal  7- Mid Anteroseptal:  normal  8- Mid Anterior:  normal  9- Mid Anterolateral:  normal  10- Mid Inferolateral:  normal  11- Mid Inferior:  normal  12- Mid Inferoseptal:  normal  13- Apical Anterior:  normal  14-  Apical Lateral:  normal  15- Apical Inferior:  normal  16- Apical Septal:  akinetic  17- [place]:  normal    Valves  Aortic Valve: Annulus normal.  Stenosis not present.  Area: 2.55 cm .  Mean Gradient: 1 mmHg.  Regurgitation +2.  Leaflets normal.  Leaflet motions normal.    Mitral Valve: Annulus normal.  Stenosis not present.  Regurgitation +2.  Leaflets normal.  Leaflet motions normal.    Tricuspid Valve: Annulus normal.  Stenosis not present.  Regurgitation +3.  Leaflets normal.  Leaflet motions normal.    Pulmonic Valve: Annulus normal.  Stenosis not present.  Regurgitation +1.      Aorta: Ascending Aorta: Size normal.  Diameter 4 cm.  Dissection not present.  Plaque thickness less than 3 mm.  Mobile plaque not present.    Aortic Arch: Size normal.   Diameter 4 cm.   Dissection not present.   Plaque thickness less than 3 mm.   Mobile plaque not present.    Descending Aorta: Size normal.   Diameter 2.6 cm.   Dissection not present.   Plaque thickness less than 3 mm.   Mobile plaque not present.        Right Atrium:  Size normal.   Spontaneous echo contrast not present.   Thrombus not present.   Tumor not present.   Device not present.     Left Atrium: Size normal.  Spontaneous echo contrast not present.  Thrombus not present.  Tumor not present.  Device not present.    Left atrial appendage normal.   Other Atria Findings:  MAGALYS velocity 80 cm/s  Atrial Septum: Intra-atrial septal morphology normal.     Ventricular Septum: Intra-ventricular septum morphology contains a muscular defect.   Ventricular septal defect shunts left to right.   Other ventricular septal defect findings:  VSD occlusion device visualized in interventricular septum near the apex with left-to-right flow by CFD. Qp:Qs 2.3  Diastolic Function Measurements:  Diastolic Dysfunction Grade= indeterminate. E= ms. A= ms. E/A Ratio= . DT=  ms.  S/D= .  IVRT= ms.  Other Findings:   Pericardium:  normal. Pleural Effusion:  left. Pulmonary Arteries:  normal.  Pulmonary Venous Flow:  normal. Cornoary sinus catheter present.   Post Intervention Findings  Procedure(s) performed:  Other (see comments) (VSD Repair). Global function:  Unchanged. Regional wall motion: Unchanged. Surgeon(s) notified of all postintervention findings: Yes.       Echocardiogram Comments  Echocardiogram comments:   PRE-CPB:  Normal biventricular systolic function with LV EF 55-60% by visual estimation. Diastology indeterminant 2/2 VSD.  There is mild to moderate MR with a centrally directed, complex MR jet.  The 3D EROA is 0.13.  There is mild centrally directed AI.  The ascending aorta is mildly dilated at 4.1cm.  There is moderate TR and trace pulmonary insufficiency.  There is no PFO by color doppler.  There is a septal occluder seen in the muscular intraventricular septum near the apex with residual left to right shunting across the superior aspect of the partially occluded VSD.  Qp:Qs is 2.3.  There is a small L pleural effusion.  There is no pericardial effusion. No aortic dissection. All findings communicated with surgical team    POST-CPB:  S/p VSD repair via left ventriculotomy. Global biventricular function unchanged. Anterolateral wall hypokinesis with enhancement at ventriculotomy site with no extravasation of fluid. No new valvulopathies. No flow visualized with CFD seen across the VSD repair site.  The Qp:Qs is now 1.03. No pericardial effusion. There is no aortic dissection.  Findings communicated to surgical team in real time.   .

## 2022-02-05 ENCOUNTER — APPOINTMENT (OUTPATIENT)
Dept: GENERAL RADIOLOGY | Facility: CLINIC | Age: 82
DRG: 228 | End: 2022-02-05
Attending: SURGERY
Payer: COMMERCIAL

## 2022-02-05 LAB
ALBUMIN SERPL-MCNC: 2.7 G/DL (ref 3.4–5)
ALBUMIN SERPL-MCNC: 2.9 G/DL (ref 3.4–5)
ALP SERPL-CCNC: 44 U/L (ref 40–150)
ALP SERPL-CCNC: 45 U/L (ref 40–150)
ALT SERPL W P-5'-P-CCNC: 27 U/L (ref 0–50)
ALT SERPL W P-5'-P-CCNC: 32 U/L (ref 0–50)
ANION GAP SERPL CALCULATED.3IONS-SCNC: 13 MMOL/L (ref 3–14)
ANION GAP SERPL CALCULATED.3IONS-SCNC: 9 MMOL/L (ref 3–14)
AST SERPL W P-5'-P-CCNC: 150 U/L (ref 0–45)
AST SERPL W P-5'-P-CCNC: 152 U/L (ref 0–45)
BACTERIA UR CULT: ABNORMAL
BASE EXCESS BLDA CALC-SCNC: -3.5 MMOL/L (ref -9–1.8)
BASE EXCESS BLDA CALC-SCNC: -4.8 MMOL/L (ref -9–1.8)
BILIRUB SERPL-MCNC: 0.6 MG/DL (ref 0.2–1.3)
BILIRUB SERPL-MCNC: 0.9 MG/DL (ref 0.2–1.3)
BUN SERPL-MCNC: 15 MG/DL (ref 7–30)
BUN SERPL-MCNC: 17 MG/DL (ref 7–30)
CA-I BLD-MCNC: 4.5 MG/DL (ref 4.4–5.2)
CALCIUM SERPL-MCNC: 8.3 MG/DL (ref 8.5–10.1)
CALCIUM SERPL-MCNC: 8.5 MG/DL (ref 8.5–10.1)
CHLORIDE BLD-SCNC: 108 MMOL/L (ref 94–109)
CHLORIDE BLD-SCNC: 111 MMOL/L (ref 94–109)
CO2 SERPL-SCNC: 20 MMOL/L (ref 20–32)
CO2 SERPL-SCNC: 21 MMOL/L (ref 20–32)
CREAT SERPL-MCNC: 1.25 MG/DL (ref 0.52–1.04)
CREAT SERPL-MCNC: 1.32 MG/DL (ref 0.52–1.04)
ERYTHROCYTE [DISTWIDTH] IN BLOOD BY AUTOMATED COUNT: 18.3 % (ref 10–15)
GFR SERPL CREATININE-BSD FRML MDRD: 40 ML/MIN/1.73M2
GFR SERPL CREATININE-BSD FRML MDRD: 43 ML/MIN/1.73M2
GLUCOSE BLD-MCNC: 117 MG/DL (ref 70–99)
GLUCOSE BLD-MCNC: 150 MG/DL (ref 70–99)
GLUCOSE BLDC GLUCOMTR-MCNC: 100 MG/DL (ref 70–99)
GLUCOSE BLDC GLUCOMTR-MCNC: 102 MG/DL (ref 70–99)
GLUCOSE BLDC GLUCOMTR-MCNC: 103 MG/DL (ref 70–99)
GLUCOSE BLDC GLUCOMTR-MCNC: 110 MG/DL (ref 70–99)
GLUCOSE BLDC GLUCOMTR-MCNC: 111 MG/DL (ref 70–99)
GLUCOSE BLDC GLUCOMTR-MCNC: 119 MG/DL (ref 70–99)
GLUCOSE BLDC GLUCOMTR-MCNC: 123 MG/DL (ref 70–99)
GLUCOSE BLDC GLUCOMTR-MCNC: 128 MG/DL (ref 70–99)
GLUCOSE BLDC GLUCOMTR-MCNC: 129 MG/DL (ref 70–99)
GLUCOSE BLDC GLUCOMTR-MCNC: 129 MG/DL (ref 70–99)
GLUCOSE BLDC GLUCOMTR-MCNC: 133 MG/DL (ref 70–99)
GLUCOSE BLDC GLUCOMTR-MCNC: 97 MG/DL (ref 70–99)
HCO3 BLD-SCNC: 19 MMOL/L (ref 21–28)
HCO3 BLD-SCNC: 20 MMOL/L (ref 21–28)
HCT VFR BLD AUTO: 22.5 % (ref 35–47)
HGB BLD-MCNC: 7.5 G/DL (ref 11.7–15.7)
LACTATE SERPL-SCNC: 1.1 MMOL/L (ref 0.7–2)
MAGNESIUM SERPL-MCNC: 2.6 MG/DL (ref 1.8–2.6)
MCH RBC QN AUTO: 31.5 PG (ref 26.5–33)
MCHC RBC AUTO-ENTMCNC: 33.3 G/DL (ref 31.5–36.5)
MCV RBC AUTO: 95 FL (ref 78–100)
O2/TOTAL GAS SETTING VFR VENT: 2 %
O2/TOTAL GAS SETTING VFR VENT: 30 %
OXYHGB MFR BLD: 97 % (ref 92–100)
PCO2 BLD: 30 MM HG (ref 35–45)
PCO2 BLD: 31 MM HG (ref 35–45)
PH BLD: 7.4 [PH] (ref 7.35–7.45)
PH BLD: 7.44 [PH] (ref 7.35–7.45)
PHOSPHATE SERPL-MCNC: 2 MG/DL (ref 2.5–4.5)
PHOSPHATE SERPL-MCNC: 3 MG/DL (ref 2.5–4.5)
PLATELET # BLD AUTO: 270 10E3/UL (ref 150–450)
PO2 BLD: 120 MM HG (ref 80–105)
PO2 BLD: 125 MM HG (ref 80–105)
POTASSIUM BLD-SCNC: 3.3 MMOL/L (ref 3.4–5.3)
POTASSIUM BLD-SCNC: 3.7 MMOL/L (ref 3.4–5.3)
POTASSIUM BLD-SCNC: 4 MMOL/L (ref 3.4–5.3)
PROT SERPL-MCNC: 5.2 G/DL (ref 6.8–8.8)
PROT SERPL-MCNC: 5.5 G/DL (ref 6.8–8.8)
RBC # BLD AUTO: 2.38 10E6/UL (ref 3.8–5.2)
SODIUM SERPL-SCNC: 140 MMOL/L (ref 133–144)
SODIUM SERPL-SCNC: 142 MMOL/L (ref 133–144)
WBC # BLD AUTO: 8.1 10E3/UL (ref 4–11)

## 2022-02-05 PROCEDURE — 200N000002 HC R&B ICU UMMC

## 2022-02-05 PROCEDURE — 258N000003 HC RX IP 258 OP 636: Performed by: INTERNAL MEDICINE

## 2022-02-05 PROCEDURE — 82330 ASSAY OF CALCIUM: CPT | Performed by: SURGERY

## 2022-02-05 PROCEDURE — 71045 X-RAY EXAM CHEST 1 VIEW: CPT

## 2022-02-05 PROCEDURE — 83605 ASSAY OF LACTIC ACID: CPT | Performed by: INTERNAL MEDICINE

## 2022-02-05 PROCEDURE — 71045 X-RAY EXAM CHEST 1 VIEW: CPT | Mod: 26 | Performed by: RADIOLOGY

## 2022-02-05 PROCEDURE — 84100 ASSAY OF PHOSPHORUS: CPT | Performed by: INTERNAL MEDICINE

## 2022-02-05 PROCEDURE — 250N000011 HC RX IP 250 OP 636: Performed by: INTERNAL MEDICINE

## 2022-02-05 PROCEDURE — 250N000009 HC RX 250: Performed by: INTERNAL MEDICINE

## 2022-02-05 PROCEDURE — 999N000155 HC STATISTIC RAPCV CVP MONITORING

## 2022-02-05 PROCEDURE — 93005 ELECTROCARDIOGRAM TRACING: CPT

## 2022-02-05 PROCEDURE — 999N000157 HC STATISTIC RCP TIME EA 10 MIN

## 2022-02-05 PROCEDURE — 93010 ELECTROCARDIOGRAM REPORT: CPT | Mod: 76 | Performed by: INTERNAL MEDICINE

## 2022-02-05 PROCEDURE — 94003 VENT MGMT INPAT SUBQ DAY: CPT

## 2022-02-05 PROCEDURE — 84132 ASSAY OF SERUM POTASSIUM: CPT | Performed by: THORACIC SURGERY (CARDIOTHORACIC VASCULAR SURGERY)

## 2022-02-05 PROCEDURE — 250N000013 HC RX MED GY IP 250 OP 250 PS 637: Performed by: STUDENT IN AN ORGANIZED HEALTH CARE EDUCATION/TRAINING PROGRAM

## 2022-02-05 PROCEDURE — 250N000011 HC RX IP 250 OP 636: Performed by: STUDENT IN AN ORGANIZED HEALTH CARE EDUCATION/TRAINING PROGRAM

## 2022-02-05 PROCEDURE — 250N000013 HC RX MED GY IP 250 OP 250 PS 637: Performed by: NURSE PRACTITIONER

## 2022-02-05 PROCEDURE — 85014 HEMATOCRIT: CPT | Performed by: SURGERY

## 2022-02-05 PROCEDURE — 250N000011 HC RX IP 250 OP 636: Performed by: SURGERY

## 2022-02-05 PROCEDURE — 250N000013 HC RX MED GY IP 250 OP 250 PS 637: Performed by: SURGERY

## 2022-02-05 PROCEDURE — 80053 COMPREHEN METABOLIC PANEL: CPT | Performed by: INTERNAL MEDICINE

## 2022-02-05 PROCEDURE — 999N000015 HC STATISTIC ARTERIAL MONITORING DAILY

## 2022-02-05 PROCEDURE — 82803 BLOOD GASES ANY COMBINATION: CPT | Performed by: STUDENT IN AN ORGANIZED HEALTH CARE EDUCATION/TRAINING PROGRAM

## 2022-02-05 PROCEDURE — 250N000009 HC RX 250: Performed by: SURGERY

## 2022-02-05 PROCEDURE — 83735 ASSAY OF MAGNESIUM: CPT | Performed by: SURGERY

## 2022-02-05 PROCEDURE — 82805 BLOOD GASES W/O2 SATURATION: CPT | Performed by: SURGERY

## 2022-02-05 RX ORDER — FUROSEMIDE 10 MG/ML
20 INJECTION INTRAMUSCULAR; INTRAVENOUS ONCE
Status: COMPLETED | OUTPATIENT
Start: 2022-02-05 | End: 2022-02-05

## 2022-02-05 RX ORDER — POTASSIUM CHLORIDE 7.45 MG/ML
10 INJECTION INTRAVENOUS
Status: COMPLETED | OUTPATIENT
Start: 2022-02-05 | End: 2022-02-05

## 2022-02-05 RX ORDER — POTASSIUM CHLORIDE 7.45 MG/ML
10 INJECTION INTRAVENOUS ONCE
Status: COMPLETED | OUTPATIENT
Start: 2022-02-05 | End: 2022-02-05

## 2022-02-05 RX ADMIN — HYDROMORPHONE HYDROCHLORIDE 0.2 MG: 0.2 INJECTION, SOLUTION INTRAMUSCULAR; INTRAVENOUS; SUBCUTANEOUS at 02:36

## 2022-02-05 RX ADMIN — POTASSIUM CHLORIDE 10 MEQ: 7.46 INJECTION, SOLUTION INTRAVENOUS at 08:03

## 2022-02-05 RX ADMIN — SENNOSIDES AND DOCUSATE SODIUM 1 TABLET: 50; 8.6 TABLET ORAL at 10:24

## 2022-02-05 RX ADMIN — PANTOPRAZOLE SODIUM 40 MG: 40 TABLET, DELAYED RELEASE ORAL at 10:25

## 2022-02-05 RX ADMIN — Medication 2.5 MG: at 21:37

## 2022-02-05 RX ADMIN — HEPARIN SODIUM 5000 UNITS: 5000 INJECTION, SOLUTION INTRAVENOUS; SUBCUTANEOUS at 11:31

## 2022-02-05 RX ADMIN — FUROSEMIDE 20 MG: 10 INJECTION, SOLUTION INTRAVENOUS at 12:51

## 2022-02-05 RX ADMIN — ACETAMINOPHEN 975 MG: 325 TABLET, FILM COATED ORAL at 14:46

## 2022-02-05 RX ADMIN — BENZOCAINE AND MENTHOL 1 LOZENGE: 15; 3.6 LOZENGE ORAL at 11:31

## 2022-02-05 RX ADMIN — HUMAN INSULIN 1 UNITS/HR: 100 INJECTION, SOLUTION SUBCUTANEOUS at 06:23

## 2022-02-05 RX ADMIN — DEXMEDETOMIDINE HYDROCHLORIDE 0.4 MCG/KG/HR: 400 INJECTION INTRAVENOUS at 06:30

## 2022-02-05 RX ADMIN — ACETAMINOPHEN 975 MG: 325 TABLET, FILM COATED ORAL at 10:25

## 2022-02-05 RX ADMIN — PROPOFOL 30 MCG/KG/MIN: 10 INJECTION, EMULSION INTRAVENOUS at 06:23

## 2022-02-05 RX ADMIN — LEVOTHYROXINE SODIUM 50 MCG: 0.05 TABLET ORAL at 10:25

## 2022-02-05 RX ADMIN — THERA TABS 1 TABLET: TAB at 10:25

## 2022-02-05 RX ADMIN — POTASSIUM CHLORIDE 10 MEQ: 7.46 INJECTION, SOLUTION INTRAVENOUS at 02:37

## 2022-02-05 RX ADMIN — HYDROMORPHONE HYDROCHLORIDE 0.2 MG: 0.2 INJECTION, SOLUTION INTRAMUSCULAR; INTRAVENOUS; SUBCUTANEOUS at 06:42

## 2022-02-05 RX ADMIN — ACETAMINOPHEN 975 MG: 325 TABLET, FILM COATED ORAL at 22:05

## 2022-02-05 RX ADMIN — POLYETHYLENE GLYCOL 3350 17 G: 17 POWDER, FOR SOLUTION ORAL at 10:24

## 2022-02-05 RX ADMIN — MUPIROCIN 0.5 G: 20 OINTMENT TOPICAL at 20:14

## 2022-02-05 RX ADMIN — Medication 5 MG: at 22:05

## 2022-02-05 RX ADMIN — ASPIRIN 81 MG CHEWABLE TABLET 81 MG: 81 TABLET CHEWABLE at 10:25

## 2022-02-05 RX ADMIN — HEPARIN SODIUM 5000 UNITS: 5000 INJECTION, SOLUTION INTRAVENOUS; SUBCUTANEOUS at 20:14

## 2022-02-05 RX ADMIN — Medication 1 TABLET: at 20:14

## 2022-02-05 RX ADMIN — Medication 2.5 MG: at 21:13

## 2022-02-05 RX ADMIN — Medication 1 TABLET: at 10:36

## 2022-02-05 RX ADMIN — SIMVASTATIN 20 MG: 20 TABLET, FILM COATED ORAL at 22:05

## 2022-02-05 RX ADMIN — POTASSIUM PHOSPHATE, MONOBASIC AND POTASSIUM PHOSPHATE, DIBASIC 9 MMOL: 224; 236 INJECTION, SOLUTION, CONCENTRATE INTRAVENOUS at 01:20

## 2022-02-05 RX ADMIN — MUPIROCIN 0.5 G: 20 OINTMENT TOPICAL at 09:11

## 2022-02-05 RX ADMIN — SENNOSIDES AND DOCUSATE SODIUM 1 TABLET: 50; 8.6 TABLET ORAL at 20:14

## 2022-02-05 RX ADMIN — CEFAZOLIN 1 G: 1 INJECTION, POWDER, FOR SOLUTION INTRAMUSCULAR; INTRAVENOUS at 11:31

## 2022-02-05 RX ADMIN — POTASSIUM CHLORIDE 10 MEQ: 7.46 INJECTION, SOLUTION INTRAVENOUS at 01:18

## 2022-02-05 RX ADMIN — OXYCODONE HYDROCHLORIDE 5 MG: 5 TABLET ORAL at 10:35

## 2022-02-05 ASSESSMENT — ACTIVITIES OF DAILY LIVING (ADL)
ADLS_ACUITY_SCORE: 12
ADLS_ACUITY_SCORE: 10
ADLS_ACUITY_SCORE: 12
ADLS_ACUITY_SCORE: 10
ADLS_ACUITY_SCORE: 12
ADLS_ACUITY_SCORE: 10
ADLS_ACUITY_SCORE: 12
ADLS_ACUITY_SCORE: 10
ADLS_ACUITY_SCORE: 12
ADLS_ACUITY_SCORE: 12
ADLS_ACUITY_SCORE: 10

## 2022-02-05 ASSESSMENT — MIFFLIN-ST. JEOR: SCORE: 1100.13

## 2022-02-05 NOTE — PROGRESS NOTES
CV ICU PROGRESS NOTE  02/05/2022      CO-MORBIDITIES:   S/P ventricular septal defect repair  (primary encounter diagnosis)  VSD (ventricular septal defect)  Single subsegmental pulmonary embolism without acute cor pulmonale (H)  CAROL (generalized anxiety disorder)    ASSESSMENT: Kimberley Mullins is a 81 year old female with a PMH of HTN, hypothyroidism, recent [facility] admission (1/14-1/17) for N/V and hyponatremia found to have a new apical, septal VSD ( underwent percutaneous closure with 16mm Amplatzer device (now has a residual, high gradient shunt) who underwent apical septal VSD repair and removal of Amplatzer device 2/4 with Dr. Whitney and Dr. Whittington.     PLAN:  Changes today:  - Wean epi as able  - Diurese  - Femoral arterial line discontinued    Neuro/ pain/ sedation:  # Acute Postoperative pain  # History of tinnitus  # History of macular degeneration and bilateral cataracts  - Monitor neurological status. Notify the MD for any acute changes in exam.  - Pain: scheduled tylenol and gabapentin. PRN tylenol, oxycodone, robaxin     Pulmonary care:   Extubated  - Titrate supplemental oxygen to maintain saturation above 92%.  - Pulmonary hygiene: Incentive spirometer every 15- 30 minutes when awake, flutter valve, C&DB     Cardiovascular:    # VSD s/p percutaneous closure with 16mm Amplatzer device  # Residual high gradient VSD shunt s/p septal VSD repair and Amplatzer device removal 2/4 with Dr. Whitney and Dr. Whittington  # History of hypertension  # History of hypercholesterolemia  Recent echo on 2/3/22 with LVEF of 55-50%, mildly reduced RV function, moderate TR  LHC/RC 1/25 with no CAD, PCPW 15, MPAP 26  Cardiac MR without evidence of ischemia  CTA chest pulmonary angiogram 1/26/22 showing 1.9 x 1.2 x 0.7 cm muscular ventricular septal defect.  It is seen in the mid anteroseptum and then extends inferiorly into  the mid inferoseptum and obliquely into the apical septum.  - Monitor hemodynamic status.   - Goal  MAP>65, SBP<140  - Statin hold  - BB hold  - ASA 81  - Epi, norepi gtt; wean as tolerated  - PTA meds: ASA 81 daily, amlodipine 5mg daily, simvastatin 20mg daily held     GI care/ Nutrition:   # History of IBS with diarrhea  - NPO   - PPI  - Continue bowel regimen: miralax, senna  - PTA meds: pantoprazole 20mg daily    Renal/ Fluid Balance/ Electrolytes:   # Recent LINDSEY  BL creat appears to be ~ 0.9-1.0. Most recent creatinine before surgery was 1.44. She has decreased urine output and dark colored urine before her operation 2/4.  - Strict I/O, daily weights  - Avoid/limit nephrotoxins as able  - Replete lytes PRN per protocol     Endocrine:    # Stress induced hyperglycemia  # History of hypothyroidism  Preop A1c 5.6  - Insulin gtt  - Goal BG <180 for optimal healing  - PTA meds: synthroid 50 mcg daily resumed     ID/ Antibiotics:  # Stress induced leukocytosis  - To complete perioperative regimen  - Continue to monitor fever curve, WBC and inflammatory markers as appropriate     Heme:     # Stress induced leukocytosis  # Acute blood loss anemia  # Acute blood loss thrombocytopenia  No s/sx active bleeding  - Continue to monitor  - CBC      MSK/ Skin:  # Sternotomy  # Surgical Incision  # Osteoarthritis s/p total knee replacement  # Osteopenia  - Sternal precautions  - Postoperative incision management per protocol  - PT/OT/CR  - PTA meds: Tylenol PRN     Prophylaxis:    - Mechanical prophylaxis for DVT  - Chemical DVT prophylaxis - start subcutaneous heparin tomorrow  - PPI     Lines/ tubes/ drains:  - RIJ MAC (2/4)  - RIJ access line (10 days)  - Right groin access line (9 days)  - Arterial Line x 2 (right radial) (2/4)  - PIV R hand (2/4)  - 3 CTs (anterior and posterior mediastinal, right pleural) (2/4)  - Plascencia (2/4)     Disposition:  - CVICU     Patient seen, findings and plan discussed with CVICU staff Dr. Vasquez and CVTS staff Dr. Devonte Hutchinson MD, PhD, PGY-3  General  Surgery  ====================================    SUBJECTIVE:   No acute events. Extubated this morning without issue. Pain well controlled.     OBJECTIVE:   1. VITAL SIGNS:   Temp:  [97.6  F (36.4  C)-100  F (37.8  C)] 97.7  F (36.5  C)  Pulse:  [72-86] 81  Resp:  [17-41] 18  BP: (103)/(52) 103/52  MAP:  [59 mmHg-80 mmHg] 68 mmHg  Arterial Line BP: ()/(37-62) 101/49  FiO2 (%):  [30 %-60 %] 30 %  SpO2:  [96 %-100 %] 96 %  Ventilation Mode: (S) CPAP/PS  (Continuous positive airway pressure with Pressure Support)  FiO2 (%): 30 %  Rate Set (breaths/minute): 18 breaths/min  Tidal Volume Set (mL): 450 mL  PEEP (cm H2O): 5 cmH2O  Pressure Support (cm H2O): 5 cmH2O  Oxygen Concentration (%): 30 %  Resp: 18      2. INTAKE/ OUTPUT:   I/O last 3 completed shifts:  In: 2015.98 [I.V.:2015.98]  Out: 1720 [Urine:1400; Chest Tube:320]    3. PHYSICAL EXAMINATION:   General: alert, calm, comfortable  Neuro: A&Ox3, NAD  Resp: Breathing non-labored on nasal cannula  CV: RRR on telemetry  Abdomen: Soft, Non-distended, Non-tender  Incisions: c/d/i  Extremities: warm and well perfused    4. INVESTIGATIONS:   Arterial Blood Gases   Recent Labs   Lab 02/05/22  1140 02/05/22  0905 02/04/22  1421 02/04/22  1314   PH 7.40 7.44 7.40 7.37   PCO2 31* 30* 34* 37   PO2 125* 120* 244* 335*   HCO3 19* 20* 21 21     Complete Blood Count   Recent Labs   Lab 02/05/22  0414 02/04/22  1422 02/04/22  1314 02/04/22  1234 02/04/22  1155 02/04/22  0921 02/04/22  0850   WBC 8.1 11.7*  --   --  15.1*  --  9.5   HGB 7.5* 7.7* 8.4* 7.1* 7.9*   < > 8.6*  8.9*    298  --   --  140*  --  228    < > = values in this interval not displayed.     Basic Metabolic Panel  Recent Labs   Lab 02/05/22  1405 02/05/22  1135 02/05/22  0908 02/05/22  0808 02/05/22  0615 02/05/22  0613 02/05/22  0005 02/04/22  2330 02/04/22  1507 02/04/22  1422 02/04/22  1355 02/04/22  1314 02/04/22  0850 02/04/22  0537   NA  --   --   --   --   --  140  --  142  --  141  --  139   <  > 135   POTASSIUM  --   --   --   --   --  3.7  --  3.3*  --  3.3*  --  3.3*   < > 3.7   CHLORIDE  --   --   --   --   --  111*  --  108  --  107  --   --   --  104   CO2  --   --   --   --   --  20  --  21  --  20  --   --   --  24   BUN  --   --   --   --   --  15  --  17  --  18  --   --   --  22   CR  --   --   --   --   --  1.25*  --  1.32*  --  1.22*  --   --   --  1.44*   * 123* 102* 100*   < > 117*   < > 150*   < > 128*   < > 145*   < > 104*    < > = values in this interval not displayed.     Liver Function Tests  Recent Labs   Lab 02/05/22  0613 02/04/22  2330 02/04/22  1422 02/04/22  1155 02/04/22  0850 02/04/22  0537   * 150* 125*  --   --  141*   ALT 27 32 33  --   --  43   ALKPHOS 45 44 42  --   --  49   BILITOTAL 0.9 0.6 1.0  --   --  1.8*   ALBUMIN 2.7* 2.9* 2.5*  --   --  3.4   INR  --   --  1.34* 1.54* 1.21*  --      Pancreatic Enzymes  No lab results found in last 7 days.  Coagulation Profile  Recent Labs   Lab 02/04/22  1422 02/04/22  1155 02/04/22  0850 02/04/22  0537   INR 1.34* 1.54* 1.21*  --    PTT 62* 66* 40* 34         5. RADIOLOGY:   Recent Results (from the past 24 hour(s))   XR Chest Port 1 View    Narrative    EXAM: XR CHEST PORT 1 VIEW  2/5/2022 5:36 AM     HISTORY:  Post Op CVTS Surgery       COMPARISON:  2/4/2022    FINDINGS: Single view of the chest. Postsurgical changes of the chest  with intact median sternotomy wires. Endotracheal tube tip projects  over the lower thoracic trachea. Right IJ central venous catheter tip  projects over the mid SVC. Stable mediastinal drains and right basilar  chest tube.    Trachea is midline. Stable cardiomediastinal silhouette. No  significant pleural effusion or pneumothorax. Improved aeration of the  lungs with decreased bibasilar prominent mixed interstitial and  airspace opacities. The visualized upper abdomen is unremarkable.      Impression    IMPRESSION:   1. Decreased bilateral pulmonary opacities likely  representing  improved pulmonary edema and/or atelectasis.  2. Stable support devices.    I have personally reviewed the examination and initial interpretation  and I agree with the findings.    CR MCLAUGHLIN MD         SYSTEM ID:  [id number]       =========================================

## 2022-02-05 NOTE — PLAN OF CARE
Major Shift Events:  Pt arrived from OR @ 1400. Sedated on prop 30. Pt able to follow commands when propofol stopped. PRN dilaudid given x1. SR w/ frequent PVCs. Epi at 0.04mcg/kg/min. CMV settings 50/400/5/16. Suctioned for minimal secretions. Ctx3, pleural x1 and mediastinal x2, 20-45mL hr out. Insulin drip at 2units/hr. Plascencia in place w/ 75-150mL/hr filipe urine. R internal jugular CVC, L femoral A line, R radial A line, R PIV. K and Phos replaced.  Plan: Monitor hemodynamics. Wean sedations as able.   For vital signs and complete assessments, please see documentation flowsheets.

## 2022-02-05 NOTE — PROGRESS NOTES
CLINICAL NUTRITION SERVICES - BRIEF NOTE    Received provider consult for nutrition education with comments post op cardiovascular surgery (automatic consult on post-op order set). S/p Median Sternotomy, cardiopulmonary bypass, Ischemic Ventricular Septal Defect repair, tranesophageal echocardiogram performed by anesthesia on 2/4. Nutrition education not indicated.    RD will follow per LOS protocol or if re-consulted.     Gloria Rice MS, RD, LD, CNSC  Weekend Pager: [phone]  CVICU RD: [id number]  Pgr: [phone]

## 2022-02-06 ENCOUNTER — APPOINTMENT (OUTPATIENT)
Dept: PHYSICAL THERAPY | Facility: CLINIC | Age: 82
DRG: 228 | End: 2022-02-06
Attending: INTERNAL MEDICINE
Payer: COMMERCIAL

## 2022-02-06 ENCOUNTER — APPOINTMENT (OUTPATIENT)
Dept: GENERAL RADIOLOGY | Facility: CLINIC | Age: 82
DRG: 228 | End: 2022-02-06
Attending: PHYSICIAN ASSISTANT
Payer: COMMERCIAL

## 2022-02-06 ENCOUNTER — APPOINTMENT (OUTPATIENT)
Dept: GENERAL RADIOLOGY | Facility: CLINIC | Age: 82
DRG: 228 | End: 2022-02-06
Attending: SURGERY
Payer: COMMERCIAL

## 2022-02-06 ENCOUNTER — APPOINTMENT (OUTPATIENT)
Dept: OCCUPATIONAL THERAPY | Facility: CLINIC | Age: 82
DRG: 228 | End: 2022-02-06
Attending: SURGERY
Payer: COMMERCIAL

## 2022-02-06 LAB
ABO/RH(D): NORMAL
ANION GAP SERPL CALCULATED.3IONS-SCNC: 6 MMOL/L (ref 3–14)
ANTIBODY SCREEN: NEGATIVE
BASOPHILS # BLD AUTO: 0 10E3/UL (ref 0–0.2)
BASOPHILS NFR BLD AUTO: 0 %
BUN SERPL-MCNC: 18 MG/DL (ref 7–30)
CA-I BLD-MCNC: 4.1 MG/DL (ref 4.4–5.2)
CALCIUM SERPL-MCNC: 7.5 MG/DL (ref 8.5–10.1)
CHLORIDE BLD-SCNC: 109 MMOL/L (ref 94–109)
CO2 SERPL-SCNC: 21 MMOL/L (ref 20–32)
CREAT SERPL-MCNC: 1.19 MG/DL (ref 0.52–1.04)
EOSINOPHIL # BLD AUTO: 0 10E3/UL (ref 0–0.7)
EOSINOPHIL NFR BLD AUTO: 0 %
ERYTHROCYTE [DISTWIDTH] IN BLOOD BY AUTOMATED COUNT: 18.7 % (ref 10–15)
GFR SERPL CREATININE-BSD FRML MDRD: 45 ML/MIN/1.73M2
GLUCOSE BLD-MCNC: 100 MG/DL (ref 70–99)
GLUCOSE BLDC GLUCOMTR-MCNC: 100 MG/DL (ref 70–99)
GLUCOSE BLDC GLUCOMTR-MCNC: 102 MG/DL (ref 70–99)
GLUCOSE BLDC GLUCOMTR-MCNC: 110 MG/DL (ref 70–99)
GLUCOSE BLDC GLUCOMTR-MCNC: 114 MG/DL (ref 70–99)
GLUCOSE BLDC GLUCOMTR-MCNC: 115 MG/DL (ref 70–99)
GLUCOSE BLDC GLUCOMTR-MCNC: 154 MG/DL (ref 70–99)
HBA1C MFR BLD: NORMAL %
HCT VFR BLD AUTO: 22.1 % (ref 35–47)
HCV RNA SERPL NAA+PROBE-ACNC: NOT DETECTED IU/ML
HGB BLD-MCNC: 7.2 G/DL (ref 11.7–15.7)
IMM GRANULOCYTES # BLD: 0.1 10E3/UL
IMM GRANULOCYTES NFR BLD: 1 %
INR PPP: 1.34 (ref 0.85–1.15)
LYMPHOCYTES # BLD AUTO: 0.9 10E3/UL (ref 0.8–5.3)
LYMPHOCYTES NFR BLD AUTO: 8 %
MCH RBC QN AUTO: 32.1 PG (ref 26.5–33)
MCHC RBC AUTO-ENTMCNC: 32.6 G/DL (ref 31.5–36.5)
MCV RBC AUTO: 99 FL (ref 78–100)
MONOCYTES # BLD AUTO: 1.6 10E3/UL (ref 0–1.3)
MONOCYTES NFR BLD AUTO: 15 %
NEUTROPHILS # BLD AUTO: 8.2 10E3/UL (ref 1.6–8.3)
NEUTROPHILS NFR BLD AUTO: 76 %
NRBC # BLD AUTO: 0 10E3/UL
NRBC BLD AUTO-RTO: 0 /100
PHOSPHATE SERPL-MCNC: 3.8 MG/DL (ref 2.5–4.5)
PLATELET # BLD AUTO: 201 10E3/UL (ref 150–450)
POTASSIUM BLD-SCNC: 3.8 MMOL/L (ref 3.4–5.3)
POTASSIUM BLD-SCNC: 3.8 MMOL/L (ref 3.4–5.3)
RBC # BLD AUTO: 2.24 10E6/UL (ref 3.8–5.2)
SODIUM SERPL-SCNC: 136 MMOL/L (ref 133–144)
SPECIMEN EXPIRATION DATE: NORMAL
WBC # BLD AUTO: 10.8 10E3/UL (ref 4–11)

## 2022-02-06 PROCEDURE — 258N000003 HC RX IP 258 OP 636: Performed by: STUDENT IN AN ORGANIZED HEALTH CARE EDUCATION/TRAINING PROGRAM

## 2022-02-06 PROCEDURE — 71045 X-RAY EXAM CHEST 1 VIEW: CPT | Mod: 26 | Performed by: RADIOLOGY

## 2022-02-06 PROCEDURE — 86850 RBC ANTIBODY SCREEN: CPT | Performed by: THORACIC SURGERY (CARDIOTHORACIC VASCULAR SURGERY)

## 2022-02-06 PROCEDURE — 250N000013 HC RX MED GY IP 250 OP 250 PS 637: Performed by: NURSE PRACTITIONER

## 2022-02-06 PROCEDURE — 84132 ASSAY OF SERUM POTASSIUM: CPT | Performed by: STUDENT IN AN ORGANIZED HEALTH CARE EDUCATION/TRAINING PROGRAM

## 2022-02-06 PROCEDURE — 84100 ASSAY OF PHOSPHORUS: CPT | Performed by: STUDENT IN AN ORGANIZED HEALTH CARE EDUCATION/TRAINING PROGRAM

## 2022-02-06 PROCEDURE — 99233 SBSQ HOSP IP/OBS HIGH 50: CPT | Mod: 24 | Performed by: ANESTHESIOLOGY

## 2022-02-06 PROCEDURE — 82330 ASSAY OF CALCIUM: CPT | Performed by: SURGERY

## 2022-02-06 PROCEDURE — 97116 GAIT TRAINING THERAPY: CPT | Mod: GP | Performed by: PHYSICAL THERAPIST

## 2022-02-06 PROCEDURE — 97164 PT RE-EVAL EST PLAN CARE: CPT | Mod: GP | Performed by: PHYSICAL THERAPIST

## 2022-02-06 PROCEDURE — 97535 SELF CARE MNGMENT TRAINING: CPT | Mod: GO | Performed by: OCCUPATIONAL THERAPIST

## 2022-02-06 PROCEDURE — 71045 X-RAY EXAM CHEST 1 VIEW: CPT | Mod: 77

## 2022-02-06 PROCEDURE — 250N000013 HC RX MED GY IP 250 OP 250 PS 637: Performed by: SURGERY

## 2022-02-06 PROCEDURE — 250N000011 HC RX IP 250 OP 636: Performed by: STUDENT IN AN ORGANIZED HEALTH CARE EDUCATION/TRAINING PROGRAM

## 2022-02-06 PROCEDURE — 250N000013 HC RX MED GY IP 250 OP 250 PS 637: Performed by: STUDENT IN AN ORGANIZED HEALTH CARE EDUCATION/TRAINING PROGRAM

## 2022-02-06 PROCEDURE — 36415 COLL VENOUS BLD VENIPUNCTURE: CPT | Performed by: THORACIC SURGERY (CARDIOTHORACIC VASCULAR SURGERY)

## 2022-02-06 PROCEDURE — 71045 X-RAY EXAM CHEST 1 VIEW: CPT

## 2022-02-06 PROCEDURE — 80048 BASIC METABOLIC PNL TOTAL CA: CPT | Performed by: SURGERY

## 2022-02-06 PROCEDURE — 97530 THERAPEUTIC ACTIVITIES: CPT | Mod: GO | Performed by: OCCUPATIONAL THERAPIST

## 2022-02-06 PROCEDURE — 250N000011 HC RX IP 250 OP 636: Performed by: SURGERY

## 2022-02-06 PROCEDURE — 250N000013 HC RX MED GY IP 250 OP 250 PS 637: Performed by: THORACIC SURGERY (CARDIOTHORACIC VASCULAR SURGERY)

## 2022-02-06 PROCEDURE — 214N000001 HC R&B CCU UMMC

## 2022-02-06 PROCEDURE — 85610 PROTHROMBIN TIME: CPT | Performed by: THORACIC SURGERY (CARDIOTHORACIC VASCULAR SURGERY)

## 2022-02-06 PROCEDURE — 85025 COMPLETE CBC W/AUTO DIFF WBC: CPT | Performed by: SURGERY

## 2022-02-06 PROCEDURE — 97530 THERAPEUTIC ACTIVITIES: CPT | Mod: GP | Performed by: PHYSICAL THERAPIST

## 2022-02-06 PROCEDURE — 83036 HEMOGLOBIN GLYCOSYLATED A1C: CPT | Performed by: STUDENT IN AN ORGANIZED HEALTH CARE EDUCATION/TRAINING PROGRAM

## 2022-02-06 PROCEDURE — 97165 OT EVAL LOW COMPLEX 30 MIN: CPT | Mod: GO | Performed by: OCCUPATIONAL THERAPIST

## 2022-02-06 PROCEDURE — 86901 BLOOD TYPING SEROLOGIC RH(D): CPT | Performed by: THORACIC SURGERY (CARDIOTHORACIC VASCULAR SURGERY)

## 2022-02-06 PROCEDURE — 999N000128 HC STATISTIC PERIPHERAL IV START W/O US GUIDANCE

## 2022-02-06 RX ORDER — HEPARIN SODIUM 10000 [USP'U]/100ML
500 INJECTION, SOLUTION INTRAVENOUS CONTINUOUS
Status: DISCONTINUED | OUTPATIENT
Start: 2022-02-06 | End: 2022-02-07

## 2022-02-06 RX ORDER — NICOTINE POLACRILEX 4 MG
15-30 LOZENGE BUCCAL
Status: DISCONTINUED | OUTPATIENT
Start: 2022-02-06 | End: 2022-02-10 | Stop reason: HOSPADM

## 2022-02-06 RX ORDER — DEXTROSE MONOHYDRATE 25 G/50ML
25-50 INJECTION, SOLUTION INTRAVENOUS
Status: DISCONTINUED | OUTPATIENT
Start: 2022-02-06 | End: 2022-02-10 | Stop reason: HOSPADM

## 2022-02-06 RX ORDER — FUROSEMIDE 10 MG/ML
20 INJECTION INTRAMUSCULAR; INTRAVENOUS EVERY 8 HOURS
Status: COMPLETED | OUTPATIENT
Start: 2022-02-06 | End: 2022-02-06

## 2022-02-06 RX ORDER — POTASSIUM CHLORIDE 750 MG/1
10 TABLET, EXTENDED RELEASE ORAL ONCE
Status: COMPLETED | OUTPATIENT
Start: 2022-02-06 | End: 2022-02-06

## 2022-02-06 RX ORDER — WARFARIN SODIUM 5 MG/1
5 TABLET ORAL
Status: COMPLETED | OUTPATIENT
Start: 2022-02-06 | End: 2022-02-06

## 2022-02-06 RX ORDER — FUROSEMIDE 10 MG/ML
20 INJECTION INTRAMUSCULAR; INTRAVENOUS ONCE
Status: COMPLETED | OUTPATIENT
Start: 2022-02-06 | End: 2022-02-06

## 2022-02-06 RX ORDER — POTASSIUM CHLORIDE 20MEQ/15ML
10 LIQUID (ML) ORAL ONCE
Status: COMPLETED | OUTPATIENT
Start: 2022-02-06 | End: 2022-02-06

## 2022-02-06 RX ADMIN — ASPIRIN 81 MG CHEWABLE TABLET 81 MG: 81 TABLET CHEWABLE at 08:06

## 2022-02-06 RX ADMIN — ACETAMINOPHEN 975 MG: 325 TABLET, FILM COATED ORAL at 05:49

## 2022-02-06 RX ADMIN — SENNOSIDES AND DOCUSATE SODIUM 1 TABLET: 50; 8.6 TABLET ORAL at 08:06

## 2022-02-06 RX ADMIN — POLYETHYLENE GLYCOL 3350 17 G: 17 POWDER, FOR SOLUTION ORAL at 08:06

## 2022-02-06 RX ADMIN — Medication 1 TABLET: at 08:06

## 2022-02-06 RX ADMIN — FUROSEMIDE 20 MG: 10 INJECTION, SOLUTION INTRAVENOUS at 01:04

## 2022-02-06 RX ADMIN — HEPARIN SODIUM 5000 UNITS: 5000 INJECTION, SOLUTION INTRAVENOUS; SUBCUTANEOUS at 03:56

## 2022-02-06 RX ADMIN — FUROSEMIDE 20 MG: 10 INJECTION, SOLUTION INTRAVENOUS at 20:07

## 2022-02-06 RX ADMIN — Medication 5 MG: at 22:23

## 2022-02-06 RX ADMIN — ACETAMINOPHEN 975 MG: 325 TABLET, FILM COATED ORAL at 22:22

## 2022-02-06 RX ADMIN — SIMVASTATIN 20 MG: 20 TABLET, FILM COATED ORAL at 22:23

## 2022-02-06 RX ADMIN — Medication 1 TABLET: at 20:08

## 2022-02-06 RX ADMIN — HEPARIN SODIUM 5000 UNITS: 5000 INJECTION, SOLUTION INTRAVENOUS; SUBCUTANEOUS at 11:50

## 2022-02-06 RX ADMIN — MUPIROCIN 0.5 G: 20 OINTMENT TOPICAL at 08:06

## 2022-02-06 RX ADMIN — ACETAMINOPHEN 975 MG: 325 TABLET, FILM COATED ORAL at 15:02

## 2022-02-06 RX ADMIN — LEVOTHYROXINE SODIUM 50 MCG: 0.05 TABLET ORAL at 08:06

## 2022-02-06 RX ADMIN — SENNOSIDES AND DOCUSATE SODIUM 1 TABLET: 50; 8.6 TABLET ORAL at 20:08

## 2022-02-06 RX ADMIN — WARFARIN SODIUM 5 MG: 5 TABLET ORAL at 18:47

## 2022-02-06 RX ADMIN — FUROSEMIDE 20 MG: 10 INJECTION, SOLUTION INTRAVENOUS at 12:36

## 2022-02-06 RX ADMIN — HEPARIN SODIUM 500 UNITS/HR: 1000 INJECTION INTRAVENOUS; SUBCUTANEOUS at 12:36

## 2022-02-06 RX ADMIN — EPINEPHRINE 0.01 MCG/KG/MIN: 1 INJECTION PARENTERAL at 01:09

## 2022-02-06 RX ADMIN — POTASSIUM CHLORIDE 10 MEQ: 40 SOLUTION ORAL at 05:41

## 2022-02-06 RX ADMIN — PANTOPRAZOLE SODIUM 40 MG: 40 TABLET, DELAYED RELEASE ORAL at 08:06

## 2022-02-06 RX ADMIN — Medication 5 MG: at 11:49

## 2022-02-06 RX ADMIN — POTASSIUM CHLORIDE 10 MEQ: 750 TABLET, EXTENDED RELEASE ORAL at 08:06

## 2022-02-06 RX ADMIN — MUPIROCIN 0.5 G: 20 OINTMENT TOPICAL at 20:07

## 2022-02-06 RX ADMIN — ISOSORBIDE DINITRATE 30 MG: 10 TABLET ORAL at 12:36

## 2022-02-06 RX ADMIN — THERA TABS 1 TABLET: TAB at 08:11

## 2022-02-06 ASSESSMENT — ACTIVITIES OF DAILY LIVING (ADL)
ADLS_ACUITY_SCORE: 10
ADLS_ACUITY_SCORE: 10
ADLS_ACUITY_SCORE: 12
ADLS_ACUITY_SCORE: 12
ADLS_ACUITY_SCORE: 10
ADLS_ACUITY_SCORE: 12
ADLS_ACUITY_SCORE: 10
ADLS_ACUITY_SCORE: 12
ADLS_ACUITY_SCORE: 10
ADLS_ACUITY_SCORE: 10
ADLS_ACUITY_SCORE: 12
ADLS_ACUITY_SCORE: 10
ADLS_ACUITY_SCORE: 12
ADLS_ACUITY_SCORE: 12
ADLS_ACUITY_SCORE: 10
ADLS_ACUITY_SCORE: 12
ADLS_ACUITY_SCORE: 10
ADLS_ACUITY_SCORE: 12
ADLS_ACUITY_SCORE: 10
ADLS_ACUITY_SCORE: 12
ADLS_ACUITY_SCORE: 12

## 2022-02-06 ASSESSMENT — MIFFLIN-ST. JEOR: SCORE: 1084.13

## 2022-02-06 NOTE — PROGRESS NOTES
02/06/22 0815   Quick Adds   Type of Visit PT Re-evaluation   Living Environment   People in home alone   Current Living Arrangements house   Home Accessibility stairs to enter home;stairs within home   Number of Stairs, Main Entrance 4   Stair Railings, Main Entrance railings on both sides of stairs   Number of Stairs, Within Home, Primary greater than 10 stairs   Stair Railings, Within Home, Primary railing on right side (ascending)   Transportation Anticipated family or friend will provide   Living Environment Comments Very active, patient reports she will stay with her son upon discharge-with no steps, has a FWW   Self-Care   Usual Activity Tolerance good   Current Activity Tolerance fair   Regular Exercise Yes   Exercise Amount/Frequency 2 times/wk   Disability/Function   Concentrating, Remembering or Making Decisions Difficulty no   Difficulty Communicating no   Difficulty Eating/Swallowing no   Walking or Climbing Stairs Difficulty no   Dressing/Bathing Difficulty no   Toileting issues no   Doing Errands Independently Difficulty (such as shopping) no   Fall history within last six months yes   Number of times patient has fallen within last six months 1   Change in Functional Status Since Onset of Current Illness/Injury yes   General Information   Onset of Illness/Injury or Date of Surgery 02/04/22   Referring Physician Jacqueline Sumner NP   Patient/Family Therapy Goals Statement (PT) Increase strength, return home.    Pertinent History of Current Problem (include personal factors and/or comorbidities that impact the POC) Kimberley Mullins is a 81 year old female with a PMH of HTN, hypothyroidism, recent [facility] admission (1/14-1/17) for N/V and hyponatremia found to have a new apical, septal VSD ( underwent percutaneous closure with 16mm Amplatzer device (now has a residual, high gradient shunt) who underwent apical septal VSD repair and removal of Amplatzer device 2/4 with Dr. Whitney and Dr. Whittington.    Existing Precautions/Restrictions fall  (sternal)   General Observations Activity as tolerated, RA, HR 87 with activity, MAP>100. Chest tubes to water seal for ambulation.    Cognition   Orientation Status (Cognition) oriented x 3   Affect/Mental Status (Cognition) WFL   Follows Commands (Cognition) over 90% accuracy;follows multi-step commands   Strength   Strength Comments Not formally tested but demos antigravity strength.    Bed Mobility   Comment (Bed Mobility) modA supine >sit, very slow, increase in pain   Transfers   Transfer Safety Comments Sit to stand modA x1 within sternal precautions.    Gait/Stairs (Locomotion)   Comment (Gait/Stairs) Stand pivot transfer 3 ft to the chair with arm in arm assist, min to modA x1, very slow moving, fatigued, minimal toe clearance.    Balance   Balance Comments Requires UE support to march or take any steps in standing.    Clinical Impression   Criteria for Skilled Therapeutic Intervention yes, treatment indicated   PT Diagnosis (PT) Impaired functional mobility   Influenced by the following impairments Weakness, pain, deconditioning   Functional limitations due to impairments bed mobility, transfers, ambulation, stair negotiation   Clinical Presentation Stable/Uncomplicated   Clinical Presentation Rationale clinical judgement   Clinical Decision Making (Complexity) moderate complexity   Therapy Frequency (PT) 6x/week   Predicted Duration of Therapy Intervention (days/wks) 1-2 weeks   Planned Therapy Interventions (PT) balance training;gait training;home exercise program;neuromuscular re-education;stair training;strengthening   Risk & Benefits of therapy have been explained evaluation/treatment results reviewed;care plan/treatment goals reviewed;risks/benefits reviewed;current/potential barriers reviewed;participants voiced agreement with care plan;participants included;patient   PT Discharge Planning    PT Discharge Recommendation (DC Rec) home;home with assist;home  with outpatient cardiac rehab   PT Rationale for DC Rec Patient below baseline mobility, quite weak and deconditioned POD 2, very motivated and hard working. Anticipate with good progress and once medically stable she may be able to DC home with family assist and either home care vs outpatient therapy. Will have to track her progress.    PT Brief overview of current status  1A pivot transfers with nursing.    Total Evaluation Time   Total Evaluation Time (Minutes) 10

## 2022-02-06 NOTE — PROGRESS NOTES
Transfer  Transferred from:  ICU  Via:bed  Reason for transfer: Pt inappropriate for unit  Family: Aware of transfer - daughter Hilary at the bedside  Belongings:Sent with pt  Chart:Sent with pt  Medications: Meds from bin sent with pt  Report called from: Nancy RN  2 RN skin assessment done by: Liberty SPRING and Zena DOBSON    Pt alert and oriented x4. VSS. Denies any pain, lightheadedness or dizziness at this time. Hep gtt continued at 500 units/hr. 3 CTs continued to -20 suction. Plascencia catheter in place and draining urine. Dinner ordered and pt to eat soon. BG check was 100, sliding scale insulin not given.     Temp: 98.5  F (36.9  C) Temp src: Oral BP: 96/54 Pulse: 82   Resp: 22 SpO2: 95 % O2 Device: None (Room air)

## 2022-02-06 NOTE — PLAN OF CARE
Major Shift Events: Pt A&Ox4, able to make needs known. SR w/ PVCs. SBP goal 100-110, epi off @ 1815. Pt extubated to 2L NC @ 0945, now on RA. Tolerating IS. Tolerating clear liquid diet, bowel regime started today. Plascencia w/ adequate yellow UOP, 20mg IV lasix given x1. L fem A line removed. R radial A line in place. K replaced.   Plan: Continue to monitor hemodynamics.   For vital signs and complete assessments, please see documentation flowsheets.

## 2022-02-06 NOTE — PHARMACY-ANTICOAGULATION SERVICE
Clinical Pharmacy - Warfarin Dosing Consult     Pharmacy has been consulted to manage this patient s warfarin therapy.  Indication: DVT/ PE Treatment  Provider/Team: CVTS  OP Anticoag Clinic: N/A  Warfarin Prior to Admission: No  Significant drug interactions: No major DDIs, heparin & aspirin =increased risk of bleeding  Recent documented change in oral intake/nutrition: No    INR   Date Value Ref Range Status   02/06/2022 1.34 (H) 0.85 - 1.15 Final   02/04/2022 1.34 (H) 0.85 - 1.15 Final       Recommend warfarin 5 mg today.  Pharmacy will monitor Kimberley Mullins daily and order warfarin doses to achieve specified goal.      Please contact pharmacy as soon as possible if the warfarin needs to be held for a procedure or if the warfarin goals change.      Addendum 2/9/2022: INR goal range 2-3  Denise Nguyen, Pharm.D., [facility]  Pager [phone]

## 2022-02-06 NOTE — PROGRESS NOTES
02/06/22 1400   Quick Adds   Type of Visit Initial Occupational Therapy Evaluation   Living Environment   People in home alone   Current Living Arrangements house   Home Accessibility stairs to enter home;stairs within home   Number of Stairs, Main Entrance 4   Number of Stairs, Within Home, Primary greater than 10 stairs   Living Environment Comments pt planning on staying with son or having someone in her home post DISCH   Self-Care   Usual Activity Tolerance good   Current Activity Tolerance fair   Regular Exercise Yes   Activity/Exercise Type   ([employer] k[employer] sneakers. )   Exercise Amount/Frequency 2 times/wk   Equipment Currently Used at Home tub bench  (has sock aid from knee surgery. )   Disability/Function   Hearing Difficulty or Deaf no   Wear Glasses or Blind yes   Concentrating, Remembering or Making Decisions Difficulty no   Difficulty Communicating no   Difficulty Eating/Swallowing no   Walking or Climbing Stairs Difficulty no   Dressing/Bathing Difficulty no   Toileting issues no   Doing Errands Independently Difficulty (such as shopping) no   Change in Functional Status Since Onset of Current Illness/Injury yes   General Information   Referring Physician Jacqueline Sumner   Patient/Family Therapy Goal Statement (OT) return to PLOF   Additional Occupational Profile Info/Pertinent History of Current Problem Kimberley Mullins is a 81 year old female with a PMH of HTN, hypothyroidism, recent [facility] admission (1/14-1/17) for N/V and hyponatremia found to have a new apical, septal VSD ( underwent percutaneous closure with 16mm Amplatzer device (now has a residual, high gradient shunt) who underwent apical septal VSD repair and removal of Amplatzer device 2/4 with Dr. Whitney and Dr. Whittington.   Existing Precautions/Restrictions sternal   General Observations and Info pt with supportive daughter at bedside   Cognitive Status Examination   Orientation Status orientation to person, place and time   Affect/Mental  "Status (Cognitive) WFL   Cognitive Status Comments pt reports feeling \"fuzzy\" from medications, will continue to follow.    Visual Perception   Visual Impairment/Limitations WFL   Sensory   Sensory Quick Adds No deficits were identified   Range of Motion Comprehensive   General Range of Motion no range of motion deficits identified   Strength Comprehensive (MMT)   General Manual Muscle Testing (MMT) Assessment other (see comments)   Comment, General Manual Muscle Testing (MMT) Assessment overall deconditioniong   Coordination   Upper Extremity Coordination No deficits were identified   Bed Mobility   Bed Mobility supine-sit;sit-supine   Supine-Sit Louise (Bed Mobility) maximum assist (25% patient effort)   Sit-Supine Louise (Bed Mobility) maximum assist (25% patient effort)   Comment (Bed Mobility) educaiton required.    Transfers   Transfers bed-chair transfer;sit-stand transfer   Transfer Skill: Bed to Chair/Chair to Bed   Bed-Chair Louise (Transfers) minimum assist (75% patient effort)   Sit-Stand Transfer   Sit-Stand Louise (Transfers) minimum assist (75% patient effort)   Activities of Daily Living   BADL Assessment lower body dressing   Lower Body Dressing Assessment   Louise Level (Lower Body Dressing) maximum assist (25% patient effort)   Comment (Lower Body Dressing) education required.    Clinical Impression   Criteria for Skilled Therapeutic Interventions Met (OT) yes   OT Diagnosis decreased ADL I   Assessment of Occupational Performance 5 or more Performance Deficits   Identified Performance Deficits dressing, bathing, toileting, G/H, home making.    Planned Therapy Interventions (OT) ADL retraining;IADL retraining;bed mobility training;strengthening;transfer training;home program guidelines;progressive activity/exercise;risk factor education   Clinical Decision Making Complexity (OT) low complexity   Therapy Frequency (OT) 6x/week   Predicted Duration of Therapy 2 weeks "   Risk & Benefits of therapy have been explained evaluation/treatment results reviewed;care plan/treatment goals reviewed;risks/benefits reviewed;current/potential barriers reviewed;participants voiced agreement with care plan;participants included;patient;daughter   Comment-Clinical Impression pt presents to OT with post surgical precautions and general deconditoining leading to decreased ADL I.    OT Discharge Planning    OT Discharge Recommendation (DC Rec) Transitional Care Facility;home with outpatient cardiac rehab   OT Rationale for DC Rec pt progressing slowly but well.    OT Brief overview of current status  Pt ambulating in hallway 125 feet wiht sitting rest break with IV pole for support and min A.    Total Evaluation Time (Minutes)   Total Evaluation Time (Minutes) 5

## 2022-02-06 NOTE — PROGRESS NOTES
Major Shift Events:  Pt is A&Ox4. Sinus rhythm. MAP goal >65, epi 0.01 for a few hours overnight, now off. RA. Tolerating clear liquids. No BM. Plascencia, low output 10-15mL at start of shift, MD notified, 20mg lasix given. R radial A line removed, stopped working after trying to draw blood, MD notified. Slept intermittently overnight.  Plan: Continue w/ POC.  For vital signs and complete assessments, please see documentation flowsheets.

## 2022-02-06 NOTE — PROGRESS NOTES
CV ICU PROGRESS NOTE  02/06/2022      CO-MORBIDITIES:   S/P ventricular septal defect repair  (primary encounter diagnosis)  VSD (ventricular septal defect)  Single subsegmental pulmonary embolism without acute cor pulmonale (H)  CAROL (generalized anxiety disorder)    ASSESSMENT: Kimberley Mullins is a 81 year old female with a PMH of HTN, hypothyroidism, recent [facility] admission (1/14-1/17) for N/V and hyponatremia found to have a new apical, septal VSD ( underwent percutaneous closure with 16mm Amplatzer device (now has a residual, high gradient shunt) who underwent apical septal VSD repair and removal of Amplatzer device 2/4 with Dr. Whitney and Dr. Whittington.     PLAN:  Changes today:  - Lasix 20 x2 doses today  - Transfer to floor  - Keep Plascencia until tomorrow  - Heparin straight rate    Neuro/ pain/ sedation:  # Acute Postoperative pain  # History of tinnitus  # History of macular degeneration and bilateral cataracts  - Monitor neurological status. Notify the MD for any acute changes in exam.  - Pain: scheduled tylenol and gabapentin. PRN tylenol, oxycodone, robaxin     Pulmonary care:   Extubated  - Titrate supplemental oxygen to maintain saturation above 92%.  - Pulmonary hygiene: Incentive spirometer every 15- 30 minutes when awake, flutter valve, C&DB     Cardiovascular:    # VSD s/p percutaneous closure with 16mm Amplatzer device  # Residual high gradient VSD shunt s/p septal VSD repair and Amplatzer device removal 2/4 with Dr. Whitney and Dr. Whittington  # History of hypertension  # History of hypercholesterolemia  Recent echo on 2/3/22 with LVEF of 55-50%, mildly reduced RV function, moderate TR  LHC/RC 1/25 with no CAD, PCPW 15, MPAP 26  Cardiac MR without evidence of ischemia  CTA chest pulmonary angiogram 1/26/22 showing 1.9 x 1.2 x 0.7 cm muscular ventricular septal defect.  It is seen in the mid anteroseptum and then extends inferiorly into  the mid inferoseptum and obliquely into the apical septum.  -  Monitor hemodynamic status.   - Goal MAP>65, SBP<140  - Statin hold  - BB hold  - ASA 81  - PTA meds: ASA 81 daily, amlodipine 5mg daily, simvastatin 20mg daily     GI care/ Nutrition:   # History of IBS with diarrhea  - NPO   - PPI  - Continue bowel regimen: miralax, senna  - PTA meds: pantoprazole 20mg daily    Renal/ Fluid Balance/ Electrolytes:   # Recent LINDSEY  BL creat appears to be ~ 0.9-1.0. Most recent creatinine before surgery was 1.44. She has decreased urine output and dark colored urine before her operation 2/4.  - Strict I/O, daily weights  - Avoid/limit nephrotoxins as able  - Replete lytes PRN per protocol  - Lasix 20 x2 today, hernandez can stay until tomorrow     Endocrine:    # Stress induced hyperglycemia  # History of hypothyroidism  Preop A1c 5.6  - SSI  - Goal BG <180 for optimal healing  - PTA meds: synthroid 50 mcg daily resumed     ID/ Antibiotics:  # Stress induced leukocytosis  - To complete perioperative regimen  - Continue to monitor fever curve, WBC and inflammatory markers as appropriate     Heme:     # Stress induced leukocytosis  # Acute blood loss anemia  # Acute blood loss thrombocytopenia  # Small segmental PE, acute vs chronic  No s/sx active bleeding  - Continue to monitor  - CBC   - Heparin 500/hr     MSK/ Skin:  # Sternotomy  # Surgical Incision  # Osteoarthritis s/p total knee replacement  # Osteopenia  - Sternal precautions  - Postoperative incision management per protocol  - PT/OT/CR  - PTA meds: Tylenol PRN     Prophylaxis:    - Mechanical prophylaxis for DVT  - Chemical DVT prophylaxis - straight rateheparin  - PPI     Lines/ tubes/ drains:  - RIJ MAC (2/4)  - RIJ access line (10 days)  - Right groin access line (9 days)  - Arterial Line x 2 (right radial) (2/4)  - PIV R hand (2/4)  - 3 CTs (anterior and posterior mediastinal, right pleural) (2/4)  - Hernandez (2/4)     Disposition:  - CVICU     Patient seen, findings and plan discussed with CVICU staff Dr. Vasquez and CVTS staff  Dr. Devonte Hutchinson MD, PhD, PGY-3  General Surgery  ====================================    SUBJECTIVE:   No acute events. Doing well on room air and off pressors.     OBJECTIVE:   1. VITAL SIGNS:   Temp:  [97.8  F (36.6  C)-98.6  F (37  C)] 97.8  F (36.6  C)  Pulse:  [73-85] 85  Resp:  [16-36] 18  BP: ()/(50-64) 107/56  MAP:  [59 mmHg-137 mmHg] 78 mmHg  Arterial Line BP: ()/() 82/74  SpO2:  [92 %-100 %] 97 %  Ventilation Mode: (S) CPAP/PS  (Continuous positive airway pressure with Pressure Support)  FiO2 (%): 30 %  Rate Set (breaths/minute): 18 breaths/min  Tidal Volume Set (mL): 450 mL  PEEP (cm H2O): 5 cmH2O  Pressure Support (cm H2O): 5 cmH2O  Oxygen Concentration (%): 30 %  Resp: 18      2. INTAKE/ OUTPUT:   I/O last 3 completed shifts:  In: 750.03 [I.V.:750.03]  Out: 906 [Urine:716; Chest Tube:190]    3. PHYSICAL EXAMINATION:   General: alert, calm, comfortable  Neuro: A&Ox3, NAD  Resp: Breathing non-labored on room air  CV: RRR on telemetry  Abdomen: Soft, Non-distended, Non-tender  Incisions: c/d/i  Extremities: warm and well perfused    4. INVESTIGATIONS:   Arterial Blood Gases   Recent Labs   Lab 02/05/22  1140 02/05/22  0905 02/04/22  1421 02/04/22  1314   PH 7.40 7.44 7.40 7.37   PCO2 31* 30* 34* 37   PO2 125* 120* 244* 335*   HCO3 19* 20* 21 21     Complete Blood Count   Recent Labs   Lab 02/06/22  0506 02/05/22  0414 02/04/22  1422 02/04/22  1314 02/04/22  1234 02/04/22  1155   WBC 10.8 8.1 11.7*  --   --  15.1*   HGB 7.2* 7.5* 7.7* 8.4*   < > 7.9*    270 298  --   --  140*    < > = values in this interval not displayed.     Basic Metabolic Panel  Recent Labs   Lab 02/06/22  0422 02/06/22  0405 02/06/22  0001 02/05/22 2023 02/05/22 2011 02/05/22  0615 02/05/22  0613 02/05/22  0005 02/04/22  2330 02/04/22  1507 02/04/22  1422   NA  --  136  --   --   --   --  140  --  142  --  141   POTASSIUM  --  3.8  3.8  --  4.0  --   --  3.7  --  3.3*  --  3.3*   CHLORIDE   --  109  --   --   --   --  111*  --  108  --  107   CO2  --  21  --   --   --   --  20  --  21  --  20   BUN  --  18  --   --   --   --  15  --  17  --  18   CR  --  1.19*  --   --   --   --  1.25*  --  1.32*  --  1.22*   * 100* 115*  --  128*   < > 117*   < > 150*   < > 128*    < > = values in this interval not displayed.     Liver Function Tests  Recent Labs   Lab 02/05/22  0613 02/04/22  2330 02/04/22  1422 02/04/22  1155 02/04/22  0850 02/04/22  0537   * 150* 125*  --   --  141*   ALT 27 32 33  --   --  43   ALKPHOS 45 44 42  --   --  49   BILITOTAL 0.9 0.6 1.0  --   --  1.8*   ALBUMIN 2.7* 2.9* 2.5*  --   --  3.4   INR  --   --  1.34* 1.54* 1.21*  --      Pancreatic Enzymes  No lab results found in last 7 days.  Coagulation Profile  Recent Labs   Lab 02/04/22  1422 02/04/22  1155 02/04/22  0850 02/04/22  0537   INR 1.34* 1.54* 1.21*  --    PTT 62* 66* 40* 34         5. RADIOLOGY:   Recent Results (from the past 24 hour(s))   XR Chest Port 1 View    Narrative    EXAM: XR CHEST PORT 1 VIEW  2/6/2022 3:48 AM     HISTORY:  Chest tubes       COMPARISON:  2/5/2022    FINDINGS: Single view of the chest. Postsurgical changes of the chest  with intact median sternotomy wires. Endotracheal tube has been  removed. Right IJ central venous catheter tip projects over the upper  SVC. Stable mediastinal drains and right-sided chest tube.     Trachea is midline. Stable partially obscured cardiac silhouette.  Small bilateral pleural effusions. No pneumothorax. Low lung volumes  with continued basilar predominant interstitial and airspace  opacities. No acute findings in the visualized upper abdomen.      Impression    IMPRESSION:   1. Low lung volumes with continued basilar predominant opacities  likely representing pulmonary edema and/or atelectasis.  2. Small bilateral pleural effusions.  3. Interval extubation. Remaining support devices are stable..    I have personally reviewed the examination and initial  interpretation  and I agree with the findings.    CR MCLAUGHLIN MD         SYSTEM ID:  [id number]       =========================================

## 2022-02-07 ENCOUNTER — APPOINTMENT (OUTPATIENT)
Dept: CARDIOLOGY | Facility: CLINIC | Age: 82
DRG: 228 | End: 2022-02-07
Attending: PHYSICIAN ASSISTANT
Payer: COMMERCIAL

## 2022-02-07 ENCOUNTER — APPOINTMENT (OUTPATIENT)
Dept: PHYSICAL THERAPY | Facility: CLINIC | Age: 82
DRG: 228 | End: 2022-02-07
Attending: INTERNAL MEDICINE
Payer: COMMERCIAL

## 2022-02-07 ENCOUNTER — APPOINTMENT (OUTPATIENT)
Dept: GENERAL RADIOLOGY | Facility: CLINIC | Age: 82
DRG: 228 | End: 2022-02-07
Attending: SURGERY
Payer: COMMERCIAL

## 2022-02-07 ENCOUNTER — APPOINTMENT (OUTPATIENT)
Dept: GENERAL RADIOLOGY | Facility: CLINIC | Age: 82
DRG: 228 | End: 2022-02-07
Attending: PHYSICIAN ASSISTANT
Payer: COMMERCIAL

## 2022-02-07 LAB
ANION GAP SERPL CALCULATED.3IONS-SCNC: 6 MMOL/L (ref 3–14)
ATRIAL RATE - MUSE: 107 BPM
ATRIAL RATE - MUSE: 77 BPM
ATRIAL RATE - MUSE: 82 BPM
BUN SERPL-MCNC: 27 MG/DL (ref 7–30)
CA-I BLD-MCNC: 4.5 MG/DL (ref 4.4–5.2)
CALCIUM SERPL-MCNC: 8.2 MG/DL (ref 8.5–10.1)
CHLORIDE BLD-SCNC: 104 MMOL/L (ref 94–109)
CO2 SERPL-SCNC: 24 MMOL/L (ref 20–32)
CREAT SERPL-MCNC: 1.33 MG/DL (ref 0.52–1.04)
DIASTOLIC BLOOD PRESSURE - MUSE: NORMAL MMHG
ERYTHROCYTE [DISTWIDTH] IN BLOOD BY AUTOMATED COUNT: 17.7 % (ref 10–15)
ERYTHROCYTE [DISTWIDTH] IN BLOOD BY AUTOMATED COUNT: 18 % (ref 10–15)
GFR SERPL CREATININE-BSD FRML MDRD: 40 ML/MIN/1.73M2
GLUCOSE BLD-MCNC: 82 MG/DL (ref 70–99)
HCT VFR BLD AUTO: 23.6 % (ref 35–47)
HCT VFR BLD AUTO: 25.6 % (ref 35–47)
HGB BLD-MCNC: 7.6 G/DL (ref 11.7–15.7)
HGB BLD-MCNC: 8.1 G/DL (ref 11.7–15.7)
HOLD SPECIMEN: NORMAL
INR PPP: 1.41 (ref 0.85–1.15)
INTERPRETATION ECG - MUSE: NORMAL
LVEF ECHO: NORMAL
MCH RBC QN AUTO: 31.7 PG (ref 26.5–33)
MCH RBC QN AUTO: 31.9 PG (ref 26.5–33)
MCHC RBC AUTO-ENTMCNC: 31.6 G/DL (ref 31.5–36.5)
MCHC RBC AUTO-ENTMCNC: 32.2 G/DL (ref 31.5–36.5)
MCV RBC AUTO: 101 FL (ref 78–100)
MCV RBC AUTO: 98 FL (ref 78–100)
P AXIS - MUSE: 42 DEGREES
P AXIS - MUSE: 45 DEGREES
P AXIS - MUSE: 67 DEGREES
PHOSPHATE SERPL-MCNC: 2.6 MG/DL (ref 2.5–4.5)
PLATELET # BLD AUTO: 229 10E3/UL (ref 150–450)
PLATELET # BLD AUTO: 254 10E3/UL (ref 150–450)
POTASSIUM BLD-SCNC: 3.7 MMOL/L (ref 3.4–5.3)
PR INTERVAL - MUSE: 104 MS
PR INTERVAL - MUSE: 128 MS
PR INTERVAL - MUSE: 164 MS
QRS DURATION - MUSE: 58 MS
QRS DURATION - MUSE: 76 MS
QRS DURATION - MUSE: 92 MS
QT - MUSE: 358 MS
QT - MUSE: 418 MS
QT - MUSE: 442 MS
QTC - MUSE: 473 MS
QTC - MUSE: 477 MS
QTC - MUSE: 516 MS
R AXIS - MUSE: 23 DEGREES
R AXIS - MUSE: 23 DEGREES
R AXIS - MUSE: 5 DEGREES
RBC # BLD AUTO: 2.4 10E6/UL (ref 3.8–5.2)
RBC # BLD AUTO: 2.54 10E6/UL (ref 3.8–5.2)
SARS-COV-2 RNA RESP QL NAA+PROBE: NEGATIVE
SODIUM SERPL-SCNC: 134 MMOL/L (ref 133–144)
SYSTOLIC BLOOD PRESSURE - MUSE: NORMAL MMHG
T AXIS - MUSE: 19 DEGREES
T AXIS - MUSE: 35 DEGREES
T AXIS - MUSE: 66 DEGREES
UFH PPP CHRO-ACNC: 0.1 IU/ML
UFH PPP CHRO-ACNC: 0.16 IU/ML
VENTRICULAR RATE- MUSE: 107 BPM
VENTRICULAR RATE- MUSE: 77 BPM
VENTRICULAR RATE- MUSE: 82 BPM
WBC # BLD AUTO: 10.4 10E3/UL (ref 4–11)
WBC # BLD AUTO: 11.2 10E3/UL (ref 4–11)

## 2022-02-07 PROCEDURE — 85027 COMPLETE CBC AUTOMATED: CPT | Performed by: PHYSICIAN ASSISTANT

## 2022-02-07 PROCEDURE — 250N000011 HC RX IP 250 OP 636: Performed by: PHYSICIAN ASSISTANT

## 2022-02-07 PROCEDURE — 71045 X-RAY EXAM CHEST 1 VIEW: CPT | Mod: 26 | Performed by: RADIOLOGY

## 2022-02-07 PROCEDURE — 87205 SMEAR GRAM STAIN: CPT | Performed by: PHYSICIAN ASSISTANT

## 2022-02-07 PROCEDURE — 71046 X-RAY EXAM CHEST 2 VIEWS: CPT | Mod: 26 | Performed by: RADIOLOGY

## 2022-02-07 PROCEDURE — 85520 HEPARIN ASSAY: CPT | Performed by: THORACIC SURGERY (CARDIOTHORACIC VASCULAR SURGERY)

## 2022-02-07 PROCEDURE — 93308 TTE F-UP OR LMTD: CPT | Mod: 26 | Performed by: INTERNAL MEDICINE

## 2022-02-07 PROCEDURE — 82330 ASSAY OF CALCIUM: CPT | Performed by: SURGERY

## 2022-02-07 PROCEDURE — 97116 GAIT TRAINING THERAPY: CPT | Mod: GP

## 2022-02-07 PROCEDURE — 71045 X-RAY EXAM CHEST 1 VIEW: CPT

## 2022-02-07 PROCEDURE — 93010 ELECTROCARDIOGRAM REPORT: CPT | Performed by: INTERNAL MEDICINE

## 2022-02-07 PROCEDURE — 250N000013 HC RX MED GY IP 250 OP 250 PS 637: Performed by: STUDENT IN AN ORGANIZED HEALTH CARE EDUCATION/TRAINING PROGRAM

## 2022-02-07 PROCEDURE — 258N000003 HC RX IP 258 OP 636: Performed by: PHYSICIAN ASSISTANT

## 2022-02-07 PROCEDURE — 36415 COLL VENOUS BLD VENIPUNCTURE: CPT | Performed by: PHYSICIAN ASSISTANT

## 2022-02-07 PROCEDURE — 250N000013 HC RX MED GY IP 250 OP 250 PS 637: Performed by: SURGERY

## 2022-02-07 PROCEDURE — 214N000001 HC R&B CCU UMMC

## 2022-02-07 PROCEDURE — 71046 X-RAY EXAM CHEST 2 VIEWS: CPT

## 2022-02-07 PROCEDURE — 93325 DOPPLER ECHO COLOR FLOW MAPG: CPT

## 2022-02-07 PROCEDURE — 250N000013 HC RX MED GY IP 250 OP 250 PS 637: Performed by: THORACIC SURGERY (CARDIOTHORACIC VASCULAR SURGERY)

## 2022-02-07 PROCEDURE — 36415 COLL VENOUS BLD VENIPUNCTURE: CPT | Performed by: THORACIC SURGERY (CARDIOTHORACIC VASCULAR SURGERY)

## 2022-02-07 PROCEDURE — 97530 THERAPEUTIC ACTIVITIES: CPT | Mod: GP

## 2022-02-07 PROCEDURE — 93005 ELECTROCARDIOGRAM TRACING: CPT

## 2022-02-07 PROCEDURE — 93321 DOPPLER ECHO F-UP/LMTD STD: CPT | Mod: 26 | Performed by: INTERNAL MEDICINE

## 2022-02-07 PROCEDURE — 80048 BASIC METABOLIC PNL TOTAL CA: CPT | Performed by: PHYSICIAN ASSISTANT

## 2022-02-07 PROCEDURE — 85610 PROTHROMBIN TIME: CPT | Performed by: PHYSICIAN ASSISTANT

## 2022-02-07 PROCEDURE — 250N000013 HC RX MED GY IP 250 OP 250 PS 637: Performed by: PHYSICIAN ASSISTANT

## 2022-02-07 PROCEDURE — 93321 DOPPLER ECHO F-UP/LMTD STD: CPT

## 2022-02-07 PROCEDURE — 85520 HEPARIN ASSAY: CPT | Performed by: STUDENT IN AN ORGANIZED HEALTH CARE EDUCATION/TRAINING PROGRAM

## 2022-02-07 PROCEDURE — 84100 ASSAY OF PHOSPHORUS: CPT | Performed by: SURGERY

## 2022-02-07 PROCEDURE — 93325 DOPPLER ECHO COLOR FLOW MAPG: CPT | Mod: 26 | Performed by: INTERNAL MEDICINE

## 2022-02-07 PROCEDURE — U0005 INFEC AGEN DETEC AMPLI PROBE: HCPCS | Performed by: PHYSICIAN ASSISTANT

## 2022-02-07 RX ORDER — METHOCARBAMOL 500 MG/1
500 TABLET, FILM COATED ORAL 4 TIMES DAILY
Status: DISCONTINUED | OUTPATIENT
Start: 2022-02-07 | End: 2022-02-10 | Stop reason: HOSPADM

## 2022-02-07 RX ORDER — HEPARIN SODIUM 10000 [USP'U]/100ML
0-5000 INJECTION, SOLUTION INTRAVENOUS CONTINUOUS
Status: DISCONTINUED | OUTPATIENT
Start: 2022-02-07 | End: 2022-02-08

## 2022-02-07 RX ORDER — WARFARIN SODIUM 3 MG/1
3 TABLET ORAL
Status: COMPLETED | OUTPATIENT
Start: 2022-02-07 | End: 2022-02-07

## 2022-02-07 RX ORDER — METOCLOPRAMIDE 5 MG/1
5 TABLET ORAL
Status: DISCONTINUED | OUTPATIENT
Start: 2022-02-07 | End: 2022-02-08

## 2022-02-07 RX ORDER — POTASSIUM CHLORIDE 20MEQ/15ML
20 LIQUID (ML) ORAL 2 TIMES DAILY
Status: DISCONTINUED | OUTPATIENT
Start: 2022-02-07 | End: 2022-02-10 | Stop reason: HOSPADM

## 2022-02-07 RX ORDER — FUROSEMIDE 10 MG/ML
20 INJECTION INTRAMUSCULAR; INTRAVENOUS EVERY 8 HOURS
Status: COMPLETED | OUTPATIENT
Start: 2022-02-07 | End: 2022-02-07

## 2022-02-07 RX ORDER — ACETAMINOPHEN 325 MG/1
650 TABLET ORAL EVERY 6 HOURS
Status: DISCONTINUED | OUTPATIENT
Start: 2022-02-07 | End: 2022-02-10 | Stop reason: HOSPADM

## 2022-02-07 RX ADMIN — WARFARIN SODIUM 3 MG: 3 TABLET ORAL at 17:35

## 2022-02-07 RX ADMIN — METHOCARBAMOL 500 MG: 500 TABLET ORAL at 15:22

## 2022-02-07 RX ADMIN — ACETAMINOPHEN 975 MG: 325 TABLET, FILM COATED ORAL at 06:18

## 2022-02-07 RX ADMIN — Medication 5 MG: at 22:00

## 2022-02-07 RX ADMIN — Medication 1 TABLET: at 08:21

## 2022-02-07 RX ADMIN — FUROSEMIDE 20 MG: 10 INJECTION, SOLUTION INTRAVENOUS at 08:21

## 2022-02-07 RX ADMIN — ACETAMINOPHEN 650 MG: 325 TABLET, FILM COATED ORAL at 17:35

## 2022-02-07 RX ADMIN — METOCLOPRAMIDE HYDROCHLORIDE 5 MG: 5 TABLET ORAL at 14:18

## 2022-02-07 RX ADMIN — MUPIROCIN 0.5 G: 20 OINTMENT TOPICAL at 08:22

## 2022-02-07 RX ADMIN — ASPIRIN 81 MG CHEWABLE TABLET 81 MG: 81 TABLET CHEWABLE at 08:21

## 2022-02-07 RX ADMIN — METOCLOPRAMIDE HYDROCHLORIDE 5 MG: 5 TABLET ORAL at 15:22

## 2022-02-07 RX ADMIN — PANTOPRAZOLE SODIUM 40 MG: 40 TABLET, DELAYED RELEASE ORAL at 08:21

## 2022-02-07 RX ADMIN — METHOCARBAMOL 500 MG: 500 TABLET ORAL at 09:11

## 2022-02-07 RX ADMIN — ACETAMINOPHEN 650 MG: 325 TABLET, FILM COATED ORAL at 12:45

## 2022-02-07 RX ADMIN — POTASSIUM CHLORIDE 20 MEQ: 20 SOLUTION ORAL at 08:34

## 2022-02-07 RX ADMIN — FUROSEMIDE 20 MG: 10 INJECTION, SOLUTION INTRAVENOUS at 15:22

## 2022-02-07 RX ADMIN — POLYETHYLENE GLYCOL 3350 17 G: 17 POWDER, FOR SOLUTION ORAL at 08:22

## 2022-02-07 RX ADMIN — METHOCARBAMOL 500 MG: 500 TABLET ORAL at 12:45

## 2022-02-07 RX ADMIN — Medication 1 TABLET: at 19:23

## 2022-02-07 RX ADMIN — HEPARIN SODIUM 800 UNITS/HR: 1000 INJECTION INTRAVENOUS; SUBCUTANEOUS at 14:34

## 2022-02-07 RX ADMIN — METHOCARBAMOL 500 MG: 500 TABLET ORAL at 19:23

## 2022-02-07 RX ADMIN — LEVOTHYROXINE SODIUM 50 MCG: 0.05 TABLET ORAL at 08:21

## 2022-02-07 RX ADMIN — MUPIROCIN 0.5 G: 20 OINTMENT TOPICAL at 19:23

## 2022-02-07 RX ADMIN — THERA TABS 1 TABLET: TAB at 08:22

## 2022-02-07 RX ADMIN — METOCLOPRAMIDE HYDROCHLORIDE 5 MG: 5 TABLET ORAL at 22:00

## 2022-02-07 RX ADMIN — SIMVASTATIN 20 MG: 20 TABLET, FILM COATED ORAL at 22:00

## 2022-02-07 RX ADMIN — POTASSIUM CHLORIDE 20 MEQ: 20 SOLUTION ORAL at 19:23

## 2022-02-07 ASSESSMENT — ACTIVITIES OF DAILY LIVING (ADL)
ADLS_ACUITY_SCORE: 11
ADLS_ACUITY_SCORE: 12
ADLS_ACUITY_SCORE: 11
ADLS_ACUITY_SCORE: 12
ADLS_ACUITY_SCORE: 11

## 2022-02-07 ASSESSMENT — MIFFLIN-ST. JEOR: SCORE: 1084.13

## 2022-02-07 NOTE — PROGRESS NOTES
Cardiovascular Surgery Progress Note  02/07/2022         Assessment and Plan:     Kimberley Mullins is a 81 year old female with a PMH of HTN, hypothyroidism, recent [facility] admission (1/14-1/17) for N/V and hyponatremia found to have a new apical, septal VSD ( underwent percutaneous closure with 16mm Amplatzer device (now has a residual, high gradient shunt) who underwent apical septal VSD repair and removal of Amplatzer device 2/4 with Dr. Whitney and Dr. Whittington    Cardiovascular:   # Hx of VSD s/p percutaneous closure with 16mm Amplatzer device  # Residual high gradient VSD shunt s/p septal VSD repair and Amplatzer device removal 2/4 with Dr. Whitney and Dr. Whittington  # Known ST segment deviation  - Patient states no new chest pain with activity, SOB, or symptoms of ACS. EKG unchanged from 2/4 and 2/5. No signs or symptoms of pericarditis or pericardial tamponade. Echo 2/7 pending.  - PTA Medications: Simvastatin 20mg daily  Amlodipine 5mg daily  - Current Medications: ASA 81mg  Simvastatin 20mg daily BB on hold for hypotension  - No arrhythmia, HD stable. Goal Map > 65, Goal SBP <140mmHg  - Most recent echo 2/3/22 showed LV EF 55-60%  - Repeat Echocardiogram once chest tubes removed.    Chest tubes: removed without immediate complications  TPW: removed without immediate complications    Pulmonary:  # Single subsegmental pulmonary embolism without acute cor pulmonale  - Extubated POD 1 to 2 L/m lpm via NC. Now saturating well on RA   - Supplemental O2 PRN to keep sats > 92%. Wean off as tolerated.  - Pulm toilet, IS, activity and deep breathing    Neurology:  # History of tinnitus  # History of macular degeneration and bilateral cataracts  # CAROL  - Neuro intact  - Acute post-operative pain well controlled with acetaminophen, PO oxycodone PRN, IV dilaudid PRN    MSK/ Skin:  # Sternotomy  # Surgical Incision  # Osteoarthritis s/p total knee replacement  # Osteopenia  - Sternal precautions  - Postoperative incision  management per protocol  - PT/OT/CR     / Renal:  - No Hx of renal disease. Baseline Creatinine 1.2-1.4.   - Most recent Creatinine: 1.33, adequate UOP.   - Diuresis: Furosemide 20mg IV bid  - Discontinue Plascencia catheter with voiding trial 2/7    GI / FEN:   # IBS with diarrhea  - Regular diet, continue bowel regimen  - Metoclopromide for nausea and appetite stimulation  - Replace electrolytes as needed, hepatic enzymes WNL.    Endocrine:   # Stress Induced Hyperglycemia  # Hypothyroidism  - PTA levothyroxine 50mcg restarted  - Most Recent Hemoglobin A1c: 5.6  - Stress induced hyperglycemia initially managed on insulin drip postop, transitioned to sliding scale. Discontinued insulin 2/7    Infectious Disease:  # Stress induced leukocytosis  - WBC 10.4 , remains afebrile, no signs or symptoms of infection  - Completed perioperative antibiotics    Hematology:   # Acute blood loss anemia   # Thrombocytopenia  # Small segmental PE, acute vs chronic  - Hgb 7.6; Plt 229, no signs or symptoms of active bleeding    Antithrombotics:   - ASA 81mg  Heparin straight rate 500/hour transitioned to LIH drip    Prophylaxis:   - Stress ulcer prophylaxis: Pantoprazole 40 mg daily for 30 days  - DVT prophylaxis: SCD    Disposition:   - Transferred to  on 2/6/22  - Therapies recommending discharge pending    Discussed with Surgeon, Dr. Whitney via written and verbal commnication.     Manoj Hollis PA-c  Pager: [phone]  7:30 AM February 7, 2022           Interval History:     No overnight events.  States pain is well managed on current regimen. Slept well overnight.  Tolerating diet but is having less of an appetite, is passing flatus, BM x 1. Nausea noted especially after taking her morning medications  Breathing well without complaints.   Working with therapies and ambulating in halls without assistance.   Denies chest pain, palpitations, dizziness, syncopal symptoms, fevers, chills, myalgias, or sternal popping/clicking.          "Physical Exam:   Blood pressure 117/68, pulse 82, temperature 98.3  F (36.8  C), temperature source Oral, resp. rate 20, height 1.6 m (5' 3\"), weight 65.5 kg (144 lb 6.4 oz), SpO2 93 %, not currently breastfeeding.  Vitals:    02/04/22 0631 02/05/22 0400 02/06/22 0100   Weight: 60.2 kg (132 lb 11.2 oz) 67.1 kg (147 lb 14.9 oz) 65.5 kg (144 lb 6.4 oz)      Current Weight: 65.5 Trend: -1.4  Admit weight: 68.6    Daily Fluid status; net loss: -213      Net loss SA: -04609  MAPs: 68-92  LVEF: 55-60    Gen: A&Ox4, NAD  Neuro: no focal deficits   CV: RRR, normal S1 S2, no murmurs, rubs or gallops. No appreciable JVD  Vascular: Peripheral pulses present Radial 2+, Dorsalis Pedis 2+, Posterior Tibialis 2+.   Pulm: CTA, no wheezing or rhonchi, normal breathing on RA  Abd: nondistended, normal BS, soft, nontender  Ext: Positive peripheral edema, 1+ pitting. Warm.   Incision: clean, dry, intact, no erythema, sternum stable  Tubes/drain sites: dressing clean and dry, serosanguinous output, no air leak. 24 hr output 100 mL.          Data:    Imaging:  reviewed recent imaging,     Chest x-ray  Interpretation:    Echocardiogram 2/3/22  Interpretation Summary  Global and regional left ventricular function is normal with an EF of 55-60%.  Flattened septum is consistent with right ventricular pressure overload.  The Amplatzer device placed at the site of the apical VSD is well seated  however bowing in to RV with apical vimal device leak. Peak VSD flow through  the vimal-device leak is 5 m/s.  Global right ventricular function is mildly reduced.  Pulmonary hypertension is present.  Right ventricular systolic pressure is 70mmHg above the right atrial pressure.  Trivial pericardial effusion is present.     This study was compared with the study from 2/2/2022 .  Systolic PAP estimated higher in this study otherwise no change.    CT scan:    Labs:  CBC  Recent Labs   Lab 02/07/22  0532 02/06/22  0506 02/05/22  0414 02/04/22  1422   WBC " 10.4 10.8 8.1 11.7*   RBC 2.40* 2.24* 2.38* 2.44*   HGB 7.6* 7.2* 7.5* 7.7*   HCT 23.6* 22.1* 22.5* 23.0*   MCV 98 99 95 94   MCH 31.7 32.1 31.5 31.6   MCHC 32.2 32.6 33.3 33.5   RDW 18.0* 18.7* 18.3* 17.2*    201 270 298     CMP:  Last Comprehensive Metabolic Panel:  Sodium   Date Value Ref Range Status   02/07/2022 134 133 - 144 mmol/L Final   01/19/2022 130.9 (A) 135 - 146 mmol/L Final     Potassium   Date Value Ref Range Status   02/07/2022 3.7 3.4 - 5.3 mmol/L Final   01/19/2022 4.75 3.5 - 5.3 mmol/L Final     Chloride   Date Value Ref Range Status   02/07/2022 104 94 - 109 mmol/L Final   01/19/2022 100.4 98 - 110 mmol/L Final     Carbon Dioxide   Date Value Ref Range Status   01/19/2022 25.6 20 - 32 mmol/L Final     Carbon Dioxide (CO2)   Date Value Ref Range Status   02/07/2022 24 20 - 32 mmol/L Final     Anion Gap   Date Value Ref Range Status   02/07/2022 6 3 - 14 mmol/L Final   05/05/2008 13 6 - 17 mmol/L Final     Glucose   Date Value Ref Range Status   02/07/2022 82 70 - 99 mg/dL Final   01/19/2022 112 (A) 60 - 99 mg/dL Final     Urea Nitrogen   Date Value Ref Range Status   02/07/2022 27 7 - 30 mg/dL Final   01/19/2022 13 7 - 25 mg/dL Final     BUN/Creatinine Ratio   Date Value Ref Range Status   01/19/2022 17.8 6 - 22 Final     Creatinine   Date Value Ref Range Status   02/07/2022 1.33 (H) 0.52 - 1.04 mg/dL Final   01/19/2022 0.73 0.60 - 1.30 mg/dL Final     GFR Estimate   Date Value Ref Range Status   02/07/2022 40 (L) >60 mL/min/1.73m2 Final     Comment:     Effective December 21, 2021 eGFRcr in adults is calculated using the 2021 CKD-EPI creatinine equation which includes age and gender (Rolanda et al., NEJM, DOI: 10.1056/APFMgc4171306)   11/07/2018 84 > OR = 60 mL/min/1.73m2 Final     Calcium   Date Value Ref Range Status   02/07/2022 8.2 (L) 8.5 - 10.1 mg/dL Final   01/19/2022 8.7 8.6 - 10.3 mg/dL Final     Bilirubin Total   Date Value Ref Range Status   02/05/2022 0.9 0.2 - 1.3 mg/dL Final    05/26/2021 0.9 0.2 - 1.2 mg/dL Final     Alkaline Phosphatase   Date Value Ref Range Status   02/05/2022 45 40 - 150 U/L Final   05/26/2021 68 33 - 130 U/L Final     ALT   Date Value Ref Range Status   02/05/2022 27 0 - 50 U/L Final   11/07/2018 19 6 - 29 U/L Final     AST   Date Value Ref Range Status   02/05/2022 152 (H) 0 - 45 U/L Final   11/07/2018 25 10 - 35 U/L Final     BMP  Recent Labs   Lab 02/07/22  0532 02/06/22  2124 02/06/22  1659 02/06/22  1149 02/06/22  0422 02/06/22  0405 02/06/22  0001 02/05/22 2023 02/05/22  0615 02/05/22  0613 02/05/22  0005 02/04/22  2330     --   --   --   --  136  --   --   --  140  --  142   POTASSIUM 3.7  --   --   --   --  3.8  3.8  --  4.0  --  3.7  --  3.3*   CHLORIDE 104  --   --   --   --  109  --   --   --  111*  --  108   ANANDA 8.2*  --   --   --   --  7.5*  --   --   --  8.3*  --  8.5   CO2 24  --   --   --   --  21  --   --   --  20  --  21   BUN 27  --   --   --   --  18  --   --   --  15  --  17   CR 1.33*  --   --   --   --  1.19*  --   --   --  1.25*  --  1.32*   GLC 82 114* 100* 154*   < > 100*   < >  --    < > 117*   < > 150*    < > = values in this interval not displayed.     INR  Recent Labs   Lab 02/07/22  0532 02/06/22  1542 02/04/22  1422 02/04/22  1155   INR 1.41* 1.34* 1.34* 1.54*      Hepatic Panel  Recent Labs   Lab 02/05/22  0613 02/04/22  2330 02/04/22  1422 02/04/22  0537   * 150* 125* 141*   ALT 27 32 33 43   ALKPHOS 45 44 42 49   BILITOTAL 0.9 0.6 1.0 1.8*   ALBUMIN 2.7* 2.9* 2.5* 3.4     GLUCOSE:   Recent Labs   Lab 02/07/22  0532 02/06/22  2124 02/06/22  1659 02/06/22  1149 02/06/22  0804 02/06/22  0422   GLC 82 114* 100* 154* 110* 102*

## 2022-02-07 NOTE — PROGRESS NOTES
"CLINICAL NUTRITION SERVICES - REASSESSMENT NOTE     Nutrition Prescription    RECOMMENDATIONS FOR MDs/PROVIDERS TO ORDER:  1. Monitor lytes for refeeding syndrome and if low, rec replacement. K+, Mg++ and phos labs all WNL when last checked (2/5 or 2/7). Pt's urinary ketones were +10 on 2/3/2022.   2. Consider order biotene prn.     Malnutrition Status:    Severe malnutrition in the context of acute illness on chronic illness    Recommendations already ordered by Registered Dietitian (RD):  Oral supplements    Future/Additional Recommendations:  1. Continue regular diet, liberalized to help encourage oral intake. Encourage intake of oral supplements and as able, snacks. Rec pt take medications with food, as able.   2. Continue thera-vit, as ordered, to help meet micronutrient needs. Consider modifying to a multivitamin with minerals to help better meet micronutrient needs.   3. Check vitamin D status and order supplementation if low. Pt's 25 OH vitamin D total lab was 15 on 4/5/2018.   4. Continue bowel regimen.      EVALUATION OF THE PROGRESS TOWARD GOALS   Diet: Adv diet as tolerated, regular since 2/6. Had been on a 2 g sodium diet 2/3 pm but was then NPO/clear liquids until 2/6 (due to surgery).   Intake: Poor diet tolerance per RN. Per % intake flowsheets, pt consuming 75% with a good appetite 2/1, 100% on 2/3, and % with a fair to good appetite 2/6. Per RN 2/7, \"Regular diet. Poor appetite. Encourage oral intake.\"  Pt is requiring tray set-up. [employer] (meal ordering system) indicates food/beverages sent 2/5-2/6 totaled 409 kcals and 20 g protein daily on average. Ordered one meal on 2/5 and then two meals yesterday (2/6). Factors affecting oral intake include recent diet advancement post-op, lack of appetite, nausea with pills, and dry mouth. Per discussion with pt and daughter, pt's appetite has been decreased since 1/10/2022.      NEW FINDINGS   Resp: Extubated 2/5  General: Pt states she has been " feeling weak and loss of strength.   GI: On scheduled senna and miralax. Zero stools daily, on average. Last stool noted today (2/7) was formed and brown.   Wt Hx: 65.6 kg (2/18/21), 65.8 kg (8/4/21), 66.2 kg (9/1/21), 63 kg (1/14/2022), 68.6 kg (1/24/2022, admit) - No significant/severe wt loss. Difficult to assess wt changes with changes in fluid status and diuresis.     ASSESSED NUTRITION NEEDS (updated):   Dosing Weight: 60 kg (based on lowest wt this admission of 60.2 kg on 2/4)  Estimated Energy Needs: 9986-9090 kcals/day (25 - 30 kcals/kg)  Justification: Maintenance  Estimated Protein Needs: 72-90 grams protein/day (1.2-1.5 grams of pro/kg)  Justification: Increased needs post-op  Estimated Fluid Needs: 6285-6057 mL/day (25 - 30 mL/kg)   Justification: Maintenance    MALNUTRITION  % Intake: </= 50% for >/= 5 days (severe)  % Weight Loss: Difficult to assess wt changes with changes in fluid status and diuresis.   Subcutaneous Fat Loss: None observed  Muscle Loss: Temporal:  Mild vs age related, Upper arm (bicep, tricep):  Mild-Moderate and Dorsal hand:  Mild-moderate vs age related  Fluid Accumulation/Edema: Trace  Malnutrition Diagnosis: Severe malnutrition in the context of acute illness on chronic illness    Previous Goals   Patient to consume % of nutritionally adequate meal trays TID, or the equivalent with supplements/snacks.  Evaluation: Not meeting.     Previous Nutrition Diagnosis  Inadequate oral intake related to poor appetite and restrictive diet order as evidenced by documented intakes and muscle wasting.     Evaluation: Unresolved. Updated below.     CURRENT NUTRITION DIAGNOSIS  Inadequate oral intake related to decreased appetite, recent diet advancement, nausea with pills, and dry mouth as evidenced by pt with a poor appetite and [employer] (meal ordering system) indicates food/beverages sent 2/5-2/6 totaled 409 kcals and 20 g protein daily on average while estimated needs are 2901-3768  kcals/day (25 - 30 kcals/kg) and 72-90 grams protein/day (1.2-1.5 grams of pro/kg).    INTERVENTIONS   Implementation  Medical food supplement therapy: Discussed oral supplements. Pt states she likes Boost and drinks this at room temperature. Ordered chocolate Ensure Enlive at 14:00. If out of chocolate, pt is agreeable to trying vanilla Ensure Enlive.   Nutrition education for nutrition relationship to health/disease: Encouraged intake at meals (explained rationale) and also explained current diet order. Encouraged intake of oral supplements and adequate nutrition intake. Discussed potential nutrition interventions due to dry mouth and nausea when taking pills.     Goals  Patient to consume % of nutritionally adequate meal trays TID, or the equivalent with supplements/snacks.    Monitoring/Evaluation  Progress toward goals will be monitored and evaluated per protocol.     Nutrition will continue to follow.      Court Shannon, MS, RD, LD, [facility]   6C Pgr: [phone]

## 2022-02-07 NOTE — PROGRESS NOTES
"SPIRITUAL HEALTH SERVICES  SPIRITUAL ASSESSMENT Progress Note  [facility] ([place]) 6C     REFERRAL SOURCE: Follow up    Pt receptive to visit, daughter at bedside.  Pt said she is sore from her surgery but is taking things \"one day at a time.\"  Provided prayer and support    PLAN: SHS will remain available and continue to visit     Kary Toney    Pager: [phone]    "

## 2022-02-07 NOTE — PLAN OF CARE
Admitted for apical VSD repair and removal of Amplatzer device after she underwent percutaneous closure and developed a high gradient shunt. PMHx ofHTN, hypothyroidism, CAROL, VSD      Neuro: AOx4, Calm and cooperative, Neuros intact.  Cardiac/Tele/VS: SR with ST elevation (Team aware). Other VSS (see flowsheets)  Respiratory: Room air, tolerates well. KUMAR  GI/: Plascencia catheter, adequate UOP. No BM this shift.   Diet/Appetite: Regular diet. Poor appetite. Encourage oral intake.  Skin: CTx3 attached to suction, Incision dressed CDI.  LDAs: 2 R. PIV. Infusing heparin at 500 units/hr and SL  Activity: Assist of 1   Pain: Denies pain. Scheduled tylenol given     Plan: Continue to monitor and notify Cards 1 of concerns

## 2022-02-08 ENCOUNTER — APPOINTMENT (OUTPATIENT)
Dept: OCCUPATIONAL THERAPY | Facility: CLINIC | Age: 82
DRG: 228 | End: 2022-02-08
Attending: INTERNAL MEDICINE
Payer: COMMERCIAL

## 2022-02-08 ENCOUNTER — APPOINTMENT (OUTPATIENT)
Dept: GENERAL RADIOLOGY | Facility: CLINIC | Age: 82
DRG: 228 | End: 2022-02-08
Attending: SURGERY
Payer: COMMERCIAL

## 2022-02-08 ENCOUNTER — APPOINTMENT (OUTPATIENT)
Dept: PHYSICAL THERAPY | Facility: CLINIC | Age: 82
DRG: 228 | End: 2022-02-08
Attending: INTERNAL MEDICINE
Payer: COMMERCIAL

## 2022-02-08 LAB
ANION GAP SERPL CALCULATED.3IONS-SCNC: 10 MMOL/L (ref 3–14)
ATRIAL RATE - MUSE: 81 BPM
BUN SERPL-MCNC: 21 MG/DL (ref 7–30)
CALCIUM SERPL-MCNC: 7.8 MG/DL (ref 8.5–10.1)
CHLORIDE BLD-SCNC: 102 MMOL/L (ref 94–109)
CO2 SERPL-SCNC: 22 MMOL/L (ref 20–32)
CREAT SERPL-MCNC: 1.07 MG/DL (ref 0.52–1.04)
DIASTOLIC BLOOD PRESSURE - MUSE: NORMAL MMHG
ERYTHROCYTE [DISTWIDTH] IN BLOOD BY AUTOMATED COUNT: 17 % (ref 10–15)
GFR SERPL CREATININE-BSD FRML MDRD: 52 ML/MIN/1.73M2
GLUCOSE BLD-MCNC: 92 MG/DL (ref 70–99)
HAPTOGLOB SERPL-MCNC: 65 MG/DL (ref 32–197)
HCT VFR BLD AUTO: 23.2 % (ref 35–47)
HGB BLD-MCNC: 7.5 G/DL (ref 11.7–15.7)
INR PPP: 3 (ref 0.85–1.15)
INR PPP: 3.28 (ref 0.85–1.15)
INTERPRETATION ECG - MUSE: NORMAL
LDH SERPL L TO P-CCNC: 742 U/L (ref 81–234)
MCH RBC QN AUTO: 31.3 PG (ref 26.5–33)
MCHC RBC AUTO-ENTMCNC: 32.3 G/DL (ref 31.5–36.5)
MCV RBC AUTO: 97 FL (ref 78–100)
P AXIS - MUSE: 10 DEGREES
PLATELET # BLD AUTO: 289 10E3/UL (ref 150–450)
POTASSIUM BLD-SCNC: 3.9 MMOL/L (ref 3.4–5.3)
PR INTERVAL - MUSE: 146 MS
QRS DURATION - MUSE: 92 MS
QT - MUSE: 408 MS
QTC - MUSE: 473 MS
R AXIS - MUSE: -10 DEGREES
RBC # BLD AUTO: 2.4 10E6/UL (ref 3.8–5.2)
SODIUM SERPL-SCNC: 134 MMOL/L (ref 133–144)
SYSTOLIC BLOOD PRESSURE - MUSE: NORMAL MMHG
T AXIS - MUSE: 16 DEGREES
UFH PPP CHRO-ACNC: 0.17 IU/ML
VENTRICULAR RATE- MUSE: 81 BPM
WBC # BLD AUTO: 10 10E3/UL (ref 4–11)

## 2022-02-08 PROCEDURE — 250N000013 HC RX MED GY IP 250 OP 250 PS 637: Performed by: SURGERY

## 2022-02-08 PROCEDURE — 97530 THERAPEUTIC ACTIVITIES: CPT | Mod: GO

## 2022-02-08 PROCEDURE — 85610 PROTHROMBIN TIME: CPT | Performed by: PHYSICIAN ASSISTANT

## 2022-02-08 PROCEDURE — 71045 X-RAY EXAM CHEST 1 VIEW: CPT

## 2022-02-08 PROCEDURE — 250N000013 HC RX MED GY IP 250 OP 250 PS 637: Performed by: STUDENT IN AN ORGANIZED HEALTH CARE EDUCATION/TRAINING PROGRAM

## 2022-02-08 PROCEDURE — 97116 GAIT TRAINING THERAPY: CPT | Mod: GP

## 2022-02-08 PROCEDURE — 99231 SBSQ HOSP IP/OBS SF/LOW 25: CPT | Performed by: PEDIATRICS

## 2022-02-08 PROCEDURE — 97530 THERAPEUTIC ACTIVITIES: CPT | Mod: GP

## 2022-02-08 PROCEDURE — 83010 ASSAY OF HAPTOGLOBIN QUANT: CPT | Performed by: THORACIC SURGERY (CARDIOTHORACIC VASCULAR SURGERY)

## 2022-02-08 PROCEDURE — 83615 LACTATE (LD) (LDH) ENZYME: CPT | Performed by: THORACIC SURGERY (CARDIOTHORACIC VASCULAR SURGERY)

## 2022-02-08 PROCEDURE — 36415 COLL VENOUS BLD VENIPUNCTURE: CPT | Performed by: PHYSICIAN ASSISTANT

## 2022-02-08 PROCEDURE — 214N000001 HC R&B CCU UMMC

## 2022-02-08 PROCEDURE — 258N000003 HC RX IP 258 OP 636: Performed by: PHYSICIAN ASSISTANT

## 2022-02-08 PROCEDURE — 82310 ASSAY OF CALCIUM: CPT | Performed by: PHYSICIAN ASSISTANT

## 2022-02-08 PROCEDURE — 97535 SELF CARE MNGMENT TRAINING: CPT | Mod: GO

## 2022-02-08 PROCEDURE — 71045 X-RAY EXAM CHEST 1 VIEW: CPT | Mod: 26 | Performed by: RADIOLOGY

## 2022-02-08 PROCEDURE — 85014 HEMATOCRIT: CPT | Performed by: PHYSICIAN ASSISTANT

## 2022-02-08 PROCEDURE — 250N000013 HC RX MED GY IP 250 OP 250 PS 637: Performed by: THORACIC SURGERY (CARDIOTHORACIC VASCULAR SURGERY)

## 2022-02-08 PROCEDURE — 85520 HEPARIN ASSAY: CPT | Performed by: THORACIC SURGERY (CARDIOTHORACIC VASCULAR SURGERY)

## 2022-02-08 PROCEDURE — 99207 PR CONSULT E&M CHANGED TO SUBSEQUENT LEVEL: CPT | Performed by: PEDIATRICS

## 2022-02-08 PROCEDURE — 250N000011 HC RX IP 250 OP 636: Performed by: PHYSICIAN ASSISTANT

## 2022-02-08 PROCEDURE — 36415 COLL VENOUS BLD VENIPUNCTURE: CPT | Performed by: THORACIC SURGERY (CARDIOTHORACIC VASCULAR SURGERY)

## 2022-02-08 PROCEDURE — 250N000013 HC RX MED GY IP 250 OP 250 PS 637: Performed by: PHYSICIAN ASSISTANT

## 2022-02-08 PROCEDURE — 85610 PROTHROMBIN TIME: CPT | Performed by: THORACIC SURGERY (CARDIOTHORACIC VASCULAR SURGERY)

## 2022-02-08 RX ORDER — FUROSEMIDE 40 MG
40 TABLET ORAL
Status: DISCONTINUED | OUTPATIENT
Start: 2022-02-08 | End: 2022-02-10 | Stop reason: HOSPADM

## 2022-02-08 RX ORDER — METOCLOPRAMIDE 5 MG/1
5 TABLET ORAL 3 TIMES DAILY PRN
Status: DISCONTINUED | OUTPATIENT
Start: 2022-02-08 | End: 2022-02-10 | Stop reason: HOSPADM

## 2022-02-08 RX ADMIN — ACETAMINOPHEN 650 MG: 325 TABLET, FILM COATED ORAL at 12:19

## 2022-02-08 RX ADMIN — METHOCARBAMOL 500 MG: 500 TABLET ORAL at 12:19

## 2022-02-08 RX ADMIN — ACETAMINOPHEN 650 MG: 325 TABLET, FILM COATED ORAL at 23:33

## 2022-02-08 RX ADMIN — METHOCARBAMOL 500 MG: 500 TABLET ORAL at 08:00

## 2022-02-08 RX ADMIN — ACETAMINOPHEN 650 MG: 325 TABLET, FILM COATED ORAL at 06:17

## 2022-02-08 RX ADMIN — ASPIRIN 81 MG CHEWABLE TABLET 81 MG: 81 TABLET CHEWABLE at 08:05

## 2022-02-08 RX ADMIN — ACETAMINOPHEN 650 MG: 325 TABLET, FILM COATED ORAL at 00:02

## 2022-02-08 RX ADMIN — PANTOPRAZOLE SODIUM 40 MG: 40 TABLET, DELAYED RELEASE ORAL at 07:58

## 2022-02-08 RX ADMIN — METHOCARBAMOL 500 MG: 500 TABLET ORAL at 17:11

## 2022-02-08 RX ADMIN — Medication 0.5 MG: at 19:06

## 2022-02-08 RX ADMIN — HEPARIN SODIUM 950 UNITS/HR: 1000 INJECTION INTRAVENOUS; SUBCUTANEOUS at 06:17

## 2022-02-08 RX ADMIN — BENZOCAINE AND MENTHOL 1 LOZENGE: 15; 3.6 LOZENGE ORAL at 00:00

## 2022-02-08 RX ADMIN — BENZOCAINE AND MENTHOL 1 LOZENGE: 15; 3.6 LOZENGE ORAL at 01:29

## 2022-02-08 RX ADMIN — Medication 1 TABLET: at 20:19

## 2022-02-08 RX ADMIN — FUROSEMIDE 40 MG: 40 TABLET ORAL at 17:11

## 2022-02-08 RX ADMIN — Medication 1 TABLET: at 08:00

## 2022-02-08 RX ADMIN — Medication 5 MG: at 23:33

## 2022-02-08 RX ADMIN — POTASSIUM CHLORIDE 20 MEQ: 20 SOLUTION ORAL at 08:01

## 2022-02-08 RX ADMIN — MUPIROCIN 0.5 G: 20 OINTMENT TOPICAL at 08:01

## 2022-02-08 RX ADMIN — FUROSEMIDE 40 MG: 40 TABLET ORAL at 08:00

## 2022-02-08 RX ADMIN — THERA TABS 1 TABLET: TAB at 07:59

## 2022-02-08 RX ADMIN — POTASSIUM CHLORIDE 20 MEQ: 20 SOLUTION ORAL at 20:19

## 2022-02-08 RX ADMIN — METHOCARBAMOL 500 MG: 500 TABLET ORAL at 23:33

## 2022-02-08 RX ADMIN — ACETAMINOPHEN 650 MG: 325 TABLET, FILM COATED ORAL at 17:11

## 2022-02-08 RX ADMIN — LEVOTHYROXINE SODIUM 50 MCG: 0.05 TABLET ORAL at 08:01

## 2022-02-08 RX ADMIN — POLYETHYLENE GLYCOL 3350 17 G: 17 POWDER, FOR SOLUTION ORAL at 08:00

## 2022-02-08 RX ADMIN — SIMVASTATIN 20 MG: 20 TABLET, FILM COATED ORAL at 23:33

## 2022-02-08 ASSESSMENT — ACTIVITIES OF DAILY LIVING (ADL)
ADLS_ACUITY_SCORE: 11
ADLS_ACUITY_SCORE: 10
ADLS_ACUITY_SCORE: 11
ADLS_ACUITY_SCORE: 10
ADLS_ACUITY_SCORE: 11
ADLS_ACUITY_SCORE: 10
ADLS_ACUITY_SCORE: 10
ADLS_ACUITY_SCORE: 11
DEPENDENT_IADLS:: INDEPENDENT

## 2022-02-08 ASSESSMENT — MIFFLIN-ST. JEOR: SCORE: 1062.81

## 2022-02-08 NOTE — PLAN OF CARE
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 Whitney and Dr. Whittington     Neuro: AOx4, denies headache, lightheadedness, dizziness, no reports of numbness or tingling.  Resp: RA sating >92%, denies SOB. IS reviewed and correct technique performed. Good, productive dry cough overnight, secretions cleared independently with bedside yawnker   Cardiac: SR 80s, denies chest pain  GI/: Regular diet, denies N/V, voiding adequately with catheter in place. 2 BM overnight, LBM 2/8 AM  Skin: No new deficits noted  Pain: Denies  Activity: Up with assist of 1 to bedside commode  Labs: Hep 10a 0.10, RN managed Heparin protocols followed.  LDAs:  2 L PIV infusing Heparin at 950U/hr, other is saline locked.     Pt had minimal sleep d/t continued coughing overnight. PRN Lozenges given as needed, will continue to monitor and report changes to team.

## 2022-02-08 NOTE — CONSULTS
Care Management Initial Consult    General Information  Assessment completed with: Patient,Children,    Type of CM/SW Visit: Initial Assessment    Primary Care Provider verified and updated as needed: Yes   Readmission within the last 30 days: no previous admission in last 30 days         Advance Care Planning: Advance Care Planning Reviewed: no concerns identified          Communication Assessment  Patient's communication style: spoken language (English or Bilingual)    Hearing Difficulty or Deaf: no   Wear Glasses or Blind: yes    Cognitive  Cognitive/Neuro/Behavioral: WDL  Level of Consciousness: alert  Arousal Level: opens eyes spontaneously  Orientation: oriented x 4  Mood/Behavior: calm,cooperative  Best Language: 0 - No aphasia  Speech: clear,spontaneous    Living Environment:   People in home: alone     Current living Arrangements: house      Able to return to prior arrangements: TCU recommended; however daughter wants pt to discharge to her or her brother's home        Family/Social Support:  Care provided by: self,child(gabriela)  Provides care for: no one  Marital Status:   Children          Description of Support System: Supportive,Involved    Support Assessment: Adequate family and caregiver support,Adequate social supports    Current Resources:   Patient receiving home care services: No     Community Resources: None  Equipment currently used at home: none  Supplies currently used at home: None    Employment/Financial:  Employment Status: retired        Financial Concerns: No concerns identified           Lifestyle & Psychosocial Needs:  Social Determinants of Health     Tobacco Use: Low Risk      Smoking Tobacco Use: Never Smoker     Smokeless Tobacco Use: Never Used   Alcohol Use: Not on file   Financial Resource Strain: Not on file   Food Insecurity: Not on file   Transportation Needs: Not on file   Physical Activity: Not on file   Stress: Not on file   Social Connections: Not on file   Intimate  Partner Violence: Not on file   Depression: Not at risk     PHQ-2 Score: 0   Housing Stability: Not on file       Functional Status:  Prior to admission patient needed assistance:   Dependent ADLs:: Independent  Dependent IADLs:: Independent       Mental Health Status:  Mental Health Status: Reported anxiety. Pt does not take any medication, but is interested. SW encouraged pt to discuss w/ provider.       Chemical Dependency Status:  Chemical Dependency Status: No Current Concerns             Values/Beliefs:  Spiritual, Cultural Beliefs, Voodoo Practices, Values that affect care: yes               Additional Information:  SW met w/ pt and daughter, Hilary, in pt's room to complete CMA. PTA, pt lived alone at home (a house) in [place], [place]. Pt was IND w/ mobility, ADLs/IADLs. Pt drove. Pt did not receive and home care or community resources. DARIEN provided education to pt and Hilary about TCU (current therapy recs) and pt and Hilary prefer to discharge home (either to Hilary's house in [place], [place] or pt's son Adán's house in [place]). DARIEN provided education about the difference of HC and TCU (frequency). Hilary reiterated she prefers pt to go to one of their homes. DARIEN informed pt and Hilary that DARIEN will notify the team of their wishes. DARIEN provided Hilary w/ a TCU list from the Medicare.gov website in case pt and Hilary change their mind about TCU.     Willis WOLFF, MELISSASW  Brittany   Phone: [phone]  Pager: [phone]

## 2022-02-08 NOTE — CONSULTS
[facility]  Consult Note - Hospitalist Service, GOLD TEAM   Date of Admission:  1/24/2022  Consult Requested by: Manoj Hollis  Reason for Consult: left pleural effusion    Consult and Procedure Service - Procedure Note    Attending: Jim Sheikh MD  Resident: n/a   Procedure: Ultrasound Assisted Diagnostic and Therapeutic Thoracentesis   Indication: pleural fluid  Requested by: Manoj Perea  Bleeding Risk: Low-Moderate:, no anticoagulation on board, Platelets > 50 000      POCUS Findings:   =========================  LEFT pleural effusion, small pocket free flowing fluid, without loculations or septations     Recommendations:  :: using SDM pt (and daughter helping her) favored contd close monitoring and supportive cares, declined thora  :: if concern for infection is increased or if contd symptoms or if there is a change in patients feelings about thora, please feel free to consult CAPS again for re-evaluation, we will be happy to assist.         The patient's care was discussed with the Patient, Patient's Family and Primary team.    Jim Sheikh MD  [facility]  Securely message with the Vocera Web Console (learn more here)  Text page via [facility] Paging/Directory   Please see signed in provider for up to date coverage information    Hospitalist Service, GOLD TEAM     Clinically Significant Risk Factors Present on Admission                    ______________________________________________________________________    Chief Complaint   Problems breathing    History is obtained from the patient and her [facility]    History of Present Illness   Kimberley Mullins is a 81 year old female who underwent recent amplatz device VSD closure, having difficulties w/deep inspiration. Denies pain at rest denies chest pressure, no orthopnea, mild cough worse with inspiration    Review of Systems   4 pt ROS negative    Past Medical History    I have  reviewed this patient's medical history and updated it with pertinent information if needed.   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  Past Surgical History:   Procedure Laterality Date     ARTHROPLASTY KNEE  10/25/2011    Procedure:ARTHROPLASTY KNEE; Right Total Knee Arthroplasty   ; Surgeon:BRYCE ATWOOD; Location:[facility] OR     ARTHROPLASTY KNEE  6/2/2014    Procedure: ARTHROPLASTY KNEE;  Surgeon: Bryce Atwood MD;  Location: [facility] OR     CV CORONARY ANGIOGRAM N/A 1/25/2022    Procedure: Coronary Angiogram; diagnostic;  Surgeon: Sreekanth Delong MD;  Location:  HEART CARDIAC CATH LAB     CV RIGHT HEART CATH MEASUREMENTS RECORDED N/A 1/25/2022    Procedure: Right Heart Cath;  Surgeon: Sreekanth Delong MD;  Location:  HEART CARDIAC CATH LAB     CV VSD CLOSURE N/A 1/31/2022    Procedure: CV VSD CLOSURE W/KATIE;  Surgeon: Daniel Cerda MD;  Location:  HEART CARDIAC CATH LAB     HC DILATION/CURETTAGE DIAG/THER NON OB  2006    D & C     HC KNEE SCOPE, DIAGNOSTIC      Arthroscopy, Knee     REPAIR VENTRICULAR SEPTAL DEFECT N/A 2/4/2022    Procedure: Median Sternotomy, cardiopulmonary bypass, Ischemic Ventricular Septal Defect repair, tranesophageal echocardiogram performed by anesthesia;  Surgeon: Aldair Whitney MD;  Location:  OR     [facility] AFF FOREARM/WRIST SURGERY UNLISTED      orif radius fx distal  left     [facility] APPENDECTOMY  1955     [facility] OPEN JESSI FIXATN HUMERAL SHAFT FX  12/2007    Dr Massey     [facility] COLONOSCOPY W SNARE REMOVAL TUMOR/POLYP/LESION  9/03 & 3/2011    polyp removal during screening colonoscopy       Social History   I have reviewed this patient's social history and updated it with pertinent information if needed.  Social History     Tobacco Use     Smoking status: Never Smoker     Smokeless tobacco: Never Used   Substance Use Topics     Alcohol use: Yes     Alcohol/week: 5.8 standard drinks     Types: 7 Standard drinks or equivalent per week     Comment: two to three glasses of wine q day.     Drug use: No       Family History   I have reviewed this patient's family history and updated it with pertinent information if needed.  Family History   Problem Relation Age of Onset     Heart Disease Father         MI age 70       Medications   I have reviewed this patient's current medications    Allergies   Allergies   Allergen Reactions     No Known Allergies        Physical Exam   Vital Signs: Temp: 97.6  F (36.4  C) Temp src: Oral BP: 133/72 Pulse: 89   Resp: 14 SpO2: 97 % O2 Device: None (Room air)    Weight: 139 lbs 11.2 oz        Data   Results for orders placed or performed during the hospital encounter of 01/24/22 (from the past 24 hour(s))   XR Chest 2 Views    Narrative    EXAM: XR CHEST 2 VW  2/7/2022 2:08 PM     HISTORY:  Chest tube removal       COMPARISON:  Chest x-ray 2/7/2022 at 7:34 AM.    FINDINGS:   PA and lateral views of the chest. Postsurgical changes of VSD repair,  with intact median sternotomy wires and cerclage wire. Stable small  bilateral pleural effusions with left basilar associated atelectasis.  No pneumothorax or focal airspace opacity. Calcification diffusely of  the costochondral joints. Lumbar convex leftward curvature with  degenerative changes of the visualized spine.      Impression    IMPRESSION:  1. Several bilateral small pleural effusions, left greater than  right.  2. No new focal opacities or pneumothorax.  3. Convex leftward lumbar curvature.    I have personally reviewed the examination and initial interpretation  and I agree with the findings.    SIMONE NATARAJAN MD         SYSTEM ID:  [id number]   Extra Tube    Narrative    The following orders were created for panel order Extra Tube.  Procedure                               Abnormality         Status                     ---------                               -----------         ------                     Extra Green Top (Lithium...[[id number]]                      Final result                 Please view results for these tests on the individual orders.   Extra Green Top (Lithium Heparin) Tube   Result Value Ref Range    Hold Specimen JI    Respiratory Aerobic Bacterial Culture with Gram Stain    Specimen: Expectorate; Sputum   Result Value Ref Range    Gram Stain Result <10 Squamous epithelial cells/low power field     Gram Stain Result <25 PMNs/low power field     Gram Stain Result 3+ Mixed christopher    CBC with platelets   Result Value Ref Range    WBC Count 11.2 (H) 4.0 - 11.0 10e3/uL    RBC Count 2.54 (L) 3.80 - 5.20 10e6/uL    Hemoglobin 8.1 (L) 11.7 - 15.7 g/dL    Hematocrit 25.6 (L) 35.0 - 47.0 %     (H) 78 - 100 fL    MCH 31.9 26.5 - 33.0 pg    MCHC 31.6 31.5 - 36.5 g/dL    RDW 17.7 (H) 10.0 - 15.0 %    Platelet Count 254 150 - 450 10e3/uL   Echo Limited   Result Value Ref Range    LVEF  55-60%     Narrative    [id number]  [id number]  [id number]  [id number]^DWIGHT^URIEL^     [facility],[place]  Echocardiography Laboratory  [address] [place] [id number]     Name: ALEXA EDGE  MRN: [id number]  : 1940  Study Date: 2022 04:24 PM  Age: 81 yrs  Gender: Female  Patient Location: [facility]  Reason For Study: VSD  History: S/P VSD patch  Ordering Physician: URIEL QUINTANILLA  Referring Physician: IVAN BIGGS  Performed By: Pat Perea RDCS      BSA: 1.7 m2  Height: 63 in  Weight: 144 lb  HR: 117  BP: 113/63 mmHg  ______________________________________________________________________________  Procedure  Limited Portable Echo Adult. The final echo results were communicated to Dr. Whitney. The final echo results were communicated to the ordering physician by  phone .  ______________________________________________________________________________  Interpretation Summary  Status post removal of Amplatzer device and patch closure of muscular VSD  2/4/22. Some images (images #[id number] and 50-54) are suspicious for a small  residual VSD at the apical margin of the patch. Consider cardiac CTA or MRI  for further evaluation.  Global and regional left ventricular function is normal with an EF of 55-60%.  Mild right ventricular dilation is present. Global right ventricular function  is mildly reduced.  No pericardial effusion is present.     This study was compared with the study from 2/4/22 (intraoperative KATIE) and  2/3/22 (TTE): Possible residual VSD was not seen on the postoperative images  from the intraoperative KATIE, however the ventricular septum is better  interrogated on today's KATIE. Compared with the prior TTE, there has been  interval removal of Amplatzer device and patch closure of VSD with significant  interval reduction in ventricular-level shunting. RV size and function also  appears slightly improved on limited views.  ______________________________________________________________________________  Left Ventricle  Global and regional left ventricular function is normal with an EF of 55-60%.  Left ventricular size is normal. Abnormal non-specific septal motion is  present. Status post removal of Amplatzer device and patch closure of muscular  VSD 2/4/22. Some images (images #[id number] and 50-54) are suspicious for a small  residual VSD at the apical margin of the patch.     Right Ventricle  Mild right ventricular dilation is present. Global right ventricular  function  is mildly reduced.     Tricuspid Valve  Mild tricuspid insufficiency is present. The right ventricular systolic  pressure is approximated at 21.3 mmHg plus the right atrial pressure.     Pericardium  No pericardial effusion is present.     Compared to Previous Study  This study was compared with the study from 22 (intraoperative KATIE) and  2/3/22 (TTE) . Possible residual VSD was not seen on the postoperative images  from the intraoperative KATIE, however the ventricular septum is better  interrogated on today's KATIE. Compared with the prior TTE, there has been  interval removal of Amplatzer device and patch closure of VSD with significant  interval reduction in ventricular-level shunting. RV size and function also  appears slightly improved on limited views.  ______________________________________________________________________________  MMode/2D Measurements & Calculations     IVSd: 0.84 cm  LVIDd: 3.8 cm  LVIDs: 2.9 cm  LVPWd: 1.1 cm  FS: 23.5 %  LV mass(C)d: 108.0 grams  LV mass(C)dI: 64.2 grams/m2  RWT: 0.57     Doppler Measurements & Calculations  TR max lalita: 231.0 cm/sec  TR max P.3 mmHg     ______________________________________________________________________________  Report approved by: Liz Nava 2022 05:07 PM         Heparin Unfractionated Anti Xa Level   Result Value Ref Range    Anti Xa Unfractionated Heparin 0.10 For Reference Range, See Comment IU/mL    Narrative    Therapeutic Range: UFH: 0.25-0.50 IU/mL for low intensity dosing,  0.30-0.70 IU/mL for high intensity dosing DVT and PE.  This test is not validated for other direct factor X inhibitors (e.g. rivaroxaban, apixaban, edoxaban, betrixaban, fondaparinux) and should not be used for monitoring of other medications.   Lactate Dehydrogenase   Result Value Ref Range    Lactate Dehydrogenase 742 (H) 81 - 234 U/L   Haptoglobin   Result Value Ref Range    Haptoglobin 65 32 - 197 mg/dL   INR   Result Value Ref Range     INR 3.28 (H) 0.85 - 1.15   CBC with platelets   Result Value Ref Range    WBC Count 10.0 4.0 - 11.0 10e3/uL    RBC Count 2.40 (L) 3.80 - 5.20 10e6/uL    Hemoglobin 7.5 (L) 11.7 - 15.7 g/dL    Hematocrit 23.2 (L) 35.0 - 47.0 %    MCV 97 78 - 100 fL    MCH 31.3 26.5 - 33.0 pg    MCHC 32.3 31.5 - 36.5 g/dL    RDW 17.0 (H) 10.0 - 15.0 %    Platelet Count 289 150 - 450 10e3/uL   Basic metabolic panel   Result Value Ref Range    Sodium 134 133 - 144 mmol/L    Potassium 3.9 3.4 - 5.3 mmol/L    Chloride 102 94 - 109 mmol/L    Carbon Dioxide (CO2) 22 20 - 32 mmol/L    Anion Gap 10 3 - 14 mmol/L    Urea Nitrogen 21 7 - 30 mg/dL    Creatinine 1.07 (H) 0.52 - 1.04 mg/dL    Calcium 7.8 (L) 8.5 - 10.1 mg/dL    Glucose 92 70 - 99 mg/dL    GFR Estimate 52 (L) >60 mL/min/1.73m2   Heparin Unfractionated Anti Xa Level   Result Value Ref Range    Anti Xa Unfractionated Heparin 0.17 For Reference Range, See Comment IU/mL    Narrative    Therapeutic Range: UFH: 0.25-0.50 IU/mL for low intensity dosing,  0.30-0.70 IU/mL for high intensity dosing DVT and PE.  This test is not validated for other direct factor X inhibitors (e.g. rivaroxaban, apixaban, edoxaban, betrixaban, fondaparinux) and should not be used for monitoring of other medications.

## 2022-02-08 NOTE — PROGRESS NOTES
"/63   Pulse 92   Temp 98.1  F (36.7  C) (Oral)   Resp 16   Ht 1.6 m (5' 3\")   Wt 63.4 kg (139 lb 11.2 oz)   LMP  (LMP Unknown)   SpO2 98%   BMI 24.75 kg/m        AOx4. VSS. On RA. Denies pain except for intermittent exertional shoulder pain, on scheduled tylenol and robaxin. Up with assist of 1 and walker. Removed hernandez at 1000, patient voided post-removal. LBM today. PO lasix continued today. Heparin gtt discontinued. Sternal incision CDI. Fair appetite. 2 PIV saline locked. Worked with PT. Expected discharge to TCU recommended; however daughter wants pt to discharge to her or her brother's home.   "

## 2022-02-08 NOTE — PROGRESS NOTES
Cardiovascular Surgery Progress Note  02/08/2022         Assessment and Plan:     Kimberley Mullins is a 81 year old female with a PMH of HTN, hypothyroidism, recent [facility] admission (1/14-1/17) for N/V and hyponatremia found to have a new apical, septal VSD ( underwent percutaneous closure with 16mm Amplatzer device (now has a residual, high gradient shunt) who underwent apical septal VSD repair and removal of Amplatzer device 2/4 with Dr. Whitney and Dr. Whittington    Cardiovascular:   # Hx of VSD s/p percutaneous closure with 16mm Amplatzer device  # Residual high gradient VSD shunt s/p septal VSD repair and Amplatzer device removal 2/4 with Dr. Whitney and Dr. Whittington  # Known ST segment deviation  - Patient states no new chest pain with activity, SOB, or symptoms of ACS. EKG unchanged from 2/4 and 2/5. No signs or symptoms of pericarditis or pericardial tamponade. Echo 2/7 pending.  - PTA Medications: Simvastatin 20mg daily  Amlodipine 5mg daily  - Current Medications: ASA 81mg  Simvastatin 20mg daily BB on hold for hypotension  - No arrhythmia, HD stable. Goal Map > 65, Goal SBP <140mmHg  - Most recent echo 2/3/22 showed LV EF 55-60%  - Post op Echocardiogram with possible small residual shunt-nothing to do, follow clonically.     Chest tubes: removed without immediate complications  TPW: removed without immediate complications    Pulmonary:  # Single subsegmental pulmonary embolism without acute cor pulmonale  - Extubated POD 1 to 2 L/m lpm via NC. Now saturating well on RA   - Supplemental O2 PRN to keep sats > 92%. Wean off as tolerated.  - Pulm toilet, IS, activity and deep breathing  - Small bilateral pleural effusions (left greater than right) with persistent dry cough. Medicine consulted for possible left thoracentesis. Will see today.     Neurology:  # History of tinnitus  # History of macular degeneration and bilateral cataracts  # CAROL  - Neuro intact  - Acute post-operative pain well controlled with  acetaminophen, PO oxycodone PRN, IV dilaudid PRN    MSK/ Skin:  # Sternotomy  # Surgical Incision  # Osteoarthritis s/p total knee replacement  # Osteopenia  - Sternal precautions  - Postoperative incision management per protocol  - PT/OT/CR     / Renal:  - No Hx of renal disease. Baseline Creatinine 1.2-1.4.   - Most recent Creatinine: 1.07, adequate UOP.   - Diuresis: switched to oral lasix 40 mg po bid today.   - Discontinue Plascencia catheter with voiding trial 2/7. Still in, will remove today.     GI / FEN:   # IBS with diarrhea  - Regular diet, continue bowel regimen  - Metoclopromide for nausea and appetite stimulation  - Replace electrolytes as needed, hepatic enzymes WNL.    Endocrine:   # Stress Induced Hyperglycemia  # Hypothyroidism  - PTA levothyroxine 50mcg restarted  - Most Recent Hemoglobin A1c: 5.6  - Stress induced hyperglycemia initially managed on insulin drip postop, transitioned to sliding scale. Discontinued insulin 2/7    Infectious Disease:  # Stress induced leukocytosis  - WBC 10.0 , remains afebrile, no signs or symptoms of infection  - Completed perioperative antibiotics    Hematology:   # Acute blood loss anemia   # Thrombocytopenia  # Small segmental PE, acute vs chronic  - Hgb 7.5; Plt stable, no signs or symptoms of active bleeding    Antithrombotics:   - ASA 81mg   - Stop LIH  - INR 3.28, on coumadin.     Prophylaxis:   - Stress ulcer prophylaxis: Pantoprazole 40 mg daily for 30 days  - DVT prophylaxis: SCD    Disposition:   - Transferred to  on 2/6/22  - Therapies recommending discharge pending    Discussed with Surgeon, Dr. Whitney via written and verbal commnication.     Christiana Hale PA-C  Cardiothoracic Surgery  Pager [phone]  February 8, 2022            Interval History:     No overnight events.  States pain is well managed on current regimen. Did not sleep well due to dry cough.   Tolerating diet but is having poor appetite, is passing flatus, +BM.   Breathing well  "without complaints.   Working with therapies and ambulating in halls without assistance.   Denies chest pain, palpitations, dizziness, syncopal symptoms, fevers, chills, myalgias, or sternal popping/clicking.         Physical Exam:   Blood pressure 119/63, pulse 94, temperature 98.1  F (36.7  C), temperature source Oral, resp. rate 16, height 1.6 m (5' 3\"), weight 63.4 kg (139 lb 11.2 oz), SpO2 97 %, not currently breastfeeding.  Vitals:    02/06/22 0100 02/07/22 0800 02/08/22 0200   Weight: 65.5 kg (144 lb 6.4 oz) 65.5 kg (144 lb 6.4 oz) 63.4 kg (139 lb 11.2 oz)          Gen: A&Ox4, NAD  Neuro: no focal deficits   CV: RRR, normal S1 S2, no murmurs, rubs or gallops. No appreciable JVD  Pulm: CTA, no wheezing or rhonchi, normal breathing on RA. Diminished left base.   Abd: nondistended, normal BS, soft, nontender  Ext: no LE edema   Incision: clean, dry, intact, no erythema, sternum stable  Tubes/drain sites: dressing clean and dry, all drains out.          Data:    Imaging:  reviewed recent imaging,     Chest x-ray  EXAM: XR CHEST PORT 1 VIEW  2/8/2022 8:26 AM      HISTORY:  Chest tubes        COMPARISON:  Chest x-ray 2/7/2022 and CT 1/24/2022.     FINDINGS:   AP portable chest. Stable postsurgical changes of  VSD repair, with  intact median sternotomy wires and cerclage wire. Stable small  bilateral pleural effusions, with associated bibasilar atelectasis,  best appreciated on prior CT. No pneumothorax or new focal airspace  opacity. Calcification diffusely in the costochondral joints. Lumbar  convex leftward curvature. Left humeral fracture hardware appears  intact.                                                                      IMPRESSION:  1. Small bilateral pleural effusions with associated bibasilar  atelectasis.  2. No new focal opacities. No pneumothorax.  3. Convex leftward lumbar curvature.     I have personally reviewed the examination and initial interpretation  and I agree with the " findings.     CELINA KAY MD         SYSTEM ID:  [id number]    Echocardiogram 2/7/22  Interpretation Summary  Status post removal of Amplatzer device and patch closure of muscular VSD  2/4/22. Some images (images #[id number] and [id number]) are suspicious for a small  residual VSD at the apical margin of the patch. Consider cardiac CTA or MRI  for further evaluation.  Global and regional left ventricular function is normal with an EF of 55-60%.  Mild right ventricular dilation is present. Global right ventricular function  is mildly reduced.  No pericardial effusion is present.     This study was compared with the study from 2/4/22 (intraoperative KATIE) and  2/3/22 (TTE): Possible residual VSD was not seen on the postoperative images  from the intraoperative KATIE, however the ventricular septum is better  interrogated on today's KATIE. Compared with the prior TTE, there has been  interval removal of Amplatzer device and patch closure of VSD with significant  interval reduction in ventricular-level shunting. RV size and function also  appears slightly improved on limited views.        Labs:  CBC  Recent Labs   Lab 02/08/22  0638 02/07/22  1417 02/07/22  0532 02/06/22  0506   WBC 10.0 11.2* 10.4 10.8   RBC 2.40* 2.54* 2.40* 2.24*   HGB 7.5* 8.1* 7.6* 7.2*   HCT 23.2* 25.6* 23.6* 22.1*   MCV 97 101* 98 99   MCH 31.3 31.9 31.7 32.1   MCHC 32.3 31.6 32.2 32.6   RDW 17.0* 17.7* 18.0* 18.7*    254 229 201     CMP:  Last Comprehensive Metabolic Panel:  Sodium   Date Value Ref Range Status   02/08/2022 134 133 - 144 mmol/L Final   01/19/2022 130.9 (A) 135 - 146 mmol/L Final     Potassium   Date Value Ref Range Status   02/08/2022 3.9 3.4 - 5.3 mmol/L Final   01/19/2022 4.75 3.5 - 5.3 mmol/L Final     Chloride   Date Value Ref Range Status   02/08/2022 102 94 - 109 mmol/L Final   01/19/2022 100.4 98 - 110 mmol/L Final     Carbon Dioxide   Date Value Ref Range Status   01/19/2022 25.6 20 - 32 mmol/L Final     Carbon Dioxide (CO2)    Date Value Ref Range Status   02/08/2022 22 20 - 32 mmol/L Final     Anion Gap   Date Value Ref Range Status   02/08/2022 10 3 - 14 mmol/L Final   05/05/2008 13 6 - 17 mmol/L Final     Glucose   Date Value Ref Range Status   02/08/2022 92 70 - 99 mg/dL Final   01/19/2022 112 (A) 60 - 99 mg/dL Final     Urea Nitrogen   Date Value Ref Range Status   02/08/2022 21 7 - 30 mg/dL Final   01/19/2022 13 7 - 25 mg/dL Final     BUN/Creatinine Ratio   Date Value Ref Range Status   01/19/2022 17.8 6 - 22 Final     Creatinine   Date Value Ref Range Status   02/08/2022 1.07 (H) 0.52 - 1.04 mg/dL Final   01/19/2022 0.73 0.60 - 1.30 mg/dL Final     GFR Estimate   Date Value Ref Range Status   02/08/2022 52 (L) >60 mL/min/1.73m2 Final     Comment:     Effective December 21, 2021 eGFRcr in adults is calculated using the 2021 CKD-EPI creatinine equation which includes age and gender (Rolanda et al., NEJM, DOI: 10.1056/KURZeb2662801)   11/07/2018 84 > OR = 60 mL/min/1.73m2 Final     Calcium   Date Value Ref Range Status   02/08/2022 7.8 (L) 8.5 - 10.1 mg/dL Final   01/19/2022 8.7 8.6 - 10.3 mg/dL Final     Bilirubin Total   Date Value Ref Range Status   02/05/2022 0.9 0.2 - 1.3 mg/dL Final   05/26/2021 0.9 0.2 - 1.2 mg/dL Final     Alkaline Phosphatase   Date Value Ref Range Status   02/05/2022 45 40 - 150 U/L Final   05/26/2021 68 33 - 130 U/L Final     ALT   Date Value Ref Range Status   02/05/2022 27 0 - 50 U/L Final   11/07/2018 19 6 - 29 U/L Final     AST   Date Value Ref Range Status   02/05/2022 152 (H) 0 - 45 U/L Final   11/07/2018 25 10 - 35 U/L Final     BMP  Recent Labs   Lab 02/08/22  0638 02/07/22  0532 02/06/22 2124 02/06/22  1659 02/06/22  0422 02/06/22  0405 02/06/22  0001 02/05/22 2023 02/05/22 0615 02/05/22 0613    134  --   --   --  136  --   --   --  140   POTASSIUM 3.9 3.7  --   --   --  3.8  3.8  --  4.0  --  3.7   CHLORIDE 102 104  --   --   --  109  --   --   --  111*   ANANDA 7.8* 8.2*  --   --   --   7.5*  --   --   --  8.3*   CO2 22 24  --   --   --  21  --   --   --  20   BUN 21 27  --   --   --  18  --   --   --  15   CR 1.07* 1.33*  --   --   --  1.19*  --   --   --  1.25*   GLC 92 82 114* 100*   < > 100*   < >  --    < > 117*    < > = values in this interval not displayed.     INR  Recent Labs   Lab 02/08/22  1457 02/08/22  0638 02/07/22  0532 02/06/22  1542   INR 3.00* 3.28* 1.41* 1.34*      Hepatic Panel  Recent Labs   Lab 02/05/22  0613 02/04/22  2330 02/04/22  1422 02/04/22  0537   * 150* 125* 141*   ALT 27 32 33 43   ALKPHOS 45 44 42 49   BILITOTAL 0.9 0.6 1.0 1.8*   ALBUMIN 2.7* 2.9* 2.5* 3.4     GLUCOSE:   Recent Labs   Lab 02/08/22  0638 02/07/22  0532 02/06/22  2124 02/06/22  1659 02/06/22  1149 02/06/22  0804   GLC 92 82 114* 100* 154* 110*

## 2022-02-09 ENCOUNTER — APPOINTMENT (OUTPATIENT)
Dept: PHYSICAL THERAPY | Facility: CLINIC | Age: 82
DRG: 228 | End: 2022-02-09
Attending: INTERNAL MEDICINE
Payer: COMMERCIAL

## 2022-02-09 PROBLEM — Z87.74 S/P VENTRICULAR SEPTAL DEFECT REPAIR: Status: ACTIVE | Noted: 2022-02-09

## 2022-02-09 LAB
ANION GAP SERPL CALCULATED.3IONS-SCNC: 8 MMOL/L (ref 3–14)
BACTERIA SPT CULT: NORMAL
BUN SERPL-MCNC: 15 MG/DL (ref 7–30)
CALCIUM SERPL-MCNC: 8.1 MG/DL (ref 8.5–10.1)
CHLORIDE BLD-SCNC: 103 MMOL/L (ref 94–109)
CO2 SERPL-SCNC: 24 MMOL/L (ref 20–32)
CREAT SERPL-MCNC: 1.04 MG/DL (ref 0.52–1.04)
ERYTHROCYTE [DISTWIDTH] IN BLOOD BY AUTOMATED COUNT: 16.7 % (ref 10–15)
GFR SERPL CREATININE-BSD FRML MDRD: 53 ML/MIN/1.73M2
GLUCOSE BLD-MCNC: 85 MG/DL (ref 70–99)
GRAM STAIN RESULT: NORMAL
HCT VFR BLD AUTO: 23.7 % (ref 35–47)
HGB BLD-MCNC: 7.6 G/DL (ref 11.7–15.7)
INR PPP: 2.46 (ref 0.85–1.15)
MAGNESIUM SERPL-MCNC: 1.7 MG/DL (ref 1.8–2.6)
MCH RBC QN AUTO: 31.3 PG (ref 26.5–33)
MCHC RBC AUTO-ENTMCNC: 32.1 G/DL (ref 31.5–36.5)
MCV RBC AUTO: 98 FL (ref 78–100)
PHOSPHATE SERPL-MCNC: 2.3 MG/DL (ref 2.5–4.5)
PLATELET # BLD AUTO: 323 10E3/UL (ref 150–450)
POTASSIUM BLD-SCNC: 3.9 MMOL/L (ref 3.4–5.3)
RBC # BLD AUTO: 2.43 10E6/UL (ref 3.8–5.2)
SODIUM SERPL-SCNC: 135 MMOL/L (ref 133–144)
WBC # BLD AUTO: 7.2 10E3/UL (ref 4–11)

## 2022-02-09 PROCEDURE — 214N000001 HC R&B CCU UMMC

## 2022-02-09 PROCEDURE — 250N000013 HC RX MED GY IP 250 OP 250 PS 637: Performed by: PHYSICIAN ASSISTANT

## 2022-02-09 PROCEDURE — 250N000013 HC RX MED GY IP 250 OP 250 PS 637: Performed by: STUDENT IN AN ORGANIZED HEALTH CARE EDUCATION/TRAINING PROGRAM

## 2022-02-09 PROCEDURE — 84100 ASSAY OF PHOSPHORUS: CPT | Performed by: THORACIC SURGERY (CARDIOTHORACIC VASCULAR SURGERY)

## 2022-02-09 PROCEDURE — 250N000013 HC RX MED GY IP 250 OP 250 PS 637: Performed by: THORACIC SURGERY (CARDIOTHORACIC VASCULAR SURGERY)

## 2022-02-09 PROCEDURE — 36415 COLL VENOUS BLD VENIPUNCTURE: CPT | Performed by: THORACIC SURGERY (CARDIOTHORACIC VASCULAR SURGERY)

## 2022-02-09 PROCEDURE — 82310 ASSAY OF CALCIUM: CPT | Performed by: THORACIC SURGERY (CARDIOTHORACIC VASCULAR SURGERY)

## 2022-02-09 PROCEDURE — 250N000013 HC RX MED GY IP 250 OP 250 PS 637: Performed by: SURGERY

## 2022-02-09 PROCEDURE — 85027 COMPLETE CBC AUTOMATED: CPT | Performed by: THORACIC SURGERY (CARDIOTHORACIC VASCULAR SURGERY)

## 2022-02-09 PROCEDURE — 999N000128 HC STATISTIC PERIPHERAL IV START W/O US GUIDANCE

## 2022-02-09 PROCEDURE — 97530 THERAPEUTIC ACTIVITIES: CPT | Mod: GP

## 2022-02-09 PROCEDURE — 83735 ASSAY OF MAGNESIUM: CPT | Performed by: THORACIC SURGERY (CARDIOTHORACIC VASCULAR SURGERY)

## 2022-02-09 PROCEDURE — 85610 PROTHROMBIN TIME: CPT | Performed by: THORACIC SURGERY (CARDIOTHORACIC VASCULAR SURGERY)

## 2022-02-09 PROCEDURE — 97116 GAIT TRAINING THERAPY: CPT | Mod: GP

## 2022-02-09 RX ORDER — MAGNESIUM OXIDE 400 MG/1
400 TABLET ORAL 2 TIMES DAILY
Status: DISCONTINUED | OUTPATIENT
Start: 2022-02-09 | End: 2022-02-10 | Stop reason: HOSPADM

## 2022-02-09 RX ORDER — WARFARIN SODIUM 1 MG/1
2 TABLET ORAL
Status: COMPLETED | OUTPATIENT
Start: 2022-02-09 | End: 2022-02-09

## 2022-02-09 RX ADMIN — SIMVASTATIN 20 MG: 20 TABLET, FILM COATED ORAL at 21:37

## 2022-02-09 RX ADMIN — POTASSIUM & SODIUM PHOSPHATES POWDER PACK 280-160-250 MG 1 PACKET: 280-160-250 PACK at 09:08

## 2022-02-09 RX ADMIN — ACETAMINOPHEN 650 MG: 325 TABLET, FILM COATED ORAL at 17:30

## 2022-02-09 RX ADMIN — THERA TABS 1 TABLET: TAB at 09:07

## 2022-02-09 RX ADMIN — Medication 12.5 MG: at 21:38

## 2022-02-09 RX ADMIN — FUROSEMIDE 40 MG: 40 TABLET ORAL at 09:07

## 2022-02-09 RX ADMIN — POTASSIUM & SODIUM PHOSPHATES POWDER PACK 280-160-250 MG 1 PACKET: 280-160-250 PACK at 21:38

## 2022-02-09 RX ADMIN — WARFARIN SODIUM 2 MG: 1 TABLET ORAL at 17:30

## 2022-02-09 RX ADMIN — POTASSIUM CHLORIDE 20 MEQ: 20 SOLUTION ORAL at 09:07

## 2022-02-09 RX ADMIN — Medication 400 MG: at 12:28

## 2022-02-09 RX ADMIN — Medication 12.5 MG: at 09:07

## 2022-02-09 RX ADMIN — METHOCARBAMOL 500 MG: 500 TABLET ORAL at 12:28

## 2022-02-09 RX ADMIN — Medication 1 TABLET: at 09:07

## 2022-02-09 RX ADMIN — FUROSEMIDE 40 MG: 40 TABLET ORAL at 15:59

## 2022-02-09 RX ADMIN — Medication 1 TABLET: at 21:37

## 2022-02-09 RX ADMIN — ACETAMINOPHEN 650 MG: 325 TABLET, FILM COATED ORAL at 12:28

## 2022-02-09 RX ADMIN — ASPIRIN 81 MG CHEWABLE TABLET 81 MG: 81 TABLET CHEWABLE at 09:07

## 2022-02-09 RX ADMIN — POTASSIUM CHLORIDE 20 MEQ: 20 SOLUTION ORAL at 21:54

## 2022-02-09 RX ADMIN — Medication 5 MG: at 21:37

## 2022-02-09 RX ADMIN — METHOCARBAMOL 500 MG: 500 TABLET ORAL at 21:37

## 2022-02-09 RX ADMIN — ACETAMINOPHEN 650 MG: 325 TABLET, FILM COATED ORAL at 23:38

## 2022-02-09 RX ADMIN — PANTOPRAZOLE SODIUM 40 MG: 40 TABLET, DELAYED RELEASE ORAL at 09:07

## 2022-02-09 RX ADMIN — METHOCARBAMOL 500 MG: 500 TABLET ORAL at 15:59

## 2022-02-09 RX ADMIN — ACETAMINOPHEN 650 MG: 325 TABLET, FILM COATED ORAL at 05:07

## 2022-02-09 RX ADMIN — METHOCARBAMOL 500 MG: 500 TABLET ORAL at 09:07

## 2022-02-09 RX ADMIN — Medication 400 MG: at 21:37

## 2022-02-09 RX ADMIN — LEVOTHYROXINE SODIUM 50 MCG: 0.05 TABLET ORAL at 09:07

## 2022-02-09 ASSESSMENT — ACTIVITIES OF DAILY LIVING (ADL)
ADLS_ACUITY_SCORE: 10
ADLS_ACUITY_SCORE: 8
ADLS_ACUITY_SCORE: 8
ADLS_ACUITY_SCORE: 10
ADLS_ACUITY_SCORE: 10
ADLS_ACUITY_SCORE: 8
ADLS_ACUITY_SCORE: 10
ADLS_ACUITY_SCORE: 8
ADLS_ACUITY_SCORE: 8
ADLS_ACUITY_SCORE: 10
ADLS_ACUITY_SCORE: 8
ADLS_ACUITY_SCORE: 10
ADLS_ACUITY_SCORE: 10
ADLS_ACUITY_SCORE: 8
ADLS_ACUITY_SCORE: 10
ADLS_ACUITY_SCORE: 8
ADLS_ACUITY_SCORE: 10
ADLS_ACUITY_SCORE: 8
ADLS_ACUITY_SCORE: 8
ADLS_ACUITY_SCORE: 10
ADLS_ACUITY_SCORE: 10
ADLS_ACUITY_SCORE: 8

## 2022-02-09 ASSESSMENT — MIFFLIN-ST. JEOR: SCORE: 1074.6

## 2022-02-09 NOTE — DISCHARGE SUMMARY
[facility], [place]   [facility] Discharge Summary     Kimberley Mullins MRN# [id number]   Age: 81 year old YOB: 1940     Admitting Physician:  Justin Pickering MD  Discharge Physician:  Manoj Hollis PA-C  Primary Care Physician:        Renetta Rizvi     DATE OF ADMISSION: 1/24/2022      DATE OF DISCHARGE: February 10, 2022     Admit Wt: 68.6 Kg  Discharge Wt: 63.0 Kg          Primary Diagnoses:   1. Subacute muscular ventricular septal defect, etiology unknown s/p Repair muscular ventriculoseptal defect, single patch    2. 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 Single subsegmental pulmonary embolism without acute cor pulmonale, discharged on Warfarin  4. Hemolysis, improving  5. Acute kidney injury, resolved  6. Acute diastolic heart failure, resolved  7. Pulmonary hypertension, stable  8. Acute on chronic anemia, stable  9. Severe malnutrition in the context of acute illness on chronic illness, improving            Secondary Diagnoses:   1. Hypothyroidism, controlled  2. Hx of tinnitus  3. Hx of macular degeneration and bilateral cataracts  4. CAROL  5. osteoathritis s/p Total knee Arthroplasty  6. Osteopenia  7. IBS with diarrhea    PROCEDURES PERFORMED:   Date: 2/4/22.  Surgeon: Dr. Whitney    1. Repair muscular ventriculoseptal defect, single patch    INTRAOPERATIVE FINDINGS:   OPERATIVE FINDINGS:    1. Transesophageal echocardiogram intraoperatively showed similar findings to pre-operatively. Her left to right shunt fraction by KATIE was 2.3.  2. The VSD plug was visible through the right atriotomy but the defect was too difficult to repair via this approach.   3. The VSD was repaired with a single patch technique through a left apico-lateral ventriculotomy using bovine pericardium. The ventriculotomy was closed primarily.  4. The post bypass KATIE showed no residual shunt and no new or changed valvular disease. An  intermittent left bundle branch block was present post operatively.    PATHOLOGY RESULTS:   ID Source Type Collected By Time Frozen   1 Other Implant Aldair Whitney MD 2/4/22 1215 No   Description: VSD Plug        CULTURE RESULTS:  none    CONSULTS:    1. PT/OT  2. Internal medicine Procedure Team    BRIEF HISTORY OF ILLNESS:  Kimberley Mullins is a 81 year old female with a PMH of HTN, hypothyroidism, recent [facility] admission (1/14-1/17) for N/V and hyponatremia found to have a new apical, septal VSD ( underwent percutaneous closure with 16mm Amplatzer device (now has a residual, high gradient shunt) who underwent apical septal VSD repair and removal of Amplatzer device 2/4 with Dr. Whitney and Dr. Whittington       [facility] [place] COURSE: Kimberley Mullins is a 81 year old female who on 1/24/2022 underwent the above-named procedures.  she tolerated the operation well.     Postoperatively was admitted to the CVICU.  Patient was extubated within protocol on POD #1.  Blood pressure and cardiac index were managed with vasopressors and inotropic agents which were continuously weaned until no longer needed.  Patient was subsequently  transferred to the surgical telemetry floor.     While on the surgical unit, the patient continued to progress well. Chest tubes and temporary pacemaker wires were removed when deemed appropriate. Cardiac Medications were gradually reintroduced as tolerated. These medications included Metoprolol Tartrate 12.5 bid, Simvastatin 20mg daily, and ASA 81mg.    CT chest pulmonary embolism on 1/24 found a small segmental pulmonary embolism of indeterminant and possibly chronic age.  For this indication Mireya was placed on a heparin drip once her chest tubes were removed.  Heparin was bridged to warfarin with therapeutic INR of 2-3.  Plan as of discharge is to maintain warfarin therapy for 3 months with an INR goal of 2-3.  Reevaluation at that time can be determined for further imaging and anticoagulation. She  was discharged with an INR of 2.06    A left-sided pleural effusion was noted on chest x-ray prior to chest tube removal.  However chest tubes were not eliminating the effusion adequately.  Patient was not short of breath nor had any increased work of breathing.  Chest tubes were removed and the internal medicine procedure team was consulted for elimination of the left pleural effusion by means of thoracentesis.  She was discharged status post thoracentesis and improved pleural effusion after removal of about 300mL of serosanguinous fluid.     Patient was fluid overloaded and treated with diuretics. She was discharged 5.6 Kg below preoperative weight and will discharge with 5 days of gentle diuretic therapy in the form of furosemide 20mg by mouth daily with potassium supplementation. Patient will have re-evaluation for further Diuretic therapy need in clinic follow-up.     If any dental procedures are to be performed it is recommended to use prophylactic antibiotics to prevent serious infections from occurring.  This includes any dental procedure including routine cleanings.     Patient was transiently hyperglycemic and treated with insulin infusion then transitioned to sliding scale insulin per protocol. Most recent Hemoglobin A1c is 5.6, she has no history of Diabetes mellitus, and her blood sugars remained stable. No Further glycemic control was necessary at discharge.    Nutrition consultation was provided in the context of Malnutrition based on the following clinical evaluations: % Intake: </= 50% for >/= 5 days (severe)  % Weight Loss: Difficult to assess wt changes with changes in fluid status and diuresis.   Subcutaneous Fat Loss: None observed  Muscle Loss: Temporal:  Mild vs age related, Upper arm (bicep, tricep):  Mild-Moderate and Dorsal hand:  Mild-moderate vs age related  Fluid Accumulation/Edema: Trace.  Based on the above findings, the diagnosis of Severe malnutrition in the context of acute illness  "on chronic illness was determined. Recommendations include   1. Continue regular diet, liberalized to help encourage oral intake. Encourage intake of oral supplements and as able, snacks. Rec pt take medications with food, as able.   2. Continue thera-vit, as ordered, to help meet micronutrient needs. Consider modifying to a multivitamin with minerals to help better meet micronutrient needs.       Prior to discharge, her pain was controlled well, she was working well with therapies, able to perform most ADLs, ambulate with assistance, and had full return of bowel and bladder function.  On February 10th, 2022, she was discharged to home in stable condition. Follow up with cardiology and cardiac surgery have been arranged. Pt encouraged to follow up with PCP and cardiac rehab upon discharge.    Patient discharged on aspirin:  Yes 81 mg  Patient discharged on beta blocker: yes   Patient discharged on ACE Inhibitor/ARB: no  Due to hypotension    Patient discharged on statin: yes         Discharge Disposition:     Discharged to home            Condition on Discharge:     Discharge condition: Stable   Discharge vitals: Blood pressure 131/78, pulse 87, temperature 98.5  F (36.9  C), temperature source Oral, resp. rate 18, height 1.6 m (5' 3\"), weight 63 kg (138 lb 14.4 oz), SpO2 95 %, not currently breastfeeding.     Code status on discharge: Full Code     Vitals:    02/08/22 0200 02/09/22 0000 02/10/22 0212   Weight: 63.4 kg (139 lb 11.2 oz) 64.5 kg (142 lb 4.8 oz) 63 kg (138 lb 14.4 oz)       DAY OF DISCHARGE PHYSICAL EXAM:    Blood pressure 131/78, pulse 87, temperature 98.5  F (36.9  C), temperature source Oral, resp. rate 18, height 1.6 m (5' 3\"), weight 63 kg (138 lb 14.4 oz), SpO2 95 %, not currently breastfeeding.  Vitals:    02/08/22 0200 02/09/22 0000 02/10/22 0212   Weight: 63.4 kg (139 lb 11.2 oz) 64.5 kg (142 lb 4.8 oz) 63 kg (138 lb 14.4 oz)        Admit Wt: 68.6 Kg  Discharge Wt: 63.0 Kg      24 hr Fluid " status; net loss -590 L.   MAPs:     Gen: A&Ox4, NAD  Neuro: no focal deficits   CV: RRR, normal S1 S2, no murmurs, rubs or gallops. No appreicable JVD  Pulm: CTA, no wheezing or rhonchi, normal breathing on RA  Abd: nondistended, normal BS, soft, nontender  Ext: positive peripheral edema, 1+ pitting and soft  Incision: clean, dry, intact, no erythema, sternum stable  Tubes/drain sites: dressing clean and dry      Recent Labs   Lab 02/10/22  0553 02/09/22  0619 02/08/22  0638 02/07/22  0532 02/06/22  2124 02/06/22  1659   GLC 86 85 92 82 114* 100*       ECHOCARDIOGRAM, 2/8/2022-   Left Ventricle  Global and regional left ventricular function is normal with an EF of 55-60%.  Left ventricular size is normal. Abnormal non-specific septal motion is  present. Status post removal of Amplatzer device and patch closure of muscular  VSD 2/4/22. Some images (images #45-46 and 50-54) are suspicious for a small  residual VSD at the apical margin of the patch.     Right Ventricle  Mild right ventricular dilation is present. Global right ventricular function  is mildly reduced.     Tricuspid Valve  Mild tricuspid insufficiency is present. The right ventricular systolic  pressure is approximated at 21.3 mmHg plus the right atrial pressure.     Pericardium  No pericardial effusion is present.     Compared to Previous Study  This study was compared with the study from 2/4/22 (intraoperative KATIE) and  2/3/22 (TTE) . Possible residual VSD was not seen on the postoperative images  from the intraoperative KATIE, however the ventricular septum is better  interrogated on today's KATIE. Compared with the prior TTE, there has been  interval removal of Amplatzer device and patch closure of VSD with significant  interval reduction in ventricular-level shunting. RV size and function also  appears slightly improved on limited views.    CXR 2/8/2022-   FINDINGS:   AP portable chest. Stable postsurgical changes of  VSD repair, with  intact  median sternotomy wires and cerclage wire. Stable small  bilateral pleural effusions, with associated bibasilar atelectasis,  best appreciated on prior CT. No pneumothorax or new focal airspace  opacity. Calcification diffusely in the costochondral joints. Lumbar  convex leftward curvature. Left humeral fracture hardware appears  intact.    DISCHARGE INSTRUCTIONS:  AFTER YOU GO HOME FROM YOUR HEART SURGERY  (Ventricular Septal Defect Repair on 2/4/22)    You had a sternotomy, avoid lifting anything greater than ten pounds for 6 weeks after surgery and then less than 20 pounds for an additional 6 weeks. Do not reach backwards or use arms to push out of chair. Do not let people pull on your arms to assist with standing. Avoid twisting or reaching too far across your body.  Avoid strenuous activities such as bowling, vacuuming, raking, shoveling, golf or tennis for 12 weeks after your surgery. It is okay to resume sex if you feel comfortable in doing so. You may have to try different positions with your partner.  Splint your chest incision by hugging a pillow or bringing your arms across your chest when coughing or sneezing. Please try to sleep on your back for the first 4-6 weeks to avoid extra stress on your sternum (breastbone) while it is healing.     Activity as tolerated with sternal precautions. No lifting more than 10 pounds for first 6 weeks, then no lifting more than 20 pounds for an additional 6 weeks. Avoid lifting arms above shoulders. After these 12 weeks, activity as tolerated with pain. Avoid strenuous activities such as bowling, vacuuming, raking, shoveling, golf or tennis for 12 weeks after your surgery. No sex for 6-8 weeks after surgery.     No driving for 4 weeks. If you continue to take narcotics after 4 weeks, no driving until you are not taking narcotics. Sit in the back seat for 4 weeks.      Shower or wash your incisions twice daily with soap and water (or as instructed), pat dry. Keep wound  clean and dry, showers are okay after discharge, but don't let spray hit directly on incision. No baths or swimming for 1 month. Cover chest tube sites with gauze until they stop draining, then leave open to air. It is not abnormal for chest tube sites to drain yellowish/clear fluid for up to 2-3 weeks after surgery.   Watch for signs of infection: increased redness, tenderness, warmth or any drainage from sternum incision.  Also a temperature > 100.5 F or chills. Call your surgeon or primary care provider's office immediately. Remove any skin glue left on incisions after 10-14 days. This will not affect your incision and can speed up healing.    Exercise is very important in your recovery. Please follow the guidelines set up for you in your cardiac rehab classes at the hospital. If outpatient cardiac rehab was ordered for you, we highly recommend you participate. If you have problems arranging your cardiac rehab, please call [phone] for all locations, with the exception of [place], please call [phone] and [place], please call [phone].    Avoid sitting for prolonged periods of time, try to walk every hour during the day. If you have a leg incision, elevate your leg often when you are not walking.    Check your weight when you get home from the hospital and continue to check it daily through your recovery for at least a month. If you notice a weight gain of 2-3 pounds in a week, notify your primary care physician, cardiologist or surgeon.    Bowel activity may be slow after surgery. If necessary, you may take an over the counter laxative such as Milk of Magnesia or Miralax. You may have bowel stimulants or stool softeners prescribed (docusate sodium, Senokot, Miralax). We recommend using stool softeners while using narcotics for pain (oxycodone/percocet, hydrocodone/vicodin, hydromorphone/dilaudid).      If you start to feel dizzy, nauseous, have chest pain, shortness of breath, lower extremity  edema with pain and redness, or extreme headache that is out of the ordinary, it is important that you go to the closest ER for evaluation. Any emergency should be addressed by seeing your local Emergency Medicine provider. Other non-emergency concerns should be addresed by contacting your primary care physician or Cardiologist.    Wean OFF of narcotics (oxycodone, dilaudid, hydrocodone) as soon as possible. You should continue taking acetaminophen as long as you have any surgical pain as the first choice for pain control and add narcotics as necessary for pain to be tolerable.      DENTAL VISITS AFTER SURGERY  If you have had your heart valve repaired or replaced, we do not recommend having any dental work done for 6 months and you will need to take an antibiotic prior to dental visits from now on.  Please notify your dentist before any procedure for the proper treatment needed. The antibiotic is taken by mouth one hour prior to visit. This includes routine cleanings.    DO NOT SMOKE.  IF YOU NEED HELP QUITTING, PLEASE TALK WITH YOUR CARDIOLOGIST OR PRIMARY DOCTOR.    You are on the blood thinner, warfarin. Follow the instructions you were given in the hospital and DO NOT SKIP this medication. Try and take it the same time everyday. Your primary care physician or coumadin clinic will manage the dosing. INR goal is 2-3.    REGARDING PRESCRIPTION REFILLS.  If you need a refill on your pain medication contact us to discuss your pain and a possible one time refill.   All other medications will be adjusted, discontinued and re-filled by your primary care physician and/or your cardiologist as they were prior to your surgery. We have given you enough for one to three month with possibly one refill.      You have small procedure sites at right neck and right groin, the right groin incision is bigger.  You may have small amounts of bruising or discomfort, this is expected. If you develop worse swelling, redness and warmth  that does not go away, fever and chills, Increasing numbness in your legs, worsening pain at the site then you need to contact your nurse coordinator.    You may shower, but do not soak in a bath tub or pool or apply lotions, ointments, powder, etc for 3-5 days or until sites look healed.     Activity: No lifting, pushing, pulling more than 10 pounds (examples to avoid: groceries, vacuuming, gardening, golfing): For one week with a procedure through the groin.    After the initial healing process of the access site, we recommend cardiac rehabilitation for all patients who underwent structural procedure. Cardiac rehabilitation will help you:  - Rebuild stamina, strength and balance.  - Learn how to participate in activities safely, as well as help you regain confidence to do so.  - Return to activities of daily living and leisure.  Please contact their central scheduling to arrange at [phone]    We prefer you to follow up with your primary care provider within 2 weeks. You will be arranged to see the Structural Cardiologist or NP in 2 weeks and 1 month. At that time you will have a repeat ultrasound of the heart to look at the valve.     Should you have any questions or concerns please contact the structural cardiology nurse coordinator:  Marilyn [phone] (at the first prompt enter #1, second prompt enter #3, then ask the triage nurse if you can speak with Marilyn).    POST-OPERATIVE CLINIC VISITS  You have a follow up visit with [facility] Surgery Advance Care Practitioners on 2/15/22 at the [facility].  You will then return to the care of your primary provider and your cardiologist. Future medication refills should come from your PCP or Cardiologist.   You should see your primary care provider in 1-2 weeks after discharge.   It is important to see your cardiologist, Dr. Justin Pickering, about 2-4 weeks after discharge.    PRE-ADMISSION MEDICATIONS:  No current facility-administered  medications on file prior to encounter.  levothyroxine (SYNTHROID/LEVOTHROID) 50 MCG tablet, Take 1 tablet (50 mcg) by mouth daily  Multiple Vitamins-Minerals (PRESERVISION AREDS PO), Take 1 tablet by mouth 2 times daily  multivitamin, therapeutic (THERA-VIT) TABS tablet, Take 1 tablet by mouth daily  simvastatin (ZOCOR) 20 MG tablet, TAKE 1 TABLET (20 MG) BY MOUTH AT BEDTIME         DISCHARGE MEDICATIONS:      Review of your medicines      START taking      Dose / Directions   acetaminophen 325 MG tablet  Commonly known as: TYLENOL      Dose: 650 mg  Take 2 tablets (650 mg) by mouth every 6 hours as needed for mild pain or pain  Quantity: 90 tablet  Refills: 0     aspirin 81 MG chewable tablet  Commonly known as: ASA      Dose: 81 mg  1 tablet (81 mg) by Oral or NG Tube route daily  Quantity: 90 tablet  Refills: 0     clonazePAM 0.5 MG tablet  Commonly known as: klonoPIN  Used for: CAROL (generalized anxiety disorder)      Dose: 0.5 mg  Take 1 tablet (0.5 mg) by mouth nightly as needed for anxiety  Quantity: 30 tablet  Refills: 0     clopidogrel 75 MG tablet  Commonly known as: PLAVIX      Dose: 75 mg  Take 1 tablet (75 mg) by mouth daily Dose to start tomorrow.  Quantity: 90 tablet  Refills: 3     furosemide 20 MG tablet  Commonly known as: LASIX      Dose: 20 mg  Take 1 tablet (20 mg) by mouth 2 times daily  Quantity: 14 tablet  Refills: 0     hydrOXYzine 25 MG tablet  Commonly known as: ATARAX      Dose: 25 mg  Take 1 tablet (25 mg) by mouth every 6 hours as needed for anxiety or other (adjuvant pain)  Quantity: 30 tablet  Refills: 0     methocarbamol 500 MG tablet  Commonly known as: ROBAXIN      Dose: 500 mg  Take 1 tablet (500 mg) by mouth 4 times daily as needed for muscle spasms  Quantity: 90 tablet  Refills: 0     metoprolol tartrate 25 MG tablet  Commonly known as: LOPRESSOR      Dose: 12.5 mg  Take 0.5 tablets (12.5 mg) by mouth 2 times daily  Quantity: 90 tablet  Refills: 0     oxyCODONE 5 MG  tablet  Commonly known as: ROXICODONE      Dose: 2.5-5 mg  Take 0.5-1 tablets (2.5-5 mg) by mouth every 4 hours as needed for severe pain  Quantity: 20 tablet  Refills: 0     pantoprazole 40 MG EC tablet  Commonly known as: PROTONIX      Dose: 40 mg  Take 1 tablet (40 mg) by mouth daily  Quantity: 30 tablet  Refills: 0     polyethylene glycol 17 GM/Dose powder  Commonly known as: MIRALAX      Dose: 17 g  Take 17 g by mouth daily  Quantity: 510 g  Refills: 0     potassium chloride ER 20 MEQ CR tablet  Commonly known as: K-TAB      Dose: 20 mEq  Take 1 tablet (20 mEq) by mouth daily  Quantity: 7 tablet  Refills: 0     * Warfarin Therapy Reminder      Dose: 1 each  1 each continuous prn  Quantity: 1 each  Refills: 0     * warfarin ANTICOAGULANT 2 MG tablet  Commonly known as: COUMADIN  Used for: Single subsegmental pulmonary embolism without acute cor pulmonale (H)      Dose: 2 mg  Take 1 tablet (2 mg) by mouth daily For tonight (2/10/22), take 1 tablet (2mg) by mouth at night. Tomorrow, follow the instructions for the dose of medication provided by your coumadin clinic or primary care provider. It is important to take this medication at a consistent time in the evening.  Quantity: 90 tablet  Refills: 0         * This list has 2 medication(s) that are the same as other medications prescribed for you. Read the directions carefully, and ask your doctor or other care provider to review them with you.            CONTINUE these medicines which have NOT CHANGED      Dose / Directions   levothyroxine 50 MCG tablet  Commonly known as: SYNTHROID/LEVOTHROID  Used for: Acquired hypothyroidism      Dose: 50 mcg  Take 1 tablet (50 mcg) by mouth daily  Quantity: 90 tablet  Refills: 3     multivitamin, therapeutic Tabs tablet      Dose: 1 tablet  Take 1 tablet by mouth daily  Refills: 0     PRESERVISION AREDS PO      Dose: 1 tablet  Take 1 tablet by mouth 2 times daily  Refills: 0     simvastatin 20 MG tablet  Commonly known as:  ZOCOR  Used for: Pure hypercholesterolemia      TAKE 1 TABLET (20 MG) BY MOUTH AT BEDTIME  Quantity: 90 tablet  Refills: 3        STOP taking    amLODIPine 5 MG tablet  Commonly known as: NORVASC        ondansetron 4 MG ODT tab  Commonly known as: ZOFRAN-ODT              Where to get your medicines      These medications were sent to [facility] [place], [place] - [address] [address]    Phone: [phone]     acetaminophen 325 MG tablet    aspirin 81 MG chewable tablet    clopidogrel 75 MG tablet    furosemide 20 MG tablet    hydrOXYzine 25 MG tablet    methocarbamol 500 MG tablet    metoprolol tartrate 25 MG tablet    oxyCODONE 5 MG tablet    pantoprazole 40 MG EC tablet    polyethylene glycol 17 GM/Dose powder    potassium chloride ER 20 MEQ CR tablet    warfarin ANTICOAGULANT 2 MG tablet    Warfarin Therapy Reminder      Some of these will need a paper prescription and others can be bought over the counter. Ask your nurse if you have questions.    Bring a paper prescription for each of these medications    clonazePAM 0.5 MG tablet            CC:Renetta Gbison Dr.      [facility]   Cardiothoracic Surgery  Office phone: [phone]  Office fax: [phone]

## 2022-02-09 NOTE — PLAN OF CARE
D: Pt admit 1/24/22 s/p apical septal VSD repair 2/4. PMH HTN, hypothyroidism, recent [facility] hospital admission found new apical septal VSD. Pt had percutaneous closure w/amplatzer device with residual high gradient shunt    I/A:   Neuro: A&Ox4  VS: VSS. RA  Tele: SR  Pain: incisional pain controlled with scheduled tylenol and robaxin  GI/: Urinating adequately. BM x2  Diet: regular  IV/Drips: R PIV SL  Activity: SBA with walker  Skin/drains: midline incision LEAH w/some bruising and edema surrounding incision, old CT sites UTV dressing CDI.    P: Plan for medicine team consult today for possible thoracentesis. Continue to monitor pt status and report changes to Cards 1.     Archana Quintana RN

## 2022-02-09 NOTE — OP NOTE
Procedure Date: 02/04/2022    PREOPERATIVE DIAGNOSES:    1.  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    2.  Hemolysis.  3.  Acute kidney injury.  4.  Diastolic heart failure with cardiogenic shock.    POSTOPERATIVE DIAGNOSES:  1.  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    2.  Hemolysis.  3.  Acute kidney injury.  4.  Diastolic heart failure with cardiogenic shock.    PROCEDURE:  Repair of muscular ventricular septal defect with a single pericardial patch.    SURGEON:  Aldair Whitney MD    CO-SURGEON:  Leighton Whittington MD.  Note, a second attending surgeon was requested for the operation because of the complexity of the operation as well as the absence of any qualified resident or fellow.  Note, please refer to full details by Dr. Aldair Whitney, who was the primary surgeon.    DESCRIPTION OF PROCEDURE:  The patient was brought to the operating room in stable condition.  After the administration of general anesthesia, the patient's chest, abdomen, lower extremities were prepped and draped in usual manner.  Median sternotomy was performed.  Cardiopulmonary bypass was instituted via aortic and bicaval cannulation.  Antegrade and retrograde cardioplegia cannulae were placed.  Pressure was temporarily reduced.  The aorta was crossclamped.  The right atriotomy was performed, and the VSD was identified.  Next, we made an anterior ventriculotomy to the left of the left anterior descending artery.  Adequate retraction was obtained.  The Amplatz device was removed.  The VSD was identified and closed with pledgeted 2-0 Ethibond sutures passed from the right ventricular side of the VSD to the left ventricular side circumferentially.  A patulous passive PhotoFix bovine pericardium was cut to size and sutures passed through its perimeter.  This was seated and gently tied.  The ventriculotomy was closed with 2 Adan felt strips.  Warm dose of retrograde hotshot cardioplegia was given,  and with high suction on vent cross-clamp released.  Organized rhythm resumed without need for defibrillations.  Patient was weaned off cardiopulmonary bypass.  Hemostasis was obtained.  Sternum was closed in usual standard fashion.  The patient transferred to ICU in stable critical condition.    NOTE:  As co-surgeon, I performed specific portions of the operation.    Leighton Whittington MD        D: 2022   T: 2022   MT: [facility]    Name:     ALEXA EDGE  MRN:      [id number]        Account:        [id number]   :      1940           Procedure Date: 2022     Document: [id number]

## 2022-02-09 NOTE — PLAN OF CARE
[id number]    VSS. Up walking in halls with OT. Denies pain. Tolerating room air. Continues to have congested cough- sucking on lozenges in room. One episode of nausea after eating, resolved on it's own. Patient and family discussed having thoracentesis today. Originally they were hesitant, but now they are agreeable to it. Notified CVTS who re-consulted the procedure team. Procedure team to meet with patient again tomorrow to discuss. Plan to continue to monitor patient overnight. Notify physician with changes or concerns.

## 2022-02-09 NOTE — PROGRESS NOTES
Care Management Follow Up    Length of Stay (days): 16    Expected Discharge Date: 2/10/22     Concerns to be Addressed: discharge planning     Patient plan of care discussed at interdisciplinary rounds: Yes    Anticipated Discharge Disposition:  Son's home:  Adán Mullins  [address] [place] [id number]  Pt's cell: [phone]  Daughter Neeraj Ortiz cell: [phone]---she will take her to MD appointment on Friday, 2/11/22     Anticipated Discharge Services:  New warfarin monitoring by PCP: Crystal  [facility]  [address] [address]  Ph: [phone]  They can see Epic    Indication: DVT/MS s/p ventral septal defectrepair--repair with single pericardial patch 2/8/22   Goal INR: 2-3  Duration: 3-6 months    Scheduled for Friday, 2/11/22 @ 1:30pm with INR draw  Then  Referral sent to [facility] Ph: [phone] per neeraj--they will review and call me back--request RN/PT/OT  Fax: [phone]--done @ 12:35pm wait for acceptance: CANNOT accept due to staffing    I fax'd referral to Simone  Ph: [phone] and Liza armenta to F: [phone] @ 4:20pm. Wait for acceptance____    I sent email referral to [facility]/Benoit  @ 4:22pm  Wait for acceptance____    PLC scheduled for 2/10/22 @ 11am in room for warfarin teaching     Anticipated Discharge DME:  PT to dispense a walker    Patient/family educated on Medicare website which has current facility and service quality ratings:  Yes  Education Provided on the Discharge Plan: Yes   Patient/Family in Agreement with the Plan: Yes     Referrals Placed by CM/SW:    Private pay costs discussed: Home Health     Additional Information:          Sivan Iniguez RN

## 2022-02-09 NOTE — PROGRESS NOTES
Cardiovascular Surgery Progress Note  02/09/2022         Assessment and Plan:     Kimberley Mullins is a 81 year old female with a PMH of HTN, hypothyroidism, recent [facility] admission (1/14-1/17) for N/V and hyponatremia found to have a new apical, septal VSD ( underwent percutaneous closure with 16mm Amplatzer device (now has a residual, high gradient shunt) who underwent apical septal VSD repair and removal of Amplatzer device 2/4 with Dr. Whitney and Dr. Whittington     Cardiovascular:   # Hx of VSD s/p percutaneous closure with 16mm Amplatzer device  # Residual high gradient VSD shunt s/p septal VSD repair and Amplatzer device removal 2/4 with Dr. Whitney and Dr. Whittington  # Known ST segment deviation  - Patient states no new chest pain with activity, SOB, or symptoms of ACS. EKG unchanged from 2/4 and 2/5. No signs or symptoms of pericarditis or pericardial tamponade. Echo 2/7 pending.  - PTA Medications: Simvastatin 20mg daily  Amlodipine 5mg daily  - Current Medications: Restarted Metoprolol 12.5 bid  ASA 81mg  Simvastatin 20mg daily  - No arrhythmia, HD stable. Goal Map > 65, Goal SBP <140mmHg  - Most recent echo 2/3/22 showed LV EF 55-60%  - Post op Echocardiogram with possible small residual shunt-nothing to do, follow clinically.      Chest tubes: removed without immediate complications  TPW: removed without immediate complications     Pulmonary:  # Single subsegmental pulmonary embolism without acute cor pulmonale  - Extubated POD 1 to 2 L/m lpm via NC. Now saturating well on RA   - Supplemental O2 PRN to keep sats > 92%. Wean off as tolerated.  - Pulm toilet, IS, activity and deep breathing    # Small Left pleural effusion   - Persistent dry cough. Medicine consulted for possible left thoracentesis.  - Pending thoracentesis 2/9/22     Neurology:  # History of tinnitus  # History of macular degeneration and bilateral cataracts  # CAROL  - Neuro intact  - Acute post-operative pain well controlled with  acetaminophen, PO oxycodone PRN, IV dilaudid PRN     MSK/ Skin:  # Sternotomy  # Surgical Incision  # Osteoarthritis s/p total knee replacement  # Osteopenia  - Sternal precautions  - Postoperative incision management per protocol  - PT/OT/CR      / Renal:  - No Hx of renal disease. Baseline Creatinine 1.2-1.4.   - Most recent Creatinine: 1.07, adequate UOP.   - Diuresis: switched to oral lasix 40 mg po bid today.   - Discontinue Plascencia catheter with voiding trial 2/7. Still in, will remove today.      GI / FEN:   # IBS with diarrhea  - Regular diet, continue bowel regimen  - Metoclopromide for nausea and appetite stimulation  - Replace electrolytes as needed, hepatic enzymes WNL.     Endocrine:   # Stress Induced Hyperglycemia  # Hypothyroidism  - PTA levothyroxine 50mcg restarted  - Most Recent Hemoglobin A1c: 5.6  - Stress induced hyperglycemia initially managed on insulin drip postop, transitioned to sliding scale. Discontinued insulin 2/7     Infectious Disease:  # Stress induced leukocytosis  - WBC 7.2 , remains afebrile, no signs or symptoms of infection  - Completed perioperative antibiotics     Hematology:   # Acute blood loss anemia   # Thrombocytopenia  # Small segmental PE, acute vs chronic  - Hgb 7.6; Plt stable, no signs or symptoms of active bleeding     Antithrombotics:   - ASA 81mg   - Stop LIH  - INR 2.46, on coumadin.      Prophylaxis:   - Stress ulcer prophylaxis: Pantoprazole 40 mg daily for 30 days  - DVT prophylaxis: SCD     Disposition:   - Transferred to  on 2/6/22  - Therapies recommending discharge to TCU or home with assist.  - Thoracentesis 2/9/22    Discussed with Surgeon, Dr. Whitney via written and verbal commnication.     Manoj Hollis PA-c  Pager: [phone]  7:31 AM February 9, 2022           Interval History:     No overnight events.  States pain is well managed on current regimen. Slept well overnight.  Tolerating diet, is passing flatus, BM x 1. No nausea or  "vomiting.  Breathing well without complaints.   Working with therapies and ambulating in halls without assistance.   Denies chest pain, palpitations, dizziness, syncopal symptoms, fevers, chills, myalgias, or sternal popping/clicking.         Physical Exam:   Blood pressure 134/80, pulse 87, temperature 97.9  F (36.6  C), temperature source Oral, resp. rate 16, height 1.6 m (5' 3\"), weight 64.5 kg (142 lb 4.8 oz), SpO2 97 %, not currently breastfeeding.  Vitals:    02/07/22 0800 02/08/22 0200 02/09/22 0000   Weight: 65.5 kg (144 lb 6.4 oz) 63.4 kg (139 lb 11.2 oz) 64.5 kg (142 lb 4.8 oz)      Current Weight: 64.5 Kg Trend: +0.9 Kg  Admit weight: 68.6 Kg    Daily Fluid status; net loss: -170  mL    Net loss SA: -6713 mL  MAPs:   LVEF: 55-60    Gen: A&Ox4, NAD  Neuro: no focal deficits   CV: RRR, normal S1 S2, no murmurs, rubs or gallops. No appreciable JVD   Vascular: Peripheral pulses present Radial 2+, Dorsalis Pedis 2+, Posterior Tibialis 2+.   Pulm: CTA, no wheezing or rhonchi, normal breathing on RA  Abd: nondistended, normal BS, soft, nontender  Ext: Positive peripheral edema, 1+ pitting but soft. Warm.   Incision: clean, dry, intact, no erythema, sternum stable  Tubes/drain sites: dressing clean and dry         Data:    Imaging:  reviewed recent imaging    Chest x-ray  Interpretation:    Echocardiogram  Interpretation:    CT scan:    Labs:  CBC  Recent Labs   Lab 02/09/22  0619 02/08/22  0638 02/07/22  1417 02/07/22  0532   WBC 7.2 10.0 11.2* 10.4   RBC 2.43* 2.40* 2.54* 2.40*   HGB 7.6* 7.5* 8.1* 7.6*   HCT 23.7* 23.2* 25.6* 23.6*   MCV 98 97 101* 98   MCH 31.3 31.3 31.9 31.7   MCHC 32.1 32.3 31.6 32.2   RDW 16.7* 17.0* 17.7* 18.0*    289 254 229     CMP:  Last Comprehensive Metabolic Panel:  Sodium   Date Value Ref Range Status   02/08/2022 134 133 - 144 mmol/L Final   01/19/2022 130.9 (A) 135 - 146 mmol/L Final     Potassium   Date Value Ref Range Status   02/08/2022 3.9 3.4 - 5.3 mmol/L Final "   01/19/2022 4.75 3.5 - 5.3 mmol/L Final     Chloride   Date Value Ref Range Status   02/08/2022 102 94 - 109 mmol/L Final   01/19/2022 100.4 98 - 110 mmol/L Final     Carbon Dioxide   Date Value Ref Range Status   01/19/2022 25.6 20 - 32 mmol/L Final     Carbon Dioxide (CO2)   Date Value Ref Range Status   02/08/2022 22 20 - 32 mmol/L Final     Anion Gap   Date Value Ref Range Status   02/08/2022 10 3 - 14 mmol/L Final   05/05/2008 13 6 - 17 mmol/L Final     Glucose   Date Value Ref Range Status   02/08/2022 92 70 - 99 mg/dL Final   01/19/2022 112 (A) 60 - 99 mg/dL Final     Urea Nitrogen   Date Value Ref Range Status   02/08/2022 21 7 - 30 mg/dL Final   01/19/2022 13 7 - 25 mg/dL Final     BUN/Creatinine Ratio   Date Value Ref Range Status   01/19/2022 17.8 6 - 22 Final     Creatinine   Date Value Ref Range Status   02/08/2022 1.07 (H) 0.52 - 1.04 mg/dL Final   01/19/2022 0.73 0.60 - 1.30 mg/dL Final     GFR Estimate   Date Value Ref Range Status   02/08/2022 52 (L) >60 mL/min/1.73m2 Final     Comment:     Effective December 21, 2021 eGFRcr in adults is calculated using the 2021 CKD-EPI creatinine equation which includes age and gender (Rolanda stern al., NEJ, DOI: 10.1056/XXQUjh7175375)   11/07/2018 84 > OR = 60 mL/min/1.73m2 Final     Calcium   Date Value Ref Range Status   02/08/2022 7.8 (L) 8.5 - 10.1 mg/dL Final   01/19/2022 8.7 8.6 - 10.3 mg/dL Final     Bilirubin Total   Date Value Ref Range Status   02/05/2022 0.9 0.2 - 1.3 mg/dL Final   05/26/2021 0.9 0.2 - 1.2 mg/dL Final     Alkaline Phosphatase   Date Value Ref Range Status   02/05/2022 45 40 - 150 U/L Final   05/26/2021 68 33 - 130 U/L Final     ALT   Date Value Ref Range Status   02/05/2022 27 0 - 50 U/L Final   11/07/2018 19 6 - 29 U/L Final     AST   Date Value Ref Range Status   02/05/2022 152 (H) 0 - 45 U/L Final   11/07/2018 25 10 - 35 U/L Final     BMP  Recent Labs   Lab 02/08/22  0638 02/07/22  0532 02/06/22  2124 02/06/22  1659 02/06/22  0422  02/06/22  0405 02/06/22  0001 02/05/22 2023 02/05/22  0615 02/05/22  0613    134  --   --   --  136  --   --   --  140   POTASSIUM 3.9 3.7  --   --   --  3.8  3.8  --  4.0  --  3.7   CHLORIDE 102 104  --   --   --  109  --   --   --  111*   ANANDA 7.8* 8.2*  --   --   --  7.5*  --   --   --  8.3*   CO2 22 24  --   --   --  21  --   --   --  20   BUN 21 27  --   --   --  18  --   --   --  15   CR 1.07* 1.33*  --   --   --  1.19*  --   --   --  1.25*   GLC 92 82 114* 100*   < > 100*   < >  --    < > 117*    < > = values in this interval not displayed.     INR  Recent Labs   Lab 02/09/22  0620 02/08/22  1457 02/08/22  0638 02/07/22  0532   INR 2.46* 3.00* 3.28* 1.41*      Hepatic Panel  Recent Labs   Lab 02/05/22  0613 02/04/22  2330 02/04/22  1422 02/04/22  0537   * 150* 125* 141*   ALT 27 32 33 43   ALKPHOS 45 44 42 49   BILITOTAL 0.9 0.6 1.0 1.8*   ALBUMIN 2.7* 2.9* 2.5* 3.4     GLUCOSE:   Recent Labs   Lab 02/08/22  0638 02/07/22  0532 02/06/22  2124 02/06/22  1659 02/06/22  1149 02/06/22  0804   GLC 92 82 114* 100* 154* 110*

## 2022-02-10 ENCOUNTER — APPOINTMENT (OUTPATIENT)
Dept: EDUCATION SERVICES | Facility: CLINIC | Age: 82
DRG: 228 | End: 2022-02-10
Attending: INTERNAL MEDICINE
Payer: COMMERCIAL

## 2022-02-10 ENCOUNTER — APPOINTMENT (OUTPATIENT)
Dept: PHYSICAL THERAPY | Facility: CLINIC | Age: 82
DRG: 228 | End: 2022-02-10
Attending: INTERNAL MEDICINE
Payer: COMMERCIAL

## 2022-02-10 ENCOUNTER — APPOINTMENT (OUTPATIENT)
Dept: GENERAL RADIOLOGY | Facility: CLINIC | Age: 82
DRG: 228 | End: 2022-02-10
Attending: PEDIATRICS
Payer: COMMERCIAL

## 2022-02-10 ENCOUNTER — APPOINTMENT (OUTPATIENT)
Dept: OCCUPATIONAL THERAPY | Facility: CLINIC | Age: 82
DRG: 228 | End: 2022-02-10
Attending: INTERNAL MEDICINE
Payer: COMMERCIAL

## 2022-02-10 VITALS
HEART RATE: 97 BPM | HEIGHT: 63 IN | SYSTOLIC BLOOD PRESSURE: 133 MMHG | WEIGHT: 138.9 LBS | RESPIRATION RATE: 18 BRPM | BODY MASS INDEX: 24.61 KG/M2 | OXYGEN SATURATION: 96 % | DIASTOLIC BLOOD PRESSURE: 81 MMHG | TEMPERATURE: 98.5 F

## 2022-02-10 LAB
ANION GAP SERPL CALCULATED.3IONS-SCNC: 8 MMOL/L (ref 3–14)
APPEARANCE FLD: ABNORMAL
BUN SERPL-MCNC: 12 MG/DL (ref 7–30)
CALCIUM SERPL-MCNC: 8.1 MG/DL (ref 8.5–10.1)
CHLORIDE BLD-SCNC: 103 MMOL/L (ref 94–109)
CO2 SERPL-SCNC: 25 MMOL/L (ref 20–32)
COLOR FLD: ABNORMAL
CREAT SERPL-MCNC: 0.98 MG/DL (ref 0.52–1.04)
ERYTHROCYTE [DISTWIDTH] IN BLOOD BY AUTOMATED COUNT: 17 % (ref 10–15)
GFR SERPL CREATININE-BSD FRML MDRD: 57 ML/MIN/1.73M2
GLUCOSE BLD-MCNC: 86 MG/DL (ref 70–99)
GRAM STAIN RESULT: NORMAL
GRAM STAIN RESULT: NORMAL
HCT VFR BLD AUTO: 23.9 % (ref 35–47)
HGB BLD-MCNC: 7.8 G/DL (ref 11.7–15.7)
INR PPP: 2.06 (ref 0.85–1.15)
LDH FLD L TO P-CCNC: 930 U/L
LYMPHOCYTES NFR FLD MANUAL: 6 %
MCH RBC QN AUTO: 31.7 PG (ref 26.5–33)
MCHC RBC AUTO-ENTMCNC: 32.6 G/DL (ref 31.5–36.5)
MCV RBC AUTO: 97 FL (ref 78–100)
MONOS+MACROS NFR FLD MANUAL: 18 %
NEUTS BAND NFR FLD MANUAL: 77 %
PLATELET # BLD AUTO: 364 10E3/UL (ref 150–450)
POTASSIUM BLD-SCNC: 3.7 MMOL/L (ref 3.4–5.3)
PROT FLD-MCNC: 3.2 G/DL
PROT SERPL-MCNC: 5.4 G/DL (ref 6.8–8.8)
RBC # BLD AUTO: 2.46 10E6/UL (ref 3.8–5.2)
SODIUM SERPL-SCNC: 136 MMOL/L (ref 133–144)
WBC # BLD AUTO: 7.6 10E3/UL (ref 4–11)
WBC # FLD AUTO: 3070 /UL

## 2022-02-10 PROCEDURE — 250N000013 HC RX MED GY IP 250 OP 250 PS 637: Performed by: STUDENT IN AN ORGANIZED HEALTH CARE EDUCATION/TRAINING PROGRAM

## 2022-02-10 PROCEDURE — 250N000013 HC RX MED GY IP 250 OP 250 PS 637: Performed by: NURSE PRACTITIONER

## 2022-02-10 PROCEDURE — 89051 BODY FLUID CELL COUNT: CPT | Performed by: PEDIATRICS

## 2022-02-10 PROCEDURE — 85027 COMPLETE CBC AUTOMATED: CPT | Performed by: THORACIC SURGERY (CARDIOTHORACIC VASCULAR SURGERY)

## 2022-02-10 PROCEDURE — 250N000013 HC RX MED GY IP 250 OP 250 PS 637: Performed by: SURGERY

## 2022-02-10 PROCEDURE — 99231 SBSQ HOSP IP/OBS SF/LOW 25: CPT | Performed by: PEDIATRICS

## 2022-02-10 PROCEDURE — 87070 CULTURE OTHR SPECIMN AEROBIC: CPT | Performed by: PHYSICIAN ASSISTANT

## 2022-02-10 PROCEDURE — 250N000013 HC RX MED GY IP 250 OP 250 PS 637: Performed by: PHYSICIAN ASSISTANT

## 2022-02-10 PROCEDURE — 87205 SMEAR GRAM STAIN: CPT | Performed by: PHYSICIAN ASSISTANT

## 2022-02-10 PROCEDURE — 97110 THERAPEUTIC EXERCISES: CPT | Mod: GP

## 2022-02-10 PROCEDURE — 84155 ASSAY OF PROTEIN SERUM: CPT | Performed by: PEDIATRICS

## 2022-02-10 PROCEDURE — 99207 PR CONSULT E&M CHANGED TO SUBSEQUENT LEVEL: CPT | Performed by: PEDIATRICS

## 2022-02-10 PROCEDURE — 84157 ASSAY OF PROTEIN OTHER: CPT | Performed by: PEDIATRICS

## 2022-02-10 PROCEDURE — 97530 THERAPEUTIC ACTIVITIES: CPT | Mod: GO

## 2022-02-10 PROCEDURE — 97116 GAIT TRAINING THERAPY: CPT | Mod: GP

## 2022-02-10 PROCEDURE — 97535 SELF CARE MNGMENT TRAINING: CPT | Mod: GO

## 2022-02-10 PROCEDURE — 36415 COLL VENOUS BLD VENIPUNCTURE: CPT | Performed by: THORACIC SURGERY (CARDIOTHORACIC VASCULAR SURGERY)

## 2022-02-10 PROCEDURE — 250N000013 HC RX MED GY IP 250 OP 250 PS 637: Performed by: THORACIC SURGERY (CARDIOTHORACIC VASCULAR SURGERY)

## 2022-02-10 PROCEDURE — 83615 LACTATE (LD) (LDH) ENZYME: CPT | Performed by: PEDIATRICS

## 2022-02-10 PROCEDURE — 71045 X-RAY EXAM CHEST 1 VIEW: CPT | Mod: 26 | Performed by: RADIOLOGY

## 2022-02-10 PROCEDURE — 87075 CULTR BACTERIA EXCEPT BLOOD: CPT | Performed by: PHYSICIAN ASSISTANT

## 2022-02-10 PROCEDURE — 85610 PROTHROMBIN TIME: CPT | Performed by: THORACIC SURGERY (CARDIOTHORACIC VASCULAR SURGERY)

## 2022-02-10 PROCEDURE — 82310 ASSAY OF CALCIUM: CPT | Performed by: THORACIC SURGERY (CARDIOTHORACIC VASCULAR SURGERY)

## 2022-02-10 PROCEDURE — 999N000065 XR CHEST PORT 1 VIEW

## 2022-02-10 RX ORDER — METHOCARBAMOL 500 MG/1
500 TABLET, FILM COATED ORAL 4 TIMES DAILY PRN
Qty: 90 TABLET | Refills: 0 | Status: SHIPPED | OUTPATIENT
Start: 2022-02-10 | End: 2022-03-11

## 2022-02-10 RX ORDER — METOPROLOL TARTRATE 25 MG/1
12.5 TABLET, FILM COATED ORAL 2 TIMES DAILY
Qty: 90 TABLET | Refills: 0 | Status: SHIPPED | OUTPATIENT
Start: 2022-02-10 | End: 2022-03-08

## 2022-02-10 RX ORDER — FUROSEMIDE 20 MG
20 TABLET ORAL 2 TIMES DAILY
Qty: 14 TABLET | Refills: 0 | Status: SHIPPED | OUTPATIENT
Start: 2022-02-10 | End: 2022-02-10

## 2022-02-10 RX ORDER — WARFARIN SODIUM 1 MG/1
2 TABLET ORAL
Status: DISCONTINUED | OUTPATIENT
Start: 2022-02-10 | End: 2022-02-10 | Stop reason: HOSPADM

## 2022-02-10 RX ORDER — POTASSIUM CHLORIDE 1500 MG/1
20 TABLET, EXTENDED RELEASE ORAL DAILY
Qty: 7 TABLET | Refills: 0 | Status: SHIPPED | OUTPATIENT
Start: 2022-02-10 | End: 2022-03-11

## 2022-02-10 RX ORDER — FUROSEMIDE 20 MG
20 TABLET ORAL DAILY
Qty: 7 TABLET | Refills: 0 | Status: SHIPPED | OUTPATIENT
Start: 2022-02-10 | End: 2022-03-11

## 2022-02-10 RX ORDER — OXYCODONE HYDROCHLORIDE 5 MG/1
2.5-5 TABLET ORAL EVERY 4 HOURS PRN
Qty: 20 TABLET | Refills: 0 | Status: SHIPPED | OUTPATIENT
Start: 2022-02-10 | End: 2022-02-15

## 2022-02-10 RX ORDER — ASPIRIN 81 MG/1
81 TABLET, CHEWABLE ORAL DAILY
Qty: 90 TABLET | Refills: 0 | Status: SHIPPED | OUTPATIENT
Start: 2022-02-10

## 2022-02-10 RX ORDER — PANTOPRAZOLE SODIUM 40 MG/1
40 TABLET, DELAYED RELEASE ORAL DAILY
Qty: 30 TABLET | Refills: 0 | Status: SHIPPED | OUTPATIENT
Start: 2022-02-10 | End: 2022-03-11

## 2022-02-10 RX ORDER — ACETAMINOPHEN 325 MG/1
650 TABLET ORAL EVERY 6 HOURS PRN
Qty: 90 TABLET | Refills: 0 | Status: SHIPPED | OUTPATIENT
Start: 2022-02-10

## 2022-02-10 RX ORDER — POTASSIUM CHLORIDE 1500 MG/1
20 TABLET, EXTENDED RELEASE ORAL DAILY
Qty: 7 TABLET | Refills: 0 | Status: SHIPPED | OUTPATIENT
Start: 2022-02-10 | End: 2022-02-10

## 2022-02-10 RX ORDER — HYDROXYZINE HYDROCHLORIDE 25 MG/1
25 TABLET, FILM COATED ORAL EVERY 6 HOURS PRN
Qty: 30 TABLET | Refills: 0 | Status: SHIPPED | OUTPATIENT
Start: 2022-02-10 | End: 2022-03-11

## 2022-02-10 RX ORDER — POLYETHYLENE GLYCOL 3350 17 G/17G
17 POWDER, FOR SOLUTION ORAL DAILY
Qty: 510 G | Refills: 0 | Status: SHIPPED | OUTPATIENT
Start: 2022-02-10 | End: 2022-03-11

## 2022-02-10 RX ORDER — WARFARIN SODIUM 2 MG/1
2 TABLET ORAL DAILY
Qty: 90 TABLET | Refills: 0 | Status: SHIPPED | OUTPATIENT
Start: 2022-02-10 | End: 2022-03-08

## 2022-02-10 RX ADMIN — HYDROXYZINE HYDROCHLORIDE 25 MG: 25 TABLET, FILM COATED ORAL at 02:16

## 2022-02-10 RX ADMIN — THERA TABS 1 TABLET: TAB at 09:04

## 2022-02-10 RX ADMIN — Medication 1 TABLET: at 09:03

## 2022-02-10 RX ADMIN — METHOCARBAMOL 500 MG: 500 TABLET ORAL at 09:04

## 2022-02-10 RX ADMIN — PANTOPRAZOLE SODIUM 40 MG: 40 TABLET, DELAYED RELEASE ORAL at 09:03

## 2022-02-10 RX ADMIN — ACETAMINOPHEN 650 MG: 325 TABLET, FILM COATED ORAL at 12:05

## 2022-02-10 RX ADMIN — ACETAMINOPHEN 650 MG: 325 TABLET, FILM COATED ORAL at 05:14

## 2022-02-10 RX ADMIN — Medication 400 MG: at 09:04

## 2022-02-10 RX ADMIN — POTASSIUM CHLORIDE 20 MEQ: 20 SOLUTION ORAL at 09:03

## 2022-02-10 RX ADMIN — FUROSEMIDE 40 MG: 40 TABLET ORAL at 09:04

## 2022-02-10 RX ADMIN — LEVOTHYROXINE SODIUM 50 MCG: 0.05 TABLET ORAL at 09:04

## 2022-02-10 RX ADMIN — ASPIRIN 81 MG CHEWABLE TABLET 81 MG: 81 TABLET CHEWABLE at 09:03

## 2022-02-10 RX ADMIN — METHOCARBAMOL 500 MG: 500 TABLET ORAL at 12:05

## 2022-02-10 RX ADMIN — Medication 12.5 MG: at 09:03

## 2022-02-10 ASSESSMENT — ACTIVITIES OF DAILY LIVING (ADL)
ADLS_ACUITY_SCORE: 8
ADLS_ACUITY_SCORE: 10
ADLS_ACUITY_SCORE: 10
ADLS_ACUITY_SCORE: 8
ADLS_ACUITY_SCORE: 10
ADLS_ACUITY_SCORE: 8
ADLS_ACUITY_SCORE: 8
ADLS_ACUITY_SCORE: 10
ADLS_ACUITY_SCORE: 8
ADLS_ACUITY_SCORE: 10

## 2022-02-10 ASSESSMENT — MIFFLIN-ST. JEOR: SCORE: 1059.18

## 2022-02-10 NOTE — PLAN OF CARE
Physical Therapy Discharge Summary    Reason for therapy discharge:    Discharged to home with home therapy.    Progress towards therapy goal(s). See goals on Care Plan in [facility] electronic health record for goal details.  Goals partially met.  Barriers to achieving goals:   discharge from facility.    Therapy recommendation(s):    Continued therapy is recommended.  Rationale/Recommendations:  Would benefit from TCU due to below baseline.

## 2022-02-10 NOTE — PROGRESS NOTES
Care Coordinator  D/I: [facility] RN: genaro called me approx 11:30am and said they CAN accept her to begin 2/14/22 @ son's home in [facility].  A: pt can discharge today per Manoj Hollis  P: I have informed Manoj that pt has been accepted by [facility]. I will fax them the orders/d/c summary when signed.  I have notified [facility] christian Pham that she can cancel this referral.

## 2022-02-10 NOTE — PLAN OF CARE
Occupational Therapy Discharge Summary    Reason for therapy discharge:    Discharged to home with home therapy.    Progress towards therapy goal(s). See goals on Care Plan in [facility] electronic health record for goal details.  Goals partially met.  Barriers to achieving goals:   discharge from facility.    Therapy recommendation(s):    Continued therapy is recommended.  Rationale/Recommendations:  To maximize functional IND.

## 2022-02-10 NOTE — PLAN OF CARE
Discharge  Discharged to: home with son  Via: daughter/independent transport  Belongings: returned to patient  Teaching: AVS discussed with patient and questions answered  Clinic appointment: follow up reviewed  Tele and IV: discontinued per orders

## 2022-02-10 NOTE — CONSULTS
"Consult and Procedure Service - Thoracentesis Note    Consult Request: cardiology  Attending: Jim Sheikh MD  Resident: n/a   Procedure: Ultrasound-Assisted Diagnostic and Therapeutic Thoracentesis  Indication: pleural effusion  Diagnosis: pleural effusion  Bleeding Risk: Low-Moderate:, no anticoagulation on board, Platelets > 50 000    Subj:  No pain, stable cough, weak but able to get up from chair this am with asst and walker  Slept ok, ready for thora, slightly anxious  dgtr bedside, both pt and dgtr asking many questions which were answered to satisfaction  Obj:L  /78 (BP Location: Right arm, Patient Position: Semi-Hurtado's)   Pulse 87   Temp 98.5  F (36.9  C) (Oral)   Resp 18   Ht 1.6 m (5' 3\")   Wt 63 kg (138 lb 14.4 oz)   LMP  (LMP Unknown)   SpO2 95%   BMI 24.61 kg/m    EXAM  General: frail appearing woman sitting up in bed, NAD  Head: NC, AT  Eye: symm gaze, anicteric sclerae  Pulm: no stridor, comfortable WOB on RA, no retractions  Neuro: awake, alert, hearing speech and phonation, intact grossly      INR   Date Value Ref Range Status   02/10/2022 2.06 (H) 0.85 - 1.15 Final   06/05/2014 2.21 (H) 0.86 - 1.14 Final     INR Protime   Date Value Ref Range Status   06/05/2017 0.9  Final      Platelet Count   Date Value Ref Range Status   02/10/2022 364 150 - 450 10e3/uL Final   01/24/2022 265 150 - 375 10^9/L Final   02/21/2012 227 10^9/L          The risks and benefits of the procedure were explained to patient and dgtr present who expressed understanding and opted to proceed.  Consent was obtained and placed in the chart and the patient was placed on pulse oximetry. A time out was performed.    An ultrasound probe was used to identify an area of pleural fluid in the LEFT hemithorax.    This area was prepped and draped in the usual sterile fashion and 5 ml of 1% lidocaine was instilled and fluid located using ultrasound guidance.  A small incision was made with an 11 blade scalpel. The 5 Fr " thoracentesis catheter and needle was then inserted above the rib until fluid obtained then the needle removed. Using a one-way valve, 300 ml of dark red colored fluid was removed and specimen(s) labeled and left with bedside RN  to be further analyzed.  The catheter was removed with the patient exhaling and  area cleaned and an occlusive dressing placed in usual fashion.  Patient tolerated the procedure well without obvious complication   Ultrasound revealed normal lung sliding after the procedure and a chest radiograph is pending.        POCUS  PRE-- small to moderate pleural effusion in LEFT LOWER chest; no loculations, vasc probe at site wo major vessels  POST-- small pleural effusion at left base, left apex +LS and normal M mode    Recommendations  -- agree with infectious effusion labs, recommend cell count, ptn, LDH, as well (ordered for you; my understanding is that the pt may discharge today) as she does not appear to have had thora labs this hospital stay  -- Please contact the Consult and Procedure Service if any concerns or complications arise.     Jim Sheikh MD  2/10/2022

## 2022-02-10 NOTE — PLAN OF CARE
Neuro: A&Ox4.   Cardiac: SR. VSS.   Respiratory: Sating 95% on RA.  GI/: Adequate urine output. No BM reported today. Loose stools reported yesterday. Pt requested we hold stool softeners.  Diet/appetite: Tolerating regular diet. Eating poorly. Appetite low.   Activity:  Assist of 1, up to chair and in halls.  Pain: At acceptable level on current regimen.   Skin: No new deficits noted.  LDA's: PIV saline locked, midline incision with some edema and bruising noted, old chest tube sites CDI.    Events: patient requested shower. Staff unable to confirm with medical team if okay to shower with chest tubes removed yesterday. Will postpone shower until tomorrow. Thoracentesis scheduled tomorrow. OT schduled for tomorrow morning. Patient plans to discharge to home with son.   Plan: Continue with POC. Notify primary team with changes. Providers discussed plan to discharge tomorrow.

## 2022-02-10 NOTE — CONSULTS
Met with patient and her daughter, with her son on a video call, for warfarin education. Discussed how to take the medication, diet, INR, and signs and symptoms to report to the care team.    Literature given: Guide to Warfarin Therapy, Warfarin Therapy Calendar.

## 2022-02-10 NOTE — PLAN OF CARE
D: Pt admit 1/24/22 s/p apical septal VSD repair 2/4. PMH HTN, hypothyroidism, recent [facility] hospital admission found new apical septal VSD. Pt had percutaneous closure w/amplatzer device with residual high gradient shunt     I/A:   Neuro: A&Ox4  VS: VSS. RA  Tele: SR  Pain: incisional pain controlled PRN atarax x1  GI/: Urinating adequately. No BM overnight  Diet: regular  IV/Drips: R PIV SL  Activity: SBA with walker  Skin/drains: midline incision LEAH w/some bruising and edema surrounding incision, old CT sites     P: Plan for thoracentesis and OT followed by discharge home with son. Continue to monitor pt status and report changes to Cards 1.      Archana Quintana RN

## 2022-02-10 NOTE — DISCHARGE INSTRUCTIONS
________________________________________________________  Discharge RN please fax discharge orders to home care agency: [facility]--fax'd 2/10/22 @ 12:17pm by Sivan VINES     ______________________________________________________________________________________________________________________________________________    Ms Mullins,  You are scheduled for:  [facility]: Dr Katlyn Daniels  [address] [address]  Ph: [phone]  They can see Epic      Scheduled for Friday, 2/11/22 @ 1:30pm with INR draw        AFTER YOU GO HOME FROM YOUR HEART SURGERY  (Ventricular Septal Defect Repair on 2/4/22)    You had a sternotomy, avoid lifting anything greater than ten pounds for 6 weeks after surgery and then less than 20 pounds for an additional 6 weeks. Do not reach backwards or use arms to push out of chair. Do not let people pull on your arms to assist with standing. Avoid twisting or reaching too far across your body.  Avoid strenuous activities such as bowling, vacuuming, raking, shoveling, golf or tennis for 12 weeks after your surgery. It is okay to resume sex if you feel comfortable in doing so. You may have to try different positions with your partner.  Splint your chest incision by hugging a pillow or bringing your arms across your chest when coughing or sneezing. Please try to sleep on your back for the first 4-6 weeks to avoid extra stress on your sternum (breastbone) while it is healing.     Activity as tolerated with sternal precautions. No lifting more than 10 pounds for first 6 weeks, then no lifting more than 20 pounds for an additional 6 weeks. Avoid lifting arms above shoulders. After these 12 weeks, activity as tolerated with pain. Avoid strenuous activities such as bowling, vacuuming, raking, shoveling, golf or tennis for 12 weeks after your surgery. No sex for 6-8 weeks after surgery.     No driving for 4 weeks. If you continue to take narcotics after 4 weeks,  no driving until you are not taking narcotics. Sit in the back seat for 4 weeks.      Shower or wash your incisions twice daily with soap and water (or as instructed), pat dry. Keep wound clean and dry, showers are okay after discharge, but don't let spray hit directly on incision. No baths or swimming for 1 month. Cover chest tube sites with gauze until they stop draining, then leave open to air. It is not abnormal for chest tube sites to drain yellowish/clear fluid for up to 2-3 weeks after surgery.   Watch for signs of infection: increased redness, tenderness, warmth or any drainage from sternum incision.  Also a temperature > 100.5 F or chills. Call your surgeon or primary care provider's office immediately. Remove any skin glue left on incisions after 10-14 days. This will not affect your incision and can speed up healing.    Exercise is very important in your recovery. Please follow the guidelines set up for you in your cardiac rehab classes at the hospital. If outpatient cardiac rehab was ordered for you, we highly recommend you participate. If you have problems arranging your cardiac rehab, please call [phone] for all locations, with the exception of [place], please call [phone] and [place], please call [phone].    Avoid sitting for prolonged periods of time, try to walk every hour during the day. If you have a leg incision, elevate your leg often when you are not walking.    Check your weight when you get home from the hospital and continue to check it daily through your recovery for at least a month. If you notice a weight gain of 2-3 pounds in a week, notify your primary care physician, cardiologist or surgeon.    Bowel activity may be slow after surgery. If necessary, you may take an over the counter laxative such as Milk of Magnesia or Miralax. You may have bowel stimulants or stool softeners prescribed (docusate sodium, Senokot, Miralax). We recommend using stool softeners while using  narcotics for pain (oxycodone/percocet, hydrocodone/vicodin, hydromorphone/dilaudid).      If you start to feel dizzy, nauseous, have chest pain, shortness of breath, lower extremity edema with pain and redness, or extreme headache that is out of the ordinary, it is important that you go to the closest ER for evaluation. Any emergency should be addressed by seeing your local Emergency Medicine provider. Other non-emergency concerns should be addresed by contacting your primary care physician or Cardiologist.    Wean OFF of narcotics (oxycodone, dilaudid, hydrocodone) as soon as possible. You should continue taking acetaminophen as long as you have any surgical pain as the first choice for pain control and add narcotics as necessary for pain to be tolerable.      DENTAL VISITS AFTER SURGERY  If you have had your heart valve repaired or replaced, we do not recommend having any dental work done for 6 months and you will need to take an antibiotic prior to dental visits from now on.  Please notify your dentist before any procedure for the proper treatment needed. The antibiotic is taken by mouth one hour prior to visit. This includes routine cleanings.    DO NOT SMOKE.  IF YOU NEED HELP QUITTING, PLEASE TALK WITH YOUR CARDIOLOGIST OR PRIMARY DOCTOR.    You are on the blood thinner, warfarin. Follow the instructions you were given in the hospital and DO NOT SKIP this medication. Try and take it the same time everyday. Your primary care physician or coumadin clinic will manage the dosing. INR goal is 2-3.    REGARDING PRESCRIPTION REFILLS.  If you need a refill on your pain medication contact us to discuss your pain and a possible one time refill.   All other medications will be adjusted, discontinued and re-filled by your primary care physician and/or your cardiologist as they were prior to your surgery. We have given you enough for one to three month with possibly one refill.              You have small procedure sites  at right neck and right groin, the right groin incision is bigger.  You may have small amounts of bruising or discomfort, this is expected. If you develop worse swelling, redness and warmth that does not go away, fever and chills, Increasing numbness in your legs, worsening pain at the site then you need to contact your nurse coordinator.    You may shower, but do not soak in a bath tub or pool or apply lotions, ointments, powder, etc for 3-5 days or until sites look healed.     Activity: No lifting, pushing, pulling more than 10 pounds (examples to avoid: groceries, vacuuming, gardening, golfing): For one week with a procedure through the groin.    After the initial healing process of the access site, we recommend cardiac rehabilitation for all patients who underwent structural procedure. Cardiac rehabilitation will help you:  - Rebuild stamina, strength and balance.  - Learn how to participate in activities safely, as well as help you regain confidence to do so.  - Return to activities of daily living and leisure.  Please contact their central scheduling to arrange at [phone]    We prefer you to follow up with your primary care provider within 2 weeks. You will be arranged to see the Structural Cardiologist or NP in 2 weeks and 1 month. At that time you will have a repeat ultrasound of the heart to look at the valve.     Should you have any questions or concerns please contact the structural cardiology nurse coordinator:  Marilyn [phone] (at the first prompt enter #1, second prompt enter #3, then ask the triage nurse if you can speak with Marilyn).    POST-OPERATIVE CLINIC VISITS  You have a follow up visit with [facility] Advance Care Practitioners on 2/15/22 at the [facility].  You will then return to the care of your primary provider and your cardiologist. Future medication refills should come from your PCP or Cardiologist.   You should see your primary care provider in 1-2 weeks  after discharge.   It is important to see your cardiologist, Dr. Justin Pickering, about 2-4 weeks after discharge.    If you do not hear from a  in 7 days, please call [phone] (choose option 1) and request to be seen with a general cardiologist or someone that you have seen in the past.   If there is a need to return to see CT Surgery please call our  at [phone].    SURGICAL QUESTIONS  Please call Sheron Ferreira, or Dariela Hardin with surgical recovery and medication questions, their phone numbers are listed below.  They will assist you with your needs and contact other surgery care team members as indicated.    On weekends or after hours, please call [phone] and ask the  to   page the Cardiothoracic Surgery fellow on call.      Thank you,    Your Cardiothoracic Surgery Team  Rajani Marin RN Care Coordinator-  [phone]   Sheron Ferreira RN Care Coordinator-  [phone]  Dariela Hardin RN Care Coordinator- [phone]

## 2022-02-10 NOTE — PHARMACY-ANTICOAGULATION SERVICE
Clinical Pharmacy- Warfarin Discharge Note  This patient is currently on warfarin for the treatment of PE.  INR Goal= 2-3    Anticoagulation Dose History     Recent Dosing and Labs Latest Ref Rng & Units 2/6/2022 2/7/2022 2/8/2022 2/8/2022 2/8/2022 2/9/2022 2/10/2022    Warfarin 0.5 mg - - - - - 0.5 mg - -    Warfarin 1 mg - - - - - - 2 mg -    Warfarin 3 mg - - 3 mg - - - - -    Warfarin 5 mg - 5 mg - - - - - -    INR 0.85 - 1.15 1.34(H) 1.41(H) 3.28(H) 3.00(H) - 2.46(H) 2.06(H)        Vitamin K doses administered during the last 7 days: none  FFP administered during the last 7 days: none    Recommend discharging the patient on warfarin dose of 2 mg on 2/10/2022 (Thursday).  Recommend INR recheck on 2/11/2022.  Recommend discharge with a prescription for warfarin 2mg tablets.      Denise Nguyen, Pharm.D., [facility]  Pager [phone]

## 2022-02-11 ENCOUNTER — OFFICE VISIT (OUTPATIENT)
Dept: FAMILY MEDICINE | Facility: CLINIC | Age: 82
End: 2022-02-11

## 2022-02-11 ENCOUNTER — CARE COORDINATION (OUTPATIENT)
Dept: FAMILY MEDICINE | Facility: CLINIC | Age: 82
End: 2022-02-11

## 2022-02-11 ENCOUNTER — DOCUMENTATION ONLY (OUTPATIENT)
Dept: ANTICOAGULATION | Facility: CLINIC | Age: 82
End: 2022-02-11
Payer: COMMERCIAL

## 2022-02-11 VITALS
OXYGEN SATURATION: 95 % | HEART RATE: 59 BPM | TEMPERATURE: 97.6 F | DIASTOLIC BLOOD PRESSURE: 78 MMHG | SYSTOLIC BLOOD PRESSURE: 126 MMHG

## 2022-02-11 DIAGNOSIS — Z79.01 ANTICOAGULATED ON COUMADIN: Primary | ICD-10-CM

## 2022-02-11 DIAGNOSIS — Z87.74 S/P VENTRICULAR SEPTAL DEFECT REPAIR: ICD-10-CM

## 2022-02-11 DIAGNOSIS — I10 BENIGN ESSENTIAL HYPERTENSION: ICD-10-CM

## 2022-02-11 DIAGNOSIS — Z79.01 CHRONIC ANTICOAGULATION: ICD-10-CM

## 2022-02-11 DIAGNOSIS — Z48.89 ENCOUNTER FOR POST SURGICAL WOUND CHECK: ICD-10-CM

## 2022-02-11 LAB
INR POINT OF CARE: 2.3 (ref 2–3)
PATH REPORT.COMMENTS IMP SPEC: NORMAL
PATH REPORT.COMMENTS IMP SPEC: NORMAL
PATH REPORT.FINAL DX SPEC: NORMAL
PATH REPORT.GROSS SPEC: NORMAL
PATH REPORT.RELEVANT HX SPEC: NORMAL
PHOTO IMAGE: NORMAL

## 2022-02-11 PROCEDURE — 99496 TRANSJ CARE MGMT HIGH F2F 7D: CPT | Performed by: FAMILY MEDICINE

## 2022-02-11 PROCEDURE — 36415 COLL VENOUS BLD VENIPUNCTURE: CPT | Performed by: FAMILY MEDICINE

## 2022-02-11 PROCEDURE — 85610 PROTHROMBIN TIME: CPT | Performed by: FAMILY MEDICINE

## 2022-02-11 NOTE — PROGRESS NOTES
Assessment & Plan   Problem List Items Addressed This Visit        Circulatory    Benign essential hypertension       Other    S/P ventricular septal defect repair      Other Visit Diagnoses     Anticoagulated on Coumadin    -  Primary    Relevant Orders    PROTHROMBIN TIME INR ([facility]) (Completed)    VENOUS COLLECTION (Completed)    Encounter for post surgical wound check        Chronic anticoagulation             1. Anticoagulated on Coumadin  INR done. In goal range. She has just started on coumadin, I would recommend a recheck in 1 week, they said she had very extensive education in the hospital and didn't need to see anyone else about this. See me back in a week or if home health nurse comes out, recheck INR Monday and Friday.  - PROTHROMBIN TIME INR ([facility])  - VENOUS COLLECTION    2. Encounter for post surgical wound check  Appears to be healing appropriately.    3. S/P ventricular septal defect repair  followup planned with cardiology.    4. Benign essential hypertension  Controlled. Continue all current medications.    5. Chronic anticoagulation             followup with me in 1 week to recheck.    No follow-ups on file.    Katlyn Daniels MD  [facility]    Marlena Chu is a 81 year old who presents for the following health issues     HPI       Hospital Follow-up Visit:    Hospital/Nursing Home/IP Rehab Facility: [facility]   Date of Admission: 01/24/22  Date of Discharge: 02/10/22  1. Reason(s) for Admission: Subacute muscular ventricular septal defect, etiology unknown s/p Repair muscular ventriculoseptal defect, single patch          Was your hospitalization related to COVID-19? No   Problems taking medications regularly:  None  Medication changes since discharge: Updated in chart  Problems adhering to non-medication therapy:  None    Summary of hospitalization:  [facility] discharge summary reviewed  Diagnostic Tests/Treatments  reviewed.  Follow up needed: cardiology.  Other Healthcare Providers Involved in Patient s Care:         Homecare  Update since discharge: improved. Post Discharge Medication Reconciliation: discharge medications reconciled, continue medications without change.  Plan of care communicated with patient and family          Here with son. Had a VSD. Open heart surgery. No reason that she had a heart attack. Tried a plug. Repaired the hole. Was at [facility].  Here for coumadin check.  Has a cardiologist apt next week on 15th. Not bruising, no bleeding.  Coumadin in 2 mg/day, first time was yesterday. Goal is 2-3.   Will have home care coming out.  This could become permanent, possibly 6 months. Hoping she may be able to go off the medications.  Yesterday morning aspirated some fluid off the lung. Incision site is healing well.   Is on tylenol. Not having a lot of pain.  Also having some anxiety. Friend is on a low dose of a medication and this takes the edge off.  Citalopram.  She was discharged yesterday from the hospital.      Review of Systems   Constitutional, HEENT, cardiovascular, pulmonary, gi and gu systems are negative, except as otherwise noted.      Objective    /78 (BP Location: Right arm, Patient Position: Sitting, Cuff Size: Adult Regular)   Pulse 59   Temp 97.6  F (36.4  C) (Temporal)   LMP  (LMP Unknown)   SpO2 95%   There is no height or weight on file to calculate BMI.  Physical Exam   GENERAL: healthy, alert and no distress  RESP: lungs clear to auscultation - no rales, rhonchi or wheezes  CV: regular rate and rhythm, normal S1 S2, no S3 or S4, no murmur, click or rub, no peripheral edema and peripheral pulses strong  MS: no gross musculoskeletal defects noted, no edema  NEURO: Normal strength and tone, mentation intact and speech normal  PSYCH: mentation appears normal, affect normal/bright  Chest: incision site is extensive, down the length of her chest, intact, healing  appropriately    Results for orders placed or performed in visit on 02/11/22   PROTHROMBIN TIME INR ([facility])     Status: None   Result Value Ref Range    INR Protime 2.3 2.0 - 3.0

## 2022-02-11 NOTE — PROGRESS NOTES
Care Coordination Initial Assessment    The patient was admitted into [facility] for VSD. She was discharged on 1/24/2022. She was discharged on 2/10/22 with instructions to follow up with her PCP, cardiology and surgeon.    PCP: Renetta Rizvi    Referral Source:  ED/IP List    Utilization:   Upcoming Appt.: Has appt 2/11/22 with Dr. Daniels    Health Maintenance Reviewed: Yes    Current Medical Health Concerns:   Reviewed in full.    Patient/Caregiver Understanding: Yes    Medication Management:   Method of Taking:  Family set up in med box  Patient has understanding of regimen and is adherent:  Yes    Functional Status:   Transportation: Family provides transportation    Current Behavioral Health Concerns:   No concerns at this time.     Patient/Caregiver Understanding:  Yes    Psychosocial:  Support System Patient/Caregivers: Good support within family and friends. Patient does not have any concerns in this area.     Gaps:    NA    Resources Given:    No resources were needed at this time. Patient is getting main follow up done with cardiology and her surgeon and has everything all set up with them.     Plan:   The patient is doing ok overall and is happy to be back at home. She was able to get scheduled for a hospital follow up visit today with Dr. Daniels at 1:30 pm where everything will be fully reviewed at the visit. All questions and concerns will be addressed with Dr. Daniels.

## 2022-02-11 NOTE — NURSING NOTE
Chief Complaint   Patient presents with     Hospital F/U     Subacute muscular ventricular septal defect, etiology unknown s/p Repair muscular ventriculoseptal defect, single patch       Pre-visit Screening:  Immunizations:  up to date  Colonoscopy:  is up to date  Mammogram: is up to date  Asthma Action Test/Plan:  NA  PHQ9:  NA  GAD7:  NA  Questioned patient about current smoking habits Pt. has never smoked.  Ok to leave detailed message on voice mail for today's visit only Yes, phone # [phone]

## 2022-02-11 NOTE — PROGRESS NOTES
[facility].  Patient's primary care provider is with [facility].  Per care coordinator's notes, patient has follow up with PCP on 2/11.  Also discharged with home care.  Attempted to cancel INR clinic referral, but this was placed before patient was discharged.  [facility] will not be following this patient since she does not have a PCP within [facility].  Mayi Bundy RN

## 2022-02-14 ENCOUNTER — TELEPHONE (OUTPATIENT)
Dept: CARDIOLOGY | Facility: CLINIC | Age: 82
End: 2022-02-14
Payer: COMMERCIAL

## 2022-02-14 ENCOUNTER — TELEPHONE (OUTPATIENT)
Dept: FAMILY MEDICINE | Facility: CLINIC | Age: 82
End: 2022-02-14

## 2022-02-14 PROBLEM — R06.02 SHORTNESS OF BREATH: Status: RESOLVED | Noted: 2022-01-24 | Resolved: 2022-02-14

## 2022-02-14 NOTE — TELEPHONE ENCOUNTER
Left VM for patient to call CV RN with update after hospital discharge. CV RN contact info provided.    ----- Message from Manoj Hollis PA-C sent at 2/9/2022 11:18 AM CST -----    Regarding: Discharge tomorrow  Surgeon: Dr. Whitney  Case: VSD repair  Complications: Subsegmental PE  Anticipated Discharge Date: 2/10/22 to Home    Please schedule:   [facility] clinic follow up on 2/15/22 at 14:00 pm  Primary Care Provider follow up in 1-2 weeks.  Cardiologist follow up in 2-4 weeks

## 2022-02-14 NOTE — TELEPHONE ENCOUNTER
Called and informed nurse. She also mentioned that pt has been getting a few bloody noses so they suggested a saline mist to help, I informed the nurse that pt should be seen if continuing and verbally told [facility].

## 2022-02-14 NOTE — TELEPHONE ENCOUNTER
Sheridan called back to request verbal okay for frequency 2x for 1 week  3x for 1 week  2x for 6 weeks  And 4 PRN  Also home health aide 2x for 4 weeks    Sheridan # [phone]

## 2022-02-14 NOTE — TELEPHONE ENCOUNTER
Sheridan, a nurse with [facility] called in and left a message stating that the patients INR result today was 2.2. She has been taking 2 mg of warfarin everyday. Sheridan is wondering what the new orders should be, routing to Renetta for review, please advise.      Sheridan can be reached at [phone]

## 2022-02-14 NOTE — TELEPHONE ENCOUNTER
INR is at goal. Continue same dose recheck in 2 weeks.     Okay for verbal orders for home care.

## 2022-02-15 ENCOUNTER — OFFICE VISIT (OUTPATIENT)
Dept: CARDIOLOGY | Facility: CLINIC | Age: 82
End: 2022-02-15
Attending: THORACIC SURGERY (CARDIOTHORACIC VASCULAR SURGERY)
Payer: COMMERCIAL

## 2022-02-15 VITALS
OXYGEN SATURATION: 98 % | BODY MASS INDEX: 23.91 KG/M2 | WEIGHT: 135 LBS | SYSTOLIC BLOOD PRESSURE: 122 MMHG | HEART RATE: 104 BPM | DIASTOLIC BLOOD PRESSURE: 83 MMHG

## 2022-02-15 DIAGNOSIS — I26.99 PULMONARY EMBOLISM, UNSPECIFIED CHRONICITY, UNSPECIFIED PULMONARY EMBOLISM TYPE, UNSPECIFIED WHETHER ACUTE COR PULMONALE PRESENT (H): Primary | ICD-10-CM

## 2022-02-15 DIAGNOSIS — Q21.0 VSD (VENTRICULAR SEPTAL DEFECT): Primary | ICD-10-CM

## 2022-02-15 LAB — BACTERIA PLR CULT: NO GROWTH

## 2022-02-15 PROCEDURE — G0463 HOSPITAL OUTPT CLINIC VISIT: HCPCS

## 2022-02-15 ASSESSMENT — PAIN SCALES - GENERAL: PAINLEVEL: NO PAIN (0)

## 2022-02-15 NOTE — PATIENT INSTRUCTIONS
Your surgery was on 2/4/2022    From the date of surgery:  No lifting greater than 10 pounds for six weeks, then no greater than 20 pounds for an additional 6 weeks.  No driving for 4 weeks.  Continue to weigh yourself every day. If you gain more than 2-3 pounds overnight or 5 pounds in a week with increased swelling in your legs or shortness of breath, please call us.    Twin Castaneda RN Care Coordinator- [phone]     If you have problems arranging your cardiac rehab, please call [phone] for all locations, with the exception of [place], please call [phone] and [place], please call [phone].

## 2022-02-15 NOTE — PROGRESS NOTES
"CARDIOTHORACIC SURGERY FOLLOW-UP VISIT     Kimberley Mullins   1940   [id number]      Reason for visit: Post-Op VSD repair with Dr. Whitney on 2/4/2022    HPI: Kimberley Mullins is a 81 year old year old female with a PMH of HTN, hypothyroidism and recent hospitalization for hyponatremia at [facility] She was found to have a new apical, septal VSD.  Presents to clinic for a routine follow-up appointment after surgery.     Per discharge summary: \"Kimberley Mullins is a 81 year old female who on 1/24/2022 underwent the above-named procedures.  she tolerated the operation well.   Postoperatively was admitted to the CVICU.  Patient was extubated within protocol on POD #1.  Blood pressure and cardiac index were managed with vasopressors and inotropic agents which were continuously weaned until no longer needed.  Patient was subsequently  transferred to the surgical telemetry floor.   While on the surgical unit, the patient continued to progress well. Chest tubes and temporary pacemaker wires were removed when deemed appropriate. Cardiac Medications were gradually reintroduced as tolerated. These medications included Metoprolol Tartrate 12.5 bid, Simvastatin 20mg daily, and ASA 81mg.  CT chest pulmonary embolism on 1/24 found a small segmental pulmonary embolism of indeterminant and possibly chronic age.  For this indication Mireya was placed on a heparin drip once her chest tubes were removed.  Heparin was bridged to warfarin with therapeutic INR of 2-3.  Plan as of discharge is to maintain warfarin therapy for 3 months with an INR goal of 2-3.  Reevaluation at that time can be determined for further imaging and anticoagulation. She was discharged with an INR of 2.06  A left-sided pleural effusion was noted on chest x-ray prior to chest tube removal.  However chest tubes were not eliminating the effusion adequately.  Patient was not short of breath nor had any increased work of breathing.  Chest tubes were removed " and the internal medicine procedure team was consulted for elimination of the left pleural effusion by means of thoracentesis.  She was discharged status post thoracentesis and improved pleural effusion after removal of about 300mL of serosanguinous fluid.  Patient was fluid overloaded and treated with diuretics. She was discharged 5.6 Kg below preoperative weight and will discharge with 5 days of gentle diuretic therapy in the form of furosemide 20mg by mouth daily with potassium supplementation. Patient will have re-evaluation for further Diuretic therapy need in clinic follow-up.   If any dental procedures are to be performed it is recommended to use prophylactic antibiotics to prevent serious infections from occurring.  This includes any dental procedure including routine cleanings.   Patient was transiently hyperglycemic and treated with insulin infusion then transitioned to sliding scale insulin per protocol. Most recent Hemoglobin A1c is 5.6, she has no history of Diabetes mellitus, and her blood sugars remained stable. No Further glycemic control was necessary at discharge.  Nutrition consultation was provided in the context of Malnutrition based on the following clinical evaluations: % Intake: </= 50% for >/= 5 days (severe)  % Weight Loss: Difficult to assess wt changes with changes in fluid status and diuresis.   Subcutaneous Fat Loss: None observed  Muscle Loss: Temporal:  Mild vs age related, Upper arm (bicep, tricep):  Mild-Moderate and Dorsal hand:  Mild-moderate vs age related  Fluid Accumulation/Edema: Trace.  Based on the above findings, the diagnosis of Severe malnutrition in the context of acute illness on chronic illness was determined. Recommendations include   1. Continue regular diet, liberalized to help encourage oral intake. Encourage intake of oral supplements and as able, snacks. Rec pt take medications with food, as able.   2. Continue thera-vit, as ordered, to help meet micronutrient  "needs. Consider modifying to a multivitamin with minerals to help better meet micronutrient needs.    Prior to discharge, her pain was controlled well, she was working well with therapies, able to perform most ADLs, ambulate with assistance, and had full return of bowel and bladder function.  On February 10th, 2022, she was discharged to home in stable condition. Follow up with cardiology and cardiac surgery have been arranged. Pt encouraged to follow up with PCP and cardiac rehab upon discharge.\"     Returns to clinic today for postoperative visit.    Since discharge, has been recovering well  at her son's house.    Patient is on Coumadin and INR is therapeutic with home finger stick checks.  States pain is well controlled. She continues to need robaxin and tylenol for pain management. Sleep is good. Also having some anxiety but has not used clonazepam or atarax. Scheduled to start home care tomorrow (PT/OT). Has been active around the house with the assistance of a walker but noted today that she has not yet showered.  Breathing has been stable/improving. Continues to work on C &DB, has not been using IS. Denies SOB at rest, PND, orthopnea. No cough. Today in clinic patient sounds to be in a  regular rhythm with HR <100 bpm. Patient denies any fever, chills, chest pain, palpitations, edema, SOB, lightheadedness, nausea and vomiting.  Discharge weight 138 lbs; weight today 135 lbs; Appears  euvolemic upon examinination with no appreciable JVD, BLE edema, abdominal bloating. LS are CTA. Has had a reduced appetite, is supplementing with Boost. Discussed the importance of nutrition in healing and staying adequately hydrated. Having regular bowel movements and voiding without difficulty. Midline sternal incision healing well with no noted erythema or drainage. Denies  sternal popping or clicking.    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     Thyroid disease      Transient global amnesia      Unspecified tinnitus     Tinnitus       PAST SURGICAL HISTORY:  Past Surgical History:   Procedure Laterality Date     ARTHROPLASTY KNEE  10/25/2011    Procedure:ARTHROPLASTY KNEE; Right Total Knee Arthroplasty   ; Surgeon:BRYCE ATWOOD; Location:[facility] OR     ARTHROPLASTY KNEE  6/2/2014    Procedure: ARTHROPLASTY KNEE;  Surgeon: Bryce Atwood MD;  Location: [facility] OR     CV CORONARY ANGIOGRAM N/A 1/25/2022    Procedure: Coronary Angiogram; diagnostic;  Surgeon: Sreekanth Delong MD;  Location:  HEART CARDIAC CATH LAB     CV RIGHT HEART CATH MEASUREMENTS RECORDED N/A 1/25/2022    Procedure: Right Heart Cath;  Surgeon: Sreekanth Delong MD;  Location:  HEART CARDIAC CATH LAB     CV VSD CLOSURE N/A 1/31/2022    Procedure: CV VSD CLOSURE W/KATIE;  Surgeon: Daniel Cerda MD;  Location:  HEART CARDIAC CATH LAB     HC DILATION/CURETTAGE DIAG/THER NON OB  2006    D & C     HC KNEE SCOPE, DIAGNOSTIC      Arthroscopy, Knee     REPAIR VENTRICULAR SEPTAL DEFECT N/A 2/4/2022    Procedure: Median Sternotomy, cardiopulmonary bypass, Ischemic Ventricular Septal Defect repair, tranesophageal echocardiogram performed by anesthesia;  Surgeon: Aldair Whitney MD;  Location:  OR     [facility] AFF FOREARM/WRIST SURGERY UNLISTED      orif radius fx distal left     [facility] APPENDECTOMY  1955     [facility] OPEN JESSI FIXATN HUMERAL SHAFT FX  12/2007    Dr Massey     [facility] COLONOSCOPY W SNARE REMOVAL TUMOR/POLYP/LESION  9/03 & 3/2011    polyp removal during screening colonoscopy       CURRENT MEDICATIONS:   Current Outpatient Medications   Medication      acetaminophen (TYLENOL) 325 MG tablet     aspirin (ASA) 81 MG chewable tablet     furosemide (LASIX) 20 MG tablet     levothyroxine (SYNTHROID/LEVOTHROID) 50 MCG tablet     methocarbamol (ROBAXIN) 500 MG tablet     metoprolol tartrate (LOPRESSOR) 25 MG tablet     Multiple Vitamins-Minerals (PRESERVISION AREDS PO)     multivitamin, therapeutic (THERA-VIT) TABS tablet     pantoprazole (PROTONIX) 40 MG EC tablet     potassium chloride ER (K-TAB) 20 MEQ CR tablet     simvastatin (ZOCOR) 20 MG tablet     warfarin ANTICOAGULANT (COUMADIN) 2 MG tablet     Warfarin Therapy Reminder     hydrOXYzine (ATARAX) 25 MG tablet     polyethylene glycol (MIRALAX) 17 GM/Dose powder     No current facility-administered medications for this visit.       ALLERGIES:      Allergies   Allergen Reactions     No Known Allergies        ROS:  Review of symptoms otherwise negative unless commented about in HPI.     LABS:  Last Basic Metabolic Panel:  Lab Results   Component Value Date     02/10/2022    .9 01/19/2022      Lab Results   Component Value Date    POTASSIUM 3.7 02/10/2022    POTASSIUM 4.75 01/19/2022     Lab Results   Component Value Date    CHLORIDE 103 02/10/2022    CHLORIDE 100.4 01/19/2022     Lab Results   Component Value Date    ANANDA 8.1 02/10/2022    ANANDA 8.7 01/19/2022     Lab Results   Component Value Date    CO2 25 02/10/2022    CO2 25.6 01/19/2022     Lab Results   Component Value Date    BUN 12 02/10/2022    BUN 13 01/19/2022    BUN 17.8 01/19/2022     Lab Results   Component Value Date    CR 0.98 02/10/2022    CR 0.73 01/19/2022     Lab Results   Component Value Date    GLC 86 02/10/2022     01/19/2022       Last CBC:   Lab Results   Component Value Date    WBC 7.6 02/10/2022    WBC 9.6 01/24/2022     Lab Results   Component Value Date    RBC 2.46 02/10/2022    RBC 3.48 01/24/2022     Lab Results   Component Value Date    HGB 7.8 02/10/2022    HGB 11.7 01/24/2022     Lab Results   Component Value Date     HCT 23.9 02/10/2022    HCT 35.3 01/24/2022     No components found for: MCT  Lab Results   Component Value Date    MCV 97 02/10/2022    .5 01/24/2022     Lab Results   Component Value Date    MCH 31.7 02/10/2022    MCH 33.6 01/24/2022     Lab Results   Component Value Date    MCHC 32.6 02/10/2022    MCHC 33.1 01/24/2022     Lab Results   Component Value Date    RDW 17.0 02/10/2022    RDW 11.9 01/14/2022     Lab Results   Component Value Date     02/10/2022     01/24/2022     02/21/2012       INR:  Lab Results   Component Value Date    INR 2.3 02/11/2022    INR 2.06 02/10/2022    INR 2.46 02/09/2022    INR 3.00 02/08/2022    INR 3.28 02/08/2022    INR 1.41 02/07/2022    INR 1.34 02/06/2022    INR 1.34 02/04/2022    INR 1.54 02/04/2022    INR 1.21 02/04/2022    INR 1.08 01/24/2022    INR 1.09 01/24/2022    INR 0.9 06/05/2017    INR 2.21 06/05/2014    INR 2.01 06/04/2014    INR 1.12 06/03/2014    INR 0.94 05/05/2008         IMAGING:  None    PHYSICAL EXAM:   Blood Pressure 122/83 (BP Location: Right arm, Patient Position: Chair, Cuff Size: Adult Regular)   Pulse 104   Weight 61.2 kg (135 lb)   Last Menstrual Period  (LMP Unknown)   Oxygen Saturation 98%   Body Mass Index 23.91 kg/m    General: alert and oriented x 3, pleasant, no acute distress, normal mood and affect  Neuro: Intact, no focal deficits   CV: S1 S2, no murmurs, rubs or gallops, regular rate and rhythm, no peripheral edema  Pulm: bilateral breath sounds, clear to auscultation, easy work of breathing  Incision: incisions clean dry and intact without erythema, swelling or drainage    PROCEDURES: None       ASSESSMENT/PLAN:  Kimberley Mullins is a 81 year old year old female status post VSD repair who returns to clinic for postop visit.     Ventricular septal defect S/P repair and removal of Amplatzer device on 2/4/22  Noted mild RV dysfunction on postoperative echocardiogram  Anticoagulation - Warfarin therapy with goal INR  2-3    1. Surgically doing well overall.  Incisions are healing well with no signs of infection. Increasing activity and strength overall.   2. Hemodynamics are stable.  3. Follow up with your cardiologist as scheduled in March  4. Recommended starting Outpatient Cardiac Rehab until completed instead of home care   5. Continue sternal precautions for 12 weeks from surgery date.   6. No driving for 4 weeks from surgery date.   7. Anticoagulation referral placed - should not be utilizing finger sticks until well established with warfarin therapy  8. Complete diuretics as ordered this Thursday and then continue to monitor weights and for e/o edema, SOB  9. Plavix discontinued - continue warfarin and baby aspirin  10. Discontinued clonazepam in favor of atarax for now. Follow up with PCP if ongoing concerns for anxiety    Postoperative restrictions have been reviewed and all of the patient's questions were answered. Our contact information was given to the patient if he should have any further questions/concerns. No further follow up is needed with us.  The total time spent with the patient was 75 minutes, > 50% of which was spent in counseling and coordination of care.    JEANINE Haider, ACNPC-AG, CCRN  Nurse Practitioner  Cardiothoracic Surgery  Pager: [phone]    CC  Renetta Rizvi

## 2022-02-15 NOTE — NURSING NOTE
Chief Complaint   Patient presents with     New Patient     POst-op VSD repair Dr. Whitney     Vitals were taken and medications reconciled.    Filipe Neves, TEMO  2:01 PM

## 2022-02-16 ENCOUNTER — DOCUMENTATION ONLY (OUTPATIENT)
Dept: FAMILY MEDICINE | Facility: CLINIC | Age: 82
End: 2022-02-16

## 2022-02-16 ENCOUNTER — TELEPHONE (OUTPATIENT)
Dept: FAMILY MEDICINE | Facility: CLINIC | Age: 82
End: 2022-02-16

## 2022-02-16 NOTE — TELEPHONE ENCOUNTER
Sarah SHARP from [facility] called asking for OT orders after she evaluated her today. She is seeking OT 1x a week for 4 weeks.     Please advise (Sarah) # [phone]    Thanks, Dilma

## 2022-02-16 NOTE — TELEPHONE ENCOUNTER
Nurse called to request verbal orders for 2x for 2 weeks and 1x for 2 weeks. States can't wait for SRB to return, looks like MAKSIM saw for hos f/u    Nurse Didi # [phone]

## 2022-02-16 NOTE — PROGRESS NOTES
Referral was sent to [facility].  Patient's PCP is Renetta Rizvi PA-C at [facility] and they are managing her warfarin and INRs.  See encounters from 2/11/22 and 2/14/22.    Referral to [facility] was cancelled.    Haydee Chan RN

## 2022-02-17 ENCOUNTER — HOSPITAL ENCOUNTER (OUTPATIENT)
Dept: CARDIAC REHAB | Facility: CLINIC | Age: 82
End: 2022-02-17
Attending: THORACIC SURGERY (CARDIOTHORACIC VASCULAR SURGERY)
Payer: COMMERCIAL

## 2022-02-17 DIAGNOSIS — I26.99 PULMONARY EMBOLISM, UNSPECIFIED CHRONICITY, UNSPECIFIED PULMONARY EMBOLISM TYPE, UNSPECIFIED WHETHER ACUTE COR PULMONALE PRESENT (H): ICD-10-CM

## 2022-02-17 LAB — BACTERIA PLR CULT: NORMAL

## 2022-02-17 PROCEDURE — G0238 OTH RESP PROC, INDIV: HCPCS | Performed by: CLINICAL EXERCISE PHYSIOLOGIST

## 2022-02-23 ENCOUNTER — TELEPHONE (OUTPATIENT)
Dept: FAMILY MEDICINE | Facility: CLINIC | Age: 82
End: 2022-02-23

## 2022-02-23 ENCOUNTER — TRANSFERRED RECORDS (OUTPATIENT)
Dept: FAMILY MEDICINE | Facility: CLINIC | Age: 82
End: 2022-02-23

## 2022-02-23 NOTE — TELEPHONE ENCOUNTER
Ivett nurse from [facility] called with a few questions in regards to pt's medications.    1) Who will be taking over her Metoprolol and Warfarin us or cardiology?  2)Pt thinks she is running low on Metoprolol and Warfarin (uses [employer] AV)  3)She has been checking BP daily per hospital guidelines, she is getting around 130/80 average with a heart rate around 100-110. Does she need to keep checking on BP daily? What is goal for her BP?    Please advise (Ivett) # [phone]    Thanks, Dilma DUNLAP

## 2022-02-24 ENCOUNTER — HOSPITAL ENCOUNTER (OUTPATIENT)
Dept: CARDIAC REHAB | Facility: CLINIC | Age: 82
End: 2022-02-24
Attending: THORACIC SURGERY (CARDIOTHORACIC VASCULAR SURGERY)
Payer: COMMERCIAL

## 2022-02-24 PROCEDURE — G0238 OTH RESP PROC, INDIV: HCPCS

## 2022-02-24 NOTE — TELEPHONE ENCOUNTER
Metoprolol through cardiology.  They should be the ones to manage her blood pressure until she is stable. She is currently post op.  How long will she be on coumadin? If we have guidelines through cardiology for expected range, length of being on coumadin, etc, we can manage, but cardiology should manage this until we know these details.

## 2022-02-25 DIAGNOSIS — I26.93 SINGLE SUBSEGMENTAL PULMONARY EMBOLISM WITHOUT ACUTE COR PULMONALE (H): ICD-10-CM

## 2022-02-25 DIAGNOSIS — Z87.74 S/P VENTRICULAR SEPTAL DEFECT REPAIR: ICD-10-CM

## 2022-02-25 NOTE — TELEPHONE ENCOUNTER
Per discharge summary: CT chest pulmonary embolism on 1/24 found a small segmental pulmonary embolism of indeterminant and possibly chronic age.  For this indication Mireya was placed on a heparin drip once her chest tubes were removed.  Heparin was bridged to warfarin with therapeutic INR of 2-3.  Plan as of discharge is to maintain warfarin therapy for 3 months with an INR goal of 2-3.  Reevaluation at that time can be determined for further imaging and anticoagulation. She was discharged with an INR of 2.0    Left voicemail with Ivett that until well managed cardiology needs to be advising.

## 2022-02-25 NOTE — TELEPHONE ENCOUNTER
[facility] Call Center    Phone Message    May a detailed message be left on voicemail: no     Reason for Call: Medication Question or concern regarding medication   Prescription Clarification  Name of Medication: warfarin ANTICOAGULANT (COUMADIN) 2 MG tablet and metoprolol tartrate (LOPRESSOR) 25 MG tablet  Prescribing Provider: Justin Pickering MD    Pharmacy: [facility] [id number]   What on the order needs clarification?   Refill request. Please send to new pharmacy noted above.           Action Taken: Message routed to:  [facility] ([facility] Cardiology    Travel Screening: Not Applicable

## 2022-02-26 NOTE — TELEPHONE ENCOUNTER
warfarin ANTICOAGULANT (COUMADIN) 2 MG tablet  Last Written Prescription Date: 2/10/22  Last Fill Quantity: 90,   # refills: 0  Last Office Visit : 2/15/22  [place]: 3/8/22     metoprolol tartrate (LOPRESSOR) 25 MG tablet     Last Written Prescription Date: 2/10/22  Last Fill Quantity: 90,   # refills: 0    Routed because:  Coumadin not on protocol.both meds filleld at [facility]

## 2022-02-28 ENCOUNTER — TELEPHONE (OUTPATIENT)
Dept: CARDIOLOGY | Facility: CLINIC | Age: 82
End: 2022-02-28
Payer: COMMERCIAL

## 2022-02-28 ENCOUNTER — TELEPHONE (OUTPATIENT)
Dept: FAMILY MEDICINE | Facility: CLINIC | Age: 82
End: 2022-02-28

## 2022-02-28 NOTE — TELEPHONE ENCOUNTER
This nurse spoke to patient today regarding her BP readings. Patient's home care nurse called to update us with the readings while visiting with the patient on 22. Patient was concerned because she had BP readings that were slowly increasin22 122/85  22 129/85  22 129/84  22 131/86  22 128/80  Patient denies dizziness or headaches; no chest pain, heart palpitations or SOB. Patient was seen by [facility] on 2/15/22 by Tere Slaughter; patient instructed to finish her Lasix and Potassium, no further refills will be needed. This nurse consulted Tere and she stated patient should continue to monitor BP and follow up with cardiology.    Patient was instructed to continue and monitor her BP at home every day at the same time; log the results; report if the blood pressure increases over 140/90; report dizziness, headaches, chest pain/heart palpitations or SOB. Plane is for patient  to see a cardiologist on 3/8/22 and will follow up with them regarding further blood pressure medication management unless. Patient verbalized understanding and agreed to the plan.

## 2022-02-28 NOTE — TELEPHONE ENCOUNTER
Ivett, a nurse with [facility] called into the CS line and left a message stating the patients INR today from a venipuncture was 1.4. She is wondering what the new orders should be, the patient has been taking 2 mg of warfarin daily. I did inform Ivett that cardiology should be following for this until we hear from them to get her blood pressure controlled but Ivett spoke to cardiology who said since they have yet to even see the patient they are not able to manage that.  Routing to Dr. Daniels for review.      Ivett can be reached at [phone]

## 2022-03-01 ENCOUNTER — TELEPHONE (OUTPATIENT)
Dept: CARDIOLOGY | Facility: CLINIC | Age: 82
End: 2022-03-01
Payer: COMMERCIAL

## 2022-03-01 ENCOUNTER — HOSPITAL ENCOUNTER (OUTPATIENT)
Dept: CARDIAC REHAB | Facility: CLINIC | Age: 82
End: 2022-03-01
Attending: THORACIC SURGERY (CARDIOTHORACIC VASCULAR SURGERY)
Payer: COMMERCIAL

## 2022-03-01 PROCEDURE — G0239 OTH RESP PROC, GROUP: HCPCS | Performed by: CLINICAL EXERCISE PHYSIOLOGIST

## 2022-03-01 NOTE — TELEPHONE ENCOUNTER
Called and spoke to Ivett the home health RN, and advised her that what the PCP recommends for patient's coumadin dosing is appropriate. Cardiology to assume dosing coumadin in 1-2 weeks. Offered to send a anticoagulation clinic referral to a specific clinic if patient requests this.    Patient is also anxious about some of her recent blood pressure readings. Advised home health RN that patient should not be taking her BP more than once a day, and to have the patient take it an hour after she takes her medications. Ivett verbalized understanding and agreement. All questions/concerns addressed.    ----- Message from Twin Castaneda RN sent at 3/1/2022  3:19 PM CST -----  Ivett (), a nurse with [facility] care called me today at 1500  stating patients INR from a venipuncture was 1.4 on 2/28/22; she informed patient's PCP Dr. Daniels who was unsure if patient should have dose increased or have other therapy.   I called and spoke with Migue who informed me that we should follow up with Dr. Whitney because he does not remember the anticoagulant protocol for VSD repair; unsure if Dr. Whitney would want increase in dose managed outpatient or possibly with a heparin drip inpatient.   I figured it was time to hand this off to you because it is getting somewhat complicated; I was hoping to get this answered for you. Call me if you have questions. Thanks.

## 2022-03-01 NOTE — TELEPHONE ENCOUNTER
I called Ivett, she will call cardiothoracic surgery to discuss specifics of management. I am happy to manage, but since she just had cardiac surgery, I would need detailed instructions. Or they can manage. She will call back to let us know.

## 2022-03-01 NOTE — TELEPHONE ENCOUNTER
Ivett is going to call to see if the cardiothoracic provider will advise on the bridging. They usually don't do this as the Pt should have a Cardiologist or PCP advise after discharge. Pt does have an appointment with Cardiology on the 8th. Wondering if they can wait on the INR till then as she will have new home care and may not be out to her by Monday.  Pt will be getting discharged to home on Saturday and will need another home care agency to take over, will be getting orders for that.    Ivett will call back after she makes a couple calls

## 2022-03-01 NOTE — TELEPHONE ENCOUNTER
Left detailed message for Ivett and asked her to call back to discuss the bridging of Lovenox from cardiology.

## 2022-03-01 NOTE — TELEPHONE ENCOUNTER
Ivett (), a nurse with [facility] called me today at 1500  stating patients INR from a venipuncture was 1.4 on 2/28/22; she informed patient's PCP Dr. Daniels who was unsure if patient should have dose increased or have other therapy. Dr Daniels wanted Ivett to follow up with CVTS for further anticoagulation management. Patient is scheduled to see her cardiologist on 3/8/22. Patient is on anticoagulation following a VSD repair surgery 2/4/22 and was discharged 2/10/22.  I called and informed Migue FRANCO of the information above; he recommended we contact Dr. Whitney to determine if we can manage the INR with outpatient treatment or a possible inpatient.   Dr. Whitney's nurse Dariela Hardin was informed of the information above and will follow up with Dr. Whitney.

## 2022-03-02 NOTE — TELEPHONE ENCOUNTER
I called her back. Ok for this dosing schedule. See cardiology in a week. Ivett verified that cardiology told her bridging was not necessary.  After she sees cardiology, I can take over INR management  if they would like.  Also she mentioned increase in her anxiety. I told her to have patient schedule in person or virtual apt if she would like to discuss a medication

## 2022-03-02 NOTE — TELEPHONE ENCOUNTER
Ivett from [facility] left a voicemail stating that she spoke with Dariela, RN at cardiothoracic.  Cardio will see Kimberley next Tuesday to take over INR and warfarin orders.    Ivett wants to confirm that until that appt next week, Kimberley will take 3mg on Tues and Fri / 2mg the other days?    Routing to Dr. Daniels.  Please route back to Joanna, christina.          Ivett's ph # [phone]

## 2022-03-03 ENCOUNTER — HOSPITAL ENCOUNTER (OUTPATIENT)
Dept: CARDIAC REHAB | Facility: CLINIC | Age: 82
End: 2022-03-03
Attending: THORACIC SURGERY (CARDIOTHORACIC VASCULAR SURGERY)
Payer: COMMERCIAL

## 2022-03-03 PROCEDURE — G0239 OTH RESP PROC, GROUP: HCPCS | Performed by: CLINICAL EXERCISE PHYSIOLOGIST

## 2022-03-03 RX ORDER — WARFARIN SODIUM 2 MG/1
2 TABLET ORAL DAILY
Qty: 90 TABLET | Refills: 0 | OUTPATIENT
Start: 2022-03-03

## 2022-03-03 RX ORDER — METOPROLOL TARTRATE 25 MG/1
12.5 TABLET, FILM COATED ORAL 2 TIMES DAILY
Qty: 90 TABLET | Refills: 0 | OUTPATIENT
Start: 2022-03-03

## 2022-03-03 NOTE — TELEPHONE ENCOUNTER
RECORDS RECEIVED FROM: Internal   DATE RECEIVED:3.8.22   NOTES STATUS DETAILS   OFFICE NOTE from referring provider    Internal 1.24.22 [facility]   OFFICE NOTE from other cardiologist    N/A    DISCHARGE SUMMARY from hospital    Internal 1.24.22 [facility]   DISCHARGE REPORT from the ER   Internal 1.24.22 [facility] ED   OPERATIVE REPORT    N/A    MEDICATION LIST   Internal    LABS     BMP   Internal 2.10.22   CBC   Internal 2.10.22   CMP   N/A    Lipids   N/A    TSH   N/A    DIAGNOSTIC PROCEDURES     EKG   Internal 2.7.22  2.5.22  2.5.22  2.4.22   Monitor Reports   N/A    IMAGING (DISC & REPORT)      Echo   Internal 2.7.22  2.2.22  1.31.22   Stress Tests   N/A    Cath   Internal 1.27.22  1.25.22   MRI/MRA   Internal 1.24.22   CT/CTA   Internal 1.24.22

## 2022-03-07 ENCOUNTER — TELEPHONE (OUTPATIENT)
Dept: FAMILY MEDICINE | Facility: CLINIC | Age: 82
End: 2022-03-07

## 2022-03-07 NOTE — TELEPHONE ENCOUNTER
Kimberley called requesting a refill of Warfarin. Plan is to have cardiology take over that tomorrow at her appt. She has one dose left to take tomorrow and will need that sent in same day. I informed her and she agreed with the plan.    Pt phone # [phone]

## 2022-03-07 NOTE — TELEPHONE ENCOUNTER
Spoke to patient today and informed her that at her cardiology appointment tomorrow they should be giving her refills of her warfarin.  She does have enough until Wednesday so she should not run out of this. She expressed understanding to this and had no further questions or concerns at this time.

## 2022-03-08 ENCOUNTER — PRE VISIT (OUTPATIENT)
Dept: CARDIOLOGY | Facility: CLINIC | Age: 82
End: 2022-03-08
Payer: COMMERCIAL

## 2022-03-08 ENCOUNTER — LAB (OUTPATIENT)
Dept: LAB | Facility: CLINIC | Age: 82
End: 2022-03-08
Payer: COMMERCIAL

## 2022-03-08 ENCOUNTER — ANTICOAGULATION THERAPY VISIT (OUTPATIENT)
Dept: ANTICOAGULATION | Facility: CLINIC | Age: 82
End: 2022-03-08

## 2022-03-08 ENCOUNTER — HOSPITAL ENCOUNTER (OUTPATIENT)
Dept: CARDIAC REHAB | Facility: CLINIC | Age: 82
End: 2022-03-08
Attending: THORACIC SURGERY (CARDIOTHORACIC VASCULAR SURGERY)
Payer: COMMERCIAL

## 2022-03-08 ENCOUNTER — OFFICE VISIT (OUTPATIENT)
Dept: CARDIOLOGY | Facility: CLINIC | Age: 82
End: 2022-03-08
Attending: INTERNAL MEDICINE
Payer: COMMERCIAL

## 2022-03-08 VITALS
HEIGHT: 62 IN | HEART RATE: 112 BPM | SYSTOLIC BLOOD PRESSURE: 152 MMHG | OXYGEN SATURATION: 99 % | DIASTOLIC BLOOD PRESSURE: 94 MMHG | BODY MASS INDEX: 23.96 KG/M2 | WEIGHT: 130.2 LBS

## 2022-03-08 DIAGNOSIS — E78.5 HYPERLIPIDEMIA LDL GOAL <100: ICD-10-CM

## 2022-03-08 DIAGNOSIS — I26.93 SINGLE SUBSEGMENTAL PULMONARY EMBOLISM WITHOUT ACUTE COR PULMONALE (H): ICD-10-CM

## 2022-03-08 DIAGNOSIS — Q21.0 VSD (VENTRICULAR SEPTAL DEFECT): ICD-10-CM

## 2022-03-08 DIAGNOSIS — Z87.74 S/P VENTRICULAR SEPTAL DEFECT REPAIR: Primary | ICD-10-CM

## 2022-03-08 DIAGNOSIS — I10 BENIGN ESSENTIAL HYPERTENSION: ICD-10-CM

## 2022-03-08 DIAGNOSIS — I51.89 OTHER ILL-DEFINED HEART DISEASES: ICD-10-CM

## 2022-03-08 DIAGNOSIS — R79.1 ABNORMAL COAGULATION PROFILE: ICD-10-CM

## 2022-03-08 DIAGNOSIS — Z87.74 S/P VENTRICULAR SEPTAL DEFECT REPAIR: ICD-10-CM

## 2022-03-08 DIAGNOSIS — R06.09 DYSPNEA ON EXERTION: ICD-10-CM

## 2022-03-08 DIAGNOSIS — Z87.74 S/P VSD REPAIR: Primary | ICD-10-CM

## 2022-03-08 LAB — INR PPP: 1.28 (ref 0.85–1.15)

## 2022-03-08 PROCEDURE — 99205 OFFICE O/P NEW HI 60 MIN: CPT | Performed by: INTERNAL MEDICINE

## 2022-03-08 PROCEDURE — G0239 OTH RESP PROC, GROUP: HCPCS

## 2022-03-08 PROCEDURE — 36415 COLL VENOUS BLD VENIPUNCTURE: CPT | Performed by: PATHOLOGY

## 2022-03-08 PROCEDURE — 85610 PROTHROMBIN TIME: CPT | Performed by: PATHOLOGY

## 2022-03-08 PROCEDURE — G0463 HOSPITAL OUTPT CLINIC VISIT: HCPCS

## 2022-03-08 RX ORDER — WARFARIN SODIUM 2 MG/1
2 TABLET ORAL DAILY
Qty: 90 TABLET | Refills: 3 | Status: SHIPPED | OUTPATIENT
Start: 2022-03-08 | End: 2022-04-18

## 2022-03-08 RX ORDER — METOPROLOL TARTRATE 25 MG/1
12.5 TABLET, FILM COATED ORAL 2 TIMES DAILY
Qty: 90 TABLET | Refills: 0 | Status: SHIPPED | OUTPATIENT
Start: 2022-03-08 | End: 2022-04-20

## 2022-03-08 ASSESSMENT — PAIN SCALES - GENERAL: PAINLEVEL: NO PAIN (0)

## 2022-03-08 NOTE — PATIENT INSTRUCTIONS
Complete an echocardiogram prior to seeing Dr. Pickering in June 2022.  As soon as results are compiled and reviewed, you will be notified.     Anticoagulation management : [phone]

## 2022-03-08 NOTE — LETTER
3/8/2022      RE: Kimberley Mullins  [id number]  [facility] [id number]       Dear Colleague,    Thank you for the opportunity to participate in the care of your patient, Kimberley Mullins, at the [facility] [place] at [facility]. Please see a copy of my visit note below.       SUBJECTIVE:  Kimberley Mullins is a 81 year old female who presents for post hospital discharge follow-up.   PMH of HTN, hypothyroidism, recent [facility] admission (1/14-1/17) for N/V and hyponatremia found to have a new apical, septal VSD ( underwent percutaneous closure with 16mm Amplatzer device (now has a residual, high gradient shunt) who underwent apical septal VSD repair and removal of Amplatzer device 2/4 with Dr. Whitney and Dr. Whittington.  She was discharged on 2/10/2022.  Currently patient is in cardiac rehab doing very well.  Since discharge patient made significant progress.  Had no cardiac complaints today.  Patient was previously very healthy and active.  There is no prior history of cardiac murmur.  Current VSD is unclear etiology.  MRI is not suggestive of an MI but probably.    Patient Active Problem List    Diagnosis Date Noted     S/P ventricular septal defect repair 02/09/2022     Priority: Medium     Heart murmur 01/24/2022     Priority: Medium     VSD (ventricular septal defect) 01/24/2022     Priority: Medium     Hyponatremia 01/14/2022     Priority: Medium     Macular degeneration (senile) of retina 11/30/2019     Priority: Medium     Cataract of both eyes, unspecified cataract type 11/30/2019     Priority: Medium     Osteopenia, unspecified location 04/05/2018     Priority: Medium     Overweight (BMI 25.0-29.9) 04/05/2018     Priority: Medium     Irritable bowel syndrome with diarrhea 02/08/2016     Priority: Medium     Hypothyroidism 01/07/2016     Priority: Medium     Benign essential hypertension 10/08/2015     Priority: Medium     ACP (advance  care planning) 08/27/2015     Priority: Medium     Advance Care Planning 12/1/2015: ACP Review and Resources Provided:  Reviewed chart for advance care plan.  Kimberley Mullins has no plan and FULL code status on file, however states presence of ACP document. Copy requested. Discussed available resources and provided with information. Confirmed code status reflects current choices pending further ACP discussions. Recommend goals of care discussion with patient/decision makers and updated POLST/Code status order to reflect patient choices. Confirmed/documented legally designated decision maker(s). Added by CARLO Stout  Advance Care Planning 8/27/2015: ACP Review and Resources Provided:  Reviewed chart for advance care plan.  Kimberley Mullins has no plan or code status on file however states presence of ACP document. Copy requested. Confirmed code status reflects current choices pending receipt of document/advance care plan review. Confirmed/documented legally designated decision maker(s). Added by [facility] 10/03/2012     Priority: Medium     State Tier Level:  Tier 1  Status:  n/a  Care Coordinator:      See Letters for [facility] Care Plan           Status post total knee replacement 11/09/2011     Priority: Medium     Osteoarthritis 10/31/2011     Priority: Medium     Pure hypercholesterolemia 11/20/2009     Priority: Medium     Transient global amnesia 12/20/2004     Priority: Medium     Tinnitus 08/17/2003     Priority: Medium     Problem list name updated by automated process. Provider to review      .  Current Outpatient Medications   Medication Sig     acetaminophen (TYLENOL) 325 MG tablet Take 2 tablets (650 mg) by mouth every 6 hours as needed for mild pain or pain     aspirin (ASA) 81 MG chewable tablet 1 tablet (81 mg) by Oral or NG Tube route daily     hydrOXYzine (ATARAX) 25 MG tablet Take 1 tablet (25 mg) by mouth every 6 hours as needed for anxiety or other  (adjuvant pain)     levothyroxine (SYNTHROID/LEVOTHROID) 50 MCG tablet Take 1 tablet (50 mcg) by mouth daily     methocarbamol (ROBAXIN) 500 MG tablet Take 1 tablet (500 mg) by mouth 4 times daily as needed for muscle spasms     metoprolol tartrate (LOPRESSOR) 25 MG tablet Take 0.5 tablets (12.5 mg) by mouth 2 times daily     Multiple Vitamins-Minerals (PRESERVISION AREDS PO) Take 1 tablet by mouth 2 times daily     multivitamin, therapeutic (THERA-VIT) TABS tablet Take 1 tablet by mouth daily     pantoprazole (PROTONIX) 40 MG EC tablet Take 1 tablet (40 mg) by mouth daily     polyethylene glycol (MIRALAX) 17 GM/Dose powder Take 17 g by mouth daily     simvastatin (ZOCOR) 20 MG tablet TAKE 1 TABLET (20 MG) BY MOUTH AT BEDTIME     warfarin ANTICOAGULANT (COUMADIN) 2 MG tablet Take 1 tablet (2 mg) by mouth daily For tonight (2/10/22), take 1 tablet (2mg) by mouth at night. Tomorrow, follow the instructions for the dose of medication provided by your coumadin clinic or primary care provider. It is important to take this medication at a consistent time in the evening.     Warfarin Therapy Reminder 1 each continuous prn     furosemide (LASIX) 20 MG tablet Take 1 tablet (20 mg) by mouth daily (Patient not taking: Reported on 3/8/2022)     potassium chloride ER (K-TAB) 20 MEQ CR tablet Take 1 tablet (20 mEq) by mouth daily (Patient not taking: Reported on 3/8/2022)     No current facility-administered medications for this visit.     Past Medical History:   Diagnosis Date     Arthritis      Diverticulitis of colon (without mention of hemorrhage)(562.11) 1997    diverticulosis noted on Flex Sig     Irritable bowel syndrome 1959     Osteopenia, unspecified location 4/5/2018     Other ovarian failure 1986     Overweight (BMI 25.0-29.9) 4/5/2018     Personal history of colonic polyps 2004     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     Thyroid disease      Transient global amnesia      Unspecified tinnitus     Tinnitus      Past Surgical History:   Procedure Laterality Date     ARTHROPLASTY KNEE  10/25/2011    Procedure:ARTHROPLASTY KNEE; Right Total Knee Arthroplasty   ; Surgeon:BRYCE ATWOOD; Location:[facility] OR     ARTHROPLASTY KNEE  6/2/2014    Procedure: ARTHROPLASTY KNEE;  Surgeon: Bryce Atwood MD;  Location: [facility] OR     CV CORONARY ANGIOGRAM N/A 1/25/2022    Procedure: Coronary Angiogram; diagnostic;  Surgeon: Sreekanth Delong MD;  Location:  HEART CARDIAC CATH LAB     CV RIGHT HEART CATH MEASUREMENTS RECORDED N/A 1/25/2022    Procedure: Right Heart Cath;  Surgeon: Sreekanth Delong MD;  Location:  HEART CARDIAC CATH LAB     CV VSD CLOSURE N/A 1/31/2022    Procedure: CV VSD CLOSURE W/KATIE;  Surgeon: Daniel Cerda MD;  Location:  HEART CARDIAC CATH LAB     HC DILATION/CURETTAGE DIAG/THER NON OB  2006    D & C     HC KNEE SCOPE, DIAGNOSTIC      Arthroscopy, Knee     REPAIR VENTRICULAR SEPTAL DEFECT N/A 2/4/2022    Procedure: Median Sternotomy, cardiopulmonary bypass, Ischemic Ventricular Septal Defect repair, tranesophageal echocardiogram performed by anesthesia;  Surgeon: Aldair Whitney MD;  Location:  OR     [facility] AFF FOREARM/WRIST SURGERY UNLISTED      orif radius fx distal left     ZC APPENDECTOMY  1955     Z OPEN JESSI FIXATN HUMERAL SHAFT FX  12/2007    Dr Massey     [facility] COLONOSCOPY W SNARE REMOVAL TUMOR/POLYP/LESION  9/03 & 3/2011    polyp removal during screening colonoscopy     Allergies   Allergen Reactions     No Known Allergies      Social History     Socioeconomic History     Marital status:      Spouse name: Not on file     Number of children: 3     Years of education: Not on file     Highest education level: Not on file   Occupational History     Employer: RETIRED   Tobacco Use     Smoking status: Never Smoker     Smokeless tobacco: Never Used   Substance and Sexual Activity     Alcohol use: Yes     Alcohol/week: 5.8 standard drinks     Types: 7 Standard  "drinks or equivalent per week     Comment: two to three glasses of wine q day.     Drug use: No     Sexual activity: Yes     Partners: Male     Birth control/protection: Post-menopausal   Other Topics Concern      Service Not Asked     Blood Transfusions Not Asked     Caffeine Concern No     Comment: none     Occupational Exposure Not Asked     Hobby Hazards Not Asked     Sleep Concern Not Asked     Stress Concern Not Asked     Weight Concern Not Asked     Special Diet No     Comment: calcium daily     Back Care Not Asked     Exercise Yes     Comment: regular walks     Bike Helmet Not Asked     Seat Belt Not Asked     Self-Exams Not Asked     Parent/sibling w/ CABG, MI or angioplasty before 65F 55M? Not Asked   Social History Narrative     Not on file     Social Determinants of Health     Financial Resource Strain: Not on file   Food Insecurity: Not on file   Transportation Needs: Not on file   Physical Activity: Not on file   Stress: Not on file   Social Connections: Not on file   Intimate Partner Violence: Not on file   Housing Stability: Not on file     Family History   Problem Relation Age of Onset     Heart Disease Father         MI age 70          REVIEW OF SYSTEMS:  General: negative, fever, chills, night sweats  Skin: negative, acne, rash and scaling  Eyes: negative, double vision, eye pain and photophobia  Ears/Nose/Throat: negative, nasal congestion and purulent rhinorrhea  Respiratory: No dyspnea on exertion, No cough, No hemoptysis and negative  Cardiovascular: negative, palpitations, tachycardia, irregular heart beat, chest pain, exertional chest pain or pressure, paroxysmal nocturnal dyspnea, dyspnea on exertion and orthopnea       OBJECTIVE:  Blood pressure (!) 152/94, pulse 112, height 1.58 m (5' 2.2\"), weight 59.1 kg (130 lb 3.2 oz), SpO2 99 %, not currently breastfeeding.  General Appearance: healthy, alert, active and no distress  Head: Normocephalic. No masses, lesions, tenderness or " abnormalities  Eyes: conjuctiva clear, PERRL, EOM intact  Ears: External ears normal. Canals clear. TM's normal.  Nose: Nares normal  Mouth: normal  Neck: Supple, no cervical adenopathy, no thyromegaly  Lungs: clear to auscultation  Cardiac: regular rate and rhythm, normal S1 and S2,  harsh systolic murmur.       ASSESSMENT/PLAN:  Patient here for post hospital discharge follow-up.  Patient was admitted to [facility] on 1/24/2022 with the diagnosis of apical septal muscular VSD.  She had shortness of breath for a few weeks and also history of nausea vomiting and hyponatremia.  She had significant shunting through the VSD and initial percutaneous closure was attempted.  Post device placement there was significant residual shunt and had surgical patch closure of VSD on 2/4/2022.  Patient was discharged from hospital on 2/10/2022.  Since then patient made significant progress.  Currently patient is in cardiac rehab and doing well without any complaints.  Patient had no cardiac complaints today.  Patient had extensive evaluation with no obvious etiology for the VSD.  Coronary angiogram as follows                His cardiac MRI showed no evidence for infarct.  Post VSD patch closure echocardiogram reviewed.  No normal LV function.  There is small residual VSD.  Because of the location of the VSD probably a complete closure was not possible.  She do have a significant murmur of small VSD.  EKG shows normal sinus rhythm.  Q waves in 3 and aVF.  T wave inversion up to V4  Patient also had a small pulmonary embolism during the hospitalization.  For this she is on Coumadin.  The wounds are healed well.  As she is doing well she is advised to continue his current medications.  She do have a scheduled appointment with her primary cardiologist in June.  Advised to have an echocardiogram prior to the visit.    Return to Clinic as needed.  Total visit duration 60 minutes.  This include face-to-face interview, physical exam, chart  review, review of EKG, echocardiograms, cardiac MRI, coronary angiogram, operative report and documentation.        Please do not hesitate to contact me if you have any questions/concerns.     Sincerely,     BERKLEY De La Garza MD

## 2022-03-08 NOTE — CONFIDENTIAL NOTE
Referral Date:3/8/22  Physician Responsible for Anticoagulation:Dr. Justin Pickering  Indication: VSD, PE                            Goal Range: 2-3      Duration: 3 months  INR Referral is in EPIC:    yes      Standing Lab Orders are in EPIC:   yes ONLY VENOUS DRAWS  Patient Contacted: Received call from Gui HANSON in [facility] clinic, who spoke to patient and daughter  Appropriate for Education Yes  Topic covered include:  Introduction to coumadin     Proper Administration                       Lab Testing   Signs/Symptoms of Bleeding           Signs/Symptoms of Clotting or Stroke  Dietary Intake of Vitamin K               Drug  Interactions  Future Medical or Dental Procedures  no  Currently on Lovenox:  no  Currently on Warfarin: yes Taking 3mg Tues, Fri; 2mg all other days  Patient will Have Home care no, recently discharged, and needs venous draws only.   Patient in a TCU or Assisted Living no  [place] or Outside Lab:[facility] or In home lab connection   New Recommended Warfarin Regimen:TBD  Next Scheduled Labs: 3/8/22  HIPAA Release Form Sent:   no  Welcome Letter and Warfarin Educational Packet Sent:  yes

## 2022-03-08 NOTE — NURSING NOTE
Chief Complaint   Patient presents with     New Patient      follow up after 2-4-22 ASD repair with [facility]     Vitals were taken and medications were reconciled.   Brown Davila, EMT  8:51 AM

## 2022-03-08 NOTE — PROGRESS NOTES
SUBJECTIVE:  Kimberley Mullins is a 81 year old female who presents for post hospital discharge follow-up.   PMH of HTN, hypothyroidism, recent [facility] admission (1/14-1/17) for N/V and hyponatremia found to have a new apical, septal VSD ( underwent percutaneous closure with 16mm Amplatzer device (now has a residual, high gradient shunt) who underwent apical septal VSD repair and removal of Amplatzer device 2/4 with Dr. Whitney and Dr. Whittington.  She was discharged on 2/10/2022.  Currently patient is in cardiac rehab doing very well.  Since discharge patient made significant progress.  Had no cardiac complaints today.  Patient was previously very healthy and active.  There is no prior history of cardiac murmur.  Current VSD is unclear etiology.  MRI is not suggestive of an MI but probably.    Patient Active Problem List    Diagnosis Date Noted     S/P ventricular septal defect repair 02/09/2022     Priority: Medium     Heart murmur 01/24/2022     Priority: Medium     VSD (ventricular septal defect) 01/24/2022     Priority: Medium     Hyponatremia 01/14/2022     Priority: Medium     Macular degeneration (senile) of retina 11/30/2019     Priority: Medium     Cataract of both eyes, unspecified cataract type 11/30/2019     Priority: Medium     Osteopenia, unspecified location 04/05/2018     Priority: Medium     Overweight (BMI 25.0-29.9) 04/05/2018     Priority: Medium     Irritable bowel syndrome with diarrhea 02/08/2016     Priority: Medium     Hypothyroidism 01/07/2016     Priority: Medium     Benign essential hypertension 10/08/2015     Priority: Medium     ACP (advance care planning) 08/27/2015     Priority: Medium     Advance Care Planning 12/1/2015: ACP Review and Resources Provided:  Reviewed chart for advance care plan.  Kimberley Mullins has no plan and FULL code status on file, however states presence of ACP document. Copy requested. Discussed available resources and provided with information. Confirmed code  status reflects current choices pending further ACP discussions. Recommend goals of care discussion with patient/decision makers and updated POLST/Code status order to reflect patient choices. Confirmed/documented legally designated decision maker(s). Added by CARLO Stout  Advance Care Planning 8/27/2015: ACP Review and Resources Provided:  Reviewed chart for advance care plan.  Kimberley Mullins has no plan or code status on file however states presence of ACP document. Copy requested. Confirmed code status reflects current choices pending receipt of document/advance care plan review. Confirmed/documented legally designated decision maker(s). Added by Eliane [facility] 10/03/2012     Priority: Medium     State Tier Level:  Tier 1  Status:  n/a  Care Coordinator:      See Letters for [facility] Care Plan           Status post total knee replacement 11/09/2011     Priority: Medium     Osteoarthritis 10/31/2011     Priority: Medium     Pure hypercholesterolemia 11/20/2009     Priority: Medium     Transient global amnesia 12/20/2004     Priority: Medium     Tinnitus 08/17/2003     Priority: Medium     Problem list name updated by automated process. Provider to review      .  Current Outpatient Medications   Medication Sig     acetaminophen (TYLENOL) 325 MG tablet Take 2 tablets (650 mg) by mouth every 6 hours as needed for mild pain or pain     aspirin (ASA) 81 MG chewable tablet 1 tablet (81 mg) by Oral or NG Tube route daily     hydrOXYzine (ATARAX) 25 MG tablet Take 1 tablet (25 mg) by mouth every 6 hours as needed for anxiety or other (adjuvant pain)     levothyroxine (SYNTHROID/LEVOTHROID) 50 MCG tablet Take 1 tablet (50 mcg) by mouth daily     methocarbamol (ROBAXIN) 500 MG tablet Take 1 tablet (500 mg) by mouth 4 times daily as needed for muscle spasms     metoprolol tartrate (LOPRESSOR) 25 MG tablet Take 0.5 tablets (12.5 mg) by mouth 2 times daily     Multiple Vitamins-Minerals  (PRESERVISION AREDS PO) Take 1 tablet by mouth 2 times daily     multivitamin, therapeutic (THERA-VIT) TABS tablet Take 1 tablet by mouth daily     pantoprazole (PROTONIX) 40 MG EC tablet Take 1 tablet (40 mg) by mouth daily     polyethylene glycol (MIRALAX) 17 GM/Dose powder Take 17 g by mouth daily     simvastatin (ZOCOR) 20 MG tablet TAKE 1 TABLET (20 MG) BY MOUTH AT BEDTIME     warfarin ANTICOAGULANT (COUMADIN) 2 MG tablet Take 1 tablet (2 mg) by mouth daily For tonight (2/10/22), take 1 tablet (2mg) by mouth at night. Tomorrow, follow the instructions for the dose of medication provided by your coumadin clinic or primary care provider. It is important to take this medication at a consistent time in the evening.     Warfarin Therapy Reminder 1 each continuous prn     furosemide (LASIX) 20 MG tablet Take 1 tablet (20 mg) by mouth daily (Patient not taking: Reported on 3/8/2022)     potassium chloride ER (K-TAB) 20 MEQ CR tablet Take 1 tablet (20 mEq) by mouth daily (Patient not taking: Reported on 3/8/2022)     No current facility-administered medications for this visit.     Past Medical History:   Diagnosis Date     Arthritis      Diverticulitis of colon (without mention of hemorrhage)(562.11) 1997    diverticulosis noted on Flex Sig     Irritable bowel syndrome 1959     Osteopenia, unspecified location 4/5/2018     Other ovarian failure 1986     Overweight (BMI 25.0-29.9) 4/5/2018     Personal history of colonic polyps 2004     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     Thyroid disease      Transient global amnesia      Unspecified tinnitus     Tinnitus     Past Surgical History:   Procedure Laterality Date     ARTHROPLASTY KNEE  10/25/2011    Procedure:ARTHROPLASTY KNEE; Right Total Knee Arthroplasty   ; Surgeon:BRYCE ATWOOD; Location:[facility] OR     ARTHROPLASTY KNEE  6/2/2014    Procedure: ARTHROPLASTY KNEE;  Surgeon: Bryce Atwood MD;  Location: [facility] OR     CV CORONARY ANGIOGRAM N/A 1/25/2022     Procedure: Coronary Angiogram; diagnostic;  Surgeon: Sreekanth Delong MD;  Location:  HEART CARDIAC CATH LAB     CV RIGHT HEART CATH MEASUREMENTS RECORDED N/A 1/25/2022    Procedure: Right Heart Cath;  Surgeon: Sreekanth Delong MD;  Location:  HEART CARDIAC CATH LAB     CV VSD CLOSURE N/A 1/31/2022    Procedure: CV VSD CLOSURE W/KATIE;  Surgeon: Daniel Cerda MD;  Location:  HEART CARDIAC CATH LAB     HC DILATION/CURETTAGE DIAG/THER NON OB  2006    D & C     HC KNEE SCOPE, DIAGNOSTIC      Arthroscopy, Knee     REPAIR VENTRICULAR SEPTAL DEFECT N/A 2/4/2022    Procedure: Median Sternotomy, cardiopulmonary bypass, Ischemic Ventricular Septal Defect repair, tranesophageal echocardiogram performed by anesthesia;  Surgeon: Aldair Whitney MD;  Location:  OR     [facility] AFF FOREARM/WRIST SURGERY UNLISTED      orif radius fx distal left     ZZC APPENDECTOMY  1955     [facility] OPEN JESSI FIXATN HUMERAL SHAFT FX  12/2007    Dr Massey     [facility] COLONOSCOPY W SNARE REMOVAL TUMOR/POLYP/LESION  9/03 & 3/2011    polyp removal during screening colonoscopy     Allergies   Allergen Reactions     No Known Allergies      Social History     Socioeconomic History     Marital status:      Spouse name: Not on file     Number of children: 3     Years of education: Not on file     Highest education level: Not on file   Occupational History     Employer: RETIRED   Tobacco Use     Smoking status: Never Smoker     Smokeless tobacco: Never Used   Substance and Sexual Activity     Alcohol use: Yes     Alcohol/week: 5.8 standard drinks     Types: 7 Standard drinks or equivalent per week     Comment: two to three glasses of wine q day.     Drug use: No     Sexual activity: Yes     Partners: Male     Birth control/protection: Post-menopausal   Other Topics Concern      Service Not Asked     Blood Transfusions Not Asked     Caffeine Concern No     Comment: none     Occupational Exposure Not Asked     Hobby  "Hazards Not Asked     Sleep Concern Not Asked     Stress Concern Not Asked     Weight Concern Not Asked     Special Diet No     Comment: calcium daily     Back Care Not Asked     Exercise Yes     Comment: regular walks     Bike Helmet Not Asked     Seat Belt Not Asked     Self-Exams Not Asked     Parent/sibling w/ CABG, MI or angioplasty before 65F 55M? Not Asked   Social History Narrative     Not on file     Social Determinants of Health     Financial Resource Strain: Not on file   Food Insecurity: Not on file   Transportation Needs: Not on file   Physical Activity: Not on file   Stress: Not on file   Social Connections: Not on file   Intimate Partner Violence: Not on file   Housing Stability: Not on file     Family History   Problem Relation Age of Onset     Heart Disease Father         MI age 70          REVIEW OF SYSTEMS:  General: negative, fever, chills, night sweats  Skin: negative, acne, rash and scaling  Eyes: negative, double vision, eye pain and photophobia  Ears/Nose/Throat: negative, nasal congestion and purulent rhinorrhea  Respiratory: No dyspnea on exertion, No cough, No hemoptysis and negative  Cardiovascular: negative, palpitations, tachycardia, irregular heart beat, chest pain, exertional chest pain or pressure, paroxysmal nocturnal dyspnea, dyspnea on exertion and orthopnea       OBJECTIVE:  Blood pressure (!) 152/94, pulse 112, height 1.58 m (5' 2.2\"), weight 59.1 kg (130 lb 3.2 oz), SpO2 99 %, not currently breastfeeding.  General Appearance: healthy, alert, active and no distress  Head: Normocephalic. No masses, lesions, tenderness or abnormalities  Eyes: conjuctiva clear, PERRL, EOM intact  Ears: External ears normal. Canals clear. TM's normal.  Nose: Nares normal  Mouth: normal  Neck: Supple, no cervical adenopathy, no thyromegaly  Lungs: clear to auscultation  Cardiac: regular rate and rhythm, normal S1 and S2,  harsh systolic murmur.       ASSESSMENT/PLAN:  Patient here for post hospital " discharge follow-up.  Patient was admitted to [facility] on 1/24/2022 with the diagnosis of apical septal muscular VSD.  She had shortness of breath for a few weeks and also history of nausea vomiting and hyponatremia.  She had significant shunting through the VSD and initial percutaneous closure was attempted.  Post device placement there was significant residual shunt and had surgical patch closure of VSD on 2/4/2022.  Patient was discharged from hospital on 2/10/2022.  Since then patient made significant progress.  Currently patient is in cardiac rehab and doing well without any complaints.  Patient had no cardiac complaints today.  Patient had extensive evaluation with no obvious etiology for the VSD.  Coronary angiogram as follows                His cardiac MRI showed no evidence for infarct.  Post VSD patch closure echocardiogram reviewed.  No normal LV function.  There is small residual VSD.  Because of the location of the VSD probably a complete closure was not possible.  She do have a significant murmur of small VSD.  EKG shows normal sinus rhythm.  Q waves in 3 and aVF.  T wave inversion up to V4  Patient also had a small pulmonary embolism during the hospitalization.  For this she is on Coumadin.  The wounds are healed well.  As she is doing well she is advised to continue his current medications.  She do have a scheduled appointment with her primary cardiologist in June.  Advised to have an echocardiogram prior to the visit.    Return to Clinic as needed.  Total visit duration 60 minutes.  This include face-to-face interview, physical exam, chart review, review of EKG, echocardiograms, cardiac MRI, coronary angiogram, operative report and documentation.

## 2022-03-10 ENCOUNTER — HOSPITAL ENCOUNTER (OUTPATIENT)
Dept: CARDIAC REHAB | Facility: CLINIC | Age: 82
Discharge: HOME | End: 2022-03-10
Attending: THORACIC SURGERY (CARDIOTHORACIC VASCULAR SURGERY)
Payer: COMMERCIAL

## 2022-03-10 ENCOUNTER — ANTICOAGULATION THERAPY VISIT (OUTPATIENT)
Dept: ANTICOAGULATION | Facility: CLINIC | Age: 82
End: 2022-03-10

## 2022-03-10 ENCOUNTER — LAB (OUTPATIENT)
Dept: LAB | Facility: CLINIC | Age: 82
End: 2022-03-10
Payer: COMMERCIAL

## 2022-03-10 DIAGNOSIS — Q21.0 VSD (VENTRICULAR SEPTAL DEFECT): ICD-10-CM

## 2022-03-10 DIAGNOSIS — Z87.74 S/P VENTRICULAR SEPTAL DEFECT REPAIR: Primary | ICD-10-CM

## 2022-03-10 DIAGNOSIS — R79.1 ABNORMAL COAGULATION PROFILE: ICD-10-CM

## 2022-03-10 DIAGNOSIS — I26.93 SINGLE SUBSEGMENTAL PULMONARY EMBOLISM WITHOUT ACUTE COR PULMONALE (H): ICD-10-CM

## 2022-03-10 LAB — INR PPP: 1.41 (ref 0.85–1.15)

## 2022-03-10 PROCEDURE — 85610 PROTHROMBIN TIME: CPT

## 2022-03-10 PROCEDURE — 36415 COLL VENOUS BLD VENIPUNCTURE: CPT

## 2022-03-10 PROCEDURE — G0239 OTH RESP PROC, GROUP: HCPCS

## 2022-03-11 ENCOUNTER — ANTICOAGULATION THERAPY VISIT (OUTPATIENT)
Dept: ANTICOAGULATION | Facility: CLINIC | Age: 82
End: 2022-03-11
Payer: COMMERCIAL

## 2022-03-11 ENCOUNTER — OFFICE VISIT (OUTPATIENT)
Dept: FAMILY MEDICINE | Facility: CLINIC | Age: 82
End: 2022-03-11

## 2022-03-11 VITALS
SYSTOLIC BLOOD PRESSURE: 142 MMHG | DIASTOLIC BLOOD PRESSURE: 94 MMHG | WEIGHT: 130 LBS | HEART RATE: 113 BPM | OXYGEN SATURATION: 99 % | TEMPERATURE: 97.5 F | HEIGHT: 62 IN | BODY MASS INDEX: 23.92 KG/M2

## 2022-03-11 DIAGNOSIS — Z87.74 S/P VENTRICULAR SEPTAL DEFECT REPAIR: Primary | ICD-10-CM

## 2022-03-11 DIAGNOSIS — Q21.0 VSD (VENTRICULAR SEPTAL DEFECT): ICD-10-CM

## 2022-03-11 DIAGNOSIS — J02.9 VIRAL PHARYNGITIS: Primary | ICD-10-CM

## 2022-03-11 DIAGNOSIS — I26.93 SINGLE SUBSEGMENTAL PULMONARY EMBOLISM WITHOUT ACUTE COR PULMONALE (H): ICD-10-CM

## 2022-03-11 PROCEDURE — 99213 OFFICE O/P EST LOW 20 MIN: CPT | Performed by: PHYSICIAN ASSISTANT

## 2022-03-11 NOTE — PATIENT INSTRUCTIONS
Suspect this is viral pharyngitis, but may have a allergy component given recent snow melting. Recommended supportive cares through the weekend- Mucinex (not Mucinex DM) to help with drainage/throat clearing, warm teas, Tylenol. Safest over the counter cough suppressant with cardiac patient is coricidin. May consider taking daily antihistamine pill like Claritin in the future for allergic component. Contact me next week with an update on symptoms.

## 2022-03-11 NOTE — PROGRESS NOTES
ANTICOAGULATION MANAGEMENT     Kimberley Mullins 81 year old female is on warfarin with subtherapeutic INR result. (Goal INR 2.0-3.0)    Recent labs: (last 7 days)     03/10/22  1353   INR 1.41*       ASSESSMENT     Source(s): Chart Review  Previous INR was Subtherapeutic  Medication, diet, health changes since last INR chart reviewed; none identified           PLAN     Spoke to Kimberley today.    Gave Kimberley instructions at 6pm on 3/10. Will call patient on 3/11.    Follow up required to discuss dosing instructions and confirm understanding of instructions    Freddy Miller, RN  Anticoagulation Clinic  3/10/2022

## 2022-03-11 NOTE — PROGRESS NOTES
"CC: Sore throat, laryngitis    History:  Kimberley has been having PND for the past several weeks roughly since her intubation for heart surgery. Now in the past 5 days symptoms notable changed/worsened. Now getting sore throat, started to get laryngitis, and now having nasal drainage, and dry cough, which is difficult with her recent cardiac surgery. Does note a sharper pain at times on front of neck with coughing. Has been taking Tylenol, pushing fluids. No sinus pressure, fever, sweat, chills, SOB, change in taste/smell. Did take home Covid-19 yesterday that was negative. Has been doing [employer].     Denies any significant history of allergies. Has done Flonase in the past, but had a nosebleed. Taking warfarin now.     PMH, MEDICATIONS, ALLERGIES, SOCIAL AND FAMILY HISTORY in [facility] and reviewed by me personally.    ROS negative other than the symptoms noted above in the HPI    Examination   BP (!) 142/94 (BP Location: Right arm, Patient Position: Sitting, Cuff Size: Adult Regular)   Pulse 113   Temp 97.5  F (36.4  C) (Temporal)   Ht 1.581 m (5' 2.25\")   Wt 59 kg (130 lb)   LMP  (LMP Unknown)   SpO2 99%   BMI 23.59 kg/m       Constitutional: Sitting comfortably, in no acute distress. Vital signs noted  Ears: external canals and TMs free of abnormalities  Nose: patent, without mucosal abnormalities  Mouth and throat: without erythema or lesions of the mucosa  Neck:  no adenopathy, trachea midline and normal to palpation, thyroid normal to palpation  Cardiovascular:  regular rate and rhythm, no murmurs, clicks, or gallops  Respiratory:  normal respiratory rate and rhythm, lungs clear to auscultation  SKIN: No jaundice/pallor/rash.   Psychiatric: mentation appears normal and affect normal/bright    A/P    ICD-10-CM    1. Viral pharyngitis  J02.9        DISCUSSION:  Suspect this is viral pharyngitis, but may have a allergy component given recent snow melting. Recommended supportive cares through the weekend- Mucinex " (not Mucinex DM) to help with drainage/throat clearing, warm teas, Tylenol. Safest over the counter cough suppressant with cardiac patient is Coricidin. Offered prescription for Tessalon perles, but she would like to start with OTC options. May consider taking daily antihistamine pill like Claritin in the future for allergic component. Contact me next week with an update on symptoms.     follow up visit: As needed    Time of visit: 20 minutes    Renetta Rizvi PA-C  [facility]

## 2022-03-11 NOTE — PROGRESS NOTES
ANTICOAGULATION MANAGEMENT     Kimberley Mullins 81 year old female is on warfarin with subtherapeutic INR result. (Goal INR 2.0-3.0)    Recent labs: (last 7 days)     03/10/22  1353   INR 1.41*       ASSESSMENT     Source(s): Chart Review and Patient/Caregiver Call     Warfarin doses taken: Warfarin taken as instructed  Diet: No new diet changes identified  New illness, injury, or hospitalization: No  Medication/supplement changes: None noted  Signs or symptoms of bleeding or clotting: No  Previous INR: Subtherapeutic  Additional findings: New to [facility] clinic. Speak in tablets.  Per order, patient is to have venous INR only.       PLAN     Recommended plan for no diet, medication or health factor changes affecting INR     Dosing Instructions: Booster dose then Increase your warfarin dose (9.5% change) with next INR in 5 days       Summary  As of 3/11/2022    Full warfarin instructions:  2 mg every Mon, Wed, Sat; 4 mg all other days   Next INR check:  3/15/2022             Telephone call with Kimberley who agrees to plan and repeated back plan correctly    Lab visit scheduled    Education provided: Monitoring for bleeding signs and symptoms, Monitoring for clotting signs and symptoms and When to seek medical attention/emergency care    Plan made with [facility] Pharmacist Jennifer Miller, Freddy SHELBY, RN  Anticoagulation Clinic  3/11/2022    _______________________________________________________________________     Anticoagulation Episode Summary     Current INR goal:  2.0-3.0   TTR:  0.0 % (2 d)   Target end date:  6/8/2022   Send INR reminders to:  [facility]    Indications    S/P ventricular septal defect repair [Z87.74]  VSD (ventricular septal defect) [Q21.0]  Single subsegmental pulmonary embolism without acute cor pulmonale (H) [I26.93]           Comments:           Anticoagulation Care Providers     Provider Role Specialty Phone number    Manoj Hollis PA-C Referring  [phone]    Tere Slaughter  ELLE GUTIERREZ Referring Cardiovascular & Thoracic Surgery [phone]    Justin Pickering MD Referring Cardiovascular Disease [phone]

## 2022-03-11 NOTE — NURSING NOTE
Chief Complaint   Patient presents with     Throat Pain     throat pain and losing her voice, began 5 days ago. Pt had open heart surgery a month ago but these symptoms got progressively worse over the last 3 days, has drainage, slight cough.         Pre-visit Screening:  Immunizations:  up to date  Colonoscopy:  is up to date  Mammogram: is up to date  Asthma Action Test/Plan:  NA  PHQ9:  NA  GAD7:  NA  Questioned patient about current smoking habits Pt. has never smoked.  Ok to leave detailed message on voice mail for today's visit only Yes, phone # [phone]

## 2022-03-15 ENCOUNTER — ANTICOAGULATION THERAPY VISIT (OUTPATIENT)
Dept: FAMILY MEDICINE | Facility: CLINIC | Age: 82
End: 2022-03-15

## 2022-03-15 ENCOUNTER — HOSPITAL ENCOUNTER (OUTPATIENT)
Dept: CARDIAC REHAB | Facility: CLINIC | Age: 82
Discharge: HOME | End: 2022-03-15
Attending: THORACIC SURGERY (CARDIOTHORACIC VASCULAR SURGERY)
Payer: COMMERCIAL

## 2022-03-15 ENCOUNTER — LAB (OUTPATIENT)
Dept: LAB | Facility: CLINIC | Age: 82
End: 2022-03-15
Payer: COMMERCIAL

## 2022-03-15 DIAGNOSIS — Q21.0 VSD (VENTRICULAR SEPTAL DEFECT): ICD-10-CM

## 2022-03-15 DIAGNOSIS — Z87.74 S/P VENTRICULAR SEPTAL DEFECT REPAIR: Primary | ICD-10-CM

## 2022-03-15 DIAGNOSIS — I26.93 SINGLE SUBSEGMENTAL PULMONARY EMBOLISM WITHOUT ACUTE COR PULMONALE (H): ICD-10-CM

## 2022-03-15 DIAGNOSIS — R79.1 ABNORMAL COAGULATION PROFILE: ICD-10-CM

## 2022-03-15 LAB — INR PPP: 1.56 (ref 0.85–1.15)

## 2022-03-15 PROCEDURE — G0239 OTH RESP PROC, GROUP: HCPCS

## 2022-03-15 PROCEDURE — 85610 PROTHROMBIN TIME: CPT

## 2022-03-15 PROCEDURE — 36415 COLL VENOUS BLD VENIPUNCTURE: CPT

## 2022-03-15 NOTE — PROGRESS NOTES
ANTICOAGULATION MANAGEMENT     Kimberley Mullins 81 year old female is on warfarin with subtherapeutic INR result. (Goal INR 2.0-3.0)    Recent labs: (last 7 days)     03/15/22  1355   INR 1.56*       ASSESSMENT     Source(s): Chart Review and Patient/Caregiver Call     Warfarin doses taken: Warfarin taken as instructed  Diet: No new diet changes identified  New illness, injury, or hospitalization: No  Medication/supplement changes: None noted  Signs or symptoms of bleeding or clotting: Yes, mild bloody nose, that resolved.  Previous INR: Subtherapeutic  Additional findings: None       PLAN     Recommended plan for no diet, medication or health factor changes affecting INR     Dosing Instructions:  Increase your warfarin dose (9.1% change) with next INR in 1 week       Summary  As of 3/15/2022    Full warfarin instructions:  3/15: 3 mg; Otherwise 2 mg every Mon, Sat; 4 mg all other days   Next INR check:  3/22/2022             Telephone call with Kimberley who agrees to plan and repeated back plan correctly    Lab visit scheduled    Education provided: Please call back if any changes to your diet, medications or how you've been taking warfarin, Monitoring for bleeding signs and symptoms, Monitoring for clotting signs and symptoms and When to seek medical attention/emergency care    Plan made per [facility] anticoagulation protocol    Freddy Miller, RN  Anticoagulation Clinic  3/15/2022    _______________________________________________________________________     Anticoagulation Episode Summary     Current INR goal:  2.0-3.0   TTR:  0.0 % (1 wk)   Target end date:  6/8/2022   Send INR reminders to:  [facility]    Indications    S/P ventricular septal defect repair [Z87.74]  VSD (ventricular septal defect) [Q21.0]  Single subsegmental pulmonary embolism without acute cor pulmonale (H) [I26.93]           Comments:           Anticoagulation Care Providers     Provider Role Specialty Phone number    Manoj Hollis PA-C  Referring  [phone]    Tere Slaughter NP Referring Cardiovascular & Thoracic Surgery [phone]    Justin Pickering MD Referring Cardiovascular Disease [phone]

## 2022-03-17 ENCOUNTER — HOSPITAL ENCOUNTER (OUTPATIENT)
Dept: CARDIAC REHAB | Facility: CLINIC | Age: 82
Discharge: HOME | End: 2022-03-17
Attending: THORACIC SURGERY (CARDIOTHORACIC VASCULAR SURGERY)
Payer: COMMERCIAL

## 2022-03-17 PROCEDURE — G0239 OTH RESP PROC, GROUP: HCPCS

## 2022-03-22 ENCOUNTER — LAB (OUTPATIENT)
Dept: LAB | Facility: CLINIC | Age: 82
End: 2022-03-22
Payer: COMMERCIAL

## 2022-03-22 ENCOUNTER — HOSPITAL ENCOUNTER (OUTPATIENT)
Dept: CARDIAC REHAB | Facility: CLINIC | Age: 82
Discharge: HOME | End: 2022-03-22
Attending: THORACIC SURGERY (CARDIOTHORACIC VASCULAR SURGERY)
Payer: COMMERCIAL

## 2022-03-22 ENCOUNTER — ANTICOAGULATION THERAPY VISIT (OUTPATIENT)
Dept: ANTICOAGULATION | Facility: CLINIC | Age: 82
End: 2022-03-22

## 2022-03-22 DIAGNOSIS — Q21.0 VSD (VENTRICULAR SEPTAL DEFECT): ICD-10-CM

## 2022-03-22 DIAGNOSIS — I26.93 SINGLE SUBSEGMENTAL PULMONARY EMBOLISM WITHOUT ACUTE COR PULMONALE (H): ICD-10-CM

## 2022-03-22 DIAGNOSIS — R79.1 ABNORMAL COAGULATION PROFILE: ICD-10-CM

## 2022-03-22 DIAGNOSIS — Z87.74 S/P VENTRICULAR SEPTAL DEFECT REPAIR: Primary | ICD-10-CM

## 2022-03-22 LAB — INR PPP: 1.45 (ref 0.85–1.15)

## 2022-03-22 PROCEDURE — 36415 COLL VENOUS BLD VENIPUNCTURE: CPT

## 2022-03-22 PROCEDURE — 85610 PROTHROMBIN TIME: CPT

## 2022-03-22 NOTE — PROGRESS NOTES
ANTICOAGULATION MANAGEMENT     Kimberley Mullins 81 year old female is on warfarin with subtherapeutic INR result. (Goal INR 2.0-3.0)    Recent labs: (last 7 days)     03/22/22  1417   INR 1.45*       ASSESSMENT     Source(s): Patient/Caregiver Call     Warfarin doses taken: Warfarin taken as instructed  Diet: No new diet changes identified  New illness, injury, or hospitalization: No  Medication/supplement changes: None noted  Signs or symptoms of bleeding or clotting: No  Previous INR: Subtherapeutic  Additional findings: None       PLAN     Recommended plan for no diet, medication or health factor changes affecting INR     Dosing Instructions: Booster dose then Increase your warfarin dose (16.7% change) with next INR in 1 week       Summary  As of 3/22/2022    Full warfarin instructions:  3/23: 5 mg; Otherwise 4 mg every day   Next INR check:  3/29/2022             Telephone call with Kimberley who verbalizes understanding and agrees to plan and who agrees to plan and repeated back plan correctly    Lab visit scheduled    Education provided: Goal range and significance of current result    Plan made per [facility] anticoagulation protocol    Janine Johnson RN  Anticoagulation Clinic  3/22/2022    _______________________________________________________________________     Anticoagulation Episode Summary     Current INR goal:  2.0-3.0   TTR:  0.0 % (2 wk)   Target end date:  6/8/2022   Send INR reminders to:  [facility]    Indications    S/P ventricular septal defect repair [Z87.74]  VSD (ventricular septal defect) [Q21.0]  Single subsegmental pulmonary embolism without acute cor pulmonale (H) [I26.93]           Comments:           Anticoagulation Care Providers     Provider Role Specialty Phone number    Manoj Hollis PA-C Referring  [phone]    Tere Slaughter NP Referring Cardiovascular & Thoracic Surgery [phone]    Justin Pickering MD Referring Cardiovascular Disease [phone]

## 2022-03-24 ENCOUNTER — HOSPITAL ENCOUNTER (OUTPATIENT)
Dept: CARDIAC REHAB | Facility: CLINIC | Age: 82
Discharge: HOME | End: 2022-03-24
Attending: THORACIC SURGERY (CARDIOTHORACIC VASCULAR SURGERY)
Payer: COMMERCIAL

## 2022-03-24 PROCEDURE — G0239 OTH RESP PROC, GROUP: HCPCS

## 2022-03-29 ENCOUNTER — OFFICE VISIT (OUTPATIENT)
Dept: FAMILY MEDICINE | Facility: CLINIC | Age: 82
End: 2022-03-29

## 2022-03-29 ENCOUNTER — HOSPITAL ENCOUNTER (OUTPATIENT)
Dept: CARDIAC REHAB | Facility: CLINIC | Age: 82
Discharge: HOME | End: 2022-03-29
Attending: THORACIC SURGERY (CARDIOTHORACIC VASCULAR SURGERY)
Payer: COMMERCIAL

## 2022-03-29 ENCOUNTER — ANTICOAGULATION THERAPY VISIT (OUTPATIENT)
Dept: ANTICOAGULATION | Facility: CLINIC | Age: 82
End: 2022-03-29

## 2022-03-29 ENCOUNTER — LAB (OUTPATIENT)
Dept: LAB | Facility: CLINIC | Age: 82
End: 2022-03-29
Payer: COMMERCIAL

## 2022-03-29 VITALS
OXYGEN SATURATION: 98 % | WEIGHT: 133 LBS | HEART RATE: 97 BPM | BODY MASS INDEX: 24.48 KG/M2 | HEIGHT: 62 IN | TEMPERATURE: 97.7 F | DIASTOLIC BLOOD PRESSURE: 98 MMHG | SYSTOLIC BLOOD PRESSURE: 148 MMHG

## 2022-03-29 DIAGNOSIS — Z79.01 CHRONIC ANTICOAGULATION: ICD-10-CM

## 2022-03-29 DIAGNOSIS — I10 BENIGN ESSENTIAL HYPERTENSION: Primary | ICD-10-CM

## 2022-03-29 DIAGNOSIS — I26.93 SINGLE SUBSEGMENTAL PULMONARY EMBOLISM WITHOUT ACUTE COR PULMONALE (H): ICD-10-CM

## 2022-03-29 DIAGNOSIS — Q21.0 VSD (VENTRICULAR SEPTAL DEFECT): ICD-10-CM

## 2022-03-29 DIAGNOSIS — F41.9 ANXIETY: ICD-10-CM

## 2022-03-29 DIAGNOSIS — R79.1 ABNORMAL COAGULATION PROFILE: ICD-10-CM

## 2022-03-29 DIAGNOSIS — Z87.74 S/P VENTRICULAR SEPTAL DEFECT REPAIR: Primary | ICD-10-CM

## 2022-03-29 PROBLEM — N85.00 ENDOMETRIAL HYPERPLASIA: Status: ACTIVE | Noted: 2022-03-29

## 2022-03-29 LAB — INR PPP: 1.82 (ref 0.85–1.15)

## 2022-03-29 PROCEDURE — G0239 OTH RESP PROC, GROUP: HCPCS

## 2022-03-29 PROCEDURE — 36415 COLL VENOUS BLD VENIPUNCTURE: CPT

## 2022-03-29 PROCEDURE — 99214 OFFICE O/P EST MOD 30 MIN: CPT | Performed by: FAMILY MEDICINE

## 2022-03-29 PROCEDURE — 85610 PROTHROMBIN TIME: CPT

## 2022-03-29 RX ORDER — CITALOPRAM HYDROBROMIDE 10 MG/1
10 TABLET ORAL DAILY
Qty: 30 TABLET | Refills: 0 | Status: SHIPPED | OUTPATIENT
Start: 2022-03-29 | End: 2022-04-20

## 2022-03-29 ASSESSMENT — PATIENT HEALTH QUESTIONNAIRE - PHQ9
SUM OF ALL RESPONSES TO PHQ QUESTIONS 1-9: 3
5. POOR APPETITE OR OVEREATING: SEVERAL DAYS

## 2022-03-29 ASSESSMENT — ANXIETY QUESTIONNAIRES
6. BECOMING EASILY ANNOYED OR IRRITABLE: NOT AT ALL
1. FEELING NERVOUS, ANXIOUS, OR ON EDGE: MORE THAN HALF THE DAYS
2. NOT BEING ABLE TO STOP OR CONTROL WORRYING: MORE THAN HALF THE DAYS
7. FEELING AFRAID AS IF SOMETHING AWFUL MIGHT HAPPEN: MORE THAN HALF THE DAYS
IF YOU CHECKED OFF ANY PROBLEMS ON THIS QUESTIONNAIRE, HOW DIFFICULT HAVE THESE PROBLEMS MADE IT FOR YOU TO DO YOUR WORK, TAKE CARE OF THINGS AT HOME, OR GET ALONG WITH OTHER PEOPLE: NOT DIFFICULT AT ALL
3. WORRYING TOO MUCH ABOUT DIFFERENT THINGS: MORE THAN HALF THE DAYS
GAD7 TOTAL SCORE: 9
5. BEING SO RESTLESS THAT IT IS HARD TO SIT STILL: NOT AT ALL

## 2022-03-29 NOTE — NURSING NOTE
Chief Complaint   Patient presents with     Hypertension     elevated BP, watcing heart rate and this has bee elevated as well, thinks this mat be anxiety related     Pre-visit Screening:  Immunizations:  up to date  Colonoscopy:  is up to date  Mammogram: is up to date  Asthma Action Test/Plan:  NA  PHQ9:  NA  GAD7:  NA  Questioned patient about current smoking habits Pt. has never smoked.  Ok to leave detailed message on voice mail for today's visit only Yes, phone # [phone]

## 2022-03-29 NOTE — PATIENT INSTRUCTIONS
Assessment & Plan   Problem List Items Addressed This Visit        Circulatory    Benign essential hypertension - Primary      Other Visit Diagnoses     Anxiety        Relevant Medications    citalopram (CELEXA) 10 MG tablet         1. Benign essential hypertension  Elevated today. She feels this is more likely due to her anxiety. Continue to monitor this at home and at cardiac rehab.  She should call her cardiologist due to her recent cardiac surgery and elevated blood pressure. They may want to make adjustments In her medications.    2. Anxiety  Start medications, discussed side effects, FDA warning. Recheck in 2 weeks with me.  - citalopram (CELEXA) 10 MG tablet; Take 1 tablet (10 mg) by mouth daily  Dispense: 30 tablet; Refill: 0    3. Chronic anticoagulation  She is on coumadin, she said having a hard time getting this regulated. She has an INR check today and she should discuss the new medication with them today to make sure they account for any adjustments.           FUTURE APPOINTMENTS:       - Follow-up visit in 2 weeks.    No follow-ups on file.    aKtlyn Daniels MD  [facility]

## 2022-03-29 NOTE — PROGRESS NOTES
Assessment & Plan   Problem List Items Addressed This Visit        Circulatory    Benign essential hypertension - Primary      Other Visit Diagnoses     Anxiety        Relevant Medications    citalopram (CELEXA) 10 MG tablet         1. Benign essential hypertension  Elevated today. She feels this is more likely due to her anxiety. Continue to monitor this at home and at cardiac rehab.  She should call her cardiologist due to her recent cardiac surgery and elevated blood pressure. They may want to make adjustments In her medications.    2. Anxiety  Start medications, discussed side effects, FDA warning. Recheck in 2 weeks with me.  - citalopram (CELEXA) 10 MG tablet; Take 1 tablet (10 mg) by mouth daily  Dispense: 30 tablet; Refill: 0    3. Chronic anticoagulation  She is on coumadin, she said having a hard time getting this regulated. She has an INR check today and she should discuss the new medication with them today to make sure they account for any adjustments.           FUTURE APPOINTMENTS:       - Follow-up visit in 2 weeks.    No follow-ups on file.    Katlyn Daniels MD  [facility]    Subjective     Nursing Notes:   Dilma Malin CMA  3/29/2022 11:41 AM  Signed  Chief Complaint   Patient presents with     Hypertension     elevated BP, watcing heart rate and this has bee elevated as well, thinks this mat be anxiety related     Pre-visit Screening:  Immunizations:  up to date  Colonoscopy:  is up to date  Mammogram: is up to date  Asthma Action Test/Plan:  NA  PHQ9:  NA  GAD7:  NA  Questioned patient about current smoking habits Pt. has never smoked.  Ok to leave detailed message on voice mail for today's visit only Yes, phone # [phone]           Kimberley Mullins is a 81 year old female who presents to clinic today for the following health issues   HPI     Here about her high blood pressures. At home today it was 150/104. But goes to cardiac rehab twice a week and it was 140/?    Is  "having anxiety and stress and wondering if she should start something. Hasn't been on anything in the past.   Was given a pill--cardiologist. Can't remember the pill.no depression. Feels jittery and worries a lot because of her whole situation.  Has INR done at [facility]. Having trouble getting this adjusted.        Review of Systems   Constitutional, HEENT, cardiovascular, pulmonary, gi and gu systems are negative, except as otherwise noted.      Objective    BP (!) 152/84 (BP Location: Left arm, Patient Position: Sitting, Cuff Size: Adult Regular)   Pulse 97   Temp 97.7  F (36.5  C) (Temporal)   Ht 1.581 m (5' 2.25\")   Wt 60.3 kg (133 lb)   LMP  (LMP Unknown)   SpO2 98%   BMI 24.13 kg/m    Body mass index is 24.13 kg/m .  Physical Exam   GENERAL: healthy, alert and no distress  CV: regular rate and rhythm, normal S1 S2, no S3 or S4, no murmur, click or rub, no peripheral edema and peripheral pulses strong  MS: no gross musculoskeletal defects noted, no edema  NEURO: Normal strength and tone, mentation intact and speech normal  PSYCH: mentation appears normal, affect normal/bright  Chest--surgical scar is appropriately healing mid sternum          "

## 2022-03-29 NOTE — PROGRESS NOTES
ANTICOAGULATION MANAGEMENT     Kimberley Mullins 81 year old female is on warfarin with therapeutic INR result. (Goal INR 2.0-3.0)    Recent labs: (last 7 days)     03/29/22  1356   INR 1.82*       ASSESSMENT     Source(s): Patient/Caregiver Call     Warfarin doses taken: Warfarin taken as instructed  Diet: No new diet changes identified  New illness, injury, or hospitalization: No  Medication/supplement changes: Celexa starts today and explained that this could increase risk for bleeding, but not the INR  Signs or symptoms of bleeding or clotting: No  Previous INR: Subtherapeutic  Additional findings: None       PLAN     Recommended plan for no diet, medication or health factor changes affecting INR     Dosing Instructions:  Increase your warfarin dose (7.1% change) with next INR in 1 week       Summary  As of 3/29/2022    Full warfarin instructions:  5 mg every Wed, Sat; 4 mg all other days   Next INR check:  4/5/2022             Telephone call with Kimberley who verbalizes understanding and agrees to plan and who agrees to plan and repeated back plan correctly    Lab visit scheduled    Education provided: None required    Plan made with [facility] Pharmacist Jennifer Johnson, RN  Anticoagulation Clinic  3/29/2022    _______________________________________________________________________     Anticoagulation Episode Summary     Current INR goal:  2.0-3.0   TTR:  0.0 % (3 wk)   Target end date:  6/8/2022   Send INR reminders to:  [facility]    Indications    S/P ventricular septal defect repair [Z87.74]  VSD (ventricular septal defect) [Q21.0]  Single subsegmental pulmonary embolism without acute cor pulmonale (H) [I26.93]           Comments:           Anticoagulation Care Providers     Provider Role Specialty Phone number    Manoj Hollis PA-C Referring  [phone]    Tere Slaughter NP Referring Cardiovascular & Thoracic Surgery [phone]    Justin Pickering MD Referring Cardiovascular  Disease [phone]

## 2022-03-30 ASSESSMENT — ANXIETY QUESTIONNAIRES: GAD7 TOTAL SCORE: 9

## 2022-03-30 NOTE — OP NOTE
_________________________________________________________________________________________      HEART CATHETERIZATION REPORT  ___________________________________________________________________________________________      PRE-CATHETERIZATION DIAGNOSES:  1. Hypertension, hyperlipidemia  2.  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  Hypothyroidism  4.  Mild coronary artery disease   NAME OF PROCEDURE:   1. Right and left cardiac catheterization  2. Ventricular septal defect device closure  3. Ventricular angiograms    ADAIR FINDINGS / RECOMMENDATIONS  Large left-to-right shunt with tip with a QRS of 2.6-1 across the VSD  Elevated left ventricular filling pressures  Mild pulmonary arterial hypertension  Successful closure of a large ventricular septal defect with Amplatzer VSD occluder with transesophageal echocardiographic guidance     Complications:  None     PHYSICIANS:  1. Attending Interventional Cardiology Staff: Daniel Cerda MD (Primary surgeon)  Ofelia Corcoran (Co surgeon)  2. Imaging Cardiologist: Hugo Vazquez MD  3. Interventional Cardiology Fellow: Yusuf Ortiz MD  Follow up recommendations after VSD Closure   1 week 1 month 6 month 1 year 2 years 5 years 10 years   Physical Exam X X X X X X X   Electrocardiogram X X X X X X X   Echocardiogram  X X X1 X1 X X       If symptoms referable to cardiovascular system: immediate exam, electrocardiogram, echocardiogram    1 If residual leak at 1 - 2 years with persistent LV dilation perform MRI or repeat catheterization        BRIEF CLINICAL HISTORY:     Ms. Ames is a 81 female with new onset heart failure due to ventricular septal defect who presents on an elective inpatient basis for VSD closure.    PROCEDURES:  The patient was brought to the catheterization laboratory. She received general anesthesia, was intubated, and was breathing room air. He was prepped and draped in the standard sterile fashion. The patient was  re-identified and procedural time-out completed.     Vascular access was obtained using the modified Seldinger technique with ultrasound guidance. A 6 Fr sheath was placed in the right femoral vein and a 7 Fr sheath was placed in the RFA in the 8 Amharic sheath was placed in the right internal jugular vein. Heparin was administered throughout the case to maintain ACTs > 200 msec.    A standard right heart catheterization was performed. A 7 Fr wedge catheter was advanced from the right femoral vein into the superior vena cava for hemoximetry measurements. The catheter was advanced to the low superior vena cava, right atrium, right ventricle, right pulmonary artery, and  right pulmonary artery wedge position for hemoximetry and pressure measurements. A 5 Fr pigtail catheter was advanced to the left ventricle for measurement.      Angiography and interventions were performed as described below.     The patient tolerated the procedure well without complications. Lines were removed and hemostasis achieved with the application of light pressure. The patient was transported to PACU for post-procedure monitoring and recovery and then to Unit 6 for overnight observation.. The findings were discussed with the patient s family and treating physicians.     HEMODYNAMICS:  Baseline hemodynamics were performed with the patient intubated, ventilated, and in room air. A baseline cardiac output was obtained using the Sky s principle with assumed VO2 and this was used to calculate oxygen consumption.    Systemic saturation was low normal. Mixed venous saturation was low.  There was significant increase in saturations through the right heart suggesting large left-to-right shunt.  Wedge pressures and left ventricular end-diastolic pressures were elevated secondary to volume overload.  There was no gradients to suggest any significant obstruction to either the right of the left ventricular outflow tracts.    RA 11/11/10  RV 40/11  PA  "40/17/28  PCW 23/27/17  LV 90/20  Qp/Qs: 2.6  SVC 64%  RA 65%  PA sat 87%  Ao sat 99%    ANGIOGRAPHY:     1. Left ventriculogram:  A power assisted angiogram of the left ventricle was performed in Belarusian/Cranial projections. A pigtail catheter is noted in the left ventricle.  There is a large ventricular septal defect noted.  There is a large left to right shunt.    2. Post VSD device placement: Following placement of the Amplatz septal occluder device there is trivial residual flow from left to right across the device. The device is well seated with no impingement on the aortic valve or mitral chordae.         INTERVENTION:    Details: Following RCFV, RCFA, and RIJ access, heparin was administered to achieve a goal ACT > 250 seconds.  KATIE was used for ultrasound guidance throughout the procedure.  A 5Fr Woods catheter was used to obtain right heart pressures and oxygen saturations up to the pulmonary artery from RIJ access. A 6Fr Pigtail catheter was introduced in the left ventricle over a 0.035\" wire and used to perform left ventriculogram in Belarusian cranial projection. The pigtail catheter was exchanged over the wire for a 6Fr JR4 catheter. Standard 0.035\" and 0.035\" noodlewire were not successful in crossing the defect. A 0.035\" baby J guidewire successfully crossed from LV to RV and was positioned in the pulmonary artery. JR4 was advanced over the wire to the PA. Baby J glidewire was exchanged for a noodlewire in the PA. An exchange wire was used to remove the 5Fr Woods catheter from the RIJ. A 20mm gooseneck snare system was used to snare the distal end of the noodlewire and was externalized from the RIJ access site. The 8Fr short sheath was removed over the wire and exchanged for a 8Fr 180degree TorqVue delivery system. The delivery system was advanced over the wire while the JR4 was pulled back from the RFA site in a \"kissing catheter\" technique through the VSD. After entering the LV with the TorqVue catheter, " the JR4 and wire were removed. A 12mm Amplatz VSD occluder device was placed through the delivery sheath and positioned across the VSD. Position was confirmed with KATIE and fluoroscopy and was deployed. During pullback, the device was brought inadvertently into the RV and could not be readvanced into the LV. The device was removed intact from the RIJ delivery sheath. The VSD was crossed again from LV with a 4Fr JR4 catheter and noodlewire in the PA. The wire was snared and externalized from RIJ access site. The TorqVue sheath dilator was reintroduced and brought back across the VSD into the LV. A 16mm VSD occluder device was introduced and LV disk was exposed under KATIE and fluoroscopic guidance. The device was pulled back across the septum and RV disk was successfully deployed under fluoroscopic guidance. Device positioning was confirmed with both KATIE and fluoroscopy. LV angiogram was performed, confirming improvement in left to right shunt. The device was successfully released. Protamine was administered. The delivery system was successfully removed from RIJ and manual pressure held. RFV catheter was removed and manual pressure held. RFA sheath was removed and 8Fr AngioSeal deployed.    Result: Post deployment angiography showed good occlusion of VSD with tiny residual shunting.       Due to the complex nature of the procedure, and lack of a cane he is proficient with this procedure, the attending interventional cardiologists were present for this procedure.  The primary surgeon was , see separate note.. As a co-surgeon, I scrubbed in and assisted with the procedure, specifically, I was involved in planning of the procedure, crossing the ventricular septal defect from the left ventricular side, making through and through arteriovenous loop, delivery of the device and its assessment.    Teaching physician was present for the entire procedure, including angiography, and agrees with  interpretation.            Ofelia Corcoran MD. [facility]  Director, Pediatric Interventional Cardiology

## 2022-03-31 ENCOUNTER — TELEPHONE (OUTPATIENT)
Dept: ANTICOAGULATION | Facility: CLINIC | Age: 82
End: 2022-03-31
Payer: COMMERCIAL

## 2022-03-31 NOTE — TELEPHONE ENCOUNTER
3/31/22    Kimberley calling.  She had a nose bleed and some blood in mucous yesterday and today.  She is concerned about starting Celexa.    Medication list is short.  She takes Metoprolol, Warfarin, Celexa, and a baby ASA.    Reviewed Tylenol use.  Takes one low dose in Am and Pm daily.    Sent message to Dr. Daniels to advise.    GERBER Hampton      Warning:  Concurrent use of CITALOPRAM and ANTICOAGULANTS may result in an increased risk of bleeding.      Clinical Management:  When citalopram and an anticoagulant are given concurrently, monitor patients for signs of increased bleeding. Patients who are taking warfarin should be monitored closely for altered anticoagulant effects, including increased bleeding, when citalopram therapy is initiated or discontinued (Prod Info CELEXA  oral tablets, solution, 2007).

## 2022-04-01 ENCOUNTER — TELEPHONE (OUTPATIENT)
Dept: FAMILY MEDICINE | Facility: CLINIC | Age: 82
End: 2022-04-01

## 2022-04-01 NOTE — TELEPHONE ENCOUNTER
Spoke with pt. She stated had only taken 1 dose of Celexa. Bloody nose lasted less than 30 min and was stopped fairly easily. We discussed how she has some sinus issues. Advised her to take Mucinex, saline nasal, spray, add humidifier, and add vaseline to nostrils for dryness. Advised her to restart Celexa as most likely not the cause after 1 dose. She will restart today and monitor for ongoing nose bleeds. If occurs frequently will most likely switch to Wellbutrin per KEP.

## 2022-04-01 NOTE — TELEPHONE ENCOUNTER
----- Message from Katlyn Daniels MD sent at 3/31/2022 12:11 PM CDT -----  Regarding: RE: Reaching out from the Coumadin clinic  Please put this into a phone message and call her to discuss. How much blood? Does she need to be seen? Go to the ER? She just started celexa so I don't think this could be causing this. And her INR was 1.82. this is being monitored by the INR clinic.  I will likely not be checking messages again today since I am off, if there is anything urgent.  ----- Message -----  From: Freddy Miller RN  Sent: 3/31/2022  10:49 AM CDT  To: Katlyn Daniels MD  Subject: Reaching out from the Coumadin clinic            Dr. Daniels,    I'm reaching out from the Coumadin clinic.  Kimberley states she had a bloody nose yesterday, and has had blood in mucous this morning.  She took (1) dose of Celexa yesterday and is concerned.  She has not taken her morning dose of Celexa today 3/31.    Could you please advise?     Thanks!!  Freddy MANJARREZ RN

## 2022-04-05 ENCOUNTER — LAB (OUTPATIENT)
Dept: LAB | Facility: CLINIC | Age: 82
End: 2022-04-05
Payer: COMMERCIAL

## 2022-04-05 ENCOUNTER — ANTICOAGULATION THERAPY VISIT (OUTPATIENT)
Dept: ANTICOAGULATION | Facility: CLINIC | Age: 82
End: 2022-04-05

## 2022-04-05 ENCOUNTER — HOSPITAL ENCOUNTER (OUTPATIENT)
Dept: CARDIAC REHAB | Facility: CLINIC | Age: 82
Discharge: HOME | End: 2022-04-05
Attending: THORACIC SURGERY (CARDIOTHORACIC VASCULAR SURGERY)
Payer: COMMERCIAL

## 2022-04-05 DIAGNOSIS — I26.93 SINGLE SUBSEGMENTAL PULMONARY EMBOLISM WITHOUT ACUTE COR PULMONALE (H): ICD-10-CM

## 2022-04-05 DIAGNOSIS — R79.1 ABNORMAL COAGULATION PROFILE: ICD-10-CM

## 2022-04-05 DIAGNOSIS — Z87.74 S/P VENTRICULAR SEPTAL DEFECT REPAIR: Primary | ICD-10-CM

## 2022-04-05 DIAGNOSIS — Q21.0 VSD (VENTRICULAR SEPTAL DEFECT): ICD-10-CM

## 2022-04-05 LAB — INR PPP: 1.92 (ref 0.85–1.15)

## 2022-04-05 PROCEDURE — G0239 OTH RESP PROC, GROUP: HCPCS

## 2022-04-05 PROCEDURE — 85610 PROTHROMBIN TIME: CPT

## 2022-04-05 PROCEDURE — 36415 COLL VENOUS BLD VENIPUNCTURE: CPT

## 2022-04-05 NOTE — PROGRESS NOTES
ANTICOAGULATION MANAGEMENT     Kimberley Mullins 81 year old female is on warfarin with subtherapeutic INR result. (Goal INR 2.0-3.0)    Recent labs: (last 7 days)     04/05/22  1351   INR 1.92*       ASSESSMENT     Source(s): Chart Review and Patient/Caregiver Call     Warfarin doses taken: Warfarin taken as instructed  Diet: No new diet changes identified  New illness, injury, or hospitalization: No  Medication/supplement changes: None noted  Signs or symptoms of bleeding or clotting: No  Previous INR: Subtherapeutic  Additional findings: None       PLAN     Recommended plan for no diet, medication or health factor changes affecting INR     Dosing Instructions: Increase your warfarin dose (7% change) with next INR in 1 week       Summary  As of 4/5/2022    Full warfarin instructions:  4 mg every Sun, Tue, Thu; 5 mg all other days   Next INR check:  4/12/2022             Telephone call with Kimberley who verbalizes understanding and agrees to plan    Lab visit scheduled    Education provided: Goal range and significance of current result    Plan made per [facility] anticoagulation protocol    Curtis Hope RN  Anticoagulation Clinic  4/5/2022    _______________________________________________________________________     Anticoagulation Episode Summary     Current INR goal:  2.0-3.0   TTR:  0.0 % (4 wk)   Target end date:  6/8/2022   Send INR reminders to:  [facility]    Indications    S/P ventricular septal defect repair [Z87.74]  VSD (ventricular septal defect) [Q21.0]  Single subsegmental pulmonary embolism without acute cor pulmonale (H) [I26.93]           Comments:           Anticoagulation Care Providers     Provider Role Specialty Phone number    Manoj Hollis PA-C Referring  [phone]    Tere Slaughter NP Referring Cardiovascular & Thoracic Surgery [phone]    Justin Pickering MD Referring Cardiovascular Disease [phone]

## 2022-04-07 ENCOUNTER — HOSPITAL ENCOUNTER (OUTPATIENT)
Dept: CARDIAC REHAB | Facility: CLINIC | Age: 82
Discharge: HOME | End: 2022-04-07
Attending: THORACIC SURGERY (CARDIOTHORACIC VASCULAR SURGERY)
Payer: COMMERCIAL

## 2022-04-07 PROCEDURE — G0239 OTH RESP PROC, GROUP: HCPCS

## 2022-04-09 DIAGNOSIS — F41.9 ANXIETY: ICD-10-CM

## 2022-04-11 RX ORDER — CITALOPRAM HYDROBROMIDE 10 MG/1
10 TABLET ORAL DAILY
Qty: 30 TABLET | Refills: 0 | COMMUNITY
Start: 2022-04-11

## 2022-04-11 NOTE — TELEPHONE ENCOUNTER
Kimberley Mullins is requesting a refill of:    Refused Prescriptions:                       Disp   Refills    citalopram (CELEXA) 10 MG tablet [Pharmacy*30 tab*0        Sig: TAKE 1 TABLET (10 MG) BY MOUTH DAILY.  Refused By: CLEO CABEZAS  Reason for Refusal: Patient needs appointment    New medication, was asked to return in 2 weeks, due for OV.

## 2022-04-12 ENCOUNTER — ANTICOAGULATION THERAPY VISIT (OUTPATIENT)
Dept: ANTICOAGULATION | Facility: CLINIC | Age: 82
End: 2022-04-12

## 2022-04-12 ENCOUNTER — HOSPITAL ENCOUNTER (OUTPATIENT)
Dept: CARDIAC REHAB | Facility: CLINIC | Age: 82
Discharge: HOME | End: 2022-04-12
Attending: THORACIC SURGERY (CARDIOTHORACIC VASCULAR SURGERY)
Payer: COMMERCIAL

## 2022-04-12 ENCOUNTER — LAB (OUTPATIENT)
Dept: LAB | Facility: CLINIC | Age: 82
End: 2022-04-12
Payer: COMMERCIAL

## 2022-04-12 DIAGNOSIS — Z87.74 S/P VENTRICULAR SEPTAL DEFECT REPAIR: Primary | ICD-10-CM

## 2022-04-12 DIAGNOSIS — I26.93 SINGLE SUBSEGMENTAL PULMONARY EMBOLISM WITHOUT ACUTE COR PULMONALE (H): ICD-10-CM

## 2022-04-12 DIAGNOSIS — R79.1 ABNORMAL COAGULATION PROFILE: ICD-10-CM

## 2022-04-12 DIAGNOSIS — Q21.0 VSD (VENTRICULAR SEPTAL DEFECT): ICD-10-CM

## 2022-04-12 LAB — INR PPP: 2.21 (ref 0.85–1.15)

## 2022-04-12 PROCEDURE — 36415 COLL VENOUS BLD VENIPUNCTURE: CPT

## 2022-04-12 PROCEDURE — 85610 PROTHROMBIN TIME: CPT

## 2022-04-12 PROCEDURE — G0239 OTH RESP PROC, GROUP: HCPCS

## 2022-04-12 NOTE — PROGRESS NOTES
ANTICOAGULATION MANAGEMENT     Kimberley Mullins 81 year old female is on warfarin with therapeutic INR result. (Goal INR 2.0-3.0)    Recent labs: (last 7 days)     04/12/22  1330   INR 2.21*       ASSESSMENT     Source(s): Patient/Caregiver Call     Warfarin doses taken: Warfarin taken as instructed  Diet: No new diet changes identified  New illness, injury, or hospitalization: No  Medication/supplement changes: None noted  Signs or symptoms of bleeding or clotting: No  Previous INR: Subtherapeutic  Additional findings: None       PLAN     Recommended plan for no diet, medication or health factor changes affecting INR     Dosing Instructions: continue your current warfarin dose with next INR in 2 weeks       Summary  As of 4/12/2022    Full warfarin instructions:  4 mg every Sun, Tue, Thu; 5 mg all other days   Next INR check:  4/26/2022             Telephone call with Kimberley who verbalizes understanding and agrees to plan and who agrees to plan and repeated back plan correctly    Lab visit scheduled    Education provided: None required    Plan made per [facility] anticoagulation protocol    Janine Johnson RN  Anticoagulation Clinic  4/12/2022    _______________________________________________________________________     Anticoagulation Episode Summary     Current INR goal:  2.0-3.0   TTR:  14.2 % (1.2 mo)   Target end date:  6/8/2022   Send INR reminders to:  [facility]    Indications    S/P ventricular septal defect repair [Z87.74]  VSD (ventricular septal defect) [Q21.0]  Single subsegmental pulmonary embolism without acute cor pulmonale (H) [I26.93]           Comments:           Anticoagulation Care Providers     Provider Role Specialty Phone number    Manoj Hollis PA-C Referring  [phone]    Tere Slaughter NP Referring Cardiovascular & Thoracic Surgery [phone]    Justin Pickering MD Referring Cardiovascular Disease [phone]

## 2022-04-14 ENCOUNTER — HOSPITAL ENCOUNTER (OUTPATIENT)
Dept: CARDIAC REHAB | Facility: CLINIC | Age: 82
Discharge: HOME | End: 2022-04-14
Attending: THORACIC SURGERY (CARDIOTHORACIC VASCULAR SURGERY)
Payer: COMMERCIAL

## 2022-04-14 PROCEDURE — G0239 OTH RESP PROC, GROUP: HCPCS

## 2022-04-18 ENCOUNTER — TELEPHONE (OUTPATIENT)
Dept: CARDIOLOGY | Facility: CLINIC | Age: 82
End: 2022-04-18
Payer: COMMERCIAL

## 2022-04-18 DIAGNOSIS — I26.93 SINGLE SUBSEGMENTAL PULMONARY EMBOLISM WITHOUT ACUTE COR PULMONALE (H): ICD-10-CM

## 2022-04-18 RX ORDER — WARFARIN SODIUM 2 MG/1
TABLET ORAL
Qty: 180 TABLET | Refills: 1 | Status: SHIPPED | OUTPATIENT
Start: 2022-04-18 | End: 2022-10-19

## 2022-04-18 NOTE — TELEPHONE ENCOUNTER
ANTICOAGULATION MANAGEMENT:  Medication Refill    Anticoagulation Summary  As of 4/12/2022    Warfarin maintenance plan:  4 mg (2 mg x 2) every Sun, Tue, Thu; 5 mg (2 mg x 2.5) all other days   Next INR check:  4/26/2022   Target end date:  6/8/2022    Indications    S/P ventricular septal defect repair [Z87.74]  VSD (ventricular septal defect) [Q21.0]  Single subsegmental pulmonary embolism without acute cor pulmonale (H) [I26.93]             Anticoagulation Care Providers     Provider Role Specialty Phone number    Manoj Hollis PA-C Referring  [phone]    Tere Slaughter NP Referring Cardiovascular & Thoracic Surgery [phone]    Justin Pickering MD Referring Cardiovascular Disease [phone]          Visit with referring provider/group within last year: Yes    [facility] referral signed within last year: Yes    Kimberley meets all criteria for refill (current [facility] referral, office visit with referring provider/group in last year, lab monitoring up to date or not exceeding 2 weeks overdue). Rx instructions and quantity supplied updated to match patient's current dosing plan. Warfarin 90 day supply with 1 refill granted per [facility] protocol     Meli Watson RN  Anticoagulation Clinic

## 2022-04-18 NOTE — TELEPHONE ENCOUNTER
[facility] Call Center    Phone Message    May a detailed message be left on voicemail: yes     Reason for Call: Medication Refill Request    Has the patient contacted the pharmacy for the refill? Yes   Name of medication being requested: warfarin ANTICOAGULANT (COUMADIN) 2 MG tablet  Provider who prescribed the medication: Holli  Pharmacy:    [address] [address] [address]      Date medication is needed: As soon as possible        Action Taken: Message routed to:  Other: Cardiology    Travel Screening: Not Applicable

## 2022-04-20 ENCOUNTER — HOSPITAL ENCOUNTER (OUTPATIENT)
Dept: CARDIAC REHAB | Facility: CLINIC | Age: 82
Discharge: HOME | End: 2022-04-20
Attending: THORACIC SURGERY (CARDIOTHORACIC VASCULAR SURGERY)
Payer: COMMERCIAL

## 2022-04-20 ENCOUNTER — OFFICE VISIT (OUTPATIENT)
Dept: FAMILY MEDICINE | Facility: CLINIC | Age: 82
End: 2022-04-20

## 2022-04-20 VITALS
TEMPERATURE: 98.6 F | BODY MASS INDEX: 23.92 KG/M2 | DIASTOLIC BLOOD PRESSURE: 82 MMHG | SYSTOLIC BLOOD PRESSURE: 132 MMHG | WEIGHT: 130 LBS | OXYGEN SATURATION: 96 % | HEART RATE: 98 BPM | HEIGHT: 62 IN

## 2022-04-20 DIAGNOSIS — I10 BENIGN ESSENTIAL HYPERTENSION: ICD-10-CM

## 2022-04-20 DIAGNOSIS — F41.9 ANXIETY: ICD-10-CM

## 2022-04-20 DIAGNOSIS — E78.00 PURE HYPERCHOLESTEROLEMIA: ICD-10-CM

## 2022-04-20 DIAGNOSIS — Z87.74 S/P VSD REPAIR: ICD-10-CM

## 2022-04-20 DIAGNOSIS — R06.09 DYSPNEA ON EXERTION: ICD-10-CM

## 2022-04-20 DIAGNOSIS — Z87.74 S/P VENTRICULAR SEPTAL DEFECT REPAIR: ICD-10-CM

## 2022-04-20 DIAGNOSIS — Q21.0 VSD (VENTRICULAR SEPTAL DEFECT): ICD-10-CM

## 2022-04-20 DIAGNOSIS — I26.93 SINGLE SUBSEGMENTAL PULMONARY EMBOLISM WITHOUT ACUTE COR PULMONALE (H): ICD-10-CM

## 2022-04-20 DIAGNOSIS — R79.1 ABNORMAL COAGULATION PROFILE: ICD-10-CM

## 2022-04-20 DIAGNOSIS — I51.89 OTHER ILL-DEFINED HEART DISEASES: ICD-10-CM

## 2022-04-20 DIAGNOSIS — E78.5 HYPERLIPIDEMIA LDL GOAL <100: ICD-10-CM

## 2022-04-20 DIAGNOSIS — E03.9 ACQUIRED HYPOTHYROIDISM: ICD-10-CM

## 2022-04-20 PROCEDURE — G0238 OTH RESP PROC, INDIV: HCPCS

## 2022-04-20 PROCEDURE — 99213 OFFICE O/P EST LOW 20 MIN: CPT | Performed by: FAMILY MEDICINE

## 2022-04-20 ASSESSMENT — ANXIETY QUESTIONNAIRES
GAD7 TOTAL SCORE: 2
3. WORRYING TOO MUCH ABOUT DIFFERENT THINGS: SEVERAL DAYS
2. NOT BEING ABLE TO STOP OR CONTROL WORRYING: NOT AT ALL
IF YOU CHECKED OFF ANY PROBLEMS ON THIS QUESTIONNAIRE, HOW DIFFICULT HAVE THESE PROBLEMS MADE IT FOR YOU TO DO YOUR WORK, TAKE CARE OF THINGS AT HOME, OR GET ALONG WITH OTHER PEOPLE: NOT DIFFICULT AT ALL
7. FEELING AFRAID AS IF SOMETHING AWFUL MIGHT HAPPEN: NOT AT ALL
6. BECOMING EASILY ANNOYED OR IRRITABLE: NOT AT ALL
5. BEING SO RESTLESS THAT IT IS HARD TO SIT STILL: NOT AT ALL
1. FEELING NERVOUS, ANXIOUS, OR ON EDGE: SEVERAL DAYS

## 2022-04-20 ASSESSMENT — PATIENT HEALTH QUESTIONNAIRE - PHQ9
SUM OF ALL RESPONSES TO PHQ QUESTIONS 1-9: 2
5. POOR APPETITE OR OVEREATING: NOT AT ALL

## 2022-04-20 NOTE — NURSING NOTE
Chief Complaint   Patient presents with     Recheck Medication     Non-fasting today, refill medications      Pre-visit Screening:  Immunizations:  up to date  Colonoscopy:  is up to date  Mammogram: is up to date  Asthma Action Test/Plan:  NA  PHQ9:  Done today  GAD7:  Done today  Questioned patient about current smoking habits Pt. has never smoked.  Ok to leave detailed message on voice mail for today's visit only Yes, phone # [phone]

## 2022-04-21 ASSESSMENT — ANXIETY QUESTIONNAIRES: GAD7 TOTAL SCORE: 2

## 2022-04-22 DIAGNOSIS — I10 BENIGN ESSENTIAL HYPERTENSION: ICD-10-CM

## 2022-04-22 DIAGNOSIS — E03.9 ACQUIRED HYPOTHYROIDISM: ICD-10-CM

## 2022-04-22 DIAGNOSIS — E78.00 PURE HYPERCHOLESTEROLEMIA: ICD-10-CM

## 2022-04-22 LAB
BUN SERPL-MCNC: 8 MG/DL (ref 7–25)
BUN/CREATININE RATIO: 10.4 (ref 6–22)
CALCIUM SERPL-MCNC: 9.8 MG/DL (ref 8.6–10.3)
CHLORIDE SERPLBLD-SCNC: 98.7 MMOL/L (ref 98–110)
CHOLEST SERPL-MCNC: 149 MG/DL (ref 0–199)
CHOLEST/HDLC SERPL: 3 {RATIO} (ref 0–5)
CREAT SERPL-MCNC: 0.77 MG/DL (ref 0.6–1.3)
GLUCOSE SERPL-MCNC: 92 MG/DL (ref 60–99)
HDLC SERPL-MCNC: 59 MG/DL (ref 40–150)
LDLC SERPL CALC-MCNC: 71 MG/DL (ref 0–130)
POTASSIUM SERPL-SCNC: 4.43 MMOL/L (ref 3.5–5.3)
SODIUM SERPL-SCNC: 135 MMOL/L (ref 135–146)
TRIGL SERPL-MCNC: 97 MG/DL (ref 0–149)

## 2022-04-22 PROCEDURE — 80061 LIPID PANEL: CPT | Performed by: FAMILY MEDICINE

## 2022-04-22 PROCEDURE — 80048 BASIC METABOLIC PNL TOTAL CA: CPT | Performed by: FAMILY MEDICINE

## 2022-04-22 PROCEDURE — 36415 COLL VENOUS BLD VENIPUNCTURE: CPT | Performed by: FAMILY MEDICINE

## 2022-04-22 RX ORDER — METOPROLOL TARTRATE 25 MG/1
12.5 TABLET, FILM COATED ORAL 2 TIMES DAILY
Qty: 90 TABLET | Refills: 1 | Status: SHIPPED | OUTPATIENT
Start: 2022-04-22 | End: 2022-12-20

## 2022-04-22 RX ORDER — CITALOPRAM HYDROBROMIDE 10 MG/1
10 TABLET ORAL DAILY
Qty: 90 TABLET | Refills: 1 | Status: SHIPPED | OUTPATIENT
Start: 2022-04-22 | End: 2022-10-19

## 2022-04-22 RX ORDER — SIMVASTATIN 20 MG
TABLET ORAL
Qty: 90 TABLET | Refills: 3 | Status: SHIPPED | OUTPATIENT
Start: 2022-04-22 | End: 2023-05-16

## 2022-04-22 RX ORDER — LEVOTHYROXINE SODIUM 50 UG/1
50 TABLET ORAL DAILY
Qty: 90 TABLET | Refills: 3 | Status: SHIPPED | OUTPATIENT
Start: 2022-04-22 | End: 2023-05-16

## 2022-04-23 LAB — TSH SERPL-ACNC: 3.44 MIU/L (ref 0.4–4.5)

## 2022-04-26 ENCOUNTER — ANTICOAGULATION THERAPY VISIT (OUTPATIENT)
Dept: ANTICOAGULATION | Facility: CLINIC | Age: 82
End: 2022-04-26

## 2022-04-26 ENCOUNTER — LAB (OUTPATIENT)
Dept: LAB | Facility: CLINIC | Age: 82
End: 2022-04-26
Payer: COMMERCIAL

## 2022-04-26 DIAGNOSIS — Z87.74 S/P VENTRICULAR SEPTAL DEFECT REPAIR: Primary | ICD-10-CM

## 2022-04-26 DIAGNOSIS — I26.93 SINGLE SUBSEGMENTAL PULMONARY EMBOLISM WITHOUT ACUTE COR PULMONALE (H): ICD-10-CM

## 2022-04-26 DIAGNOSIS — Q21.0 VSD (VENTRICULAR SEPTAL DEFECT): ICD-10-CM

## 2022-04-26 DIAGNOSIS — R79.1 ABNORMAL COAGULATION PROFILE: ICD-10-CM

## 2022-04-26 LAB — INR PPP: 1.73 (ref 0.85–1.15)

## 2022-04-26 PROCEDURE — 85610 PROTHROMBIN TIME: CPT

## 2022-04-26 PROCEDURE — 36415 COLL VENOUS BLD VENIPUNCTURE: CPT

## 2022-04-26 NOTE — PROGRESS NOTES
ANTICOAGULATION MANAGEMENT     Kimberley Mullins 81 year old female is on warfarin with subtherapeutic INR result. (Goal INR 2.0-3.0)    Recent labs: (last 7 days)     04/26/22  1016   INR 1.73*       ASSESSMENT     Source(s): Patient/Caregiver Call     Warfarin doses taken: Warfarin taken as instructed  Diet: Increased greens/vitamin K in diet; plans to resume previous intake  New illness, injury, or hospitalization: No  Medication/supplement changes: None noted  Signs or symptoms of bleeding or clotting: No  Previous INR: Therapeutic last visit; previously outside of goal range  Additional findings: None       PLAN     Recommended plan for temporary change(s) affecting INR     Dosing Instructions: booster dose then continue your current warfarin dose with next INR in 2 weeks       Summary  As of 4/26/2022    Full warfarin instructions:  4/26: 6 mg; Otherwise 4 mg every Sun, Tue, Thu; 5 mg all other days   Next INR check:  5/10/2022             Telephone call with Kimberley who verbalizes understanding and agrees to plan and who agrees to plan and repeated back plan correctly    Lab visit scheduled    Education provided: Importance of consistent vitamin K intake, Impact of vitamin K foods on INR, Vitamin K content of foods and Potential interaction between warfarin and alcohol    Plan made per [facility] anticoagulation protocol    Janine Johnson RN  Anticoagulation Clinic  4/26/2022    _______________________________________________________________________     Anticoagulation Episode Summary     Current INR goal:  2.0-3.0   TTR:  22.5 % (1.6 mo)   Target end date:  6/8/2022   Send INR reminders to:  [facility]    Indications    S/P ventricular septal defect repair [Z87.74]  VSD (ventricular septal defect) [Q21.0]  Single subsegmental pulmonary embolism without acute cor pulmonale (H) [I26.93]           Comments:           Anticoagulation Care Providers     Provider Role Specialty Phone number    Manoj Hollis PA-C  Referring  [phone]    Tere Slaughter NP Referring Cardiovascular & Thoracic Surgery [phone]    Justin Pickering MD Referring Cardiovascular Disease [phone]

## 2022-05-10 ENCOUNTER — LAB (OUTPATIENT)
Dept: LAB | Facility: CLINIC | Age: 82
End: 2022-05-10
Payer: COMMERCIAL

## 2022-05-10 DIAGNOSIS — R79.1 ABNORMAL COAGULATION PROFILE: ICD-10-CM

## 2022-05-10 PROCEDURE — 85610 PROTHROMBIN TIME: CPT

## 2022-05-10 PROCEDURE — 36415 COLL VENOUS BLD VENIPUNCTURE: CPT

## 2022-05-11 ENCOUNTER — ANTICOAGULATION THERAPY VISIT (OUTPATIENT)
Dept: ANTICOAGULATION | Facility: CLINIC | Age: 82
End: 2022-05-11
Payer: COMMERCIAL

## 2022-05-11 DIAGNOSIS — Q21.0 VSD (VENTRICULAR SEPTAL DEFECT): ICD-10-CM

## 2022-05-11 DIAGNOSIS — Z87.74 S/P VENTRICULAR SEPTAL DEFECT REPAIR: Primary | ICD-10-CM

## 2022-05-11 DIAGNOSIS — I26.93 SINGLE SUBSEGMENTAL PULMONARY EMBOLISM WITHOUT ACUTE COR PULMONALE (H): ICD-10-CM

## 2022-05-11 LAB — INR PPP: 1.75 (ref 0.85–1.15)

## 2022-05-11 NOTE — PROGRESS NOTES
ANTICOAGULATION MANAGEMENT     Kimberley Mullins 81 year old female is on warfarin with subtherapeutic INR result. (Goal INR 2.0-3.0)    Recent labs: (last 7 days)     05/10/22  0904   INR 1.75*       ASSESSMENT     Source(s): Chart Review and Patient/Caregiver Call     Warfarin doses taken: Warfarin taken as instructed  Diet: No new diet changes identified  New illness, injury, or hospitalization: No  Medication/supplement changes: None noted  Signs or symptoms of bleeding or clotting: No  Previous INR: Subtherapeutic  Additional findings: None       PLAN     Recommended plan for no diet, medication or health factor changes affecting INR     Dosing Instructions: increase the warfarin dose 5.6% with next INR in 2 weeks       Summary  As of 5/11/2022    Full warfarin instructions:  4 mg every Tue; 5 mg all other days   Next INR check:  5/24/2022             Telephone call with Kimberley who verbalizes understanding and agrees to plan and who agrees to plan and repeated back plan correctly    Lab visit scheduled    Education provided: Goal range and significance of current result, Importance of therapeutic range, Importance of following up at instructed interval and Importance of taking warfarin as instructed    Plan made per [facility] anticoagulation protocol    Nivia Abarca, RN  Anticoagulation Clinic  5/11/2022    _______________________________________________________________________     Anticoagulation Episode Summary     Current INR goal:  2.0-3.0   TTR:  17.6 % (2.1 mo)   Target end date:  6/8/2022   Send INR reminders to:  [facility]    Indications    S/P ventricular septal defect repair [Z87.74]  VSD (ventricular septal defect) [Q21.0]  Single subsegmental pulmonary embolism without acute cor pulmonale (H) [I26.93]           Comments:           Anticoagulation Care Providers     Provider Role Specialty Phone number    Manoj Hollis PA-C Referring  [phone]    Tere Slaughter NP Referring  Cardiovascular & Thoracic Surgery [phone]    Justin Pickering MD Referring Cardiovascular Disease [phone]

## 2022-05-24 ENCOUNTER — LAB (OUTPATIENT)
Dept: LAB | Facility: CLINIC | Age: 82
End: 2022-05-24
Payer: COMMERCIAL

## 2022-05-24 DIAGNOSIS — R79.1 ABNORMAL COAGULATION PROFILE: ICD-10-CM

## 2022-05-24 LAB — INR PPP: 1.65 (ref 0.85–1.15)

## 2022-05-24 PROCEDURE — 85610 PROTHROMBIN TIME: CPT

## 2022-05-24 PROCEDURE — 36415 COLL VENOUS BLD VENIPUNCTURE: CPT

## 2022-05-25 ENCOUNTER — ANTICOAGULATION THERAPY VISIT (OUTPATIENT)
Dept: ANTICOAGULATION | Facility: CLINIC | Age: 82
End: 2022-05-25
Payer: COMMERCIAL

## 2022-05-25 DIAGNOSIS — Q21.0 VSD (VENTRICULAR SEPTAL DEFECT): ICD-10-CM

## 2022-05-25 DIAGNOSIS — Z87.74 S/P VENTRICULAR SEPTAL DEFECT REPAIR: Primary | ICD-10-CM

## 2022-05-25 DIAGNOSIS — I26.93 SINGLE SUBSEGMENTAL PULMONARY EMBOLISM WITHOUT ACUTE COR PULMONALE (H): ICD-10-CM

## 2022-05-25 NOTE — PROGRESS NOTES
ANTICOAGULATION MANAGEMENT     Kimberley Mullins 81 year old female is on warfarin with subtherapeutic INR result. (Goal INR 2.0-3.0)    Recent labs: (last 7 days)     05/24/22  0943   INR 1.65*       ASSESSMENT     Source(s): Chart Review and Patient/Caregiver Call     Warfarin doses taken: Warfarin taken as instructed  Diet: No new diet changes identified (does not eat greens)  New illness, injury, or hospitalization: No  Medication/supplement changes: around 5/11/22 she started a multivitamin with 42% vitamin K  Signs or symptoms of bleeding or clotting: No  Previous INR: Subtherapeutic  Additional findings: None       PLAN     Recommended plan for ongoing change(s) affecting INR     Dosing Instructions: booster dose then Increase your warfarin dose (11.8% change) with next INR in 1 week       Summary  As of 5/25/2022    Full warfarin instructions:  5/25: 8 mg; Otherwise 6 mg every Mon, Wed, Fri; 5 mg all other days   Next INR check:  6/1/2022             Telephone call with Kimberley who agrees to plan and repeated back plan correctly    Lab visit scheduled    Education provided: Please call back if any changes to your diet, medications or how you've been taking warfarin and Contact [phone] with any changes, questions or concerns.     Plan made per [facility] anticoagulation protocol    Pippa Morales RN  Anticoagulation Clinic  5/25/2022    _______________________________________________________________________     Anticoagulation Episode Summary     Current INR goal:  2.0-3.0   TTR:  14.4 % (2.6 mo)   Target end date:  6/8/2022   Send INR reminders to:  [facility]    Indications    S/P ventricular septal defect repair [Z87.74]  VSD (ventricular septal defect) [Q21.0]  Single subsegmental pulmonary embolism without acute cor pulmonale (H) [I26.93]           Comments:           Anticoagulation Care Providers     Provider Role Specialty Phone number    Manoj Hollis PA-C Referring  [phone]     Tere Slaughter NP Referring Cardiovascular & Thoracic Surgery [phone]    Justin Pickering MD Referring Cardiovascular Disease [phone]

## 2022-06-01 ENCOUNTER — LAB (OUTPATIENT)
Dept: LAB | Facility: CLINIC | Age: 82
End: 2022-06-01
Payer: COMMERCIAL

## 2022-06-01 ENCOUNTER — ANTICOAGULATION THERAPY VISIT (OUTPATIENT)
Dept: ANTICOAGULATION | Facility: CLINIC | Age: 82
End: 2022-06-01

## 2022-06-01 DIAGNOSIS — I26.93 SINGLE SUBSEGMENTAL PULMONARY EMBOLISM WITHOUT ACUTE COR PULMONALE (H): ICD-10-CM

## 2022-06-01 DIAGNOSIS — Z87.74 S/P VENTRICULAR SEPTAL DEFECT REPAIR: Primary | ICD-10-CM

## 2022-06-01 DIAGNOSIS — R79.1 ABNORMAL COAGULATION PROFILE: ICD-10-CM

## 2022-06-01 DIAGNOSIS — Q21.0 VSD (VENTRICULAR SEPTAL DEFECT): ICD-10-CM

## 2022-06-01 LAB — INR PPP: 1.97 (ref 0.85–1.15)

## 2022-06-01 PROCEDURE — 36415 COLL VENOUS BLD VENIPUNCTURE: CPT

## 2022-06-01 PROCEDURE — 85610 PROTHROMBIN TIME: CPT

## 2022-06-01 NOTE — PROGRESS NOTES
ANTICOAGULATION MANAGEMENT     Kimberley Mullins 81 year old female is on warfarin with subtherapeutic INR result. (Goal INR 2.0-3.0)    Recent labs: (last 7 days)     06/01/22  0850   INR 1.97*       ASSESSMENT     Source(s): Chart Review and Patient/Caregiver Call     Warfarin doses taken: Warfarin taken as instructed  Diet: No new diet changes identified  New illness, injury, or hospitalization: No  Medication/supplement changes: None noted  Signs or symptoms of bleeding or clotting: No  Previous INR: Subtherapeutic  Additional findings: INR trending the right direction. Will leave dose alone and recheck in one week.         PLAN     Recommended plan for no diet, medication or health factor changes affecting INR     Dosing Instructions: continue your current warfarin dose with next INR in 1 week       Summary  As of 6/1/2022    Full warfarin instructions:  6 mg every Mon, Wed, Fri; 5 mg all other days   Next INR check:  6/8/2022             Telephone call with Kimberley who verbalizes understanding and agrees to plan    Lab visit scheduled    Education provided: Goal range and significance of current result    Plan made per [facility] anticoagulation protocol    Sherley Bahena RN  Anticoagulation Clinic  6/1/2022    _______________________________________________________________________     Anticoagulation Episode Summary     Current INR goal:  2.0-3.0   TTR:  13.0 % (2.8 mo)   Target end date:  6/8/2022   Send INR reminders to:  [facility]    Indications    S/P ventricular septal defect repair [Z87.74]  VSD (ventricular septal defect) [Q21.0]  Single subsegmental pulmonary embolism without acute cor pulmonale (H) [I26.93]           Comments:           Anticoagulation Care Providers     Provider Role Specialty Phone number    Manoj Hollis PA-C Referring  [phone]    Tere Slaughter NP Referring Cardiovascular & Thoracic Surgery [phone]    Justin Pickering MD Referring Cardiovascular  Disease [phone]

## 2022-06-04 NOTE — Clinical Note
Potential access sites were evaluated for patency using ultrasound.   The right radial artery was selected. Access was obtained under with Sonosite and Fluoroscopic guidance using a micropuncture 21 guage needle with direct visualization of needle entry.      show

## 2022-06-06 NOTE — PROGRESS NOTES
Chief complaint: Apical Muscular VSD     HPI:     81 year old female with PMHx of apical muscular VSD s/p patch repair who presents today for follow up.    Ms. Mullins presented to PCP clinic on 1/2022 where he was noted to dyspnea, peripheral edema and a new murmur. Patient was sent to [facility] ED where he was found to have a large muscular VSD on echo. She was transferred to [place] where she underwent a RHC with RA:63.6% and a step up to RV the RV of 82.5 suggestive of shunt, along with a QP/QP: 2.14. Coronary angiogram revealed widely patent coronary arteries.  She was then admitted to the Cardiology service where she later underwent a CMRI to assess the VSD0 cMRI revealed a large VSD with left to right shunt. It was determined that the patient would undergoe percutaneous placement of a 14mm Amplatzer closure device. However despite percutaneous intervention, patient still had a high grade shunt and was referred to surgery. She then underwent an apical muscular VSD repair to good results and was discharged with plans to follow up with Dr. Pickering.     Today, she returns back to clinic where she is overall doing well. She states that she is now able to climb up a flight of stairs, which is a significant improvement since after she was diagnosed with a VSD. Patient is able 1-2 walks before developing SOB. Patient does endorse lightheadedness while walking around. Patient denies chest pain.              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     Thyroid disease      Transient global amnesia      Unspecified tinnitus     Tinnitus  "      CURRENT MEDICATIONS:  Current Outpatient Medications   Medication Sig Dispense Refill     acetaminophen (TYLENOL) 325 MG tablet Take 2 tablets (650 mg) by mouth every 6 hours as needed for mild pain or pain 90 tablet 0     aspirin (ASA) 81 MG chewable tablet 1 tablet (81 mg) by Oral or NG Tube route daily 90 tablet 0     citalopram (CELEXA) 10 MG tablet Take 1 tablet (10 mg) by mouth daily 90 tablet 1     levothyroxine (SYNTHROID/LEVOTHROID) 50 MCG tablet Take 1 tablet (50 mcg) by mouth daily 90 tablet 3     metoprolol tartrate (LOPRESSOR) 25 MG tablet Take 0.5 tablets (12.5 mg) by mouth 2 times daily 90 tablet 1     Multiple Vitamins-Minerals (PRESERVISION AREDS PO) Take 1 tablet by mouth 2 times daily       multivitamin, therapeutic (THERA-VIT) TABS tablet Take 1 tablet by mouth daily       simvastatin (ZOCOR) 20 MG tablet TAKE 1 TABLET (20 MG) BY MOUTH AT BEDTIME 90 tablet 3     warfarin ANTICOAGULANT (COUMADIN) 2 MG tablet Take 4 MG ( 2 tablets) Every Sunday, Tuesday and Thursday, and 5 mg( 2.5 tablets )  all other days unless directed otherwise by [facility]. 180 tablet 1     Warfarin Therapy Reminder 1 each continuous prn 1 each 0       ALLERGIES:     Allergies   Allergen Reactions     No Known Allergies        FAMILY HISTORY:  Family History   Problem Relation Age of Onset     Heart Disease Father         MI age 70       SOCIAL HISTORY:  Social History     Tobacco Use     Smoking status: Never Smoker     Smokeless tobacco: Never Used   Substance Use Topics     Alcohol use: Yes     Alcohol/week: 5.8 standard drinks     Types: 7 Standard drinks or equivalent per week     Comment: two to three glasses of wine q day.     Drug use: No       ROS:   A comprehensive 14 point review of systems is negative other than as mentioned in HPI.    Exam:  BP (!) 160/85 (BP Location: Right arm, Patient Position: Right side)   Pulse 82   Ht 1.58 m (5' 2.21\")   Wt 58.2 kg (128 lb 3.2 oz)   LMP  (LMP Unknown)   SpO2 97%   BMI " 23.29 kg/m    GENERAL APPEARANCE: healthy, alert and no distress  EYES: no icterus, no xanthelasmas  ENT: normal palate, mucosa moist, no central cyanosis  NECK: JVP not elevated  RESPIRATORY: lungs clear to auscultation - no rales, rhonchi or wheezes, no use of accessory muscles, no retractions, respirations are unlabored, normal respiratory rate  CARDIOVASCULAR: regular rhythm, normal S1 with physiologic split S2, no S3 or S4 and no murmur, click or rub.  GI: soft, non tender, bowel sounds normal,no abdominal bruits  EXTREMITIES: no edema, no bruits  NEURO: alert and oriented to person/place/time, normal speech, gait and affect  VASC: Radial, dorsalis pedis and posterior tibialis pulses 2+ bilaterally.  SKIN: no ecchymoses, no rashes.  PSYCH: cooperative, affect appropriate.     Labs:  Reviewed.       Testing/Procedures:      Ladan Morgan personally visualized and interpreted:    TTE 6/7/202: LVEF: 55-60%, normal RV function. Small Residual VSD present, color doppler not performed.       CMR 1/26/22: LVEF:55%, Apical Muscular VSD with left to right shunt, moderately dilated RV, no obvious LGE    Coronary angiogram/RHC 1/25/22:  Normal coronary arteries  RHC: RA 8 (v wave to 13) RV 50/7 PA 44/18(27) PCW 16 TDCO/CI 3.9/2.3   Shunt eval (O2 sat): R IJV 63.5% SVC 65.3% IVC 62.8% RA 63.6%   RV 82.5%   PA 80.2% RPA 80.6% RPCW 96% Ao 98% Hgb 11.7   Qp/Qs 2.14     TTE 2/7/22 (read by Dr. Pickering):  Status post removal of Amplatzer device and patch closure of muscular VSD  2/4/22. Some images (images #45-46 and 50-54) are suspicious for a small  residual VSD at the apical margin of the patch. Consider cardiac CTA or MRI  for further evaluation.  Global and regional left ventricular function is normal with an EF of 55-60%.  Mild right ventricular dilation is present. Global right ventricular function  is mildly reduced.  No pericardial effusion is present.     This study was compared with the study from 2/4/22  (intraoperative KATIE) and  2/3/22 (TTE): Possible residual VSD was not seen on the postoperative images  from the intraoperative KATIE, however the ventricular septum is better  interrogated on today's KATIE. Compared with the prior TTE, there has been  interval removal of Amplatzer device and patch closure of VSD with significant  interval reduction in ventricular-level shunting. RV size and function also  appears slightly improved on limited views.    TTE 06/07/22 (reviewed by Dr. Pickering):  Normal LV size and function, LVEF=55-60%.  RV function is normal.  Small residual VSD (~6 mm) at the apical margin of the VSD patch repair, unchanged from 2/3/2022.       Assessment and Plan:     81 year old female with PMHx of apical muscular VSD s/p patch repair who presents today for follow up.    #Apical muscular VSD s/p repair  81 year old female who presented from [facility] with SOB and later found to have a hemodynamically significant VSD. QP:QS at the time was 2.14 on RHC and LHC. Initially underwent percutaneous closure with occluder device, however had significant residual shunt that necessitated VSD repair with patch. Subsequent Echo after surgery revealed a small VSD shunt with no RV dysfunction at the time. TTE today is stable from 2/2022 and shows normal LV size/function.  Her functional capacity is excellent and continues to improve.   -OK to start exercise program (she completed cardiac rehab)   -SBE prophylaxis with dental procedures    -follow up in 6 months with TTE prior    #HTN, uncontrolled:   -restart amlodipine 5 mg daily    #Small segmental PE  Started on 2/2022. Now has been 3 months, will continue Coumadin for an additional 3 months   - Continue Coumadin through 7/24/22 to complete 6-month total course    Staffed with Dr. Ladan Rodrigues Md  Cardiology Fellow  PGY6    ATTENDING ATTESTATION     Patient was seen and evaluated with Dr. Rodrigues. History was confirmed personally by me.  Labs, imaging studies, EKGs, and telemetry were reviewed. Agree with assessment and plan as outlined above. The note has been edited by me as needed to produce a single, cohesive document.     Total time spent today 75 minutes.    Justin Pickering MD  Staff Cardiologist      ]

## 2022-06-07 ENCOUNTER — ANCILLARY PROCEDURE (OUTPATIENT)
Dept: CARDIOLOGY | Facility: CLINIC | Age: 82
End: 2022-06-07
Attending: INTERNAL MEDICINE
Payer: COMMERCIAL

## 2022-06-07 VITALS
BODY MASS INDEX: 23.59 KG/M2 | WEIGHT: 128.2 LBS | HEIGHT: 62 IN | HEART RATE: 82 BPM | OXYGEN SATURATION: 97 % | SYSTOLIC BLOOD PRESSURE: 160 MMHG | DIASTOLIC BLOOD PRESSURE: 85 MMHG

## 2022-06-07 DIAGNOSIS — R79.1 ABNORMAL COAGULATION PROFILE: ICD-10-CM

## 2022-06-07 DIAGNOSIS — Q21.0 VSD (VENTRICULAR SEPTAL DEFECT): ICD-10-CM

## 2022-06-07 DIAGNOSIS — I10 BENIGN ESSENTIAL HYPERTENSION: ICD-10-CM

## 2022-06-07 DIAGNOSIS — Z87.74 S/P VENTRICULAR SEPTAL DEFECT REPAIR: ICD-10-CM

## 2022-06-07 DIAGNOSIS — E78.5 HYPERLIPIDEMIA LDL GOAL <100: ICD-10-CM

## 2022-06-07 DIAGNOSIS — R06.09 DYSPNEA ON EXERTION: ICD-10-CM

## 2022-06-07 DIAGNOSIS — I51.89 OTHER ILL-DEFINED HEART DISEASES: ICD-10-CM

## 2022-06-07 DIAGNOSIS — Z87.74 S/P VSD REPAIR: ICD-10-CM

## 2022-06-07 DIAGNOSIS — Z87.74 S/P VENTRICULAR SEPTAL DEFECT REPAIR: Primary | ICD-10-CM

## 2022-06-07 DIAGNOSIS — I26.93 SINGLE SUBSEGMENTAL PULMONARY EMBOLISM WITHOUT ACUTE COR PULMONALE (H): ICD-10-CM

## 2022-06-07 LAB — LVEF ECHO: NORMAL

## 2022-06-07 PROCEDURE — 99417 PROLNG OP E/M EACH 15 MIN: CPT | Performed by: INTERNAL MEDICINE

## 2022-06-07 PROCEDURE — G0463 HOSPITAL OUTPT CLINIC VISIT: HCPCS

## 2022-06-07 PROCEDURE — 99207 PR STATISTIC IV PUSH SINGLE INITIAL SUBSTANCE: CPT | Performed by: INTERNAL MEDICINE

## 2022-06-07 PROCEDURE — 93306 TTE W/DOPPLER COMPLETE: CPT | Performed by: INTERNAL MEDICINE

## 2022-06-07 PROCEDURE — 99215 OFFICE O/P EST HI 40 MIN: CPT | Mod: 25 | Performed by: INTERNAL MEDICINE

## 2022-06-07 RX ORDER — AMLODIPINE BESYLATE 5 MG/1
5 TABLET ORAL DAILY
Qty: 90 TABLET | Refills: 3 | Status: SHIPPED | OUTPATIENT
Start: 2022-06-07 | End: 2022-06-24

## 2022-06-07 RX ORDER — AMOXICILLIN 500 MG/1
2000 CAPSULE ORAL DAILY PRN
Qty: 4 CAPSULE | Refills: 3 | Status: SHIPPED | OUTPATIENT
Start: 2022-06-07 | End: 2023-05-16

## 2022-06-07 RX ADMIN — Medication 6 ML: at 14:22

## 2022-06-07 ASSESSMENT — PAIN SCALES - GENERAL: PAINLEVEL: NO PAIN (0)

## 2022-06-07 NOTE — PATIENT INSTRUCTIONS
"Cardiology Providers you saw during your visit:  Dr. Pickering    Medication changes:  Start taking amlodipine 5 mg once daily - please monitor your BP daily and send a message with the readings.  STOP taking coumadin (warfarin) after 6 months - July 24, 2022.  Amoxicillin for SBE (subacute bacterial endocarditis) prophylaxis.    Follow up:  Follow up in 6 months with echocardiogram prior.    Labs:  none    Please call if you have :  1. Weight gain of more than 2 pounds in a day or 5 pounds in a week  2. Increased shortness of breath, swelling or bloating  3. Dizziness, lightheadedness   4. Any questions or concerns.     Follow the American Heart Association Diet and Lifestyle recommendations:  Limit saturated fat, trans fat, sodium, red meat, sweets and sugar-sweetened beverages. If you choose to eat red meat, compare labels and select the leanest cuts available.  Aim for at least 150 minutes of moderate physical activity or 75 minutes of vigorous physical activity - or an equal combination of both - each week.    During business hours: [phone], option # 1 \"To leave a message for your care team\"     After hours, weekends or holidays: On Call Cardiologist [phone]   option #4 and ask to speak to the on-call Cardiologist. Inform them you are a CORE/heart failure patient at the [facility].    Aliya Victor RN BSN  Cardiology Nurse Coordinator - Heart Failure Clinic  [facility]  [phone] option 1 to schedule an appointment or leave a message for your care team    "

## 2022-06-07 NOTE — LETTER
6/7/2022      RE: Kimberley Mullins  [id number]  [facility] [address]       Dear Colleague,    Thank you for the opportunity to participate in the care of your patient, Kimberley Mullins, at the [facility] [place] [facility]. Please see a copy of my visit note below.    Chief complaint: Apical Muscular VSD     HPI:     81 year old female with PMHx of apical muscular VSD s/p patch repair who presents today for follow up.    Ms. Mullins presented to PCP clinic on 1/2022 where he was noted to dyspnea, peripheral edema and a new murmur. Patient was sent to [facility] ED where he was found to have a large muscular VSD on echo. She was transferred to [place] where she underwent a RHC with RA:63.6% and a step up to RV the RV of 82.5 suggestive of shunt, along with a QP/QP: 2.14. Coronary angiogram revealed widely patent coronary arteries.  She was then admitted to the Cardiology service where she later underwent a CMRI to assess the VSD0 cMRI revealed a large VSD with left to right shunt. It was determined that the patient would undergoe percutaneous placement of a 14mm Amplatzer closure device. However despite percutaneous intervention, patient still had a high grade shunt and was referred to surgery. She then underwent an apical muscular VSD repair to good results and was discharged with plans to follow up with Dr. Pickering.     Today, she returns back to clinic where she is overall doing well. She states that she is now able to climb up a flight of stairs, which is a significant improvement since after she was diagnosed with a VSD. Patient is able 1-2 walks before developing SOB. Patient does endorse lightheadedness while walking around. Patient denies chest pain.              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     Thyroid disease      Transient global amnesia      Unspecified tinnitus     Tinnitus       CURRENT MEDICATIONS:  Current Outpatient Medications   Medication Sig Dispense Refill     acetaminophen (TYLENOL) 325 MG tablet Take 2 tablets (650 mg) by mouth every 6 hours as needed for mild pain or pain 90 tablet 0     aspirin (ASA) 81 MG chewable tablet 1 tablet (81 mg) by Oral or NG Tube route daily 90 tablet 0     citalopram (CELEXA) 10 MG tablet Take 1 tablet (10 mg) by mouth daily 90 tablet 1     levothyroxine (SYNTHROID/LEVOTHROID) 50 MCG tablet Take 1 tablet (50 mcg) by mouth daily 90 tablet 3     metoprolol tartrate (LOPRESSOR) 25 MG tablet Take 0.5 tablets (12.5 mg) by mouth 2 times daily 90 tablet 1     Multiple Vitamins-Minerals (PRESERVISION AREDS PO) Take 1 tablet by mouth 2 times daily       multivitamin, therapeutic (THERA-VIT) TABS tablet Take 1 tablet by mouth daily       simvastatin (ZOCOR) 20 MG tablet TAKE 1 TABLET (20 MG) BY MOUTH AT BEDTIME 90 tablet 3     warfarin ANTICOAGULANT (COUMADIN) 2 MG tablet Take 4 MG ( 2 tablets) Every Sunday, Tuesday and Thursday, and 5 mg( 2.5 tablets )  all other days unless directed otherwise by [facility]. 180 tablet 1     Warfarin Therapy Reminder 1 each continuous prn 1 each 0       ALLERGIES:     Allergies   Allergen Reactions     No Known Allergies        FAMILY HISTORY:  Family History   Problem Relation Age of Onset     Heart Disease Father         MI age 70       SOCIAL HISTORY:  Social History     Tobacco Use     Smoking status: Never Smoker     Smokeless tobacco: Never Used   Substance Use Topics     Alcohol use: Yes     Alcohol/week: 5.8 standard drinks     Types: 7 Standard drinks or equivalent per week      "Comment: two to three glasses of wine q day.     Drug use: No       ROS:   A comprehensive 14 point review of systems is negative other than as mentioned in HPI.    Exam:  BP (!) 160/85 (BP Location: Right arm, Patient Position: Right side)   Pulse 82   Ht 1.58 m (5' 2.21\")   Wt 58.2 kg (128 lb 3.2 oz)   LMP  (LMP Unknown)   SpO2 97%   BMI 23.29 kg/m    GENERAL APPEARANCE: healthy, alert and no distress  EYES: no icterus, no xanthelasmas  ENT: normal palate, mucosa moist, no central cyanosis  NECK: JVP not elevated  RESPIRATORY: lungs clear to auscultation - no rales, rhonchi or wheezes, no use of accessory muscles, no retractions, respirations are unlabored, normal respiratory rate  CARDIOVASCULAR: regular rhythm, normal S1 with physiologic split S2, no S3 or S4 and no murmur, click or rub.  GI: soft, non tender, bowel sounds normal,no abdominal bruits  EXTREMITIES: no edema, no bruits  NEURO: alert and oriented to person/place/time, normal speech, gait and affect  VASC: Radial, dorsalis pedis and posterior tibialis pulses 2+ bilaterally.  SKIN: no ecchymoses, no rashes.  PSYCH: cooperative, affect appropriate.     Labs:  Reviewed.       Testing/Procedures:      Ladan Morgan personally visualized and interpreted:    TTE 6/7/202: LVEF: 55-60%, normal RV function. Small Residual VSD present, color doppler not performed.       CMR 1/26/22: LVEF:55%, Apical Muscular VSD with left to right shunt, moderately dilated RV, no obvious LGE    Coronary angiogram/RHC 1/25/22:  Normal coronary arteries  RHC: RA 8 (v wave to 13) RV 50/7 PA 44/18(27) PCW 16 TDCO/CI 3.9/2.3   Shunt eval (O2 sat): R IJV 63.5% SVC 65.3% IVC 62.8% RA 63.6%   RV 82.5%   PA 80.2% RPA 80.6% RPCW 96% Ao 98% Hgb 11.7   Qp/Qs 2.14     TTE 2/7/22 (read by Dr. Pickering):  Status post removal of Amplatzer device and patch closure of muscular VSD  2/4/22. Some images (images #45-46 and 50-54) are suspicious for a small  residual VSD at the apical margin " of the patch. Consider cardiac CTA or MRI  for further evaluation.  Global and regional left ventricular function is normal with an EF of 55-60%.  Mild right ventricular dilation is present. Global right ventricular function  is mildly reduced.  No pericardial effusion is present.     This study was compared with the study from 2/4/22 (intraoperative KATIE) and  2/3/22 (TTE): Possible residual VSD was not seen on the postoperative images  from the intraoperative KATIE, however the ventricular septum is better  interrogated on today's KATIE. Compared with the prior TTE, there has been  interval removal of Amplatzer device and patch closure of VSD with significant  interval reduction in ventricular-level shunting. RV size and function also  appears slightly improved on limited views.    TTE 06/07/22 (reviewed by Dr. Pickering):  Normal LV size and function, LVEF=55-60%.  RV function is normal.  Small residual VSD (~6 mm) at the apical margin of the VSD patch repair, unchanged from 2/3/2022.       Assessment and Plan:     81 year old female with PMHx of apical muscular VSD s/p patch repair who presents today for follow up.    #Apical muscular VSD s/p repair  81 year old female who presented from [facility] with SOB and later found to have a hemodynamically significant VSD. QP:QS at the time was 2.14 on RHC and LHC. Initially underwent percutaneous closure with occluder device, however had significant residual shunt that necessitated VSD repair with patch. Subsequent Echo after surgery revealed a small VSD shunt with no RV dysfunction at the time. TTE today is stable from 2/2022 and shows normal LV size/function.  Her functional capacity is excellent and continues to improve.   -OK to start exercise program (she completed cardiac rehab)   -SBE prophylaxis with dental procedures    -follow up in 6 months with TTE prior    #HTN, uncontrolled:   -restart amlodipine 5 mg daily    #Small segmental PE  Started on 2/2022. Now  has been 3 months, will continue Coumadin for an additional 3 months   - Continue Coumadin through 7/24/22 to complete 6-month total course    Staffed with Dr. Ladan Rodrigues Md  Cardiology Fellow  PGY6    ATTENDING ATTESTATION     Patient was seen and evaluated with Dr. Rodrigues. History was confirmed personally by me. Labs, imaging studies, EKGs, and telemetry were reviewed. Agree with assessment and plan as outlined above. The note has been edited by me as needed to produce a single, cohesive document.     Total time spent today 75 minutes.    Justin Pickering MD  Staff Cardiologist

## 2022-06-07 NOTE — NURSING NOTE
Chief Complaint   Patient presents with     New Patient     return cardiology, 80 yo female S/P ventricular septal defect repair presents for follow up with echocardiogram prior.   Vitals were taken and medications reconciled.    Floyd Vee, EMT  2:41 PM

## 2022-06-08 ENCOUNTER — LAB (OUTPATIENT)
Dept: LAB | Facility: CLINIC | Age: 82
End: 2022-06-08
Payer: COMMERCIAL

## 2022-06-08 DIAGNOSIS — R79.1 ABNORMAL COAGULATION PROFILE: ICD-10-CM

## 2022-06-08 PROCEDURE — 85610 PROTHROMBIN TIME: CPT

## 2022-06-08 PROCEDURE — 36415 COLL VENOUS BLD VENIPUNCTURE: CPT

## 2022-06-09 ENCOUNTER — ANTICOAGULATION THERAPY VISIT (OUTPATIENT)
Dept: ANTICOAGULATION | Facility: CLINIC | Age: 82
End: 2022-06-09
Payer: COMMERCIAL

## 2022-06-09 DIAGNOSIS — Q21.0 VSD (VENTRICULAR SEPTAL DEFECT): ICD-10-CM

## 2022-06-09 DIAGNOSIS — I26.93 SINGLE SUBSEGMENTAL PULMONARY EMBOLISM WITHOUT ACUTE COR PULMONALE (H): ICD-10-CM

## 2022-06-09 DIAGNOSIS — Z87.74 S/P VENTRICULAR SEPTAL DEFECT REPAIR: Primary | ICD-10-CM

## 2022-06-09 LAB — INR PPP: 1.6 (ref 0.85–1.15)

## 2022-06-09 NOTE — PROGRESS NOTES
ANTICOAGULATION MANAGEMENT     Kimberley Mullins 81 year old female is on warfarin with subtherapeutic INR result. (Goal INR 2.0-3.0)    Recent labs: (last 7 days)     06/08/22  1339   INR 1.60*       ASSESSMENT     Source(s): Chart Review and Patient/Caregiver Call     Warfarin doses taken: Less warfarin taken than planned which may be affecting INR. She wrote down instructions incorrectly last week.   Diet: No new diet changes identified  New illness, injury, or hospitalization: No  Medication/supplement changes: None noted, new MVI a few weeks ago with increased Vit K  Signs or symptoms of bleeding or clotting: No  Previous INR: Subtherapeutic  Additional findings: None       PLAN     Recommended plan for temporary change(s) affecting INR     Dosing Instructions: Boost dose then resume previously intended warfarin dose (INR was near therapeutic at that dose last week) with next INR in 1 week       Summary  As of 6/9/2022    Full warfarin instructions:  6/9: 8 mg; Otherwise 6 mg every Mon, Wed, Fri; 5 mg all other days   Next INR check:  6/15/2022             Telephone call with Kimberley who verbalizes understanding and agrees to plan    Lab visit scheduled    Education provided: Goal range and significance of current result and Importance of taking warfarin as instructed    Plan made per [facility] anticoagulation protocol    Isabelle Antony RN  Anticoagulation Clinic  6/9/2022    _______________________________________________________________________     Anticoagulation Episode Summary     Current INR goal:  2.0-3.0   TTR:  12.0 % (3.1 mo)   Target end date:  7/24/2022   Send INR reminders to:  [facility]    Indications    S/P ventricular septal defect repair [Z87.74]  VSD (ventricular septal defect) [Q21.0]  Single subsegmental pulmonary embolism without acute cor pulmonale (H) [I26.93]           Comments:  Per cardiology OV note 6/7/22: Continue coumadin through 7/24/22 to complete 6 month total course          Anticoagulation Care Providers     Provider Role Specialty Phone number    Manoj Hollis PA-C Referring  [phone]    Tere Slaughter NP Referring Cardiovascular & Thoracic Surgery [phone]    Justin Pickering MD Referring Cardiovascular Disease [phone]

## 2022-06-15 ENCOUNTER — LAB (OUTPATIENT)
Dept: LAB | Facility: CLINIC | Age: 82
End: 2022-06-15
Payer: COMMERCIAL

## 2022-06-15 DIAGNOSIS — R79.1 ABNORMAL COAGULATION PROFILE: ICD-10-CM

## 2022-06-15 LAB — INR PPP: 1.7 (ref 0.85–1.15)

## 2022-06-15 PROCEDURE — 85610 PROTHROMBIN TIME: CPT

## 2022-06-15 PROCEDURE — 36415 COLL VENOUS BLD VENIPUNCTURE: CPT

## 2022-06-16 ENCOUNTER — ANTICOAGULATION THERAPY VISIT (OUTPATIENT)
Dept: ANTICOAGULATION | Facility: CLINIC | Age: 82
End: 2022-06-16
Payer: COMMERCIAL

## 2022-06-16 DIAGNOSIS — I26.93 SINGLE SUBSEGMENTAL PULMONARY EMBOLISM WITHOUT ACUTE COR PULMONALE (H): ICD-10-CM

## 2022-06-16 DIAGNOSIS — Z87.74 S/P VENTRICULAR SEPTAL DEFECT REPAIR: Primary | ICD-10-CM

## 2022-06-16 DIAGNOSIS — Q21.0 VSD (VENTRICULAR SEPTAL DEFECT): ICD-10-CM

## 2022-06-16 NOTE — PROGRESS NOTES
ANTICOAGULATION MANAGEMENT     Kimberley Mullins 81 year old female is on warfarin with subtherapeutic INR result. (Goal INR 2.0-3.0)    Recent labs: (last 7 days)     06/15/22  1157   INR 1.70*       ASSESSMENT     Source(s): Chart Review and Patient/Caregiver Call     Warfarin doses taken: Warfarin taken as instructed  Diet: No new diet changes identified  New illness, injury, or hospitalization: No  Medication/supplement changes: None noted  Signs or symptoms of bleeding or clotting: No  Previous INR: Subtherapeutic  Additional findings: None       PLAN     Recommended plan for no diet, medication or health factor changes affecting INR     Dosing Instructions: Increase your warfarin dose (10% change) with next INR in 1 week       Summary  As of 6/16/2022    Full warfarin instructions:  6 mg every day   Next INR check:  6/22/2022             Telephone call with Kimberley who verbalizes understanding and agrees to plan and who agrees to plan and repeated back plan correctly    Lab visit scheduled    Education provided: Goal range and significance of current result, Importance of therapeutic range, Importance of following up at instructed interval and Importance of taking warfarin as instructed    Plan made per [facility] anticoagulation protocol    Nivia Abarca RN  Anticoagulation Clinic  6/16/2022    _______________________________________________________________________     Anticoagulation Episode Summary     Current INR goal:  2.0-3.0   TTR:  11.2 % (3.3 mo)   Target end date:  7/24/2022   Send INR reminders to:  [facility]    Indications    S/P ventricular septal defect repair [Z87.74]  VSD (ventricular septal defect) [Q21.0]  Single subsegmental pulmonary embolism without acute cor pulmonale (H) [I26.93]           Comments:  Per cardiology OV note 6/7/22: Continue coumadin through 7/24/22 to complete 6 month total course         Anticoagulation Care Providers     Provider Role Specialty Phone number    Bunny,  Manoj Fernandes PA-C Referring  [phone]    Tere Slaughter NP Referring Cardiovascular & Thoracic Surgery [phone]    Justin Pickering MD Referring Cardiovascular Disease [phone]

## 2022-06-21 ENCOUNTER — TELEPHONE (OUTPATIENT)
Dept: CARDIOLOGY | Facility: CLINIC | Age: 82
End: 2022-06-21
Payer: COMMERCIAL

## 2022-06-21 DIAGNOSIS — I10 BENIGN ESSENTIAL HYPERTENSION: ICD-10-CM

## 2022-06-21 NOTE — TELEPHONE ENCOUNTER
"I called Kimberley to check on her BP after she started amlodipine 5 mg daily. She is feeling well overall.    BP has been \"all over the place\", however she does not take her BP at the same time every day.    6/11 - 126/80  6/12 - 154/94  6/13 - 136/87  6/14 - 148/92  6/15 - 164/92, 149/88  6/16 - 148/92  6/17 - 145/90  6/18 - 137/84  6/19 - 138/87  6/20 - 130/78  6/21 - 126/80    Aliya Victor RN    "

## 2022-06-22 ENCOUNTER — LAB (OUTPATIENT)
Dept: LAB | Facility: CLINIC | Age: 82
End: 2022-06-22
Payer: COMMERCIAL

## 2022-06-22 DIAGNOSIS — R79.1 ABNORMAL COAGULATION PROFILE: ICD-10-CM

## 2022-06-22 LAB — INR PPP: 2.12 (ref 0.85–1.15)

## 2022-06-22 PROCEDURE — 85610 PROTHROMBIN TIME: CPT

## 2022-06-22 PROCEDURE — 36415 COLL VENOUS BLD VENIPUNCTURE: CPT

## 2022-06-23 ENCOUNTER — ANTICOAGULATION THERAPY VISIT (OUTPATIENT)
Dept: ANTICOAGULATION | Facility: CLINIC | Age: 82
End: 2022-06-23

## 2022-06-23 DIAGNOSIS — Q21.0 VSD (VENTRICULAR SEPTAL DEFECT): ICD-10-CM

## 2022-06-23 DIAGNOSIS — Z87.74 S/P VENTRICULAR SEPTAL DEFECT REPAIR: Primary | ICD-10-CM

## 2022-06-23 DIAGNOSIS — I26.93 SINGLE SUBSEGMENTAL PULMONARY EMBOLISM WITHOUT ACUTE COR PULMONALE (H): ICD-10-CM

## 2022-06-23 NOTE — PROGRESS NOTES
ANTICOAGULATION MANAGEMENT     Kimberley Mullins 81 year old female is on warfarin with therapeutic INR result. (Goal INR 2.0-3.0)    Recent labs: (last 7 days)     06/22/22  1247   INR 2.12*       ASSESSMENT     Source(s): Chart Review and Patient/Caregiver Call     Warfarin doses taken: Warfarin taken as instructed  Diet: No new diet changes identified  New illness, injury, or hospitalization: No  Medication/supplement changes: None noted  Signs or symptoms of bleeding or clotting: No  Previous INR: Subtherapeutic  Additional findings: None       PLAN     Recommended plan for no diet, medication or health factor changes affecting INR     Dosing Instructions: continue your current warfarin dose with next INR in 2 weeks       Summary  As of 6/23/2022    Full warfarin instructions:  6 mg every day   Next INR check:  7/6/2022             Telephone call with Kimberley who verbalizes understanding and agrees to plan    Lab visit scheduled    Education provided: Goal range and significance of current result and Contact [phone] with any changes, questions or concerns.     Plan made per [facility] anticoagulation protocol    Isabelle Antony RN  Anticoagulation Clinic  6/23/2022    _______________________________________________________________________     Anticoagulation Episode Summary     Current INR goal:  2.0-3.0   TTR:  12.3 % (3.5 mo)   Target end date:  7/24/2022   Send INR reminders to:  [facility]    Indications    S/P ventricular septal defect repair [Z87.74]  VSD (ventricular septal defect) [Q21.0]  Single subsegmental pulmonary embolism without acute cor pulmonale (H) [I26.93]           Comments:  Per cardiology OV note 6/7/22: Continue coumadin through 7/24/22 to complete 6 month total course         Anticoagulation Care Providers     Provider Role Specialty Phone number    Manoj Hollis PA-C Referring  [phone]    Tere Slaughter NP Referring Cardiovascular & Thoracic Surgery [phone]     Justin Pickering MD Referring Cardiovascular Disease [phone]

## 2022-06-24 RX ORDER — AMLODIPINE BESYLATE 5 MG/1
10 TABLET ORAL DAILY
Qty: 180 TABLET | Refills: 3 | Status: SHIPPED | OUTPATIENT
Start: 2022-06-24 | End: 2023-05-16

## 2022-06-24 NOTE — TELEPHONE ENCOUNTER
Date: 6/24/2022    Time of Call: 1:24 PM     Diagnosis:  hypertension     [ VORB ] Ordering provider: Justin Pickering MD    Order: increase amlodipine to 10mg daily.     Order received by: Amy Broderick Rn     Follow-up/additional notes: order placed. Called Kimberley, reviewed med change as above. SHe states understanding. Will continue to monitor BP. WIll plan to call her in 1-2 weeks to review BP again.

## 2022-06-28 NOTE — PROGRESS NOTES
Assessment & Plan   Problem List Items Addressed This Visit        Circulatory    Benign essential hypertension       Other    Chronic anticoagulation - Primary    Relevant Orders    VENOUS COLLECTION (Completed)    PROTHROMBIN TIME INR ([facility]) (Completed)      Other Visit Diagnoses     Left ankle swelling        Relevant Orders    XR Ankle Left G/E 3 Views    Orthopedic  Referral    Ecchymosis        Left leg swelling        Relevant Orders    Radiology Referral    US Lower Extremity Venous Duplex Left    Orthopedic  Referral           1. Benign essential hypertension  Controlled.    2. Chronic anticoagulation  INR done, in range, she will followup with her INR clinic.  - VENOUS COLLECTION  - PROTHROMBIN TIME INR ([facility])    3. Left ankle swelling  Xray appears normal. Referral done to see ortho to discuss further.  - XR Ankle Left G/E 3 Views  - Orthopedic  Referral; Future    4. Ecchymosis  Resolving, hip seems to be ok. Discussed that she has a hematoma and this will take some time to resolve.    5. Left leg swelling  Us done, no DVT. followup with ortho  - Radiology Referral; Future  - US Lower Extremity Venous Duplex Left; Future  - Orthopedic  Referral; Future           FUTURE APPOINTMENTS:       - Follow-up visit with ortho    No follow-ups on file.    Katlyn Daniels MD  [facility]    Subjective     Nursing Notes:   Dilma Malin CMA  6/29/2022  2:07 PM  Signed  Chief Complaint   Patient presents with     Swelling     Left inner ankle swollen, slightly painful, feels warm to the touch, has gotten worse over the last few weeks      Pre-visit Screening:  Immunizations:  up to date  Colonoscopy:  is up to date  Mammogram: is up to date  Asthma Action Test/Plan:  NA  PHQ9:  NA  GAD7:  NA  Questioned patient about current smoking habits Pt. has never smoked.  Ok to leave detailed message on voice mail for today's visit only Yes, phone # [phone]        "    Kimberley Mullins is a 81 year old female who presents to clinic today for the following health issues   HPI     Left ankle and leg swelling, no pain. Swollen for --not new but has gotten worse. Has had trouble in the past with the foot. Has hammertoes and bunion.     Is on coumadin--followed by a coumadin agency. Gets a draw at [facility]. Last time checked 1 week ago, it was 2.1. medial foot turns in and the ankle hurts. Has seen podiatry because she thought she had plantar faciitis but told her this was some kind of tendoniti--was seen within the past 2 years.    1 week ago tripped and fell onto her left hip, at home, now she has a big bruise, tripped. Hip seems to work ok.        Review of Systems   Constitutional, HEENT, cardiovascular, pulmonary, gi and gu systems are negative, except as otherwise noted.      Objective    /64 (BP Location: Right arm, Patient Position: Sitting, Cuff Size: Adult Regular)   Pulse 87   Temp 98.2  F (36.8  C) (Temporal)   Ht 1.581 m (5' 2.25\")   Wt 59.4 kg (131 lb)   LMP  (LMP Unknown)   SpO2 98%   BMI 23.77 kg/m    Body mass index is 23.77 kg/m .  Physical Exam   GENERAL: healthy, alert and no distress  MS: no gross musculoskeletal defects noted, no edema  NEURO: Normal strength and tone, mentation intact and speech normal  PSYCH: mentation appears normal, affect normal/bright  Left hip rom normal  Deep purple ecchymosis on left lateral thigh.  Left lower leg larger than right, has definite swelling in left ankle, more medially, pain in medial malleolus, neg holmans. No calf tenderness to palpation    Results for orders placed or performed in visit on 06/29/22   PROTHROMBIN TIME INR ([facility])     Status: None   Result Value Ref Range    INR Point of Care 2.4 2.0 - 3.0     Xray left ankle: no acute injury, sent for overread  Personally reviewed by me.      "

## 2022-06-29 ENCOUNTER — OFFICE VISIT (OUTPATIENT)
Dept: FAMILY MEDICINE | Facility: CLINIC | Age: 82
End: 2022-06-29

## 2022-06-29 ENCOUNTER — HOSPITAL ENCOUNTER (OUTPATIENT)
Dept: ULTRASOUND IMAGING | Facility: CLINIC | Age: 82
Discharge: HOME | End: 2022-06-29
Attending: FAMILY MEDICINE | Admitting: FAMILY MEDICINE
Payer: COMMERCIAL

## 2022-06-29 VITALS
DIASTOLIC BLOOD PRESSURE: 64 MMHG | TEMPERATURE: 98.2 F | OXYGEN SATURATION: 98 % | BODY MASS INDEX: 24.11 KG/M2 | HEIGHT: 62 IN | SYSTOLIC BLOOD PRESSURE: 120 MMHG | HEART RATE: 87 BPM | WEIGHT: 131 LBS

## 2022-06-29 DIAGNOSIS — Z79.01 CHRONIC ANTICOAGULATION: Primary | ICD-10-CM

## 2022-06-29 DIAGNOSIS — R58 ECCHYMOSIS: ICD-10-CM

## 2022-06-29 DIAGNOSIS — I10 BENIGN ESSENTIAL HYPERTENSION: ICD-10-CM

## 2022-06-29 DIAGNOSIS — M25.472 LEFT ANKLE SWELLING: ICD-10-CM

## 2022-06-29 DIAGNOSIS — M79.89 LEFT LEG SWELLING: ICD-10-CM

## 2022-06-29 LAB — INR POINT OF CARE: 2.4 (ref 2–3)

## 2022-06-29 PROCEDURE — 85610 PROTHROMBIN TIME: CPT | Performed by: FAMILY MEDICINE

## 2022-06-29 PROCEDURE — 93971 EXTREMITY STUDY: CPT | Mod: LT

## 2022-06-29 PROCEDURE — 73610 X-RAY EXAM OF ANKLE: CPT | Mod: LT | Performed by: FAMILY MEDICINE

## 2022-06-29 PROCEDURE — 36415 COLL VENOUS BLD VENIPUNCTURE: CPT | Performed by: FAMILY MEDICINE

## 2022-06-29 PROCEDURE — 99214 OFFICE O/P EST MOD 30 MIN: CPT | Performed by: FAMILY MEDICINE

## 2022-06-29 NOTE — NURSING NOTE
Chief Complaint   Patient presents with     Swelling     Left inner ankle swollen, slightly painful, feels warm to the touch, has gotten worse over the last few weeks      Pre-visit Screening:  Immunizations:  up to date  Colonoscopy:  is up to date  Mammogram: is up to date  Asthma Action Test/Plan:  NA  PHQ9:  NA  GAD7:  NA  Questioned patient about current smoking habits Pt. has never smoked.  Ok to leave detailed message on voice mail for today's visit only Yes, phone # [phone]

## 2022-07-05 ENCOUNTER — LAB (OUTPATIENT)
Dept: LAB | Facility: CLINIC | Age: 82
End: 2022-07-05
Payer: COMMERCIAL

## 2022-07-05 ENCOUNTER — ANTICOAGULATION THERAPY VISIT (OUTPATIENT)
Dept: ANTICOAGULATION | Facility: CLINIC | Age: 82
End: 2022-07-05

## 2022-07-05 DIAGNOSIS — Z87.74 S/P VENTRICULAR SEPTAL DEFECT REPAIR: Primary | ICD-10-CM

## 2022-07-05 DIAGNOSIS — Q21.0 VSD (VENTRICULAR SEPTAL DEFECT): ICD-10-CM

## 2022-07-05 DIAGNOSIS — I26.93 SINGLE SUBSEGMENTAL PULMONARY EMBOLISM WITHOUT ACUTE COR PULMONALE (H): ICD-10-CM

## 2022-07-05 DIAGNOSIS — R79.1 ABNORMAL COAGULATION PROFILE: ICD-10-CM

## 2022-07-05 LAB — INR PPP: 2.19 (ref 0.85–1.15)

## 2022-07-05 PROCEDURE — 36415 COLL VENOUS BLD VENIPUNCTURE: CPT

## 2022-07-05 PROCEDURE — 85610 PROTHROMBIN TIME: CPT

## 2022-07-05 NOTE — PROGRESS NOTES
ANTICOAGULATION MANAGEMENT     Kimberley Mullins 81 year old female is on warfarin with therapeutic INR result. (Goal INR The patient does not have an active anticoagulation episode.)    Recent labs: (last 7 days)     07/05/22  1243   INR 2.19*       ASSESSMENT     Source(s): Patient/Caregiver Call     Warfarin doses taken: Warfarin taken as instructed  Diet: No new diet changes identified  New illness, injury, or hospitalization: No  Medication/supplement changes: None noted  Signs or symptoms of bleeding or clotting: No  Previous INR: Therapeutic last 2(+) visits  Additional findings: Per 6/7/22 Dr. Justin Pickering patient is finished with Warfarin on 7/24.       PLAN     Recommended plan for no diet, medication or health factor changes affecting INR     Dosing Instructions: continue your current warfarin dose with next INR in 3 weeks       Summary  As of 7/5/2022    Full warfarin instructions:  6 mg every day   Next INR check:               Telephone call with Kimberley who verbalizes understanding and agrees to plan and who agrees to plan and repeated back plan correctly    Patient offered & declined to schedule next visit    Education provided: None required    Plan made per [facility] anticoagulation protocol    Janine Johnson, RN  Anticoagulation Clinic  7/5/2022    _______________________________________________________________________

## 2022-07-08 ENCOUNTER — TELEPHONE (OUTPATIENT)
Dept: CARDIOLOGY | Facility: CLINIC | Age: 82
End: 2022-07-08

## 2022-07-08 NOTE — TELEPHONE ENCOUNTER
I called Kimberley to review her BP after increasing amlodipine to 10 mg daily 2 weeks ago. She takes the 10 mg amlodipine every night.    BP:  6/25 - 147/88  6/27 - 125/73  6/28 - 139/81  6/29 - 128/80  6/30 - 123/74  7/2 - 120/74  7/3 - 120/76  7/4 - 133/81, 120/72  7/5 - 128/76, 123/72  7/6 - 134/84  7/8 - 119/69    She did mention that her left ankle is swollen, red, slightly painful, and warm; she has seen her PCP for this and they did an ultrasound to rule out a blood clot. She is going to an orthopedic doctor to further assess this. She denies any shortness of breath or weight gain.    Aliya Victor RN

## 2022-07-13 ENCOUNTER — HOSPITAL ENCOUNTER (OUTPATIENT)
Dept: MAMMOGRAPHY | Facility: CLINIC | Age: 82
Discharge: HOME | End: 2022-07-13
Attending: OBSTETRICS & GYNECOLOGY | Admitting: OBSTETRICS & GYNECOLOGY
Payer: COMMERCIAL

## 2022-07-13 DIAGNOSIS — Z12.31 OTHER SCREENING MAMMOGRAM: ICD-10-CM

## 2022-07-13 PROCEDURE — 77067 SCR MAMMO BI INCL CAD: CPT

## 2022-07-14 ENCOUNTER — TRANSFERRED RECORDS (OUTPATIENT)
Dept: FAMILY MEDICINE | Facility: CLINIC | Age: 82
End: 2022-07-14

## 2022-08-01 ENCOUNTER — TELEPHONE (OUTPATIENT)
Dept: CARDIOLOGY | Facility: CLINIC | Age: 82
End: 2022-08-01

## 2022-08-01 ENCOUNTER — TELEPHONE (OUTPATIENT)
Dept: FAMILY MEDICINE | Facility: CLINIC | Age: 82
End: 2022-08-01

## 2022-08-01 NOTE — TELEPHONE ENCOUNTER
PT provider is recommending, and will order patient to start a medication patch-   Iontophoresis patch with dexamethasone    Kimberley is wondering with health history of open heart surgery and other medications she is on if this is ok to start. Pt knows Dr. Daniels and Renetta are out of the office, is ok discussing with another provider if needed.     Can be reached at [phone]    Routing to Dilma

## 2022-08-02 NOTE — TELEPHONE ENCOUNTER
Spoke with other providers here said should be ok ask cardiology prior to KEP's response. Spoke with Kimberley, she confirmed with cardiology as well and got the ok from them.

## 2022-08-18 NOTE — OR NURSING
Paged Zaida Morgan for any pre-op orders, and to ask if another EKG today is needed.   Patient informed per Sean Castillo: I just tried sending x-ray results/comments to patient via [employer] [facility] but not sure if he checks this, please contact him w/the following x-ray results/disposition:   X-rays of toe show only mild age-related osteoarthritis -- no unusual erosions or characteristic gout findings on x-rays; but given clinical history I would presume recurring \"gout-like pain\" is indeed gout, just has not caused any severe joint damage that would show on x-rays.  I do see separate telephone note from earlier today about same issue, and will have RN contact you tomorrow for further discussion and options regarding ongoing plan of care.   We just increased his allopurinol a few weeks ago, and uric acid was not too high as it was even before that, so presuming this is recurring gout flares, I would presume it is alcohol related as this is the #1 common/known trigger, and he drinks whiskey every night.  I know he has reduced amount, but I've been telling him for years there is technically no safe amount when a person has gout, and with time/age he may not be metabolizing uric acid / alcohol as well as he used to -- ultimately the higher dose allopurinol should help reduce frequency/severity of symptoms, and prn Prednisone should also help, but if he wants less gout issues then less whiskey is probably the best solution.  We can refer to Rheumatologist for eval/opinion as well if desired, but based on x-rays does not appear to be any sort of surgical/podiatry issue to address, I think it's all metabolic/joint issue complicated by gout/alcohol.     Patient states he has not had any alcohol in 1 1/2 weeks. He will call back if gout symptoms continue, or if decides would like referral to rheumatology

## 2022-09-07 ENCOUNTER — TRANSFERRED RECORDS (OUTPATIENT)
Dept: FAMILY MEDICINE | Facility: CLINIC | Age: 82
End: 2022-09-07

## 2022-10-09 ENCOUNTER — HEALTH MAINTENANCE LETTER (OUTPATIENT)
Age: 82
End: 2022-10-09

## 2022-10-19 ENCOUNTER — OFFICE VISIT (OUTPATIENT)
Dept: FAMILY MEDICINE | Facility: CLINIC | Age: 82
End: 2022-10-19

## 2022-10-19 ENCOUNTER — TELEPHONE (OUTPATIENT)
Dept: FAMILY MEDICINE | Facility: CLINIC | Age: 82
End: 2022-10-19

## 2022-10-19 ENCOUNTER — TRANSFERRED RECORDS (OUTPATIENT)
Dept: FAMILY MEDICINE | Facility: CLINIC | Age: 82
End: 2022-10-19

## 2022-10-19 VITALS
HEIGHT: 62 IN | DIASTOLIC BLOOD PRESSURE: 78 MMHG | BODY MASS INDEX: 25.17 KG/M2 | OXYGEN SATURATION: 98 % | WEIGHT: 136.8 LBS | TEMPERATURE: 96.8 F | SYSTOLIC BLOOD PRESSURE: 138 MMHG | HEART RATE: 69 BPM

## 2022-10-19 DIAGNOSIS — I10 BENIGN ESSENTIAL HYPERTENSION: ICD-10-CM

## 2022-10-19 DIAGNOSIS — F41.9 ANXIETY: ICD-10-CM

## 2022-10-19 DIAGNOSIS — R07.89 CHEST WALL PAIN: Primary | ICD-10-CM

## 2022-10-19 PROCEDURE — 99214 OFFICE O/P EST MOD 30 MIN: CPT | Mod: 25 | Performed by: STUDENT IN AN ORGANIZED HEALTH CARE EDUCATION/TRAINING PROGRAM

## 2022-10-19 PROCEDURE — G0008 ADMIN INFLUENZA VIRUS VAC: HCPCS | Performed by: STUDENT IN AN ORGANIZED HEALTH CARE EDUCATION/TRAINING PROGRAM

## 2022-10-19 PROCEDURE — 90662 IIV NO PRSV INCREASED AG IM: CPT | Performed by: STUDENT IN AN ORGANIZED HEALTH CARE EDUCATION/TRAINING PROGRAM

## 2022-10-19 RX ORDER — CITALOPRAM HYDROBROMIDE 10 MG/1
10 TABLET ORAL DAILY
Qty: 90 TABLET | Refills: 1 | Status: SHIPPED | OUTPATIENT
Start: 2022-10-19 | End: 2023-05-16

## 2022-10-19 RX ORDER — AMLODIPINE BESYLATE 5 MG/1
10 TABLET ORAL DAILY
Qty: 180 TABLET | Refills: 3 | Status: CANCELLED | OUTPATIENT
Start: 2022-10-19

## 2022-10-19 RX ORDER — CITALOPRAM HYDROBROMIDE 10 MG/1
10 TABLET ORAL DAILY
Qty: 90 TABLET | Refills: 1 | COMMUNITY
Start: 2022-10-19

## 2022-10-19 NOTE — TELEPHONE ENCOUNTER
Kimberley Mullins is requesting a refill of:    Refused Prescriptions:                       Disp   Refills    citalopram (CELEXA) 10 MG tablet [Pharmacy*90 tab*1        Sig: TAKE 1 TABLET (10 MG) BY MOUTH DAILY.  Refused By: CLEO CABEZAS  Reason for Refusal: Patient needs appointment    Pt last seen 04/22/22 advised to return in 6 months. Pt due for OV

## 2022-10-19 NOTE — PATIENT INSTRUCTIONS
Pain seems be at rib-cartilage junction, should get better on it's own with some rest. Tylenol, heating pad, and lidocaine patch can all be helpful.     If pain becomes worse with exertion or significantly worsening would go to ER    Covid booster anytime at your pharmacy    Citalopram refilled     Call with any questions or concerns

## 2022-10-19 NOTE — NURSING NOTE
Chief Complaint   Patient presents with     Chest Pain     Pt c/o rib pain on L side close to chest for about 3 days. Pt has hx of heart issues so she wants to rule out pleurisy.      Pre-visit Screening:  Immunizations:  Flu done today.  Colonoscopy:  is up to date  Mammogram: is up to date  Asthma Action Test/Plan:  na  PHQ9:  na  GAD7:  na  Questioned patient about current smoking habits Pt. has never smoked.  Ok to leave detailed message on voice mail for today's visit only yes, phone # [phone]

## 2022-10-19 NOTE — PROGRESS NOTES
ASSESSMENT & PLAN      ICD-10-CM    1. Chest wall pain  R07.89       2. Anxiety  F41.9 citalopram (CELEXA) 10 MG tablet      3. Benign essential hypertension  I10          Costochondral junction pain, likely from repetitive lifting of grandchildren, exam overall reassuring today. Sx mgmt and close follow-up with any new or persistent sx. Emergent precautions reviewed.  Anxiety doing well on celexa, continue current dose.   HTN stable, no change in plan  HM reviewed as above    Patient Instructions   Pain seems be at rib-cartilage junction, should get better on it's own with some rest. Tylenol, heating pad, and lidocaine patch can all be helpful.     If pain becomes worse with exertion or significantly worsening would go to ER    Covid booster anytime at your pharmacy    Citalopram refilled     Call with any questions or concerns        Gui Zaman MD, [facility]      -----    SUBJECTIVE  Kimberley Mullins is a/an 82 year old female who is seen for evaluation of     Chief Complaint   Patient presents with     Chest Pain     Pt c/o rib pain on L side close to chest for about 3 days. Pt has hx of heart issues so she wants to rule out pleurisy.        The patient is seen by themselves.  The patient is Right handed    Date of Onset: 3 days  Mechanism of injury: Reports insidious onset without acute precipitating event.  Location of Pain: left lower ribs  Worsened by: twisting/motion  Better with: unsure  Treatments tried: no treatment tried to date  Associated symptoms: no cough or SOB, no exertional pain or KUMAR. No GI sx.     Has had some sinus congestion and cough for a few days.   No SOB or pleuritic pain.   Hx of VSD repair January 2022, normal angiogram at that time  Has two great grandchildren living with her that she is picking up often    Orthopedic/Surgical history: NO    Patient's PMH, PSH, and family hx reviewed.      OBJECTIVE:  /78 (BP Location: Right arm, Patient Position:  "Sitting, Cuff Size: Adult Large)   Pulse 69   Temp 96.8  F (36  C) (Temporal)   Ht 1.575 m (5' 2\")   Wt 62.1 kg (136 lb 12.8 oz)   LMP  (LMP Unknown)   SpO2 98%   BMI 25.02 kg/m     General: healthy, alert and in no distress  HEENT: no scleral icterus or conjunctival erythema  Skin: no visible suspicious lesions or rashes.   CV: RRR, no pedal edema   Resp: normal respiratory effort without conversational dyspnea, symm air mvmt  Psych: normal mood and affect  Gait: normal, with appropriate coordination and balance     MSK:   TTP over left lateral ribs 8-9 at chondral junction. No crepitus or skin change.     "

## 2022-11-26 ENCOUNTER — HEALTH MAINTENANCE LETTER (OUTPATIENT)
Age: 82
End: 2022-11-26

## 2023-01-13 NOTE — PROGRESS NOTES
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    Initial visit 6/7/2022: Ms. Mullins presented to PCP clinic on 1/2022 where he was noted to dyspnea, peripheral edema and a new murmur. Patient was sent to [facility] ED where he was found to have a large muscular VSD on echo. She was transferred to [place] where she underwent a RHC with RA:63.6% and a step up to RV the RV of 82.5 suggestive of shunt, along with a QP/QP: 2.14. Coronary angiogram revealed widely patent coronary arteries.  She was then admitted to the Cardiology service where she later underwent a CMRI to assess the VSD0 cMRI revealed a large VSD with left to right shunt. It was determined that the patient would undergoe percutaneous placement of a 14mm Amplatzer closure device. However despite percutaneous intervention, patient still had a high grade shunt and was referred to surgery. She then underwent an apical muscular VSD repair to good results and was discharged with plans to follow up with Dr. Pickering.     Today, she returns back to clinic where she is overall doing well. She states that she is now able to climb up a flight of stairs, which is a significant improvement since after she was diagnosed with a VSD. Patient is able 1-2 walks before developing SOB. Patient does endorse lightheadedness while walking around. Patient denies chest pain.        Interval visit 1/17/2023: During last visit, amlodipine 5 mg daily was restarted for uncontrolled hypertension. Since last visit, he has been doing well.   She denies chest pain/pressure, SOB, dizziness, palpitations, orthopnea, PND or syncope  She has been having ankle edema worse at the end of the day. She is concerned that the edema and mild weight gain is related to her heart.  She usually babysits her grandchildren without limitations but is not exercising.      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     Thyroid disease      Transient global amnesia      Unspecified tinnitus     Tinnitus       CURRENT MEDICATIONS:  Current Outpatient Medications   Medication Sig Dispense Refill     acetaminophen (TYLENOL) 325 MG tablet Take 2 tablets (650 mg) by mouth every 6 hours as needed for mild pain or pain 90 tablet 0     amLODIPine (NORVASC) 5 MG tablet Take 2 tablets (10 mg) by mouth daily 180 tablet 3     amoxicillin (AMOXIL) 500 MG capsule Take 4 capsules (2,000 mg) by mouth daily as needed (Please take prior to dental procedures) 4 capsule 3     aspirin (ASA) 81 MG chewable tablet 1 tablet (81 mg) by Oral or NG Tube route daily 90 tablet 0     citalopram (CELEXA) 10 MG tablet Take 1 tablet (10 mg) by mouth daily 90 tablet 1     levothyroxine (SYNTHROID/LEVOTHROID) 50 MCG tablet Take 1 tablet (50 mcg) by mouth daily 90 tablet 3     metoprolol tartrate (LOPRESSOR) 25 MG tablet TAKE 0.5 TABLETS BY MOUTH 2 TIMES DAILY. 90 tablet 1     Multiple Vitamin (MULTI-VITAMIN DAILY PO) Multi Vitamin       Multiple Vitamins-Minerals (PRESERVISION AREDS PO) Take 1 tablet by mouth 2 times daily       simvastatin (ZOCOR) 20 MG tablet TAKE 1 TABLET (20 MG) BY MOUTH AT BEDTIME 90 tablet 3     multivitamin, therapeutic (THERA-VIT) TABS tablet Take 1 tablet by mouth daily (Patient not taking: Reported on 1/17/2023)         ALLERGIES:     Allergies   Allergen Reactions     No Known Allergies        FAMILY HISTORY:  Family History   Problem Relation Age of Onset     Heart Disease Father         MI age 70       SOCIAL  "HISTORY:  Social History     Tobacco Use     Smoking status: Never     Smokeless tobacco: Never   Substance Use Topics     Alcohol use: Yes     Alcohol/week: 5.8 standard drinks     Types: 7 Standard drinks or equivalent per week     Comment: two to three glasses of wine q day.     Drug use: No       ROS:   A comprehensive 14 point review of systems is negative other than as mentioned in HPI.    Exam:  BP (!) 155/80   Pulse 85   Ht 1.58 m (5' 2.21\")   Wt 62.7 kg (138 lb 3.2 oz)   LMP  (LMP Unknown)   SpO2 98%   BMI 25.11 kg/m    GENERAL APPEARANCE: healthy, alert and no distress  NECK: JVP not elevated  RESPIRATORY: lungs clear to auscultation   CARDIOVASCULAR: regular rhythm, normal S1 and S2  EXTREMITIES: trace ankle edema.      Labs:  Reviewed.       Testing/Procedures:      Ladan Morgan personally visualized and interpreted:    TTE 6/7/202: LVEF: 55-60%, normal RV function. Small Residual VSD present, color doppler not performed.       CMR 1/26/22: LVEF:55%, Apical Muscular VSD with left to right shunt, moderately dilated RV, no obvious LGE    Coronary angiogram/RHC 1/25/22:  Normal coronary arteries  RHC: RA 8 (v wave to 13) RV 50/7 PA 44/18(27) PCW 16 TDCO/CI 3.9/2.3   Shunt eval (O2 sat): R IJV 63.5% SVC 65.3% IVC 62.8% RA 63.6%   RV 82.5%   PA 80.2% RPA 80.6% RPCW 96% Ao 98% Hgb 11.7   Qp/Qs 2.14     TTE 2/7/22 (read by Dr. Pickering):  Status post removal of Amplatzer device and patch closure of muscular VSD  2/4/22. Some images (images #45-46 and 50-54) are suspicious for a small  residual VSD at the apical margin of the patch. Consider cardiac CTA or MRI  for further evaluation.  Global and regional left ventricular function is normal with an EF of 55-60%.  Mild right ventricular dilation is present. Global right ventricular function  is mildly reduced.  No pericardial effusion is present.     This study was compared with the study from 2/4/22 (intraoperative KATIE) and  2/3/22 (TTE): Possible " residual VSD was not seen on the postoperative images  from the intraoperative KATIE, however the ventricular septum is better  interrogated on today's KATIE. Compared with the prior TTE, there has been  interval removal of Amplatzer device and patch closure of VSD with significant  interval reduction in ventricular-level shunting. RV size and function also  appears slightly improved on limited views.    TTE 06/07/22 (reviewed by Dr. Pickering):  Normal LV size and function, LVEF=55-60%.  RV function is normal.  Small residual VSD (~6 mm) at the apical margin of the VSD patch repair, unchanged from 2/3/2022.     TTE 1/17/23 (reviewed by Dr. Pickering):   LVEF=50-55%. Normal RV function. Normal Small residual left-to-right shunt at the site of VSD repair. Mild-mod TR, mild AI. 4.3 cm aorta. No change from 6/7/22.    Assessment and Plan:     82 year old female with PMHx of apical muscular VSD s/p patch repair who presents today for follow up.    #Apical muscular VSD s/p repair on 2/4/2022   81 year old female who presented from [facility] with SOB and later found to have a hemodynamically significant VSD. QP:QS at the time was 2.14 on RHC and LHC. Initially underwent percutaneous closure with occluder device, however had significant residual shunt that necessitated VSD repair with patch.     TTE today is stable from 6/2022 and shows normal LV size/function with small residual L-R shunt, normal right sided pressures and RVSP of around 40 mmHg.    Plan:  - Continue aspirin 81 mg daily.   -SBE prophylaxis with dental procedures  - Compression stockings and leg elevation for ankle swelling  - Encourage exercise.    #HTN, controlled:   -Continue amlodipine 10 mg daily    #Small segmental PE  Started on 2/2022, completed 6 months of coumadin.     Follow up in a year with echocardiogram prior.      Staffed with Dr. Ladan Felton MD  Cardiovascular disease fellow  [facility]  [place]  [phone]  01/17/2023 5:04 PM     ATTENDING ATTESTATION     Patient was seen and evaluated with Dr. Felton. History was confirmed personally by me. Labs, imaging studies, EKGs, and telemetry were reviewed. Agree with assessment and plan as outlined above. The note has been edited by me as needed to produce a single, cohesive document.     Review of the result(s) of each unique test - TTE 1/17/23  Assessment requiring an independent historian(s) - family - son  Independent interpretation of a test performed by another physician/other qualified health care professional (not separately reported) - TTE 1/17/23  Ordering of each unique test    Justin Pickering MD  Staff Cardiologist

## 2023-01-17 ENCOUNTER — OFFICE VISIT (OUTPATIENT)
Dept: CARDIOLOGY | Facility: CLINIC | Age: 83
End: 2023-01-17
Attending: INTERNAL MEDICINE
Payer: COMMERCIAL

## 2023-01-17 VITALS
HEART RATE: 85 BPM | WEIGHT: 138.2 LBS | SYSTOLIC BLOOD PRESSURE: 155 MMHG | OXYGEN SATURATION: 98 % | HEIGHT: 62 IN | BODY MASS INDEX: 25.43 KG/M2 | DIASTOLIC BLOOD PRESSURE: 80 MMHG

## 2023-01-17 DIAGNOSIS — Q21.0 VENTRICULAR SEPTAL DEFECT: ICD-10-CM

## 2023-01-17 DIAGNOSIS — I10 BENIGN ESSENTIAL HYPERTENSION: ICD-10-CM

## 2023-01-17 DIAGNOSIS — E78.5 HYPERLIPIDEMIA LDL GOAL <100: ICD-10-CM

## 2023-01-17 DIAGNOSIS — Q21.0 VSD (VENTRICULAR SEPTAL DEFECT), MUSCULAR: ICD-10-CM

## 2023-01-17 DIAGNOSIS — Z87.74 S/P VENTRICULAR SEPTAL DEFECT REPAIR: ICD-10-CM

## 2023-01-17 LAB — LVEF ECHO: NORMAL

## 2023-01-17 PROCEDURE — 99214 OFFICE O/P EST MOD 30 MIN: CPT | Mod: 25 | Performed by: INTERNAL MEDICINE

## 2023-01-17 PROCEDURE — G0463 HOSPITAL OUTPT CLINIC VISIT: HCPCS | Performed by: INTERNAL MEDICINE

## 2023-01-17 PROCEDURE — G0463 HOSPITAL OUTPT CLINIC VISIT: HCPCS | Mod: 25

## 2023-01-17 PROCEDURE — 93306 TTE W/DOPPLER COMPLETE: CPT | Performed by: INTERNAL MEDICINE

## 2023-01-17 ASSESSMENT — PAIN SCALES - GENERAL: PAINLEVEL: NO PAIN (0)

## 2023-01-17 NOTE — NURSING NOTE
Chief Complaint   Patient presents with     Follow Up     Return Cardiology- return cardiology, 81 yo female S/P ventricular septal defect repair presents for follow up with echocardiogram prior.     Vitals were taken and medications reconciled.    TEMO Andino  3:31 PM

## 2023-01-17 NOTE — LETTER
1/17/2023      RE: Kimberley Mullins  [id number]  [place] [address]       Dear Colleague,    Thank you for the opportunity to participate in the care of your patient, Kimberley Mullins, at the [facility] [place] [facility]. Please see a copy of my visit note below.    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    Initial visit 6/7/2022: Ms. Mullins presented to PCP clinic on 1/2022 where he was noted to dyspnea, peripheral edema and a new murmur. Patient was sent to [facility] ED where he was found to have a large muscular VSD on echo. She was transferred to [place] where she underwent a RHC with RA:63.6% and a step up to RV the RV of 82.5 suggestive of shunt, along with a QP/QP: 2.14. Coronary angiogram revealed widely patent coronary arteries.  She was then admitted to the Cardiology service where she later underwent a CMRI to assess the VSD0 cMRI revealed a large VSD with left to right shunt. It was determined that the patient would undergoe percutaneous placement of a 14mm Amplatzer closure device. However despite percutaneous intervention, patient still had a high grade shunt and was referred to surgery. She then underwent an apical muscular VSD repair to good results and was discharged with plans to follow up with Dr. Pickering.     Today, she returns back to clinic where she is overall doing well. She states that she is now able to climb up a flight of stairs, which is a significant improvement since after she was diagnosed with a VSD. Patient is able 1-2 walks before developing SOB. Patient does endorse lightheadedness while walking around. Patient denies chest pain.        Interval visit 1/17/2023: During last visit, amlodipine 5 mg daily was restarted for uncontrolled hypertension. Since last visit, he  has been doing well.   She denies chest pain/pressure, SOB, dizziness, palpitations, orthopnea, PND or syncope  She has been having ankle edema worse at the end of the day. She is concerned that the edema and mild weight gain is related to her heart.  She usually babysits her grandchildren without limitations but is not exercising.      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     Thyroid disease      Transient global amnesia      Unspecified tinnitus     Tinnitus       CURRENT MEDICATIONS:  Current Outpatient Medications   Medication Sig Dispense Refill     acetaminophen (TYLENOL) 325 MG tablet Take 2 tablets (650 mg) by mouth every 6 hours as needed for mild pain or pain 90 tablet 0     amLODIPine (NORVASC) 5 MG tablet Take 2 tablets (10 mg) by mouth daily 180 tablet 3     amoxicillin (AMOXIL) 500 MG capsule Take 4 capsules (2,000 mg) by mouth daily as needed (Please take prior to dental procedures) 4 capsule 3     aspirin (ASA) 81 MG chewable tablet 1 tablet (81 mg) by Oral or NG Tube route daily 90 tablet 0     citalopram (CELEXA) 10 MG tablet Take 1 tablet (10 mg) by mouth daily 90 tablet 1     levothyroxine (SYNTHROID/LEVOTHROID) 50 MCG tablet Take 1 tablet (50 mcg) by mouth daily 90 tablet 3     metoprolol tartrate (LOPRESSOR) 25 MG tablet TAKE 0.5 TABLETS BY MOUTH 2 TIMES DAILY. 90 tablet 1     Multiple Vitamin (MULTI-VITAMIN DAILY PO) Multi Vitamin       Multiple Vitamins-Minerals (PRESERVISION AREDS PO) Take 1 tablet by mouth 2 times daily       simvastatin (ZOCOR) 20 MG tablet TAKE 1 TABLET (20 MG) BY MOUTH AT BEDTIME 90  "tablet 3     multivitamin, therapeutic (THERA-VIT) TABS tablet Take 1 tablet by mouth daily (Patient not taking: Reported on 1/17/2023)         ALLERGIES:     Allergies   Allergen Reactions     No Known Allergies        FAMILY HISTORY:  Family History   Problem Relation Age of Onset     Heart Disease Father         MI age 70       SOCIAL HISTORY:  Social History     Tobacco Use     Smoking status: Never     Smokeless tobacco: Never   Substance Use Topics     Alcohol use: Yes     Alcohol/week: 5.8 standard drinks     Types: 7 Standard drinks or equivalent per week     Comment: two to three glasses of wine q day.     Drug use: No       ROS:   A comprehensive 14 point review of systems is negative other than as mentioned in HPI.    Exam:  BP (!) 155/80   Pulse 85   Ht 1.58 m (5' 2.21\")   Wt 62.7 kg (138 lb 3.2 oz)   LMP  (LMP Unknown)   SpO2 98%   BMI 25.11 kg/m    GENERAL APPEARANCE: healthy, alert and no distress  NECK: JVP not elevated  RESPIRATORY: lungs clear to auscultation   CARDIOVASCULAR: regular rhythm, normal S1 and S2  EXTREMITIES: trace ankle edema.      Labs:  Reviewed.       Testing/Procedures:      Ladan Morgan personally visualized and interpreted:    TTE 6/7/202: LVEF: 55-60%, normal RV function. Small Residual VSD present, color doppler not performed.       CMR 1/26/22: LVEF:55%, Apical Muscular VSD with left to right shunt, moderately dilated RV, no obvious LGE    Coronary angiogram/RHC 1/25/22:  Normal coronary arteries  RHC: RA 8 (v wave to 13) RV 50/7 PA 44/18(27) PCW 16 TDCO/CI 3.9/2.3   Shunt eval (O2 sat): R IJV 63.5% SVC 65.3% IVC 62.8% RA 63.6%   RV 82.5%   PA 80.2% RPA 80.6% RPCW 96% Ao 98% Hgb 11.7   Qp/Qs 2.14     TTE 2/7/22 (read by Dr. Pickering):  Status post removal of Amplatzer device and patch closure of muscular VSD  2/4/22. Some images (images #45-46 and 50-54) are suspicious for a small  residual VSD at the apical margin of the patch. Consider cardiac CTA or MRI  for " further evaluation.  Global and regional left ventricular function is normal with an EF of 55-60%.  Mild right ventricular dilation is present. Global right ventricular function  is mildly reduced.  No pericardial effusion is present.     This study was compared with the study from 2/4/22 (intraoperative KATIE) and  2/3/22 (TTE): Possible residual VSD was not seen on the postoperative images  from the intraoperative KATIE, however the ventricular septum is better  interrogated on today's KATIE. Compared with the prior TTE, there has been  interval removal of Amplatzer device and patch closure of VSD with significant  interval reduction in ventricular-level shunting. RV size and function also  appears slightly improved on limited views.    TTE 06/07/22 (reviewed by Dr. Pickering):  Normal LV size and function, LVEF=55-60%.  RV function is normal.  Small residual VSD (~6 mm) at the apical margin of the VSD patch repair, unchanged from 2/3/2022.     TTE 1/17/23 (reviewed by Dr. Pickering):   LVEF=50-55%. Normal RV function. Normal Small residual left-to-right shunt at the site of VSD repair. Mild-mod TR, mild AI. 4.3 cm aorta. No change from 6/7/22.    Assessment and Plan:     82 year old female with PMHx of apical muscular VSD s/p patch repair who presents today for follow up.    #Apical muscular VSD s/p repair on 2/4/2022   81 year old female who presented from [facility] with SOB and later found to have a hemodynamically significant VSD. QP:QS at the time was 2.14 on RHC and LHC. Initially underwent percutaneous closure with occluder device, however had significant residual shunt that necessitated VSD repair with patch.     TTE today is stable from 6/2022 and shows normal LV size/function with small residual L-R shunt, normal right sided pressures and RVSP of around 40 mmHg.    Plan:  - Continue aspirin 81 mg daily.   -SBE prophylaxis with dental procedures  - Compression stockings and leg elevation for ankle  swelling  - Encourage exercise.    #HTN, controlled:   -Continue amlodipine 10 mg daily    #Small segmental PE  Started on 2/2022, completed 6 months of coumadin.     Follow up in a year with echocardiogram prior.      Staffed with Dr. Ladan Felton MD  Cardiovascular disease fellow  [facility]  [phone]  01/17/2023 5:04 PM     ATTENDING ATTESTATION     Patient was seen and evaluated with Dr. Felton. History was confirmed personally by me. Labs, imaging studies, EKGs, and telemetry were reviewed. Agree with assessment and plan as outlined above. The note has been edited by me as needed to produce a single, cohesive document.     Review of the result(s) of each unique test - TTE 1/17/23  Assessment requiring an independent historian(s) - family - son  Independent interpretation of a test performed by another physician/other qualified health care professional (not separately reported) - TTE 1/17/23  Ordering of each unique test    Justin Pickering MD  Staff Cardiologist

## 2023-01-17 NOTE — PATIENT INSTRUCTIONS
"Cardiology Providers you saw during your visit:  Dr. Pickering    Medication changes:  No changes    Follow up:  1 year with Dr. Pickering with echocardiogram prior.    Labs:  None    Please call if you have :  1. Weight gain of more than 2 pounds in a day or 5 pounds in a week  2. Increased shortness of breath, swelling or bloating  3. Dizziness, lightheadedness   4. Any questions or concerns.     Follow the American Heart Association Diet and Lifestyle recommendations:  Limit saturated fat, trans fat, sodium, red meat, sweets and sugar-sweetened beverages. If you choose to eat red meat, compare labels and select the leanest cuts available.  Aim for at least 150 minutes of moderate physical activity or 75 minutes of vigorous physical activity - or an equal combination of both - each week.    During business hours: [phone], option # 1 \"To leave a message for your care team\"     After hours, weekends or holidays: On Call Cardiologist [phone]   option #4 and ask to speak to the on-call Cardiologist. Inform them you are a CORE/heart failure patient at the [facility].    Aliya Victor RN BSN  Cardiology Nurse Coordinator - Heart Failure Clinic  [facility]  [phone] option 1 to schedule an appointment or leave a message for your care team    "

## 2023-01-17 NOTE — NURSING NOTE
Med Reconcile: Reviewed and verified all current medications with the patient. The updated medication list was printed and given to the patient.    Return Appointment: Patient given instructions regarding scheduling next clinic visit. Patient demonstrated understanding of this information and agreed to call with further questions or concerns. Follow up with Dr. Pickering in 1 year with echo prior.    Patient stated she understood all health information given and agreed to call with further questions or concerns.    Aliya Victor RN

## 2023-03-28 ENCOUNTER — OFFICE VISIT (OUTPATIENT)
Dept: FAMILY MEDICINE | Facility: CLINIC | Age: 83
End: 2023-03-28

## 2023-03-28 VITALS
HEART RATE: 76 BPM | SYSTOLIC BLOOD PRESSURE: 124 MMHG | HEIGHT: 62 IN | BODY MASS INDEX: 25.98 KG/M2 | OXYGEN SATURATION: 98 % | DIASTOLIC BLOOD PRESSURE: 78 MMHG | WEIGHT: 141.2 LBS | TEMPERATURE: 98 F

## 2023-03-28 DIAGNOSIS — M54.2 NECK PAIN: Primary | ICD-10-CM

## 2023-03-28 DIAGNOSIS — M25.473 ANKLE SWELLING, UNSPECIFIED LATERALITY: ICD-10-CM

## 2023-03-28 DIAGNOSIS — E87.1 HYPONATREMIA: ICD-10-CM

## 2023-03-28 LAB
% GRANULOCYTES: 64.4 %
BUN SERPL-MCNC: 17 MG/DL (ref 7–25)
BUN/CREATININE RATIO: 18.7 (ref 6–22)
CALCIUM SERPL-MCNC: 9.1 MG/DL (ref 8.6–10.3)
CHLORIDE SERPLBLD-SCNC: 100.7 MMOL/L (ref 98–110)
CO2 SERPL-SCNC: 30 MMOL/L (ref 20–32)
CREAT SERPL-MCNC: 0.91 MG/DL (ref 0.6–1.3)
GLUCOSE SERPL-MCNC: 104 MG/DL (ref 60–99)
HCT VFR BLD AUTO: 40.5 % (ref 35–47)
HEMOGLOBIN: 12.8 G/DL (ref 11.7–15.7)
LYMPHOCYTES NFR BLD AUTO: 23.3 %
MCH RBC QN AUTO: 32 PG (ref 26–33)
MCHC RBC AUTO-ENTMCNC: 31.6 G/DL (ref 31–36)
MCV RBC AUTO: 101.3 FL (ref 78–100)
MONOCYTES NFR BLD AUTO: 12.3 %
PLATELET COUNT - QUEST: 209 10^9/L (ref 150–375)
POTASSIUM SERPL-SCNC: 4.6 MMOL/L (ref 3.5–5.3)
RBC # BLD AUTO: 4 10*12/L (ref 3.8–5.2)
SODIUM SERPL-SCNC: 136.7 MMOL/L (ref 135–146)
WBC # BLD AUTO: 5.8 10*9/L (ref 4–11)

## 2023-03-28 PROCEDURE — 85025 COMPLETE CBC W/AUTO DIFF WBC: CPT | Performed by: FAMILY MEDICINE

## 2023-03-28 PROCEDURE — 99214 OFFICE O/P EST MOD 30 MIN: CPT | Performed by: FAMILY MEDICINE

## 2023-03-28 PROCEDURE — 36415 COLL VENOUS BLD VENIPUNCTURE: CPT | Performed by: FAMILY MEDICINE

## 2023-03-28 PROCEDURE — 80048 BASIC METABOLIC PNL TOTAL CA: CPT | Performed by: FAMILY MEDICINE

## 2023-03-28 NOTE — NURSING NOTE
"Chief Complaint   Patient presents with     Neck Pain     Neck pain for the last month,neck feels tight, can hear \"crunching\" when she moves her neck, unable to turn from side to side, had xrays years ago that showed some degenerative bone loss     Swelling     Bilateral ankle swelling, ankles are ok in the morning and then swelling increases the more she is on her feet and walking     Pre-visit Screening:  Immunizations:  up to date  Colonoscopy:  is up to date  Mammogram: is up to date  Asthma Action Test/Plan:  NA  PHQ9:  NA  GAD7:  NA  Questioned patient about current smoking habits Pt. has never smoked.  Ok to leave detailed message on voice mail for today's visit only Yes, phone # [phone]      "

## 2023-03-28 NOTE — PROGRESS NOTES
"Assessment & Plan   Problem List Items Addressed This Visit        Endocrine    Hyponatremia   Other Visit Diagnoses     Neck pain    -  Primary    Relevant Orders    Physical Therapy Referral    Ankle swelling, unspecified laterality        Relevant Orders    VENOUS COLLECTION (Completed)    Basic Metabolic Panel (BFP) (Completed)    HEMOGRAM PLATELET DIFF (BFP) (Completed)           1. Neck pain  Referral done to physical therapy, recheck in 2-3 weeks.  - Physical Therapy Referral    2. Ankle swelling, unspecified laterality  Check labs. She should also contact her cardiologist, discuss possibility of heart failure with weight gain and ankle swelling. I will check labs today.  - VENOUS COLLECTION  - Basic Metabolic Panel (BFP)  - HEMOGRAM PLATELET DIFF (BFP)    3. Hyponatremia  Recheck today.         BMI:   Estimated body mass index is 25.83 kg/m  as calculated from the following:    Height as of this encounter: 1.575 m (5' 2\").    Weight as of this encounter: 64 kg (141 lb 3.2 oz).         FUTURE APPOINTMENTS:       - Follow-up visit in 2-3 weeks.    No follow-ups on file.    Katlyn Daniels MD  [facility]    Subjective     Nursing Notes:   Dilma Malin [facility]  3/28/2023  1:41 PM  Signed  Chief Complaint   Patient presents with     Neck Pain     Neck pain for the last month,neck feels tight, can hear \"crunching\" when she moves her neck, unable to turn from side to side, had xrays years ago that showed some degenerative bone loss     Swelling     Bilateral ankle swelling, ankles are ok in the morning and then swelling increases the more she is on her feet and walking     Pre-visit Screening:  Immunizations:  up to date  Colonoscopy:  is up to date  Mammogram: is up to date  Asthma Action Test/Plan:  NA  PHQ9:  NA  GAD7:  NA  Questioned patient about current smoking habits Pt. has never smoked.  Ok to leave detailed message on voice mail for today's visit only Yes, phone # [phone]       " "    Kimberley Mullins is a 82 year old female who presents to clinic today for the following health issues     HPI     Here with weight gain and ankle swelling.  Had some tendonitis in the foot and this was causing the swelling but this is gone.   Now has bilateral ankle swelling, this has been for about maybe a month comes and goes. Breathing is ok. Has had some low sodium.  Just had an echo in January, sees cardiology. Next echo in  A year.    Also has some pain her neck and hurts to turn it right and left, and it \"crunches\". 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  This started months ago. It was sore in the morning, thought it might be her pillow. It felt like the muscle on the side/back areas. Felt tight. No UE numbness/tingling/pain/weakness. This neck pain hasn't gone away and it's been awhile. Hasn't been seen for this before. Not sure who told her about DJD. Saw ortho for her wrist and thinks he might have told her this. She thinks they might have done an xray for this.        Review of Systems   Constitutional, HEENT, cardiovascular, pulmonary, gi and gu systems are negative, except as otherwise noted.      Objective    /78 (BP Location: Left arm, Patient Position: Sitting, Cuff Size: Adult Regular)   Pulse 76   Temp 98  F (36.7  C) (Temporal)   Ht 1.575 m (5' 2\")   Wt 64 kg (141 lb 3.2 oz)   LMP  (LMP Unknown)   SpO2 98%   BMI 25.83 kg/m    Body mass index is 25.83 kg/m .  Physical Exam   GENERAL: healthy, alert and no distress  RESP: lungs clear to auscultation - no rales, rhonchi or wheezes  CV: regular rate and rhythm, normal S1 S2, no S3 or S4, no murmur, click or rub, no peripheral edema and peripheral pulses strong  MS: no gross musculoskeletal defects noted, no edema  NEURO: Normal strength and tone, mentation intact and speech normal  PSYCH: mentation appears normal, affect normal/bright  No pedal edema, no obvious ankle swelling    Results for orders " placed or performed in visit on 03/28/23   Basic Metabolic Panel (BFP)     Status: Abnormal   Result Value Ref Range    Carbon Dioxide 30.0 20 - 32 mmol/L    Creatinine 0.91 0.60 - 1.30 mg/dL    Glucose 104 (A) 60 - 99 mg/dL    Sodium 136.7 135 - 146 mmol/L    Potassium 4.60 3.5 - 5.3 mmol/L    Chloride 100.7 98 - 110 mmol/L    Urea Nitrogen 17 7 - 25 mg/dL    Calcium 9.1 8.6 - 10.3 mg/dL    BUN/Creatinine Ratio 18.7 6 - 22   HEMOGRAM PLATELET DIFF (BFP)     Status: Abnormal   Result Value Ref Range    WBC 5.8 4.0 - 11 10*9/L    RBC Count 4.00 3.8 - 5.2 10*12/L    Hemoglobin 12.8 11.7 - 15.7 g/dL    Hematocrit 40.5 35.0 - 47.0 %    .3 (A) 78 - 100 fL    MCH 32.0 26 - 33 pg    MCHC 31.6 31 - 36 g/dL    Platelet Count 209 150 - 375 10^9/L    % Granulocytes 64.4 %    % Lymphocytes 23.3 %    % Monocytes 12.3 %

## 2023-04-17 ENCOUNTER — OFFICE VISIT (OUTPATIENT)
Dept: FAMILY MEDICINE | Facility: CLINIC | Age: 83
End: 2023-04-17

## 2023-04-17 VITALS
DIASTOLIC BLOOD PRESSURE: 80 MMHG | BODY MASS INDEX: 25.58 KG/M2 | WEIGHT: 139 LBS | HEART RATE: 80 BPM | TEMPERATURE: 97.1 F | OXYGEN SATURATION: 98 % | HEIGHT: 62 IN | SYSTOLIC BLOOD PRESSURE: 138 MMHG

## 2023-04-17 DIAGNOSIS — F41.9 ANXIETY: ICD-10-CM

## 2023-04-17 DIAGNOSIS — J01.10 ACUTE NON-RECURRENT FRONTAL SINUSITIS: Primary | ICD-10-CM

## 2023-04-17 PROCEDURE — 99213 OFFICE O/P EST LOW 20 MIN: CPT | Performed by: PHYSICIAN ASSISTANT

## 2023-04-17 NOTE — PROGRESS NOTES
"  Assessment & Plan     Acute non-recurrent frontal sinusitis-no pain on palpation, suspect viral sinusitis at this time. Will treat conservatively and await improvement  -Rest, increase fluids, honey for cough suppression/sore throat  -coricidin decongestant  -Tylenol as needed for symptomatic relief  Seek emergency care for significant shortness of breath, chest pain, and/or fever >103F that cannot be controlled with antipyretics   -Pt will call in 2-4 days if no improvement-consider AB at that time        Follow up as needed-call with worsening symptoms    No follow-ups on file.    Aldair Shah, PA-C  [facility]    Subjective   Kimberley is a 82 year old, presenting for the following health issues:  Sinus Problem (Sinus congestion for 7 days/Two neg covid tests/Possible sinus infection/Tried nasal rinse, mucinex)         View : No data to display.              HPI     No teeth pain.      Acute Illness  Acute illness concerns: Sinus pressure/pain/burning  Onset/Duration: 1 week  Symptoms:  Fever: YES  Chills/Sweats: No  Headache (location?): No  Sinus Pressure: YES  Conjunctivitis:  No  Ear Pain: no  Rhinorrhea: YES  Congestion: YES  Sore Throat: YES  Cough: YES-productive of yellow sputum  Wheeze: No  Decreased Appetite: YES  Nausea: No  Vomiting: No  Diarrhea: No  Dysuria/Freq.: No  Dysuria or Hematuria: No  Fatigue/Achiness: YES  Sick/Strep Exposure: YES  Therapies tried and outcome: Mucinex, Tylenol      Review of Systems   Constitutional, HEENT, cardiovascular, pulmonary, gi and gu systems are negative, except as otherwise noted.      Objective    /80 (BP Location: Right arm, Patient Position: Sitting, Cuff Size: Adult Large)   Pulse 80   Temp 97.1  F (36.2  C) (Temporal)   Ht 1.575 m (5' 2\")   Wt 63 kg (139 lb)   LMP  (LMP Unknown)   SpO2 98%   BMI 25.42 kg/m    Body mass index is 25.42 kg/m .  Physical Exam   GENERAL: healthy, alert and no distress  HENT: ear canals and TM's " normal, nose and mouth without ulcers or lesions  NECK: no adenopathy, no asymmetry, masses, or scars and thyroid normal to palpation. No tenderness to sinus palpation throughout  RESP: lungs clear to auscultation - no rales, rhonchi or wheezes  CV: regular rate and rhythm, normal S1 S2, no S3 or S4, no murmur, click or rub, no peripheral edema and peripheral pulses strong  ABDOMEN: soft, nontender, no hepatosplenomegaly, no masses and bowel sounds normal  MS: no gross musculoskeletal defects noted, no edema  NEURO: Normal strength and tone, mentation intact and speech normal

## 2023-04-17 NOTE — NURSING NOTE
Chief Complaint   Patient presents with     Sinus Problem     Sinus congestion for 7 days  Two neg covid tests  Possible sinus infection  Tried nasal rinse, mucinex         Pre-visit Screening:  Immunizations:  up to date  Colonoscopy:  UTD  Mammogram: UTD  Asthma Action Test/Plan:  NA  PHQ9:  NA  GAD7:  NA  Questioned patient about current smoking habits Pt. has never smoked.  Ok to leave detailed message on voice mail for today's visit only Yes, phone # [phone]

## 2023-04-21 RX ORDER — CITALOPRAM HYDROBROMIDE 10 MG/1
10 TABLET ORAL DAILY
Qty: 90 TABLET | Refills: 1 | COMMUNITY
Start: 2023-04-21

## 2023-04-21 NOTE — TELEPHONE ENCOUNTER
Kimberley Mullins is requesting a refill of:    Refused Prescriptions:                       Disp   Refills    citalopram (CELEXA) 10 MG tablet [Pharmacy*90 tab*1        Sig: TAKE 1 TABLET (10 MG) BY MOUTH DAILY.  Refused By: PHANI CORDOVA  Reason for Refusal: Patient needs appointment    Needs OV for refills

## 2023-04-25 ENCOUNTER — TELEPHONE (OUTPATIENT)
Dept: FAMILY MEDICINE | Facility: CLINIC | Age: 83
End: 2023-04-25

## 2023-04-25 DIAGNOSIS — J01.90 ACUTE BACTERIAL SINUSITIS: Primary | ICD-10-CM

## 2023-04-25 DIAGNOSIS — B96.89 ACUTE BACTERIAL SINUSITIS: Primary | ICD-10-CM

## 2023-04-25 NOTE — TELEPHONE ENCOUNTER
Pt has worsened/not improved since visit. Still having significant sinus symptoms. Discussed augmentin Rx and patient agrees to take med for 7 days BID with food. Will call with any questions or concerns  Aldair Shah PA-C  [facility]      Kimberley Mullins called the clinic support line with the following:    Sx have not improved, still has sinus congestion, drainage and cough. Laryngitis started today    Please advise - [phone] (home)

## 2023-05-12 NOTE — PROGRESS NOTES
Assessment & Plan   Problem List Items Addressed This Visit        Endocrine    Pure hypercholesterolemia    Relevant Medications    simvastatin (ZOCOR) 20 MG tablet    Hypothyroidism    Relevant Medications    levothyroxine (SYNTHROID/LEVOTHROID) 50 MCG tablet    Other Relevant Orders    TSH WITH FREE T4 REFLEX (QUEST)       Circulatory    Benign essential hypertension    Relevant Medications    amLODIPine (NORVASC) 5 MG tablet    metoprolol tartrate (LOPRESSOR) 25 MG tablet    Other Relevant Orders    VENOUS COLLECTION (Completed)    Lipid Panel (BFP)    Comprehensive Metobolic Panel (BFP)    VSD (ventricular septal defect)    Single subsegmental pulmonary embolism without acute cor pulmonale (H)       Other    S/P ventricular septal defect repair    Relevant Medications    metoprolol tartrate (LOPRESSOR) 25 MG tablet    Anxiety    Relevant Medications    citalopram (CELEXA) 10 MG tablet   Other Visit Diagnoses     Nasal congestion    -  Primary    Relevant Medications    fluticasone (FLONASE) 50 MCG/ACT nasal spray    S/P VSD repair        Hyperlipidemia LDL goal <100        Relevant Medications    simvastatin (ZOCOR) 20 MG tablet    Dyspnea on exertion        Relevant Medications    fluticasone (FLONASE) 50 MCG/ACT nasal spray    Other ill-defined heart diseases        Relevant Medications    metoprolol tartrate (LOPRESSOR) 25 MG tablet    Abnormal coagulation profile         Initial Medicare annual wellness visit             1. Benign essential hypertension  Controlled on medications, refilled.  - amLODIPine (NORVASC) 5 MG tablet; Take 2 tablets (10 mg) by mouth daily  Dispense: 180 tablet; Refill: 1  - metoprolol tartrate (LOPRESSOR) 25 MG tablet; Take 0.5 tablets (12.5 mg) by mouth 2 times daily  Dispense: 90 tablet; Refill: 1  - VENOUS COLLECTION  - Lipid Panel (BFP)  - Comprehensive Metobolic Panel (BFP)    2. Anxiety  She is feeling much better. Refilled medications.  - citalopram (CELEXA) 10 MG tablet;  Take 1 tablet (10 mg) by mouth daily  Dispense: 90 tablet; Refill: 1    3. Acquired hypothyroidism  Check labs, refilled.  - levothyroxine (SYNTHROID/LEVOTHROID) 50 MCG tablet; Take 1 tablet (50 mcg) by mouth daily  Dispense: 90 tablet; Refill: 3  - TSH WITH FREE T4 REFLEX (QUEST)    4. S/P ventricular septal defect repair    - metoprolol tartrate (LOPRESSOR) 25 MG tablet; Take 0.5 tablets (12.5 mg) by mouth 2 times daily  Dispense: 90 tablet; Refill: 1    5. S/P VSD repair    - metoprolol tartrate (LOPRESSOR) 25 MG tablet; Take 0.5 tablets (12.5 mg) by mouth 2 times daily  Dispense: 90 tablet; Refill: 1    6. Hyperlipidemia LDL goal <100    - metoprolol tartrate (LOPRESSOR) 25 MG tablet; Take 0.5 tablets (12.5 mg) by mouth 2 times daily  Dispense: 90 tablet; Refill: 1    7. Single subsegmental pulmonary embolism without acute cor pulmonale (H)    - metoprolol tartrate (LOPRESSOR) 25 MG tablet; Take 0.5 tablets (12.5 mg) by mouth 2 times daily  Dispense: 90 tablet; Refill: 1    8. VSD (ventricular septal defect)    - metoprolol tartrate (LOPRESSOR) 25 MG tablet; Take 0.5 tablets (12.5 mg) by mouth 2 times daily  Dispense: 90 tablet; Refill: 1    9. Dyspnea on exertion    - metoprolol tartrate (LOPRESSOR) 25 MG tablet; Take 0.5 tablets (12.5 mg) by mouth 2 times daily  Dispense: 90 tablet; Refill: 1    10. Other ill-defined heart diseases    - metoprolol tartrate (LOPRESSOR) 25 MG tablet; Take 0.5 tablets (12.5 mg) by mouth 2 times daily  Dispense: 90 tablet; Refill: 1    11. Abnormal coagulation profile     - metoprolol tartrate (LOPRESSOR) 25 MG tablet; Take 0.5 tablets (12.5 mg) by mouth 2 times daily  Dispense: 90 tablet; Refill: 1    12. Pure hypercholesterolemia    - simvastatin (ZOCOR) 20 MG tablet; TAKE 1 TABLET (20 MG) BY MOUTH AT BEDTIME  Dispense: 90 tablet; Refill: 3    13. Nasal congestion  Try flonase. Recheck if no improvement in a few weeks.  - fluticasone (FLONASE) 50 MCG/ACT nasal spray; Spray 1  "spray into both nostrils daily  Dispense: 1 g; Refill: 11      14. Initial Medicare annual wellness visit  Completed.       BMI:   Estimated body mass index is 25.31 kg/m  as calculated from the following:    Height as of this encounter: 1.575 m (5' 2\").    Weight as of this encounter: 62.8 kg (138 lb 6.4 oz).         FUTURE APPOINTMENTS:       - Follow-up visit in 6 months.    No follow-ups on file.    Katlyn Daniels MD  [facility]    Subjective     Nursing Notes:   Dilma Malin, [facility]  5/16/2023  8:39 AM  Signed  Chief Complaint   Patient presents with     Recheck Medication     Fasting today, refill medications     Pre-visit Screening:  Immunizations:  up to date  Colonoscopy:  UTD  Mammogram: UTD  Asthma Action Test/Plan:  NA  PHQ9:  Done todau  GAD7:  Done today  Questioned patient about current smoking habits Pt. has never smoked.  Ok to leave detailed message on voice mail for today's visit only Yes, phone # [phone]           Kimberley Mullins is a 82 year old female who presents to clinic today for the following health issues     HPI     Here to followup on her medications. She is doing well. Due for labs and refills.  Citalopram, would like to continue with this, same dose. This is controlling her anxiety and depression.  Due for thyroid labs and med reills.  Blood pressure is controlled, checks at home, controlled.    Had a neck strain, went to therapy for this. This is better and is doing home exercises.    Has some post nasal congestion then into the throat. Wondering if something might help her. Tried a neti pot but not better.        Review of Systems   Constitutional, HEENT, cardiovascular, pulmonary, gi and gu systems are negative, except as otherwise noted.      Objective    /82 (BP Location: Left arm, Patient Position: Sitting, Cuff Size: Adult Regular)   Pulse 65   Temp 98  F (36.7  C) (Temporal)   Ht 1.575 m (5' 2\")   Wt 62.8 kg (138 lb 6.4 oz)   LMP  (LMP " Unknown)   SpO2 96%   BMI 25.31 kg/m    Body mass index is 25.31 kg/m .  Physical Exam   GENERAL: healthy, alert and no distress  RESP: lungs clear to auscultation - no rales, rhonchi or wheezes  CV: regular rate and rhythm, normal S1 S2, no S3 or S4, no murmur, click or rub, no peripheral edema and peripheral pulses strong  MS: no gross musculoskeletal defects noted, no edema  NEURO: Normal strength and tone, mentation intact and speech normal  PSYCH: mentation appears normal, affect normal/bright    No results found for any visits on 05/16/23.

## 2023-05-16 ENCOUNTER — OFFICE VISIT (OUTPATIENT)
Dept: FAMILY MEDICINE | Facility: CLINIC | Age: 83
End: 2023-05-16

## 2023-05-16 VITALS
WEIGHT: 138.4 LBS | DIASTOLIC BLOOD PRESSURE: 82 MMHG | OXYGEN SATURATION: 96 % | HEART RATE: 65 BPM | TEMPERATURE: 98 F | BODY MASS INDEX: 25.47 KG/M2 | HEIGHT: 62 IN | SYSTOLIC BLOOD PRESSURE: 132 MMHG

## 2023-05-16 DIAGNOSIS — I10 BENIGN ESSENTIAL HYPERTENSION: ICD-10-CM

## 2023-05-16 DIAGNOSIS — Z87.74 S/P VSD REPAIR: ICD-10-CM

## 2023-05-16 DIAGNOSIS — R06.09 DYSPNEA ON EXERTION: ICD-10-CM

## 2023-05-16 DIAGNOSIS — I51.89 OTHER ILL-DEFINED HEART DISEASES: ICD-10-CM

## 2023-05-16 DIAGNOSIS — E03.9 ACQUIRED HYPOTHYROIDISM: ICD-10-CM

## 2023-05-16 DIAGNOSIS — Z87.74 S/P VENTRICULAR SEPTAL DEFECT REPAIR: ICD-10-CM

## 2023-05-16 DIAGNOSIS — I26.93 SINGLE SUBSEGMENTAL PULMONARY EMBOLISM WITHOUT ACUTE COR PULMONALE (H): ICD-10-CM

## 2023-05-16 DIAGNOSIS — F41.9 ANXIETY: ICD-10-CM

## 2023-05-16 DIAGNOSIS — Z00.00 INITIAL MEDICARE ANNUAL WELLNESS VISIT: ICD-10-CM

## 2023-05-16 DIAGNOSIS — E78.5 HYPERLIPIDEMIA LDL GOAL <100: ICD-10-CM

## 2023-05-16 DIAGNOSIS — R79.1 ABNORMAL COAGULATION PROFILE: ICD-10-CM

## 2023-05-16 DIAGNOSIS — E78.00 PURE HYPERCHOLESTEROLEMIA: ICD-10-CM

## 2023-05-16 DIAGNOSIS — Q21.0 VSD (VENTRICULAR SEPTAL DEFECT): ICD-10-CM

## 2023-05-16 DIAGNOSIS — R09.81 NASAL CONGESTION: Primary | ICD-10-CM

## 2023-05-16 LAB
ALBUMIN SERPL-MCNC: 4.2 G/DL (ref 3.6–5.1)
ALBUMIN/GLOB SERPL: 1.6 {RATIO} (ref 1–2.5)
ALP SERPL-CCNC: 75 U/L (ref 33–130)
ALT 1742-6: 13 U/L (ref 0–32)
AST 1920-8: 22 U/L (ref 0–35)
BILIRUB SERPL-MCNC: 0.9 MG/DL (ref 0.2–1.2)
BUN SERPL-MCNC: 10 MG/DL (ref 7–25)
BUN/CREATININE RATIO: 13.3 (ref 6–32)
CALCIUM SERPL-MCNC: 9.3 MG/DL (ref 8.6–10.3)
CHLORIDE SERPLBLD-SCNC: 98.4 MMOL/L (ref 98–110)
CHOLEST SERPL-MCNC: 168 MG/DL (ref 0–199)
CHOLEST/HDLC SERPL: 2 {RATIO} (ref 0–5)
CO2 SERPL-SCNC: 29 MMOL/L (ref 20–32)
CREAT SERPL-MCNC: 0.75 MG/DL (ref 0.6–1.3)
GLOBULIN, CALCULATED - QUEST: 2.6 (ref 1.9–3.7)
GLUCOSE SERPL-MCNC: 86 MG/DL (ref 60–99)
HDLC SERPL-MCNC: 75 MG/DL (ref 40–150)
LDLC SERPL CALC-MCNC: 78 MG/DL (ref 0–130)
POTASSIUM SERPL-SCNC: 4.42 MMOL/L (ref 3.5–5.3)
PROT SERPL-MCNC: 6.8 G/DL (ref 6.1–8.1)
SODIUM SERPL-SCNC: 135.2 MMOL/L (ref 135–146)
TRIGL SERPL-MCNC: 73 MG/DL (ref 0–149)

## 2023-05-16 PROCEDURE — 80053 COMPREHEN METABOLIC PANEL: CPT | Performed by: FAMILY MEDICINE

## 2023-05-16 PROCEDURE — G0438 PPPS, INITIAL VISIT: HCPCS | Performed by: FAMILY MEDICINE

## 2023-05-16 PROCEDURE — 36415 COLL VENOUS BLD VENIPUNCTURE: CPT | Performed by: FAMILY MEDICINE

## 2023-05-16 PROCEDURE — 80061 LIPID PANEL: CPT | Performed by: FAMILY MEDICINE

## 2023-05-16 PROCEDURE — 99214 OFFICE O/P EST MOD 30 MIN: CPT | Mod: 25 | Performed by: FAMILY MEDICINE

## 2023-05-16 RX ORDER — AMLODIPINE BESYLATE 5 MG/1
10 TABLET ORAL DAILY
Qty: 180 TABLET | Refills: 1 | Status: SHIPPED | OUTPATIENT
Start: 2023-05-16 | End: 2023-11-14

## 2023-05-16 RX ORDER — CITALOPRAM HYDROBROMIDE 10 MG/1
10 TABLET ORAL DAILY
Qty: 90 TABLET | Refills: 1 | Status: SHIPPED | OUTPATIENT
Start: 2023-05-16 | End: 2023-11-14

## 2023-05-16 RX ORDER — METOPROLOL TARTRATE 25 MG/1
12.5 TABLET, FILM COATED ORAL 2 TIMES DAILY
Qty: 90 TABLET | Refills: 1 | Status: SHIPPED | OUTPATIENT
Start: 2023-05-16 | End: 2023-11-14

## 2023-05-16 RX ORDER — SIMVASTATIN 20 MG
TABLET ORAL
Qty: 90 TABLET | Refills: 3 | Status: SHIPPED | OUTPATIENT
Start: 2023-05-16 | End: 2023-11-14

## 2023-05-16 RX ORDER — FLUTICASONE PROPIONATE 50 MCG
1 SPRAY, SUSPENSION (ML) NASAL DAILY
Qty: 1 G | Refills: 11 | Status: SHIPPED | OUTPATIENT
Start: 2023-05-16 | End: 2023-07-11

## 2023-05-16 RX ORDER — LEVOTHYROXINE SODIUM 50 UG/1
50 TABLET ORAL DAILY
Qty: 90 TABLET | Refills: 3 | Status: SHIPPED | OUTPATIENT
Start: 2023-05-16 | End: 2023-11-14

## 2023-05-16 ASSESSMENT — ANXIETY QUESTIONNAIRES
3. WORRYING TOO MUCH ABOUT DIFFERENT THINGS: NOT AT ALL
7. FEELING AFRAID AS IF SOMETHING AWFUL MIGHT HAPPEN: NOT AT ALL
5. BEING SO RESTLESS THAT IT IS HARD TO SIT STILL: NOT AT ALL
2. NOT BEING ABLE TO STOP OR CONTROL WORRYING: NOT AT ALL
GAD7 TOTAL SCORE: 0
1. FEELING NERVOUS, ANXIOUS, OR ON EDGE: NOT AT ALL
IF YOU CHECKED OFF ANY PROBLEMS ON THIS QUESTIONNAIRE, HOW DIFFICULT HAVE THESE PROBLEMS MADE IT FOR YOU TO DO YOUR WORK, TAKE CARE OF THINGS AT HOME, OR GET ALONG WITH OTHER PEOPLE: NOT DIFFICULT AT ALL
GAD7 TOTAL SCORE: 0
6. BECOMING EASILY ANNOYED OR IRRITABLE: NOT AT ALL

## 2023-05-16 ASSESSMENT — PATIENT HEALTH QUESTIONNAIRE - PHQ9
5. POOR APPETITE OR OVEREATING: NOT AT ALL
SUM OF ALL RESPONSES TO PHQ QUESTIONS 1-9: 0

## 2023-05-16 NOTE — PROGRESS NOTES
Kimberley Mullins is a 82 year old female who presents for Medicare Annual Wellness Visit.    Current providers caring for this patient include:  Patient Care Team:  Katlyn Daniels MD as PCP - General (Family Medicine)  Katlyn Daniels MD as Assigned PCP  Aliya Victor, RN as Specialty Care Coordinator (Cardiology)  Justin Pickering MD as MD (Cardiovascular Disease)  Justin Pickering MD as Assigned Heart and Vascular Provider    Complete Medical and Social history reviewed with patient, outlined below.    Patient Active Problem List   Diagnosis     Tinnitus     Transient global amnesia     Pure hypercholesterolemia     Hypothyroidism     Osteoarthritis     Status post total knee replacement     Health Care Home     ACP (advance care planning)     Benign essential hypertension     Irritable bowel syndrome with diarrhea     Osteopenia, unspecified location     Overweight (BMI 25.0-29.9)     Macular degeneration (senile) of retina     Cataract of both eyes, unspecified cataract type     Hyponatremia     Heart murmur     VSD (ventricular septal defect)     S/P ventricular septal defect repair     Single subsegmental pulmonary embolism without acute cor pulmonale (H)     Endometrial hyperplasia     Chronic anticoagulation     Anxiety       Past Medical History:   Diagnosis Date     Arthritis      Diverticulitis of colon (without mention of hemorrhage)(562.11) 1997    diverticulosis noted on Flex Sig     Irritable bowel syndrome 1959     Osteopenia, unspecified location 4/5/2018     Other ovarian failure 1986     Overweight (BMI 25.0-29.9) 4/5/2018     Personal history of colonic polyps 2004     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     Thyroid disease      Transient global amnesia      Unspecified tinnitus     Tinnitus       Past Surgical History:   Procedure Laterality Date     ARTHROPLASTY KNEE  10/25/2011    Procedure:ARTHROPLASTY KNEE; Right Total Knee Arthroplasty   ;  Surgeon:BRYCE ATWOOD; Location:[facility] OR     ARTHROPLASTY KNEE  6/2/2014    Procedure: ARTHROPLASTY KNEE;  Surgeon: Bryce Atwood MD;  Location: [facility] OR     CV CORONARY ANGIOGRAM N/A 1/25/2022    Procedure: Coronary Angiogram; diagnostic;  Surgeon: Sreekanth Delong MD;  Location:  HEART CARDIAC CATH LAB     CV RIGHT HEART CATH MEASUREMENTS RECORDED N/A 1/25/2022    Procedure: Right Heart Cath;  Surgeon: Sreekanth Delong MD;  Location:  HEART CARDIAC CATH LAB     CV VSD CLOSURE N/A 1/31/2022    Procedure: CV VSD CLOSURE W/KATIE;  Surgeon: Daniel Cerda MD;  Location:  HEART CARDIAC CATH LAB     HC DILATION/CURETTAGE DIAG/THER NON OB  2006    D & C     HC KNEE SCOPE, DIAGNOSTIC      Arthroscopy, Knee     REPAIR VENTRICULAR SEPTAL DEFECT N/A 2/4/2022    Procedure: Median Sternotomy, cardiopulmonary bypass, Ischemic Ventricular Septal Defect repair, tranesophageal echocardiogram performed by anesthesia;  Surgeon: Aldair Whitney MD;  Location:  OR     [facility] AFF FOREARM/WRIST SURGERY UNLISTED      orif radius fx distal left     C APPENDECTOMY  1955     [facility] OPEN JESSI FIXATN HUMERAL SHAFT FX  12/2007    Dr Massey     [facility] COLONOSCOPY W SNARE REMOVAL TUMOR/POLYP/LESION  9/03 & 3/2011    polyp removal during screening colonoscopy       Family History   Problem Relation Age of Onset     Heart Disease Father         MI age 70       Social History     Tobacco Use     Smoking status: Never     Passive exposure: Never     Smokeless tobacco: Never   Vaping Use     Vaping status: Not on file   Substance Use Topics     Alcohol use: Yes     Alcohol/week: 5.8 standard drinks of alcohol     Types: 7 Standard drinks or equivalent per week     Comment: two to three glasses of wine q day.       Diet: regular  Physical Activity: active without specific exercise program  Depression Screen:    Over the past 2 weeks, patient has felt down, depressed, or hopeless:  No    Over the past 2 weeks,  "patient has felt little interest or pleasure in doing things: No    Functional ability/Safety screen:  Up and go test (able to get up and walk longer than 30 seconds): Passed  Patient needs assistance with: nothing  Patient's home has the following possible safety concerns: none identified  Patient has concerns about her hearing:  Yes  Cognitive Screen  Patient repeats three objects (ball, flag, tree)      Clock drawing test:   NORMAL  Recalls three objects after 3 minutes (ball,flag,tree):                            recalls 3 objects (3 points)    Physical Exam:  /82 (BP Location: Left arm, Patient Position: Sitting, Cuff Size: Adult Regular)   Pulse 65   Temp 98  F (36.7  C) (Temporal)   Ht 1.575 m (5' 2\")   Wt 62.8 kg (138 lb 6.4 oz)   LMP  (LMP Unknown)   SpO2 96%   BMI 25.31 kg/m     Body mass index is 25.31 kg/m .           End of Life Planning:   Patient currently has an advanced directive: Yes.  Practitioner is supportive of decision.    Education/Counseling:   Based on review of the above information, the following items were addressed:      Discussed healthy diet and regular exercise    Appropriate preventive services were discussed with this patient, including applicable screening as appropriate for cardiovascular disease, diabetes, osteopenia/osteoporosis, and glaucoma.  As appropriate for age/gender, discussed screening for colorectal cancer, prostate cancer, breast cancer, and cervical cancer.   Checklist reviewing preventive services available has been given to the patient.      "

## 2023-05-16 NOTE — NURSING NOTE
Chief Complaint   Patient presents with     Recheck Medication     Fasting today, refill medications     Pre-visit Screening:  Immunizations:  up to date  Colonoscopy:  UTD  Mammogram: UTD  Asthma Action Test/Plan:  NA  PHQ9:  Done todau  GAD7:  Done today  Questioned patient about current smoking habits Pt. has never smoked.  Ok to leave detailed message on voice mail for today's visit only Yes, phone # [phone]

## 2023-05-18 LAB — TSH SERPL-ACNC: 3.43 MIU/L (ref 0.4–4.5)

## 2023-06-07 ENCOUNTER — OFFICE VISIT (OUTPATIENT)
Dept: FAMILY MEDICINE | Facility: CLINIC | Age: 83
End: 2023-06-07

## 2023-06-07 VITALS
SYSTOLIC BLOOD PRESSURE: 126 MMHG | HEART RATE: 75 BPM | BODY MASS INDEX: 25.4 KG/M2 | TEMPERATURE: 98.2 F | HEIGHT: 62 IN | WEIGHT: 138 LBS | DIASTOLIC BLOOD PRESSURE: 82 MMHG | OXYGEN SATURATION: 96 %

## 2023-06-07 DIAGNOSIS — R05.1 ACUTE COUGH: Primary | ICD-10-CM

## 2023-06-07 PROCEDURE — 99213 OFFICE O/P EST LOW 20 MIN: CPT | Performed by: FAMILY MEDICINE

## 2023-06-07 RX ORDER — BENZONATATE 200 MG/1
200 CAPSULE ORAL 3 TIMES DAILY PRN
Qty: 20 CAPSULE | Refills: 0 | Status: SHIPPED | OUTPATIENT
Start: 2023-06-07 | End: 2023-11-14

## 2023-06-07 RX ORDER — AZITHROMYCIN 250 MG/1
TABLET, FILM COATED ORAL
Qty: 6 TABLET | Refills: 0 | Status: SHIPPED | OUTPATIENT
Start: 2023-06-07 | End: 2023-11-14

## 2023-06-07 NOTE — NURSING NOTE
Chief Complaint   Patient presents with     URI     Productive cough for 1 week, she feels a lot of congestion in her throat, sinus congestion, post nasal drainage      Pre-visit Screening:  Immunizations:  up to date  Colonoscopy:  is up to date  Mammogram: is up to date  Asthma Action Test/Plan:  NA  PHQ9:  NA  GAD7:  NA  Questioned patient about current smoking habits Pt. has never smoked.  Ok to leave detailed message on voice mail for today's visit only Yes, phone # [phone]

## 2023-06-07 NOTE — PROGRESS NOTES
SUBJECTIVE:   Kimberley Mullins is a 82 year old female who complains of nasal congestion, post nasal drip, white nasal discharge, cough, chest congestion and fatigue for 7+ days-cough worsening. She denies a history of sweats, chills, myalgias, shortness of breath, vomiting and chest pain and denies a history of asthma. Patient does not smoke cigarettes.     Pt using flonase    Pt has tried coracedin and helps a little    covid test negative at home    Pt has 2 great grandchildren in home- always with colds    Patient Active Problem List   Diagnosis     Tinnitus     Transient global amnesia     Pure hypercholesterolemia     Hypothyroidism     Osteoarthritis     Status post total knee replacement     Health Care Home     ACP (advance care planning)     Benign essential hypertension     Irritable bowel syndrome with diarrhea     Osteopenia, unspecified location     Overweight (BMI 25.0-29.9)     Macular degeneration (senile) of retina     Cataract of both eyes, unspecified cataract type     Hyponatremia     Heart murmur     VSD (ventricular septal defect)     S/P ventricular septal defect repair     Single subsegmental pulmonary embolism without acute cor pulmonale (H)     Endometrial hyperplasia     Chronic anticoagulation     Anxiety     Past Medical History:   Diagnosis Date     Arthritis      Diverticulitis of colon (without mention of hemorrhage)(562.11) 1997    diverticulosis noted on Flex Sig     Irritable bowel syndrome 1959     Osteopenia, unspecified location 04/05/2018     Other ovarian failure 1986     Overweight (BMI 25.0-29.9) 04/05/2018     Personal history of colonic polyps 2004     Status post total knee replacement 11/09/2011     Thyroid disease      Transient global amnesia      Unspecified tinnitus     Tinnitus     Family History   Problem Relation Age of Onset     Heart Disease Father         MI age 70     Social History     Socioeconomic History     Marital status:      Spouse name: Not on  file     Number of children: 3     Years of education: Not on file     Highest education level: Not on file   Occupational History     Employer: RETIRED   Tobacco Use     Smoking status: Never     Passive exposure: Never     Smokeless tobacco: Never   Vaping Use     Vaping status: Not on file   Substance and Sexual Activity     Alcohol use: Yes     Alcohol/week: 5.8 standard drinks of alcohol     Types: 7 Standard drinks or equivalent per week     Comment: two to three glasses of wine q day.     Drug use: No     Sexual activity: Yes     Partners: Male     Birth control/protection: Post-menopausal   Other Topics Concern      Service Not Asked     Blood Transfusions Not Asked     Caffeine Concern No     Comment: none     Occupational Exposure Not Asked     Hobby Hazards Not Asked     Sleep Concern Not Asked     Stress Concern Not Asked     Weight Concern Not Asked     Special Diet No     Comment: calcium daily     Back Care Not Asked     Exercise Yes     Comment: regular walks     Bike Helmet Not Asked     Seat Belt Not Asked     Self-Exams Not Asked     Parent/sibling w/ CABG, MI or angioplasty before 65F 55M? Not Asked   Social History Narrative     Not on file     Social Determinants of Health     Financial Resource Strain: Low Risk  (5/29/2019)    Overall Financial Resource Strain (CARDIA)      Difficulty of Paying Living Expenses: Not hard at all   Food Insecurity: No Food Insecurity (5/29/2019)    Hunger Vital Sign      Worried About Running Out of Food in the Last Year: Never true      Ran Out of Food in the Last Year: Never true   Transportation Needs: No Transportation Needs (5/29/2019)    PRAPARE - Transportation      Lack of Transportation (Medical): No      Lack of Transportation (Non-Medical): No   Physical Activity: Not on file   Stress: Not on file   Social Connections: Not on file   Intimate Partner Violence: Not on file   Housing Stability: Not on file     Past Surgical History:   Procedure  Laterality Date     ARTHROPLASTY KNEE  10/25/2011    Procedure:ARTHROPLASTY KNEE; Right Total Knee Arthroplasty   ; Surgeon:BRYCE ATWOOD; Location:[facility] OR     ARTHROPLASTY KNEE  06/02/2014    Procedure: ARTHROPLASTY KNEE;  Surgeon: Bryce Atwood MD;  Location: [facility] OR     CV CORONARY ANGIOGRAM N/A 01/25/2022    Procedure: Coronary Angiogram; diagnostic;  Surgeon: Sreekanth Delong MD;  Location:  HEART CARDIAC CATH LAB     CV RIGHT HEART CATH MEASUREMENTS RECORDED N/A 01/25/2022    Procedure: Right Heart Cath;  Surgeon: Sreekanth Delong MD;  Location:  HEART CARDIAC CATH LAB     CV VSD CLOSURE N/A 01/31/2022    Procedure: CV VSD CLOSURE W/KATIE;  Surgeon: Daniel Cerda MD;  Location:  HEART CARDIAC CATH LAB     HC DILATION/CURETTAGE DIAG/THER NON OB  2006    D & C     HC KNEE SCOPE, DIAGNOSTIC      Arthroscopy, Knee     REPAIR VENTRICULAR SEPTAL DEFECT N/A 02/04/2022    Procedure: Median Sternotomy, cardiopulmonary bypass, Ischemic Ventricular Septal Defect repair, tranesophageal echocardiogram performed by anesthesia;  Surgeon: Aldair Whitney MD;  Location:  OR     [facility] AFF FOREARM/WRIST SURGERY UNLISTED      orif radius fx distal left     ZZC APPENDECTOMY  1955     Z OPEN JESSI FIXATN HUMERAL SHAFT FX  12/2007    Dr Massey     acetaminophen (TYLENOL) 325 MG tablet, Take 2 tablets (650 mg) by mouth every 6 hours as needed for mild pain or pain  amLODIPine (NORVASC) 5 MG tablet, Take 2 tablets (10 mg) by mouth daily  aspirin (ASA) 81 MG chewable tablet, 1 tablet (81 mg) by Oral or NG Tube route daily  citalopram (CELEXA) 10 MG tablet, Take 1 tablet (10 mg) by mouth daily  fluticasone (FLONASE) 50 MCG/ACT nasal spray, Spray 1 spray into both nostrils daily  levothyroxine (SYNTHROID/LEVOTHROID) 50 MCG tablet, Take 1 tablet (50 mcg) by mouth daily  metoprolol tartrate (LOPRESSOR) 25 MG tablet, Take 0.5 tablets (12.5 mg) by mouth 2 times daily  Multiple Vitamin  (MULTI-VITAMIN DAILY PO), Multi Vitamin  Multiple Vitamins-Minerals (PRESERVISION AREDS PO), Take 1 tablet by mouth 2 times daily  simvastatin (ZOCOR) 20 MG tablet, TAKE 1 TABLET (20 MG) BY MOUTH AT BEDTIME    No current facility-administered medications on file prior to visit.       Allergies: No known allergies    Immunization History   Administered Date(s) Administered     Allergy Set 1 06/10/1999     COVID-19 Bivalent 12+ (Pfizer) 10/31/2022     COVID-19 MONOVALENT 12+ (Pfizer) 01/28/2021, 02/18/2021, 09/26/2021     COVID-19 Monovalent 12+ (Pfizer 2022) 05/31/2022     FLUAD(HD)65+ QUAD 10/21/2021     HEPA 01/04/2005, 08/02/2005     Influenza (H1N1) 01/08/2010     Influenza (IIV3) PF 11/11/2009, 09/13/2011, 01/10/2013     Influenza Vaccine 65+ (Fluzone HD) 10/19/2022     Influenza Vaccine >6 months (Alfuria,Fluzone) 10/08/2015, 10/10/2016, 11/01/2017, 10/15/2018, 10/11/2019, 08/28/2020     Pneumo Conj 13-V (2010&after) 02/08/2016     Pneumococcal 23 valent 11/11/2009     TD,PF 7+ (Tenivac) 01/04/2005     TDAP Vaccine (Boostrix) 02/02/2015     Zoster recombinant adjuvanted (SHINGRIX) 12/01/2019, 06/09/2020     Zoster vaccine, live 09/14/2017      OBJECTIVE:  She appears well, vital signs are as noted by the nurse. Ears normal.  Throat and pharynx normal.  Neck supple. No adenopathy in the neck. Nose is congested. Sinuses non tender. The chest is clear, without wheezes or rales.    ASSESSMENT:   Bronchitis-likely viral,  Discussed viral vs. bacterial infections and need to avoid unnecessary prescribing of antibiotics to ensure that no resistance will develop. Will give prescription for antibiotic (see orders) to fill is symptoms do not improve in the next 2-3 days.     Also tessalon, I reviewed the risks, benefits, and possible side effects of the medication.  The patient had an opportunity to ask any questions regarding the treatment plan. The patient was encouraged to call my office if any problems.      PLAN:zpack, I reviewed the risks, benefits, and possible side effects of the medication.  The patient had an opportunity to ask any questions regarding the treatment plan. The patient was encouraged to call my office if any problems.   Symptomatic therapy suggested: push fluids and rest. Call or return to clinic prn if these symptoms worsen or fail to improve as anticipated.

## 2023-06-20 ENCOUNTER — TRANSFERRED RECORDS (OUTPATIENT)
Dept: FAMILY MEDICINE | Facility: CLINIC | Age: 83
End: 2023-06-20

## 2023-07-18 ENCOUNTER — HOSPITAL ENCOUNTER (OUTPATIENT)
Dept: MAMMOGRAPHY | Facility: CLINIC | Age: 83
Discharge: HOME | End: 2023-07-18
Attending: FAMILY MEDICINE | Admitting: FAMILY MEDICINE
Payer: COMMERCIAL

## 2023-07-18 DIAGNOSIS — Z12.31 VISIT FOR SCREENING MAMMOGRAM: ICD-10-CM

## 2023-07-18 PROCEDURE — 77067 SCR MAMMO BI INCL CAD: CPT

## 2023-07-24 DIAGNOSIS — E78.5 HYPERLIPIDEMIA LDL GOAL <100: ICD-10-CM

## 2023-07-24 DIAGNOSIS — I10 BENIGN ESSENTIAL HYPERTENSION: ICD-10-CM

## 2023-07-24 DIAGNOSIS — Z87.74 S/P VENTRICULAR SEPTAL DEFECT REPAIR: ICD-10-CM

## 2023-07-26 NOTE — TELEPHONE ENCOUNTER
amoxicillin (AMOXIL) 500 MG capsule (Discontinued) 4 capsule 3 6/7/2022 5/16/2023 --         Last Office Visit :1/17/23  Future Office visit:  noned    Routing refill request to provider for review/approval because:  end date 5/16/23.  dosing alert

## 2023-07-27 DIAGNOSIS — Z87.74 S/P VENTRICULAR SEPTAL DEFECT REPAIR: Primary | ICD-10-CM

## 2023-07-27 RX ORDER — AMOXICILLIN 500 MG/1
2000 CAPSULE ORAL DAILY PRN
Qty: 4 CAPSULE | Refills: 3 | OUTPATIENT
Start: 2023-07-27

## 2023-07-27 RX ORDER — AMOXICILLIN 500 MG/1
CAPSULE ORAL
Qty: 4 CAPSULE | Refills: 3 | Status: SHIPPED | OUTPATIENT
Start: 2023-07-27 | End: 2024-04-16

## 2023-07-27 NOTE — TELEPHONE ENCOUNTER
amoxicillin (AMOXIL) 500 MG capsule   Last Written Prescription Date:  6/7/22  Last Fill Quantity: 4,   # refills: 3  Last Office Visit : 1/17/23  Future Office visit:  None    Routing refill request to provider for review/approval because:  ABX prophylaxis is not on the [facility], [facility] or [facility] refill protocol    Patient has a dentist appointment on Monday 7/31 for a cleaning and she needs the ABX for that appt        [facility] [address] [place] [facility] [address] [address]

## 2023-07-27 NOTE — TELEPHONE ENCOUNTER
[facility] Call Center    Phone Message    May a detailed message be left on voicemail: yes     Reason for Call: Medication Refill Request    Has the patient contacted the pharmacy for the refill? Yes,   amoxicillin (AMOXIL) 500 MG capsule  4 capsule     the patient has a dentist appointment on Monday 7/31 for a cleaning and she needs the ABX for that appt       [facility] [address] [place] [facility] [address] [address]          Action Taken: Other: Cardiology    Travel Screening: Not Applicable    Thank you!  [facility]

## 2023-07-31 NOTE — NURSING NOTE
Kimberley is here today to discuss a reoccurrence of Struthers Palsy.    Pre-visit Screening:  Immunizations:  up to date  Colonoscopy:  is up to date  Mammogram: is up to date  Asthma Action Test/Plan:  NA  PHQ9:  NA  GAD7:  NA  Questioned patient about current smoking habits Pt. has never smoked.  Ok to leave detailed message on voice mail for today's visit only Yes, phone # [phone]      
Will ask for PRN medication to manage hallucinations

## 2023-08-10 ENCOUNTER — TRANSFERRED RECORDS (OUTPATIENT)
Dept: FAMILY MEDICINE | Facility: CLINIC | Age: 83
End: 2023-08-10

## 2023-08-30 ENCOUNTER — LAB REQUISITION (OUTPATIENT)
Dept: LAB | Facility: CLINIC | Age: 83
End: 2023-08-30
Payer: COMMERCIAL

## 2023-08-30 DIAGNOSIS — Z01.419 ENCOUNTER FOR GYNECOLOGICAL EXAMINATION (GENERAL) (ROUTINE) WITHOUT ABNORMAL FINDINGS: ICD-10-CM

## 2023-08-30 PROCEDURE — G0145 SCR C/V CYTO,THINLAYER,RESCR: HCPCS | Mod: ORL | Performed by: OBSTETRICS & GYNECOLOGY

## 2023-09-05 LAB
BKR LAB AP GYN ADEQUACY: NORMAL
BKR LAB AP GYN INTERPRETATION: NORMAL
BKR LAB AP HPV REFLEX: NORMAL
BKR LAB AP LMP: NORMAL
BKR LAB AP PREVIOUS ABNL DX: NORMAL
BKR LAB AP PREVIOUS ABNORMAL: NORMAL
PATH REPORT.COMMENTS IMP SPEC: NORMAL
PATH REPORT.COMMENTS IMP SPEC: NORMAL
PATH REPORT.RELEVANT HX SPEC: NORMAL

## 2023-10-25 NOTE — PLAN OF CARE
No major shift events    Cardio: SR, w/ occasional PVC's. VSS currently on 0.04 Epi down from 0.08    Pulm: remains on CMV settings 40% O2, R15,  and PEEP of 5. Extubation delayed per MD.  Chest tube output minimal    GI/: Plascencia in place with adequate output     Neuro: Pt following commands    K+ & Phos replaced thru night   Patient arrives ambulatory through triage with c/o of abdominal pain that started this Sunday. +nausea/vomiting/diarrhea.

## 2023-10-26 ENCOUNTER — TELEPHONE (OUTPATIENT)
Dept: CARDIOLOGY | Facility: CLINIC | Age: 83
End: 2023-10-26
Payer: COMMERCIAL

## 2023-10-26 NOTE — TELEPHONE ENCOUNTER
I called Kimberley back and let her know I have extended the echocardiogram orders. Okay to get the COVID booster. Kimberley expressed understanding.    Aliya Victor RN

## 2023-10-26 NOTE — TELEPHONE ENCOUNTER
[facility] Call Center    Phone Message    May a detailed message be left on voicemail: yes     Reason for Call: Other: Patient would like to schedule her follow up and echocardiogram for the same day. Office visit with Dr. Pickering scheduling out until 4/2024, please extend echo order so these can be scheduled together. Patient is also wondering if Dr. Pickering would recommend the COVID booster for her. Please call the patient back to discuss and schedule appt.       Action Taken: Other: cardiology    Travel Screening: Not Applicable  Thank you!  [facility]

## 2023-11-01 ENCOUNTER — OFFICE VISIT (OUTPATIENT)
Dept: FAMILY MEDICINE | Facility: CLINIC | Age: 83
End: 2023-11-01

## 2023-11-01 VITALS
TEMPERATURE: 97.9 F | OXYGEN SATURATION: 97 % | BODY MASS INDEX: 25.03 KG/M2 | SYSTOLIC BLOOD PRESSURE: 124 MMHG | HEART RATE: 70 BPM | WEIGHT: 136 LBS | DIASTOLIC BLOOD PRESSURE: 78 MMHG | HEIGHT: 62 IN

## 2023-11-01 DIAGNOSIS — M54.9 BACK PAIN, UNSPECIFIED BACK LOCATION, UNSPECIFIED BACK PAIN LATERALITY, UNSPECIFIED CHRONICITY: ICD-10-CM

## 2023-11-01 DIAGNOSIS — R07.81 RIB PAIN ON LEFT SIDE: Primary | ICD-10-CM

## 2023-11-01 DIAGNOSIS — S22.32XA CLOSED FRACTURE OF ONE RIB OF LEFT SIDE, INITIAL ENCOUNTER: ICD-10-CM

## 2023-11-01 PROCEDURE — 71101 X-RAY EXAM UNILAT RIBS/CHEST: CPT | Mod: LT | Performed by: FAMILY MEDICINE

## 2023-11-01 PROCEDURE — 99214 OFFICE O/P EST MOD 30 MIN: CPT | Performed by: FAMILY MEDICINE

## 2023-11-01 NOTE — PROGRESS NOTES
Assessment & Plan   Problem List Items Addressed This Visit    None  Visit Diagnoses       Rib pain on left side    -  Primary    Relevant Orders    XR Ribs & Chest Lt 3v (Completed)    Back pain, unspecified back location, unspecified back pain laterality, unspecified chronicity        Closed fracture of one rib of left side, initial encounter               1. Rib pain on left side  Xrays done.  - XR Ribs & Chest Lt 3v    2. Back pain, unspecified back location, unspecified back pain laterality, unspecified chronicity      3. Closed fracture of one rib of left side, initial encounter  This appears to be a rib fracture. Continue to treat symptomatically.              FUTURE APPOINTMENTS:       - Follow-up visit as needed.    No follow-ups on file.    Katlyn Daniels MD  [facility]    Subjective     Nursing Notes:   Dilma Malin CMA  11/1/2023 12:27 PM  Signed  Chief Complaint   Patient presents with    Pain     Pain on left side near her ribs, pain started 3 weeks ago after playing with her great grandkids, she hit her side and arm on a door frame, pain is getting worse, unable to lay on her left side      Pre-visit Screening:  Immunizations:  up to date  Colonoscopy:  is up to date  Mammogram: is up to date  Asthma Action Test/Plan:  NA  PHQ9:  NA  GAD7:  NA  Questioned patient about current smoking habits Pt. has never smoked.  Ok to leave detailed message on voice mail for today's visit only Yes, phone # [phone]       Kimberley Mullins is a 83 year old female who presents to clinic today for the following health issues     HPI     Left rib pain for 3 weeks. Tender area left lateral rib area. Hurts to push in right there. Might have injured it. Was playing with grandkids. Tripped against the door frame and banged up her shoulder and then a few days later noticed this rib area pain. Something similar about a year ago and was dx with costochondritis. Thought this was what it was but it  "hasn't gotten better. Picked up one of the kids and it seems worse. Breathing is ok.     Followed by cardiology for cardiac problems. This is not due to her chest/heart area.    Has history of back problems. Years ago was told she has degenerative disc disease. Sometimes it's lower back but mostly it's between the shoulder blades. It hurts if she is working in the kitchen but then seems to get better when she takes a break and sits down.        Review of Systems   Constitutional, HEENT, cardiovascular, pulmonary, gi and gu systems are negative, except as otherwise noted.      Objective    /78 (BP Location: Right arm, Patient Position: Sitting, Cuff Size: Adult Regular)   Pulse 70   Temp 97.9  F (36.6  C) (Temporal)   Ht 1.575 m (5' 2\")   Wt 61.7 kg (136 lb)   LMP  (LMP Unknown)   SpO2 97%   BMI 24.87 kg/m    Body mass index is 24.87 kg/m .  Physical Exam   GENERAL: healthy, alert and no distress  RESP: lungs clear to auscultation - no rales, rhonchi or wheezes  CV: regular rate and rhythm, normal S1 S2, no S3 or S4, no murmur, click or rub, no peripheral edema and peripheral pulses strong  MS: no gross musculoskeletal defects noted, no edema  NEURO: Normal strength and tone, mentation intact and speech normal  PSYCH: mentation appears normal, affect normal/bright  Tender point area left lateral lower rib no ecchymosis    Results for orders placed or performed in visit on 11/01/23   XR Ribs & Chest Lt 3v     Status: None    Narrative    Radiologist Consultation/:   " Fax:  [phone]  [phone]  _____________________________________________________________________________________________________________________________________________________________________________________________________________________________________________________________________________________________________________________________________________________________________________________________________________________________________________________________________________________________________  PATIENT NAME: GRUMAN, ALEXA R  YOB: 1940 Age: 83 ACCESSION NUMBER: [id number]  SEX: F ORDERING PROVIDER: Katlyn Daniels  FACILITY: [facility] PRIMARY PROVIDER:  PATIENT ID: [id number] INTERESTED PARTY:  Page 1 of 1  _________________________________________________________________________________________________________________________________________________________________________________________________________________________________________________________________________________________________________________________________________________________________________________________  EXAM: XRAY RIBS,UNI,PA CH LEFT  LOCATION: [facility]  DATE: 11/1/2023  INDICATION: Left rib pain.  COMPARISON: 2 view chest 07/20/2016  IMPRESSION: Previous sternotomy. Heart size and vascularity are normal. No focal consolidation,  pneumothorax nor pleural effusion. Stable fixation hardware across healed proximal right humeral fracture.  Deformity of the anterolateral left 11th rib could represent an acute fracture if focal pain in this region.  SIGNED BY: Víctor Marley MD 11/2/2023 8:24 AM       Xray personally read by me, possible fracture

## 2023-11-01 NOTE — NURSING NOTE
Chief Complaint   Patient presents with    Pain     Pain on left side near her ribs, pain started 3 weeks ago after playing with her great grandkids, she hit her side and arm on a door frame, pain is getting worse, unable to lay on her left side      Pre-visit Screening:  Immunizations:  up to date  Colonoscopy:  is up to date  Mammogram: is up to date  Asthma Action Test/Plan:  NA  PHQ9:  NA  GAD7:  NA  Questioned patient about current smoking habits Pt. has never smoked.  Ok to leave detailed message on voice mail for today's visit only Yes, phone # [phone]

## 2023-11-05 DIAGNOSIS — F41.9 ANXIETY: ICD-10-CM

## 2023-11-07 RX ORDER — CITALOPRAM HYDROBROMIDE 10 MG/1
10 TABLET ORAL DAILY
COMMUNITY
Start: 2023-11-07

## 2023-11-07 NOTE — TELEPHONE ENCOUNTER
Kimberley Mullins is requesting a refill of:    Refused Prescriptions:                       Disp   Refills    citalopram (CELEXA) 10 MG tablet [Pharmacy*                Sig: TAKE 1 TABLET (10 MG) BY MOUTH DAILY.  Refused By: CLEO CABEZAS  Reason for Refusal: Patient needs appointment    Pt due for OV

## 2023-11-13 NOTE — PROGRESS NOTES
Assessment & Plan   Problem List Items Addressed This Visit          Endocrine    Pure hypercholesterolemia    Relevant Medications    simvastatin (ZOCOR) 20 MG tablet    Hypothyroidism    Relevant Medications    levothyroxine (SYNTHROID/LEVOTHROID) 50 MCG tablet       Circulatory    Benign essential hypertension    Relevant Medications    amLODIPine (NORVASC) 5 MG tablet    metoprolol tartrate (LOPRESSOR) 25 MG tablet    Other Relevant Orders    VENOUS COLLECTION (Completed)    Basic Metabolic Panel (BFP)       Other    S/P ventricular septal defect repair    Relevant Medications    metoprolol tartrate (LOPRESSOR) 25 MG tablet    Anxiety    Relevant Medications    citalopram (CELEXA) 10 MG tablet     Other Visit Diagnoses       Screening for osteoporosis    -  Primary    Relevant Orders    Dexa hip/pelvis/spine*    Radiology Referral    Other ill-defined heart diseases        Relevant Medications    metoprolol tartrate (LOPRESSOR) 25 MG tablet    Other Relevant Orders    HEMOGRAM PLATELET DIFF (BFP)           1. Benign essential hypertension  Controlled on medications, refilled, check labs.  - amLODIPine (NORVASC) 5 MG tablet; Take 2 tablets (10 mg) by mouth daily  Dispense: 180 tablet; Refill: 1  - metoprolol tartrate (LOPRESSOR) 25 MG tablet; Take 0.5 tablets (12.5 mg) by mouth 2 times daily  Dispense: 90 tablet; Refill: 1  - VENOUS COLLECTION  - Basic Metabolic Panel (BFP)    2. Anxiety  Controlled on medications.  - citalopram (CELEXA) 10 MG tablet; Take 1 tablet (10 mg) by mouth daily  Dispense: 90 tablet; Refill: 1    3. Acquired hypothyroidism  Labs are up to date, refilled.  - levothyroxine (SYNTHROID/LEVOTHROID) 50 MCG tablet; Take 1 tablet (50 mcg) by mouth daily  Dispense: 90 tablet; Refill: 1    4. S/P ventricular septal defect repair  Refilled.  - metoprolol tartrate (LOPRESSOR) 25 MG tablet; Take 0.5 tablets (12.5 mg) by mouth 2 times daily  Dispense: 90 tablet; Refill: 1    5. Other ill-defined heart  "diseases  Refilled.  - metoprolol tartrate (LOPRESSOR) 25 MG tablet; Take 0.5 tablets (12.5 mg) by mouth 2 times daily  Dispense: 90 tablet; Refill: 1  - HEMOGRAM PLATELET DIFF (BFP)    6. Pure hypercholesterolemia  Refilled.  - simvastatin (ZOCOR) 20 MG tablet; TAKE 1 TABLET (20 MG) BY MOUTH AT BEDTIME  Dispense: 90 tablet; Refill: 3    7. Screening for osteoporosis    - Dexa hip/pelvis/spine*  - Radiology Referral              FUTURE APPOINTMENTS:       - Follow-up visit in  6months.    No follow-ups on file.    Katyln Daniels MD  [facility]    Subjective     Nursing Notes:   Dilma Malin CMA  11/14/2023  8:39 AM  Signed  Chief Complaint   Patient presents with    Recheck Medication     Fasting today, refill medications     Pre-visit Screening:  Immunizations:  up to date  Colonoscopy:  is up to date  Mammogram: is up to date  Asthma Action Test/Plan:  NA  PHQ9:  Done today  GAD7:  Done today  Questioned patient about current smoking habits Pt. has never smoked.  Ok to leave detailed message on voice mail for today's visit only Yes, phone # [phone]       Kimberley Mullins is a 83 year old female who presents to clinic today for the following health issues   HPI     Here for medication check. Refills on medications:  Thyroid--is taking daily, no problems.  Cholesterol--labs are up to date, is taking daily.  Blood pressure--controlled, due for medication refills.  Anxiety--citalopram is working well for her.        Review of Systems   Constitutional, HEENT, cardiovascular, pulmonary, gi and gu systems are negative, except as otherwise noted.      Objective    /74 (BP Location: Right arm, Patient Position: Sitting, Cuff Size: Adult Large)   Pulse 81   Temp 97.9  F (36.6  C) (Temporal)   Ht 1.575 m (5' 2\")   Wt 61.7 kg (136 lb)   LMP  (LMP Unknown)   SpO2 97%   BMI 24.87 kg/m    Body mass index is 24.87 kg/m .  Physical Exam   GENERAL: healthy, alert and no distress  RESP: lungs " clear to auscultation - no rales, rhonchi or wheezes  CV: regular rate and rhythm, normal S1 S2, no S3 or S4, no murmur, click or rub, no peripheral edema and peripheral pulses strong  MS: no gross musculoskeletal defects noted, no edema  NEURO: Normal strength and tone, mentation intact and speech normal  PSYCH: mentation appears normal, affect normal/bright    No results found for any visits on 11/14/23.

## 2023-11-14 ENCOUNTER — OFFICE VISIT (OUTPATIENT)
Dept: FAMILY MEDICINE | Facility: CLINIC | Age: 83
End: 2023-11-14

## 2023-11-14 VITALS
BODY MASS INDEX: 25.03 KG/M2 | HEIGHT: 62 IN | WEIGHT: 136 LBS | DIASTOLIC BLOOD PRESSURE: 74 MMHG | HEART RATE: 81 BPM | OXYGEN SATURATION: 97 % | SYSTOLIC BLOOD PRESSURE: 118 MMHG | TEMPERATURE: 97.9 F

## 2023-11-14 DIAGNOSIS — E78.00 PURE HYPERCHOLESTEROLEMIA: ICD-10-CM

## 2023-11-14 DIAGNOSIS — I10 BENIGN ESSENTIAL HYPERTENSION: ICD-10-CM

## 2023-11-14 DIAGNOSIS — Z87.74 S/P VENTRICULAR SEPTAL DEFECT REPAIR: ICD-10-CM

## 2023-11-14 DIAGNOSIS — I51.89 OTHER ILL-DEFINED HEART DISEASES: ICD-10-CM

## 2023-11-14 DIAGNOSIS — F41.9 ANXIETY: ICD-10-CM

## 2023-11-14 DIAGNOSIS — Z13.820 SCREENING FOR OSTEOPOROSIS: Primary | ICD-10-CM

## 2023-11-14 DIAGNOSIS — E03.9 ACQUIRED HYPOTHYROIDISM: ICD-10-CM

## 2023-11-14 LAB
% GRANULOCYTES: 59.7 %
BUN SERPL-MCNC: 13 MG/DL (ref 7–25)
BUN/CREATININE RATIO: 18.6 (ref 6–32)
CALCIUM SERPL-MCNC: 9.4 MG/DL (ref 8.6–10.3)
CHLORIDE SERPLBLD-SCNC: 96.5 MMOL/L (ref 98–110)
CO2 SERPL-SCNC: 28.3 MMOL/L (ref 20–32)
CREAT SERPL-MCNC: 0.7 MG/DL (ref 0.6–1.3)
GLUCOSE SERPL-MCNC: 92 MG/DL (ref 60–99)
HCT VFR BLD AUTO: 42.8 % (ref 35–47)
HEMOGLOBIN: 13.3 G/DL (ref 11.7–15.7)
LYMPHOCYTES NFR BLD AUTO: 27.9 %
MCH RBC QN AUTO: 31.4 PG (ref 26–33)
MCHC RBC AUTO-ENTMCNC: 31.1 G/DL (ref 31–36)
MCV RBC AUTO: 101.3 FL (ref 78–100)
MONOCYTES NFR BLD AUTO: 12.4 %
PLATELET COUNT - QUEST: 216 10^9/L (ref 150–375)
POTASSIUM SERPL-SCNC: 4.36 MMOL/L (ref 3.5–5.3)
RBC # BLD AUTO: 4.23 10*12/L (ref 3.8–5.2)
SODIUM SERPL-SCNC: 133.4 MMOL/L (ref 135–146)
WBC # BLD AUTO: 5.3 10*9/L (ref 4–11)

## 2023-11-14 PROCEDURE — 36415 COLL VENOUS BLD VENIPUNCTURE: CPT | Performed by: FAMILY MEDICINE

## 2023-11-14 PROCEDURE — 80048 BASIC METABOLIC PNL TOTAL CA: CPT | Performed by: FAMILY MEDICINE

## 2023-11-14 PROCEDURE — 99213 OFFICE O/P EST LOW 20 MIN: CPT | Mod: 25 | Performed by: FAMILY MEDICINE

## 2023-11-14 PROCEDURE — G0008 ADMIN INFLUENZA VIRUS VAC: HCPCS | Performed by: FAMILY MEDICINE

## 2023-11-14 PROCEDURE — 85025 COMPLETE CBC W/AUTO DIFF WBC: CPT | Performed by: FAMILY MEDICINE

## 2023-11-14 PROCEDURE — 90662 IIV NO PRSV INCREASED AG IM: CPT | Performed by: FAMILY MEDICINE

## 2023-11-14 RX ORDER — METOPROLOL TARTRATE 25 MG/1
12.5 TABLET, FILM COATED ORAL 2 TIMES DAILY
Qty: 90 TABLET | Refills: 1 | Status: SHIPPED | OUTPATIENT
Start: 2023-11-14 | End: 2024-05-22

## 2023-11-14 RX ORDER — AMLODIPINE BESYLATE 5 MG/1
10 TABLET ORAL DAILY
Qty: 180 TABLET | Refills: 1 | Status: SHIPPED | OUTPATIENT
Start: 2023-11-14 | End: 2024-05-22

## 2023-11-14 RX ORDER — LEVOTHYROXINE SODIUM 50 UG/1
50 TABLET ORAL DAILY
Qty: 90 TABLET | Refills: 1 | Status: SHIPPED | OUTPATIENT
Start: 2023-11-14 | End: 2024-05-22

## 2023-11-14 RX ORDER — SIMVASTATIN 20 MG
TABLET ORAL
Qty: 90 TABLET | Refills: 3 | Status: SHIPPED | OUTPATIENT
Start: 2023-11-14

## 2023-11-14 RX ORDER — CITALOPRAM HYDROBROMIDE 10 MG/1
10 TABLET ORAL DAILY
Qty: 90 TABLET | Refills: 1 | Status: SHIPPED | OUTPATIENT
Start: 2023-11-14 | End: 2024-05-20

## 2023-11-14 ASSESSMENT — PATIENT HEALTH QUESTIONNAIRE - PHQ9
5. POOR APPETITE OR OVEREATING: NOT AT ALL
SUM OF ALL RESPONSES TO PHQ QUESTIONS 1-9: 1

## 2023-11-14 ASSESSMENT — ANXIETY QUESTIONNAIRES
3. WORRYING TOO MUCH ABOUT DIFFERENT THINGS: NOT AT ALL
2. NOT BEING ABLE TO STOP OR CONTROL WORRYING: NOT AT ALL
IF YOU CHECKED OFF ANY PROBLEMS ON THIS QUESTIONNAIRE, HOW DIFFICULT HAVE THESE PROBLEMS MADE IT FOR YOU TO DO YOUR WORK, TAKE CARE OF THINGS AT HOME, OR GET ALONG WITH OTHER PEOPLE: NOT DIFFICULT AT ALL
6. BECOMING EASILY ANNOYED OR IRRITABLE: NOT AT ALL
5. BEING SO RESTLESS THAT IT IS HARD TO SIT STILL: NOT AT ALL
1. FEELING NERVOUS, ANXIOUS, OR ON EDGE: NOT AT ALL
GAD7 TOTAL SCORE: 0
GAD7 TOTAL SCORE: 0
7. FEELING AFRAID AS IF SOMETHING AWFUL MIGHT HAPPEN: NOT AT ALL

## 2023-11-14 NOTE — NURSING NOTE
Chief Complaint   Patient presents with    Recheck Medication     Fasting today, refill medications     Pre-visit Screening:  Immunizations:  up to date  Colonoscopy:  is up to date  Mammogram: is up to date  Asthma Action Test/Plan:  NA  PHQ9:  Done today  GAD7:  Done today  Questioned patient about current smoking habits Pt. has never smoked.  Ok to leave detailed message on voice mail for today's visit only Yes, phone # [phone]

## 2024-03-18 DIAGNOSIS — F41.9 ANXIETY: ICD-10-CM

## 2024-03-19 RX ORDER — CITALOPRAM HYDROBROMIDE 10 MG/1
10 TABLET ORAL DAILY
COMMUNITY
Start: 2024-03-19

## 2024-03-19 NOTE — TELEPHONE ENCOUNTER
Kimberley Mullins is requesting a refill of:    Refused Prescriptions:                       Disp   Refills    citalopram (CELEXA) 10 MG tablet [Pharmacy*                Sig: TAKE 1 TABLET (10 MG) BY MOUTH DAILY.  Refused By: PHANI CORDOVA  Reason for Refusal: Duplicate

## 2024-04-12 ENCOUNTER — TELEPHONE (OUTPATIENT)
Dept: CARDIOLOGY | Facility: CLINIC | Age: 84
End: 2024-04-12
Payer: COMMERCIAL

## 2024-04-12 DIAGNOSIS — E78.5 HYPERLIPIDEMIA LDL GOAL <100: Primary | ICD-10-CM

## 2024-04-12 NOTE — TELEPHONE ENCOUNTER
4/12/2024 3:39PM Elyse Ziyad  Patient confirmed scheduled appointment:  Date: 4/16/2024  Time: 8AM  Visit type: fasting lab  Provider: per Dr. Pickering  Location: [address] [place] [address]  Testing/imaging: echo (3rd floor) prior at 7AM, Return Cardiology w/ Dr. Pickering (3rd floor) to follow at 9AM  Additional notes: 4/12 Scheduled fasting lab prior to Dr. Pickering appt on 4/16. YASH   Elyse Ziyad 4/12/2024 3:39PM        ----- Message from Aliya Victor RN sent at 4/12/2024  3:32 PM CDT -----  Regarding: lab  Hello!    Can you please call Kimberley to add a lab appointment 4/16 before Ladan appointment? The lab should be fasting.    Thanks,  GERBER Pisano

## 2024-04-15 NOTE — PROGRESS NOTES
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    Initial visit 6/7/2022: Ms. Mullins presented to PCP clinic on 1/2022 where he was noted to dyspnea, peripheral edema and a new murmur. Patient was sent to [facility] ED where he was found to have a large muscular VSD on echo. She was transferred to [place] where she underwent a RHC with RA:63.6% and a step up to RV the RV of 82.5 suggestive of shunt, along with a QP/QP: 2.14. Coronary angiogram revealed widely patent coronary arteries.  She was then admitted to the Cardiology service where she later underwent a CMRI to assess the VSD. cMRI revealed a large VSD with left to right shunt. It was determined that the patient would undergoe percutaneous placement of a 14mm Amplatzer closure device. However despite percutaneous intervention, patient still had a high grade shunt and was referred to surgery. She then underwent an apical muscular VSD repair to good results and was discharged with plans to follow up with Dr. Pickering.     Today, she returns back to clinic where she is overall doing well. She states that she is now able to climb up a flight of stairs, which is a significant improvement since after she was diagnosed with a VSD. Patient is able 1-2 walks before developing SOB. Patient does endorse lightheadedness while walking around. Patient denies chest pain.        Interval visit 1/17/2023: During last visit, amlodipine 5 mg daily was restarted for uncontrolled hypertension. Since last visit, he has been doing well.   She denies chest pain/pressure, SOB, dizziness, palpitations, orthopnea, PND or syncope  She has been having ankle edema worse at the end of the day. She is concerned that the edema and mild weight gain is related to her heart.  She usually babysits her grandchildren without limitations but is not  exercising.    04/16/24:  Kimberley reports feeling generally well. She has been experiencing midback and lower back pain with prolonged standing. She has been exercising at the [facility] 2x/week (1 hour class of mixed resistance and aerobic activity) and also golfs (with a cart) in the summer. She notices the back pain when vacuuming. Back pain is not associated with dyspnea but is sometimes associated with lightheadedness. She does not experience the back pain walking up stairs but does notice the back pain when she walks. She does report generalized fatigability and also persistently decreased exercise capacity (getting out of breath at the airport, etc.); she does not think she could walk 1 mile without stopping. She thinks this is about the same compared with 1 year ago. Back pain was also present 1 year ago; she feels it may be slightly worse now. ROS is otherwise notable for heartburn, which occurs daily and is present continuously throughout the day; no correlation with (or exacerbation by) activity.     SBPs at home average 100s-110s.      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    Thyroid disease     Transient global amnesia     Unspecified tinnitus     Tinnitus       CURRENT MEDICATIONS:  Current Outpatient Medications   Medication Sig Dispense Refill    acetaminophen (TYLENOL) 325 MG tablet Take 2 tablets (650 mg) by mouth every 6 hours as needed for mild pain or pain 90 tablet 0    amLODIPine (NORVASC) 5 MG tablet Take 2 tablets (10 mg) by mouth daily 180 tablet 1    amoxicillin (AMOXIL) 500 MG capsule Take 4 capsules (2,000 mg) by  mouth daily as needed (Please take prior to dental procedures) - Oral 4 capsule 3    aspirin (ASA) 81 MG chewable tablet 1 tablet (81 mg) by Oral or NG Tube route daily 90 tablet 0    citalopram (CELEXA) 10 MG tablet Take 1 tablet (10 mg) by mouth daily 90 tablet 1    fluticasone (FLONASE) 50 MCG/ACT nasal spray INSTILL 1 SPRAY INTO BOTH NOSTRILS DAILY 16 g 3    levothyroxine (SYNTHROID/LEVOTHROID) 50 MCG tablet Take 1 tablet (50 mcg) by mouth daily 90 tablet 1    metoprolol tartrate (LOPRESSOR) 25 MG tablet Take 0.5 tablets (12.5 mg) by mouth 2 times daily 90 tablet 1    Multiple Vitamin (MULTI-VITAMIN DAILY PO) Multi Vitamin      Multiple Vitamins-Minerals (PRESERVISION AREDS PO) Take 1 tablet by mouth 2 times daily      simvastatin (ZOCOR) 20 MG tablet TAKE 1 TABLET (20 MG) BY MOUTH AT BEDTIME 90 tablet 3       ALLERGIES:     Allergies   Allergen Reactions    No Known Allergies        FAMILY HISTORY:  Family History   Problem Relation Age of Onset    Heart Disease Father         MI age 70       SOCIAL HISTORY:  Social History     Tobacco Use    Smoking status: Never     Passive exposure: Never    Smokeless tobacco: Never   Substance Use Topics    Alcohol use: Yes     Alcohol/week: 5.8 standard drinks of alcohol     Types: 7 Standard drinks or equivalent per week     Comment: two to three glasses of wine q day.    Drug use: No       ROS:   A comprehensive 14 point review of systems is negative other than as mentioned in HPI.    Exam:  /72 (BP Location: Right arm, Patient Position: Chair, Cuff Size: Adult Regular)   Pulse 77   Wt 64.3 kg (141 lb 12.8 oz)   LMP  (LMP Unknown)   SpO2 97%   BMI 25.94 kg/m    GENERAL APPEARANCE: healthy, alert and no distress  NECK: JVP not elevated  RESPIRATORY: lungs clear to auscultation   CARDIOVASCULAR: regular rhythm, normal S1 and S2, +3/6 systolic murmur  EXTREMITIES: trace ankle edema.      Labs:  Reviewed.   LDL 67    Testing/Procedures:  CMR 1/26/22: LVEF:55%,  Apical Muscular VSD with left to right shunt, moderately dilated RV, no obvious LGE    Coronary angiogram/RHC 1/25/22:  Normal coronary arteries  RHC: RA 8 (v wave to 13) RV 50/7 PA 44/18(27) PCW 16 TDCO/CI 3.9/2.3   Shunt eval (O2 sat): R IJV 63.5% SVC 65.3% IVC 62.8% RA 63.6%   RV 82.5%   PA 80.2% RPA 80.6% RPCW 96% Ao 98% Hgb 11.7   Qp/Qs 2.14     TTE 2/7/22 (read by Dr. Pickering):  Status post removal of Amplatzer device and patch closure of muscular VSD  2/4/22. Some images (images #45-46 and 50-54) are suspicious for a small  residual VSD at the apical margin of the patch. Consider cardiac CTA or MRI  for further evaluation.  Global and regional left ventricular function is normal with an EF of 55-60%.  Mild right ventricular dilation is present. Global right ventricular function  is mildly reduced.  No pericardial effusion is present.     This study was compared with the study from 2/4/22 (intraoperative KATIE) and  2/3/22 (TTE): Possible residual VSD was not seen on the postoperative images  from the intraoperative KATIE, however the ventricular septum is better  interrogated on today's KATIE. Compared with the prior TTE, there has been  interval removal of Amplatzer device and patch closure of VSD with significant  interval reduction in ventricular-level shunting. RV size and function also  appears slightly improved on limited views.    TTE 06/07/22 (reviewed by Dr. Pickering):  Normal LV size and function, LVEF=55-60%.  RV function is normal.  Small residual VSD (~6 mm) at the apical margin of the VSD patch repair, unchanged from 2/3/2022.     TTE 1/17/23 (reviewed by Dr. Pickering):   LVEF=50-55%. Normal RV function. Normal Small residual left-to-right shunt at the site of VSD repair. Mild-mod TR, mild AI. 4.3 cm aorta. No change from 6/7/22.      I personally visualized and interpreted:  TTE 04/16/24:    LVEF=50-55%. Normal RV function. Small residual left-to-right shunt at the site of VSD repair;  VSD  gradient at least 93 mmHg. Mild-mod TR, mild AI. 4.3 cm aorta. RVSP 24 mmHg+RAP. No significant change from 1/17/23 or 6/7/22.      Assessment and Plan:   83 year old female with PMHx of apical muscular VSD s/p patch repair who presents today for follow up.    #Exertional back pain, chronic, most likely musculoskeletal   -offered coronary CTA to exclude new CAD with atypical anginal equivalent (suspicion for this is very low however based on timing, stability, and symptomatology)-- she prefers to hold off and pursue orthopedic evaluation for the present and revisit if symptoms worsen or fail to improve. I agree that this is reasonable but told her we would be happy to arrange coronary CTA should she change her mind.   -encouraged her to gradually increase moderate-intensity aerobic exercise as tolerated       #Apical muscular VSD s/p repair on 2/4/2022   81 year old female who presented from [facility] with SOB and later found to have a hemodynamically significant VSD. QP:QS at the time was 2.14 on RHC and LHC. Initially underwent percutaneous closure with occluder device, however had significant residual shunt that necessitated VSD repair with patch.     TTE today is stable from 6/2022 and 1/2023 and shows normal LV size/function with small residual L-R shunt, normal right sided pressures and RVSP ~24 mmHg.    Plan:  - Continue aspirin 81 mg daily.   -SBE prophylaxis with dental procedures  - Compression stockings and leg elevation for ankle swelling  - Encourage exercise.    #HTN, controlled:   -Continue amlodipine 10 mg daily    #HL, controlled:  -continue present statin    #Small segmental PE  Started on 2/2022, completed 6 months of coumadin.     Follow up in 1 year (at [place]) with echocardiogram prior.    Review of the result(s) of each unique test - TTE 4/16/24, lipids  Assessment requiring an independent historian(s) - family - daughter  Independent interpretation of a test performed by another  physician/other qualified health care professional (not separately reported) - TTE 4/16/24, comparison with TTE 1/2023  Ordering of each unique test  I spent a total of 50 minutes on the day of the visit.   Time spent by me doing chart review, history and exam, documentation and further activities per the note      Justin Pickering MD  Cardiology

## 2024-04-16 ENCOUNTER — OFFICE VISIT (OUTPATIENT)
Dept: CARDIOLOGY | Facility: CLINIC | Age: 84
End: 2024-04-16
Attending: INTERNAL MEDICINE
Payer: COMMERCIAL

## 2024-04-16 ENCOUNTER — LAB (OUTPATIENT)
Dept: LAB | Facility: CLINIC | Age: 84
End: 2024-04-16
Payer: COMMERCIAL

## 2024-04-16 VITALS
SYSTOLIC BLOOD PRESSURE: 130 MMHG | OXYGEN SATURATION: 97 % | DIASTOLIC BLOOD PRESSURE: 72 MMHG | HEART RATE: 77 BPM | WEIGHT: 141.8 LBS | BODY MASS INDEX: 25.94 KG/M2

## 2024-04-16 DIAGNOSIS — I51.89 OTHER ILL-DEFINED HEART DISEASES: ICD-10-CM

## 2024-04-16 DIAGNOSIS — E78.5 HYPERLIPIDEMIA LDL GOAL <100: ICD-10-CM

## 2024-04-16 DIAGNOSIS — Z87.74 S/P VENTRICULAR SEPTAL DEFECT REPAIR: ICD-10-CM

## 2024-04-16 DIAGNOSIS — Z87.74 S/P VSD REPAIR: ICD-10-CM

## 2024-04-16 DIAGNOSIS — I10 BENIGN ESSENTIAL HYPERTENSION: ICD-10-CM

## 2024-04-16 DIAGNOSIS — Q21.0 VSD (VENTRICULAR SEPTAL DEFECT), MUSCULAR: Primary | ICD-10-CM

## 2024-04-16 LAB
CHOLEST SERPL-MCNC: 167 MG/DL
FASTING STATUS PATIENT QL REPORTED: YES
HDLC SERPL-MCNC: 81 MG/DL
LDLC SERPL CALC-MCNC: 67 MG/DL
LVEF ECHO: NORMAL
NONHDLC SERPL-MCNC: 86 MG/DL
TRIGL SERPL-MCNC: 95 MG/DL

## 2024-04-16 PROCEDURE — 93306 TTE W/DOPPLER COMPLETE: CPT | Performed by: INTERNAL MEDICINE

## 2024-04-16 PROCEDURE — 36415 COLL VENOUS BLD VENIPUNCTURE: CPT | Performed by: PATHOLOGY

## 2024-04-16 PROCEDURE — 99215 OFFICE O/P EST HI 40 MIN: CPT | Mod: 25 | Performed by: INTERNAL MEDICINE

## 2024-04-16 PROCEDURE — 99207 PR STATISTIC IV PUSH SINGLE INITIAL SUBSTANCE: CPT | Performed by: INTERNAL MEDICINE

## 2024-04-16 PROCEDURE — 80061 LIPID PANEL: CPT | Performed by: PATHOLOGY

## 2024-04-16 PROCEDURE — G0463 HOSPITAL OUTPT CLINIC VISIT: HCPCS | Performed by: INTERNAL MEDICINE

## 2024-04-16 RX ORDER — AMOXICILLIN 500 MG/1
CAPSULE ORAL
Qty: 4 CAPSULE | Refills: 3 | Status: SHIPPED | OUTPATIENT
Start: 2024-04-16

## 2024-04-16 RX ADMIN — Medication 6 ML: at 07:37

## 2024-04-16 ASSESSMENT — PAIN SCALES - GENERAL: PAINLEVEL: NO PAIN (0)

## 2024-04-16 NOTE — NURSING NOTE
Labs: Patient was given results of the laboratory testing obtained today. Patient was instructed to return for the next laboratory testing in 1 year. Patient demonstrated understanding of this information and agreed to call with further questions or concerns.     Med Reconcile: Reviewed and verified all current medications with the patient. The updated medication list was printed and given to the patient.    Return Appointment: Patient given instructions regarding scheduling next clinic visit. Patient demonstrated understanding of this information and agreed to call with further questions or concerns. Annual follow up with Dr. Pickering with fasting lipids and echocardiogram prior.    Patient stated she understood all health information given and agreed to call with further questions or concerns.    Aliya Victor RN

## 2024-04-16 NOTE — NURSING NOTE
Chief Complaint   Patient presents with    Follow Up     Return Cardiology-  return cardiology, 82 yo female S/P ventricular septal defect repair presents for follow up with echocardiogram prior.     Vitals were taken and medications reconciled.    TEMO Andino  8:43 AM

## 2024-04-16 NOTE — LETTER
4/16/2024      RE: Kimbreley Mullins  [id number]  [place] [address]       Dear Colleague,    Thank you for the opportunity to participate in the care of your patient, Kimberley Mullins, at the [facility]. Please see a copy of my visit note below.    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    Initial visit 6/7/2022: Ms. Mullins presented to PCP clinic on 1/2022 where he was noted to dyspnea, peripheral edema and a new murmur. Patient was sent to [facility] ED where he was found to have a large muscular VSD on echo. She was transferred to [place] where she underwent a RHC with RA:63.6% and a step up to RV the RV of 82.5 suggestive of shunt, along with a QP/QP: 2.14. Coronary angiogram revealed widely patent coronary arteries.  She was then admitted to the Cardiology service where she later underwent a CMRI to assess the VSD. cMRI revealed a large VSD with left to right shunt. It was determined that the patient would undergoe percutaneous placement of a 14mm Amplatzer closure device. However despite percutaneous intervention, patient still had a high grade shunt and was referred to surgery. She then underwent an apical muscular VSD repair to good results and was discharged with plans to follow up with Dr. Pickering.     Today, she returns back to clinic where she is overall doing well. She states that she is now able to climb up a flight of stairs, which is a significant improvement since after she was diagnosed with a VSD. Patient is able 1-2 walks before developing SOB. Patient does endorse lightheadedness while walking around. Patient denies chest pain.        Interval visit 1/17/2023: During last visit, amlodipine 5 mg daily was restarted for uncontrolled hypertension. Since last visit, he  has been doing well.   She denies chest pain/pressure, SOB, dizziness, palpitations, orthopnea, PND or syncope  She has been having ankle edema worse at the end of the day. She is concerned that the edema and mild weight gain is related to her heart.  She usually babysits her grandchildren without limitations but is not exercising.    04/16/24:  Kimberley reports feeling generally well. She has been experiencing midback and lower back pain with prolonged standing. She has been exercising at the [facility] 2x/week (1 hour class of mixed resistance and aerobic activity) and also golfs (with a cart) in the summer. She notices the back pain when vacuuming. Back pain is not associated with dyspnea but is sometimes associated with lightheadedness. She does not experience the back pain walking up stairs but does notice the back pain when she walks. She does report generalized fatigability and also persistently decreased exercise capacity (getting out of breath at the airport, etc.); she does not think she could walk 1 mile without stopping. She thinks this is about the same compared with 1 year ago. Back pain was also present 1 year ago; she feels it may be slightly worse now. ROS is otherwise notable for heartburn, which occurs daily and is present continuously throughout the day; no correlation with (or exacerbation by) activity.     SBPs at home average 100s-110s.      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/09/2011     Thyroid disease      Transient global amnesia      Unspecified tinnitus     Tinnitus       CURRENT MEDICATIONS:  Current Outpatient  Medications   Medication Sig Dispense Refill     acetaminophen (TYLENOL) 325 MG tablet Take 2 tablets (650 mg) by mouth every 6 hours as needed for mild pain or pain 90 tablet 0     amLODIPine (NORVASC) 5 MG tablet Take 2 tablets (10 mg) by mouth daily 180 tablet 1     amoxicillin (AMOXIL) 500 MG capsule Take 4 capsules (2,000 mg) by mouth daily as needed (Please take prior to dental procedures) - Oral 4 capsule 3     aspirin (ASA) 81 MG chewable tablet 1 tablet (81 mg) by Oral or NG Tube route daily 90 tablet 0     citalopram (CELEXA) 10 MG tablet Take 1 tablet (10 mg) by mouth daily 90 tablet 1     fluticasone (FLONASE) 50 MCG/ACT nasal spray INSTILL 1 SPRAY INTO BOTH NOSTRILS DAILY 16 g 3     levothyroxine (SYNTHROID/LEVOTHROID) 50 MCG tablet Take 1 tablet (50 mcg) by mouth daily 90 tablet 1     metoprolol tartrate (LOPRESSOR) 25 MG tablet Take 0.5 tablets (12.5 mg) by mouth 2 times daily 90 tablet 1     Multiple Vitamin (MULTI-VITAMIN DAILY PO) Multi Vitamin       Multiple Vitamins-Minerals (PRESERVISION AREDS PO) Take 1 tablet by mouth 2 times daily       simvastatin (ZOCOR) 20 MG tablet TAKE 1 TABLET (20 MG) BY MOUTH AT BEDTIME 90 tablet 3       ALLERGIES:     Allergies   Allergen Reactions     No Known Allergies        FAMILY HISTORY:  Family History   Problem Relation Age of Onset     Heart Disease Father         MI age 70       SOCIAL HISTORY:  Social History     Tobacco Use     Smoking status: Never     Passive exposure: Never     Smokeless tobacco: Never   Substance Use Topics     Alcohol use: Yes     Alcohol/week: 5.8 standard drinks of alcohol     Types: 7 Standard drinks or equivalent per week     Comment: two to three glasses of wine q day.     Drug use: No       ROS:   A comprehensive 14 point review of systems is negative other than as mentioned in HPI.    Exam:  /72 (BP Location: Right arm, Patient Position: Chair, Cuff Size: Adult Regular)   Pulse 77   Wt 64.3 kg (141 lb 12.8 oz)   LMP   (LMP Unknown)   SpO2 97%   BMI 25.94 kg/m    GENERAL APPEARANCE: healthy, alert and no distress  NECK: JVP not elevated  RESPIRATORY: lungs clear to auscultation   CARDIOVASCULAR: regular rhythm, normal S1 and S2, +3/6 systolic murmur  EXTREMITIES: trace ankle edema.      Labs:  Reviewed.   LDL 67    Testing/Procedures:  CMR 1/26/22: LVEF:55%, Apical Muscular VSD with left to right shunt, moderately dilated RV, no obvious LGE    Coronary angiogram/RHC 1/25/22:  Normal coronary arteries  RHC: RA 8 (v wave to 13) RV 50/7 PA 44/18(27) PCW 16 TDCO/CI 3.9/2.3   Shunt eval (O2 sat): R IJV 63.5% SVC 65.3% IVC 62.8% RA 63.6%   RV 82.5%   PA 80.2% RPA 80.6% RPCW 96% Ao 98% Hgb 11.7   Qp/Qs 2.14     TTE 2/7/22 (read by Dr. Pickering):  Status post removal of Amplatzer device and patch closure of muscular VSD  2/4/22. Some images (images #45-46 and 50-54) are suspicious for a small  residual VSD at the apical margin of the patch. Consider cardiac CTA or MRI  for further evaluation.  Global and regional left ventricular function is normal with an EF of 55-60%.  Mild right ventricular dilation is present. Global right ventricular function  is mildly reduced.  No pericardial effusion is present.     This study was compared with the study from 2/4/22 (intraoperative KATIE) and  2/3/22 (TTE): Possible residual VSD was not seen on the postoperative images  from the intraoperative KATIE, however the ventricular septum is better  interrogated on today's KATIE. Compared with the prior TTE, there has been  interval removal of Amplatzer device and patch closure of VSD with significant  interval reduction in ventricular-level shunting. RV size and function also  appears slightly improved on limited views.    TTE 06/07/22 (reviewed by Dr. Pickering):  Normal LV size and function, LVEF=55-60%.  RV function is normal.  Small residual VSD (~6 mm) at the apical margin of the VSD patch repair, unchanged from 2/3/2022.     TTE 1/17/23 (reviewed by  Dr. Pickering):   LVEF=50-55%. Normal RV function. Normal Small residual left-to-right shunt at the site of VSD repair. Mild-mod TR, mild AI. 4.3 cm aorta. No change from 6/7/22.      I personally visualized and interpreted:  TTE 04/16/24:    LVEF=50-55%. Normal RV function. Small residual left-to-right shunt at the site of VSD repair;  VSD gradient at least 93 mmHg. Mild-mod TR, mild AI. 4.3 cm aorta. RVSP 24 mmHg+RAP. No significant change from 1/17/23 or 6/7/22.      Assessment and Plan:   83 year old female with PMHx of apical muscular VSD s/p patch repair who presents today for follow up.    #Exertional back pain, chronic, most likely musculoskeletal   -offered coronary CTA to exclude new CAD with atypical anginal equivalent (suspicion for this is very low however based on timing, stability, and symptomatology)-- she prefers to hold off and pursue orthopedic evaluation for the present and revisit if symptoms worsen or fail to improve. I agree that this is reasonable but told her we would be happy to arrange coronary CTA should she change her mind.   -encouraged her to gradually increase moderate-intensity aerobic exercise as tolerated       #Apical muscular VSD s/p repair on 2/4/2022   81 year old female who presented from [facility] with SOB and later found to have a hemodynamically significant VSD. QP:QS at the time was 2.14 on RHC and LHC. Initially underwent percutaneous closure with occluder device, however had significant residual shunt that necessitated VSD repair with patch.     TTE today is stable from 6/2022 and 1/2023 and shows normal LV size/function with small residual L-R shunt, normal right sided pressures and RVSP ~24 mmHg.    Plan:  - Continue aspirin 81 mg daily.   -SBE prophylaxis with dental procedures  - Compression stockings and leg elevation for ankle swelling  - Encourage exercise.    #HTN, controlled:   -Continue amlodipine 10 mg daily    #HL, controlled:  -continue present  statin    #Small segmental PE  Started on 2/2022, completed 6 months of coumadin.     Follow up in 1 year (at [place]) with echocardiogram prior.    Review of the result(s) of each unique test - TTE 4/16/24, lipids  Assessment requiring an independent historian(s) - family - daughter  Independent interpretation of a test performed by another physician/other qualified health care professional (not separately reported) - TTE 4/16/24, comparison with TTE 1/2023  Ordering of each unique test  I spent a total of 50 minutes on the day of the visit.   Time spent by me doing chart review, history and exam, documentation and further activities per the note      Justin Pickering MD  Cardiology

## 2024-04-16 NOTE — PATIENT INSTRUCTIONS
"Cardiology Providers you saw during your visit:  Dr. Pickering    Medication changes:  none    Follow up:  With Dr. Pickering in [place] in 1 year with fasting lipids and echocardiogram prior      Please call if you have :  1. Weight gain of more than 2 pounds in a day or 5 pounds in a week  2. Increased shortness of breath, swelling or bloating  3. Dizziness, lightheadedness   4. Any questions or concerns.     Follow the American Heart Association Diet and Lifestyle recommendations:  Limit saturated fat, trans fat, sodium, red meat, sweets and sugar-sweetened beverages. If you choose to eat red meat, compare labels and select the leanest cuts available.  Aim for at least 150 minutes of moderate physical activity or 75 minutes of vigorous physical activity - or an equal combination of both - each week.    During business hours: [phone], option # 1 \"To leave a message for your care team\"     After hours, weekends or holidays: On Call Cardiologist [phone]   option #4 and ask to speak to the on-call Cardiologist. Inform them you are a CORE/heart failure patient at the [place].    Aliya Victor RN BSN  Cardiology Nurse Coordinator - Heart Failure Clinic  [facility]  [phone] option 1 to schedule an appointment or leave a message for your care team    "

## 2024-05-06 DIAGNOSIS — Z87.74 S/P VENTRICULAR SEPTAL DEFECT REPAIR: ICD-10-CM

## 2024-05-06 DIAGNOSIS — I10 BENIGN ESSENTIAL HYPERTENSION: ICD-10-CM

## 2024-05-06 DIAGNOSIS — I51.89 OTHER ILL-DEFINED HEART DISEASES: ICD-10-CM

## 2024-05-06 RX ORDER — METOPROLOL TARTRATE 25 MG/1
12.5 TABLET, FILM COATED ORAL 2 TIMES DAILY
COMMUNITY
Start: 2024-05-06

## 2024-05-06 NOTE — TELEPHONE ENCOUNTER
Kimberley Mullins is requesting a refill of:    Refused Prescriptions:                       Disp   Refills    metoprolol tartrate (LOPRESSOR) 25 MG tabl*                Sig: TAKE 0.5 TABLETS BY MOUTH 2 TIMES DAILY.  Refused By: CLEO CABEZAS  Reason for Refusal: Patient needs appointment    Pt due for OV

## 2024-05-18 DIAGNOSIS — F41.9 ANXIETY: ICD-10-CM

## 2024-05-18 DIAGNOSIS — I10 BENIGN ESSENTIAL HYPERTENSION: ICD-10-CM

## 2024-05-18 DIAGNOSIS — E03.9 ACQUIRED HYPOTHYROIDISM: ICD-10-CM

## 2024-05-20 RX ORDER — LEVOTHYROXINE SODIUM 50 UG/1
50 TABLET ORAL DAILY
COMMUNITY
Start: 2024-05-20

## 2024-05-20 RX ORDER — CITALOPRAM HYDROBROMIDE 10 MG/1
10 TABLET ORAL DAILY
Qty: 5 TABLET | Refills: 0 | Status: SHIPPED | OUTPATIENT
Start: 2024-05-20 | End: 2024-05-22

## 2024-05-20 RX ORDER — CITALOPRAM HYDROBROMIDE 10 MG/1
10 TABLET ORAL DAILY
COMMUNITY
Start: 2024-05-20

## 2024-05-20 RX ORDER — AMLODIPINE BESYLATE 5 MG/1
10 TABLET ORAL DAILY
COMMUNITY
Start: 2024-05-20

## 2024-05-20 NOTE — PROGRESS NOTES
Assessment & Plan   Problem List Items Addressed This Visit          Endocrine    Hypothyroidism    Relevant Medications    levothyroxine (SYNTHROID/LEVOTHROID) 50 MCG tablet    Other Relevant Orders    TSH WITH FREE T4 REFLEX ([facility])       Circulatory    Benign essential hypertension    Relevant Medications    amLODIPine (NORVASC) 5 MG tablet    metoprolol tartrate (LOPRESSOR) 25 MG tablet    Other Relevant Orders    VENOUS COLLECTION (Completed)    Comprehensive Metobolic Panel ([facility])       Other    S/P ventricular septal defect repair    Relevant Medications    metoprolol tartrate (LOPRESSOR) 25 MG tablet    Anxiety    Relevant Medications    citalopram (CELEXA) 10 MG tablet     Other Visit Diagnoses       Medicare annual wellness visit, subsequent    -  Primary    Other ill-defined heart diseases        Relevant Medications    metoprolol tartrate (LOPRESSOR) 25 MG tablet    Heartburn        Relevant Medications    famotidine (PEPCID) 20 MG tablet    Urinary symptom or sign        Relevant Orders    URINALYSIS, ROUTINE ([facility]) (Completed)    Acute cystitis without hematuria        Relevant Medications    cephALEXin (KEFLEX) 500 MG capsule    Other Relevant Orders    URINE CULTURE AEROBIC BACTERIAL ([facility])           1. Medicare annual wellness visit, subsequent  Completed.    2. Benign essential hypertension  Controlled on medications, refilled.  - amLODIPine (NORVASC) 5 MG tablet; Take 2 tablets (10 mg) by mouth daily  Dispense: 180 tablet; Refill: 1  - metoprolol tartrate (LOPRESSOR) 25 MG tablet; Take 0.5 tablets (12.5 mg) by mouth 2 times daily  Dispense: 90 tablet; Refill: 1  - VENOUS COLLECTION  - Comprehensive Metobolic Panel ([facility])    3. Anxiety  Symptoms are controlled, refilled.  - citalopram (CELEXA) 10 MG tablet; Take 1 tablet (10 mg) by mouth daily  Dispense: 90 tablet; Refill: 1    4. Acquired hypothyroidism  Due for labs, refilled.  - levothyroxine (SYNTHROID/LEVOTHROID) 50 MCG tablet; Take 1 tablet (50  mcg) by mouth daily  Dispense: 90 tablet; Refill: 1  - TSH WITH FREE T4 REFLEX ([facility])    5. S/P ventricular septal defect repair  Followed by cardiology.  - metoprolol tartrate (LOPRESSOR) 25 MG tablet; Take 0.5 tablets (12.5 mg) by mouth 2 times daily  Dispense: 90 tablet; Refill: 1    6. Other ill-defined heart diseases  Followed by cardiology.  - metoprolol tartrate (LOPRESSOR) 25 MG tablet; Take 0.5 tablets (12.5 mg) by mouth 2 times daily  Dispense: 90 tablet; Refill: 1    7. Heartburn  She has already discussed with cardiology. Start famotidine, if not better in a month, should come back and recheck.  - famotidine (PEPCID) 20 MG tablet; Take 1 tablet (20 mg) by mouth 2 times daily  Dispense: 90 tablet; Refill: 1    8. Urinary symptom or sign    - URINALYSIS, ROUTINE ([facility])    9. Acute cystitis without hematuria  This appears to be a urinary infection. She didn't want to do a pelvic exam. If she continues to have symptoms, come back for a recheck in 2-3 weeks.  - URINE CULTURE AEROBIC BACTERIAL ([facility])  - cephALEXin (KEFLEX) 500 MG capsule; Take 1 capsule (500 mg) by mouth 2 times daily for 7 days  Dispense: 14 capsule; Refill: 0              FUTURE APPOINTMENTS:       - Follow-up visit per symptoms, or med check in 6 months.    No follow-ups on file.    Katlyn Daniels MD  [facility]    Subjective     Nursing Notes:   Amy Cifuentes MA  5/22/2024 10:41 AM  Signed  Chief Complaint   Patient presents with    Recheck Medication     Pt here for medication recheck and refill  Also doing her AWV today to         Kimberley ARDON Harpreet is a 83 year old female who presents to clinic today for the following health issues   HPI     Here for her medications, due for refills, labs. She is working with cardiology, last saw them in April. They are watching her cholesterol. She is due for other lab refills.  She said she has been noticing heartburn lately, tums helps this. Coffee might be a trigger. Quit coffee  "in the mornings. She said she discussed with cardiology.    Also says that her bottom is sore and wondering if she has a uti. Discomfort with urination. Wants to check for this. Had something 5-6 years ago and got an ointment from her gynecologist. Still has some of this.        Review of Systems   Constitutional, HEENT, cardiovascular, pulmonary, gi and gu systems are negative, except as otherwise noted.      Objective    /78 (BP Location: Left arm, Patient Position: Sitting, Cuff Size: Adult Regular)   Pulse 69   Temp 97.7  F (36.5  C) (Temporal)   Ht 1.575 m (5' 2\")   Wt 63.5 kg (140 lb)   LMP  (LMP Unknown)   SpO2 97%   BMI 25.61 kg/m    Body mass index is 25.61 kg/m .  Physical Exam   GENERAL: alert and no distress  RESP: lungs clear to auscultation - no rales, rhonchi or wheezes  CV: regular rate and rhythm, normal S1 S2, no S3 or S4, no murmur, click or rub, no peripheral edema  MS: no gross musculoskeletal defects noted, no edema  NEURO: Normal strength and tone, mentation intact and speech normal  PSYCH: mentation appears normal, affect normal/bright    Results for orders placed or performed in visit on 05/22/24   URINALYSIS, ROUTINE ([facility])     Status: Abnormal   Result Value Ref Range    Color Urine Yellow     Appearance Urine Cloudy (A)     Glucose Urine Neg mg/dL    Bilirubin Urine Neg     Ketones Urine Neg mg/dL    Specific Gravity Urine 1.025     Blood Urine Large (A)     pH Urine 5.5 pH    Protein Urine 100 (A) neg - neg mg/dL    Urobilinogen Urine 0.2 EU/dL    Nitrite Urine Neg     Leukocytes MOD (A)     Wbc, Urine Micro TNTC (A) neg - 2    RBC Micro Urine 5-10 (A) neg - 2    EP/HPF FEW     Bacteria Urine SMALL (A) neg - neg    Casts/LPF neg     Miscellaneous Packed WBC's (A)          "

## 2024-05-20 NOTE — TELEPHONE ENCOUNTER
Pt scheduled appt for 05/22 needs extension of just citalopram.    Kimberley ARDON Harpreet is requesting a refill of:    Pending Prescriptions:                       Disp   Refills    citalopram (CELEXA) 10 MG tablet          5 tabl*0            Sig: Take 1 tablet (10 mg) by mouth daily  Refused Prescriptions:                       Disp   Refills    levothyroxine (SYNTHROID/LEVOTHROID) 50 MC*                Sig: TAKE 1 TABLET BY MOUTH EVERY DAY  Refused By: CLEO CABEZAS  Reason for Refusal: Patient needs appointment    amLODIPine (NORVASC) 5 MG tablet [Pharmacy*                Sig: TAKE 2 TABLETS BY MOUTH EVERY DAY  Refused By: CLEO CABEZAS  Reason for Refusal: Patient needs appointment    citalopram (CELEXA) 10 MG tablet [Pharmacy*                Sig: TAKE 1 TABLET (10 MG) BY MOUTH DAILY.  Refused By: CLEO CABEZAS  Reason for Refusal: Patient needs appointment

## 2024-05-20 NOTE — TELEPHONE ENCOUNTER
Kimberley Mullins is requesting a refill of:    Refused Prescriptions:                       Disp   Refills    levothyroxine (SYNTHROID/LEVOTHROID) 50 MC*                Sig: TAKE 1 TABLET BY MOUTH EVERY DAY  Refused By: CLEO CABEZAS  Reason for Refusal: Patient needs appointment    amLODIPine (NORVASC) 5 MG tablet [Pharmacy*                Sig: TAKE 2 TABLETS BY MOUTH EVERY DAY  Refused By: CLEO CABEZAS  Reason for Refusal: Patient needs appointment    citalopram (CELEXA) 10 MG tablet [Pharmacy*                Sig: TAKE 1 TABLET (10 MG) BY MOUTH DAILY.  Refused By: CLEO CABEZAS  Reason for Refusal: Patient needs appointment    Pt due for OV

## 2024-05-22 ENCOUNTER — OFFICE VISIT (OUTPATIENT)
Dept: FAMILY MEDICINE | Facility: CLINIC | Age: 84
End: 2024-05-22

## 2024-05-22 VITALS
TEMPERATURE: 97.7 F | DIASTOLIC BLOOD PRESSURE: 78 MMHG | OXYGEN SATURATION: 97 % | BODY MASS INDEX: 25.76 KG/M2 | HEART RATE: 69 BPM | HEIGHT: 62 IN | SYSTOLIC BLOOD PRESSURE: 120 MMHG | WEIGHT: 140 LBS

## 2024-05-22 DIAGNOSIS — Z00.00 MEDICARE ANNUAL WELLNESS VISIT, SUBSEQUENT: Primary | ICD-10-CM

## 2024-05-22 DIAGNOSIS — I10 BENIGN ESSENTIAL HYPERTENSION: ICD-10-CM

## 2024-05-22 DIAGNOSIS — F41.9 ANXIETY: ICD-10-CM

## 2024-05-22 DIAGNOSIS — I51.89 OTHER ILL-DEFINED HEART DISEASES: ICD-10-CM

## 2024-05-22 DIAGNOSIS — Z87.74 S/P VENTRICULAR SEPTAL DEFECT REPAIR: ICD-10-CM

## 2024-05-22 DIAGNOSIS — R39.9 URINARY SYMPTOM OR SIGN: ICD-10-CM

## 2024-05-22 DIAGNOSIS — E03.9 ACQUIRED HYPOTHYROIDISM: ICD-10-CM

## 2024-05-22 DIAGNOSIS — R12 HEARTBURN: ICD-10-CM

## 2024-05-22 DIAGNOSIS — N30.00 ACUTE CYSTITIS WITHOUT HEMATURIA: ICD-10-CM

## 2024-05-22 LAB
ALBUMIN SERPL-MCNC: 4.5 G/DL (ref 3.6–5.1)
ALP SERPL-CCNC: 87 U/L (ref 33–130)
ALT 1742-6: 15 U/L (ref 0–32)
APPEARANCE UR: ABNORMAL
AST 1920-8: 21 U/L (ref 0–35)
BACTERIA, UR: ABNORMAL
BILIRUB SERPL-MCNC: 0.9 MG/DL (ref 0.2–1.2)
BILIRUB UR QL: ABNORMAL
BUN SERPL-MCNC: 13 MG/DL (ref 7–25)
BUN/CREATININE RATIO: 16 (ref 6–32)
CALCIUM SERPL-MCNC: 9.8 MG/DL (ref 8.6–10.3)
CASTS/LPF: ABNORMAL
CHLORIDE SERPLBLD-SCNC: 97.3 MMOL/L (ref 98–110)
CO2 SERPL-SCNC: 28.6 MMOL/L (ref 20–32)
COLOR UR: YELLOW
CREAT SERPL-MCNC: 0.8 MG/DL (ref 0.6–1.3)
EP/HPF: ABNORMAL
GLUCOSE SERPL-MCNC: 91 MG/DL (ref 60–99)
GLUCOSE URINE: ABNORMAL MG/DL
HGB UR QL: ABNORMAL
KETONES UR QL: ABNORMAL MG/DL
MISC.: ABNORMAL
NITRITE UR QL STRIP: ABNORMAL
PH UR STRIP: 5.5 PH (ref 5–7)
POTASSIUM SERPL-SCNC: 4.59 MMOL/L (ref 3.5–5.3)
PROT SERPL-MCNC: 7.4 G/DL (ref 6.1–8.1)
PROT UR QL: 100 MG/DL
RBC, UR MICRO: ABNORMAL (ref ?–2)
SODIUM SERPL-SCNC: 136 MMOL/L (ref 135–146)
SP GR UR STRIP: 1.02
UROBILINOGEN UR QL STRIP: 0.2 EU/DL (ref 0.2–1)
WBC #/AREA URNS HPF: ABNORMAL /[HPF]
WBC, UR MICRO: ABNORMAL (ref ?–2)

## 2024-05-22 PROCEDURE — 36415 COLL VENOUS BLD VENIPUNCTURE: CPT | Performed by: FAMILY MEDICINE

## 2024-05-22 PROCEDURE — 99214 OFFICE O/P EST MOD 30 MIN: CPT | Mod: 25 | Performed by: FAMILY MEDICINE

## 2024-05-22 PROCEDURE — 80053 COMPREHEN METABOLIC PANEL: CPT | Performed by: FAMILY MEDICINE

## 2024-05-22 PROCEDURE — G0439 PPPS, SUBSEQ VISIT: HCPCS | Performed by: FAMILY MEDICINE

## 2024-05-22 PROCEDURE — 81001 URINALYSIS AUTO W/SCOPE: CPT | Performed by: FAMILY MEDICINE

## 2024-05-22 RX ORDER — CITALOPRAM HYDROBROMIDE 10 MG/1
10 TABLET ORAL DAILY
Qty: 90 TABLET | Refills: 1 | Status: SHIPPED | OUTPATIENT
Start: 2024-05-22

## 2024-05-22 RX ORDER — LEVOTHYROXINE SODIUM 50 UG/1
50 TABLET ORAL DAILY
Qty: 90 TABLET | Refills: 1 | Status: SHIPPED | OUTPATIENT
Start: 2024-05-22

## 2024-05-22 RX ORDER — FAMOTIDINE 20 MG/1
20 TABLET, FILM COATED ORAL 2 TIMES DAILY
Qty: 90 TABLET | Refills: 1 | Status: SHIPPED | OUTPATIENT
Start: 2024-05-22 | End: 2024-07-02

## 2024-05-22 RX ORDER — AMLODIPINE BESYLATE 5 MG/1
10 TABLET ORAL DAILY
Qty: 180 TABLET | Refills: 1 | Status: SHIPPED | OUTPATIENT
Start: 2024-05-22

## 2024-05-22 RX ORDER — METOPROLOL TARTRATE 25 MG/1
12.5 TABLET, FILM COATED ORAL 2 TIMES DAILY
Qty: 90 TABLET | Refills: 1 | Status: SHIPPED | OUTPATIENT
Start: 2024-05-22

## 2024-05-22 RX ORDER — CEPHALEXIN 500 MG/1
500 CAPSULE ORAL 2 TIMES DAILY
Qty: 14 CAPSULE | Refills: 0 | Status: SHIPPED | OUTPATIENT
Start: 2024-05-22 | End: 2024-05-29

## 2024-05-22 NOTE — NURSING NOTE
Chief Complaint   Patient presents with    Recheck Medication     Pt here for medication recheck and refill  Also doing her AWV today to

## 2024-05-22 NOTE — PROGRESS NOTES
Kimberley Mullins is a 83 year old female who presents for Medicare Annual Wellness Visit.    Current providers caring for this patient include:  Patient Care Team:  Katlyn Daniels MD as PCP - General (Family Medicine)  Katlyn Daniels MD as Assigned PCP  Aliya Victor, RN as Specialty Care Coordinator (Cardiology)  Justin Pickering MD as MD (Cardiovascular Disease)  Justin Pickering MD as Assigned Heart and Vascular Provider    Complete Medical and Social history reviewed with patient, outlined below.    Patient Active Problem List   Diagnosis    Tinnitus    Transient global amnesia    Pure hypercholesterolemia    Hypothyroidism    Osteoarthritis    Status post total knee replacement    Health Care Home    ACP (advance care planning)    Benign essential hypertension    Irritable bowel syndrome with diarrhea    Osteopenia, unspecified location    Overweight (BMI 25.0-29.9)    Macular degeneration (senile) of retina    Cataract of both eyes, unspecified cataract type    Hyponatremia    Heart murmur    VSD (ventricular septal defect)    S/P ventricular septal defect repair    Single subsegmental pulmonary embolism without acute cor pulmonale (H)    Endometrial hyperplasia    Chronic anticoagulation    Anxiety       Past Medical History:   Diagnosis Date    Arthritis     Diverticulitis of colon (without mention of hemorrhage)(562.11) 1997    diverticulosis noted on Flex Sig    Irritable bowel syndrome 1959    Osteopenia, unspecified location 04/05/2018    Other ovarian failure 1986    Overweight (BMI 25.0-29.9) 04/05/2018    Personal history of colonic polyps 2004    Status post total knee replacement 11/09/2011    Thyroid disease     Transient global amnesia     Unspecified tinnitus     Tinnitus       Past Surgical History:   Procedure Laterality Date    ARTHROPLASTY KNEE  10/25/2011    Procedure:ARTHROPLASTY KNEE; Right Total Knee Arthroplasty   ; Surgeon:BRYCE FISHER; Location:  OR    ARTHROPLASTY KNEE  06/02/2014    Procedure: ARTHROPLASTY KNEE;  Surgeon: Krunal Atwood MD;  Location: [facility] OR    CV CORONARY ANGIOGRAM N/A 01/25/2022    Procedure: Coronary Angiogram; diagnostic;  Surgeon: Sreekanth Delong MD;  Location:  HEART CARDIAC CATH LAB    CV RIGHT HEART CATH MEASUREMENTS RECORDED N/A 01/25/2022    Procedure: Right Heart Cath;  Surgeon: Sreekanth Delong MD;  Location:  HEART CARDIAC CATH LAB    CV VSD CLOSURE N/A 01/31/2022    Procedure: CV VSD CLOSURE W/KATIE;  Surgeon: Daniel Cerda MD;  Location: [facility] CARDIAC CATH LAB    HC DILATION/CURETTAGE DIAG/THER NON OB  2006    D & C    HC KNEE SCOPE, DIAGNOSTIC      Arthroscopy, Knee    REPAIR VENTRICULAR SEPTAL DEFECT N/A 02/04/2022    Procedure: Median Sternotomy, cardiopulmonary bypass, Ischemic Ventricular Septal Defect repair, tranesophageal echocardiogram performed by anesthesia;  Surgeon: Aldair Whitney MD;  Location:  OR    [facility] AFF FOREARM/WRIST SURGERY UNLISTED      orif radius fx distal left    ZZC APPENDECTOMY  1955    Z OPEN JESSI FIXATN HUMERAL SHAFT FX  12/2007    Dr Massey       Family History   Problem Relation Age of Onset    Heart Disease Father         MI age 70       Social History     Tobacco Use    Smoking status: Never     Passive exposure: Never    Smokeless tobacco: Never   Substance Use Topics    Alcohol use: Yes     Alcohol/week: 5.8 standard drinks of alcohol     Types: 7 Standard drinks or equivalent per week     Comment: two to three glasses of wine q day.       Diet: regular, low salt/low fat  Physical Activity: patient exercises 2 times weekly. [place]es to the y and chases her grandchilden  Depression Screen:    Over the past 2 weeks, patient has felt down, depressed, or hopeless:  No    Over the past 2 weeks, patient has felt little interest or pleasure in doing things: No    Functional ability/Safety screen:  Up and go test (able to get up and walk longer than 30  seconds): Passed  Patient needs assistance with: nothing  Patient's home has the following possible safety concerns: none identified  Patient has concerns about her hearing:  Yes  Cognitive Screen  Patient repeats three objects (ball, flag, tree)      Clock drawing test:   NORMAL  Recalls three objects after 3 minutes (ball,flag,tree):                                                                                               recalls 3 objects (3 points)    Physical Exam:  LMP  (LMP Unknown)    There is no height or weight on file to calculate BMI.           End of Life Planning:   Patient currently has an advanced directive: No.  I have verified the patient's ablity to prepare an advanced directive/make health care decisions.  Literature was provided to assist patient in preparing an advanced directive.    Education/Counseling:   Based on review of the above information, the following items were addressed:      Discussed healthy diet and regular exercise    Appropriate preventive services were discussed with this patient, including applicable screening as appropriate for cardiovascular disease, diabetes, osteopenia/osteoporosis, and glaucoma.  As appropriate for age/gender, discussed screening for colorectal cancer, prostate cancer, breast cancer, and cervical cancer.   Checklist reviewing preventive services available has been given to the patient.         reviewed and is up to date

## 2024-05-23 LAB — TSH SERPL-ACNC: 2.84 MIU/L (ref 0.4–4.5)

## 2024-05-24 LAB — URINE VOIDED CULTURE: NORMAL

## 2024-05-30 NOTE — ED TRIAGE NOTES
-- DO NOT REPLY / DO NOT REPLY ALL --  -- Message is from Engagement Center [employer] ([employer]) --    Patient Name: Marisel Iyer - patient's mother Venessa calling    Specialist or PCP Name: Lisset Scott    Symptoms: Cramping after IUD was inserted for the past few months. Family history of endometriosis. [employer] connecting patient's mother to the clinical team now.    Pregnant (females aged 13-60. If Yes, how long?) : no/unsure    Call Back # : [phone] / [phone]     Which State are you currently located in?: [place]    Name of Clinic Site / Acct# :  [facility] [address]    Use following scripting for patients waiting for a callback:   “Nurse callback times vary based on call volumes; please be aware the return phone call may come from an unidentified or out of state phone number. If your symptoms worsen or become life threatening while waiting, you should seek immediate assistance by calling 911 or going to the ER for evaluation.”   Patient presents with complaints of SOB which has been ongoing for the past few weeks. Patient also has cough and leg swelling as well. Denies any chest pain.  ABC intact without need for intervention at this time.

## 2024-06-20 ENCOUNTER — OFFICE VISIT (OUTPATIENT)
Dept: UROLOGY | Facility: CLINIC | Age: 84
End: 2024-06-20
Payer: COMMERCIAL

## 2024-06-20 VITALS
SYSTOLIC BLOOD PRESSURE: 126 MMHG | BODY MASS INDEX: 25.76 KG/M2 | DIASTOLIC BLOOD PRESSURE: 68 MMHG | HEIGHT: 62 IN | WEIGHT: 140 LBS

## 2024-06-20 DIAGNOSIS — R31.29 MICROSCOPIC HEMATURIA: Primary | ICD-10-CM

## 2024-06-20 DIAGNOSIS — R30.0 DYSURIA: ICD-10-CM

## 2024-06-20 LAB
ALBUMIN UR-MCNC: 30 MG/DL
APPEARANCE UR: CLEAR
BILIRUB UR QL STRIP: NEGATIVE
COLOR UR AUTO: YELLOW
GLUCOSE UR STRIP-MCNC: NEGATIVE MG/DL
HGB UR QL STRIP: NEGATIVE
KETONES UR STRIP-MCNC: NEGATIVE MG/DL
LEUKOCYTE ESTERASE UR QL STRIP: ABNORMAL
NITRATE UR QL: NEGATIVE
PH UR STRIP: 5.5 [PH] (ref 5–7)
RESIDUAL VOLUME (RV) (EXTERNAL): 29
SP GR UR STRIP: >=1.03 (ref 1–1.03)
UROBILINOGEN UR STRIP-ACNC: 0.2 E.U./DL

## 2024-06-20 PROCEDURE — 51798 US URINE CAPACITY MEASURE: CPT | Performed by: PHYSICIAN ASSISTANT

## 2024-06-20 PROCEDURE — 99204 OFFICE O/P NEW MOD 45 MIN: CPT | Mod: 25 | Performed by: PHYSICIAN ASSISTANT

## 2024-06-20 PROCEDURE — 81003 URINALYSIS AUTO W/O SCOPE: CPT | Mod: QW | Performed by: PHYSICIAN ASSISTANT

## 2024-06-20 ASSESSMENT — PAIN SCALES - GENERAL: PAINLEVEL: NO PAIN (0)

## 2024-06-20 ASSESSMENT — ENCOUNTER SYMPTOMS
FREQUENCY: 0
DYSURIA: 1
HEMATURIA: 1

## 2024-06-20 NOTE — PROGRESS NOTES
Subjective      REQUESTING PROVIDER   No ref. provider found     REASON FOR CONSULT   Microscopic hematuria  Occasional dysuria    HISTORY OF PRESENT ILLNESS   Ms. Mullins is a 83 year old female seen today in the urology clinic in consultation for evaluation of microscopic hematuria and dysuria.  She was seen for these on 5/22/2024.  She had concern about possible urinary tract infection.  She was treated with Keflex twice daily for 7 days.  Urinalysis at that time was very concerning for urinary tract infection, as it had WBC is too numerous to count.  She also had 5-10 RBCs.  Urine culture only showed mixed christopher.      Her dysuria resolved after the antibiotics.  She feels that her urination is normal at this time.  She will occasionally have some irritation in the labial area and does find the applying small amount of Aquaphor improves this.    Patient was referred to us due to the mixed christopher urine culture on 5-10 RBCs per high-powered field.  Patient denies any gross hematuria.  She does get some her urinary urgency when she is washing the dishes.  She denies urinary frequency or incomplete emptying.  She notes nocturia 0-1 times.  She notes rare episodes of stress incontinence with coughing.  She does wear a liner daily.    No history of nephrolithiasis, or significant chemical exposure.  She has never smoked.  Postvoid residual today is 29 mL.  Urinalysis today does not show any evidence of blood.  She had an Enterococcus faecalis UTI in 2022.    Blood thinners: 81 mg aspirin    Retention or clots: Neither    Hematuria Risk Factors:  Age >40: Yes   Smoking history: No  Occupational exposure to chemicals or dyes (ie, benzenes, aromatic amines): no  History of urologic disorder or disease: no  History of irritative voiding symptoms: rare dysuria  History of urinary tract infection: no  Analgesic abuse: no  History of pelvic irradiation: no    The following portions of the patient's history were reviewed and  updated as appropriate: allergies, current medications, past family history, past medical history, past social history, past surgical history, and problem list.     REVIEW OF SYSTEMS   Review of Systems   Constitutional: Negative.    Cardiovascular:  Negative for leg swelling.   Genitourinary:  Positive for dysuria, hematuria (No gross, only microscopic) and urgency (Rare, with running water). Negative for frequency.      Per HPI.     Patient Active Problem List   Diagnosis    Tinnitus    Transient global amnesia    Pure hypercholesterolemia    Hypothyroidism    Osteoarthritis    Status post total knee replacement    ACP (advance care planning)    Benign essential hypertension    Irritable bowel syndrome with diarrhea    Osteopenia, unspecified location    Overweight (BMI 25.0-29.9)    Macular degeneration (senile) of retina    Cataract of both eyes, unspecified cataract type    Hyponatremia    Heart murmur    VSD (ventricular septal defect)    S/P ventricular septal defect repair    Single subsegmental pulmonary embolism without acute cor pulmonale (H)    Endometrial hyperplasia    Chronic anticoagulation    Anxiety      Past Medical History:   Diagnosis Date    Arthritis     Diverticulitis of colon (without mention of hemorrhage)(562.11) 1997    diverticulosis noted on Flex Sig    Irritable bowel syndrome 1959    Osteopenia, unspecified location 04/05/2018    Other ovarian failure 1986    Overweight (BMI 25.0-29.9) 04/05/2018    Personal history of colonic polyps 2004    Status post total knee replacement 11/09/2011    Thyroid disease     Transient global amnesia     Unspecified tinnitus     Tinnitus      Past Surgical History:   Procedure Laterality Date    ARTHROPLASTY KNEE  10/25/2011    Procedure:ARTHROPLASTY KNEE; Right Total Knee Arthroplasty   ; Surgeon:BRYCE ATWOOD; Location: OR    ARTHROPLASTY KNEE  06/02/2014    Procedure: ARTHROPLASTY KNEE;  Surgeon: Bryce Atwood MD;  Location:  OR     "CV CORONARY ANGIOGRAM N/A 01/25/2022    Procedure: Coronary Angiogram; diagnostic;  Surgeon: Sreekanth Delong MD;  Location:  HEART CARDIAC CATH LAB    CV RIGHT HEART CATH MEASUREMENTS RECORDED N/A 01/25/2022    Procedure: Right Heart Cath;  Surgeon: Sreekanth Delong MD;  Location:  HEART CARDIAC CATH LAB    CV VSD CLOSURE N/A 01/31/2022    Procedure: CV VSD CLOSURE W/KATIE;  Surgeon: Daniel Cerda MD;  Location:  HEART CARDIAC CATH LAB    HC DILATION/CURETTAGE DIAG/THER NON OB  2006    D & C    HC KNEE SCOPE, DIAGNOSTIC      Arthroscopy, Knee    REPAIR VENTRICULAR SEPTAL DEFECT N/A 02/04/2022    Procedure: Median Sternotomy, cardiopulmonary bypass, Ischemic Ventricular Septal Defect repair, tranesophageal echocardiogram performed by anesthesia;  Surgeon: Aldair Whitney MD;  Location:  OR    [facility] AFF FOREARM/WRIST SURGERY UNLISTED      orif radius fx distal left    ZZC APPENDECTOMY  1955    [facility] OPEN JESSI FIXATN HUMERAL SHAFT FX  12/2007    Dr Massey      Social History:   Patient denies any smoking history.    Family History:   No known family history of nephrolithiasis or  malignancy.    Objective      PHYSICAL EXAM   /68   Ht 1.575 m (5' 2\")   Wt 63.5 kg (140 lb)   LMP  (LMP Unknown)   BMI 25.61 kg/m     Physical Exam  Constitutional:       Appearance: Normal appearance.   HENT:      Head: Normocephalic.      Nose: Nose normal.   Eyes:      General: No scleral icterus.  Pulmonary:      Effort: Pulmonary effort is normal.   Abdominal:      General: There is no distension.   Musculoskeletal:         General: Normal range of motion.      Cervical back: Normal range of motion.      Right lower leg: No edema.      Left lower leg: No edema.   Skin:     General: Skin is warm and dry.   Neurological:      General: No focal deficit present.      Mental Status: She is alert and oriented to person, place, and time.   Psychiatric:         Mood and Affect: Mood normal.       "   Behavior: Behavior normal.        LABORATORY   Recent Labs   Lab Test 06/20/24  1417 05/22/24  1102 02/03/22  0929   COLOR Yellow   < > Orange*   APPEARANCE Clear   < > Cloudy*   URINEGLC Negative   < > Negative   URINEBILI Negative   < > Negative   URINEKETONE Negative   < > 10*   SG >=1.030   < > 1.010   UBLD Negative   < > Large*   URINEPH 5.5   < > 7.0   PROTEIN 30*  --  300*   UROBILINOGEN 0.2   < >  --    NITRITE Negative   < > Negative   LEUKEST Trace*  --  Negative   RBCU  --   --  19*   WBCU  --   --  84*    < > = values in this interval not displayed.     Creatinine 0.80.    TESTING    PVR: 29 mL    Assessment & Plan    1. Microscopic hematuria    2. Dysuria        I had the pleasure today of meeting with Ms. Mullins to discuss her microscopic hematuria.  The differential diagnosis at this point includes stone disease, infection, vaginal or uterine causes, urothelial malignancy, renal disorder versus another yet unknown diagnosis.  Many times, we do not find a cause, but we should rule out bad causes.    Patient notes that she almost canceled her appointment.  We discussed that her last urine culture had mixed christopher.  Urinalysis was quite suspicious for urinary tract infection given the microscopic blood as well as the degree of WBCs.  She also had a low count of squamous epithelial cells.  Patient has no significant risk factors for malignancy within the urinary system other than her age.    Urinalysis today does not show any evidence of blood.  Patient is somewhat hesitant to go forward with microscopic hematuria evaluation at this time.    Will plan on following:    -Last UA was very suspicious for UTI given the number of WBCs and low skin cells.    -Would recommend repeat urinalysis with urine microscopy in 2-3 months.  If positive for blood, then would plan on CT Urogram and cystoscopy.  If negative for blood, then would do repeat urinalysis with urine microscopy in one year.    -If burning worsens,  could trial a prescription of topical estrogen cream.    Contact us in the interim with questions, concerns, or changes in symptomatology.    Signed by:     Dayanara Christensen PA-C 6/20/2024 2:39 PM

## 2024-06-20 NOTE — PATIENT INSTRUCTIONS
Urine today is negative for blood.    Last UA was very suspicious for UTI given the number of WBCs and low skin cells.    Would recommend repeat urinalysis with urine microscopy in 2-3 months.  If positive for blood, then would plan on CT Urogram and cystoscopy.  If negative for blood, then would do repeat urinalysis with urine microscopy in one year.    If burning worsens, could trial a prescription of topical estrogen cream.    Contact us in the interim with questions, concerns, or changes in symptomatology.  [phone]

## 2024-06-20 NOTE — NURSING NOTE
Chief Complaint   Patient presents with    Dysuria    microhematuria     Pt states they did found trance blood in her urine in May, which the main purpose of her visit today.     Pt denies gross hematuria or unusual urinary frequency  Dysuria was resolved with the antibiotics given in May.  Pt states this dysuria, while it has happened in the past, has not been a frequent pattern/issue.  Pt denies urinary incontinence, though some rare leakage with coughing, and wears a thin liner.    PVR: 29 mL by bladder scan      Sandrine De La Paz, EMT

## 2024-06-25 ASSESSMENT — ENCOUNTER SYMPTOMS: CONSTITUTIONAL NEGATIVE: 1

## 2024-07-12 ENCOUNTER — APPOINTMENT (OUTPATIENT)
Dept: CT IMAGING | Facility: CLINIC | Age: 84
End: 2024-07-12
Attending: EMERGENCY MEDICINE
Payer: COMMERCIAL

## 2024-07-12 ENCOUNTER — HOSPITAL ENCOUNTER (EMERGENCY)
Facility: CLINIC | Age: 84
Discharge: HOME | End: 2024-07-12
Attending: EMERGENCY MEDICINE | Admitting: EMERGENCY MEDICINE
Payer: COMMERCIAL

## 2024-07-12 VITALS
WEIGHT: 140 LBS | TEMPERATURE: 97.9 F | SYSTOLIC BLOOD PRESSURE: 149 MMHG | HEIGHT: 62 IN | RESPIRATION RATE: 14 BRPM | HEART RATE: 70 BPM | BODY MASS INDEX: 25.76 KG/M2 | DIASTOLIC BLOOD PRESSURE: 76 MMHG | OXYGEN SATURATION: 98 %

## 2024-07-12 DIAGNOSIS — R07.9 ACUTE CHEST PAIN: ICD-10-CM

## 2024-07-12 LAB
ANION GAP SERPL CALCULATED.3IONS-SCNC: 16 MMOL/L (ref 7–15)
BASOPHILS # BLD AUTO: 0 10E3/UL (ref 0–0.2)
BASOPHILS NFR BLD AUTO: 1 %
BUN SERPL-MCNC: 14.7 MG/DL (ref 8–23)
CALCIUM SERPL-MCNC: 9.2 MG/DL (ref 8.8–10.2)
CHLORIDE SERPL-SCNC: 98 MMOL/L (ref 98–107)
CREAT SERPL-MCNC: 0.76 MG/DL (ref 0.51–0.95)
DEPRECATED HCO3 PLAS-SCNC: 22 MMOL/L (ref 22–29)
EGFRCR SERPLBLD CKD-EPI 2021: 77 ML/MIN/1.73M2
EOSINOPHIL # BLD AUTO: 0.2 10E3/UL (ref 0–0.7)
EOSINOPHIL NFR BLD AUTO: 3 %
ERYTHROCYTE [DISTWIDTH] IN BLOOD BY AUTOMATED COUNT: 13.9 % (ref 10–15)
GLUCOSE SERPL-MCNC: 100 MG/DL (ref 70–99)
HCT VFR BLD AUTO: 36.8 % (ref 35–47)
HGB BLD-MCNC: 12.6 G/DL (ref 11.7–15.7)
HOLD SPECIMEN: NORMAL
HOLD SPECIMEN: NORMAL
IMM GRANULOCYTES # BLD: 0 10E3/UL
IMM GRANULOCYTES NFR BLD: 0 %
LYMPHOCYTES # BLD AUTO: 1.8 10E3/UL (ref 0.8–5.3)
LYMPHOCYTES NFR BLD AUTO: 35 %
MCH RBC QN AUTO: 32.5 PG (ref 26.5–33)
MCHC RBC AUTO-ENTMCNC: 34.2 G/DL (ref 31.5–36.5)
MCV RBC AUTO: 95 FL (ref 78–100)
MONOCYTES # BLD AUTO: 0.8 10E3/UL (ref 0–1.3)
MONOCYTES NFR BLD AUTO: 15 %
NEUTROPHILS # BLD AUTO: 2.3 10E3/UL (ref 1.6–8.3)
NEUTROPHILS NFR BLD AUTO: 45 %
NRBC # BLD AUTO: 0 10E3/UL
NRBC BLD AUTO-RTO: 0 /100
PLATELET # BLD AUTO: 213 10E3/UL (ref 150–450)
POTASSIUM SERPL-SCNC: 3.7 MMOL/L (ref 3.4–5.3)
RBC # BLD AUTO: 3.88 10E6/UL (ref 3.8–5.2)
SODIUM SERPL-SCNC: 136 MMOL/L (ref 135–145)
TROPONIN T SERPL HS-MCNC: 7 NG/L
WBC # BLD AUTO: 5 10E3/UL (ref 4–11)

## 2024-07-12 PROCEDURE — 99285 EMERGENCY DEPT VISIT HI MDM: CPT | Mod: 25

## 2024-07-12 PROCEDURE — 36415 COLL VENOUS BLD VENIPUNCTURE: CPT | Performed by: EMERGENCY MEDICINE

## 2024-07-12 PROCEDURE — 80048 BASIC METABOLIC PNL TOTAL CA: CPT | Performed by: EMERGENCY MEDICINE

## 2024-07-12 PROCEDURE — 71260 CT THORAX DX C+: CPT

## 2024-07-12 PROCEDURE — 85025 COMPLETE CBC W/AUTO DIFF WBC: CPT | Performed by: EMERGENCY MEDICINE

## 2024-07-12 PROCEDURE — 250N000011 HC RX IP 250 OP 636: Performed by: EMERGENCY MEDICINE

## 2024-07-12 PROCEDURE — 93005 ELECTROCARDIOGRAM TRACING: CPT

## 2024-07-12 PROCEDURE — 84484 ASSAY OF TROPONIN QUANT: CPT | Performed by: EMERGENCY MEDICINE

## 2024-07-12 RX ORDER — IOPAMIDOL 755 MG/ML
500 INJECTION, SOLUTION INTRAVASCULAR ONCE
Status: COMPLETED | OUTPATIENT
Start: 2024-07-12 | End: 2024-07-12

## 2024-07-12 RX ADMIN — IOPAMIDOL 71 ML: 755 INJECTION, SOLUTION INTRAVENOUS at 21:18

## 2024-07-12 ASSESSMENT — ACTIVITIES OF DAILY LIVING (ADL)
ADLS_ACUITY_SCORE: 38

## 2024-07-12 ASSESSMENT — COLUMBIA-SUICIDE SEVERITY RATING SCALE - C-SSRS
2. HAVE YOU ACTUALLY HAD ANY THOUGHTS OF KILLING YOURSELF IN THE PAST MONTH?: NO
6. HAVE YOU EVER DONE ANYTHING, STARTED TO DO ANYTHING, OR PREPARED TO DO ANYTHING TO END YOUR LIFE?: NO
1. IN THE PAST MONTH, HAVE YOU WISHED YOU WERE DEAD OR WISHED YOU COULD GO TO SLEEP AND NOT WAKE UP?: NO

## 2024-07-13 NOTE — ED TRIAGE NOTES
Pt presents via EMS for evaluation of left sided chest pain. Started around 1800 described as a sharp pain, rated 6/10, radiating in to left back. Happened 4 times PTA. Took a 325 mg asa at home. Hx of open heart surgery for a hole in the heart a couple years ago. Currently asymptomatic. Of note, today is the 10th anniversary of her husbands death. IV established by EMS, .

## 2024-07-13 NOTE — ED PROVIDER NOTES
Emergency Department Note      History of Present Illness     Chief Complaint   Chest Pain      HPI   Kimberley Mullins is a 83 year old female arrives via car with son for evaluation of an episode of acute left-sided chest discomfort.  This occurred shortly before her arrival, as she was preparing dinner.  She is describing it to be sharp in quality stabbing, 6/10 level intensity.  It waxed and waned in intensity and is now completely resolved.  She had no associated symptoms such as shortness of breath, lightheadedness, or palpitations.  She has no prior similar history of chest pain.  She has no known history of coronary artery disease.    Independent Historian   Son as detailed above.    Past Medical History     Medical History and Problem List   Past Medical History:   Diagnosis Date    Arthritis     Diverticulitis of colon (without mention of hemorrhage)(562.11) 1997    Irritable bowel syndrome 1959    Osteopenia, unspecified location 04/05/2018    Other ovarian failure 1986    Overweight (BMI 25.0-29.9) 04/05/2018    Personal history of colonic polyps 2004    Status post total knee replacement 11/09/2011    Thyroid disease     Transient global amnesia     Unspecified tinnitus        Medications   acetaminophen (TYLENOL) 325 MG tablet  amLODIPine (NORVASC) 5 MG tablet  amoxicillin (AMOXIL) 500 MG capsule  aspirin (ASA) 81 MG chewable tablet  citalopram (CELEXA) 10 MG tablet  famotidine (PEPCID) 20 MG tablet  levothyroxine (SYNTHROID/LEVOTHROID) 50 MCG tablet  metoprolol tartrate (LOPRESSOR) 25 MG tablet  Multiple Vitamin (MULTI-VITAMIN DAILY PO)  Multiple Vitamins-Minerals (PRESERVISION AREDS PO)  simvastatin (ZOCOR) 20 MG tablet        Surgical History   Past Surgical History:   Procedure Laterality Date    ARTHROPLASTY KNEE  10/25/2011    Procedure:ARTHROPLASTY KNEE; Right Total Knee Arthroplasty   ; Surgeon:BRYCE FISHER; Location:[facility] OR    ARTHROPLASTY KNEE  06/02/2014    Procedure: ARTHROPLASTY KNEE;   "Surgeon: Krunal Atwood MD;  Location: [facility] OR    CV CORONARY ANGIOGRAM N/A 01/25/2022    Procedure: Coronary Angiogram; diagnostic;  Surgeon: Sreekanth Delong MD;  Location:  HEART CARDIAC CATH LAB    CV RIGHT HEART CATH MEASUREMENTS RECORDED N/A 01/25/2022    Procedure: Right Heart Cath;  Surgeon: Sreekanth Delong MD;  Location:  HEART CARDIAC CATH LAB    CV VSD CLOSURE N/A 01/31/2022    Procedure: CV VSD CLOSURE W/KATIE;  Surgeon: Daniel Cerda MD;  Location:  HEART CARDIAC CATH LAB    HC DILATION/CURETTAGE DIAG/THER NON OB  2006    D & C    HC KNEE SCOPE, DIAGNOSTIC      Arthroscopy, Knee    REPAIR VENTRICULAR SEPTAL DEFECT N/A 02/04/2022    Procedure: Median Sternotomy, cardiopulmonary bypass, Ischemic Ventricular Septal Defect repair, tranesophageal echocardiogram performed by anesthesia;  Surgeon: Aldair Whitney MD;  Location:  OR    [facility] AFF FOREARM/WRIST SURGERY UNLISTED      orif radius fx distal left    C APPENDECTOMY  1955    [facility] OPEN JESSI FIXATN HUMERAL SHAFT FX  12/2007    Dr Massey       Physical Exam     Patient Vitals for the past 24 hrs:   BP Temp Temp src Pulse Resp SpO2 Height Weight   07/12/24 2215 -- -- -- -- 14 -- -- --   07/12/24 2200 (!) 149/76 -- -- 70 -- 98 % -- --   07/12/24 2030 (!) 150/87 -- -- 68 11 99 % -- --   07/12/24 2000 (!) 145/77 -- -- 71 13 100 % -- --   07/12/24 1946 (!) 156/83 97.9  F (36.6  C) Oral 75 20 99 % 1.575 m (5' 2\") 63.5 kg (140 lb)     Physical Exam  General: Patient is alert and cooperative.  HENT:  Normal nose, oropharynx. Moist oral mucosa.  Eyes: EOMI. Normal conjunctiva.  Neck:  Normal range of motion and appearance.   Cardiovascular:  Normal rate, regular rhythm and normal heart sounds.   Pulmonary/Chest:  Effort normal. No wheezing or crackles.  Abdominal: Soft. No distension or tenderness.     Musculoskeletal: Normal range of motion. No edema or tenderness.   Neurological: oriented, normal strength, sensation, " and coordination.   Skin: Warm and dry. No rash or bruising.   Psychiatric: Normal mood and affect. Normal behavior and judgement.      Diagnostics     Lab Results   Labs Ordered and Resulted from Time of ED Arrival to Time of ED Departure   BASIC METABOLIC PANEL - Abnormal       Result Value    Sodium 136      Potassium 3.7      Chloride 98      Carbon Dioxide (CO2) 22      Anion Gap 16 (*)     Urea Nitrogen 14.7      Creatinine 0.76      GFR Estimate 77      Calcium 9.2      Glucose 100 (*)    TROPONIN T, HIGH SENSITIVITY - Normal    Troponin T, High Sensitivity 7     CBC WITH PLATELETS AND DIFFERENTIAL    WBC Count 5.0      RBC Count 3.88      Hemoglobin 12.6      Hematocrit 36.8      MCV 95      MCH 32.5      MCHC 34.2      RDW 13.9      Platelet Count 213      % Neutrophils 45      % Lymphocytes 35      % Monocytes 15      % Eosinophils 3      % Basophils 1      % Immature Granulocytes 0      NRBCs per 100 WBC 0      Absolute Neutrophils 2.3      Absolute Lymphocytes 1.8      Absolute Monocytes 0.8      Absolute Eosinophils 0.2      Absolute Basophils 0.0      Absolute Immature Granulocytes 0.0      Absolute NRBCs 0.0         Imaging   CT Chest w Contrast   Final Result   IMPRESSION:    1.  No definite acute abnormality in the chest.   2.  Stable dilation of the ascending thoracic aorta measuring up to 4.2 cm.   3.  Small hiatal hernia.             EKG Interpretation:      Interpreted by Gui Carrasco MD  Time reviewed:2000   Symptoms at time of EKG: None   Rhythm: Normal sinus   Rate: 77 bpm  Axis: Normal  Ectopy: None  Conduction: Normal  ST Segments/ T Waves: No acute ischemic changes and Non-specific ST-T wave changes  Q Waves: None    Clinical Impression: non-specific EKG    Independent Interpretation   None    ED Course      Medications Administered   Medications   sodium chloride (PF) 0.9% PF flush 100 mL (64 mLs Intravenous $Given 7/12/24 2118)   iopamidol (ISOVUE-370) solution 500 mL (71 mLs  "Intravenous $Given 7/12/24 9860)       Procedures   Procedures     Discussion of Management   None    ED Course   I reviewed the patient's medical record.   The patient was seen and examined by myself. I discussed the course of care with the patient including laboratory and diagnostic studies.  she understands and is agreeable to the plan.        HEART Score  Background  Calculates the overall risk of adverse event in patient's presenting with chest pain.  Based on 5 criteria (each assigned 0-2 points) including suspiciousness of history, EKG, age, risk factors and troponin.    Data  83 year old female  has Tinnitus; Transient global amnesia; Pure hypercholesterolemia; Hypothyroidism; Osteoarthritis; Status post total knee replacement; ACP (advance care planning); Benign essential hypertension; Irritable bowel syndrome with diarrhea; Osteopenia, unspecified location; Overweight (BMI 25.0-29.9); Macular degeneration (senile) of retina; Cataract of both eyes, unspecified cataract type; Hyponatremia; Heart murmur; VSD (ventricular septal defect); S/P ventricular septal defect repair; Single subsegmental pulmonary embolism without acute cor pulmonale (H); Endometrial hyperplasia; Chronic anticoagulation; and Anxiety on their problem list.   reports that she has never smoked. She has never been exposed to tobacco smoke. She has never used smokeless tobacco.  family history includes Heart Disease in her father.  No results found for: \"TROPI\", \"TROPONIN\", \"TROPR\", \"TROPN\"  Criteria   0-2 points for each of 5 items (maximum of 10 points):  Score 0- History slightly suspicious for coronary syndrome  Score 0- EKG Normal  Score 2- Age 65 years or older  Score 0- No risk factors for atherosclerotic disease  Score 0- Within normal limits for troponin levels  Interpretation  Risk of adverse outcome  Heart Score: 2  Total Score 0-3- Adverse Outcome Risk 2.5% - Supports early discharge with appropriate " follow-up      Optional/Additional Documentation  None    Medical Decision Making / Diagnosis     CMS Diagnoses: None    MIPS       None    MDM   Kimberley Mullins is a 83 year old female arrives asymptomatic for evaluation of an episode of earlier atypical sharp left-sided chest discomfort.  She is afebrile and hemodynamically stable.  She has a normal physical examination.  EKG and troponin are within normal limits.  CT chest with contrast shows no evidence of arterial dissection or other radiographic abnormality to account for her chest pain.  She has developed no recurrence of symptoms.  The clinical picture suggest a benign pleuritic chest pain.  No further ED based testing is necessary.  She is comfortable with discharge home I recommend she follow-up with her clinic if recurring symptoms or concerns.    Disposition   The patient was discharged.     Diagnosis     ICD-10-CM    1. Acute chest pain  R07.9            Discharge Medications   New Prescriptions    No medications on file         MD Luna Otto Brian A, MD  07/12/24 7292

## 2024-07-15 LAB
ATRIAL RATE - MUSE: 77 BPM
DIASTOLIC BLOOD PRESSURE - MUSE: NORMAL MMHG
INTERPRETATION ECG - MUSE: NORMAL
P AXIS - MUSE: 17 DEGREES
PR INTERVAL - MUSE: 166 MS
QRS DURATION - MUSE: 84 MS
QT - MUSE: 404 MS
QTC - MUSE: 457 MS
R AXIS - MUSE: -4 DEGREES
SYSTOLIC BLOOD PRESSURE - MUSE: NORMAL MMHG
T AXIS - MUSE: 109 DEGREES
VENTRICULAR RATE- MUSE: 77 BPM

## 2024-07-30 ENCOUNTER — HOSPITAL ENCOUNTER (OUTPATIENT)
Dept: MAMMOGRAPHY | Facility: CLINIC | Age: 84
Discharge: HOME | End: 2024-07-30
Attending: FAMILY MEDICINE | Admitting: FAMILY MEDICINE
Payer: COMMERCIAL

## 2024-07-30 DIAGNOSIS — Z12.31 VISIT FOR SCREENING MAMMOGRAM: ICD-10-CM

## 2024-07-30 PROCEDURE — 77063 BREAST TOMOSYNTHESIS BI: CPT

## 2024-08-21 DIAGNOSIS — Z87.74 S/P VENTRICULAR SEPTAL DEFECT REPAIR: ICD-10-CM

## 2024-08-21 DIAGNOSIS — I51.89 OTHER ILL-DEFINED HEART DISEASES: ICD-10-CM

## 2024-08-21 DIAGNOSIS — I10 BENIGN ESSENTIAL HYPERTENSION: ICD-10-CM

## 2024-08-21 RX ORDER — METOPROLOL TARTRATE 25 MG/1
12.5 TABLET, FILM COATED ORAL 2 TIMES DAILY
COMMUNITY
Start: 2024-08-21

## 2024-08-21 NOTE — TELEPHONE ENCOUNTER
Kimberley Mullins is requesting a refill of:    Refused Prescriptions:                       Disp   Refills    metoprolol tartrate (LOPRESSOR) 25 MG tabl*                Sig: TAKE 0.5 TABLETS BY MOUTH 2 TIMES DAILY.  Refused By: CLEO CABEZAS  Reason for Refusal: Should already have refills on file  Reason for Refusal Comment: refill sent on 05/22/24 for 90 tab with 1 refill       Cheek Interpolation Flap Division And Inset Text: Division and inset of the cheek interpolation flap was performed to achieve optimal aesthetic result, restore normal anatomic appearance and avoid distortion of normal anatomy, expedite and facilitate wound healing, achieve optimal functional result and because linear closure either not possible or would produce suboptimal result. The patient was prepped and draped in the usual manner. The pedicle was infiltrated with local anesthesia. The pedicle was sectioned with a #15 blade. The pedicle was de-bulked and trimmed to match the shape of the defect. Hemostasis was achieved. The flap donor site and free margin of the flap were secured with deep buried sutures and the wound edges were re-approximated.

## 2024-08-21 NOTE — TELEPHONE ENCOUNTER
Pt last seen 05/22/24 advised to return in 6 months, pt takes BID however not enough medication was sent at that time.    Kimberley Mullins is requesting a refill of:    Pending Prescriptions:                       Disp   Refills    metoprolol tartrate (LOPRESSOR) 25 MG tab*180 ta*0            Sig: TAKE 0.5 TABLETS BY MOUTH 2 TIMES DAILY.

## 2024-09-26 NOTE — PROGRESS NOTES
Admitted 1/24 for SOB, new HF and septal VSD. 1/31 ventricle amplatzer device  placed.     Neuro: A&Ox4.   Cardiac: Afebrile, VSS. SR-ST low 100's.   Respiratory: RA   GI/: Dark urine; UA sent, work-up liver enzymes; No BM this shift.   Diet/appetite: Tolerating 2g sodium diet. Denies nausea   Activity: up ad eduardo     Pain: Denies   PRN: given atarax for anxiety  Skin: No new deficits noted.  Lines: R PIV SL. PFO filter in place.   Drains: none   Replacement: none     Pt has been resting comfortably, will continue to monitor and follow plan of care.       Heparin running at 1100 units/hr.   -Plan for OR tomorrow am for correction of VSD Amplatzer device; sign and held orders need to be released tonight.   Medication: sertraline (ZOLOFT) 50 MG tablet  passed protocol.   Last office visit date: telephone visit 7/05/2024  Next appointment scheduled?: No   Number of refills given: sending to provider to review

## 2024-11-17 DIAGNOSIS — F41.9 ANXIETY: ICD-10-CM

## 2024-11-18 RX ORDER — CITALOPRAM HYDROBROMIDE 10 MG/1
10 TABLET ORAL DAILY
COMMUNITY
Start: 2024-11-18

## 2024-11-22 PROBLEM — E87.1 HYPONATREMIA: Status: RESOLVED | Noted: 2022-01-14 | Resolved: 2024-11-22

## 2024-11-22 PROBLEM — H26.9 CATARACT OF BOTH EYES, UNSPECIFIED CATARACT TYPE: Status: RESOLVED | Noted: 2019-11-30 | Resolved: 2024-11-22

## 2024-12-17 ENCOUNTER — TRANSFERRED RECORDS (OUTPATIENT)
Dept: FAMILY MEDICINE | Facility: CLINIC | Age: 84
End: 2024-12-17

## 2025-03-07 ENCOUNTER — OFFICE VISIT (OUTPATIENT)
Dept: FAMILY MEDICINE | Facility: CLINIC | Age: 85
End: 2025-03-07

## 2025-03-07 VITALS
HEART RATE: 76 BPM | TEMPERATURE: 98 F | WEIGHT: 140 LBS | SYSTOLIC BLOOD PRESSURE: 116 MMHG | OXYGEN SATURATION: 97 % | BODY MASS INDEX: 25.61 KG/M2 | DIASTOLIC BLOOD PRESSURE: 72 MMHG

## 2025-03-07 DIAGNOSIS — R05.1 ACUTE COUGH: Primary | ICD-10-CM

## 2025-03-07 DIAGNOSIS — J06.9 UPPER RESPIRATORY TRACT INFECTION, UNSPECIFIED TYPE: ICD-10-CM

## 2025-03-07 LAB
COVID-19: NEGATIVE
FLUAV AG UPPER RESP QL IA.RAPID: NORMAL
FLUBV AG UPPER RESP QL IA.RAPID: NORMAL
RESPIRATORY SYNCYTIAL VIRUS: NEGATIVE

## 2025-03-07 PROCEDURE — 87634 RSV DNA/RNA AMP PROBE: CPT | Performed by: FAMILY MEDICINE

## 2025-03-07 PROCEDURE — 87804 INFLUENZA ASSAY W/OPTIC: CPT | Mod: 59 | Performed by: FAMILY MEDICINE

## 2025-03-07 PROCEDURE — 87635 SARS-COV-2 COVID-19 AMP PRB: CPT | Performed by: FAMILY MEDICINE

## 2025-03-07 PROCEDURE — G2211 COMPLEX E/M VISIT ADD ON: HCPCS | Performed by: FAMILY MEDICINE

## 2025-03-07 PROCEDURE — 3074F SYST BP LT 130 MM HG: CPT | Performed by: FAMILY MEDICINE

## 2025-03-07 PROCEDURE — 3078F DIAST BP <80 MM HG: CPT | Performed by: FAMILY MEDICINE

## 2025-03-07 PROCEDURE — 99214 OFFICE O/P EST MOD 30 MIN: CPT | Performed by: FAMILY MEDICINE

## 2025-03-07 RX ORDER — BENZONATATE 100 MG/1
100 CAPSULE ORAL 3 TIMES DAILY PRN
Qty: 20 CAPSULE | Refills: 0 | Status: SHIPPED | OUTPATIENT
Start: 2025-03-07

## 2025-03-07 NOTE — PROGRESS NOTES
"Assessment & Plan   Problem List Items Addressed This Visit    None  Visit Diagnoses       Acute cough    -  Primary    Relevant Medications    benzonatate (TESSALON) 100 MG capsule    amoxicillin-clavulanate (AUGMENTIN) 875-125 MG tablet    Other Relevant Orders    COVID-19 ([facility]) (Completed)    Influenza A and B ([facility]) (Completed)    Respiratory Syncytial Virus - RSV (Completed)    Upper respiratory tract infection, unspecified type        Relevant Medications    amoxicillin-clavulanate (AUGMENTIN) 875-125 MG tablet           1. Acute cough (Primary)  Negative testing. Due to her  symptoms and raspy lung sounds, I will treat with oral antibiotics and tessalon for the cough.  - COVID-19 ([facility])  - Influenza A and B ([facility])  - Respiratory Syncytial Virus - RSV  - benzonatate (TESSALON) 100 MG capsule; Take 1 capsule (100 mg) by mouth 3 times daily as needed.  Dispense: 20 capsule; Refill: 0  - amoxicillin-clavulanate (AUGMENTIN) 875-125 MG tablet; Take 1 tablet by mouth 2 times daily for 10 days.  Dispense: 20 tablet; Refill: 0    2. Upper respiratory tract infection, unspecified type    - amoxicillin-clavulanate (AUGMENTIN) 875-125 MG tablet; Take 1 tablet by mouth 2 times daily for 10 days.  Dispense: 20 tablet; Refill: 0            BMI  Estimated body mass index is 25.61 kg/m  as calculated from the following:    Height as of 7/12/24: 1.575 m (5' 2\").    Weight as of this encounter: 63.5 kg (140 lb).         FUTURE APPOINTMENTS:       - Follow-up visit as needed. We manage her chronic medical care.    No follow-ups on file.    Katlyn Daniels MD  [facility]    Subjective     There are no exam notes on file for this visit.     Kimberley Mullins is a 84 year old female who presents to clinic today for the following health issues     HPI     Started with a cold on Sunday. Seemed to get better. Then yesterday coughing and it seemed to get deeper. No fevers and no shortness of breath except with coughing.  " Has some wheezing.        Review of Systems   Constitutional, HEENT, cardiovascular, pulmonary, gi and gu systems are negative, except as otherwise noted.      Objective    /72 (BP Location: Right arm, Patient Position: Sitting, Cuff Size: Adult Large)   Pulse 76   Temp 98  F (36.7  C) (Temporal)   Wt 63.5 kg (140 lb)   LMP  (LMP Unknown)   SpO2 97%   BMI 25.61 kg/m    Body mass index is 25.61 kg/m .  Physical Exam   GENERAL: alert and no distress  RESP: lungs clear to auscultation -raspy breath sounds throughout both lung fields with slight scattered wheeze  CV: regular rate and rhythm, normal S1 S2, no S3 or S4, no murmur, click or rub, no peripheral edema  MS: no gross musculoskeletal defects noted, no edema  PSYCH: mentation appears normal, affect normal/bright    Results for orders placed or performed in visit on 03/07/25   COVID-19 ([facility])     Status: None   Result Value Ref Range    COVID-19 Negative    Influenza A and B ([facility])     Status: None   Result Value Ref Range    Influenza A neg neg    Influenza B neg neg   Respiratory Syncytial Virus - RSV     Status: None   Result Value Ref Range    RESPIRATORY SYNCYTIAL VIRUS Negative

## 2025-05-18 DIAGNOSIS — I10 BENIGN ESSENTIAL HYPERTENSION: ICD-10-CM

## 2025-05-19 RX ORDER — AMLODIPINE BESYLATE 5 MG/1
10 TABLET ORAL DAILY
COMMUNITY
Start: 2025-05-19

## 2025-05-19 NOTE — TELEPHONE ENCOUNTER
Kimberley Mullins is requesting a refill of:    Refused Prescriptions:                       Disp   Refills    amLODIPine (NORVASC) 5 MG tablet [Pharmacy*                Sig: TAKE 2 TABLETS BY MOUTH EVERY DAY  Refused By: CLEO CABEZAS  Reason for Refusal: Patient needs appointment    Pt due for FASTING [employer] P[employer]ER message was sent

## 2025-06-02 NOTE — PROGRESS NOTES
Assessment & Plan   Problem List Items Addressed This Visit       Pure hypercholesterolemia    Relevant Medications    simvastatin (ZOCOR) 20 MG tablet    Other Relevant Orders    VENOUS COLLECTION (Completed)    Lipid Panel ([facility])    Comprehensive Metobolic Panel ([facility])    Hypothyroidism    Relevant Medications    levothyroxine (SYNTHROID/LEVOTHROID) 50 MCG tablet    Other Relevant Orders    TSH WITH FREE T4 REFLEX ([facility])    Benign essential hypertension    Relevant Medications    amLODIPine (NORVASC) 5 MG tablet    metoprolol tartrate (LOPRESSOR) 25 MG tablet    S/P ventricular septal defect repair    Relevant Medications    metoprolol tartrate (LOPRESSOR) 25 MG tablet    Anxiety    Relevant Medications    citalopram (CELEXA) 10 MG tablet     Other Visit Diagnoses         Medicare annual wellness visit, subsequent    -  Primary      Other ill-defined heart diseases        Relevant Medications    metoprolol tartrate (LOPRESSOR) 25 MG tablet      Balance problem          Bilateral sensorineural hearing loss               1. Medicare annual wellness visit, subsequent (Primary)  Completed.      2. Benign essential hypertension  Controlled on medications, refilled.  - amLODIPine (NORVASC) 5 MG tablet; Take 2 tablets (10 mg) by mouth daily.  Dispense: 180 tablet; Refill: 1  - metoprolol tartrate (LOPRESSOR) 25 MG tablet; Take 0.5 tablets (12.5 mg) by mouth 2 times daily.  Dispense: 90 tablet; Refill: 1    3. Anxiety  Controlled, refilled.  - citalopram (CELEXA) 10 MG tablet; Take 1 tablet (10 mg) by mouth daily.  Dispense: 90 tablet; Refill: 1    4. Acquired hypothyroidism  Check labs, refilled.  - levothyroxine (SYNTHROID/LEVOTHROID) 50 MCG tablet; Take 1 tablet (50 mcg) by mouth daily.  Dispense: 90 tablet; Refill: 1  - TSH WITH FREE T4 REFLEX ([facility])    5. S/P ventricular septal defect repair    - metoprolol tartrate (LOPRESSOR) 25 MG tablet; Take 0.5 tablets (12.5 mg) by mouth 2 times daily.  Dispense: 90 tablet;  "Refill: 1    6. Other ill-defined heart diseases    - metoprolol tartrate (LOPRESSOR) 25 MG tablet; Take 0.5 tablets (12.5 mg) by mouth 2 times daily.  Dispense: 90 tablet; Refill: 1    7. Pure hypercholesterolemia  Check fasting labs,refilled.  - simvastatin (ZOCOR) 20 MG tablet; TAKE 1 TABLET (20 MG) BY MOUTH AT BEDTIME  Dispense: 90 tablet; Refill: 1  - VENOUS COLLECTION  - Lipid Panel ([facility])  - Comprehensive Metobolic Panel ([facility])    8. Balance problem  Recommend physical therapy declined, [facility]    9. Bilateral sensorineural hearing loss  Recommend to see ent, wash out ears in the shower. She will let me know if she wants the referral.            BMI  Estimated body mass index is 25.97 kg/m  as calculated from the following:    Height as of this encounter: 1.575 m (5' 2\").    Weight as of this encounter: 64.4 kg (142 lb).         FUTURE APPOINTMENTS:       - Follow-up visit in 6 months. We manage her chronic medical care.    No follow-ups on file.    Katlyn Daniels MD  [facility]    Subjective     Nursing Notes:   Dilma Malin CMA  6/3/2025  9:46 AM  Signed  Chief Complaint   Patient presents with    Recheck Medication     Fasting today, refill medications     Pre-visit Screening:  Immunizations:  not up to date - tdap at pharmacy  Colonoscopy:  is up to date  Mammogram: is up to date  Asthma Action Test/Plan:  NA  PHQ9:  Done today  GAD7:  Done today  Questioned patient about current smoking habits Pt. has never smoked.  Ok to leave detailed message on voice mail for today's visit only Yes, phone # [phone]       Kimberley Mullins is a 84 year old female who presents to clinic today for the following health issues   HPI     [facility]    Balance concerns: \"my balance is not good\". Doesn't want physical   Also her daughter has concerns about her hearing, has ordered hearing aids online. But noone to talk to about them. They buzz and has an echo. She " "has to figure it out. Also has tinnitus. Doesn't want to see ent now.    Medications: here for refills and fasting labs.  Blood pressure, cholesterol, thyroid. Her mood is controlled on medications.    Followed by cardiology. Has had surgery. She will have her annual visit with cardiology in August.      Review of Systems   Constitutional, HEENT, cardiovascular, pulmonary, gi and gu systems are negative, except as otherwise noted.      Objective    /74 (BP Location: Right arm, Patient Position: Sitting, Cuff Size: Adult Large)   Pulse 59   Temp 98.1  F (36.7  C) (Temporal)   Ht 1.575 m (5' 2\")   Wt 64.4 kg (142 lb)   LMP  (LMP Unknown)   SpO2 96%   BMI 25.97 kg/m    Body mass index is 25.97 kg/m .  Physical Exam   GENERAL: alert and no distress  HENT: ear canals and TM's normal, on left, right with cerumen  RESP: lungs clear to auscultation - no rales, rhonchi or wheezes  CV: regular rate and rhythm, normal S1 S2, no S3 or S4, no murmur, click or rub, no peripheral edema  MS: no gross musculoskeletal defects noted, no edema  PSYCH: mentation appears normal, affect normal/bright    No results found for any visits on 06/03/25.      "

## 2025-06-03 ENCOUNTER — OFFICE VISIT (OUTPATIENT)
Dept: FAMILY MEDICINE | Facility: CLINIC | Age: 85
End: 2025-06-03

## 2025-06-03 VITALS
HEIGHT: 62 IN | WEIGHT: 142 LBS | OXYGEN SATURATION: 96 % | BODY MASS INDEX: 26.13 KG/M2 | HEART RATE: 59 BPM | TEMPERATURE: 98.1 F | SYSTOLIC BLOOD PRESSURE: 116 MMHG | DIASTOLIC BLOOD PRESSURE: 74 MMHG

## 2025-06-03 DIAGNOSIS — Z00.00 MEDICARE ANNUAL WELLNESS VISIT, SUBSEQUENT: Primary | ICD-10-CM

## 2025-06-03 DIAGNOSIS — E78.00 PURE HYPERCHOLESTEROLEMIA: ICD-10-CM

## 2025-06-03 DIAGNOSIS — Z87.74 S/P VENTRICULAR SEPTAL DEFECT REPAIR: ICD-10-CM

## 2025-06-03 DIAGNOSIS — H90.3 BILATERAL SENSORINEURAL HEARING LOSS: ICD-10-CM

## 2025-06-03 DIAGNOSIS — R26.89 BALANCE PROBLEM: ICD-10-CM

## 2025-06-03 DIAGNOSIS — I10 BENIGN ESSENTIAL HYPERTENSION: ICD-10-CM

## 2025-06-03 DIAGNOSIS — F41.9 ANXIETY: ICD-10-CM

## 2025-06-03 DIAGNOSIS — E03.9 ACQUIRED HYPOTHYROIDISM: ICD-10-CM

## 2025-06-03 DIAGNOSIS — I51.89 OTHER ILL-DEFINED HEART DISEASES: ICD-10-CM

## 2025-06-03 LAB
ALBUMIN SERPL-MCNC: 3.9 G/DL (ref 3.6–5.1)
ALP SERPL-CCNC: 68 U/L (ref 33–130)
ALT 1742-6: 10 U/L (ref 0–32)
AST 1920-8: 20 U/L (ref 0–35)
BILIRUB SERPL-MCNC: 0.8 MG/DL (ref 0.2–1.2)
BUN SERPL-MCNC: 12 MG/DL (ref 7–25)
BUN/CREATININE RATIO: 17 (ref 6–32)
CALCIUM SERPL-MCNC: 9.6 MG/DL (ref 8.6–10.3)
CHLORIDE SERPLBLD-SCNC: 99.9 MMOL/L (ref 98–110)
CHOLEST SERPL-MCNC: 166 MG/DL (ref 0–199)
CHOLEST/HDLC SERPL: 2 {RATIO} (ref 0–5)
CO2 SERPL-SCNC: 28 MMOL/L (ref 20–32)
CREAT SERPL-MCNC: 0.71 MG/DL (ref 0.6–1.3)
GLUCOSE SERPL-MCNC: 86 MG/DL (ref 60–99)
HDLC SERPL-MCNC: 76 MG/DL (ref 40–150)
LDLC SERPL CALC-MCNC: 73 MG/DL (ref 0–129)
POTASSIUM SERPL-SCNC: 4.73 MMOL/L (ref 3.5–5.3)
PROT SERPL-MCNC: 6.3 G/DL (ref 6.1–8.1)
SODIUM SERPL-SCNC: 135.5 MMOL/L (ref 135–146)
TRIGL SERPL-MCNC: 84 MG/DL (ref 0–149)

## 2025-06-03 PROCEDURE — 80053 COMPREHEN METABOLIC PANEL: CPT | Performed by: FAMILY MEDICINE

## 2025-06-03 PROCEDURE — 99214 OFFICE O/P EST MOD 30 MIN: CPT | Mod: 25 | Performed by: FAMILY MEDICINE

## 2025-06-03 PROCEDURE — G0439 PPPS, SUBSEQ VISIT: HCPCS | Performed by: FAMILY MEDICINE

## 2025-06-03 PROCEDURE — 36415 COLL VENOUS BLD VENIPUNCTURE: CPT | Performed by: FAMILY MEDICINE

## 2025-06-03 PROCEDURE — G2211 COMPLEX E/M VISIT ADD ON: HCPCS | Performed by: FAMILY MEDICINE

## 2025-06-03 PROCEDURE — 96127 BRIEF EMOTIONAL/BEHAV ASSMT: CPT | Performed by: FAMILY MEDICINE

## 2025-06-03 PROCEDURE — 80061 LIPID PANEL: CPT | Performed by: FAMILY MEDICINE

## 2025-06-03 RX ORDER — SIMVASTATIN 20 MG
TABLET ORAL
Qty: 90 TABLET | Refills: 1 | Status: SHIPPED | OUTPATIENT
Start: 2025-06-03

## 2025-06-03 RX ORDER — AMLODIPINE BESYLATE 5 MG/1
10 TABLET ORAL DAILY
Qty: 180 TABLET | Refills: 1 | Status: SHIPPED | OUTPATIENT
Start: 2025-06-03

## 2025-06-03 RX ORDER — LEVOTHYROXINE SODIUM 50 UG/1
50 TABLET ORAL DAILY
Qty: 90 TABLET | Refills: 1 | Status: SHIPPED | OUTPATIENT
Start: 2025-06-03

## 2025-06-03 RX ORDER — CITALOPRAM HYDROBROMIDE 10 MG/1
10 TABLET ORAL DAILY
Qty: 90 TABLET | Refills: 1 | Status: SHIPPED | OUTPATIENT
Start: 2025-06-03

## 2025-06-03 RX ORDER — METOPROLOL TARTRATE 25 MG/1
12.5 TABLET, FILM COATED ORAL 2 TIMES DAILY
Qty: 90 TABLET | Refills: 1 | Status: SHIPPED | OUTPATIENT
Start: 2025-06-03

## 2025-06-03 ASSESSMENT — ANXIETY QUESTIONNAIRES
1. FEELING NERVOUS, ANXIOUS, OR ON EDGE: NOT AT ALL
6. BECOMING EASILY ANNOYED OR IRRITABLE: NOT AT ALL
7. FEELING AFRAID AS IF SOMETHING AWFUL MIGHT HAPPEN: NOT AT ALL
GAD7 TOTAL SCORE: 0
2. NOT BEING ABLE TO STOP OR CONTROL WORRYING: NOT AT ALL
IF YOU CHECKED OFF ANY PROBLEMS ON THIS QUESTIONNAIRE, HOW DIFFICULT HAVE THESE PROBLEMS MADE IT FOR YOU TO DO YOUR WORK, TAKE CARE OF THINGS AT HOME, OR GET ALONG WITH OTHER PEOPLE: NOT DIFFICULT AT ALL
GAD7 TOTAL SCORE: 0
3. WORRYING TOO MUCH ABOUT DIFFERENT THINGS: NOT AT ALL
5. BEING SO RESTLESS THAT IT IS HARD TO SIT STILL: NOT AT ALL

## 2025-06-03 ASSESSMENT — PATIENT HEALTH QUESTIONNAIRE - PHQ9
SUM OF ALL RESPONSES TO PHQ QUESTIONS 1-9: 1
5. POOR APPETITE OR OVEREATING: NOT AT ALL

## 2025-06-03 NOTE — PROGRESS NOTES
Kimberley Mullins is a 84 year old female who presents for Medicare Annual Wellness Visit.    Current providers caring for this patient include:  Patient Care Team:  Katlyn Daniels MD as PCP - General (Family Medicine)  Katlyn Daniels MD as Assigned PCP  Justin Pickering MD as MD (Cardiovascular Disease)  Justin Pickering MD as Assigned Heart and Vascular Provider  Dayanara Christensen PA-C as Physician Assistant (Urology)  Dayanara Christensen PA-C as Assigned Surgical Provider    Complete Medical and Social history reviewed with patient, outlined below.    Patient Active Problem List   Diagnosis    Tinnitus    Transient global amnesia    Pure hypercholesterolemia    Hypothyroidism    Osteoarthritis    Status post total knee replacement    ACP (advance care planning)    Benign essential hypertension    Irritable bowel syndrome with diarrhea    Osteopenia, unspecified location    Overweight (BMI 25.0-29.9)    Macular degeneration (senile) of retina    Heart murmur    VSD (ventricular septal defect)    S/P ventricular septal defect repair    Single subsegmental pulmonary embolism without acute cor pulmonale (H)    Endometrial hyperplasia    Chronic anticoagulation    Anxiety       Past Medical History:   Diagnosis Date    Arthritis     Diverticulitis of colon (without mention of hemorrhage)(562.11) 1997    diverticulosis noted on Flex Sig    Irritable bowel syndrome 1959    Osteopenia, unspecified location 04/05/2018    Other ovarian failure 1986    Overweight (BMI 25.0-29.9) 04/05/2018    Personal history of colonic polyps 2004    Status post total knee replacement 11/09/2011    Thyroid disease     Transient global amnesia     Unspecified tinnitus     Tinnitus       Past Surgical History:   Procedure Laterality Date    ARTHROPLASTY KNEE  10/25/2011    Procedure:ARTHROPLASTY KNEE; Right Total Knee Arthroplasty   ; Surgeon:BRYCE FISHER; Location:[facility] OR    ARTHROPLASTY KNEE   06/02/2014    Procedure: ARTHROPLASTY KNEE;  Surgeon: Krunal Atwood MD;  Location: [facility] OR    CV CORONARY ANGIOGRAM N/A 01/25/2022    Procedure: Coronary Angiogram; diagnostic;  Surgeon: Sreekanth Delong MD;  Location:  HEART CARDIAC CATH LAB    CV RIGHT HEART CATH MEASUREMENTS RECORDED N/A 01/25/2022    Procedure: Right Heart Cath;  Surgeon: Sreekanth Delong MD;  Location:  HEART CARDIAC CATH LAB    CV VSD CLOSURE N/A 01/31/2022    Procedure: CV VSD CLOSURE W/KATIE;  Surgeon: Daniel Cerad MD;  Location: [facility] CARDIAC CATH LAB    HC DILATION/CURETTAGE DIAG/THER NON OB  2006    D & C    HC KNEE SCOPE, DIAGNOSTIC      Arthroscopy, Knee    REPAIR VENTRICULAR SEPTAL DEFECT N/A 02/04/2022    Procedure: Median Sternotomy, cardiopulmonary bypass, Ischemic Ventricular Septal Defect repair, tranesophageal echocardiogram performed by anesthesia;  Surgeon: Aldair Whitney MD;  Location:  OR    [facility] AFF FOREARM/WRIST SURGERY UNLISTED      orif radius fx distal left    ZZC APPENDECTOMY  1955    ZZ OPEN JESSI FIXATN HUMERAL SHAFT FX  12/2007    Dr Massey       Family History   Problem Relation Age of Onset    Heart Disease Father         MI age 70       Social History     Tobacco Use    Smoking status: Never     Passive exposure: Never    Smokeless tobacco: Never   Substance Use Topics    Alcohol use: Yes     Alcohol/week: 5.8 standard drinks of alcohol     Types: 7 Standard drinks or equivalent per week     Comment: two to three glasses of wine q day.       Diet: regular  Physical Activity: patient exercises 2-3 times weekly  Depression Screen:    Over the past 2 weeks, patient has felt down, depressed, or hopeless:  No    Over the past 2 weeks, patient has felt little interest or pleasure in doing things: No    Functional ability/Safety screen:  Up and go test (able to get up and walk longer than 30 seconds): Passed  Patient needs assistance with: nothing  Patient's home has the  "following possible safety concerns: none identified  Patient has concerns about her hearing: Yes   Cognitive Screen  Patient repeats three objects (ball, flag, tree)      Clock drawing test:   NORMAL  Recalls three objects after 3 minutes (ball,flag,tree):                                                                                               recalls 3 objects (3 points)    Physical Exam:  /74 (BP Location: Right arm, Patient Position: Sitting, Cuff Size: Adult Large)   Pulse 59   Temp 98.1  F (36.7  C) (Temporal)   Ht 1.575 m (5' 2\")   Wt 64.4 kg (142 lb)   LMP  (LMP Unknown)   SpO2 96%   BMI 25.97 kg/m     Body mass index is 25.97 kg/m .        Health Maintenance   Topic Date Due    RSV VACCINE (1 - 1-dose 75+ series) Never done    DTAP/TDAP/TD VACCINE (2 - Td or Tdap) 02/02/2025    COVID-19 VACCINE (7 - 2024-25 season) 04/02/2025    LIPID  04/16/2025    MEDICARE ANNUAL WELLNESS VISIT  05/22/2025    TSH W/FREE T4 REFLEX  05/22/2025    DEXA  11/17/2025    BMP  11/22/2025    FALL RISK ASSESSMENT  06/03/2026    ADVANCE CARE PLANNING  08/04/2026    PHQ-2 (once per calendar year)  Completed    INFLUENZA VACCINE  Completed    PNEUMOCOCCAL VACCINE 50+ YEARS  Completed    ZOSTER VACCINE  Completed    HPV VACCINE  Aged Out    MENINGITIS VACCINE  Aged Out    MAMMO SCREENING  Discontinued         End of Life Planning:   Patient currently has an advanced directive: Yes.  Practitioner is supportive of decision.    Education/Counseling:   Based on review of the above information, the following items were addressed:      Discussed healthy diet and regular exercise  [facility], also does balance exercises    Appropriate preventive services were discussed with this patient, including applicable screening as appropriate for cardiovascular disease, diabetes, osteopenia/osteoporosis, and glaucoma.  As appropriate for age/gender, discussed screening for colorectal cancer, prostate cancer, breast " cancer, and cervical cancer.   Checklist reviewing preventive services available has been given to the patient.    HM reviewed and is up to date

## 2025-06-03 NOTE — NURSING NOTE
Chief Complaint   Patient presents with    Recheck Medication     Fasting today, refill medications     Pre-visit Screening:  Immunizations:  not up to date - tdap at pharmacy  Colonoscopy:  is up to date  Mammogram: is up to date  Asthma Action Test/Plan:  NA  PHQ9:  Done today  GAD7:  Done today  Questioned patient about current smoking habits Pt. has never smoked.  Ok to leave detailed message on voice mail for today's visit only Yes, phone # [phone]

## 2025-06-03 NOTE — PATIENT INSTRUCTIONS
Health Maintenance   Topic Date Due    LIPID  04/16/2025    MEDICARE ANNUAL WELLNESS VISIT  05/22/2025    TSH W/FREE T4 REFLEX  05/22/2025    DTAP/TDAP/TD VACCINE (2 - Td or Tdap) 12/03/2025 (Originally 2/2/2025)    COVID-19 VACCINE (7 - 2024-25 season) 12/03/2025 (Originally 4/2/2025)    RSV VACCINE (1 - 1-dose 75+ series) 06/03/2026 (Originally 9/30/2014)    DEXA  11/17/2025    BMP  11/22/2025    FALL RISK ASSESSMENT  06/03/2026    ADVANCE CARE PLANNING  08/04/2026    PHQ-2 (once per calendar year)  Completed    INFLUENZA VACCINE  Completed    PNEUMOCOCCAL VACCINE 50+ YEARS  Completed    ZOSTER VACCINE  Completed    HPV VACCINE  Aged Out    MENINGITIS VACCINE  Aged Out    MAMMO SCREENING  Discontinued

## 2025-06-04 ENCOUNTER — RESULTS FOLLOW-UP (OUTPATIENT)
Dept: FAMILY MEDICINE | Facility: CLINIC | Age: 85
End: 2025-06-04

## 2025-06-04 LAB — TSH SERPL-ACNC: 3.57 MIU/L (ref 0.4–4.5)

## 2025-06-22 ENCOUNTER — APPOINTMENT (OUTPATIENT)
Dept: GENERAL RADIOLOGY | Facility: CLINIC | Age: 85
End: 2025-06-22
Attending: EMERGENCY MEDICINE
Payer: COMMERCIAL

## 2025-06-22 ENCOUNTER — HOSPITAL ENCOUNTER (EMERGENCY)
Facility: CLINIC | Age: 85
Discharge: HOME | End: 2025-06-22
Attending: EMERGENCY MEDICINE | Admitting: EMERGENCY MEDICINE
Payer: COMMERCIAL

## 2025-06-22 VITALS
HEART RATE: 65 BPM | DIASTOLIC BLOOD PRESSURE: 62 MMHG | SYSTOLIC BLOOD PRESSURE: 107 MMHG | WEIGHT: 145.72 LBS | TEMPERATURE: 98.6 F | OXYGEN SATURATION: 95 % | RESPIRATION RATE: 15 BRPM | BODY MASS INDEX: 25.82 KG/M2 | HEIGHT: 63 IN
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
## 2025-06-22 DIAGNOSIS — S42.201A CLOSED FRACTURE OF PROXIMAL END OF RIGHT HUMERUS, UNSPECIFIED FRACTURE MORPHOLOGY, INITIAL ENCOUNTER: ICD-10-CM

## 2025-06-22 DIAGNOSIS — S00.83XA FACIAL CONTUSION, INITIAL ENCOUNTER: ICD-10-CM

## 2025-06-22 LAB
HOLD SPECIMEN: NORMAL

## 2025-06-22 PROCEDURE — 73060 X-RAY EXAM OF HUMERUS: CPT | Mod: RT

## 2025-06-22 PROCEDURE — 250N000013 HC RX MED GY IP 250 OP 250 PS 637: Performed by: EMERGENCY MEDICINE

## 2025-06-22 PROCEDURE — 99284 EMERGENCY DEPT VISIT MOD MDM: CPT | Mod: 25

## 2025-06-22 PROCEDURE — 23600 CLTX PROX HUMRL FX W/O MNPJ: CPT | Mod: RT

## 2025-06-22 PROCEDURE — 250N000011 HC RX IP 250 OP 636: Mod: JZ | Performed by: EMERGENCY MEDICINE

## 2025-06-22 PROCEDURE — 73030 X-RAY EXAM OF SHOULDER: CPT | Mod: RT

## 2025-06-22 RX ORDER — OXYCODONE AND ACETAMINOPHEN 5; 325 MG/1; MG/1
1 TABLET ORAL EVERY 6 HOURS PRN
Qty: 12 TABLET | Refills: 0 | Status: SHIPPED | OUTPATIENT
Start: 2025-06-22 | End: 2025-06-25

## 2025-06-22 RX ORDER — OXYCODONE AND ACETAMINOPHEN 5; 325 MG/1; MG/1
2 TABLET ORAL ONCE
Refills: 0 | Status: COMPLETED | OUTPATIENT
Start: 2025-06-22 | End: 2025-06-22

## 2025-06-22 RX ORDER — ONDANSETRON 4 MG/1
4 TABLET, ORALLY DISINTEGRATING ORAL EVERY 8 HOURS PRN
Qty: 10 TABLET | Refills: 0 | Status: SHIPPED | OUTPATIENT
Start: 2025-06-22 | End: 2025-06-25

## 2025-06-22 RX ORDER — DOCUSATE SODIUM 100 MG/1
100 CAPSULE, LIQUID FILLED ORAL 3 TIMES DAILY PRN
Qty: 20 CAPSULE | Refills: 0 | Status: SHIPPED | OUTPATIENT
Start: 2025-06-22

## 2025-06-22 RX ORDER — ONDANSETRON 2 MG/ML
4 INJECTION INTRAMUSCULAR; INTRAVENOUS ONCE
Status: COMPLETED | OUTPATIENT
Start: 2025-06-22 | End: 2025-06-22

## 2025-06-22 RX ORDER — HYDROMORPHONE HYDROCHLORIDE 1 MG/ML
0.5 INJECTION, SOLUTION INTRAMUSCULAR; INTRAVENOUS; SUBCUTANEOUS ONCE
Refills: 0 | Status: COMPLETED | OUTPATIENT
Start: 2025-06-22 | End: 2025-06-22

## 2025-06-22 RX ORDER — MORPHINE SULFATE 4 MG/ML
4 INJECTION, SOLUTION INTRAMUSCULAR; INTRAVENOUS ONCE
Refills: 0 | Status: COMPLETED | OUTPATIENT
Start: 2025-06-22 | End: 2025-06-22

## 2025-06-22 RX ADMIN — HYDROMORPHONE HYDROCHLORIDE 0.5 MG: 1 INJECTION, SOLUTION INTRAMUSCULAR; INTRAVENOUS; SUBCUTANEOUS at 18:29

## 2025-06-22 RX ADMIN — ONDANSETRON 4 MG: 2 INJECTION, SOLUTION INTRAMUSCULAR; INTRAVENOUS at 16:31

## 2025-06-22 RX ADMIN — OXYCODONE HYDROCHLORIDE AND ACETAMINOPHEN 2 TABLET: 5; 325 TABLET ORAL at 18:50

## 2025-06-22 RX ADMIN — MORPHINE SULFATE 4 MG: 4 INJECTION INTRAVENOUS at 16:31

## 2025-06-22 ASSESSMENT — ACTIVITIES OF DAILY LIVING (ADL)
ADLS_ACUITY_SCORE: 51

## 2025-06-22 NOTE — ED PROVIDER NOTES
"  Emergency Department Note      History of Present Illness     Chief Complaint   Fall      HPI   Kimberley Mullins is a 84 year old female with a history of VSD presenting to the Emergency Department with two relatives for evaluation of shoulder injury subsequent to fall. Patient reports a mechanical fall 2.5 hours ago after she tripped on a kiddy pool in her yard while taking her bird feeder into the house. During the fall she hit her face and right shoulder on the wooden deck. Now endorses right shoulder pain which is exacerbated with movement, specifically lifting her right arm. The pain radiates down her arm into her elbow. Also endorses mild tenderness in her right cheek. No other pain. She is not on blood thinners. Denies any vomiting, nausea, or confusion. She did not take any pain medication. Patient does not use ibuprofen due to other medical conditions. Patient last ate at 1200. No history of right shoulder pain or surgery. No abdominal pain.     Independent Historian   Relatives as detailed above.    Review of External Notes   Reviewed PCP visit from 6/3/2025.    Past Medical History     Medical History and Problem List   Arthritis  Diverticulitis of colon   Irritable bowel syndrome  Osteopenia, unspecified location  Ovarian failure  Colonic polyps  Thyroid disease  Transient global amnesia  VSD    Medications   Amlodipine  Amoxil  Aspirin  Celexa  Synthroid  Lopressor  Zocor    Surgical History   Knee arthroplasty x2  Heart cath  VSD closure  D&C  Open reduction internal fixation left forearm  Appendectomy  Humeral shaft    Physical Exam     Patient Vitals for the past 24 hrs:   BP Temp Temp src Pulse Resp SpO2 Height Weight   06/22/25 1934 107/62 -- -- 65 15 95 % -- --   06/22/25 1552 139/78 98.6  F (37  C) Temporal 78 16 100 % 1.6 m (5' 3\") 66.1 kg (145 lb 11.6 oz)     Physical Exam  General: The patient is alert, in no respiratory distress.    HENT: Contusion to the right cheek. Mucous membranes " moist.    Cardiovascular: Regular rate and rhythm. Good pulses in all four extremities. Normal capillary refill and skin turgor.     Respiratory: Lungs are clear. No nasal flaring. No retractions. No wheezing, no crackles.    Gastrointestinal: Abdomen soft. No guarding, no rebound. No palpable hernias.     Musculoskeletal: Swelling and deformity to right shoulder.     Skin: No rashes or petechiae.     Neurologic: The patient is alert and oriented x3. GCS 15. No testable cranial nerve deficit. Follows commands with clear and appropriate speech. Gives appropriate answers. Good strength in all extremities. No gross neurologic deficit. Gross sensation intact. Pupils are round and reactive. No meningismus.     Lymphatic: No cervical adenopathy. No lower extremity swelling.    Psychiatric: The patient is non-tearful.      Diagnostics     Lab Results   Labs Ordered and Resulted from Time of ED Arrival to Time of ED Departure - No data to display    Imaging   Humerus XR, G/E 2 views, right   Final Result   IMPRESSION:    Acute comminuted mildly displaced and impacted fracture of the humeral head. Fracture planes primarily extending to the surgical neck and greater tuberosity. Normal alignment of the glenohumeral, acromioclavicular, and elbow joints.      XR Shoulder Right 2 Views   Final Result   IMPRESSION:    Acute comminuted mildly displaced and impacted fracture of the humeral head. Fracture planes primarily extending to the surgical neck and greater tuberosity. Normal alignment of the glenohumeral, acromioclavicular, and elbow joints.          EKG       Independent Interpretation   X-ray right humerus shows humeral head fracture  X-ray right shoulder shows humeral head fracture    ED Course      Medications Administered   Medications   morphine (PF) injection 4 mg (4 mg Intravenous $Given 6/22/25 1631)   ondansetron (ZOFRAN) injection 4 mg (4 mg Intravenous $Given 6/22/25 1631)   HYDROmorphone (PF) (DILAUDID) injection  0.5 mg (0.5 mg Intravenous $Given 6/22/25 1826)   oxyCODONE-acetaminophen (PERCOCET) 5-325 MG per tablet 2 tablet (2 tablets Oral $Given 6/22/25 1850)       Procedures   Procedures   Sling and swath immobilization location is the right arm indication is a proximal humeral fracture the arm was immobilized in a physiologic position pain was improved there was no complications  Discussion of Management   None    ED Course   ED Course as of 06/22/25 2315   Sun Jun 22, 2025   1615 I obtained history and examined the patient as noted above.     1620 Discussed CT of the head.    1817 I rechecked and updated the patient. Patient in pain and would like more medication.     1928 I rechecked and updated the patient.  Patient in sling and swath. Pain has improved.       Additional Documentation  None    Medical Decision Making / Diagnosis     CMS Diagnoses: None    MIPS   None               MDM   Kimberley Mullins is a 84 year old female patient reports she was walking on a deck that had a aleksandr pool on it she slipped fell and landed on her right side.  She hit her head but is currently acting appropriately I discussed imaging of the head the family agrees to observe her they did not want a head CT.  X-ray of the shoulder and humerus demonstrate a proximal humerus fracture she was treated with pain medication immobilized as above and discharged outpatient follow-up.  By careful exam I did not find signs suggestive of other fractures and the patient I did not feel has likely neurologic or cardiovascular cause behind this.    Disposition   The patient was discharged.     Diagnosis     ICD-10-CM    1. Fall, initial encounter  W19.XXXA       2. Closed fracture of proximal end of right humerus, unspecified fracture morphology, initial encounter  S42.201A       3. Facial contusion, initial encounter  S00.83XA            Discharge Medications   Discharge Medication List as of 6/22/2025  7:36 PM        START taking these medications     Details   docusate sodium (COLACE) 100 MG capsule Take 1 capsule (100 mg) by mouth 3 times daily as needed for constipation., Disp-20 capsule, R-0, Local Print      ondansetron (ZOFRAN ODT) 4 MG ODT tab Take 1 tablet (4 mg) by mouth every 8 hours as needed for nausea., Disp-10 tablet, R-0, Local Print      oxyCODONE-acetaminophen (PERCOCET) 5-325 MG tablet Take 1 tablet by mouth every 6 hours as needed for pain., Disp-12 tablet, R-0, Local Print               Scribe Disclosure:  I, Donny Lau, am serving as a scribe at 4:19 PM on 6/22/2025 to document services personally performed by Brian Chery MD based on my observations and the provider's statements to me.        Brian Chery MD  06/22/25 1038

## 2025-06-22 NOTE — ED TRIAGE NOTES
Mechanical fall while changing bird feeder. Hit right cheek and right shoulder on deck. Right shoulder appears slumped in triage. No LOC. No thinners.

## 2025-06-23 ENCOUNTER — PATIENT OUTREACH (OUTPATIENT)
Dept: CARE COORDINATION | Facility: CLINIC | Age: 85
End: 2025-06-23
Payer: COMMERCIAL

## 2025-06-23 ASSESSMENT — ACTIVITIES OF DAILY LIVING (ADL): DEPENDENT_IADLS:: INDEPENDENT

## 2025-06-23 NOTE — PROGRESS NOTES
"Clinic Care Coordination Contact  Clinic Care Coordination Contact  OUTREACH    Referral Information:  Referral Source: IP Report: Pt was evaluated at [facility] on 6/22 after falling at home and fracturing her arm.    Primary Diagnosis: Injury/Fall    Chief Complaint   Patient presents with    Clinic Care Coordination - Post Hospital      Outreach        [facility] Utilization:   Clinic Utilization  Difficulty keeping appointments:: No  Compliance Concerns: No  No-Show Concerns: No  No PCP office visit in Past Year: No  Utilization      No Show Count (past year)  0             ED Visits  2             Hospital Admissions  0                    Current as of: 6/22/2025  8:03 PM                Clinical Concerns:  Current Medical Concerns:  Mechanical fall resulting in mildly displaced and impacted fracture of the humeral head and facial laceration.  Current Behavioral Concerns: None    Education Provided to patient: SW role and recommended follow-up      Medication Management:  Medication review status: Pt reports that she was given Oxycodone for the pain and it is making her a \"little loopy\".     Functional Status:  Dependent ADLs:: Independent  Dependent IADLs:: Independent  Bed or wheelchair confined:: No  Any fall with injury in the past year?: Yes    Living Situation:  Current living arrangement:: I live in a private home with family  [facility]    Lifestyle & Psychosocial Needs:  Pt is a 84 yr old  female who lives in [place]. Pt reports that her granddaughter lives with her and is currently able to help her as she can't do much, her fractured her right arm and she is right handed. Pt reports that her son is able to bring her to [facility] this afternoon for an appointment. Pt is not sure if she will have to have surgery. Her arm is in a sling. Writer offered to have someone from [facility] reach out to her to schedule follow-up with Dr. Daniels, pt stated that would be fine. Pt " feels she has good family support and they are able to assist her. She denied having add'l questions/concerns for writer at this time.   Social Drivers of Health     Food Insecurity: No Food Insecurity (5/29/2019)    Hunger Vital Sign     Worried About Running Out of Food in the Last Year: Never true     Ran Out of Food in the Last Year: Never true   Depression: Not at risk (6/3/2025)    PHQ-2     PHQ-2 Score: 0   Housing Stability: Not on file   Tobacco Use: Low Risk  (6/3/2025)    Patient History     Smoking Tobacco Use: Never     Smokeless Tobacco Use: Never     Passive Exposure: Never   Financial Resource Strain: Low Risk  (5/29/2019)    Overall Financial Resource Strain (CARDIA)     Difficulty of Paying Living Expenses: Not hard at all   Alcohol Use: Not on file   Transportation Needs: No Transportation Needs (5/29/2019)    PRAPARE - Transportation     Lack of Transportation (Medical): No     Lack of Transportation (Non-Medical): No   Physical Activity: Not on file   Interpersonal Safety: Not on file   Stress: Not on file   Social Connections: Not on file   Health Literacy: Not on file     Inadequate nutrition (GOAL):: No  Tube Feeding: No  Inadequate activity/exercise (GOAL):: No  Significant changes in sleep pattern (GOAL): No        Chemical Dependency Status: No Current Concerns  Informal Support system:: Children, Family        Resources and Interventions:  Current Resources: None     Community Resources: None        Employment Status: retired         Advance Care Plan/Directive  Advanced Care Plans/Directives on file:: No      Patient/Caregiver understanding: Yes       Future Appointments                In 3 weeks RU LAB [facility] RID    In 3 weeks [id number] Care, RS[facility]    In 2 months Justin Pickering MD [facility]            Plan:  Care Coordinator will not plan further  outreach at this time.    Rakel Donahue [facility]  Social Work Care Coordinator  Phone: [phone]

## 2025-06-25 ENCOUNTER — TRANSFERRED RECORDS (OUTPATIENT)
Dept: FAMILY MEDICINE | Facility: CLINIC | Age: 85
End: 2025-06-25

## 2025-06-25 NOTE — PROGRESS NOTES
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  - Orthopedic  Referral - To a [facility] Location (Use POS/Location)    2. Balance problems  Referral to physical therapy to work on balance.  - Physical Therapy  Referral - To a [facility] Location (Use POS/Location)    3. Facial injury, subsequent encounter  Facial bone ct today to rule out fracture. There was no fracture on ct.  - Radiology Referral (Affiliate Use Only)  - CT Facial Bones without Contrast; Future    4. Fall, subsequent encounter    - Radiology Referral (Affiliate Use Only)  - CT Facial Bones without Contrast; Future            BMI  Estimated body mass index is 25.81 kg/m  as calculated from the following:    Height as of 6/22/25: 1.6 m (5' 3\").    Weight as of 6/22/25: 66.1 kg (145 lb 11.6 oz).         FUTURE APPOINTMENTS:       - Follow-up visit as needed. We manage her chronic medical care.    No follow-ups on file.    Katlyn Daniels MD  [facility]    Subjective     Nursing Notes:   Dilma Malin CMA  6/27/2025 10:14 AM  Signed  Chief Complaint   Patient presents with    ER F/U     [facility] on 06/22 after she slipped on her deck landing on right side of her face and right shoulder, broken humerus, " she saw [facility] Dr. Burnett on 06/25 was given Tramadol to use PRN, shoulder is still painful           Kimberley Mullins is a 84 year old female who presents to clinic today for the following health issues   HPI       Here for ER followup. Broken humerus. Then has had followup with [facility]. She is still having shoulder pain.  Here today with her daughter.  3 options: nothing, replace the ball or do screws. She elected to do nothing but wondering about this.  Daughter has a lot of questions about this. Is due for followup apt in 2-3 weeks. Wants to change to see ortho here in [place]. Was seen in [place].   She got oxycodone in the ER, but the pain is a 10/10 if she moves. She can get comfortable with sitting but when she moves, has increasing pain. He gave her some tramadol.  She has a few left. This helps also. But the pain doesn't go away.     Her balance isn't the same as it used to be.    Facial injury, it was a little bruised. They checked it out. Tender upper cheek. They didn't image this. Doesn't want a facial bone ct scan.       Review of Systems   Constitutional, HEENT, cardiovascular, pulmonary, gi and gu systems are negative, except as otherwise noted.      Objective    /64 (BP Location: Right arm, Patient Position: Sitting, Cuff Size: Adult Large)   Pulse 68   Temp 97.8  F (36.6  C) (Temporal)   LMP  (LMP Unknown)   SpO2 98%   There is no height or weight on file to calculate BMI.  Physical Exam   GENERAL: alert and no distress  MS: no gross musculoskeletal defects noted, no edema  PSYCH: mentation appears normal, affect normal/bright  Right upper extremity in sling  Right facial/cheek bruising, tender on lower orbital bone    Results for orders placed or performed during the hospital encounter of 06/27/25   CT Facial Bones without Contrast     Status: None    Narrative    EXAM: CT FACIAL BONES WITHOUT CONTRAST  LOCATION: [facility]  DATE: 6/27/2025    INDICATION:  Facial  injury, subsequent encounter, Fall, subsequent encounter  COMPARISON: None.  TECHNIQUE: Routine CT Maxillofacial without IV contrast. Multiplanar reformats. Dose reduction techniques were used.     FINDINGS:  OSSEOUS STRUCTURES/SOFT TISSUES: No localized soft tissue swelling/inflammation. No facial bone fracture or malalignment. No evidence for dental trauma or periapical abscess.    ORBITAL CONTENTS: No acute abnormality.    SINUSES: No paranasal sinus mucosal disease.    VISUALIZED INTRACRANIAL CONTENTS: No acute abnormality.       Impression    IMPRESSION:   1.  No acute facial fracture.

## 2025-06-27 ENCOUNTER — HOSPITAL ENCOUNTER (OUTPATIENT)
Dept: CT IMAGING | Facility: CLINIC | Age: 85
Discharge: HOME | End: 2025-06-27
Attending: FAMILY MEDICINE | Admitting: FAMILY MEDICINE
Payer: COMMERCIAL

## 2025-06-27 ENCOUNTER — RESULTS FOLLOW-UP (OUTPATIENT)
Dept: FAMILY MEDICINE | Facility: CLINIC | Age: 85
End: 2025-06-27

## 2025-06-27 ENCOUNTER — OFFICE VISIT (OUTPATIENT)
Dept: FAMILY MEDICINE | Facility: CLINIC | Age: 85
End: 2025-06-27

## 2025-06-27 VITALS
OXYGEN SATURATION: 98 % | TEMPERATURE: 97.8 F | SYSTOLIC BLOOD PRESSURE: 106 MMHG | HEART RATE: 68 BPM | DIASTOLIC BLOOD PRESSURE: 64 MMHG
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
## 2025-06-27 DIAGNOSIS — S09.93XD FACIAL INJURY, SUBSEQUENT ENCOUNTER: ICD-10-CM

## 2025-06-27 DIAGNOSIS — S42.201K CLOSED FRACTURE OF PROXIMAL END OF RIGHT HUMERUS WITH NONUNION, UNSPECIFIED FRACTURE MORPHOLOGY, SUBSEQUENT ENCOUNTER: Primary | ICD-10-CM

## 2025-06-27 DIAGNOSIS — R26.89 BALANCE PROBLEMS: ICD-10-CM

## 2025-06-27 PROCEDURE — G2211 COMPLEX E/M VISIT ADD ON: HCPCS | Performed by: FAMILY MEDICINE

## 2025-06-27 PROCEDURE — 99214 OFFICE O/P EST MOD 30 MIN: CPT | Performed by: FAMILY MEDICINE

## 2025-06-27 PROCEDURE — 70486 CT MAXILLOFACIAL W/O DYE: CPT

## 2025-06-27 RX ORDER — TRAMADOL HYDROCHLORIDE 25 MG/1
25 TABLET, COATED ORAL EVERY 6 HOURS
COMMUNITY
Start: 2025-06-24 | End: 2025-07-02

## 2025-06-27 NOTE — NURSING NOTE
Chief Complaint   Patient presents with    ER F/U     [facility] on 06/22 after she slipped on her deck landing on right side of her face and right shoulder, broken humerus, she saw [facility] Dr. Burnett on 06/25 was given Tramadol to use PRN, shoulder is still painful

## 2025-07-02 ENCOUNTER — RESULTS FOLLOW-UP (OUTPATIENT)
Dept: FAMILY MEDICINE | Facility: CLINIC | Age: 85
End: 2025-07-02

## 2025-07-02 ENCOUNTER — TRANSFERRED RECORDS (OUTPATIENT)
Dept: FAMILY MEDICINE | Facility: CLINIC | Age: 85
End: 2025-07-02

## 2025-07-02 ENCOUNTER — OFFICE VISIT (OUTPATIENT)
Dept: FAMILY MEDICINE | Facility: CLINIC | Age: 85
End: 2025-07-02

## 2025-07-02 VITALS
HEIGHT: 63 IN | SYSTOLIC BLOOD PRESSURE: 122 MMHG | TEMPERATURE: 98.1 F | WEIGHT: 145 LBS | OXYGEN SATURATION: 96 % | BODY MASS INDEX: 25.69 KG/M2 | HEART RATE: 67 BPM | DIASTOLIC BLOOD PRESSURE: 76 MMHG

## 2025-07-02 DIAGNOSIS — Q21.0 VSD (VENTRICULAR SEPTAL DEFECT): ICD-10-CM

## 2025-07-02 DIAGNOSIS — Z01.818 PREOPERATIVE EXAMINATION: Primary | ICD-10-CM

## 2025-07-02 DIAGNOSIS — I10 BENIGN ESSENTIAL HYPERTENSION: ICD-10-CM

## 2025-07-02 DIAGNOSIS — S09.93XD FACIAL INJURY, SUBSEQUENT ENCOUNTER: ICD-10-CM

## 2025-07-02 DIAGNOSIS — Z79.01 CHRONIC ANTICOAGULATION: ICD-10-CM

## 2025-07-02 DIAGNOSIS — H35.30 MACULAR DEGENERATION (SENILE) OF RETINA: ICD-10-CM

## 2025-07-02 DIAGNOSIS — Z87.74 S/P VENTRICULAR SEPTAL DEFECT REPAIR: ICD-10-CM

## 2025-07-02 DIAGNOSIS — S42.201K CLOSED FRACTURE OF PROXIMAL END OF RIGHT HUMERUS WITH NONUNION, UNSPECIFIED FRACTURE MORPHOLOGY, SUBSEQUENT ENCOUNTER: ICD-10-CM

## 2025-07-02 DIAGNOSIS — I26.93 SINGLE SUBSEGMENTAL PULMONARY EMBOLISM WITHOUT ACUTE COR PULMONALE (H): ICD-10-CM

## 2025-07-02 DIAGNOSIS — R01.1 HEART MURMUR: ICD-10-CM

## 2025-07-02 LAB
% GRANULOCYTES: 74.6 %
BUN SERPL-MCNC: 8 MG/DL (ref 7–25)
BUN/CREATININE RATIO: 14 (ref 6–32)
CALCIUM SERPL-MCNC: 9.2 MG/DL (ref 8.6–10.3)
CHLORIDE SERPLBLD-SCNC: 98.3 MMOL/L (ref 98–110)
CO2 SERPL-SCNC: 28.6 MMOL/L (ref 20–32)
CREAT SERPL-MCNC: 0.61 MG/DL (ref 0.6–1.3)
GLUCOSE SERPL-MCNC: 101 MG/DL (ref 60–99)
HCT VFR BLD AUTO: 38.2 % (ref 35–47)
HEMOGLOBIN: 12.2 G/DL (ref 11.7–15.7)
LYMPHOCYTES NFR BLD AUTO: 15.1 %
MCH RBC QN AUTO: 32.2 PG (ref 26–33)
MCHC RBC AUTO-ENTMCNC: 31.9 G/DL (ref 31–36)
MCV RBC AUTO: 100.9 FL (ref 78–100)
MONOCYTES NFR BLD AUTO: 10.3 %
PLATELET COUNT - QUEST: 317 10^9/L (ref 150–375)
POTASSIUM SERPL-SCNC: 4.33 MMOL/L (ref 3.5–5.3)
RBC # BLD AUTO: 3.79 10*12/L (ref 3.8–5.2)
SODIUM SERPL-SCNC: 134.2 MMOL/L (ref 135–146)
WBC # BLD AUTO: 6.4 10*9/L (ref 4–11)

## 2025-07-02 PROCEDURE — 36415 COLL VENOUS BLD VENIPUNCTURE: CPT | Performed by: FAMILY MEDICINE

## 2025-07-02 PROCEDURE — 99214 OFFICE O/P EST MOD 30 MIN: CPT | Performed by: FAMILY MEDICINE

## 2025-07-02 PROCEDURE — 80048 BASIC METABOLIC PNL TOTAL CA: CPT | Performed by: FAMILY MEDICINE

## 2025-07-02 PROCEDURE — G2211 COMPLEX E/M VISIT ADD ON: HCPCS | Performed by: FAMILY MEDICINE

## 2025-07-02 PROCEDURE — 85025 COMPLETE CBC W/AUTO DIFF WBC: CPT | Performed by: FAMILY MEDICINE

## 2025-07-02 NOTE — PROGRESS NOTES
Preoperative Evaluation  [facility]YS[facility]  [address] [place] [address]  [address]  [facility] [address]  Phone: [phone]  Fax: [phone]  Primary Provider: Katlyn Daniels MD  Pre-op Performing Provider: Katlyn Daniels MD  Jul 2, 2025 7/2/2025   Surgical Information   What procedure is being done? Right shoulder total arthroplasty   Facility or Hospital where procedure/surgery will be performed: [facility]   Who is doing the procedure / surgery? Dr. Schmidt   Date of surgery / procedure: 07/15/25   Time of surgery / procedure: AM   Where do you plan to recover after surgery? Other - 1 night stay      Fax number for surgical facility: Note does not need to be faxed, will be available electronically in Epic.    Assessment & Plan     The proposed surgical procedure is considered INTERMEDIATE risk.    Problem List Items Addressed This Visit       Benign essential hypertension    Macular degeneration (senile) of retina    Heart murmur    VSD (ventricular septal defect)    Relevant Orders    Adult Cardiology Eval  Referral - To a [facility] Location    S/P ventricular septal defect repair    Relevant Orders    Adult Cardiology Eval  Referral - To [facility]    Single subsegmental pulmonary embolism without acute cor pulmonale (H)    Relevant Orders    Adult Oncology/Hematology  Referral - To a [facility] Location (Use POS/Location)    Chronic anticoagulation    Relevant Orders    Adult Cardiology Eval  Referral - To a [facility] Location     Other Visit Diagnoses         Preoperative examination    -  Primary    Relevant Orders    Adult Cardiology Eval  Referral - To a [facility] Location    VENOUS COLLECTION (Completed)    Basic Metabolic Panel (BFP)    HEMOGRAM PLATELET DIFF (BFP) (Completed)    Adult Oncology/Hematology  Referral - To a [facility] Location (Use  POS/Location)      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 Preoperative examination (Primary)    - Adult Cardiology Eval  Referral - To a [facility] Location  - VENOUS COLLECTION  - Basic Metabolic Panel (BFP)  - HEMOGRAM PLATELET DIFF (BFP)  - Adult Oncology/Hematology  Referral - To a [facility] Location (Use POS/Location); Future    2. 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 Single subsegmental pulmonary embolism without acute cor pulmonale (H)  She is not clear about the details on this, referral for preop recommendations to hematology and ongoing prevention.  - Adult Oncology/Hematology  Referral - To a [facility] Location (Use POS/Location); Future    4. VSD (ventricular septal defect)  Referral to cardiology for clearance.   - Adult Cardiology Eval  Referral - To a [facility] Location    5. S/P ventricular septal defect repair    - Adult Cardiology Eval  Referral - To a [facility] Location    6. Heart murmur      7. Chronic anticoagulation    - Adult Cardiology Eval  Referral - To a [facility]    8. Benign essential hypertension      9. Macular degeneration (senile) of retina       10. Facial injury, subsequent encounter  Facial bone ct done, negative, bruising is still present.      - No identified additional risk factors other than previously addressed    Antiplatelet or Anticoagulation Medication Instructions   - aspirin: stop aspirin 7 days prior, but should verify this with her cardiologist at the apt.    Additional Medication Instructions  Take all scheduled medications on the day of surgery    Recommendation  Patient referred to CARDIOLOGY AND HEMATOLOGY for evaluation before surgery. Surgery approval pending  completion of consultation.        Marlena Chu is a 84 year old, presenting for the following:  Pre-Op Exam        HPI: here for preop with granddaughter, right humeral head fracture. She had seen a second orthopedic surgeon yesterday who recommended surgery.    History PE in 2022. When she was in the hospital with surgery. She was on anticoagulation but then she stopped it. The cardiologist told her to stop it.  Has only had 1 blood clot.      7/2/2025   Pre-Op Questionnaire   Have you ever had a heart attack or stroke? No   Have you ever had surgery on your heart or blood vessels, such as a stent placement, a coronary artery bypass, or surgery on an artery in your head, neck, heart, or legs? (!) YES VSD repair   Do you have chest pain with activity? No   Do you have a history of heart failure? No   Do you currently have a cold, bronchitis or symptoms of other infection? No   Do you have a cough, shortness of breath, or wheezing? No   Do you or anyone in your family have previous history of blood clots? (!) YES PE in 2022   Do you or does anyone in your family have a serious bleeding problem such as prolonged bleeding following surgeries or cuts? No   Have you ever had problems with anemia or been told to take iron pills? No   Have you had any abnormal blood loss such as black, tarry or bloody stools, or abnormal vaginal bleeding? No   Have you ever had a blood transfusion? (!) UNKNOWN   Are you willing to have a blood transfusion if it is medically needed before, during, or after your surgery? Yes   Have you or any of your relatives ever had problems with anesthesia? No   Do you have sleep apnea, excessive snoring or daytime drowsiness? No   Do you have any artifical heart valves or other implanted medical devices like a pacemaker, defibrillator, or continuous glucose monitor? No   Do you have artificial joints? (!) YES knees   Are you allergic to latex? No     Advance Care Planning    Document on file is a  Health Care Directive or POLST.    Preoperative Review of    reviewed - tramadol and oxycodone prescribed after her fall/injury          Patient Active Problem List    Diagnosis Date Noted    Endometrial hyperplasia 03/29/2022     Priority: Medium    Chronic anticoagulation 03/29/2022     Priority: Medium    Anxiety 03/29/2022     Priority: Medium    Single subsegmental pulmonary embolism without acute cor pulmonale (H) 03/08/2022     Priority: Medium    S/P ventricular septal defect repair 02/09/2022     Priority: Medium    Heart murmur 01/24/2022     Priority: Medium    VSD (ventricular septal defect) 01/24/2022     Priority: Medium    Macular degeneration (senile) of retina 11/30/2019     Priority: Medium    Osteopenia, unspecified location 04/05/2018     Priority: Medium    Overweight (BMI 25.0-29.9) 04/05/2018     Priority: Medium    Irritable bowel syndrome with diarrhea 02/08/2016     Priority: Medium    Benign essential hypertension 10/08/2015     Priority: Medium    ACP (advance care planning) 08/27/2015     Priority: Medium     Advance Care Planning 12/1/2015: ACP Review and Resources Provided:  Reviewed chart for advance care plan.  Kimberley ARDON Harpreet has no plan and FULL code status on file, however states presence of ACP document. Copy requested. Discussed available resources and provided with information. Confirmed code status reflects current choices pending further ACP discussions. Recommend goals of care discussion with patient/decision makers and updated POLST/Code status order to reflect patient choices. Confirmed/documented legally designated decision maker(s). Added by CARLO Stout  Advance Care Planning 8/27/2015: ACP Review and Resources Provided:  Reviewed chart for advance care plan.  Kimberley Mullins has no plan or code status on file however states presence of ACP document. Copy requested. Confirmed code status reflects current choices pending receipt of document/advance care  plan review. Confirmed/documented legally designated decision maker(s). Added by Eliane Amin            Status post total knee replacement 11/09/2011     Priority: Medium    Osteoarthritis 10/31/2011     Priority: Medium    Other specified hypothyroidism 08/03/2011     Priority: Medium     Problem list name updated by automated process. Provider to review      Pure hypercholesterolemia 11/20/2009     Priority: Medium    Transient global amnesia 12/20/2004     Priority: Medium    Tinnitus 08/17/2003     Priority: Medium     Problem list name updated by automated process. Provider to review        Past Medical History:   Diagnosis Date    Arthritis     Diverticulitis of colon (without mention of hemorrhage)(562.11) 1997    diverticulosis noted on Flex Sig    Irritable bowel syndrome 1959    Osteopenia, unspecified location 04/05/2018    Other ovarian failure 1986    Overweight (BMI 25.0-29.9) 04/05/2018    Personal history of colonic polyps 2004    Status post total knee replacement 11/09/2011    Thyroid disease     Transient global amnesia     Unspecified tinnitus     Tinnitus     Past Surgical History:   Procedure Laterality Date    ARTHROPLASTY KNEE  10/25/2011    Procedure:ARTHROPLASTY KNEE; Right Total Knee Arthroplasty   ; Surgeon:BRYCE ATWOOD; Location:[facility] OR    ARTHROPLASTY KNEE  06/02/2014    Procedure: ARTHROPLASTY KNEE;  Surgeon: Bryce Atwood MD;  Location: [facility] OR    CV CORONARY ANGIOGRAM N/A 01/25/2022    Procedure: Coronary Angiogram; diagnostic;  Surgeon: Sreekanth Delong MD;  [facility]    CV RIGHT HEART CATH MEASUREMENTS RECORDED N/A 01/25/2022    Procedure: Right Heart Cath;  Surgeon: Sreekanth Delong MD;  [facility]    CV VSD CLOSURE N/A 01/31/2022    Procedure: CV VSD CLOSURE W/KATIE;  Surgeon: Daniel Cerda MD;  [facility]    HC DILATION/CURETTAGE DIAG/THER NON OB  2006     D & C     KNEE SCOPE, DIAGNOSTIC      Arthroscopy, Knee    REPAIR VENTRICULAR SEPTAL DEFECT N/A 02/04/2022    Procedure: Median Sternotomy, cardiopulmonary bypass, Ischemic Ventricular Septal Defect repair, tranesophageal echocardiogram performed by anesthesia;  Surgeon: Aldair Whitney MD;  Location: [facility]    [facility] AFF FOREARM/WRIST SURGERY UNLISTED      orif radius fx distal left    ZZC APPENDECTOMY  1955    ZZC OPEN JESSI FIXATN HUMERAL SHAFT FX  12/2007    Dr Massey     Current Outpatient Medications   Medication Sig Dispense Refill    amLODIPine (NORVASC) 5 MG tablet Take 2 tablets (10 mg) by mouth daily. 180 tablet 1    aspirin (ASA) 81 MG chewable tablet 1 tablet (81 mg) by Oral or NG Tube route daily 90 tablet 0    citalopram (CELEXA) 10 MG tablet Take 1 tablet (10 mg) by mouth daily. 90 tablet 1    docusate sodium (COLACE) 100 MG capsule Take 1 capsule (100 mg) by mouth 3 times daily as needed for constipation. 20 capsule 0    levothyroxine (SYNTHROID/LEVOTHROID) 50 MCG tablet Take 1 tablet (50 mcg) by mouth daily. 90 tablet 1    amoxicillin (AMOXIL) 500 MG capsule Take 4 capsules (2,000 mg) by mouth daily as needed (Please take prior to dental procedures) - Oral 4 capsule 3    calcium carbonate (TUMS) 500 MG chewable tablet Take 1 chew tab by mouth 2 times daily.      metoprolol tartrate (LOPRESSOR) 25 MG tablet Take 0.5 tablets (12.5 mg) by mouth 2 times daily. 90 tablet 1    Multiple Vitamin (MULTI-VITAMIN DAILY PO) Multi Vitamin      Multiple Vitamins-Minerals (PRESERVISION AREDS PO) Take 1 tablet by mouth 2 times daily      simvastatin (ZOCOR) 20 MG tablet TAKE 1 TABLET (20 MG) BY MOUTH AT BEDTIME 90 tablet 1       No Known Allergies     Social History     Tobacco Use    Smoking status: Never     Passive exposure: Never    Smokeless tobacco: Never   Substance Use Topics    Alcohol use: Yes     Alcohol/week: 5.8 standard drinks of alcohol     Types: 7 Standard drinks or equivalent per week     Comment:  "two to three glasses of wine q day.     Family History   Problem Relation Age of Onset    Heart Disease Father         MI age 70     History   Drug Use No             Review of Systems  Constitutional, HEENT, cardiovascular, pulmonary, gi and gu systems are negative, except as otherwise noted.    Objective    /76 (BP Location: Left arm, Patient Position: Sitting, Cuff Size: Adult Regular)   Pulse 67   Temp 98.1  F (36.7  C) (Temporal)   Ht 1.6 m (5' 3\")   Wt 65.8 kg (145 lb)   LMP  (LMP Unknown)   SpO2 96%   BMI 25.69 kg/m     Estimated body mass index is 25.69 kg/m  as calculated from the following:    Height as of this encounter: 1.6 m (5' 3\").    Weight as of this encounter: 65.8 kg (145 lb).  Physical Exam  GENERAL: alert and no distress  EYES: Eyes grossly normal to inspection, PERRL and conjunctivae and sclerae normal  HENT: ear canals and TM's normal, nose and mouth without ulcers or lesions  NECK: no adenopathy, no asymmetry, masses, or scars  RESP: lungs clear to auscultation - no rales, rhonchi or wheezes  CV: regular rate and rhythm, normal S1 S2, no S3 or S4, no murmur, click or rub, no peripheral edema  ABDOMEN: soft, nontender, no hepatosplenomegaly, no masses and bowel sounds normal  MS: no gross musculoskeletal defects noted, no edema  SKIN: no suspicious lesions or rashes  NEURO: Normal strength and tone, mentation intact and speech normal  PSYCH: mentation appears normal, affect normal/bright  Bruising right face/cheek area    Recent Labs   Lab Test 06/03/25  1315 11/22/24  0000 07/12/24 2003   HGB  --   --  12.6   PLT  --   --  213   .5 138.0 136   POTASSIUM 4.73 4.41 3.7   CR 0.71 0.69 0.76        Diagnostics  Recent Results (from the past 720 hours)   TSH WITH FREE T4 REFLEX ([facility])    Collection Time: 06/03/25 11:04 AM   Result Value Ref Range    TSH 3.57 0.40 - 4.50 mIU/L   Lipid Panel (BFP)    Collection Time: 06/03/25  1:15 PM   Result Value Ref Range    Cholesterol 166 0 " - 199 mg/dL    Triglycerides 84 0 - 149 mg/dL    HDL Cholesterol 76 40 - 150 mg/dL    LDL-C 73 0 - 129 mg/dL    Cholesterol/HDL Ratio 2 0 - 5   Comprehensive Metobolic Panel (BFP)    Collection Time: 06/03/25  1:15 PM   Result Value Ref Range    Carbon Dioxide 28.0 20 - 32 mmol/L    Creatinine 0.71 0.60 - 1.30 mg/dL    Glucose 86 60 - 99 mg/dL    Sodium 135.5 135 - 146 mmol/L    Potassium 4.73 3.5 - 5.3 mmol/L    Chloride 99.9 98 - 110 mmol/L    Protein Total 6.3 6.1 - 8.1 g/dL    Albumin 3.9 3.6 - 5.1 g/dL    Alkaline Phosphatase 68 33 - 130 U/L    ALT 10 0 - 32 U/L    AST 20 0 - 35 U/L    Bilirubin Total 0.8 0.2 - 1.2 mg/dL    Urea Nitrogen 12 7 - 25 mg/dL    Calcium 9.6 8.6 - 10.3 mg/dL    BUN/Creatinine Ratio 17 6 - 32   Extra Blue Top Tube    Collection Time: 06/22/25  4:32 PM   Result Value Ref Range    Hold Specimen JIC    Extra Red Top Tube    Collection Time: 06/22/25  4:32 PM   Result Value Ref Range    Hold Specimen JIC    Extra Green Top (Lithium Heparin) Tube    Collection Time: 06/22/25  4:32 PM   Result Value Ref Range    Hold Specimen JIC    Extra Purple Top Tube    Collection Time: 06/22/25  4:32 PM   Result Value Ref Range    Hold Specimen JIC    HEMOGRAM PLATELET DIFF (BFP)    Collection Time: 07/02/25 12:31 PM   Result Value Ref Range    WBC 6.4 4.0 - 11 10*9/L    RBC Count 3.79 (A) 3.8 - 5.2 10*12/L    Hemoglobin 12.2 11.7 - 15.7 g/dL    Hematocrit 38.2 35.0 - 47.0 %    .9 (A) 78 - 100 fL    MCH 32.2 26 - 33 pg    MCHC 31.9 31 - 36 g/dL    Platelet Count 317 150 - 375 10^9/L    % Granulocytes 74.6 %    % Lymphocytes 15.1 %    % Monocytes 10.3 %   Basic Metabolic Panel (BFP)    Collection Time: 07/02/25  2:39 PM   Result Value Ref Range    Carbon Dioxide 28.6 20 - 32 mmol/L    Creatinine 0.61 0.60 - 1.30 mg/dL    Glucose 101 (A) 60 - 99 mg/dL    Sodium 134.2 (A) 135 - 146 mmol/L    Potassium 4.33 3.5 - 5.3 mmol/L    Chloride 98.3 98 - 110 mmol/L    Urea Nitrogen 8 7 - 25 mg/dL    Calcium  9.2 8.6 - 10.3 mg/dL    BUN/Creatinine Ratio 14 6 - 32          Revised Cardiac Risk Index (RCRI)  The patient has the following serious cardiovascular risks for perioperative complications:   - Coronary Artery Disease (MI, positive stress test, angina, Qs on EKG) = 1 point     RCRI Interpretation: 1 point: Class II (low risk - 0.9% complication rate)         Signed Electronically by: Katlyn Daniels MD  A copy of this evaluation report is provided to the requesting physician.

## 2025-07-02 NOTE — LETTER
7/2/2025      Kimberley ARDON Harpreet  [id number]  [facility] [address]        Preoperative Evaluation  [facility]  [address]  [address]  [facility] [address]  Phone: [phone]  Fax: [phone]  Primary Provider: Katlyn Daniels MD  Pre-op Performing Provider: Katlyn Daniels MD  Jul 2, 2025 7/2/2025   Surgical Information   What procedure is being done? Right shoulder total arthroplasty   Facility or Hospital where procedure/surgery will be performed:  [facility]   Who is doing the procedure / surgery? Dr. Schmidt   Date of surgery / procedure: 07/15/25   Time of surgery / procedure: AM   Where do you plan to recover after surgery? Other - 1 night stay      Fax number for surgical facility: Note does not need to be faxed, will be available electronically in Epic.    Assessment & Plan    The proposed surgical procedure is considered INTERMEDIATE risk.    Problem List Items Addressed This Visit       Benign essential hypertension    Macular degeneration (senile) of retina    Heart murmur    VSD (ventricular septal defect)    Relevant Orders    Adult Cardiology Eval  Referral - To a [facility] Location    S/P ventricular septal defect repair    Relevant Orders    Adult Cardiology Eval  Referral - To a [facility] Location    Single subsegmental pulmonary embolism without acute cor pulmonale (H)    Relevant Orders    Adult Oncology/Hematology  Referral - To a Non [facility] Location (Use POS/Location)    Chronic anticoagulation    Relevant Orders    Adult Cardiology Eval  Referral - To a [facility] Location     Other Visit Diagnoses         Preoperative examination    -  Primary    Relevant Orders    Adult Cardiology Eval  Referral - To a [facility] Location    VENOUS COLLECTION (Completed)    Basic Metabolic Panel (BFP)    HEMOGRAM PLATELET DIFF (BFP) (Completed)    Adult  Oncology/Hematology  Referral - To a 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 Preoperative examination (Primary)    - Adult Cardiology Eval  Referral - To a [facility] Location  - VENOUS COLLECTION  - Basic Metabolic Panel (BFP)  - HEMOGRAM PLATELET DIFF (BFP)  - Adult Oncology/Hematology  Referral - To a [facility] Location (Use POS/Location); Future    2. 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 Single subsegmental pulmonary embolism without acute cor pulmonale (H)  She is not clear about the details on this, referral for preop recommendations to hematology and ongoing prevention.  - Adult Oncology/Hematology  Referral - To a [facility] Location (Use POS/Location); Future    4. VSD (ventricular septal defect)  Referral to cardiology for clearance.   - Adult Cardiology Eval  Referral - To a [facility] Location    5. S/P ventricular septal defect repair    - Adult Cardiology Eval  Referral - To [facility]    6. Heart murmur      7. Chronic anticoagulation    - Adult Cardiology Eval  Referral - To a [facility]    8. Benign essential hypertension      9. Macular degeneration (senile) of retina       10. Facial injury, subsequent encounter  Facial bone ct done, negative, bruising is still present.      - No identified additional risk factors other than previously addressed    Antiplatelet or Anticoagulation Medication Instructions   - aspirin: stop aspirin 7 days prior, but should verify this with her cardiologist at the apt.    Additional Medication Instructions  Take all scheduled medications on the day of surgery    Recommendation  Patient referred to  CARDIOLOGY AND HEMATOLOGY for evaluation before surgery. Surgery approval pending completion of consultation.        Marlena Chu is a 84 year old, presenting for the following:  Pre-Op Exam        HPI: here for preop with granddaughter, right humeral head fracture. She had seen a second orthopedic surgeon yesterday who recommended surgery.    History PE in 2022. When she was in the hospital with surgery. She was on anticoagulation but then she stopped it. The cardiologist told her to stop it.  Has only had 1 blood clot.      7/2/2025   Pre-Op Questionnaire   Have you ever had a heart attack or stroke? No   Have you ever had surgery on your heart or blood vessels, such as a stent placement, a coronary artery bypass, or surgery on an artery in your head, neck, heart, or legs? (!) YES VSD repair   Do you have chest pain with activity? No   Do you have a history of heart failure? No   Do you currently have a cold, bronchitis or symptoms of other infection? No   Do you have a cough, shortness of breath, or wheezing? No   Do you or anyone in your family have previous history of blood clots? (!) YES PE in 2022   Do you or does anyone in your family have a serious bleeding problem such as prolonged bleeding following surgeries or cuts? No   Have you ever had problems with anemia or been told to take iron pills? No   Have you had any abnormal blood loss such as black, tarry or bloody stools, or abnormal vaginal bleeding? No   Have you ever had a blood transfusion? (!) UNKNOWN   Are you willing to have a blood transfusion if it is medically needed before, during, or after your surgery? Yes   Have you or any of your relatives ever had problems with anesthesia? No   Do you have sleep apnea, excessive snoring or daytime drowsiness? No   Do you have any artifical heart valves or other implanted medical devices like a pacemaker, defibrillator, or continuous glucose monitor? No   Do you have artificial joints? (!) YES knees    Are you allergic to latex? No     Advance Care Planning    Document on file is a Health Care Directive or POLST.    Preoperative Review of    reviewed - tramadol and oxycodone prescribed after her fall/injury          Patient Active Problem List    Diagnosis Date Noted     Endometrial hyperplasia 03/29/2022     Priority: Medium     Chronic anticoagulation 03/29/2022     Priority: Medium     Anxiety 03/29/2022     Priority: Medium     Single subsegmental pulmonary embolism without acute cor pulmonale (H) 03/08/2022     Priority: Medium     S/P ventricular septal defect repair 02/09/2022     Priority: Medium     Heart murmur 01/24/2022     Priority: Medium     VSD (ventricular septal defect) 01/24/2022     Priority: Medium     Macular degeneration (senile) of retina 11/30/2019     Priority: Medium     Osteopenia, unspecified location 04/05/2018     Priority: Medium     Overweight (BMI 25.0-29.9) 04/05/2018     Priority: Medium     Irritable bowel syndrome with diarrhea 02/08/2016     Priority: Medium     Benign essential hypertension 10/08/2015     Priority: Medium     ACP (advance care planning) 08/27/2015     Priority: Medium     Advance Care Planning 12/1/2015: ACP Review and Resources Provided:  Reviewed chart for advance care plan.  Kimberley Mullins has no plan and FULL code status on file, however states presence of ACP document. Copy requested. Discussed available resources and provided with information. Confirmed code status reflects current choices pending further ACP discussions. Recommend goals of care discussion with patient/decision makers and updated POLST/Code status order to reflect patient choices. Confirmed/documented legally designated decision maker(s). Added by CARLO Stout  Advance Care Planning 8/27/2015: ACP Review and Resources Provided:  Reviewed chart for advance care plan.  Kimberley Mullins has no plan or code status on file however states presence of ACP document. Copy  requested. Confirmed code status reflects current choices pending receipt of document/advance care plan review. Confirmed/documented legally designated decision maker(s). Added by Eliane Amin             Status post total knee replacement 11/09/2011     Priority: Medium     Osteoarthritis 10/31/2011     Priority: Medium     Other specified hypothyroidism 08/03/2011     Priority: Medium     Problem list name updated by automated process. Provider to review       Pure hypercholesterolemia 11/20/2009     Priority: Medium     Transient global amnesia 12/20/2004     Priority: Medium     Tinnitus 08/17/2003     Priority: Medium     Problem list name updated by automated process. Provider to review        Past Medical History:   Diagnosis Date     Arthritis      Diverticulitis of colon (without mention of hemorrhage)(562.11) 1997    diverticulosis noted on Flex Sig     Irritable bowel syndrome 1959     Osteopenia, unspecified location 04/05/2018     Other ovarian failure 1986     Overweight (BMI 25.0-29.9) 04/05/2018     Personal history of colonic polyps 2004     Status post total knee replacement 11/09/2011     Thyroid disease      Transient global amnesia      Unspecified tinnitus     Tinnitus     Past Surgical History:   Procedure Laterality Date     ARTHROPLASTY KNEE  10/25/2011    Procedure:ARTHROPLASTY KNEE; Right Total Knee Arthroplasty   ; Surgeon:BRYCE ATWOOD; Location:[facility] OR     ARTHROPLASTY KNEE  06/02/2014    Procedure: ARTHROPLASTY KNEE;  Surgeon: Bryce Atwood MD;  Location: [facility] OR     CV CORONARY ANGIOGRAM N/A 01/25/2022    Procedure: Coronary Angiogram; diagnostic;  Surgeon: Sreekanth Delong MD;  Location: [facility]     CV RIGHT HEART CATH MEASUREMENTS RECORDED N/A 01/25/2022    Procedure: Right Heart Cath;  Surgeon: Sreekanth Delong MD;  Location: [facility]     CV VSD CLOSURE N/A 01/31/2022    Procedure: CV VSD CLOSURE W/KATIE;   Surgeon: Daniel Cerda MD;  Location:  HEART CARDIAC CATH LAB     HC DILATION/CURETTAGE DIAG/THER NON OB  2006    D & C     HC KNEE SCOPE, DIAGNOSTIC      Arthroscopy, Knee     REPAIR VENTRICULAR SEPTAL DEFECT N/A 02/04/2022    Procedure: Median Sternotomy, cardiopulmonary bypass, Ischemic Ventricular Septal Defect repair, tranesophageal echocardiogram performed by anesthesia;  Surgeon: Aldair Whitney MD;  Location:  OR     [facility] AFF FOREARM/WRIST SURGERY UNLISTED      orif radius fx distal left     ZZC APPENDECTOMY  1955     [facility] OPEN JESSI FIXATN HUMERAL SHAFT FX  12/2007    Dr Massey     Current Outpatient Medications   Medication Sig Dispense Refill     amLODIPine (NORVASC) 5 MG tablet Take 2 tablets (10 mg) by mouth daily. 180 tablet 1     aspirin (ASA) 81 MG chewable tablet 1 tablet (81 mg) by Oral or NG Tube route daily 90 tablet 0     citalopram (CELEXA) 10 MG tablet Take 1 tablet (10 mg) by mouth daily. 90 tablet 1     docusate sodium (COLACE) 100 MG capsule Take 1 capsule (100 mg) by mouth 3 times daily as needed for constipation. 20 capsule 0     levothyroxine (SYNTHROID/LEVOTHROID) 50 MCG tablet Take 1 tablet (50 mcg) by mouth daily. 90 tablet 1     amoxicillin (AMOXIL) 500 MG capsule Take 4 capsules (2,000 mg) by mouth daily as needed (Please take prior to dental procedures) - Oral 4 capsule 3     calcium carbonate (TUMS) 500 MG chewable tablet Take 1 chew tab by mouth 2 times daily.       metoprolol tartrate (LOPRESSOR) 25 MG tablet Take 0.5 tablets (12.5 mg) by mouth 2 times daily. 90 tablet 1     Multiple Vitamin (MULTI-VITAMIN DAILY PO) Multi Vitamin       Multiple Vitamins-Minerals (PRESERVISION AREDS PO) Take 1 tablet by mouth 2 times daily       simvastatin (ZOCOR) 20 MG tablet TAKE 1 TABLET (20 MG) BY MOUTH AT BEDTIME 90 tablet 1       No Known Allergies     Social History     Tobacco Use     Smoking status: Never     Passive exposure: Never     Smokeless tobacco: Never   Substance Use  "Topics     Alcohol use: Yes     Alcohol/week: 5.8 standard drinks of alcohol     Types: 7 Standard drinks or equivalent per week     Comment: two to three glasses of wine q day.     Family History   Problem Relation Age of Onset     Heart Disease Father         MI age 70     History   Drug Use No             Review of Systems  Constitutional, HEENT, cardiovascular, pulmonary, gi and gu systems are negative, except as otherwise noted.    Objective    /76 (BP Location: Left arm, Patient Position: Sitting, Cuff Size: Adult Regular)   Pulse 67   Temp 98.1  F (36.7  C) (Temporal)   Ht 1.6 m (5' 3\")   Wt 65.8 kg (145 lb)   LMP  (LMP Unknown)   SpO2 96%   BMI 25.69 kg/m     Estimated body mass index is 25.69 kg/m  as calculated from the following:    Height as of this encounter: 1.6 m (5' 3\").    Weight as of this encounter: 65.8 kg (145 lb).  Physical Exam  GENERAL: alert and no distress  EYES: Eyes grossly normal to inspection, PERRL and conjunctivae and sclerae normal  HENT: ear canals and TM's normal, nose and mouth without ulcers or lesions  NECK: no adenopathy, no asymmetry, masses, or scars  RESP: lungs clear to auscultation - no rales, rhonchi or wheezes  CV: regular rate and rhythm, normal S1 S2, no S3 or S4, no murmur, click or rub, no peripheral edema  ABDOMEN: soft, nontender, no hepatosplenomegaly, no masses and bowel sounds normal  MS: no gross musculoskeletal defects noted, no edema  SKIN: no suspicious lesions or rashes  NEURO: Normal strength and tone, mentation intact and speech normal  PSYCH: mentation appears normal, affect normal/bright  Bruising right face/cheek area    Recent Labs   Lab Test 06/03/25  1315 11/22/24  0000 07/12/24 2003   HGB  --   --  12.6   PLT  --   --  213   .5 138.0 136   POTASSIUM 4.73 4.41 3.7   CR 0.71 0.69 0.76        Diagnostics  Recent Results (from the past 720 hours)   TSH WITH FREE T4 REFLEX ([facility])    Collection Time: 06/03/25 11:04 AM   Result Value " Ref Range    TSH 3.57 0.40 - 4.50 mIU/L   Lipid Panel (BFP)    Collection Time: 06/03/25  1:15 PM   Result Value Ref Range    Cholesterol 166 0 - 199 mg/dL    Triglycerides 84 0 - 149 mg/dL    HDL Cholesterol 76 40 - 150 mg/dL    LDL-C 73 0 - 129 mg/dL    Cholesterol/HDL Ratio 2 0 - 5   Comprehensive Metobolic Panel (BFP)    Collection Time: 06/03/25  1:15 PM   Result Value Ref Range    Carbon Dioxide 28.0 20 - 32 mmol/L    Creatinine 0.71 0.60 - 1.30 mg/dL    Glucose 86 60 - 99 mg/dL    Sodium 135.5 135 - 146 mmol/L    Potassium 4.73 3.5 - 5.3 mmol/L    Chloride 99.9 98 - 110 mmol/L    Protein Total 6.3 6.1 - 8.1 g/dL    Albumin 3.9 3.6 - 5.1 g/dL    Alkaline Phosphatase 68 33 - 130 U/L    ALT 10 0 - 32 U/L    AST 20 0 - 35 U/L    Bilirubin Total 0.8 0.2 - 1.2 mg/dL    Urea Nitrogen 12 7 - 25 mg/dL    Calcium 9.6 8.6 - 10.3 mg/dL    BUN/Creatinine Ratio 17 6 - 32   Extra Blue Top Tube    Collection Time: 06/22/25  4:32 PM   Result Value Ref Range    Hold Specimen JIC    Extra Red Top Tube    Collection Time: 06/22/25  4:32 PM   Result Value Ref Range    Hold Specimen JIC    Extra Green Top (Lithium Heparin) Tube    Collection Time: 06/22/25  4:32 PM   Result Value Ref Range    Hold Specimen JIC    Extra Purple Top Tube    Collection Time: 06/22/25  4:32 PM   Result Value Ref Range    Hold Specimen JIC    HEMOGRAM PLATELET DIFF (BFP)    Collection Time: 07/02/25 12:31 PM   Result Value Ref Range    WBC 6.4 4.0 - 11 10*9/L    RBC Count 3.79 (A) 3.8 - 5.2 10*12/L    Hemoglobin 12.2 11.7 - 15.7 g/dL    Hematocrit 38.2 35.0 - 47.0 %    .9 (A) 78 - 100 fL    MCH 32.2 26 - 33 pg    MCHC 31.9 31 - 36 g/dL    Platelet Count 317 150 - 375 10^9/L    % Granulocytes 74.6 %    % Lymphocytes 15.1 %    % Monocytes 10.3 %   Basic Metabolic Panel (BFP)    Collection Time: 07/02/25  2:39 PM   Result Value Ref Range    Carbon Dioxide 28.6 20 - 32 mmol/L    Creatinine 0.61 0.60 - 1.30 mg/dL    Glucose 101 (A) 60 - 99 mg/dL     Sodium 134.2 (A) 135 - 146 mmol/L    Potassium 4.33 3.5 - 5.3 mmol/L    Chloride 98.3 98 - 110 mmol/L    Urea Nitrogen 8 7 - 25 mg/dL    Calcium 9.2 8.6 - 10.3 mg/dL    BUN/Creatinine Ratio 14 6 - 32          Revised Cardiac Risk Index (RCRI)  The patient has the following serious cardiovascular risks for perioperative complications:   - Coronary Artery Disease (MI, positive stress test, angina, Qs on EKG) = 1 point     RCRI Interpretation: 1 point: Class II (low risk - 0.9% complication rate)         Signed Electronically by: Katlyn Daniels MD  A copy of this evaluation report is provided to the requesting physician.             Sincerely,        Katlyn Daniels MD    Electronically signed

## 2025-07-08 ENCOUNTER — DOCUMENTATION ONLY (OUTPATIENT)
Dept: CARDIOLOGY | Facility: CLINIC | Age: 85
End: 2025-07-08
Payer: COMMERCIAL

## 2025-07-08 NOTE — PROGRESS NOTES
Lipid panel done in June, no need for second lipid panel next week.  Spoke with [facility], they will get her canceled.

## 2025-07-09 ENCOUNTER — HOSPITAL ENCOUNTER (OUTPATIENT)
Dept: CARDIOLOGY | Facility: CLINIC | Age: 85
Discharge: HOME | End: 2025-07-09
Attending: INTERNAL MEDICINE
Payer: COMMERCIAL

## 2025-07-09 DIAGNOSIS — Q21.0 VSD (VENTRICULAR SEPTAL DEFECT), MUSCULAR: ICD-10-CM

## 2025-07-09 LAB — LVEF ECHO: NORMAL

## 2025-07-09 PROCEDURE — 999N000208 ECHOCARDIOGRAM COMPLETE

## 2025-07-09 PROCEDURE — 93306 TTE W/DOPPLER COMPLETE: CPT | Mod: 26 | Performed by: GENERAL ACUTE CARE HOSPITAL

## 2025-07-09 PROCEDURE — 255N000002 HC RX 255 OP 636: Performed by: INTERNAL MEDICINE

## 2025-07-09 RX ADMIN — PERFLUTREN 2 ML (DILUTED): 6.52 INJECTION, SUSPENSION INTRAVENOUS at 14:37

## 2025-07-14 ENCOUNTER — TELEPHONE (OUTPATIENT)
Dept: CARDIOLOGY | Facility: CLINIC | Age: 85
End: 2025-07-14
Payer: COMMERCIAL

## 2025-07-14 ENCOUNTER — DOCUMENTATION ONLY (OUTPATIENT)
Dept: CARDIOLOGY | Facility: CLINIC | Age: 85
End: 2025-07-14
Payer: COMMERCIAL

## 2025-07-14 DIAGNOSIS — S42.201A CLOSED FRACTURE OF PROXIMAL END OF RIGHT HUMERUS, UNSPECIFIED FRACTURE MORPHOLOGY, INITIAL ENCOUNTER: Primary | ICD-10-CM

## 2025-07-14 NOTE — PROGRESS NOTES
Comment was requested on perioperative cardiac risk stratification for planned R reverse total shoulder arthroplasty and ORIF of R proximal humerus fracture.     TTE 7/9/25 shows LVEF=50-55% with a small area of apical akinesis, mild RV dilation with normal RV function, intact VSD repair with known small residual L-R shunt, and 4.4 cm ascending aorta. These findings are known and stable compared with 4/16/24 and also 1/17/23.    Kimberley has baseline immutable increased perioperative cardiac risk related to her chronic conditions but these have been stable for multiple years and would not be modified with additional cardiac evaluation or changes in current management. No further cardiac workup is indicated prior to the planned surgery at this time.     Justin Pickering MD  Cardiology

## 2025-07-14 NOTE — PHARMACY-ADMISSION MEDICATION HISTORY
PTA meds completed by pre-admitting nurse Florence Barnett and reviewed by pharmacy      PTA Med List   Medication Sig Last Dose/Taking    amLODIPine (NORVASC) 5 MG tablet Take 2 tablets (10 mg) by mouth daily. Taking    amoxicillin (AMOXIL) 500 MG capsule Take 4 capsules (2,000 mg) by mouth daily as needed (Please take prior to dental procedures) - Oral Taking    aspirin (ASA) 81 MG chewable tablet 1 tablet (81 mg) by Oral or NG Tube route daily 7/11/2025    calcium carbonate (TUMS) 500 MG chewable tablet Take 1 chew tab by mouth 2 times daily. Taking    citalopram (CELEXA) 10 MG tablet Take 1 tablet (10 mg) by mouth daily. Taking    docusate sodium (COLACE) 100 MG capsule Take 1 capsule (100 mg) by mouth 3 times daily as needed for constipation. Taking As Needed    levothyroxine (SYNTHROID/LEVOTHROID) 50 MCG tablet Take 1 tablet (50 mcg) by mouth daily. Taking    metoprolol tartrate (LOPRESSOR) 25 MG tablet Take 0.5 tablets (12.5 mg) by mouth 2 times daily. Taking    Multiple Vitamin (MULTI-VITAMIN DAILY PO) Take 1 tablet by mouth daily. Taking    Multiple Vitamins-Minerals (PRESERVISION AREDS PO) Take 1 tablet by mouth 2 times daily Taking    simvastatin (ZOCOR) 20 MG tablet TAKE 1 TABLET (20 MG) BY MOUTH AT BEDTIME Taking

## 2025-07-14 NOTE — TELEPHONE ENCOUNTER
Situation   Pre-op clearance.   RN spoke with patient, she is scheduled for surgery tomorrow (total shoulder arthroplasty). According to patient they want to know if okay to undergo surgery. Noted patient was last seen by cardiology April 2024, history of Ventricular Septal Defect. RN called and spoke with Florence MAYES from the pre-admitting team at [facility], awaiting  to hear back from cardiology regarding pre-operative risk stratification.         2:53 PM/ MD recommendation  << Comment was requested on perioperative cardiac risk stratification for planned R reverse total shoulder arthroplasty and ORIF of R proximal humerus fracture.      TTE 7/9/25 shows LVEF=50-55% with a small area of apical akinesis, mild RV dilation with normal RV function, intact VSD repair with known small residual L-R shunt, and 4.4 cm ascending aorta. These findings are known and stable compared with 4/16/24 and also 1/17/23.     Kimberley has baseline immutable increased perioperative cardiac risk related to her chronic conditions but these have been stable for multiple years and would not be modified with additional cardiac evaluation or changes in current management. No further cardiac workup is indicated prior to the planned surgery at this time.      Justin Pickering MD  Cardiology>>       Patient notified of the above, no further questions. Message left for surgery team and asked to call back if any questions.

## 2025-07-15 ENCOUNTER — ANESTHESIA EVENT (OUTPATIENT)
Dept: SURGERY | Facility: CLINIC | Age: 85
End: 2025-07-15
Payer: COMMERCIAL

## 2025-07-15 ENCOUNTER — ANESTHESIA (OUTPATIENT)
Dept: SURGERY | Facility: CLINIC | Age: 85
End: 2025-07-15
Payer: COMMERCIAL

## 2025-07-15 ENCOUNTER — APPOINTMENT (OUTPATIENT)
Dept: GENERAL RADIOLOGY | Facility: CLINIC | Age: 85
End: 2025-07-15
Payer: COMMERCIAL

## 2025-07-15 ENCOUNTER — HOSPITAL ENCOUNTER (OUTPATIENT)
Facility: CLINIC | Age: 85
Discharge: HOME | End: 2025-07-17
Attending: STUDENT IN AN ORGANIZED HEALTH CARE EDUCATION/TRAINING PROGRAM | Admitting: STUDENT IN AN ORGANIZED HEALTH CARE EDUCATION/TRAINING PROGRAM
Payer: COMMERCIAL

## 2025-07-15 DIAGNOSIS — S42.291A OTHER CLOSED DISPLACED FRACTURE OF PROXIMAL END OF RIGHT HUMERUS, INITIAL ENCOUNTER: Primary | ICD-10-CM

## 2025-07-15 PROBLEM — Z96.619 S/P REVERSE TOTAL SHOULDER ARTHROPLASTY: Status: ACTIVE | Noted: 2025-07-15

## 2025-07-15 PROCEDURE — 250N000009 HC RX 250: Performed by: ANESTHESIOLOGY

## 2025-07-15 PROCEDURE — 370N000017 HC ANESTHESIA TECHNICAL FEE, PER MIN: Performed by: STUDENT IN AN ORGANIZED HEALTH CARE EDUCATION/TRAINING PROGRAM

## 2025-07-15 PROCEDURE — 250N000013 HC RX MED GY IP 250 OP 250 PS 637

## 2025-07-15 PROCEDURE — 258N000001 HC RX 258: Performed by: STUDENT IN AN ORGANIZED HEALTH CARE EDUCATION/TRAINING PROGRAM

## 2025-07-15 PROCEDURE — 999N000141 HC STATISTIC PRE-PROCEDURE NURSING ASSESSMENT: Performed by: STUDENT IN AN ORGANIZED HEALTH CARE EDUCATION/TRAINING PROGRAM

## 2025-07-15 PROCEDURE — 250N000011 HC RX IP 250 OP 636: Performed by: STUDENT IN AN ORGANIZED HEALTH CARE EDUCATION/TRAINING PROGRAM

## 2025-07-15 PROCEDURE — 258N000003 HC RX IP 258 OP 636: Performed by: ANESTHESIOLOGY

## 2025-07-15 PROCEDURE — 250N000011 HC RX IP 250 OP 636: Performed by: NURSE ANESTHETIST, CERTIFIED REGISTERED

## 2025-07-15 PROCEDURE — 258N000003 HC RX IP 258 OP 636: Performed by: NURSE ANESTHETIST, CERTIFIED REGISTERED

## 2025-07-15 PROCEDURE — 999N000065 XR SHOULDER RIGHT PORT G/E 2 VIEWS: Mod: RT

## 2025-07-15 PROCEDURE — 250N000011 HC RX IP 250 OP 636

## 2025-07-15 PROCEDURE — 710N000009 HC RECOVERY PHASE 1, LEVEL 1, PER MIN: Performed by: STUDENT IN AN ORGANIZED HEALTH CARE EDUCATION/TRAINING PROGRAM

## 2025-07-15 PROCEDURE — 272N000001 HC OR GENERAL SUPPLY STERILE: Performed by: STUDENT IN AN ORGANIZED HEALTH CARE EDUCATION/TRAINING PROGRAM

## 2025-07-15 PROCEDURE — C1776 JOINT DEVICE (IMPLANTABLE): HCPCS | Performed by: STUDENT IN AN ORGANIZED HEALTH CARE EDUCATION/TRAINING PROGRAM

## 2025-07-15 PROCEDURE — C1713 ANCHOR/SCREW BN/BN,TIS/BN: HCPCS | Performed by: STUDENT IN AN ORGANIZED HEALTH CARE EDUCATION/TRAINING PROGRAM

## 2025-07-15 PROCEDURE — 360N000077 HC SURGERY LEVEL 4, PER MIN: Performed by: STUDENT IN AN ORGANIZED HEALTH CARE EDUCATION/TRAINING PROGRAM

## 2025-07-15 PROCEDURE — 250N000009 HC RX 250: Performed by: NURSE ANESTHETIST, CERTIFIED REGISTERED

## 2025-07-15 PROCEDURE — 271N000001 HC OR GENERAL SUPPLY NON-STERILE: Performed by: STUDENT IN AN ORGANIZED HEALTH CARE EDUCATION/TRAINING PROGRAM

## 2025-07-15 PROCEDURE — 250N000025 HC SEVOFLURANE, PER MIN: Performed by: STUDENT IN AN ORGANIZED HEALTH CARE EDUCATION/TRAINING PROGRAM

## 2025-07-15 PROCEDURE — 250N000009 HC RX 250: Performed by: STUDENT IN AN ORGANIZED HEALTH CARE EDUCATION/TRAINING PROGRAM

## 2025-07-15 PROCEDURE — 250N000009 HC RX 250

## 2025-07-15 DEVICE — IMPLANTABLE DEVICE
Type: IMPLANTABLE DEVICE | Site: SHOULDER | Status: FUNCTIONAL
Brand: TORNIER PERFORM™ HUMERAL SYSTEM

## 2025-07-15 DEVICE — IMPLANTABLE DEVICE
Type: IMPLANTABLE DEVICE | Site: SHOULDER | Status: FUNCTIONAL
Brand: TORNIER PERFORM® REVERSED GLENOID

## 2025-07-15 DEVICE — SCREW CENTRAL 6.5X30MM DWJ130: Type: IMPLANTABLE DEVICE | Site: SHOULDER | Status: FUNCTIONAL

## 2025-07-15 DEVICE — SCREW PERIPHERAL 14MM DWJ314: Type: IMPLANTABLE DEVICE | Site: SHOULDER | Status: FUNCTIONAL

## 2025-07-15 DEVICE — IMPLANTABLE DEVICE: Type: IMPLANTABLE DEVICE | Site: SHOULDER | Status: FUNCTIONAL

## 2025-07-15 DEVICE — IMPLANTABLE DEVICE
Type: IMPLANTABLE DEVICE | Site: SHOULDER | Status: FUNCTIONAL
Brand: TORNIER PERFORM® REVERSED AUGMENTED GLENOID

## 2025-07-15 DEVICE — SCREW PERIPHERAL 26MM: Type: IMPLANTABLE DEVICE | Site: SHOULDER | Status: FUNCTIONAL

## 2025-07-15 DEVICE — SCREW PERIPHERAL 5.0X30MM DWJ330: Type: IMPLANTABLE DEVICE | Site: SHOULDER | Status: FUNCTIONAL

## 2025-07-15 RX ORDER — SODIUM CHLORIDE, SODIUM LACTATE, POTASSIUM CHLORIDE, CALCIUM CHLORIDE 600; 310; 30; 20 MG/100ML; MG/100ML; MG/100ML; MG/100ML
INJECTION, SOLUTION INTRAVENOUS CONTINUOUS
Status: DISCONTINUED | OUTPATIENT
Start: 2025-07-15 | End: 2025-07-15 | Stop reason: HOSPADM

## 2025-07-15 RX ORDER — ONDANSETRON 2 MG/ML
4 INJECTION INTRAMUSCULAR; INTRAVENOUS EVERY 30 MIN PRN
Status: DISCONTINUED | OUTPATIENT
Start: 2025-07-15 | End: 2025-07-15 | Stop reason: HOSPADM

## 2025-07-15 RX ORDER — POLYETHYLENE GLYCOL 3350 17 G/17G
17 POWDER, FOR SOLUTION ORAL DAILY
Status: DISCONTINUED | OUTPATIENT
Start: 2025-07-16 | End: 2025-07-17 | Stop reason: HOSPADM

## 2025-07-15 RX ORDER — EPHEDRINE SULFATE 50 MG/ML
INJECTION, SOLUTION INTRAMUSCULAR; INTRAVENOUS; SUBCUTANEOUS PRN
Status: DISCONTINUED | OUTPATIENT
Start: 2025-07-15 | End: 2025-07-15

## 2025-07-15 RX ORDER — ACETAMINOPHEN 500 MG
500-1000 TABLET ORAL EVERY 6 HOURS PRN
Status: ON HOLD | COMMUNITY
End: 2025-07-17

## 2025-07-15 RX ORDER — NALOXONE HYDROCHLORIDE 0.4 MG/ML
0.4 INJECTION, SOLUTION INTRAMUSCULAR; INTRAVENOUS; SUBCUTANEOUS
Status: DISCONTINUED | OUTPATIENT
Start: 2025-07-15 | End: 2025-07-17 | Stop reason: HOSPADM

## 2025-07-15 RX ORDER — ONDANSETRON 4 MG/1
4 TABLET, ORALLY DISINTEGRATING ORAL EVERY 8 HOURS PRN
Qty: 10 TABLET | Refills: 0 | Status: SHIPPED | OUTPATIENT
Start: 2025-07-15

## 2025-07-15 RX ORDER — FAMOTIDINE 20 MG
TABLET ORAL
COMMUNITY

## 2025-07-15 RX ORDER — ONDANSETRON 4 MG/1
4 TABLET, ORALLY DISINTEGRATING ORAL EVERY 30 MIN PRN
Status: DISCONTINUED | OUTPATIENT
Start: 2025-07-15 | End: 2025-07-15 | Stop reason: HOSPADM

## 2025-07-15 RX ORDER — ALBUTEROL SULFATE 0.83 MG/ML
2.5 SOLUTION RESPIRATORY (INHALATION) EVERY 4 HOURS PRN
Status: DISCONTINUED | OUTPATIENT
Start: 2025-07-15 | End: 2025-07-15 | Stop reason: HOSPADM

## 2025-07-15 RX ORDER — METHOCARBAMOL 500 MG/1
250 TABLET ORAL EVERY 6 HOURS PRN
Status: DISCONTINUED | OUTPATIENT
Start: 2025-07-15 | End: 2025-07-17 | Stop reason: HOSPADM

## 2025-07-15 RX ORDER — LIDOCAINE HYDROCHLORIDE 20 MG/ML
INJECTION, SOLUTION INFILTRATION; PERINEURAL PRN
Status: DISCONTINUED | OUTPATIENT
Start: 2025-07-15 | End: 2025-07-15

## 2025-07-15 RX ORDER — FENTANYL CITRATE 50 UG/ML
50 INJECTION, SOLUTION INTRAMUSCULAR; INTRAVENOUS ONCE
Status: DISCONTINUED | OUTPATIENT
Start: 2025-07-15 | End: 2025-07-16

## 2025-07-15 RX ORDER — DEXAMETHASONE SODIUM PHOSPHATE 4 MG/ML
4 INJECTION, SOLUTION INTRA-ARTICULAR; INTRALESIONAL; INTRAMUSCULAR; INTRAVENOUS; SOFT TISSUE
Status: DISCONTINUED | OUTPATIENT
Start: 2025-07-15 | End: 2025-07-15 | Stop reason: HOSPADM

## 2025-07-15 RX ORDER — MEPERIDINE HYDROCHLORIDE 25 MG/ML
12.5 INJECTION INTRAMUSCULAR; INTRAVENOUS; SUBCUTANEOUS EVERY 5 MIN PRN
Status: DISCONTINUED | OUTPATIENT
Start: 2025-07-15 | End: 2025-07-15 | Stop reason: HOSPADM

## 2025-07-15 RX ORDER — NALOXONE HYDROCHLORIDE 0.4 MG/ML
0.1 INJECTION, SOLUTION INTRAMUSCULAR; INTRAVENOUS; SUBCUTANEOUS
Status: DISCONTINUED | OUTPATIENT
Start: 2025-07-15 | End: 2025-07-15 | Stop reason: HOSPADM

## 2025-07-15 RX ORDER — ONDANSETRON 2 MG/ML
INJECTION INTRAMUSCULAR; INTRAVENOUS PRN
Status: DISCONTINUED | OUTPATIENT
Start: 2025-07-15 | End: 2025-07-15

## 2025-07-15 RX ORDER — BUPIVACAINE HCL/EPINEPHRINE 0.5-1:200K
VIAL (ML) INJECTION
Status: COMPLETED | OUTPATIENT
Start: 2025-07-15 | End: 2025-07-15

## 2025-07-15 RX ORDER — HYDRALAZINE HYDROCHLORIDE 20 MG/ML
2.5-5 INJECTION INTRAMUSCULAR; INTRAVENOUS EVERY 10 MIN PRN
Status: DISCONTINUED | OUTPATIENT
Start: 2025-07-15 | End: 2025-07-15 | Stop reason: HOSPADM

## 2025-07-15 RX ORDER — LABETALOL HYDROCHLORIDE 5 MG/ML
10 INJECTION, SOLUTION INTRAVENOUS
Status: DISCONTINUED | OUTPATIENT
Start: 2025-07-15 | End: 2025-07-15 | Stop reason: HOSPADM

## 2025-07-15 RX ORDER — DEXAMETHASONE SODIUM PHOSPHATE 4 MG/ML
INJECTION, SOLUTION INTRA-ARTICULAR; INTRALESIONAL; INTRAMUSCULAR; INTRAVENOUS; SOFT TISSUE PRN
Status: DISCONTINUED | OUTPATIENT
Start: 2025-07-15 | End: 2025-07-15

## 2025-07-15 RX ORDER — OXYCODONE HYDROCHLORIDE 5 MG/1
5 TABLET ORAL
Status: DISCONTINUED | OUTPATIENT
Start: 2025-07-15 | End: 2025-07-15 | Stop reason: HOSPADM

## 2025-07-15 RX ORDER — ONDANSETRON 2 MG/ML
4 INJECTION INTRAMUSCULAR; INTRAVENOUS EVERY 6 HOURS PRN
Status: DISCONTINUED | OUTPATIENT
Start: 2025-07-15 | End: 2025-07-17 | Stop reason: HOSPADM

## 2025-07-15 RX ORDER — CEFAZOLIN SODIUM 1 G/3ML
1 INJECTION, POWDER, FOR SOLUTION INTRAMUSCULAR; INTRAVENOUS EVERY 8 HOURS
Status: COMPLETED | OUTPATIENT
Start: 2025-07-15 | End: 2025-07-16

## 2025-07-15 RX ORDER — CITALOPRAM HYDROBROMIDE 10 MG/1
10 TABLET ORAL DAILY
Status: DISCONTINUED | OUTPATIENT
Start: 2025-07-16 | End: 2025-07-17 | Stop reason: HOSPADM

## 2025-07-15 RX ORDER — CEFAZOLIN SODIUM/WATER 2 G/20 ML
2 SYRINGE (ML) INTRAVENOUS
Status: COMPLETED | OUTPATIENT
Start: 2025-07-15 | End: 2025-07-15

## 2025-07-15 RX ORDER — OXYCODONE HYDROCHLORIDE 5 MG/1
5 TABLET ORAL EVERY 4 HOURS PRN
Status: DISCONTINUED | OUTPATIENT
Start: 2025-07-15 | End: 2025-07-17 | Stop reason: HOSPADM

## 2025-07-15 RX ORDER — ACETAMINOPHEN 325 MG/1
975 TABLET ORAL EVERY 8 HOURS
Status: DISCONTINUED | OUTPATIENT
Start: 2025-07-15 | End: 2025-07-17 | Stop reason: HOSPADM

## 2025-07-15 RX ORDER — NALOXONE HYDROCHLORIDE 0.4 MG/ML
0.2 INJECTION, SOLUTION INTRAMUSCULAR; INTRAVENOUS; SUBCUTANEOUS
Status: DISCONTINUED | OUTPATIENT
Start: 2025-07-15 | End: 2025-07-17 | Stop reason: HOSPADM

## 2025-07-15 RX ORDER — FENTANYL CITRATE 50 UG/ML
25 INJECTION, SOLUTION INTRAMUSCULAR; INTRAVENOUS EVERY 5 MIN PRN
Status: DISCONTINUED | OUTPATIENT
Start: 2025-07-15 | End: 2025-07-15 | Stop reason: HOSPADM

## 2025-07-15 RX ORDER — PROCHLORPERAZINE MALEATE 5 MG/1
5 TABLET ORAL EVERY 6 HOURS PRN
Status: DISCONTINUED | OUTPATIENT
Start: 2025-07-15 | End: 2025-07-17 | Stop reason: HOSPADM

## 2025-07-15 RX ORDER — FENTANYL CITRATE 50 UG/ML
50 INJECTION, SOLUTION INTRAMUSCULAR; INTRAVENOUS EVERY 5 MIN PRN
Status: DISCONTINUED | OUTPATIENT
Start: 2025-07-15 | End: 2025-07-15 | Stop reason: HOSPADM

## 2025-07-15 RX ORDER — HYDROMORPHONE HCL IN WATER/PF 6 MG/30 ML
0.2 PATIENT CONTROLLED ANALGESIA SYRINGE INTRAVENOUS
Status: DISCONTINUED | OUTPATIENT
Start: 2025-07-15 | End: 2025-07-17 | Stop reason: HOSPADM

## 2025-07-15 RX ORDER — VANCOMYCIN HYDROCHLORIDE 1 G/20ML
INJECTION, POWDER, LYOPHILIZED, FOR SOLUTION INTRAVENOUS PRN
Status: DISCONTINUED | OUTPATIENT
Start: 2025-07-15 | End: 2025-07-15 | Stop reason: HOSPADM

## 2025-07-15 RX ORDER — PROPOFOL 10 MG/ML
INJECTION, EMULSION INTRAVENOUS PRN
Status: DISCONTINUED | OUTPATIENT
Start: 2025-07-15 | End: 2025-07-15

## 2025-07-15 RX ORDER — ACETAMINOPHEN 325 MG/1
650 TABLET ORAL EVERY 4 HOURS PRN
Qty: 100 TABLET | Refills: 0 | Status: SHIPPED | OUTPATIENT
Start: 2025-07-15

## 2025-07-15 RX ORDER — ONDANSETRON 4 MG/1
4 TABLET, ORALLY DISINTEGRATING ORAL EVERY 6 HOURS PRN
Status: DISCONTINUED | OUTPATIENT
Start: 2025-07-15 | End: 2025-07-17 | Stop reason: HOSPADM

## 2025-07-15 RX ORDER — AMOXICILLIN 250 MG
1 CAPSULE ORAL 2 TIMES DAILY
Status: DISCONTINUED | OUTPATIENT
Start: 2025-07-15 | End: 2025-07-17 | Stop reason: HOSPADM

## 2025-07-15 RX ORDER — CEFAZOLIN SODIUM/WATER 2 G/20 ML
2 SYRINGE (ML) INTRAVENOUS SEE ADMIN INSTRUCTIONS
Status: DISCONTINUED | OUTPATIENT
Start: 2025-07-15 | End: 2025-07-15 | Stop reason: HOSPADM

## 2025-07-15 RX ORDER — BISACODYL 10 MG
10 SUPPOSITORY, RECTAL RECTAL DAILY PRN
Status: DISCONTINUED | OUTPATIENT
Start: 2025-07-18 | End: 2025-07-17 | Stop reason: HOSPADM

## 2025-07-15 RX ORDER — LIDOCAINE 40 MG/G
CREAM TOPICAL
Status: DISCONTINUED | OUTPATIENT
Start: 2025-07-15 | End: 2025-07-17 | Stop reason: HOSPADM

## 2025-07-15 RX ORDER — OXYCODONE HYDROCHLORIDE 10 MG/1
10 TABLET ORAL EVERY 4 HOURS PRN
Status: DISCONTINUED | OUTPATIENT
Start: 2025-07-15 | End: 2025-07-17 | Stop reason: HOSPADM

## 2025-07-15 RX ORDER — OXYCODONE HYDROCHLORIDE 5 MG/1
5-10 TABLET ORAL EVERY 4 HOURS PRN
Qty: 26 TABLET | Refills: 0 | Status: SHIPPED | OUTPATIENT
Start: 2025-07-15

## 2025-07-15 RX ORDER — FENTANYL CITRATE 50 UG/ML
INJECTION, SOLUTION INTRAMUSCULAR; INTRAVENOUS PRN
Status: DISCONTINUED | OUTPATIENT
Start: 2025-07-15 | End: 2025-07-15

## 2025-07-15 RX ORDER — HYDROMORPHONE HCL IN WATER/PF 6 MG/30 ML
0.2 PATIENT CONTROLLED ANALGESIA SYRINGE INTRAVENOUS EVERY 5 MIN PRN
Status: DISCONTINUED | OUTPATIENT
Start: 2025-07-15 | End: 2025-07-15 | Stop reason: HOSPADM

## 2025-07-15 RX ORDER — LEVOTHYROXINE SODIUM 25 UG/1
50 TABLET ORAL DAILY
Status: DISCONTINUED | OUTPATIENT
Start: 2025-07-16 | End: 2025-07-17 | Stop reason: HOSPADM

## 2025-07-15 RX ORDER — KETOROLAC TROMETHAMINE 30 MG/ML
INJECTION, SOLUTION INTRAMUSCULAR; INTRAVENOUS PRN
Status: DISCONTINUED | OUTPATIENT
Start: 2025-07-15 | End: 2025-07-15

## 2025-07-15 RX ORDER — SODIUM CHLORIDE, SODIUM LACTATE, POTASSIUM CHLORIDE, CALCIUM CHLORIDE 600; 310; 30; 20 MG/100ML; MG/100ML; MG/100ML; MG/100ML
INJECTION, SOLUTION INTRAVENOUS CONTINUOUS
Status: DISCONTINUED | OUTPATIENT
Start: 2025-07-15 | End: 2025-07-17 | Stop reason: HOSPADM

## 2025-07-15 RX ORDER — TRANEXAMIC ACID 10 MG/ML
1 INJECTION, SOLUTION INTRAVENOUS ONCE
Status: COMPLETED | OUTPATIENT
Start: 2025-07-15 | End: 2025-07-15

## 2025-07-15 RX ORDER — LIDOCAINE 40 MG/G
CREAM TOPICAL
Status: DISCONTINUED | OUTPATIENT
Start: 2025-07-15 | End: 2025-07-15 | Stop reason: HOSPADM

## 2025-07-15 RX ORDER — OXYCODONE HYDROCHLORIDE 5 MG/1
10 TABLET ORAL
Status: DISCONTINUED | OUTPATIENT
Start: 2025-07-15 | End: 2025-07-15 | Stop reason: HOSPADM

## 2025-07-15 RX ORDER — HYDROMORPHONE HCL IN WATER/PF 6 MG/30 ML
0.4 PATIENT CONTROLLED ANALGESIA SYRINGE INTRAVENOUS
Status: DISCONTINUED | OUTPATIENT
Start: 2025-07-15 | End: 2025-07-17 | Stop reason: HOSPADM

## 2025-07-15 RX ORDER — AMLODIPINE BESYLATE 10 MG/1
10 TABLET ORAL DAILY
Status: DISCONTINUED | OUTPATIENT
Start: 2025-07-15 | End: 2025-07-17 | Stop reason: HOSPADM

## 2025-07-15 RX ORDER — FENTANYL CITRATE 50 UG/ML
25 INJECTION, SOLUTION INTRAMUSCULAR; INTRAVENOUS
Status: DISCONTINUED | OUTPATIENT
Start: 2025-07-15 | End: 2025-07-15 | Stop reason: HOSPADM

## 2025-07-15 RX ORDER — HYDROMORPHONE HCL IN WATER/PF 6 MG/30 ML
0.4 PATIENT CONTROLLED ANALGESIA SYRINGE INTRAVENOUS EVERY 5 MIN PRN
Status: DISCONTINUED | OUTPATIENT
Start: 2025-07-15 | End: 2025-07-15 | Stop reason: HOSPADM

## 2025-07-15 RX ADMIN — PHENYLEPHRINE HYDROCHLORIDE 100 MCG: 10 INJECTION INTRAVENOUS at 08:54

## 2025-07-15 RX ADMIN — Medication 2 G: at 07:25

## 2025-07-15 RX ADMIN — PHENYLEPHRINE HYDROCHLORIDE 50 MCG: 10 INJECTION INTRAVENOUS at 08:58

## 2025-07-15 RX ADMIN — PHENYLEPHRINE HYDROCHLORIDE 100 MCG: 10 INJECTION INTRAVENOUS at 08:31

## 2025-07-15 RX ADMIN — PROPOFOL 150 MG: 10 INJECTION, EMULSION INTRAVENOUS at 07:34

## 2025-07-15 RX ADMIN — AMLODIPINE BESYLATE 10 MG: 10 TABLET ORAL at 11:30

## 2025-07-15 RX ADMIN — CEFAZOLIN 1 G: 1 INJECTION, POWDER, FOR SOLUTION INTRAMUSCULAR; INTRAVENOUS at 16:01

## 2025-07-15 RX ADMIN — ONDANSETRON 4 MG: 2 INJECTION INTRAMUSCULAR; INTRAVENOUS at 08:59

## 2025-07-15 RX ADMIN — SENNOSIDES AND DOCUSATE SODIUM 1 TABLET: 8.6; 5 TABLET ORAL at 20:24

## 2025-07-15 RX ADMIN — PROPOFOL 30 MG: 10 INJECTION, EMULSION INTRAVENOUS at 09:22

## 2025-07-15 RX ADMIN — PHENYLEPHRINE HYDROCHLORIDE 100 MCG: 10 INJECTION INTRAVENOUS at 07:57

## 2025-07-15 RX ADMIN — FENTANYL CITRATE 25 MCG: 50 INJECTION INTRAMUSCULAR; INTRAVENOUS at 08:47

## 2025-07-15 RX ADMIN — KETOROLAC TROMETHAMINE 15 MG: 30 INJECTION, SOLUTION INTRAMUSCULAR at 09:10

## 2025-07-15 RX ADMIN — FENTANYL CITRATE 50 MCG: 50 INJECTION INTRAMUSCULAR; INTRAVENOUS at 07:34

## 2025-07-15 RX ADMIN — SUGAMMADEX 200 MG: 100 INJECTION, SOLUTION INTRAVENOUS at 09:13

## 2025-07-15 RX ADMIN — ROCURONIUM BROMIDE 50 MG: 50 INJECTION, SOLUTION INTRAVENOUS at 07:34

## 2025-07-15 RX ADMIN — TRANEXAMIC ACID 1 G: 10 INJECTION, SOLUTION INTRAVENOUS at 09:06

## 2025-07-15 RX ADMIN — BUPIVACAINE HYDROCHLORIDE AND EPINEPHRINE BITARTRATE 20 ML: 5; .005 INJECTION, SOLUTION PERINEURAL at 07:00

## 2025-07-15 RX ADMIN — LIDOCAINE HYDROCHLORIDE 40 MG: 20 INJECTION, SOLUTION INFILTRATION; PERINEURAL at 07:34

## 2025-07-15 RX ADMIN — PHENYLEPHRINE HYDROCHLORIDE 50 MCG: 10 INJECTION INTRAVENOUS at 09:04

## 2025-07-15 RX ADMIN — ACETAMINOPHEN 975 MG: 325 TABLET ORAL at 20:24

## 2025-07-15 RX ADMIN — TRANEXAMIC ACID 1 G: 10 INJECTION, SOLUTION INTRAVENOUS at 07:25

## 2025-07-15 RX ADMIN — PROPOFOL 20 MG: 10 INJECTION, EMULSION INTRAVENOUS at 09:16

## 2025-07-15 RX ADMIN — SENNOSIDES AND DOCUSATE SODIUM 1 TABLET: 8.6; 5 TABLET ORAL at 11:34

## 2025-07-15 RX ADMIN — PHENYLEPHRINE HYDROCHLORIDE 100 MCG: 10 INJECTION INTRAVENOUS at 08:00

## 2025-07-15 RX ADMIN — DEXAMETHASONE SODIUM PHOSPHATE 4 MG: 4 INJECTION, SOLUTION INTRA-ARTICULAR; INTRALESIONAL; INTRAMUSCULAR; INTRAVENOUS; SOFT TISSUE at 07:35

## 2025-07-15 RX ADMIN — METOPROLOL TARTRATE 12.5 MG: 25 TABLET, FILM COATED ORAL at 20:25

## 2025-07-15 RX ADMIN — EPHEDRINE SULFATE 10 MG: 5 INJECTION INTRAVENOUS at 09:05

## 2025-07-15 RX ADMIN — ACETAMINOPHEN 975 MG: 325 TABLET ORAL at 11:30

## 2025-07-15 RX ADMIN — SODIUM CHLORIDE, SODIUM LACTATE, POTASSIUM CHLORIDE, AND CALCIUM CHLORIDE: .6; .31; .03; .02 INJECTION, SOLUTION INTRAVENOUS at 09:25

## 2025-07-15 RX ADMIN — RIVAROXABAN 10 MG: 10 TABLET, FILM COATED ORAL at 17:51

## 2025-07-15 RX ADMIN — SODIUM CHLORIDE, SODIUM LACTATE, POTASSIUM CHLORIDE, AND CALCIUM CHLORIDE: .6; .31; .03; .02 INJECTION, SOLUTION INTRAVENOUS at 06:56

## 2025-07-15 RX ADMIN — FENTANYL CITRATE 25 MCG: 50 INJECTION INTRAMUSCULAR; INTRAVENOUS at 08:41

## 2025-07-15 RX ADMIN — PHENYLEPHRINE HYDROCHLORIDE 0.3 MCG/KG/MIN: 10 INJECTION INTRAVENOUS at 08:00

## 2025-07-15 ASSESSMENT — ACTIVITIES OF DAILY LIVING (ADL)
ADLS_ACUITY_SCORE: 32
ADLS_ACUITY_SCORE: 34
TOILETING_ISSUES: NO
DRESSING/BATHING_DIFFICULTY: NO
CHANGE_IN_FUNCTIONAL_STATUS_SINCE_ONSET_OF_CURRENT_ILLNESS/INJURY: YES
ADLS_ACUITY_SCORE: 34
THE_FOLLOWING_AIDS_WERE_PROVIDED;: PATIENT DECLINED OFFER OF AUXILIARY AIDS
DIFFICULTY_EATING/SWALLOWING: NO
DESCRIBE_HEARING_LOSS: BILATERAL HEARING LOSS
HEARING_DIFFICULTY_OR_DEAF: YES
ADLS_ACUITY_SCORE: 32
ADLS_ACUITY_SCORE: 31
FALL_HISTORY_WITHIN_LAST_SIX_MONTHS: YES
WEAR_GLASSES_OR_BLIND: NO
WALKING_OR_CLIMBING_STAIRS_DIFFICULTY: NO
NUMBER_OF_TIMES_PATIENT_HAS_FALLEN_WITHIN_LAST_SIX_MONTHS: 1
ADLS_ACUITY_SCORE: 34
PATIENT'S_PREFERRED_MEANS_OF_COMMUNICATION: ENGLISH SPEAKER WITH HEARING LOSS, NO SPEECH PROBLEMS.
ADLS_ACUITY_SCORE: 34
ADLS_ACUITY_SCORE: 34
WERE_AUXILIARY_AIDS_OFFERED?: NO
CONCENTRATING,_REMEMBERING_OR_MAKING_DECISIONS_DIFFICULTY: NO
ADLS_ACUITY_SCORE: 34
ADLS_ACUITY_SCORE: 33
ADLS_ACUITY_SCORE: 34
ADLS_ACUITY_SCORE: 34
ADLS_ACUITY_SCORE: 33
ADLS_ACUITY_SCORE: 32
DOING_ERRANDS_INDEPENDENTLY_DIFFICULTY: YES

## 2025-07-15 NOTE — ANESTHESIA POSTPROCEDURE EVALUATION
Patient: Kimberley Mullins    Procedure: Procedure(s):  Reverse total shoulder arthroplasty, open reduction internal fixation of greater tuberosity, biceps tenodesis       Anesthesia Type:  General    Note:     Postop Pain Control: Uneventful            Sign Out: Well controlled pain   PONV: No   Neuro/Psych: Uneventful            Sign Out: Acceptable/Baseline neuro status   Airway/Respiratory: Uneventful            Sign Out: Acceptable/Baseline resp. status   CV/Hemodynamics: Uneventful            Sign Out: Acceptable CV status; No obvious hypovolemia; No obvious fluid overload   Other NRE:    DID A NON-ROUTINE EVENT OCCUR? No           Last vitals:  Vitals Value Taken Time   /62 07/15/25 10:15   Temp 97.34  F (36.3  C) 07/15/25 10:16   Pulse 73 07/15/25 10:16   Resp 26 07/15/25 10:16   SpO2 96 % 07/15/25 10:15   Vitals shown include unfiled device data.    Electronically Signed By: Denise Lee MD, MD  July 15, 2025  1:34 PM

## 2025-07-15 NOTE — ANESTHESIA CARE TRANSFER NOTE
Patient: Kimberley Mullins    Procedure: Procedure(s):  Reverse total shoulder arthroplasty, open reduction internal fixation of greater tuberosity, biceps tenodesis       Diagnosis: Closed fracture of proximal end of right humerus, unspecified fracture morphology, initial encounter [S42.201A]  Diagnosis Additional Information: No value filed.    Anesthesia Type:   General     Note:    Oropharynx: oropharynx clear of all foreign objects  Level of Consciousness: awake  Oxygen Supplementation: face mask  Level of Supplemental Oxygen (L/min / FiO2): 6 lpm  Independent Airway: airway patency satisfactory and stable  Dentition: dentition unchanged  Vital Signs Stable: post-procedure vital signs reviewed and stable  Report to RN Given: handoff report given  Patient transferred to: PACU  Comments: Patient oral suctioned. Patient with spontaneous respirations and adequate tidal volumes. Patient awake and responsive. Extubated in OR to 6L facemask. To PACU ventilating well. VSS. Report given.  Handoff Report: Identifed the Patient, Identified the Reponsible Provider, Reviewed the pertinent medical history, Discussed the surgical course, Reviewed Intra-OP anesthesia mangement and issues during anesthesia, Set expectations for post-procedure period and Allowed opportunity for questions and acknowledgement of understanding      Vitals:  Vitals Value Taken Time   BP     Temp 97.7  F (36.5  C) 07/15/25 09:37   Pulse 81 07/15/25 09:37   Resp 20 07/15/25 09:37   SpO2 100 % 07/15/25 09:37   Vitals shown include unfiled device data.    Electronically Signed By: JEANINE Bolaños CRNA  July 15, 2025  9:38 AM

## 2025-07-15 NOTE — ANESTHESIA PROCEDURE NOTES
Interscalene Procedure Note    Pre-Procedure   Staff -        Anesthesiologist:  Denise Lee MD       Performed By: anesthesiologist       Location: pre-op       Pre-Anesthestic Checklist: patient identified, IV checked, site marked, risks and benefits discussed, informed consent, monitors and equipment checked, pre-op evaluation, at physician/surgeon's request and post-op pain management  Timeout:       Correct Patient: Yes        Correct Procedure: Yes        Correct Site: Yes        Correct Position: Yes        Correct Laterality: Yes        Site Marked: Yes  Procedure Documentation  Procedure: Interscalene         Patient Position: sitting       Patient Prep/Sterile Barriers: sterile gloves, mask       Skin prep: Chloraprep       Local skin infiltrated with mL of 1% lidocaine.        Needle Type: insulated and short bevel       Needle Gauge: 21.        Needle Length (millimeters): 50        Ultrasound guided       1. Ultrasound was used to identify targeted nerve, plexus, vascular marker, or fascial plane and place a needle adjacent to it in real-time.       2. Ultrasound was used to visualize the spread of anesthetic in close proximity to the above referenced structure.    Assessment/Narrative         The placement was negative for: blood aspirated, painful injection and site bleeding       Paresthesias: No.       Test dose of mL at.         Test dose negative, 3 minutes after injection, for signs of intravascular, subdural, or intrathecal injection.       Bolus given via needle..        Secured via.        Insertion/Infusion Method: Single Shot       Complications: none    Medication(s) Administered   Bupivacaine 0.5% w/ 1:200K Epi (Other) - Other   20 mL - 7/15/2025 7:00:00 AM   Comments:  Meds used:  Bupivacaine 0.5% with 1:400,000 epi    Ultrasound Interpretation, Peripheral Nerve Block    1. Under ultrasound guidance, the needle was inserted and placed in close proximity to the target  "nerve(s).  2. Ultrasound was also used to visualize the spread of the anesthetic in close proximity to the nerve(s) being blocked.  Local anesthetic was administered in incremental doses, with intermittent negative aspiration.    3. The nerve(s) appeared anatomically normal.  4. There were no apparent abnormal pathological findings.  5. A permanent ultrasound image was saved in the patient's record.    Pt tolerated well.    No complications.      The surgeon has given a verbal order transferring care of this patient to me for the performance of a regional analgesia block for post-op pain control. It is requested of me because I am uniquely trained and qualified to perform this block and the surgeon is neither trained nor qualified to perform this procedure.      FOR [facility] ([facility]) ONLY:   Pain Team Contact information: please page the Pain Team Via [employer]. Search \"Pain\". During daytime hours, please page the attending first. At night please page the resident first.      "

## 2025-07-15 NOTE — OP NOTE
[facility]  Orthopedic Operative Note    Reverse shoulder arthroplasty for proximal humerus fractures    Kimberley Mullins MRN# [id number]   YOB: 1940  Procedure Date:  7/15/2025  Age: 84 year old       PREOPERATIVE DIAGNOSIS:    Right 3-part displaced proximal humerus fracture  VSD    POSTOPERATIVE DIAGNOSIS:    Same    PROCEDURE PERFORMED:    Right reverse shoulder arthroplasty - 06410  Right greater tuberosity open treatment and internal fixation - 44320  Right open long head of biceps tenodesis - 20651    SURGEON:  JUAN DUARTE MD    FIRST ASSISTANT:  Avani Burrows PA-C, A skilled first assistant was necessary for this procedure for assistance with patient positioning, prepping, draping, surgical visualization, wound closure, and application of the dressing.       ANESTHESIA:  General + Block     EBL: 75cc    IMPLANTS:   Implant Name Type Inv. Item Serial No.  Lot No. LRB No. Used Action   BASEPLATE LATERAL RVRS 25MM OFFS +3MM [id number] - [id number] Total Joint Component/Insert BASEPLATE LATERAL RVRS 25MM OFFS +3MM [id number] [employer] [facility]  Right 1 Implanted   SCREW CENTRAL 6.5X30MM [id number] - [id number] [employer]ware/[place] SCREW CENTRAL 6.5X30MM [id number]  [employer] 8004 [id number] Right 1 Implanted   SCREW PERIPHERAL 5.0X30MM [id number] - [id number] Metallic Hardware/[place] SCREW PERIPHERAL 5.0X30MM [id number]  [employer] 8004 [id number] Right 1 Implanted   SCREW PERIPHERAL 26MM - [id number] Metallic Hardware/[place] SCREW PERIPHERAL 26MM  [employer] 8004 [id number] Right 1 Implanted   SCREW PERIPHERAL 14MM [id number] - [id number] Metallic Hardware/[place] SCREW PERIPHERAL 14MM [id number]  [employer] 8004 [id number] Right 2 Implanted   GLEOSPHERE LATERALIZED STANDARD 36MM - [id number] Total Joint Component/Insert GLEOSPHERE LATERALIZED STANDARD 36MM [id number] [employer]  Right 1 Implanted   Humeral Fracture Stem, STD, Size 9S, L:130mm Total Joint  Component/Insert   [employer] [id number] Right 1 Implanted   INSERT HUM 36MM PRFRM THK+0MM .5 SHLDR STRL  [id number] - [id number] Total Joint Component/Insert INSERT HUM 36MM PRFRM THK+0MM .5 SHLDR STRL  [id number] [employer]  Right 1 Implanted       COMPLICATIONS: None     DISPOSITION: PACU    CONDITION: Stable    INDICATIONS: Kimberley Mullins is a 84 year old female with history of VSD, thyroid disease, osteopenia who presents with a right displaced proximal humerus fracture after a fall.  She was seen at [facility] emergency department on June 22.  She reports tripping over a aleksandr pool in her yard well taking her bird feeder in the house.  She fell on her right shoulder and hit a wooden deck.  Radiographs reveal a displaced proximal humerus fracture involving the surgical neck and extending into the greater tuberosity.  She subsequently followed up in clinic at [facility].  She was neurovascular intact.  Repeat imaging revealed further displacement of her fracture.  The patient is active and lives in her own home.    We discussed possible treatment options including nonoperative and operative intervention along with the risks, benefits, and recovery of each option. Given the radiographic and exam findings, the patient is an appropriately indicated candidate for a reverse shoulder arthroplasty.  We discussed alternative treatment including nonoperative care but based on her degree of displacement I recommended proceeding with reverse shoulder arthroplasty.  I reviewed including ORIF of the tuberosity as well as the biceps tenodesis and the procedure as well.  The risks, benefits and alternatives to the proposed procedure were discussed in detail.  The risks of bleeding, infection, nerve damage, nonunion, malunion, hardware failure, loosening, periprosthetic fracture, prosthetic joint infection, need for revision surgery, dislocation, loss of motion, stiffness, and the medical risks of anesthesia were discussed.  Benefits including improved chance of motion, earlier rehab and improved function were discussed.      Alternatives including nonoperative management were discussed, but not recommended.  The patient was in agreement with the plan to proceed.  The informed consent was signed and documented.  Met with the patient preoperatively to love the operative extremity.     PROCEDURE:  The patient was brought into the operating room and placed on operating table.  The patient underwent general anesthesia.  The patient was positioned in a beachchair position.  All bony prominences were well-padded.  The operative extremity was cleansed with Hibiclens and then alcohol.  The operative extremity was then prepped with ChloraPrep.  The surgical team scrubbed in.  A WHO timeout was conducted to verify correct patient, correct extremity, presence of the surgeon's initials on the operative extremity, and administration of IV antibiotics, in this case Ancef.       A deltopectoral approach to the proximal humerus was performed.   The skin was incised with a knife and subcutaneous tissue with electrocautery. We dissected down to the deltopectoral interval and entered this interval.  The cephalic vein was preserved. The deltoid was elevated off of the proximal humerus just distal to the shaft preserving its insertion.  The pectoralis insertion was released a couple millimeters.       The biceps tendon was intact.  There was significant inflammation around the tendon.  The long head of the biceps was tenodesed to the pectoralis tendon with a Ethibond suture.  The proximal aspect of the biceps tendon was then excised. The rotator cuff interval was then further incised up to the superior glenoid.     The patient's proximal humerus fracture was then identified. The fracture involved surgical neck with extension into the tuberosities.  A rongeur and osteotomes were utilized to free the lesser and greater tuberosity fragments. The articular  humeral head fragment was then removed. The fracture site was debrided of any loose bone fragments. The cancellous bone underneath the tuberosities.      The lesser tuberosity with the subscapularis tendon was displaced medially and tagged with #5 FiberWire.  The greater tuberosity was displaced posteriorly with the supraspinatus and infraspinatus tendons and was tagged with #5 FiberWire.  The patient was noted to have a U-shaped tear involving the supraspinatus and infraspinatus which was incorporated into the FiberWire and repaired at the end of the case.     Glenoid preparation: The glenohumeral ligament was then incised just posterior to the subscapularis to allow for visualization of the glenoid.  A Fukuda retractor was placed along the posterior glenoid and gently retracted the humeral shaft.  A Hohmann was placed superiorly and a Batman retractor was placed anteriorly.  The labrum was then removed with electrocautery.  Care was taken along the inferior glenoid to preserve the axillary nerve.  It was palpated and found to be intact.  A Jolley was used to remove cartilage from the glenoid.     A 10 degree inferior tilt guide was then placed along the inferior aspect of the glenoid.  A guidepin was placed through the center of the guide and the depth was measured.  A 25mm Perform glenoid baseplate with a 30 mm central screw was then placed.  4 peripheral screws were then placed.  A 36 glenosphere trial was placed and found to be good fit.  A 36 mm glenosphere implant was then placed.  Both the baseplate and glenosphere were tested and found to be stable.  Retractors were then removed.     Humerus preparation:   We then turned our attention to the humeral shaft.  The humeral shaft was reamed by hand up to a size 9.  A small trial stem was then placed and expanded to protect the humeral shaft.  A size 9 perform humeral fracture stem was then placed.  Trials were then utilized to find appropriate height.  A +0 mm  polyinsert were found to be stable and allow for full range of motion of the shoulder.  Final implants were then placed.  The humerus was reduced and found to be stable throughout all range of motion.  The implants and wound were irrigated throughout placement.     Proximal humerus ORIF.  The FiberWire sutures around the greater and lesser tuberosities were then passed through the humeral stem.  In addition a drill bit was used to pass an additional #5 FiberWire suture through the humeral shaft.  The sutures were utilized to reduce the lesser and greater tuberosity down to the humeral shaft and fix it in place.  All sutures were tied.  Both the greater and lesser tuberosity were found to have good reduction.     The wound was then copiously irrigated.  Dilute Betadine was utilized for 3 minutes in the wound.  The wound was again irrigated with Pulsavac lavage.  The deltopectoral interval was then approximated with 0 Vicryl.  A 2-0 Monocryl was used along the subcutaneous tissue.  A zip tie closure was then used on the skin followed by Dermabond.  A Tegaderm dressing was placed over the wound.  The patient was placed in a sling.     The patient awoke from anesthesia and was transferred to the PACU in stable condition.  All instruments were accounted for at the end of the case     All instruments were accounted for at the end of the case.        PLAN:  Postoperatively:   1.  Ancef x 24 hours.   2.  DVT prophylaxis: ASA 325mg daily  3.   PACU x-rays.   4.  NWB LLE, Sling at all times except hygiene/PT Begin pendulum and gentle ROM, No flexion >90, No ER >30, No behind the back.    5.  Physical therapy/occupational therapy.   6.  Tegaderm/Gauze dressing on for 2 weeks.  OK to shower.  No submerging wound.  7.  Osteoporosis workup and treatment with primary care provider within 2 months.   8.  Discharge:  Case management social work consult for placement     Post-op appointment to be seen in 2 weeks. Initial  post-operative appointments are with Avani Burrows PA-C    Please contact Dr. Ramin Schmidt's Care Team at [phone] or email us at [email or web address] to schedule an appointment.      Dr. Schmidt sees patients at 2 clinic locations:  [facility]  [address], [place] [address] [place] [address] [facility]   [address] , [address], [place] [address]      Please call the on-call phone number [phone] during evenings, nights and weekends for any urgent needs. Prescription refills must be done during business hours by calling [phone]    Ramin Schmidt MD  [facility]

## 2025-07-15 NOTE — ANESTHESIA PREPROCEDURE EVALUATION
Anesthesia Pre-Procedure Evaluation    Patient: Kimberley Mullins   MRN: [id number] : 1940          Procedure : Procedure(s):  Reverse total shoulder arthroplasty, open reduction internal fixation of greater tuberosity, biceps tenodesis         Past Medical History:   Diagnosis Date    Antiplatelet or antithrombotic long-term use     Arthritis     Congenital heart disease     Diverticulitis of colon (without mention of hemorrhage)(562.11)     diverticulosis noted on Flex Sig    Hypertension     Irritable bowel syndrome     Osteopenia, unspecified location 2018    Other ovarian failure     Overweight (BMI 25.0-29.9) 2018    Personal history of colonic polyps 2004    Status post total knee replacement 2011    Thyroid disease     Transient global amnesia     Unspecified tinnitus     Tinnitus      Past Surgical History:   Procedure Laterality Date    ARTHROPLASTY KNEE  10/25/2011    Procedure:ARTHROPLASTY KNEE; Right Total Knee Arthroplasty   ; Surgeon:BRYCE ATWOOD; Location:[facility] OR    ARTHROPLASTY KNEE  2014    Procedure: ARTHROPLASTY KNEE;  Surgeon: Bryce Atwood MD;  Location: [facility] OR    CV CORONARY ANGIOGRAM N/A 2022    Procedure: Coronary Angiogram; diagnostic;  Surgeon: Sreekanth Delong MD;  [facility]: [facility]    CV RIGHT HEART CATH MEASUREMENTS RECORDED N/A 2022    Procedure: Right Heart Cath;  Surgeon: Sreekanth Delong MD;  Location: [facility]    CV VSD CLOSURE N/A 2022    Procedure: CV VSD CLOSURE W/KATIE;  Surgeon: Daniel Cerda MD;  Location: [facility]    HC DILATION/CURETTAGE DIAG/THER NON OB  2006    D & C    HC KNEE SCOPE, DIAGNOSTIC      Arthroscopy, Knee    REPAIR VENTRICULAR SEPTAL DEFECT N/A 2022    Procedure: Median Sternotomy, cardiopulmonary bypass, Ischemic Ventricular Septal Defect repair, tranesophageal echocardiogram performed by anesthesia;   Surgeon: Aldair Whitney MD;  Location[facility]    [facility] AFF FOREARM/WRIST SURGERY UNLISTED      orif radius fx distal left    ZZC APPENDECTOMY  1955    Z OPEN JESSI FIXATN HUMERAL SHAFT FX  12/2007    Dr Massey      No Known Allergies   Social History     Tobacco Use    Smoking status: Never     Passive exposure: Never    Smokeless tobacco: Never   Substance Use Topics    Alcohol use: Yes     Alcohol/week: 5.8 standard drinks of alcohol     Types: 7 Standard drinks or equivalent per week     Comment: two to three glasses of wine q day.      Wt Readings from Last 1 Encounters:   07/15/25 63.6 kg (140 lb 3.2 oz)        Anesthesia Evaluation   Pt has had prior anesthetic.     No history of anesthetic complications       ROS/MED HX  ENT/Pulmonary:    (-) sleep apnea   Neurologic:    (-) no CVA   Cardiovascular:     (+)  hypertension- -   -  - -   Taking blood thinners                           congenital heart disease VSD.    (-) CAD   METS/Exercise Tolerance:     Hematologic:     (+) History of blood clots,               Musculoskeletal:       GI/Hepatic:    (-) GERD   Renal/Genitourinary:       Endo:     (+)          thyroid problem,         (-) Type II DM   Psychiatric/Substance Use:       Infectious Disease:       Malignancy:       Other:              Physical Exam  Airway  Mallampati: II  TM distance: >3 FB  Neck ROM: full  Mouth opening: >= 4 cm    Cardiovascular - normal exam   Dental   (+) Modest Abnormalities - crowns, retainers, 1 or 2 missing teeth      Pulmonary - normal exam      Neurological - normal exam  She appears awake, alert and oriented x3.    Other Findings       OUTSIDE LABS:  CBC:   Lab Results   Component Value Date    WBC 6.4 07/02/2025    WBC 5.0 07/12/2024    HGB 12.2 07/02/2025    HGB 12.6 07/12/2024    HCT 38.2 07/02/2025    HCT 36.8 07/12/2024     07/02/2025     07/12/2024     BMP:   Lab Results   Component Value Date    .2 (A) 07/02/2025    .5 06/03/2025     "POTASSIUM 4.33 07/02/2025    POTASSIUM 4.73 06/03/2025    CHLORIDE 98.3 07/02/2025    CHLORIDE 99.9 06/03/2025    CO2 28.6 07/02/2025    CO2 28.0 06/03/2025    BUN 8 07/02/2025    BUN 14 07/02/2025    CR 0.61 07/02/2025    CR 0.71 06/03/2025     (A) 07/02/2025    GLC 86 06/03/2025     COAGS:   Lab Results   Component Value Date    PTT 62 (H) 02/04/2022    INR 2.19 (H) 07/05/2022    FIBR 297 02/04/2022     POC: No results found for: \"BGM\", \"HCG\", \"HCGS\"  HEPATIC:   Lab Results   Component Value Date    ALBUMIN 3.9 06/03/2025    PROTTOTAL 6.3 06/03/2025    ALT 27 02/05/2022     (H) 02/05/2022    ALKPHOS 68 06/03/2025    BILITOTAL 0.8 06/03/2025    BILIDIRECT 0.1 01/08/2010     OTHER:   Lab Results   Component Value Date    PH 7.40 02/05/2022    LACT 1.1 02/05/2022    A1C  02/06/2022      Comment:        Unable to calculate % HBA1C due to low HGB and/or Low HGBA1C.  Normal <5.7%   Prediabetes 5.7-6.4%    Diabetes 6.5% or higher     Note: Adopted from ADA consensus guidelines.    ANANDA 9.2 07/02/2025    PHOS 2.3 (L) 02/09/2022    MAG 1.7 (L) 02/09/2022    TSH 3.57 06/03/2025    T4 1.45 01/28/2022    CRP 0.02 06/10/2013    SED 18 01/10/2018       Anesthesia Plan    ASA Status:  3      NPO Status: NPO Appropriate   Anesthesia Type: General.  Airway: oral.  Induction: intravenous.  Maintenance: Balanced.   Techniques and Equipment:       - Monitoring Plan: standard ASA monitoring     Consents    Anesthesia Plan(s) and associated risks, benefits, and realistic alternatives discussed. Questions answered and patient/representative(s) expressed understanding.     - Discussed: anesthesiologist     - Discussed with:  Patient        - Pt is DNR/DNI Status: no DNR          Postoperative Care    Pain management: peripheral nerve block, multimodal analgesia.     Comments:                   Denise Lee MD, MD    I have reviewed the pertinent notes and labs in the chart from the past 30 days and (re)examined the " patient.  Any updates or changes from those notes are reflected in this note.    Clinically Significant Risk Factors Present on Admission                 # Drug Induced Platelet Defect: home medication list includes an antiplatelet medication   # Hypertension: Noted on problem list

## 2025-07-15 NOTE — ANESTHESIA PROCEDURE NOTES
Airway       Patient location during procedure: OR       Procedure Start/Stop Times: 7/15/2025 7:36 AM  Staff -        CRNA: Shoshana Busch APRN CRNA       Performed By: CRNA  Consent for Airway        Urgency: elective  Indications and Patient Condition       Indications for airway management: vimal-procedural       Induction type:intravenous       Mask difficulty assessment: 1 - vent by mask    Final Airway Details       Final airway type: endotracheal airway       Successful airway: ETT - single  Endotracheal Airway Details        ETT size (mm): 7.0       Cuffed: yes       Cuff volume (mL): 5       Successful intubation technique: direct laryngoscopy       DL Blade Type: [place] 2       Grade View of Cords: 1       Adjucts: stylet       Position: Right       Measured from: lips       Secured at (cm): 21       Bite block used: None    Post intubation assessment        Placement verified by: capnometry, equal breath sounds and chest rise        Number of attempts at approach: 1       Number of other approaches attempted: 0       Secured with: tape       Ease of procedure: easy       Dentition: Intact and Unchanged    Medication(s) Administered   Medication Administration Time: 7/15/2025 7:36 AM

## 2025-07-15 NOTE — PLAN OF CARE
"Care from [id number]    Pt is A/O x4, up with SBA. POD0 from right reverse total shoulder and ORIF of greater tuberosity by Dr. Schmidt. Block in right arm still effective. Numbness present, +2 pulses, mod strength. NWB RUE, in a sling. Incision is CDI. Ambulated hallway x1. LR at 50. Ancef. Voided x2. Tolerating regular diet. VSS, RA. Plan to discharge home tomorrow.     Goal Outcome Evaluation:      Plan of Care Reviewed With: patient    Overall Patient Progress: improving  Overall Patient Progress: improving    Outcome Evaluation: Admit from PACU today at 1030      Problem: Adult Inpatient Plan of Care  Goal: Plan of Care Review  Description: The Plan of Care Review/Shift note should be completed every shift.  The Outcome Evaluation is a brief statement about your assessment that the patient is improving, declining, or no change.  This information will be displayed automatically on your shift  note.  Outcome: Progressing  Flowsheets (Taken 7/15/2025 1636)  Outcome Evaluation: Admit from PACU today at 1030  Plan of Care Reviewed With: patient  Overall Patient Progress: improving  Goal: Patient-Specific Goal (Individualized)  Description: You can add care plan individualizations to a care plan. Examples of Individualization might be:  \"Parent requests to be called daily at 9am for status\", \"I have a hard time hearing out of my right ear\", or \"Do not touch me to wake me up as it startles  me\".  Outcome: Progressing  Goal: Absence of Hospital-Acquired Illness or Injury  Outcome: Progressing  Intervention: Identify and Manage Fall Risk  Recent Flowsheet Documentation  Taken 7/15/2025 1100 by Joanan Toribio, RN  Safety Promotion/Fall Prevention:   activity supervised   assistive device/personal items within reach   increase visualization of patient   clutter free environment maintained   room near nurse's station   room organization consistent   treat reversible contributory factors   safety round/check completed   " treat underlying cause  Intervention: Prevent and Manage VTE (Venous Thromboembolism) Risk  Recent Flowsheet Documentation  Taken 7/15/2025 1100 by Joanna Toribio RN  VTE Prevention/Management: SCDs on (sequential compression devices)  Goal: Optimal Comfort and Wellbeing  Outcome: Progressing  Goal: Readiness for Transition of Care  Outcome: Progressing  Intervention: Mutually Develop Transition Plan  Recent Flowsheet Documentation  Taken 7/15/2025 1000 by Joanna Toribio RN  Equipment Currently Used at Home:   cane, straight   crutches   grab bar, tub/shower   shower chair   walker, rolling   walker, standard     Problem: Pain Acute  Goal: Optimal Pain Control and Function  Outcome: Progressing  Intervention: Prevent or Manage Pain  Recent Flowsheet Documentation  Taken 7/15/2025 1100 by Joanna Toribio RN  Medication Review/Management: medications reviewed     Problem: Surgery Nonspecified  Goal: Absence of Bleeding  Outcome: Progressing  Goal: Effective Bowel Elimination  Outcome: Progressing  Goal: Fluid and Electrolyte Balance  Outcome: Progressing  Goal: Blood Glucose Level Within Targeted Range  Outcome: Progressing  Goal: Absence of Infection Signs and Symptoms  Outcome: Progressing  Goal: Anesthesia/Sedation Recovery  Outcome: Progressing  Intervention: Optimize Anesthesia Recovery  Recent Flowsheet Documentation  Taken 7/15/2025 1100 by Joanna Toribio RN  Safety Promotion/Fall Prevention:   activity supervised   assistive device/personal items within reach   increase visualization of patient   clutter free environment maintained   room near nurse's station   room organization consistent   treat reversible contributory factors   safety round/check completed   treat underlying cause  Goal: Optimal Pain Control and Function  Outcome: Progressing  Goal: Nausea and Vomiting Relief  Outcome: Progressing  Goal: Effective Urinary Elimination  Outcome: Progressing  Goal: Effective Oxygenation and  Ventilation  Outcome: Progressing  Intervention: Optimize Oxygenation and Ventilation  Recent Flowsheet Documentation  Taken 7/15/2025 1331 by Joanna Toribio, RN  Activity Management:   ambulated to bathroom   ambulated outside room  Taken 7/15/2025 1100 by Joanna Toribio, RN  Cough And Deep Breathing: done independently per patient  Activity Management: standing at bedside

## 2025-07-16 ENCOUNTER — APPOINTMENT (OUTPATIENT)
Dept: PHYSICAL THERAPY | Facility: CLINIC | Age: 85
End: 2025-07-16
Payer: COMMERCIAL

## 2025-07-16 ENCOUNTER — APPOINTMENT (OUTPATIENT)
Dept: OCCUPATIONAL THERAPY | Facility: CLINIC | Age: 85
End: 2025-07-16
Attending: STUDENT IN AN ORGANIZED HEALTH CARE EDUCATION/TRAINING PROGRAM
Payer: COMMERCIAL

## 2025-07-16 LAB
GLUCOSE BLDC GLUCOMTR-MCNC: 95 MG/DL (ref 70–99)
HGB BLD-MCNC: 10.1 G/DL (ref 11.7–15.7)
MCV RBC AUTO: 96 FL (ref 78–100)

## 2025-07-16 PROCEDURE — 97535 SELF CARE MNGMENT TRAINING: CPT | Mod: GO | Performed by: REHABILITATION PRACTITIONER

## 2025-07-16 PROCEDURE — 85018 HEMOGLOBIN: CPT

## 2025-07-16 PROCEDURE — 97161 PT EVAL LOW COMPLEX 20 MIN: CPT | Mod: GP | Performed by: PHYSICAL THERAPIST

## 2025-07-16 PROCEDURE — 82962 GLUCOSE BLOOD TEST: CPT

## 2025-07-16 PROCEDURE — 36415 COLL VENOUS BLD VENIPUNCTURE: CPT

## 2025-07-16 PROCEDURE — 97165 OT EVAL LOW COMPLEX 30 MIN: CPT | Mod: GO | Performed by: REHABILITATION PRACTITIONER

## 2025-07-16 PROCEDURE — 97110 THERAPEUTIC EXERCISES: CPT | Mod: GO | Performed by: REHABILITATION PRACTITIONER

## 2025-07-16 PROCEDURE — 250N000011 HC RX IP 250 OP 636

## 2025-07-16 PROCEDURE — 97116 GAIT TRAINING THERAPY: CPT | Mod: GP | Performed by: PHYSICAL THERAPIST

## 2025-07-16 PROCEDURE — 250N000013 HC RX MED GY IP 250 OP 250 PS 637

## 2025-07-16 RX ADMIN — ACETAMINOPHEN 975 MG: 325 TABLET ORAL at 20:46

## 2025-07-16 RX ADMIN — RIVAROXABAN 10 MG: 10 TABLET, FILM COATED ORAL at 16:13

## 2025-07-16 RX ADMIN — METHOCARBAMOL 250 MG: 500 TABLET ORAL at 22:13

## 2025-07-16 RX ADMIN — CEFAZOLIN 1 G: 1 INJECTION, POWDER, FOR SOLUTION INTRAMUSCULAR; INTRAVENOUS at 00:13

## 2025-07-16 RX ADMIN — OXYCODONE HYDROCHLORIDE 5 MG: 5 TABLET ORAL at 00:56

## 2025-07-16 RX ADMIN — HYDROMORPHONE HYDROCHLORIDE 0.4 MG: 0.2 INJECTION, SOLUTION INTRAMUSCULAR; INTRAVENOUS; SUBCUTANEOUS at 07:39

## 2025-07-16 RX ADMIN — SENNOSIDES AND DOCUSATE SODIUM 1 TABLET: 8.6; 5 TABLET ORAL at 20:46

## 2025-07-16 RX ADMIN — ACETAMINOPHEN 975 MG: 325 TABLET ORAL at 04:25

## 2025-07-16 RX ADMIN — METOPROLOL TARTRATE 12.5 MG: 25 TABLET, FILM COATED ORAL at 07:56

## 2025-07-16 RX ADMIN — SENNOSIDES AND DOCUSATE SODIUM 1 TABLET: 8.6; 5 TABLET ORAL at 07:47

## 2025-07-16 RX ADMIN — METHOCARBAMOL 250 MG: 500 TABLET ORAL at 16:13

## 2025-07-16 RX ADMIN — CITALOPRAM HYDROBROMIDE 10 MG: 10 TABLET ORAL at 07:47

## 2025-07-16 RX ADMIN — METHOCARBAMOL 250 MG: 500 TABLET ORAL at 09:38

## 2025-07-16 RX ADMIN — OXYCODONE HYDROCHLORIDE 10 MG: 10 TABLET ORAL at 06:36

## 2025-07-16 RX ADMIN — OXYCODONE HYDROCHLORIDE 5 MG: 5 TABLET ORAL at 13:11

## 2025-07-16 RX ADMIN — POLYETHYLENE GLYCOL 3350 17 G: 17 POWDER, FOR SOLUTION ORAL at 07:47

## 2025-07-16 RX ADMIN — LEVOTHYROXINE SODIUM 50 MCG: 0.03 TABLET ORAL at 07:47

## 2025-07-16 RX ADMIN — AMLODIPINE BESYLATE 10 MG: 10 TABLET ORAL at 07:47

## 2025-07-16 RX ADMIN — ACETAMINOPHEN 975 MG: 325 TABLET ORAL at 13:11

## 2025-07-16 RX ADMIN — OXYCODONE HYDROCHLORIDE 10 MG: 10 TABLET ORAL at 21:33

## 2025-07-16 RX ADMIN — METOPROLOL TARTRATE 12.5 MG: 25 TABLET, FILM COATED ORAL at 20:46

## 2025-07-16 RX ADMIN — OXYCODONE HYDROCHLORIDE 5 MG: 5 TABLET ORAL at 17:33

## 2025-07-16 RX ADMIN — HYDROMORPHONE HYDROCHLORIDE 0.4 MG: 0.2 INJECTION, SOLUTION INTRAMUSCULAR; INTRAVENOUS; SUBCUTANEOUS at 04:25

## 2025-07-16 RX ADMIN — HYDROMORPHONE HYDROCHLORIDE 0.4 MG: 0.2 INJECTION, SOLUTION INTRAMUSCULAR; INTRAVENOUS; SUBCUTANEOUS at 09:59

## 2025-07-16 RX ADMIN — METHOCARBAMOL 250 MG: 500 TABLET ORAL at 00:56

## 2025-07-16 ASSESSMENT — ACTIVITIES OF DAILY LIVING (ADL)
ADLS_ACUITY_SCORE: 34
ADLS_ACUITY_SCORE: 42
ADLS_ACUITY_SCORE: 34
IADL_COMMENTS: FAMILY TO COMPLETE AS NEEDED
ADLS_ACUITY_SCORE: 42
ADLS_ACUITY_SCORE: 34
ADLS_ACUITY_SCORE: 42
ADLS_ACUITY_SCORE: 42
ADLS_ACUITY_SCORE: 34
ADLS_ACUITY_SCORE: 42
ADLS_ACUITY_SCORE: 34
ADLS_ACUITY_SCORE: 34
ADLS_ACUITY_SCORE: 42
ADLS_ACUITY_SCORE: 42
ADLS_ACUITY_SCORE: 34
ADLS_ACUITY_SCORE: 42

## 2025-07-16 NOTE — PLAN OF CARE
"Goal Outcome Evaluation:      Plan of Care Reviewed With: patient      Blood pressure 134/65, pulse 81, temperature 97.6  F (36.4  C), temperature source Oral, resp. rate 20, height 1.6 m (5' 2.99\"), weight 63.6 kg (140 lb 3.2 oz), SpO2 94%, not currently breastfeeding.   Problem: Adult Inpatient Plan of Care  Goal: Plan of Care Review  Description: The Plan of Care Review/Shift note should be completed every shift.  The Outcome Evaluation is a brief statement about your assessment that the patient is improving, declining, or no change.  This information will be displayed automatically on your shift  note.  Outcome: Not Progressing  Flowsheets (Taken 7/16/2025 1358)  Outcome Evaluation: Pt A&O, 1 asssist for mobility, having a lot of pain in the morning PRN IV Dilauded were given, now pain is better control with oral oxycoden, robaxin, tylenol, voided adequately, ate 100% of her meals, family present in the room, sling on right sholder/arm.  Plan of Care Reviewed With: patient  Goal: Patient-Specific Goal (Individualized)  Description: You can add care plan individualizations to a care plan. Examples of Individualization might be:  \"Parent requests to be called daily at 9am for status\", \"I have a hard time hearing out of my right ear\", or \"Do not touch me to wake me up as it startles  me\".  Outcome: Not Progressing  Goal: Absence of Hospital-Acquired Illness or Injury  Outcome: Not Progressing  Intervention: Identify and Manage Fall Risk  Recent Flowsheet Documentation  Taken 7/16/2025 1100 by Bernadine Martinez RN  Safety Promotion/Fall Prevention: activity supervised  Intervention: Prevent Skin Injury  Recent Flowsheet Documentation  Taken 7/16/2025 0959 by Bernadine Martinez RN  Body Position: position changed independently  Taken 7/16/2025 0751 by Bernadine Martinez RN  Body Position: position changed independently  Intervention: Prevent and Manage VTE (Venous Thromboembolism) Risk  Recent Flowsheet " Documentation  Taken 7/16/2025 1100 by Bernadine Martinez RN  VTE Prevention/Management: SCDs on (sequential compression devices)  Intervention: Prevent Infection  Recent Flowsheet Documentation  Taken 7/16/2025 1100 by Bernadine Martinez RN  Infection Prevention: rest/sleep promoted  Goal: Optimal Comfort and Wellbeing  Outcome: Not Progressing  Intervention: Monitor Pain and Promote Comfort  Recent Flowsheet Documentation  Taken 7/16/2025 1357 by Bernadine Martinez RN  Pain Management Interventions: medication (see MAR)  Taken 7/16/2025 1311 by Bernadine Martinez RN  Pain Management Interventions: medication (see MAR)  Taken 7/16/2025 1011 by Bernadine Martinez RN  Pain Management Interventions: medication (see MAR)  Taken 7/16/2025 0959 by Bernadine Martinez RN  Pain Management Interventions: medication (see MAR)  Taken 7/16/2025 0751 by Bernadine Martinez RN  Pain Management Interventions: medication (see MAR)  Taken 7/16/2025 0739 by Bernadine Martinez RN  Pain Management Interventions: medication (see MAR)  Goal: Readiness for Transition of Care  Outcome: Not Progressing     Problem: Pain Acute  Goal: Optimal Pain Control and Function  Outcome: Not Progressing  Intervention: Develop Pain Management Plan  Recent Flowsheet Documentation  Taken 7/16/2025 1357 by Bernadine Martinez RN  Pain Management Interventions: medication (see MAR)  Taken 7/16/2025 1311 by Bernadine Martinez RN  Pain Management Interventions: medication (see MAR)  Taken 7/16/2025 1011 by Bernadine Martinez RN  Pain Management Interventions: medication (see MAR)  Taken 7/16/2025 0959 by Bernadine Martinez RN  Pain Management Interventions: medication (see MAR)  Taken 7/16/2025 0751 by Bernadine Martinez RN  Pain Management Interventions: medication (see MAR)  Taken 7/16/2025 0739 by Bernadine Martinez RN  Pain Management Interventions: medication (see MAR)  Intervention: Prevent or Manage Pain  Recent Flowsheet  Documentation  Taken 7/16/2025 1100 by Bernadine Martinez RN  Medication Review/Management: medications reviewed     Problem: Surgery Nonspecified  Goal: Absence of Bleeding  Outcome: Not Progressing  Goal: Effective Bowel Elimination  Outcome: Not Progressing  Goal: Fluid and Electrolyte Balance  Outcome: Not Progressing  Goal: Blood Glucose Level Within Targeted Range  Outcome: Not Progressing  Goal: Absence of Infection Signs and Symptoms  Outcome: Not Progressing  Goal: Anesthesia/Sedation Recovery  Outcome: Not Progressing  Intervention: Optimize Anesthesia Recovery  Recent Flowsheet Documentation  Taken 7/16/2025 1100 by Bernadine Martinez RN  Safety Promotion/Fall Prevention: activity supervised  Goal: Optimal Pain Control and Function  Outcome: Not Progressing  Intervention: Prevent or Manage Pain  Recent Flowsheet Documentation  Taken 7/16/2025 1357 by Bernadine Martinez RN  Pain Management Interventions: medication (see MAR)  Taken 7/16/2025 1311 by Bernadine Martinez RN  Pain Management Interventions: medication (see MAR)  Taken 7/16/2025 1011 by Bernadine Martinez RN  Pain Management Interventions: medication (see MAR)  Taken 7/16/2025 0959 by Bernadine Martinez RN  Pain Management Interventions: medication (see MAR)  Taken 7/16/2025 0751 by Bernadine Martinez RN  Pain Management Interventions: medication (see MAR)  Taken 7/16/2025 0739 by Bernadine Martinez RN  Pain Management Interventions: medication (see MAR)  Goal: Nausea and Vomiting Relief  Outcome: Not Progressing  Goal: Effective Urinary Elimination  Outcome: Not Progressing  Goal: Effective Oxygenation and Ventilation  Outcome: Not Progressing  Intervention: Optimize Oxygenation and Ventilation  Recent Flowsheet Documentation  Taken 7/16/2025 1100 by Bernadine Martinez RN  Cough And Deep Breathing: done independently per patient  Activity Management:   activity adjusted per tolerance   ambulated to bathroom   back to bed  Taken  7/16/2025 0959 by Bernadine Martinez, RN  Activity Management:   activity adjusted per tolerance   ambulated to bathroom   back to bed  Head of Bed (HOB) Positioning: HOB at 30-45 degrees  Taken 7/16/2025 0751 by Bernadine Martinez, RN  Activity Management:   activity adjusted per tolerance   ambulated to bathroom   back to bed  Head of Bed (HOB) Positioning: HOB at 30-45 degrees       Outcome Evaluation: Pt A&O, 1 asssist for mobility, having a lot of pain in the morning PRN IV Dilauded were given, now pain is better control with oral oxycoden, robaxin, tylenol, voided adequately, ate 100% of her meals, family present in the room, sling on right sholder/arm.

## 2025-07-16 NOTE — PROGRESS NOTES
[facility]                                                                                   OUTPATIENT OCCUPATIONAL THERAPY    PLAN OF TREATMENT FOR OUTPATIENT REHABILITATION   Patient's Last Name, First Name, Kimberley Basurto YOB: 1940   Provider's Name   [facility]   Medical Record No.  [id number]     Onset Date: 07/15/25 Start of Care Date: 07/16/25     Medical Diagnosis:  R reverse TSA               OT Diagnosis:  decreased ADL/IADLs Certification Dates:  From: 07/16/25  To: 07/18/25     See note for plan of treatment, functional goals, and certification details.    I CERTIFY THE NEED FOR THESE SERVICES FURNISHED UNDER        THIS PLAN OF TREATMENT AND WHILE UNDER MY CARE (Physician co-signature of this document indicates review and certification of the therapy plan).                           07/16/25 0940   Appointment Info   Signing Clinician's Name / Credentials (OT) NICHO Daly/L   Quick Adds   Quick Adds Certification   Living Environment   People in Home grandchild(gabriela)  (grandtr and her SO and 3 small great grandchildren (ages 5, 3,1 ))   Current Living Arrangements house   Number of Stairs, Within Home, Primary four   Stair Railings, Within Home, Primary railings on both sides of stairs   Living Environment Comments al needs on main level, walk in shower and elevated toilet   Self-Care   Usual Activity Tolerance good   Current Activity Tolerance fair   Regular Exercise Yes   Activity/Exercise Type strength training   Exercise Amount/Frequency 2 times/wk   Equipment Currently Used at Home grab bar, tub/shower;shower chair;raised toilet seat   Fall history within last six months yes   Number of times patient has fallen within last six months   (multiple per dtr)   Activity/Exercise/Self-Care Comment patient was indp with ADLs/IADls prior to admission, ambulated indp without device   Instrumental Activities of Daily  Living (IADL)   IADL Comments family to complete as needed   General Information   Onset of Illness/Injury or Date of Surgery 07/15/25   Referring Physician Avani Burrows PA-C   Patient/Family Therapy Goal Statement (OT) to return home later today   Additional Occupational Profile Info/Pertinent History of Current Problem Patient is POD#1 for R reverse TSA, after humerus fracture from a fall at home   Existing Precautions/Restrictions fall;shoulder   Right Upper Extremity (Weight-bearing Status) non weight-bearing (NWB)   General Observations and Info patient was in bed and agreeable to OT session   Cognitive Status Examination   Orientation Status orientation to person, place and time   Visual Perception   Visual Impairment/Limitations WFL   Sensory   Sensory Quick Adds sensation intact   Pain Assessment   Patient Currently in Pain Yes, see Vital Sign flowsheet  (rating pain 3/10)   Posture   Posture not impaired   Range of Motion Comprehensive   Comment, General Range of Motion decreased ROM in R UE to be expected   Strength Comprehensive (MMT)   Comment, General Manual Muscle Testing (MMT) Assessment decreased strength in R UE to be expected   Muscle Tone Assessment   Muscle Tone Quick Adds No deficits were identified   Coordination   Upper Extremity Coordination No deficits were identified   Bed Mobility   Comment (Bed Mobility) Min A bed mobility   Transfers   Transfers sit-stand transfer;toilet transfer   Sit-Stand Transfer   Sit-Stand [place] (Transfers) contact guard;verbal cues   Toilet Transfer   Type (Toilet Transfer) stand-sit   [place] Level (Toilet Transfer) contact guard;verbal cues   Assistive Device (Toilet Transfer) grab bars/safety frame   Balance   Balance Comments decreased balance to be expected, no LOB noted, howevere needs heaby hand hold A for safe mobility in halls. With multiple falls and general unsteadiness at baseline per dtr report, recommend IP PT eval   Activities of  Daily Living   BADL Assessment/Intervention upper body dressing;lower body dressing   Upper Body Dressing Assessment/Training   [place] Level (Upper Body Dressing) maximum assist (25% patient effort)   Lower Body Dressing Assessment/Training   [place] Level (Lower Body Dressing) moderate assist (50% patient effort)   Comment, (Lower Body Dressing) clincial judegment   Clinical Impression   Criteria for Skilled Therapeutic Interventions Met (OT) Yes, treatment indicated   OT Diagnosis decreased ADL/IADLs   OT Problem List-Impairments impacting ADL activity tolerance impaired;balance;range of motion (ROM);sensation;strength;pain;post-surgical precautions   Assessment of Occupational Performance 5 or more Performance Deficits   Identified Performance Deficits dsg, toileting, bathing, functional/community mobility, household chores, driving, errands   Planned Therapy Interventions (OT) ADL retraining;ROM;home program guidelines;progressive activity/exercise;transfer training   Clinical Decision Making Complexity (OT) problem focused assessment/low complexity   Risk & Benefits of therapy have been explained evaluation/treatment results reviewed;care plan/treatment goals reviewed;risks/benefits reviewed;participants voiced agreement with care plan;participants included;patient   OT Total Evaluation Time   OT Eval, Low Complexity Minutes ([id number]) 8   Therapy Certification   Start of Care Date 07/16/25   Certification date from 07/16/25   Certification date to 07/18/25   Medical Diagnosis R reverse TSA   OT Goals   Therapy Frequency (OT) One time eval and treatment   OT Goals Upper Body Dressing;Lower Body Dressing;Transfers;Toilet Transfer/Toileting;OT Goal 1;OT Goal 2;OT Goal 3   OT: Upper Body Dressing including orthotic;within precautions;Goal Met;Moderate assist   OT: Lower Body Dressing Minimal assist;within precautions;Goal Met   OT: Transfer Minimal assist;within precautions;Goal Met   OT: Toilet  Transfer/Toileting toilet transfer;using adaptive equipment;within precautions;Goal Met;Minimal assist   OT: Goal 1 Patient to be indp with exercise following shoulder surgery. goal met   OT Discharge Planning   OT Plan dressing   OT Rationale for DC Rec Patient has not met needed goals for safe dc home. Pt is limited by pain, decreased activity tolerance whiich affects safe mobility and ability to complete basic ADls. pt requiring iv pain meds, will continue with IP OT.   OT Brief overview of current status Patient  Ax1 for functional mobility, transfers and basic ADLs, limited by pain   OT Total Distance Amb During Session (feet) 200   Total Session Time   Total Session Time (sum of timed and untimed services) 8

## 2025-07-16 NOTE — PROGRESS NOTES
[facility]                                                                                   OUTPATIENT PHYSICAL THERAPY    PLAN OF TREATMENT FOR OUTPATIENT REHABILITATION   Patient's Last Name, First Name, Kimberley Basurto YOB: 1940   Provider's Name   [facility]   Medical Record No.  [id number]     Onset Date: 07/15/25 Start of Care Date:  07/16/25       Medical Diagnosis:   R reverse TSA               PT Diagnosis:  decreased functional mobility Certification Dates:  From: 07/16/25      To: 07/16/25           See note for plan of treatment, functional goals, and certification details.    I CERTIFY THE NEED FOR THESE SERVICES FURNISHED UNDER        THIS PLAN OF TREATMENT AND WHILE UNDER MY CARE (Physician co-signature of this document indicates review and certification of the therapy plan).               07/16/25 0538   Appointment Info   Signing Clinician's Name / Credentials (PT) DULCE Fuentes   Student Supervision Direct supervision provided;Therapy services provided with the co-signing licensed therapist guiding and directing the services, and providing the skilled judgement and assessment throughout the session   Rehab Comments (PT) NWVALENTIN RUE   Living Environment   People in Home grandchild(gabriela)   Current Living Arrangements house   Number of Stairs, Within Home, Primary four   Stair Railings, Within Home, Primary railings on both sides of stairs   Transportation Anticipated family or friend will provide   Living Environment Comments Pt lives in house with grandtalha, SO, and 3 great grandchildren. All needs met on main floor, 4 RAMO from garage. No concerns with mobility.   Self-Care   Usual Activity Tolerance good   Current Activity Tolerance moderate   Equipment Currently Used at Home grab bar, tub/shower;shower chair;raised toilet seat   Fall history within last six months yes   Number of times patient has fallen within  last six months 3   Activity/Exercise/Self-Care Comment IND prior to hospitalization, has SPC at home does not use for mobility.   General Information   Onset of Illness/Injury or Date of Surgery 07/15/25   Referring Physician Avani Burrows PA-C   Pertinent History of Current Problem (include personal factors and/or comorbidities that impact the POC) Per H&P pt is a 84 year old female POD1 R RTSA.   Weight-Bearing Status - RUE nonweight-bearing   Cognition   Affect/Mental Status (Cognition) WFL   Orientation Status (Cognition) oriented x 4   Follows Commands (Cognition) WFL   Pain Assessment   Patient Currently in Pain   (pain in R shoulder)   Range of Motion (ROM)   Range of Motion ROM deficits secondary to surgical procedure   Strength (Manual Muscle Testing)   Strength (Manual Muscle Testing) strength is WFL   Strength Comments Demonstrates good LE functional strength with bed mobility, transfers and gait. Deficits in R UE due to surgical procedure.   Bed Mobility   Comment, (Bed Mobility) IND supine<>sit   Transfers   Comment, (Transfers) SBA sit<>stand with QC   Gait/Stairs (Locomotion)   Distance in Feet (Gait) 20   Pattern (Gait) step-through   Comment, (Gait/Stairs) CGA and QC; demonstrates increased gait speed, gaze downard; SBA and unilateral railing for stairs   Balance   Balance Comments No deficits with seated balance EOB. Demonstrates slight gait path deviation, gaze downward and x2 slight LOB, IND regained balance.   Clinical Impression   Criteria for Skilled Therapeutic Intervention Yes, treatment indicated   PT Diagnosis (PT) decreased functional mobility   Influenced by the following impairments impaired strength, ROM, pain, balance   Functional limitations due to impairments impaired transfers, gait, stairs   Clinical Presentation (PT Evaluation Complexity) stable   Clinical Presentation Rationale clinical judgement   Clinical Decision Making (Complexity) low complexity   Planned Therapy  Interventions (PT) stair training;strengthening;patient/family education;progressive activity/exercise;transfer training;balance training;gait training;neuromuscular re-education   Risk & Benefits of therapy have been explained evaluation/treatment results reviewed;care plan/treatment goals reviewed;risks/benefits reviewed;current/potential barriers reviewed;participants voiced agreement with care plan;participants included;patient;daughter   PT Total Evaluation Time   PT Eval, Low Complexity Minutes (32559) 7   Physical Therapy Goals   PT Frequency One time eval and treatment only   PT Predicted Duration/Target Date for Goal Attainment 07/17/25   PT Goals Transfers;Gait;Stairs   PT: Transfers Modified independent;Sit to/from stand   PT: Gait Modified independent;Straight cane;Greater than 200 feet   PT: Stairs Modified independent;4 stairs;Assistive device;Rail on both sides   Interventions   Interventions Quick Adds Gait Training;Therapeutic Activity   Therapeutic Activity   Therapeutic Activities: dynamic activities to improve functional performance Minutes (88250) 4   Treatment Detail/Skilled Intervention Educated pt and daughter about use of SPC for all mobility. Pt initially resistant to use of SPC as she mentions it seems like a lot of work. Answered daughter's questions about when to use SPC and benefits of use for increased stability and reduce risk of recurrent falls. Further education about use of walls vs. SPC for mobility in home, educated on need for AD if wall surfacing at home. Pt and daughter agreeable to use of SPC for all mobility.   Gait Training   Gait Training Minutes (63953) 9   Treatment Detail/Skilled Intervention Instructed pt on use of QC with ambulation. Height adjustment to QC prior to ambulation. Pt ambulates 120 ft CGA and QC, demonstrating increased gait speed with slight gait path deviation. VCs on placement of AD when walking, pt demonstrates not using AD for majority of walk even  with reinforcement. Pt progresses to SBA and then Yinka and SPC for 210 ft, mentions SPC feeling a lot better. Reinforcement on use of SPC t/o ambulation, pt responds better to use of SPC. Demonstrates x2 LOB to R, pt able to regain balance IND with SPC. Instructed pt on stair navigation with unilateral railing, pt ascends/descends 10 stairs SBA and unilateral railing on L, progressing to Yinka with railing.   Distance in Feet 430   PT Discharge Planning   PT Plan Discharge   PT Discharge Recommendation (DC Rec) other (see comments)  (defer to ortho)   PT Rationale for DC Rec Pt presents below baseline functional mobility. Pt has had multiple falls in the past and is NWB in R UE currently. Pt lives in a supportive house with all needs met on 1 level and 4 RAMO. Recommend use of SPC for all mobility at this time. Defer to ortho for DC rec.   PT Brief overview of current status Yinka with SPC   PT Total Distance Amb During Session (feet) 450   Physical Therapy Time and Intention   Timed Code Treatment Minutes 13   Total Session Time (sum of timed and untimed services) 20

## 2025-07-16 NOTE — PLAN OF CARE
"Pt alert and orientedx4, SBA, VSS. RA.  voiding well. denied nausea/vomiting. Pain managed with scheduled Tylenol, PRN Dilaudid, Oxycodone, Robaxin. And icepacks, surgical site dressing is CDI no drainage. Sling in place.   Problem: Adult Inpatient Plan of Care  Goal: Plan of Care Review  Description: The Plan of Care Review/Shift note should be completed every shift.  The Outcome Evaluation is a brief statement about your assessment that the patient is improving, declining, or no change.  This information will be displayed automatically on your shift  note.  Outcome: Progressing  Flowsheets (Taken 7/16/2025 0536)  Outcome Evaluation: POD1 pain managed with Scheduled Tylenol, PRN Dilaudidx1, Oxycodone.Robaxin  Plan of Care Reviewed With: patient  Overall Patient Progress: improving  Goal: Patient-Specific Goal (Individualized)  Description: You can add care plan individualizations to a care plan. Examples of Individualization might be:  \"Parent requests to be called daily at 9am for status\", \"I have a hard time hearing out of my right ear\", or \"Do not touch me to wake me up as it startles  me\".  Outcome: Progressing  Goal: Absence of Hospital-Acquired Illness or Injury  Outcome: Progressing  Intervention: Identify and Manage Fall Risk  Recent Flowsheet Documentation  Taken 7/15/2025 2339 by Mary Downs RN  Safety Promotion/Fall Prevention: activity supervised  Intervention: Prevent Skin Injury  Recent Flowsheet Documentation  Taken 7/16/2025 0425 by Mary Downs RN  Body Position: position changed independently  Taken 7/16/2025 0239 by Mary Downs RN  Body Position: position changed independently  Taken 7/16/2025 0022 by Mary Downs RN  Body Position: position changed independently  Taken 7/15/2025 2339 by Mary Downs RN  Body Position: position changed independently  Intervention: Prevent and Manage VTE (Venous Thromboembolism) Risk  Recent Flowsheet Documentation  Taken 7/15/2025 2339 by Mary Downs, " RN  VTE Prevention/Management: SCDs on (sequential compression devices)  Intervention: Prevent Infection  Recent Flowsheet Documentation  Taken 7/15/2025 2339 by Mary Downs RN  Infection Prevention: rest/sleep promoted  Goal: Optimal Comfort and Wellbeing  Outcome: Progressing  Goal: Readiness for Transition of Care  Outcome: Progressing     Problem: Pain Acute  Goal: Optimal Pain Control and Function  Outcome: Progressing  Intervention: Prevent or Manage Pain  Recent Flowsheet Documentation  Taken 7/15/2025 2339 by Mary Downs RN  Medication Review/Management: medications reviewed     Problem: Surgery Nonspecified  Goal: Absence of Bleeding  Outcome: Progressing  Goal: Effective Bowel Elimination  Outcome: Progressing  Goal: Fluid and Electrolyte Balance  Outcome: Progressing  Goal: Blood Glucose Level Within Targeted Range  Outcome: Progressing  Goal: Absence of Infection Signs and Symptoms  Outcome: Progressing  Goal: Anesthesia/Sedation Recovery  Outcome: Progressing  Intervention: Optimize Anesthesia Recovery  Recent Flowsheet Documentation  Taken 7/15/2025 2339 by Mary Downs RN  Safety Promotion/Fall Prevention: activity supervised  Goal: Optimal Pain Control and Function  Outcome: Progressing  Goal: Nausea and Vomiting Relief  Outcome: Progressing  Goal: Effective Urinary Elimination  Outcome: Progressing  Goal: Effective Oxygenation and Ventilation  Outcome: Progressing  Intervention: Optimize Oxygenation and Ventilation  Recent Flowsheet Documentation  Taken 7/16/2025 0425 by Mary Downs RN  Activity Management:   activity adjusted per tolerance   ambulated to bathroom   back to bed  Head of Bed (HOB) Positioning: HOB at 30-45 degrees  Taken 7/16/2025 0239 by Mary Downs RN  Activity Management:   activity adjusted per tolerance   ambulated to bathroom   back to bed  Taken 7/16/2025 0022 by Mary Downs RN  Activity Management:   activity adjusted per tolerance   ambulated to bathroom   back to  bed  Taken 7/15/2025 4919 by Mary Downs, RN  Cough And Deep Breathing: done independently per patient  Activity Management: activity adjusted per tolerance  Head of Bed (HOB) Positioning: HOB at 30-45 degrees   Goal Outcome Evaluation:      Plan of Care Reviewed With: patient    Overall Patient Progress: improvingOverall Patient Progress: improving    Outcome Evaluation: POD1 pain managed with Scheduled Tylenol, PRN Dilaudidx1, Oxycodone.Robaxin

## 2025-07-16 NOTE — PLAN OF CARE
Physical Therapy Discharge Summary    Reason for therapy discharge:    All goals and outcomes met, no further needs identified.    Progress towards therapy goal(s). See goals on Care Plan in [facility] electronic health record for goal details.  Goals met    Therapy recommendation(s):    Continued therapy is recommended.  Rationale/Recommendations:  Defer to ortho.

## 2025-07-16 NOTE — PROGRESS NOTES
Orthopedic Surgery  Kimberley Mullins  07/16/2025     Admit Date:  7/15/2025    POD: 1 Day Post-Op   Procedure(s):  Right reverse shoulder arthroplasty, right greater tuberosity open treatment and internal fixation, right open long head of biceps tenodesis    Patient resting comfortably in bed this morning.  Pending formal occupational and physical therapy consultations.  Patient does express interest in going home today if all goes well  Pain controlled at rest.  She does have pain on light palpation of the surgical extremity with expectations and in relation to surgical ecchymosis  Tolerating oral intake.    Denies nausea or vomiting.  Denies chest pain or shortness of breath.  Denies numbness and tingling   No new orthopedic complaints today  VSS, afebrile     Temp:  [97.6  F (36.4  C)-98.7  F (37.1  C)] 97.6  F (36.4  C)  Pulse:  [66-95] 81  Resp:  [16-20] 20  BP: (100-134)/(48-65) 134/65  SpO2:  [93 %-97 %] 94 %    Alert and oriented  Dressing is clean, dry, and intact.   Erythema of the right humeral region secondary to surgical ecchymosis.  No signs of cellulitis  Right upper extremity is NVI.  Able to flex, extend, abduct, and adduct digits.  Radial pulse palpable.  Digits are warm and well-perfused.  Sensation intact to light touch.    Labs:  Recent Labs   Lab Test 07/16/25  0635 07/02/25  1231 07/12/24 2003 11/14/23  0000   WBC  --  6.4 5.0 5.3   HGB 10.1* 12.2 12.6 13.3   PLT  --  317 213 216     Recent Labs   Lab Test 07/05/22  1243 06/29/22  1429 06/22/22  1247   INR 2.19* 2.4 2.12*     A/P    1.   DVT prophylaxis: Aspirin 325 mg daily   Mobilize with PT/OT   NWB LLE, Sling at all times except hygiene/PT Begin pendulum and gentle ROM, No flexion >90, No ER >30, No behind the back.    Continue current pain regimen   Dressings: Keep intact.  Change if >50% saturated or peeling off.    Follow-up: 2 weeks post-op with Dr. Schmidt's team     2. Disposition   Anticipate d/c to home when medically cleared and  progressing in PT.      Honey Rashid PA-C  Hospital Rounder [facility] Trauma Team   [facility]  Non-operative provider (Floor KIMBERLY/Trauma Rounds)

## 2025-07-17 ENCOUNTER — APPOINTMENT (OUTPATIENT)
Dept: OCCUPATIONAL THERAPY | Facility: CLINIC | Age: 85
End: 2025-07-17
Attending: STUDENT IN AN ORGANIZED HEALTH CARE EDUCATION/TRAINING PROGRAM
Payer: COMMERCIAL

## 2025-07-17 VITALS
SYSTOLIC BLOOD PRESSURE: 114 MMHG | DIASTOLIC BLOOD PRESSURE: 55 MMHG | TEMPERATURE: 98.4 F | WEIGHT: 140.2 LBS | HEART RATE: 67 BPM | OXYGEN SATURATION: 96 % | BODY MASS INDEX: 24.84 KG/M2 | RESPIRATION RATE: 18 BRPM | HEIGHT: 63 IN

## 2025-07-17 LAB
FASTING STATUS PATIENT QL REPORTED: YES
GLUCOSE SERPL-MCNC: 89 MG/DL (ref 70–99)
HGB BLD-MCNC: 10.4 G/DL (ref 11.7–15.7)
MCV RBC AUTO: 97 FL (ref 78–100)

## 2025-07-17 PROCEDURE — 250N000013 HC RX MED GY IP 250 OP 250 PS 637

## 2025-07-17 PROCEDURE — 97110 THERAPEUTIC EXERCISES: CPT | Mod: GO | Performed by: REHABILITATION PRACTITIONER

## 2025-07-17 PROCEDURE — 85018 HEMOGLOBIN: CPT

## 2025-07-17 PROCEDURE — 82947 ASSAY GLUCOSE BLOOD QUANT: CPT | Performed by: STUDENT IN AN ORGANIZED HEALTH CARE EDUCATION/TRAINING PROGRAM

## 2025-07-17 PROCEDURE — 36415 COLL VENOUS BLD VENIPUNCTURE: CPT

## 2025-07-17 PROCEDURE — 97535 SELF CARE MNGMENT TRAINING: CPT | Mod: GO | Performed by: REHABILITATION PRACTITIONER

## 2025-07-17 RX ADMIN — METOPROLOL TARTRATE 12.5 MG: 25 TABLET, FILM COATED ORAL at 09:12

## 2025-07-17 RX ADMIN — ACETAMINOPHEN 975 MG: 325 TABLET ORAL at 04:24

## 2025-07-17 RX ADMIN — METHOCARBAMOL 250 MG: 500 TABLET ORAL at 09:11

## 2025-07-17 RX ADMIN — LEVOTHYROXINE SODIUM 50 MCG: 0.03 TABLET ORAL at 06:30

## 2025-07-17 RX ADMIN — AMLODIPINE BESYLATE 10 MG: 10 TABLET ORAL at 09:11

## 2025-07-17 RX ADMIN — OXYCODONE HYDROCHLORIDE 5 MG: 5 TABLET ORAL at 11:57

## 2025-07-17 RX ADMIN — SENNOSIDES AND DOCUSATE SODIUM 1 TABLET: 8.6; 5 TABLET ORAL at 09:11

## 2025-07-17 RX ADMIN — OXYCODONE HYDROCHLORIDE 5 MG: 5 TABLET ORAL at 02:05

## 2025-07-17 RX ADMIN — CITALOPRAM HYDROBROMIDE 10 MG: 10 TABLET ORAL at 09:11

## 2025-07-17 RX ADMIN — ACETAMINOPHEN 975 MG: 325 TABLET ORAL at 11:56

## 2025-07-17 RX ADMIN — POLYETHYLENE GLYCOL 3350 17 G: 17 POWDER, FOR SOLUTION ORAL at 09:12

## 2025-07-17 ASSESSMENT — ACTIVITIES OF DAILY LIVING (ADL)
ADLS_ACUITY_SCORE: 42

## 2025-07-17 NOTE — PLAN OF CARE
"Pt alert and orientedx4. Pain managed with scheduled Tylenol, PRN oxycodone and Robaxin. RA. Voiding well. Dressing is clean , dry and intact. Sling in place  Problem: Adult Inpatient Plan of Care  Goal: Plan of Care Review  Description: The Plan of Care Review/Shift note should be completed every shift.  The Outcome Evaluation is a brief statement about your assessment that the patient is improving, declining, or no change.  This information will be displayed automatically on your shift  note.  Outcome: Progressing  Flowsheets (Taken 7/16/2025 2220)  Outcome Evaluation: pain managed with scheduled Tylenol, PRN oxycodone and Robaxin  Plan of Care Reviewed With: patient  Overall Patient Progress: improving  Goal: Patient-Specific Goal (Individualized)  Description: You can add care plan individualizations to a care plan. Examples of Individualization might be:  \"Parent requests to be called daily at 9am for status\", \"I have a hard time hearing out of my right ear\", or \"Do not touch me to wake me up as it startles  me\".  Outcome: Progressing  Goal: Absence of Hospital-Acquired Illness or Injury  Outcome: Progressing  Intervention: Identify and Manage Fall Risk  Recent Flowsheet Documentation  Taken 7/16/2025 2155 by Mary Downs RN  Safety Promotion/Fall Prevention: activity supervised  Intervention: Prevent Skin Injury  Recent Flowsheet Documentation  Taken 7/16/2025 2155 by Mary Downs RN  Body Position: position changed independently  Intervention: Prevent and Manage VTE (Venous Thromboembolism) Risk  Recent Flowsheet Documentation  Taken 7/16/2025 2155 by Mary Downs RN  VTE Prevention/Management: SCDs on (sequential compression devices)  Intervention: Prevent Infection  Recent Flowsheet Documentation  Taken 7/16/2025 2155 by Mary Downs RN  Infection Prevention: hand hygiene promoted  Goal: Optimal Comfort and Wellbeing  Outcome: Progressing  Goal: Readiness for Transition of Care  Outcome: Progressing   "   Problem: Pain Acute  Goal: Optimal Pain Control and Function  Outcome: Progressing  Intervention: Prevent or Manage Pain  Recent Flowsheet Documentation  Taken 7/16/2025 2155 by Mary Downs RN  Medication Review/Management: medications reviewed     Problem: Surgery Nonspecified  Goal: Absence of Bleeding  Outcome: Progressing  Goal: Effective Bowel Elimination  Outcome: Progressing  Goal: Fluid and Electrolyte Balance  Outcome: Progressing  Goal: Blood Glucose Level Within Targeted Range  Outcome: Progressing  Goal: Absence of Infection Signs and Symptoms  Outcome: Progressing  Goal: Anesthesia/Sedation Recovery  Outcome: Progressing  Intervention: Optimize Anesthesia Recovery  Recent Flowsheet Documentation  Taken 7/16/2025 2155 by Mary Downs RN  Safety Promotion/Fall Prevention: activity supervised  Goal: Optimal Pain Control and Function  Outcome: Progressing  Goal: Nausea and Vomiting Relief  Outcome: Progressing  Goal: Effective Urinary Elimination  Outcome: Progressing  Goal: Effective Oxygenation and Ventilation  Outcome: Progressing  Intervention: Optimize Oxygenation and Ventilation  Recent Flowsheet Documentation  Taken 7/16/2025 2155 by Mary Downs RN  Cough And Deep Breathing: done independently per patient  Activity Management:   ambulated to bathroom   back to bed  Head of Bed (HOB) Positioning: HOB at 30-45 degrees   Goal Outcome Evaluation:      Plan of Care Reviewed With: patient    Overall Patient Progress: improvingOverall Patient Progress: improving    Outcome Evaluation: pain managed with scheduled Tylenol, PRN oxycodone and Robaxin

## 2025-07-17 NOTE — DISCHARGE SUMMARY
Discharge Summary    Kimberley Mullins MRN# [id number]   YOB: 1940 Age: 84 year old     Date of Admission:  7/15/2025  Date of Discharge:  7/17/2025  Admitting Physician:  Ramin Schmidt MD  Discharge Physician:  Ramin Schmidt MD     Primary Provider: Katlyn Daniels          Admission Diagnoses:   Right humerus fracture           Discharge Diagnosis:   Right humerus fracture s/p right reverse shoulder arthroplasty            Surgical Procedure:   PROCEDURE PERFORMED:    Right reverse shoulder arthroplasty - 23472  Right greater tuberosity open treatment and internal fixation - 23630  Right open long head of biceps tenodesis - 23430           Secondary Diagnosis:   None           Discharge Disposition:     Discharged to home           Medications Prior to Admission:     Medications Prior to Admission   Medication Sig Dispense Refill Last Dose/Taking    amLODIPine (NORVASC) 5 MG tablet Take 2 tablets (10 mg) by mouth daily. 180 tablet 1 7/14/2025    amoxicillin (AMOXIL) 500 MG capsule Take 4 capsules (2,000 mg) by mouth daily as needed (Please take prior to dental procedures) - Oral 4 capsule 3 More than a month    aspirin (ASA) 81 MG chewable tablet 1 tablet (81 mg) by Oral or NG Tube route daily 90 tablet 0 7/11/2025    calcium carbonate (TUMS) 500 MG chewable tablet Take 1 chew tab by mouth 2 times daily.   Past Week    citalopram (CELEXA) 10 MG tablet Take 1 tablet (10 mg) by mouth daily. 90 tablet 1 7/15/2025 Morning    docusate sodium (COLACE) 100 MG capsule Take 1 capsule (100 mg) by mouth 3 times daily as needed for constipation. 20 capsule 0 Past Month    levothyroxine (SYNTHROID/LEVOTHROID) 50 MCG tablet Take 1 tablet (50 mcg) by mouth daily. 90 tablet 1 7/15/2025 Morning    metoprolol tartrate (LOPRESSOR) 25 MG tablet Take 0.5 tablets (12.5 mg) by mouth 2 times daily. 90 tablet 1 7/15/2025 Morning    Multiple Vitamin (MULTI-VITAMIN DAILY PO) Take 1 tablet by mouth daily.    7/14/2025    Multiple Vitamins-Minerals (PRESERVISION AREDS PO) Take 1 tablet by mouth 2 times daily   7/15/2025 Morning    simvastatin (ZOCOR) 20 MG tablet TAKE 1 TABLET (20 MG) BY MOUTH AT BEDTIME 90 tablet 1 7/14/2025    Vitamin D, Cholecalciferol, 25 MCG (1000 UT) CAPS Take by mouth.   7/15/2025 Morning    [DISCONTINUED] acetaminophen (TYLENOL) 500 MG tablet Take 500-1,000 mg by mouth every 6 hours as needed for mild pain.   7/14/2025             Discharge Medications:     Current Facility-Administered Medications   Medication Dose Route Frequency Provider Last Rate Last Admin    acetaminophen (TYLENOL) tablet 975 mg  975 mg Oral Q8H Avani Burrows PA-C   975 mg at 07/17/25 1156    amLODIPine (NORVASC) tablet 10 mg  10 mg Oral Daily Avani Burrows PA-C   10 mg at 07/17/25 0911    benzocaine-menthol (CHLORASEPTIC) 6-10 MG lozenge 1 lozenge  1 lozenge Buccal Q1H PRN Avani Burrows PA-C        [START ON 7/18/2025] bisacodyl (DULCOLAX) suppository 10 mg  10 mg Rectal Daily PRN Avani Burrows PA-C        citalopram (celeXA) tablet 10 mg  10 mg Oral Daily Avani Burrows PA-C   10 mg at 07/17/25 0911    HYDROmorphone (DILAUDID) injection 0.2 mg  0.2 mg Intravenous Q2H PRN Avani Burrows PA-C        Or    HYDROmorphone (DILAUDID) injection 0.4 mg  0.4 mg Intravenous Q2H PRN Avani Burrows PA-C   0.4 mg at 07/16/25 0959    lactated ringers infusion   Intravenous Continuous Avani Burrows PA-C 50 mL/hr at 07/15/25 2333 Rate Verify at 07/15/25 2333    levothyroxine (SYNTHROID/LEVOTHROID) tablet 50 mcg  50 mcg Oral Daily Avani Burrows PA-C   50 mcg at 07/17/25 0630    lidocaine (LMX4) cream   Topical Q1H PRN Avani Burrows PA-C        lidocaine 1 % 0.1-1 mL  0.1-1 mL Other Q1H PRN Avani Burrows PA-C        magnesium hydroxide (MILK OF MAGNESIA) suspension 30 mL  30 mL Oral Daily PRN Avani Burrows PA-C        methocarbamol (ROBAXIN) half-tab 250 mg  250 mg Oral Q6H PRN  Avani Burrows PA-C   250 mg at 07/17/25 0911    metoprolol tartrate (LOPRESSOR) half-tab 12.5 mg  12.5 mg Oral BID Avani Burrows PA-C   12.5 mg at 07/17/25 0912    naloxone (NARCAN) injection 0.2 mg  0.2 mg Intravenous Q2 Min PRN Ramin Schmidt MD        Or    naloxone (NARCAN) injection 0.4 mg  0.4 mg Intravenous Q2 Min PRN Ramin Schmidt MD        Or    naloxone (NARCAN) injection 0.2 mg  0.2 mg Intramuscular Q2 Min PRN Ramin Schmidt MD        Or    naloxone (NARCAN) injection 0.4 mg  0.4 mg Intramuscular Q2 Min PRN Ramin Schmidt MD        ondansetron (ZOFRAN ODT) ODT tab 4 mg  4 mg Oral Q6H PRN Avani zaragoza PA-C        Or    ondansetron (ZOFRAN) injection 4 mg  4 mg Intravenous Q6H [facility]N [facility]        oxyCODONE (ROXICODONE) tablet 5 mg  5 mg Oral Q4H PRN [facility]   5 mg at 07/17/25 1157    Or    oxyCODONE IR (ROXICODONE) tablet 10 mg  10 mg Oral Q4H PRN [facility]   10 mg at 07/16/25 2133    polyethylene glycol (MIRALAX) Packet 17 g  17 g Oral Daily Avani Burrows PA-C   17 g at 07/17/25 0912    prochlorperazine (COMPAZINE) injection 5 mg  5 mg Intravenous Q6H PRN [facility]        Or    prochlorperazine (COMPAZINE) tablet 5 mg  5 mg Oral Q6H PRN [facility]        rivaroxaban ANTICOAGULANT (XARELTO) tablet 10 mg  10 mg Oral Daily with supper Avani Burrows PA-C   10 mg at 07/16/25 1613    senna-docusate (SENOKOT-S/PERICOLACE) 8.6-50 MG per tablet 1 tablet  1 tablet Oral BID Avani Burrows PA-C   1 tablet at 07/17/25 0911    sodium chloride (PF) 0.9% PF flush 3 mL  3 mL Intracatheter Q8H NEHA Avani Burrows PA-C   3 mL at 07/17/25 0912    sodium chloride (PF) 0.9% PF flush 3 mL  3 mL Intracatheter q1 min prn Avani Burrows PA-C   3 mL at 07/16/25 0959             Consultations:     No consultations were requested during this admission           Hospital Course:     The patient was  admitted after the surgical procedure by Dr. Ramin Schmidt. The patient underwent an uneventful right reverse shoulder arthroplasty on 7/15/2025 with an expected discharge on 7/16/2025. Patient had difficulty with OT, pain control. She remained another night and was stable for discharge on 7/17/2025.              Pending Results:     None           Discharge Instructions and Follow-Up:          Discharge activity: Activity as tolerated   Discharge follow-up: Follow-up with surgeon in 2 weeks    Outpatient therapy: None    Home Care agency: None    Supplies and equipment: None        Wound care: leave dressing in place   Other instructions: None

## 2025-07-17 NOTE — PROGRESS NOTES
Occupational Therapy Discharge Summary    Reason for therapy discharge:    All goals and outcomes met, no further needs identified.    Progress towards therapy goal(s). See goals on Care Plan in [facility] electronic health record for goal details.  Goals met    Therapy recommendation(s):    No further therapy is recommended.  Continue home exercise program.

## 2025-07-17 NOTE — PLAN OF CARE
"Goal Outcome Evaluation:      Plan of Care Reviewed With: patient      Patient vital signs are at baseline: Yes  Patient able to ambulate as they were prior to admission or with assist devices provided by therapies during their stay:  Yes  Patient MUST void prior to discharge:  Yes  Patient able to tolerate oral intake:  Yes  Pain has adequate pain control using Oral analgesics:  Yes  Does patient have an identified :  Yes  Has goal D/C date and time been discussed with patient:  Yes, today with family   /55 (BP Location: Left arm)   Pulse 67   Temp 98.4  F (36.9  C) (Oral)   Resp 18   Ht 1.6 m (5' 2.99\")   Wt 63.6 kg (140 lb 3.2 oz)   LMP  (LMP Unknown)   SpO2 96%   Breastfeeding No   BMI 24.84 kg/m     Problem: Adult Inpatient Plan of Care  Goal: Plan of Care Review  Description: The Plan of Care Review/Shift note should be completed every shift.  The Outcome Evaluation is a brief statement about your assessment that the patient is improving, declining, or no change.  This information will be displayed automatically on your shift  note.  Outcome: Met  Goal: Patient-Specific Goal (Individualized)  Description: You can add care plan individualizations to a care plan. Examples of Individualization might be:  \"Parent requests to be called daily at 9am for status\", \"I have a hard time hearing out of my right ear\", or \"Do not touch me to wake me up as it startles  me\".  Outcome: Met  Goal: Absence of Hospital-Acquired Illness or Injury  Outcome: Met  Intervention: Identify and Manage Fall Risk  Recent Flowsheet Documentation  Taken 7/17/2025 1100 by Bernadine Martinez RN  Safety Promotion/Fall Prevention:   assistive device/personal items within reach   clutter free environment maintained   nonskid shoes/slippers when out of bed   safety round/check completed  Intervention: Prevent Skin Injury  Recent Flowsheet Documentation  Taken 7/17/2025 1100 by Bernadine Martinez RN  Body Position: position " changed independently  Taken 7/17/2025 0900 by Bernadine Martinez RN  Body Position: position changed independently  Intervention: Prevent and Manage VTE (Venous Thromboembolism) Risk  Recent Flowsheet Documentation  Taken 7/17/2025 1100 by Bernadine Martinez RN  VTE Prevention/Management: SCDs on (sequential compression devices)  Intervention: Prevent Infection  Recent Flowsheet Documentation  Taken 7/17/2025 1100 by Bernadine Martinez RN  Infection Prevention: hand hygiene promoted  Goal: Optimal Comfort and Wellbeing  Outcome: Met  Intervention: Monitor Pain and Promote Comfort  Recent Flowsheet Documentation  Taken 7/17/2025 0900 by Bernadine Martinez RN  Pain Management Interventions: medication (see MAR)  Goal: Readiness for Transition of Care  Outcome: Met     Problem: Pain Acute  Goal: Optimal Pain Control and Function  Outcome: Met  Intervention: Develop Pain Management Plan  Recent Flowsheet Documentation  Taken 7/17/2025 0900 by Bernadine Martinez RN  Pain Management Interventions: medication (see MAR)  Intervention: Prevent or Manage Pain  Recent Flowsheet Documentation  Taken 7/17/2025 1100 by Bernadine Martinez RN  Medication Review/Management: medications reviewed     Problem: Surgery Nonspecified  Goal: Absence of Bleeding  Outcome: Met  Goal: Effective Bowel Elimination  Outcome: Met  Goal: Fluid and Electrolyte Balance  Outcome: Met  Goal: Blood Glucose Level Within Targeted Range  Outcome: Met  Goal: Absence of Infection Signs and Symptoms  Outcome: Met  Goal: Anesthesia/Sedation Recovery  Outcome: Met  Intervention: Optimize Anesthesia Recovery  Recent Flowsheet Documentation  Taken 7/17/2025 1100 by Bernadine Martinez RN  Safety Promotion/Fall Prevention:   assistive device/personal items within reach   clutter free environment maintained   nonskid shoes/slippers when out of bed   safety round/check completed  Patient Tolerance (IS): good  Goal: Optimal Pain Control and Function  Outcome:  Met  Intervention: Prevent or Manage Pain  Recent Flowsheet Documentation  Taken 7/17/2025 0900 by Bernadine Martinez RN  Pain Management Interventions: medication (see MAR)  Goal: Nausea and Vomiting Relief  Outcome: Met  Goal: Effective Urinary Elimination  Outcome: Met  Goal: Effective Oxygenation and Ventilation  Outcome: Met  Intervention: Optimize Oxygenation and Ventilation  Recent Flowsheet Documentation  Taken 7/17/2025 1100 by Bernadine Martinez RN  Cough And Deep Breathing: done independently per patient  Activity Management:   ambulated to bathroom   back to bed  Head of Bed (HOB) Positioning: HOB at 30-45 degrees  Taken 7/17/2025 0900 by Bernadine Martinez RN  Activity Management:   ambulated to bathroom   back to bed  Head of Bed (HOB) Positioning: HOB at 30-45 degrees     Outcome Evaluation: Pt A&O, 1 asssist for mobility, having a lot of pain in the morning PRN IV Dilauded were given, now pain is better control with oral oxycoden, robaxin, tylenol, voided adequately, ate 100% of her meals, family present in the room, sling on right sholder/arm.

## 2025-07-17 NOTE — PLAN OF CARE
"Patient is A&Ox4. SBA to BR. Right should pain managed w/ scheduled and PRN pain medications. Right sling remains in place. CMS intact.     Goal Outcome Evaluation:      Plan of Care Reviewed With: patient    Overall Patient Progress: improving    Outcome Evaluation: Pain mgmt.    Problem: Adult Inpatient Plan of Care  Goal: Plan of Care Review  Description: The Plan of Care Review/Shift note should be completed every shift.  The Outcome Evaluation is a brief statement about your assessment that the patient is improving, declining, or no change.  This information will be displayed automatically on your shift  note.  Outcome: Progressing  Flowsheets (Taken 7/17/2025 0620)  Outcome Evaluation: Pain mgmt.  Plan of Care Reviewed With: patient  Overall Patient Progress: improving  Goal: Patient-Specific Goal (Individualized)  Description: You can add care plan individualizations to a care plan. Examples of Individualization might be:  \"Parent requests to be called daily at 9am for status\", \"I have a hard time hearing out of my right ear\", or \"Do not touch me to wake me up as it startles  me\".  Outcome: Progressing  Goal: Absence of Hospital-Acquired Illness or Injury  Outcome: Progressing  Intervention: Identify and Manage Fall Risk  Recent Flowsheet Documentation  Taken 7/17/2025 0205 by Emi Cardenas RN  Safety Promotion/Fall Prevention: activity supervised  Intervention: Prevent Skin Injury  Recent Flowsheet Documentation  Taken 7/17/2025 0205 by Emi Cardenas RN  Body Position: position changed independently  Intervention: Prevent and Manage VTE (Venous Thromboembolism) Risk  Recent Flowsheet Documentation  Taken 7/17/2025 0205 by Emi Cardenas RN  VTE Prevention/Management: SCDs on (sequential compression devices)  Intervention: Prevent Infection  Recent Flowsheet Documentation  Taken 7/17/2025 0205 by Emi Cardenas RN  Infection Prevention: hand hygiene promoted  Goal: Optimal Comfort and " Wellbeing  Outcome: Progressing  Intervention: Monitor Pain and Promote Comfort  Recent Flowsheet Documentation  Taken 7/17/2025 0205 by Emi Cardenas RN  Pain Management Interventions: medication (see MAR)  Goal: Readiness for Transition of Care  Outcome: Progressing     Problem: Pain Acute  Goal: Optimal Pain Control and Function  Outcome: Progressing  Intervention: Develop Pain Management Plan  Recent Flowsheet Documentation  Taken 7/17/2025 0205 by Emi Cardenas RN  Pain Management Interventions: medication (see MAR)  Intervention: Prevent or Manage Pain  Recent Flowsheet Documentation  Taken 7/17/2025 0205 by Emi Cardenas RN  Medication Review/Management: medications reviewed     Problem: Surgery Nonspecified  Goal: Absence of Bleeding  Outcome: Progressing  Goal: Effective Bowel Elimination  Outcome: Progressing  Goal: Fluid and Electrolyte Balance  Outcome: Progressing  Goal: Blood Glucose Level Within Targeted Range  Outcome: Progressing  Goal: Absence of Infection Signs and Symptoms  Outcome: Progressing  Goal: Anesthesia/Sedation Recovery  Outcome: Progressing  Intervention: Optimize Anesthesia Recovery  Recent Flowsheet Documentation  Taken 7/17/2025 0205 by Emi Cardenas RN  Safety Promotion/Fall Prevention: activity supervised  Goal: Optimal Pain Control and Function  Outcome: Progressing  Intervention: Prevent or Manage Pain  Recent Flowsheet Documentation  Taken 7/17/2025 0205 by Emi Cardenas RN  Pain Management Interventions: medication (see MAR)  Goal: Nausea and Vomiting Relief  Outcome: Progressing  Goal: Effective Urinary Elimination  Outcome: Progressing  Goal: Effective Oxygenation and Ventilation  Outcome: Progressing  Intervention: Optimize Oxygenation and Ventilation  Recent Flowsheet Documentation  Taken 7/17/2025 0205 by Emi Cardenas RN  Cough And Deep Breathing: done independently per patient  Activity Management:   ambulated to bathroom   back to bed  Head of Bed  (\[facility]\"") Positioning: \[facility] [place] [facility]\"" at 30-45 degrees

## 2025-07-17 NOTE — PROGRESS NOTES
Orthopedic Surgery  Kimberley Mullins  07/17/2025     Admit Date:  7/15/2025    POD: 2 Days Post-Op   Procedure(s):  Right reverse shoulder arthroplasty, right greater tuberosity open treatment and internal fixation, right open long head of biceps tenodesis    Patient resting comfortably in bed this morning. Pain better controlled today   Tolerating oral intake.    Denies nausea or vomiting.  Denies chest pain or shortness of breath.  Denies numbness and tingling   No new orthopedic complaints today  VSS, afebrile     Temp:  [98.1  F (36.7  C)-98.5  F (36.9  C)] 98.4  F (36.9  C)  Pulse:  [67-84] 67  Resp:  [18-20] 18  BP: (114-133)/(55-66) 114/55  SpO2:  [95 %-96 %] 96 %    Alert and oriented  Dressing is clean, dry, and intact.   Erythema resolving of RUE, near;y no post surgical ecchymosis   Right upper extremity is NVI.  Able to flex, extend, abduct, and adduct digits.  Radial pulse palpable.  Digits are warm and well-perfused.  Sensation intact to light touch.    Labs:  Recent Labs   Lab Test 07/17/25  0649 07/16/25  0635 07/02/25  1231 07/12/24 2003 11/14/23  0000   WBC  --   --  6.4 5.0 5.3   HGB 10.4* 10.1* 12.2 12.6 13.3   PLT  --   --  317 213 216     Recent Labs   Lab Test 07/05/22  1243 06/29/22  1429 06/22/22  1247   INR 2.19* 2.4 2.12*     A/P    1.   DVT prophylaxis: Xarelto x 4 weeks per cardiology   Mobilize with PT/OT   NWB LLE, Sling at all times except hygiene/PT Begin pendulum and gentle ROM, No flexion >90, No ER >30, No behind the back.    Continue current pain regimen   Dressings: Keep intact.  Change if >50% saturated or peeling off.    Follow-up: 2 weeks post-op with Dr. Schmidt's team     2. Disposition   Discharge home today.       Honey Rashid PA-C  [facility] Trauma Team   [facility]  Non-operative provider (Floor KIMBERLY/Trauma Rounds)

## 2025-08-06 ENCOUNTER — TRANSFERRED RECORDS (OUTPATIENT)
Dept: FAMILY MEDICINE | Facility: CLINIC | Age: 85
End: 2025-08-06

## 2025-08-19 DIAGNOSIS — Z87.74 S/P VENTRICULAR SEPTAL DEFECT REPAIR: ICD-10-CM

## 2025-08-21 RX ORDER — AMOXICILLIN 500 MG/1
CAPSULE ORAL
Qty: 4 CAPSULE | Refills: 3 | Status: SHIPPED | OUTPATIENT
Start: 2025-08-21

## 2025-09-04 ENCOUNTER — OFFICE VISIT (OUTPATIENT)
Dept: CARDIOLOGY | Facility: CLINIC | Age: 85
End: 2025-09-04
Payer: COMMERCIAL

## 2025-09-04 VITALS
DIASTOLIC BLOOD PRESSURE: 78 MMHG | HEART RATE: 84 BPM | OXYGEN SATURATION: 96 % | WEIGHT: 138 LBS | SYSTOLIC BLOOD PRESSURE: 128 MMHG | BODY MASS INDEX: 24.45 KG/M2 | HEIGHT: 63 IN

## 2025-09-04 DIAGNOSIS — E78.5 HYPERLIPIDEMIA LDL GOAL <100: ICD-10-CM

## 2025-09-04 DIAGNOSIS — I10 BENIGN ESSENTIAL HYPERTENSION: ICD-10-CM

## 2025-09-04 DIAGNOSIS — Z87.74 S/P VENTRICULAR SEPTAL DEFECT REPAIR: Primary | ICD-10-CM

## 2025-09-04 DIAGNOSIS — Q21.0 VSD (VENTRICULAR SEPTAL DEFECT), MUSCULAR: ICD-10-CM

## (undated) DEVICE — CATH ANGIO INFINITI 3DRC 6FRX100CM 534676T

## (undated) DEVICE — SUTURE MONOCRYL+ 2-0 CT-1 36" UNDYED MCP945H

## (undated) DEVICE — LEAD ELEC MYOCARDIO PACING TEMPORARY MEDTRONIC

## (undated) DEVICE — DRSG STERI STRIP 1/2X4" R1547

## (undated) DEVICE — SLEEVE TR BAND RADIAL COMPRESSION DEVICE 24CM TRB24-REG

## (undated) DEVICE — DRSG TEGADERM 4X10" 1627

## (undated) DEVICE — INTRO SHEATH 7FRX25CM PINNACLE RSS706

## (undated) DEVICE — DEFIB PRO-PADZ LVP LQD GEL ADULT 8900-2105-01

## (undated) DEVICE — Device

## (undated) DEVICE — BRUSH SURGICAL SCRUB W/4% CHG SOL 25ML 371073

## (undated) DEVICE — SNARE AMPLATZ GOOSENECK 6FRX20MM 120CM CATH KIT GN2000

## (undated) DEVICE — GLOVE PROTEXIS POWDER FREE 7.5 ORTHOPEDIC 2D73ET75

## (undated) DEVICE — ESU HOLSTER PLASTIC DISP E2400

## (undated) DEVICE — DRSG GAUZE 4X4" 8044

## (undated) DEVICE — SU ETHIBOND 2-0 V-7DA 10X30" 10X72

## (undated) DEVICE — ADH BIOGLUE CARTRIDGE 10ML BG3510-5-US

## (undated) DEVICE — SUCTION MANIFOLD NEPTUNE 2 SYS 4 PORT 0702-020-000

## (undated) DEVICE — WIRE GUIDE 0.025"X150CM EMERALD J TIP 502524

## (undated) DEVICE — CONNECTOR DRAIN CHEST Y EXTENSION SET 19909

## (undated) DEVICE — GUIDEWIRE TORNIER AEQUALIS PERFORM +  2.5X220MM DWD017

## (undated) DEVICE — INTRO GLIDESHEATH SLENDER 6FR 10X45CM 60-1060

## (undated) DEVICE — GLOVE PROTEXIS POWDER FREE SMT 6.5  2D72PT65X

## (undated) DEVICE — SU DERMABOND ADVANCED .7ML DNX12

## (undated) DEVICE — SYR BULB IRRIG DOVER 60 ML LATEX FREE 67000

## (undated) DEVICE — WIRE GUIDE 0.035"X150CM EMERALD J TIP 502521

## (undated) DEVICE — CATH ANGIO INFINITI JR4 4FRX100CM 538421

## (undated) DEVICE — SU ETHIBOND 3-0 BBDA 36" X588H

## (undated) DEVICE — SU PROLENE 4-0 RB-1DA 36" 8557H

## (undated) DEVICE — SU ETHIBOND 0 CT-1 CR 8X18" CX21D

## (undated) DEVICE — SU ETHIBOND 2-0 V-7 DA 10X30" PXX77

## (undated) DEVICE — SU FIBERWIRE 2 38"  AR-7200

## (undated) DEVICE — CATH ANGIO SUPERTORQUE PLUS JR4 6FRX100CM 533621

## (undated) DEVICE — LINEN HALF SHEET 5512

## (undated) DEVICE — ESU GROUND PAD ADULT W/CORD E7507

## (undated) DEVICE — GUIDEWIRE VASC 0.014INX180CM RUNTHROUGH 25-1011

## (undated) DEVICE — GLOVE BIOGEL PI SZ 7.5 40875

## (undated) DEVICE — SUCTION TIP YANKAUER STR K87

## (undated) DEVICE — DRAPE CONVERTORS U-DRAPE 60X72" 8476

## (undated) DEVICE — TUBING PRESSURE 30"

## (undated) DEVICE — GW VASC .035IN DIA 260CML 7CML 3 MM RADIUS J CURVE 502455

## (undated) DEVICE — SPONGE LAP 18X18" X8435

## (undated) DEVICE — SU STRATAFIX PDS PLUS 2-0 SPIRAL CT-1 45CM SXPP1B411

## (undated) DEVICE — DRAPE IOBAN INCISE 23X17" 6650EZ

## (undated) DEVICE — PACK SET-UP STD 9102

## (undated) DEVICE — LINEN ORTHO ACL PACK 5447

## (undated) DEVICE — PREP CHLORAPREP 26ML TINTED HI-LITE ORANGE 930815

## (undated) DEVICE — INTRO SHEATH 8FRX10CM PINNACLE RSS802

## (undated) DEVICE — DRSG DRAIN 4X4" 7086

## (undated) DEVICE — GLOVE PROTEXIS BLUE W/NEU-THERA 7.5  2D73EB75

## (undated) DEVICE — IMM SHOULDER  ABDUCT ULTRASLING III MED 11-0449-3

## (undated) DEVICE — CATH ANGIO INFINITI 3DRC 4FRX100CM 538476

## (undated) DEVICE — PACK SHOULDER RIDGES

## (undated) DEVICE — MANIFOLD KIT ANGIO AUTOMATED 014613

## (undated) DEVICE — BLADE SAW STERNAL 20X30MM KM-32

## (undated) DEVICE — SUCTION TIP YANKAUER W/O VENT K86

## (undated) DEVICE — DRSG TEGADERM 8X12" 1629

## (undated) DEVICE — SU PROLENE 3-0 SHDA 36" 8522H

## (undated) DEVICE — TUBING INSUFFLATION PNEUMOCLEAR 0620050100

## (undated) DEVICE — SURGICEL HEMOSTAT 4X8" 1952

## (undated) DEVICE — DEVICE RETRIEVER HEWSON 71111579

## (undated) DEVICE — INTRO SHEATH 6FRX25CM PINNACLE RSS606

## (undated) DEVICE — DRSG ABDOMINAL 07 1/2X8" 7197D

## (undated) DEVICE — SOLUTION IV IRRIGATION 0.9% NACL 3L R8206

## (undated) DEVICE — SU SILK 0 TIE 6X30" A306H

## (undated) DEVICE — CLOSURE DEVICE ZIPLINE 16CM ZIP16 PS1160

## (undated) DEVICE — INTRO SHEATH 7FRX10CM PINNACLE RSS702

## (undated) DEVICE — PREP CHLORAPREP 26ML TINTED ORANGE  260815

## (undated) DEVICE — TIES BANDING T50R

## (undated) DEVICE — SU ETHIBOND 2-0 SHDA 30" X563H

## (undated) DEVICE — BLADE KNIFE SURG 10 371110

## (undated) DEVICE — SU ETHIBOND 5 V-37 4X30" MB66G

## (undated) DEVICE — CATH ANGIO INFINITI JL4 4FRX100CM 538420

## (undated) DEVICE — SUCTION DRY CHEST DRAIN OASIS 3600-100

## (undated) DEVICE — LINEN FULL SHEET 5511

## (undated) DEVICE — DRAPE FLUID WARMING 52 X 60" ORS-321

## (undated) DEVICE — TOURNIQUET VASCULAR KIT 7 1/2" 79012

## (undated) DEVICE — ESU BIPOLAR SEALER AQUAMANTYS 6MM 23-112-1

## (undated) DEVICE — SET HANDPIECE INTERPULSE W/COAXIAL FAN SPRAY TIP 0210118000

## (undated) DEVICE — RAD CLOSURE ANGIOSEAL 8FR  610131

## (undated) DEVICE — SU PROLENE 3-0 MH 36" 8842H

## (undated) DEVICE — DRAIN CHEST TUBE 32FR STR 8032

## (undated) DEVICE — KIT SHOULDER POSITIONING BEACH CHAIR ARM HOLDER AR-1644

## (undated) DEVICE — SU STEEL 6 CCS 4X18" M654G

## (undated) DEVICE — ESU ELEC BLADE 6" COATED/INSULATED E1455-6

## (undated) DEVICE — GLOVE PROTEXIS BLUE W/NEU-THERA 6.5  2D73EB65

## (undated) DEVICE — INTRO SHEATH AVANTI 4FRX23CM 504604T

## (undated) DEVICE — ESU ELEC BLADE 2.75" COATED/INSULATED E1455

## (undated) DEVICE — ADH SKIN CLOSURE PREMIERPRO EXOFIN 1.0ML 3470

## (undated) DEVICE — PREP POVIDONE-IODINE 10% SOL 3/4OZ POUCH STRL EA-REF885-50

## (undated) DEVICE — INTRO SHEATH MICRO PLATINUM TIP 4FRX40CM 7274

## (undated) DEVICE — SOL NACL 0.9% IRRIG 3000ML BAG 2B7477

## (undated) DEVICE — SU VICRYL+ 0 27IN CT-2 UND VCP270H

## (undated) DEVICE — CATH ANGIO WEDGE PRESSURE 6FRX110CM DL AI-07126

## (undated) DEVICE — CATH ANGIO INFINITI JL5 4FRX100CM 538422

## (undated) DEVICE — PACK ADULT HEART UMMC PV15CG92D

## (undated) DEVICE — LINEN TOWEL PACK X6 WHITE 5487

## (undated) DEVICE — SU PLEDGET SOFT TFE 3/8"X3/26"X1/16" PCP40

## (undated) DEVICE — DRAPE ARTHROSCOPY SHOULDER BEACHCHAIR 29369

## (undated) DEVICE — DRILL BIT PERIPHERAL SCREW 3.2MM MWJ126

## (undated) DEVICE — ESU PENCIL SMOKE EVAC W/ROCKER SWITCH 0703-047-000

## (undated) DEVICE — STRATAFIX 3-0

## (undated) DEVICE — NDL COUNTER 40CT  31142311

## (undated) DEVICE — SUCTION CATH AIRLIFE TRI-FLO W/CONTROL PORT 14FR  T60C

## (undated) DEVICE — RX SURGIFLO HEMOSTATIC MATRIX W/THROMBIN 8ML 2994

## (undated) DEVICE — BLADE CLIPPER SGL USE 9680

## (undated) DEVICE — SU STRATAFIX PDS PLUS 0 CT 45CM SXPP1A406

## (undated) DEVICE — PACK HEART LEFT CUSTOM

## (undated) DEVICE — VALVE HEMOSTASIS .096" COPILOT MECH 1003331

## (undated) DEVICE — DRAPE STERI U 1015

## (undated) DEVICE — ESU PENCIL W/SMOKE EVAC CVPLP2000

## (undated) DEVICE — BLADE SAW SAGITTAL STRK 18X90X1.27MM HD SYS 6 6118-127-090

## (undated) DEVICE — TOURNIQUET VASCULAR KIT ARGYLE 8888-585000

## (undated) DEVICE — DRAIN CHEST TUBE RIGHT ANGLED 28FR 8128

## (undated) DEVICE — GLIDEWIRE TERUMO .035X180CM 1.5,, J-TIP GR3525

## (undated) DEVICE — SOL NACL 0.9% IRRIG 1000ML BOTTLE 2F7124

## (undated) DEVICE — KIT HAND CONTROL ACIST 014644 AR-P54

## (undated) DEVICE — SOLUTION WOUND CLEANSING 3/4OZ 10% PVP EA-L3011FB-50

## (undated) DEVICE — SU PROLENE 4-0 SHDA 36" 8521H

## (undated) DEVICE — CATH ANGIO INFINITI PIGTAIL 145 6 SH 6FRX110CM  534-652S

## (undated) DEVICE — DRSG KERLIX FLUFFS X5

## (undated) DEVICE — BAG CLEAR TRASH 1.3M 39X33" P4040C

## (undated) RX ORDER — CEFAZOLIN SODIUM 1 G/3ML
INJECTION, POWDER, FOR SOLUTION INTRAMUSCULAR; INTRAVENOUS
Status: DISPENSED
Start: 2022-01-31

## (undated) RX ORDER — PROTAMINE SULFATE 10 MG/ML
INJECTION, SOLUTION INTRAVENOUS
Status: DISPENSED
Start: 2022-01-31

## (undated) RX ORDER — HYDROMORPHONE HYDROCHLORIDE 1 MG/ML
INJECTION, SOLUTION INTRAMUSCULAR; INTRAVENOUS; SUBCUTANEOUS
Status: DISPENSED
Start: 2022-02-04

## (undated) RX ORDER — CEFAZOLIN SODIUM/WATER 2 G/20 ML
SYRINGE (ML) INTRAVENOUS
Status: DISPENSED
Start: 2025-07-15

## (undated) RX ORDER — FENTANYL CITRATE 50 UG/ML
INJECTION, SOLUTION INTRAMUSCULAR; INTRAVENOUS
Status: DISPENSED
Start: 2022-02-04

## (undated) RX ORDER — DEXAMETHASONE SODIUM PHOSPHATE 4 MG/ML
INJECTION, SOLUTION INTRA-ARTICULAR; INTRALESIONAL; INTRAMUSCULAR; INTRAVENOUS; SOFT TISSUE
Status: DISPENSED
Start: 2025-07-15

## (undated) RX ORDER — EPHEDRINE SULFATE 50 MG/ML
INJECTION, SOLUTION INTRAMUSCULAR; INTRAVENOUS; SUBCUTANEOUS
Status: DISPENSED
Start: 2025-07-15

## (undated) RX ORDER — ASPIRIN 81 MG/1
TABLET, CHEWABLE ORAL
Status: DISPENSED
Start: 2022-02-04

## (undated) RX ORDER — KETOROLAC TROMETHAMINE 30 MG/ML
INJECTION, SOLUTION INTRAMUSCULAR; INTRAVENOUS
Status: DISPENSED
Start: 2025-07-15

## (undated) RX ORDER — FENTANYL CITRATE 50 UG/ML
INJECTION, SOLUTION INTRAMUSCULAR; INTRAVENOUS
Status: DISPENSED
Start: 2025-07-15

## (undated) RX ORDER — FENTANYL CITRATE 50 UG/ML
INJECTION, SOLUTION INTRAMUSCULAR; INTRAVENOUS
Status: DISPENSED
Start: 2022-01-25

## (undated) RX ORDER — FENTANYL CITRATE-0.9 % NACL/PF 10 MCG/ML
PLASTIC BAG, INJECTION (ML) INTRAVENOUS
Status: DISPENSED
Start: 2025-07-15

## (undated) RX ORDER — PROPOFOL 10 MG/ML
INJECTION, EMULSION INTRAVENOUS
Status: DISPENSED
Start: 2025-07-15

## (undated) RX ORDER — HEPARIN SODIUM 1000 [USP'U]/ML
INJECTION, SOLUTION INTRAVENOUS; SUBCUTANEOUS
Status: DISPENSED
Start: 2022-01-31

## (undated) RX ORDER — CEFAZOLIN SODIUM 1 G/3ML
INJECTION, POWDER, FOR SOLUTION INTRAMUSCULAR; INTRAVENOUS
Status: DISPENSED
Start: 2022-02-04

## (undated) RX ORDER — CHLORHEXIDINE GLUCONATE ORAL RINSE 1.2 MG/ML
SOLUTION DENTAL
Status: DISPENSED
Start: 2022-02-04

## (undated) RX ORDER — NICARDIPINE HCL-0.9% SOD CHLOR 1 MG/10 ML
SYRINGE (ML) INTRAVENOUS
Status: DISPENSED
Start: 2022-01-25

## (undated) RX ORDER — FENTANYL CITRATE 50 UG/ML
INJECTION, SOLUTION INTRAMUSCULAR; INTRAVENOUS
Status: DISPENSED
Start: 2022-01-31

## (undated) RX ORDER — ACETAMINOPHEN 325 MG/1
TABLET ORAL
Status: DISPENSED
Start: 2022-02-04

## (undated) RX ORDER — VANCOMYCIN HYDROCHLORIDE 1 G/20ML
INJECTION, POWDER, LYOPHILIZED, FOR SOLUTION INTRAVENOUS
Status: DISPENSED
Start: 2025-07-15

## (undated) RX ORDER — HEPARIN SODIUM 1000 [USP'U]/ML
INJECTION, SOLUTION INTRAVENOUS; SUBCUTANEOUS
Status: DISPENSED
Start: 2022-02-04

## (undated) RX ORDER — FENTANYL CITRATE-0.9 % NACL/PF 10 MCG/ML
PLASTIC BAG, INJECTION (ML) INTRAVENOUS
Status: DISPENSED
Start: 2022-01-31

## (undated) RX ORDER — GABAPENTIN 100 MG/1
CAPSULE ORAL
Status: DISPENSED
Start: 2022-02-04

## (undated) RX ORDER — HEPARIN SODIUM 1000 [USP'U]/ML
INJECTION, SOLUTION INTRAVENOUS; SUBCUTANEOUS
Status: DISPENSED
Start: 2022-01-25

## (undated) RX ORDER — LIDOCAINE HYDROCHLORIDE 10 MG/ML
INJECTION, SOLUTION EPIDURAL; INFILTRATION; INTRACAUDAL; PERINEURAL
Status: DISPENSED
Start: 2025-07-15

## (undated) RX ORDER — TRANEXAMIC ACID 10 MG/ML
INJECTION, SOLUTION INTRAVENOUS
Status: DISPENSED
Start: 2025-07-15

## (undated) RX ORDER — ONDANSETRON 2 MG/ML
INJECTION INTRAMUSCULAR; INTRAVENOUS
Status: DISPENSED
Start: 2025-07-15

## (undated) RX ORDER — BUPIVACAINE HYDROCHLORIDE AND EPINEPHRINE 2.5; 5 MG/ML; UG/ML
INJECTION, SOLUTION EPIDURAL; INFILTRATION; INTRACAUDAL; PERINEURAL
Status: DISPENSED
Start: 2025-07-15

## (undated) RX ORDER — NITROGLYCERIN 5 MG/ML
VIAL (ML) INTRAVENOUS
Status: DISPENSED
Start: 2022-01-25

## (undated) RX ORDER — CEFAZOLIN SODIUM 1 G/50ML
SOLUTION INTRAVENOUS
Status: DISPENSED
Start: 2022-02-04

## (undated) RX ORDER — FAMOTIDINE 20 MG/1
TABLET, FILM COATED ORAL
Status: DISPENSED
Start: 2022-02-04